# Patient Record
Sex: FEMALE | Race: ASIAN | NOT HISPANIC OR LATINO | Employment: UNEMPLOYED | URBAN - METROPOLITAN AREA
[De-identification: names, ages, dates, MRNs, and addresses within clinical notes are randomized per-mention and may not be internally consistent; named-entity substitution may affect disease eponyms.]

---

## 2017-02-09 ENCOUNTER — TRANSFERRED RECORDS (OUTPATIENT)
Dept: HEALTH INFORMATION MANAGEMENT | Facility: CLINIC | Age: 42
End: 2017-02-09

## 2017-02-10 ENCOUNTER — TRANSFERRED RECORDS (OUTPATIENT)
Dept: HEALTH INFORMATION MANAGEMENT | Facility: CLINIC | Age: 42
End: 2017-02-10

## 2017-11-17 ENCOUNTER — TRANSFERRED RECORDS (OUTPATIENT)
Dept: HEALTH INFORMATION MANAGEMENT | Facility: CLINIC | Age: 42
End: 2017-11-17

## 2019-07-22 ENCOUNTER — RECORDS - HEALTHEAST (OUTPATIENT)
Dept: ADMINISTRATIVE | Facility: OTHER | Age: 44
End: 2019-07-22

## 2019-07-22 ENCOUNTER — ANESTHESIA - HEALTHEAST (OUTPATIENT)
Dept: INTERVENTIONAL RADIOLOGY/VASCULAR | Facility: CLINIC | Age: 44
End: 2019-07-22

## 2019-07-22 ENCOUNTER — HOSPITAL ENCOUNTER (INPATIENT)
Dept: INTENSIVE CARE | Facility: CLINIC | Age: 44
Discharge: NURSING, REHAB OR LONG-TERM CARE | End: 2019-08-20
Attending: INTERNAL MEDICINE | Admitting: HOSPITALIST
Payer: COMMERCIAL

## 2019-07-22 DIAGNOSIS — G91.9 HYDROCEPHALUS (H): ICD-10-CM

## 2019-07-22 DIAGNOSIS — J96.00 ACUTE RESPIRATORY FAILURE, UNSPECIFIED WHETHER WITH HYPOXIA OR HYPERCAPNIA (H): ICD-10-CM

## 2019-07-22 LAB
ABO/RH(D): NORMAL
ACT BLD: 142 SECONDS (ref 105–167)
ACT BLD: 227 SECONDS (ref 105–167)
ACT BLD: 239 SECONDS (ref 105–167)
ACT BLD: 251 SECONDS (ref 105–167)
ANTIBODY SCREEN: NEGATIVE
LACTATE SERPL-SCNC: 1.9 MMOL/L (ref 0.5–2.2)
LACTATE SERPL-SCNC: 3.1 MMOL/L (ref 0.5–2.2)

## 2019-07-22 ASSESSMENT — MIFFLIN-ST. JEOR
SCORE: 1238.73

## 2019-07-23 ENCOUNTER — RECORDS - HEALTHEAST (OUTPATIENT)
Dept: ADMINISTRATIVE | Facility: OTHER | Age: 44
End: 2019-07-23

## 2019-07-23 LAB
ANION GAP SERPL CALCULATED.3IONS-SCNC: 10 MMOL/L (ref 5–18)
BUN SERPL-MCNC: 9 MG/DL (ref 8–22)
CALCIUM SERPL-MCNC: 8.4 MG/DL (ref 8.5–10.5)
CHLORIDE BLD-SCNC: 110 MMOL/L (ref 98–107)
CO2 SERPL-SCNC: 21 MMOL/L (ref 22–31)
CREAT SERPL-MCNC: 0.82 MG/DL (ref 0.6–1.1)
ERYTHROCYTE [DISTWIDTH] IN BLOOD BY AUTOMATED COUNT: 12.7 % (ref 11–14.5)
GFR SERPL CREATININE-BSD FRML MDRD: >60 ML/MIN/1.73M2
GLUCOSE BLD-MCNC: 165 MG/DL (ref 70–125)
GLUCOSE BLDC GLUCOMTR-MCNC: 130 MG/DL (ref 70–139)
HCT VFR BLD AUTO: 37.3 % (ref 35–47)
HGB BLD-MCNC: 12.4 G/DL (ref 12–16)
MCH RBC QN AUTO: 28.9 PG (ref 27–34)
MCHC RBC AUTO-ENTMCNC: 33.2 G/DL (ref 32–36)
MCV RBC AUTO: 87 FL (ref 80–100)
PLATELET # BLD AUTO: 246 THOU/UL (ref 140–440)
PMV BLD AUTO: 9.1 FL (ref 8.5–12.5)
POTASSIUM BLD-SCNC: 3.4 MMOL/L (ref 3.5–5)
RBC # BLD AUTO: 4.29 MILL/UL (ref 3.8–5.4)
SODIUM SERPL-SCNC: 141 MMOL/L (ref 136–145)
WBC: 11 THOU/UL (ref 4–11)

## 2019-07-23 ASSESSMENT — MIFFLIN-ST. JEOR
SCORE: 1235.56

## 2019-07-24 ENCOUNTER — RECORDS - HEALTHEAST (OUTPATIENT)
Dept: ADMINISTRATIVE | Facility: OTHER | Age: 44
End: 2019-07-24

## 2019-07-24 LAB
ANION GAP SERPL CALCULATED.3IONS-SCNC: 8 MMOL/L (ref 5–18)
BUN SERPL-MCNC: 9 MG/DL (ref 8–22)
CALCIUM SERPL-MCNC: 8.5 MG/DL (ref 8.5–10.5)
CHLORIDE BLD-SCNC: 111 MMOL/L (ref 98–107)
CO2 SERPL-SCNC: 20 MMOL/L (ref 22–31)
CREAT SERPL-MCNC: 0.72 MG/DL (ref 0.6–1.1)
ERYTHROCYTE [DISTWIDTH] IN BLOOD BY AUTOMATED COUNT: 12.7 % (ref 11–14.5)
GFR SERPL CREATININE-BSD FRML MDRD: >60 ML/MIN/1.73M2
GLUCOSE BLD-MCNC: 131 MG/DL (ref 70–125)
HCT VFR BLD AUTO: 34.4 % (ref 35–47)
HGB BLD-MCNC: 11.2 G/DL (ref 12–16)
MCH RBC QN AUTO: 28.9 PG (ref 27–34)
MCHC RBC AUTO-ENTMCNC: 32.6 G/DL (ref 32–36)
MCV RBC AUTO: 89 FL (ref 80–100)
PLATELET # BLD AUTO: 237 THOU/UL (ref 140–440)
PMV BLD AUTO: 9.6 FL (ref 8.5–12.5)
POTASSIUM BLD-SCNC: 3.9 MMOL/L (ref 3.5–5)
RBC # BLD AUTO: 3.87 MILL/UL (ref 3.8–5.4)
SODIUM SERPL-SCNC: 139 MMOL/L (ref 136–145)
WBC: 13.3 THOU/UL (ref 4–11)

## 2019-07-24 ASSESSMENT — MIFFLIN-ST. JEOR
SCORE: 1215.6

## 2019-07-25 ENCOUNTER — RECORDS - HEALTHEAST (OUTPATIENT)
Dept: ADMINISTRATIVE | Facility: OTHER | Age: 44
End: 2019-07-25

## 2019-07-25 ASSESSMENT — MIFFLIN-ST. JEOR
SCORE: 1234.65

## 2019-07-26 LAB
ANION GAP SERPL CALCULATED.3IONS-SCNC: 10 MMOL/L (ref 5–18)
AORTIC ROOT: 2.8 CM
AORTIC VALVE MEAN VELOCITY: 90.7 CM/S
ASCENDING AORTA: 3 CM
AV DIMENSIONLESS INDEX VTI: 0.8
AV MEAN GRADIENT: 4 MMHG
AV PEAK GRADIENT: 7.5 MMHG
AV VALVE AREA: 1.9 CM2
AV VELOCITY RATIO: 0.8
BSA FOR ECHO PROCEDURE: 1.7 M2
BUN SERPL-MCNC: 15 MG/DL (ref 8–22)
CALCIUM SERPL-MCNC: 9.1 MG/DL (ref 8.5–10.5)
CHLORIDE BLD-SCNC: 103 MMOL/L (ref 98–107)
CHOLEST SERPL-MCNC: 197 MG/DL
CO2 SERPL-SCNC: 23 MMOL/L (ref 22–31)
CREAT SERPL-MCNC: 0.73 MG/DL (ref 0.6–1.1)
CV BLOOD PRESSURE: ABNORMAL MMHG
CV ECHO HEIGHT: 58 IN
CV ECHO WEIGHT: 154 LBS
DOP CALC AO PEAK VEL: 137 CM/S
DOP CALC AO VTI: 29.6 CM
DOP CALC LVOT AREA: 2.27 CM2
DOP CALC LVOT DIAMETER: 1.7 CM
DOP CALC LVOT PEAK VEL: 106 CM/S
DOP CALC LVOT STROKE VOLUME: 55.1 CM3
DOP CALCLVOT PEAK VEL VTI: 24.3 CM
EJECTION FRACTION: 65 % (ref 55–75)
FASTING STATUS PATIENT QL REPORTED: ABNORMAL
FRACTIONAL SHORTENING: 50.3 % (ref 28–44)
GFR SERPL CREATININE-BSD FRML MDRD: >60 ML/MIN/1.73M2
GLUCOSE BLD-MCNC: 113 MG/DL (ref 70–125)
HDLC SERPL-MCNC: 43 MG/DL
INTERVENTRICULAR SEPTUM IN END DIASTOLE: 0.75 CM (ref 0.6–0.9)
IVS/PW RATIO: 1
LA AREA 1: 14.1 CM2
LA AREA 2: 15.1 CM2
LDLC SERPL CALC-MCNC: 105 MG/DL
LEFT ATRIUM LENGTH: 4.9 CM
LEFT ATRIUM SIZE: 3.3 CM
LEFT ATRIUM TO AORTIC ROOT RATIO: 1.18 NO UNITS
LEFT ATRIUM VOLUME INDEX: 21.7 ML/M2
LEFT ATRIUM VOLUME: 36.9 ML
LEFT VENTRICLE CARDIAC INDEX: 2 L/MIN/M2
LEFT VENTRICLE CARDIAC OUTPUT: 3.5 L/MIN
LEFT VENTRICLE DIASTOLIC VOLUME INDEX: 38.2 CM3/M2 (ref 29–61)
LEFT VENTRICLE DIASTOLIC VOLUME: 65 CM3 (ref 46–106)
LEFT VENTRICLE HEART RATE: 63 BPM
LEFT VENTRICLE MASS INDEX: 56.7 G/M2
LEFT VENTRICLE SYSTOLIC VOLUME INDEX: 13.5 CM3/M2 (ref 8–24)
LEFT VENTRICLE SYSTOLIC VOLUME: 23 CM3 (ref 14–42)
LEFT VENTRICULAR INTERNAL DIMENSION IN DIASTOLE: 4.29 CM (ref 3.8–5.2)
LEFT VENTRICULAR INTERNAL DIMENSION IN SYSTOLE: 2.13 CM (ref 2.2–3.5)
LEFT VENTRICULAR MASS: 96.3 G
LEFT VENTRICULAR OUTFLOW TRACT MEAN GRADIENT: 2 MMHG
LEFT VENTRICULAR OUTFLOW TRACT MEAN VELOCITY: 71.5 CM/S
LEFT VENTRICULAR OUTFLOW TRACT PEAK GRADIENT: 4 MMHG
LEFT VENTRICULAR POSTERIOR WALL IN END DIASTOLE: 0.75 CM (ref 0.6–0.9)
LV STROKE VOLUME INDEX: 32.4 ML/M2
MAGNESIUM SERPL-MCNC: 1.7 MG/DL (ref 1.8–2.6)
MITRAL VALVE DECELERATION SLOPE: 4230 MM/S2
MITRAL VALVE E/A RATIO: 1.7
MITRAL VALVE PRESSURE HALF-TIME: 74 MS
MV AVERAGE E/E' RATIO: 9.7 CM/S
MV DECELERATION TIME: 254 MS
MV E'TISSUE VEL-LAT: 12.1 CM/S
MV E'TISSUE VEL-MED: 10 CM/S
MV LATERAL E/E' RATIO: 8.8
MV MEDIAL E/E' RATIO: 10.7
MV PEAK A VELOCITY: 62.1 CM/S
MV PEAK E VELOCITY: 107 CM/S
MV VALVE AREA PRESSURE 1/2 METHOD: 3 CM2
NUC REST DIASTOLIC VOLUME INDEX: 2468.8 LBS
NUC REST SYSTOLIC VOLUME INDEX: 58 IN
PHOSPHATE SERPL-MCNC: 4.1 MG/DL (ref 2.5–4.5)
POTASSIUM BLD-SCNC: 3.9 MMOL/L (ref 3.5–5)
SODIUM SERPL-SCNC: 136 MMOL/L (ref 136–145)
TRICUSPID REGURGITATION PEAK PRESSURE GRADIENT: 23.4 MMHG
TRICUSPID VALVE ANULAR PLANE SYSTOLIC EXCURSION: 2.6 CM
TRICUSPID VALVE PEAK REGURGITANT VELOCITY: 242 CM/S
TRIGL SERPL-MCNC: 243 MG/DL

## 2019-07-27 ENCOUNTER — ANESTHESIA - HEALTHEAST (OUTPATIENT)
Dept: INTERVENTIONAL RADIOLOGY/VASCULAR | Facility: CLINIC | Age: 44
End: 2019-07-27

## 2019-07-27 ENCOUNTER — RECORDS - HEALTHEAST (OUTPATIENT)
Dept: ADMINISTRATIVE | Facility: OTHER | Age: 44
End: 2019-07-27

## 2019-07-27 LAB
ACT BLD: 130 SECONDS (ref 105–167)
ACT BLD: 243 SECONDS (ref 105–167)
ACT BLD: 247 SECONDS (ref 105–167)
ACT BLD: 267 SECONDS (ref 105–167)
ACT BLD: 276 SECONDS (ref 105–167)
BASE EXCESS BLDA CALC-SCNC: -1 MMOL/L
BASE EXCESS BLDA CALC-SCNC: -3.1 MMOL/L
CALCIUM, IONIZED MEASURED: 0.94 MMOL/L (ref 1.11–1.3)
CLOSURE TME COLL+ADP BLD: 53 SEC (ref 1–110)
CLOSURE TME COLL+EPINEP BLD: >300 SEC (ref 1–180)
COHGB MFR BLD: 100 % (ref 96–97)
COHGB MFR BLD: 100 % (ref 96–97)
GLUCOSE BLDC GLUCOMTR-MCNC: 144 MG/DL (ref 70–139)
HCO3, ARTERIAL CALC - HISTORICAL: 22.5 MMOL/L (ref 23–29)
HCO3, ARTERIAL CALC - HISTORICAL: 24.2 MMOL/L (ref 23–29)
INR PPP: 1.12 (ref 0.9–1.1)
ION CA PH 7.4: 0.92 MMOL/L (ref 1.11–1.3)
O2/TOTAL GAS SETTING VFR VENT: 0.6 %
O2/TOTAL GAS SETTING VFR VENT: 100 %
OXYHEMOGLOBIN - HISTORICAL: 97.6 % (ref 96–97)
OXYHEMOGLOBIN - HISTORICAL: 97.8 % (ref 96–97)
PCO2 BLD: 30 MM HG (ref 35–45)
PCO2 BLD: 34 MM HG (ref 35–45)
PEEP: 5 CM H2O
PEEP: 5 CM H2O
PH BLD: 7.4 [PH] (ref 7.37–7.44)
PH BLD: 7.47 [PH] (ref 7.37–7.44)
PH: 7.35 (ref 7.35–7.45)
PO2 BLD: 245 MM HG (ref 80–90)
PO2 BLD: 356 MM HG (ref 80–90)
RATE: 16 RR/MIN
RATE: 16 RR/MIN
SODIUM SERPL-SCNC: 142 MMOL/L (ref 136–145)
SODIUM SERPL-SCNC: 143 MMOL/L (ref 136–145)
TEMPERATURE: 37 DEGREES C
TEMPERATURE: 37 DEGREES C
VENTILATION MODE: ABNORMAL
VENTILATION MODE: ABNORMAL
VENTILATOR TIDAL VOLUME: 375 ML
VENTILATOR TIDAL VOLUME: 375 ML

## 2019-07-27 ASSESSMENT — MIFFLIN-ST. JEOR
SCORE: 1260.05

## 2019-07-28 ENCOUNTER — RECORDS - HEALTHEAST (OUTPATIENT)
Dept: ADMINISTRATIVE | Facility: OTHER | Age: 44
End: 2019-07-28

## 2019-07-28 LAB
ANION GAP SERPL CALCULATED.3IONS-SCNC: 9 MMOL/L (ref 5–18)
ANION GAP SERPL CALCULATED.3IONS-SCNC: NORMAL MMOL/L (ref 5–18)
BUN SERPL-MCNC: 6 MG/DL (ref 8–22)
BUN SERPL-MCNC: NORMAL MG/DL (ref 8–22)
CALCIUM SERPL-MCNC: 8.3 MG/DL (ref 8.5–10.5)
CALCIUM SERPL-MCNC: NORMAL MG/DL (ref 8.5–10.5)
CHLORIDE BLD-SCNC: 117 MMOL/L (ref 98–107)
CHLORIDE BLD-SCNC: NORMAL MMOL/L (ref 98–107)
CK SERPL-CCNC: 29 U/L (ref 30–190)
CO2 SERPL-SCNC: 17 MMOL/L (ref 22–31)
CO2 SERPL-SCNC: NORMAL MMOL/L (ref 22–31)
CREAT SERPL-MCNC: 0.63 MG/DL (ref 0.6–1.1)
CREAT SERPL-MCNC: NORMAL MG/DL (ref 0.6–1.1)
FASTING STATUS PATIENT QL REPORTED: YES
GFR SERPL CREATININE-BSD FRML MDRD: >60 ML/MIN/1.73M2
GFR SERPL CREATININE-BSD FRML MDRD: NORMAL ML/MIN/1.73M2
GLUCOSE BLD-MCNC: 136 MG/DL (ref 70–125)
GLUCOSE BLD-MCNC: NORMAL MG/DL (ref 70–125)
MAGNESIUM SERPL-MCNC: 2.1 MG/DL (ref 1.8–2.6)
POTASSIUM BLD-SCNC: 3.8 MMOL/L (ref 3.5–5)
POTASSIUM BLD-SCNC: 3.8 MMOL/L (ref 3.5–5)
POTASSIUM BLD-SCNC: NORMAL MMOL/L (ref 3.5–5)
SODIUM SERPL-SCNC: 142 MMOL/L (ref 136–145)
SODIUM SERPL-SCNC: 143 MMOL/L (ref 136–145)
SODIUM SERPL-SCNC: 143 MMOL/L (ref 136–145)
SODIUM SERPL-SCNC: 144 MMOL/L (ref 136–145)
SODIUM SERPL-SCNC: 144 MMOL/L (ref 136–145)
SODIUM SERPL-SCNC: NORMAL MMOL/L (ref 136–145)
SODIUM SERPL-SCNC: NORMAL MMOL/L (ref 136–145)
TRIGL SERPL-MCNC: 132 MG/DL

## 2019-07-28 ASSESSMENT — MIFFLIN-ST. JEOR
SCORE: 1238.28

## 2019-07-29 ENCOUNTER — RECORDS - HEALTHEAST (OUTPATIENT)
Dept: ADMINISTRATIVE | Facility: OTHER | Age: 44
End: 2019-07-29

## 2019-07-29 LAB
ERYTHROCYTE [DISTWIDTH] IN BLOOD BY AUTOMATED COUNT: 13 % (ref 11–14.5)
HCT VFR BLD AUTO: 32.4 % (ref 35–47)
HGB BLD-MCNC: 10.7 G/DL (ref 12–16)
MAGNESIUM SERPL-MCNC: 1.9 MG/DL (ref 1.8–2.6)
MCH RBC QN AUTO: 29.1 PG (ref 27–34)
MCHC RBC AUTO-ENTMCNC: 33 G/DL (ref 32–36)
MCV RBC AUTO: 88 FL (ref 80–100)
PLATELET # BLD AUTO: 217 THOU/UL (ref 140–440)
PMV BLD AUTO: 9.2 FL (ref 8.5–12.5)
POTASSIUM BLD-SCNC: 3.5 MMOL/L (ref 3.5–5)
RBC # BLD AUTO: 3.68 MILL/UL (ref 3.8–5.4)
SODIUM SERPL-SCNC: 143 MMOL/L (ref 136–145)
SODIUM SERPL-SCNC: 145 MMOL/L (ref 136–145)
SODIUM SERPL-SCNC: 146 MMOL/L (ref 136–145)
SODIUM SERPL-SCNC: 146 MMOL/L (ref 136–145)
WBC: 17.8 THOU/UL (ref 4–11)

## 2019-07-29 ASSESSMENT — MIFFLIN-ST. JEOR
SCORE: 1237.83

## 2019-07-30 ENCOUNTER — RECORDS - HEALTHEAST (OUTPATIENT)
Dept: ADMINISTRATIVE | Facility: OTHER | Age: 44
End: 2019-07-30

## 2019-07-30 LAB
ALBUMIN UR-MCNC: NEGATIVE MG/DL
ANION GAP SERPL CALCULATED.3IONS-SCNC: 7 MMOL/L (ref 5–18)
APPEARANCE UR: ABNORMAL
BACTERIA #/AREA URNS HPF: ABNORMAL HPF
BASOPHILS # BLD AUTO: 0 THOU/UL (ref 0–0.2)
BASOPHILS NFR BLD AUTO: 0 % (ref 0–2)
BILIRUB UR QL STRIP: NEGATIVE
BUN SERPL-MCNC: 3 MG/DL (ref 8–22)
CALCIUM SERPL-MCNC: 7.7 MG/DL (ref 8.5–10.5)
CHLORIDE BLD-SCNC: 122 MMOL/L (ref 98–107)
CO2 SERPL-SCNC: 19 MMOL/L (ref 22–31)
COLOR UR AUTO: ABNORMAL
CREAT SERPL-MCNC: 0.57 MG/DL (ref 0.6–1.1)
EOSINOPHIL # BLD AUTO: 0.2 THOU/UL (ref 0–0.4)
EOSINOPHIL NFR BLD AUTO: 1 % (ref 0–6)
ERYTHROCYTE [DISTWIDTH] IN BLOOD BY AUTOMATED COUNT: 13.2 % (ref 11–14.5)
GFR SERPL CREATININE-BSD FRML MDRD: >60 ML/MIN/1.73M2
GLUCOSE BLD-MCNC: 131 MG/DL (ref 70–125)
GLUCOSE UR STRIP-MCNC: ABNORMAL MG/DL
HCG UR QL: NEGATIVE
HCT VFR BLD AUTO: 30.3 % (ref 35–47)
HGB BLD-MCNC: 9.7 G/DL (ref 12–16)
HGB UR QL STRIP: NEGATIVE
KETONES UR STRIP-MCNC: NEGATIVE MG/DL
LEUKOCYTE ESTERASE UR QL STRIP: ABNORMAL
LEVETIRACETAM (KEPPRA): 8.8 UG/ML (ref 6–46)
LYMPHOCYTES # BLD AUTO: 1.2 THOU/UL (ref 0.8–4.4)
LYMPHOCYTES NFR BLD AUTO: 7 % (ref 20–40)
MAGNESIUM SERPL-MCNC: 1.8 MG/DL (ref 1.8–2.6)
MCH RBC QN AUTO: 28.8 PG (ref 27–34)
MCHC RBC AUTO-ENTMCNC: 32 G/DL (ref 32–36)
MCV RBC AUTO: 90 FL (ref 80–100)
MONOCYTES # BLD AUTO: 0.6 THOU/UL (ref 0–0.9)
MONOCYTES NFR BLD AUTO: 4 % (ref 2–10)
MUCOUS THREADS #/AREA URNS LPF: ABNORMAL LPF
NEUTROPHILS # BLD AUTO: 14.9 THOU/UL (ref 2–7.7)
NEUTROPHILS NFR BLD AUTO: 88 % (ref 50–70)
NITRATE UR QL: NEGATIVE
PH UR STRIP: 5 [PH] (ref 4.5–8)
PHENYTOIN (DILATIN) FREE: 1 UG/ML (ref 1–2)
PHENYTOIN SERPL-MCNC: 9.4 UG/ML (ref 10–20)
PLATELET # BLD AUTO: 220 THOU/UL (ref 140–440)
PMV BLD AUTO: 9.5 FL (ref 8.5–12.5)
POTASSIUM BLD-SCNC: 3.3 MMOL/L (ref 3.5–5)
POTASSIUM BLD-SCNC: 3.6 MMOL/L (ref 3.5–5)
POTASSIUM BLD-SCNC: 3.8 MMOL/L (ref 3.5–5)
RBC # BLD AUTO: 3.37 MILL/UL (ref 3.8–5.4)
RBC #/AREA URNS AUTO: ABNORMAL HPF
SODIUM SERPL-SCNC: 146 MMOL/L (ref 136–145)
SODIUM SERPL-SCNC: 148 MMOL/L (ref 136–145)
SODIUM SERPL-SCNC: 148 MMOL/L (ref 136–145)
SODIUM SERPL-SCNC: 149 MMOL/L (ref 136–145)
SP GR UR STRIP: 1.01 (ref 1–1.03)
SQUAMOUS #/AREA URNS AUTO: ABNORMAL LPF
UROBILINOGEN UR STRIP-ACNC: ABNORMAL
WBC #/AREA URNS AUTO: ABNORMAL HPF
WBC: 17.1 THOU/UL (ref 4–11)

## 2019-07-30 ASSESSMENT — MIFFLIN-ST. JEOR
SCORE: 1236.92

## 2019-07-31 ENCOUNTER — RECORDS - HEALTHEAST (OUTPATIENT)
Dept: ADMINISTRATIVE | Facility: OTHER | Age: 44
End: 2019-07-31

## 2019-07-31 LAB
MAGNESIUM SERPL-MCNC: 2 MG/DL (ref 1.8–2.6)
POTASSIUM BLD-SCNC: 3.3 MMOL/L (ref 3.5–5)
POTASSIUM BLD-SCNC: 3.7 MMOL/L (ref 3.5–5)
SODIUM SERPL-SCNC: 148 MMOL/L (ref 136–145)
SODIUM SERPL-SCNC: 148 MMOL/L (ref 136–145)
SODIUM SERPL-SCNC: 149 MMOL/L (ref 136–145)
SODIUM SERPL-SCNC: 150 MMOL/L (ref 136–145)

## 2019-07-31 ASSESSMENT — MIFFLIN-ST. JEOR
SCORE: 1241.91

## 2019-08-01 ENCOUNTER — RECORDS - HEALTHEAST (OUTPATIENT)
Dept: ADMINISTRATIVE | Facility: OTHER | Age: 44
End: 2019-08-01

## 2019-08-01 LAB
APPEARANCE CSF: ABNORMAL
BACTERIA SPEC CULT: ABNORMAL
BASOPHILS # BLD AUTO: 0 THOU/UL (ref 0–0.2)
BASOPHILS NFR BLD AUTO: 0 % (ref 0–2)
C DIFF TOX B STL QL: NEGATIVE
CHLORIDE BLD-SCNC: 122 MMOL/L (ref 98–107)
CO2 SERPL-SCNC: 20 MMOL/L (ref 22–31)
COLOR CSF: ABNORMAL
EOSINOPHIL # BLD AUTO: 0.3 THOU/UL (ref 0–0.4)
EOSINOPHIL NFR BLD AUTO: 2 % (ref 0–6)
ERYTHROCYTE [DISTWIDTH] IN BLOOD BY AUTOMATED COUNT: 13.7 % (ref 11–14.5)
FASTING STATUS PATIENT QL REPORTED: NO
GLUCOSE BLDC GLUCOMTR-MCNC: 128 MG/DL (ref 70–139)
GLUCOSE CSF-MCNC: 61 MG/DL (ref 40–75)
GRAM STAIN RESULT: NORMAL
GRAM STAIN RESULT: NORMAL
HCT VFR BLD AUTO: 25.1 % (ref 35–47)
HGB BLD-MCNC: 8 G/DL (ref 12–16)
LYMPH ABN NFR CSF MANUAL: 26 % (ref 0–98)
LYMPHOCYTES # BLD AUTO: 1.6 THOU/UL (ref 0.8–4.4)
LYMPHOCYTES NFR BLD AUTO: 12 % (ref 20–40)
MACROPHAGE % - HISTORICAL: 2 % (ref 0–98)
MAGNESIUM SERPL-MCNC: 2 MG/DL (ref 1.8–2.6)
MCH RBC QN AUTO: 28.7 PG (ref 27–34)
MCHC RBC AUTO-ENTMCNC: 31.9 G/DL (ref 32–36)
MCV RBC AUTO: 90 FL (ref 80–100)
MONOCYTES # BLD AUTO: 0.7 THOU/UL (ref 0–0.9)
MONOCYTES NFR BLD AUTO: 5 % (ref 2–10)
MONOS+MACROS NFR CSF MANUAL: 3 % (ref 0–98)
NEUTROPHILS # BLD AUTO: 10.7 THOU/UL (ref 2–7.7)
NEUTROPHILS NFR BLD AUTO: 80 % (ref 50–70)
NEUTROPHILS NFR CSF MANUAL: 70 % (ref 0–2)
PHENYTOIN (DILATIN) FREE: 1 UG/ML (ref 1–2)
PHENYTOIN SERPL-MCNC: 11.3 UG/ML (ref 10–20)
PLATELET # BLD AUTO: 187 THOU/UL (ref 140–440)
PMV BLD AUTO: 9.4 FL (ref 8.5–12.5)
POTASSIUM BLD-SCNC: 3.8 MMOL/L (ref 3.5–5)
PROT CSF-MCNC: 68 MG/DL (ref 15–45)
RBC # BLD AUTO: 2.79 MILL/UL (ref 3.8–5.4)
RBC # CSF MANUAL: ABNORMAL /UL
RIBOTYPE 027/NAP1/BI: NORMAL
SODIUM SERPL-SCNC: 145 MMOL/L (ref 136–145)
SODIUM SERPL-SCNC: 147 MMOL/L (ref 136–145)
SODIUM SERPL-SCNC: 147 MMOL/L (ref 136–145)
SODIUM SERPL-SCNC: 148 MMOL/L (ref 136–145)
SODIUM SERPL-SCNC: 149 MMOL/L (ref 136–145)
TRIGL SERPL-MCNC: 183 MG/DL
TUBE # CSF: ABNORMAL
VOLUME CSF - HISTORICAL: 3 ML
WBC # CSF MANUAL: 76 /UL
WBC: 13.4 THOU/UL (ref 4–11)

## 2019-08-01 ASSESSMENT — MIFFLIN-ST. JEOR
SCORE: 1239.64

## 2019-08-02 ENCOUNTER — RECORDS - HEALTHEAST (OUTPATIENT)
Dept: ADMINISTRATIVE | Facility: OTHER | Age: 44
End: 2019-08-02

## 2019-08-02 LAB
ANION GAP SERPL CALCULATED.3IONS-SCNC: 9 MMOL/L (ref 5–18)
BUN SERPL-MCNC: 8 MG/DL (ref 8–22)
CALCIUM SERPL-MCNC: 8.5 MG/DL (ref 8.5–10.5)
CHLORIDE BLD-SCNC: 113 MMOL/L (ref 98–107)
CO2 SERPL-SCNC: 23 MMOL/L (ref 22–31)
CREAT SERPL-MCNC: 0.63 MG/DL (ref 0.6–1.1)
CREAT SERPL-MCNC: 0.63 MG/DL (ref 0.6–1.1)
GFR SERPL CREATININE-BSD FRML MDRD: >60 ML/MIN/1.73M2
GFR SERPL CREATININE-BSD FRML MDRD: >60 ML/MIN/1.73M2
GLUCOSE BLD-MCNC: 137 MG/DL (ref 70–125)
GLUCOSE BLDC GLUCOMTR-MCNC: 130 MG/DL (ref 70–139)
GLUCOSE BLDC GLUCOMTR-MCNC: 141 MG/DL (ref 70–139)
MAGNESIUM SERPL-MCNC: 1.8 MG/DL (ref 1.8–2.6)
POTASSIUM BLD-SCNC: 3.6 MMOL/L (ref 3.5–5)
POTASSIUM BLD-SCNC: 3.6 MMOL/L (ref 3.5–5)
SODIUM SERPL-SCNC: 143 MMOL/L (ref 136–145)
SODIUM SERPL-SCNC: 145 MMOL/L (ref 136–145)
SODIUM SERPL-SCNC: 146 MMOL/L (ref 136–145)

## 2019-08-02 ASSESSMENT — MIFFLIN-ST. JEOR
SCORE: 1263.23

## 2019-08-03 ENCOUNTER — RECORDS - HEALTHEAST (OUTPATIENT)
Dept: ADMINISTRATIVE | Facility: OTHER | Age: 44
End: 2019-08-03

## 2019-08-03 LAB
ABO/RH(D): NORMAL
ANTIBODY SCREEN: NEGATIVE
APTT PPP: 32 SECONDS (ref 24–37)
BACTERIA SPEC CULT: ABNORMAL
BACTERIA SPEC CULT: ABNORMAL
BASE EXCESS BLDV CALC-SCNC: 5.4 MMOL/L
BASOPHILS # BLD AUTO: 0 THOU/UL (ref 0–0.2)
BASOPHILS # BLD AUTO: 0.1 THOU/UL (ref 0–0.2)
BASOPHILS NFR BLD AUTO: 0 % (ref 0–2)
BASOPHILS NFR BLD AUTO: 0 % (ref 0–2)
CHLORIDE BLD-SCNC: 108 MMOL/L (ref 98–107)
CK SERPL-CCNC: 437 U/L (ref 30–190)
CLOSURE TME COLL+EPINEP BLD: 112 SEC (ref 1–180)
CO2 SERPL-SCNC: 27 MMOL/L (ref 22–31)
EOSINOPHIL # BLD AUTO: 0.3 THOU/UL (ref 0–0.4)
EOSINOPHIL # BLD AUTO: 0.4 THOU/UL (ref 0–0.4)
EOSINOPHIL NFR BLD AUTO: 2 % (ref 0–6)
EOSINOPHIL NFR BLD AUTO: 3 % (ref 0–6)
ERYTHROCYTE [DISTWIDTH] IN BLOOD BY AUTOMATED COUNT: 12.9 % (ref 11–14.5)
ERYTHROCYTE [DISTWIDTH] IN BLOOD BY AUTOMATED COUNT: 13 % (ref 11–14.5)
GLUCOSE BLDC GLUCOMTR-MCNC: 125 MG/DL (ref 70–139)
GLUCOSE BLDC GLUCOMTR-MCNC: 147 MG/DL (ref 70–139)
GRAM STAIN RESULT: ABNORMAL
HCO3, VENOUS, CALC - HISTORICAL: 28.9 MMOL/L (ref 24–30)
HCT VFR BLD AUTO: 25 % (ref 35–47)
HCT VFR BLD AUTO: 30.8 % (ref 35–47)
HGB BLD-MCNC: 10.1 G/DL (ref 12–16)
HGB BLD-MCNC: 8 G/DL (ref 12–16)
INR PPP: 1.1 (ref 0.9–1.1)
LYMPHOCYTES # BLD AUTO: 1.2 THOU/UL (ref 0.8–4.4)
LYMPHOCYTES # BLD AUTO: 1.4 THOU/UL (ref 0.8–4.4)
LYMPHOCYTES NFR BLD AUTO: 11 % (ref 20–40)
LYMPHOCYTES NFR BLD AUTO: 7 % (ref 20–40)
MAGNESIUM SERPL-MCNC: 1.8 MG/DL (ref 1.8–2.6)
MAGNESIUM SERPL-MCNC: 1.8 MG/DL (ref 1.8–2.6)
MCH RBC QN AUTO: 28.7 PG (ref 27–34)
MCH RBC QN AUTO: 28.9 PG (ref 27–34)
MCHC RBC AUTO-ENTMCNC: 32 G/DL (ref 32–36)
MCHC RBC AUTO-ENTMCNC: 32.8 G/DL (ref 32–36)
MCV RBC AUTO: 88 FL (ref 80–100)
MCV RBC AUTO: 90 FL (ref 80–100)
MONOCYTES # BLD AUTO: 0.8 THOU/UL (ref 0–0.9)
MONOCYTES # BLD AUTO: 0.9 THOU/UL (ref 0–0.9)
MONOCYTES NFR BLD AUTO: 6 % (ref 2–10)
MONOCYTES NFR BLD AUTO: 7 % (ref 2–10)
NEUTROPHILS # BLD AUTO: 14.5 THOU/UL (ref 2–7.7)
NEUTROPHILS # BLD AUTO: 9.8 THOU/UL (ref 2–7.7)
NEUTROPHILS NFR BLD AUTO: 79 % (ref 50–70)
NEUTROPHILS NFR BLD AUTO: 86 % (ref 50–70)
OXYHEMOGLOBIN (VBG) - HISTORICAL: 80.8 % (ref 70–75)
OXYHGB MFR BLDV: 83.2 % (ref 70–75)
PCO2 BLDV: 43 MM HG (ref 35–50)
PH BLDV: 7.45 [PH] (ref 7.35–7.45)
PLATELET # BLD AUTO: 231 THOU/UL (ref 140–440)
PLATELET # BLD AUTO: 243 THOU/UL (ref 140–440)
PMV BLD AUTO: 10.3 FL (ref 8.5–12.5)
PMV BLD AUTO: 10.5 FL (ref 8.5–12.5)
PO2 BLDV: 42 MM HG (ref 25–47)
POTASSIUM BLD-SCNC: 3.4 MMOL/L (ref 3.5–5)
POTASSIUM BLD-SCNC: 4 MMOL/L (ref 3.5–5)
RBC # BLD AUTO: 2.79 MILL/UL (ref 3.8–5.4)
RBC # BLD AUTO: 3.5 MILL/UL (ref 3.8–5.4)
SODIUM SERPL-SCNC: 143 MMOL/L (ref 136–145)
SODIUM SERPL-SCNC: 144 MMOL/L (ref 136–145)
WBC: 12.5 THOU/UL (ref 4–11)
WBC: 17.1 THOU/UL (ref 4–11)

## 2019-08-03 ASSESSMENT — MIFFLIN-ST. JEOR
SCORE: 1263.23

## 2019-08-04 ENCOUNTER — RECORDS - HEALTHEAST (OUTPATIENT)
Dept: ADMINISTRATIVE | Facility: OTHER | Age: 44
End: 2019-08-04

## 2019-08-04 LAB
AEROBIC BLOOD CULTURE BOTTLE: NO GROWTH
AEROBIC BLOOD CULTURE BOTTLE: NO GROWTH
ANAEROBIC BLOOD CULTURE BOTTLE: ABNORMAL
ANAEROBIC BLOOD CULTURE BOTTLE: NO GROWTH
BACTERIA SPEC CULT: ABNORMAL
BACTERIA SPEC CULT: NO GROWTH
BACTERIA SPEC CULT: NO GROWTH
BLD PROD TYP BPU: NORMAL
BLOOD EXPIRATION DATE: NORMAL
BLOOD TYPE: 9500
CHLORIDE BLD-SCNC: 107 MMOL/L (ref 98–107)
CO2 SERPL-SCNC: 26 MMOL/L (ref 22–31)
CODING SYSTEM: NORMAL
COMPONENT (HISTORICAL CONVERSION): NORMAL
CROSSMATCH: NORMAL
GLUCOSE BLDC GLUCOMTR-MCNC: 119 MG/DL (ref 70–139)
GLUCOSE BLDC GLUCOMTR-MCNC: 163 MG/DL (ref 70–139)
GRAM STAIN RESULT: ABNORMAL
ISSUE DATE AND TIME: NORMAL
MAGNESIUM SERPL-MCNC: 1.7 MG/DL (ref 1.8–2.6)
POTASSIUM BLD-SCNC: 3.2 MMOL/L (ref 3.5–5)
POTASSIUM BLD-SCNC: 4 MMOL/L (ref 3.5–5)
SODIUM SERPL-SCNC: 141 MMOL/L (ref 136–145)
SODIUM SERPL-SCNC: 141 MMOL/L (ref 136–145)
SODIUM SERPL-SCNC: 142 MMOL/L (ref 136–145)
SODIUM SERPL-SCNC: 142 MMOL/L (ref 136–145)
STATUS (HISTORICAL CONVERSION): NORMAL
UNIT ABO/RH (HISTORICAL CONVERSION): NORMAL
UNIT NUMBER: NORMAL

## 2019-08-04 ASSESSMENT — MIFFLIN-ST. JEOR
SCORE: 1265.49

## 2019-08-05 ENCOUNTER — RECORDS - HEALTHEAST (OUTPATIENT)
Dept: ADMINISTRATIVE | Facility: OTHER | Age: 44
End: 2019-08-05

## 2019-08-05 LAB
AEROBIC BLOOD CULTURE BOTTLE: NO GROWTH
AEROBIC BLOOD CULTURE BOTTLE: NO GROWTH
ALBUMIN SERPL-MCNC: 2.7 G/DL (ref 3.5–5)
ALP SERPL-CCNC: 80 U/L (ref 45–120)
ALT SERPL W P-5'-P-CCNC: 65 U/L (ref 0–45)
ANAEROBIC BLOOD CULTURE BOTTLE: NO GROWTH
ANAEROBIC BLOOD CULTURE BOTTLE: NORMAL
ANION GAP SERPL CALCULATED.3IONS-SCNC: 6 MMOL/L (ref 5–18)
AST SERPL W P-5'-P-CCNC: 66 U/L (ref 0–40)
BASOPHILS # BLD AUTO: 0.1 THOU/UL (ref 0–0.2)
BASOPHILS NFR BLD AUTO: 0 % (ref 0–2)
BILIRUB SERPL-MCNC: 0.3 MG/DL (ref 0–1)
BUN SERPL-MCNC: 6 MG/DL (ref 8–22)
CALCIUM SERPL-MCNC: 8 MG/DL (ref 8.5–10.5)
CHLORIDE BLD-SCNC: 108 MMOL/L (ref 98–107)
CK SERPL-CCNC: 347 U/L (ref 30–190)
CO2 SERPL-SCNC: 25 MMOL/L (ref 22–31)
CREAT SERPL-MCNC: 0.52 MG/DL (ref 0.6–1.1)
EOSINOPHIL # BLD AUTO: 0.6 THOU/UL (ref 0–0.4)
EOSINOPHIL NFR BLD AUTO: 5 % (ref 0–6)
ERYTHROCYTE [DISTWIDTH] IN BLOOD BY AUTOMATED COUNT: 13.4 % (ref 11–14.5)
FASTING STATUS PATIENT QL REPORTED: NO
GFR SERPL CREATININE-BSD FRML MDRD: >60 ML/MIN/1.73M2
GLUCOSE BLD-MCNC: 126 MG/DL (ref 70–125)
GLUCOSE BLDC GLUCOMTR-MCNC: 122 MG/DL (ref 70–139)
GLUCOSE BLDC GLUCOMTR-MCNC: 122 MG/DL (ref 70–139)
GLUCOSE BLDC GLUCOMTR-MCNC: 125 MG/DL (ref 70–139)
HCT VFR BLD AUTO: 27 % (ref 35–47)
HGB BLD-MCNC: 9.1 G/DL (ref 12–16)
LYMPHOCYTES # BLD AUTO: 0.9 THOU/UL (ref 0.8–4.4)
LYMPHOCYTES NFR BLD AUTO: 8 % (ref 20–40)
MAGNESIUM SERPL-MCNC: 1.8 MG/DL (ref 1.8–2.6)
MCH RBC QN AUTO: 29.4 PG (ref 27–34)
MCHC RBC AUTO-ENTMCNC: 33.7 G/DL (ref 32–36)
MCV RBC AUTO: 87 FL (ref 80–100)
MONOCYTES # BLD AUTO: 0.6 THOU/UL (ref 0–0.9)
MONOCYTES NFR BLD AUTO: 5 % (ref 2–10)
NEUTROPHILS # BLD AUTO: 9 THOU/UL (ref 2–7.7)
NEUTROPHILS NFR BLD AUTO: 81 % (ref 50–70)
PLATELET # BLD AUTO: 215 THOU/UL (ref 140–440)
PMV BLD AUTO: 10.8 FL (ref 8.5–12.5)
POTASSIUM BLD-SCNC: 3.5 MMOL/L (ref 3.5–5)
POTASSIUM BLD-SCNC: 3.6 MMOL/L (ref 3.5–5)
PROT SERPL-MCNC: 5.9 G/DL (ref 6–8)
RBC # BLD AUTO: 3.1 MILL/UL (ref 3.8–5.4)
SODIUM SERPL-SCNC: 139 MMOL/L (ref 136–145)
SODIUM SERPL-SCNC: 140 MMOL/L (ref 136–145)
SODIUM SERPL-SCNC: 141 MMOL/L (ref 136–145)
SODIUM SERPL-SCNC: 142 MMOL/L (ref 136–145)
TRIGL SERPL-MCNC: 573 MG/DL
WBC: 11.2 THOU/UL (ref 4–11)

## 2019-08-05 ASSESSMENT — MIFFLIN-ST. JEOR
SCORE: 1277.29

## 2019-08-06 ENCOUNTER — RECORDS - HEALTHEAST (OUTPATIENT)
Dept: ADMINISTRATIVE | Facility: OTHER | Age: 44
End: 2019-08-06

## 2019-08-06 LAB
AEROBIC BLOOD CULTURE BOTTLE: NO GROWTH
AEROBIC BLOOD CULTURE BOTTLE: NO GROWTH
ALBUMIN SERPL-MCNC: 2.6 G/DL (ref 3.5–5)
ALP SERPL-CCNC: 76 U/L (ref 45–120)
ALT SERPL W P-5'-P-CCNC: 86 U/L (ref 0–45)
ANAEROBIC BLOOD CULTURE BOTTLE: NO GROWTH
ANAEROBIC BLOOD CULTURE BOTTLE: NO GROWTH
ANION GAP SERPL CALCULATED.3IONS-SCNC: 6 MMOL/L (ref 5–18)
AST SERPL W P-5'-P-CCNC: 87 U/L (ref 0–40)
BASOPHILS # BLD AUTO: 0.1 THOU/UL (ref 0–0.2)
BASOPHILS NFR BLD AUTO: 1 % (ref 0–2)
BILIRUB SERPL-MCNC: 0.3 MG/DL (ref 0–1)
BLD PROD TYP BPU: NORMAL
BLOOD EXPIRATION DATE: NORMAL
BLOOD TYPE: 9500
BUN SERPL-MCNC: 10 MG/DL (ref 8–22)
CALCIUM SERPL-MCNC: 8.4 MG/DL (ref 8.5–10.5)
CHLORIDE BLD-SCNC: 112 MMOL/L (ref 98–107)
CO2 SERPL-SCNC: 24 MMOL/L (ref 22–31)
CODING SYSTEM: NORMAL
COMPONENT (HISTORICAL CONVERSION): NORMAL
CREAT SERPL-MCNC: 0.58 MG/DL (ref 0.6–1.1)
CROSSMATCH: NORMAL
EOSINOPHIL # BLD AUTO: 0.4 THOU/UL (ref 0–0.4)
EOSINOPHIL NFR BLD AUTO: 5 % (ref 0–6)
ERYTHROCYTE [DISTWIDTH] IN BLOOD BY AUTOMATED COUNT: 13.6 % (ref 11–14.5)
FASTING STATUS PATIENT QL REPORTED: NO
GFR SERPL CREATININE-BSD FRML MDRD: >60 ML/MIN/1.73M2
GLUCOSE BLD-MCNC: 152 MG/DL (ref 70–125)
HCT VFR BLD AUTO: 28.9 % (ref 35–47)
HCT VFR BLD AUTO: 33.4 % (ref 35–47)
HGB BLD-MCNC: 9.4 G/DL (ref 12–16)
ISSUE DATE AND TIME: NORMAL
LYMPHOCYTES # BLD AUTO: 1.1 THOU/UL (ref 0.8–4.4)
LYMPHOCYTES NFR BLD AUTO: 13 % (ref 20–40)
MAGNESIUM SERPL-MCNC: 1.9 MG/DL (ref 1.8–2.6)
MCH RBC QN AUTO: 29.5 PG (ref 27–34)
MCHC RBC AUTO-ENTMCNC: 32.5 G/DL (ref 32–36)
MCV RBC AUTO: 91 FL (ref 80–100)
MONOCYTES # BLD AUTO: 0.5 THOU/UL (ref 0–0.9)
MONOCYTES NFR BLD AUTO: 6 % (ref 2–10)
NEUTROPHILS # BLD AUTO: 6.4 THOU/UL (ref 2–7.7)
NEUTROPHILS NFR BLD AUTO: 75 % (ref 50–70)
PLATELET # BLD AUTO: 217 THOU/UL (ref 140–440)
PMV BLD AUTO: 10.8 FL (ref 8.5–12.5)
POTASSIUM BLD-SCNC: 3.3 MMOL/L (ref 3.5–5)
POTASSIUM BLD-SCNC: 3.8 MMOL/L (ref 3.5–5)
PROT SERPL-MCNC: 5.6 G/DL (ref 6–8)
RBC # BLD AUTO: 3.19 MILL/UL (ref 3.8–5.4)
SODIUM SERPL-SCNC: 142 MMOL/L (ref 136–145)
SODIUM SERPL-SCNC: 142 MMOL/L (ref 136–145)
SODIUM SERPL-SCNC: 145 MMOL/L (ref 136–145)
STATUS (HISTORICAL CONVERSION): NORMAL
TRIGL SERPL-MCNC: 208 MG/DL
UNIT ABO/RH (HISTORICAL CONVERSION): NORMAL
UNIT NUMBER: NORMAL
WBC: 8.6 THOU/UL (ref 4–11)

## 2019-08-06 ASSESSMENT — MIFFLIN-ST. JEOR
SCORE: 1280.92

## 2019-08-07 ENCOUNTER — ANESTHESIA - HEALTHEAST (OUTPATIENT)
Dept: SURGERY | Facility: CLINIC | Age: 44
End: 2019-08-07

## 2019-08-07 ENCOUNTER — SURGERY - HEALTHEAST (OUTPATIENT)
Dept: SURGERY | Facility: CLINIC | Age: 44
End: 2019-08-07

## 2019-08-07 ENCOUNTER — RECORDS - HEALTHEAST (OUTPATIENT)
Dept: ADMINISTRATIVE | Facility: OTHER | Age: 44
End: 2019-08-07

## 2019-08-07 ENCOUNTER — ANESTHESIA - HEALTHEAST (OUTPATIENT)
Dept: MRI IMAGING | Facility: CLINIC | Age: 44
End: 2019-08-07

## 2019-08-07 LAB
ALBUMIN SERPL-MCNC: 2.3 G/DL (ref 3.5–5)
ALP SERPL-CCNC: 81 U/L (ref 45–120)
ALT SERPL W P-5'-P-CCNC: 142 U/L (ref 0–45)
ANION GAP SERPL CALCULATED.3IONS-SCNC: 5 MMOL/L (ref 5–18)
AST SERPL W P-5'-P-CCNC: 143 U/L (ref 0–40)
BACTERIA SPEC CULT: ABNORMAL
BACTERIA SPEC CULT: NO GROWTH
BASE EXCESS BLDA CALC-SCNC: 0.4 MMOL/L
BASOPHILS # BLD AUTO: 0.1 THOU/UL (ref 0–0.2)
BASOPHILS NFR BLD AUTO: 1 % (ref 0–2)
BILIRUB SERPL-MCNC: 0.3 MG/DL (ref 0–1)
BLD PROD TYP BPU: NORMAL
BLOOD EXPIRATION DATE: NORMAL
BLOOD TYPE: 5100
BUN SERPL-MCNC: 8 MG/DL (ref 8–22)
CALCIUM SERPL-MCNC: 7 MG/DL (ref 8.5–10.5)
CHLORIDE BLD-SCNC: 116 MMOL/L (ref 98–107)
CO2 SERPL-SCNC: 21 MMOL/L (ref 22–31)
CODING SYSTEM: NORMAL
COHGB MFR BLD: 95.8 % (ref 96–97)
COMPONENT (HISTORICAL CONVERSION): NORMAL
CREAT SERPL-MCNC: 0.43 MG/DL (ref 0.6–1.1)
CROSSMATCH: NORMAL
EOSINOPHIL # BLD AUTO: 0.5 THOU/UL (ref 0–0.4)
EOSINOPHIL NFR BLD AUTO: 5 % (ref 0–6)
ERYTHROCYTE [DISTWIDTH] IN BLOOD BY AUTOMATED COUNT: 14.2 % (ref 11–14.5)
GFR SERPL CREATININE-BSD FRML MDRD: >60 ML/MIN/1.73M2
GLUCOSE BLD-MCNC: 122 MG/DL (ref 70–125)
GLUCOSE BLDC GLUCOMTR-MCNC: 104 MG/DL (ref 70–139)
GLUCOSE BLDC GLUCOMTR-MCNC: 126 MG/DL (ref 70–139)
GLUCOSE BLDC GLUCOMTR-MCNC: 150 MG/DL (ref 70–139)
GRAM STAIN RESULT: NORMAL
GRAM STAIN RESULT: NORMAL
HCO3, ARTERIAL CALC - HISTORICAL: 25.1 MMOL/L (ref 23–29)
HCT VFR BLD AUTO: 30.1 % (ref 35–47)
HGB BLD-MCNC: 10.2 G/DL (ref 12–16)
ISSUE DATE AND TIME: NORMAL
LYMPHOCYTES # BLD AUTO: 1 THOU/UL (ref 0.8–4.4)
LYMPHOCYTES NFR BLD AUTO: 10 % (ref 20–40)
MAGNESIUM SERPL-MCNC: 1.6 MG/DL (ref 1.8–2.6)
MCH RBC QN AUTO: 29.8 PG (ref 27–34)
MCHC RBC AUTO-ENTMCNC: 33.9 G/DL (ref 32–36)
MCV RBC AUTO: 88 FL (ref 80–100)
MONOCYTES # BLD AUTO: 0.6 THOU/UL (ref 0–0.9)
MONOCYTES NFR BLD AUTO: 6 % (ref 2–10)
NEUTROPHILS # BLD AUTO: 8.1 THOU/UL (ref 2–7.7)
NEUTROPHILS NFR BLD AUTO: 79 % (ref 50–70)
O2/TOTAL GAS SETTING VFR VENT: 30 %
OVALOCYTES: ABNORMAL
OXYHEMOGLOBIN - HISTORICAL: 92.2 % (ref 96–97)
PCO2 BLD: 37 MM HG (ref 35–45)
PEEP: 5 CM H2O
PH BLD: 7.43 [PH] (ref 7.37–7.44)
PHOSPHATE SERPL-MCNC: 3.1 MG/DL (ref 2.5–4.5)
PLAT MORPH BLD: NORMAL
PLATELET # BLD AUTO: 191 THOU/UL (ref 140–440)
PMV BLD AUTO: 10.8 FL (ref 8.5–12.5)
PO2 BLD: 63 MM HG (ref 80–90)
POLYCHROMASIA BLD QL SMEAR: ABNORMAL
POTASSIUM BLD-SCNC: 3.1 MMOL/L (ref 3.5–5)
POTASSIUM BLD-SCNC: 3.8 MMOL/L (ref 3.5–5)
PROT SERPL-MCNC: 4.9 G/DL (ref 6–8)
RATE: 16 RR/MIN
RBC # BLD AUTO: 3.42 MILL/UL (ref 3.8–5.4)
SODIUM SERPL-SCNC: 141 MMOL/L (ref 136–145)
SODIUM SERPL-SCNC: 142 MMOL/L (ref 136–145)
SODIUM SERPL-SCNC: 142 MMOL/L (ref 136–145)
SODIUM SERPL-SCNC: 144 MMOL/L (ref 136–145)
STATUS (HISTORICAL CONVERSION): NORMAL
TEMPERATURE: 37 DEGREES C
UNIT ABO/RH (HISTORICAL CONVERSION): NORMAL
UNIT NUMBER: NORMAL
VENTILATION MODE: ABNORMAL
VENTILATOR TIDAL VOLUME: 375 ML
WBC: 10.3 THOU/UL (ref 4–11)

## 2019-08-07 ASSESSMENT — MIFFLIN-ST. JEOR
SCORE: 1299.06

## 2019-08-08 ENCOUNTER — RECORDS - HEALTHEAST (OUTPATIENT)
Dept: ADMINISTRATIVE | Facility: OTHER | Age: 44
End: 2019-08-08

## 2019-08-08 LAB
ALBUMIN SERPL-MCNC: 2.8 G/DL (ref 3.5–5)
ALP SERPL-CCNC: 95 U/L (ref 45–120)
ALT SERPL W P-5'-P-CCNC: 203 U/L (ref 0–45)
ANION GAP SERPL CALCULATED.3IONS-SCNC: 7 MMOL/L (ref 5–18)
AST SERPL W P-5'-P-CCNC: 146 U/L (ref 0–40)
BASE EXCESS BLDA CALC-SCNC: 0.7 MMOL/L
BASOPHILS # BLD AUTO: 0.1 THOU/UL (ref 0–0.2)
BASOPHILS NFR BLD AUTO: 0 % (ref 0–2)
BILIRUB SERPL-MCNC: 0.6 MG/DL (ref 0–1)
BUN SERPL-MCNC: 9 MG/DL (ref 8–22)
CALCIUM SERPL-MCNC: 8.3 MG/DL (ref 8.5–10.5)
CHLORIDE BLD-SCNC: 107 MMOL/L (ref 98–107)
CO2 SERPL-SCNC: 24 MMOL/L (ref 22–31)
COHGB MFR BLD: 98.5 % (ref 96–97)
CREAT SERPL-MCNC: 0.54 MG/DL (ref 0.6–1.1)
EOSINOPHIL # BLD AUTO: 0.3 THOU/UL (ref 0–0.4)
EOSINOPHIL NFR BLD AUTO: 2 % (ref 0–6)
ERYTHROCYTE [DISTWIDTH] IN BLOOD BY AUTOMATED COUNT: 13.8 % (ref 11–14.5)
GFR SERPL CREATININE-BSD FRML MDRD: >60 ML/MIN/1.73M2
GLUCOSE BLD-MCNC: 133 MG/DL (ref 70–125)
GLUCOSE BLDC GLUCOMTR-MCNC: 123 MG/DL (ref 70–139)
GLUCOSE BLDC GLUCOMTR-MCNC: 145 MG/DL (ref 70–139)
GLUCOSE BLDC GLUCOMTR-MCNC: 151 MG/DL (ref 70–139)
HCO3, ARTERIAL CALC - HISTORICAL: 25.4 MMOL/L (ref 23–29)
HCT VFR BLD AUTO: 34.1 % (ref 35–47)
HGB BLD-MCNC: 11.6 G/DL (ref 12–16)
LYMPHOCYTES # BLD AUTO: 1 THOU/UL (ref 0.8–4.4)
LYMPHOCYTES NFR BLD AUTO: 8 % (ref 20–40)
MAGNESIUM SERPL-MCNC: 2 MG/DL (ref 1.8–2.6)
MCH RBC QN AUTO: 29.4 PG (ref 27–34)
MCHC RBC AUTO-ENTMCNC: 34 G/DL (ref 32–36)
MCV RBC AUTO: 86 FL (ref 80–100)
MONOCYTES # BLD AUTO: 0.6 THOU/UL (ref 0–0.9)
MONOCYTES NFR BLD AUTO: 5 % (ref 2–10)
NEUTROPHILS # BLD AUTO: 11 THOU/UL (ref 2–7.7)
NEUTROPHILS NFR BLD AUTO: 84 % (ref 50–70)
O2/TOTAL GAS SETTING VFR VENT: 30 %
OXYHEMOGLOBIN - HISTORICAL: 94.9 % (ref 96–97)
PCO2 BLD: 33 MM HG (ref 35–45)
PEEP: 5 CM H2O
PH BLD: 7.47 [PH] (ref 7.37–7.44)
PLATELET # BLD AUTO: 253 THOU/UL (ref 140–440)
PMV BLD AUTO: 11 FL (ref 8.5–12.5)
PO2 BLD: 79 MM HG (ref 80–90)
POTASSIUM BLD-SCNC: 3.7 MMOL/L (ref 3.5–5)
PROT SERPL-MCNC: 6.1 G/DL (ref 6–8)
RATE: 14 RR/MIN
RBC # BLD AUTO: 3.95 MILL/UL (ref 3.8–5.4)
SODIUM SERPL-SCNC: 138 MMOL/L (ref 136–145)
SODIUM SERPL-SCNC: 141 MMOL/L (ref 136–145)
TEMPERATURE: 37 DEGREES C
VENTILATION MODE: ABNORMAL
VENTILATOR TIDAL VOLUME: 375 ML
WBC: 13 THOU/UL (ref 4–11)

## 2019-08-08 ASSESSMENT — MIFFLIN-ST. JEOR
SCORE: 1284.09

## 2019-08-09 ENCOUNTER — RECORDS - HEALTHEAST (OUTPATIENT)
Dept: ADMINISTRATIVE | Facility: OTHER | Age: 44
End: 2019-08-09

## 2019-08-09 LAB
ALBUMIN SERPL-MCNC: 2.9 G/DL (ref 3.5–5)
ALP SERPL-CCNC: 119 U/L (ref 45–120)
ALT SERPL W P-5'-P-CCNC: 159 U/L (ref 0–45)
ANION GAP SERPL CALCULATED.3IONS-SCNC: 7 MMOL/L (ref 5–18)
AST SERPL W P-5'-P-CCNC: 81 U/L (ref 0–40)
BASOPHILS # BLD AUTO: 0 THOU/UL (ref 0–0.2)
BASOPHILS NFR BLD AUTO: 0 % (ref 0–2)
BILIRUB SERPL-MCNC: 0.3 MG/DL (ref 0–1)
BUN SERPL-MCNC: 12 MG/DL (ref 8–22)
CALCIUM SERPL-MCNC: 8.3 MG/DL (ref 8.5–10.5)
CHLORIDE BLD-SCNC: 108 MMOL/L (ref 98–107)
CO2 SERPL-SCNC: 24 MMOL/L (ref 22–31)
CREAT SERPL-MCNC: 0.54 MG/DL (ref 0.6–1.1)
EOSINOPHIL # BLD AUTO: 0.5 THOU/UL (ref 0–0.4)
EOSINOPHIL NFR BLD AUTO: 4 % (ref 0–6)
ERYTHROCYTE [DISTWIDTH] IN BLOOD BY AUTOMATED COUNT: 14.1 % (ref 11–14.5)
GFR SERPL CREATININE-BSD FRML MDRD: >60 ML/MIN/1.73M2
GLUCOSE BLD-MCNC: 184 MG/DL (ref 70–125)
GLUCOSE BLDC GLUCOMTR-MCNC: 148 MG/DL (ref 70–139)
GLUCOSE BLDC GLUCOMTR-MCNC: 155 MG/DL (ref 70–139)
HCT VFR BLD AUTO: 32 % (ref 35–47)
HGB BLD-MCNC: 10.8 G/DL (ref 12–16)
LYMPHOCYTES # BLD AUTO: 0.8 THOU/UL (ref 0.8–4.4)
LYMPHOCYTES NFR BLD AUTO: 8 % (ref 20–40)
MAGNESIUM SERPL-MCNC: 1.8 MG/DL (ref 1.8–2.6)
MCH RBC QN AUTO: 29.4 PG (ref 27–34)
MCHC RBC AUTO-ENTMCNC: 33.8 G/DL (ref 32–36)
MCV RBC AUTO: 87 FL (ref 80–100)
MONOCYTES # BLD AUTO: 0.5 THOU/UL (ref 0–0.9)
MONOCYTES NFR BLD AUTO: 5 % (ref 2–10)
NEUTROPHILS # BLD AUTO: 8.7 THOU/UL (ref 2–7.7)
NEUTROPHILS NFR BLD AUTO: 82 % (ref 50–70)
PHOSPHATE SERPL-MCNC: 2.7 MG/DL (ref 2.5–4.5)
PLATELET # BLD AUTO: 292 THOU/UL (ref 140–440)
PMV BLD AUTO: 10.9 FL (ref 8.5–12.5)
POTASSIUM BLD-SCNC: 2.9 MMOL/L (ref 3.5–5)
POTASSIUM BLD-SCNC: 4 MMOL/L (ref 3.5–5)
PROT SERPL-MCNC: 6.2 G/DL (ref 6–8)
RBC # BLD AUTO: 3.67 MILL/UL (ref 3.8–5.4)
SODIUM SERPL-SCNC: 138 MMOL/L (ref 136–145)
SODIUM SERPL-SCNC: 139 MMOL/L (ref 136–145)
WBC: 10.6 THOU/UL (ref 4–11)

## 2019-08-09 ASSESSMENT — MIFFLIN-ST. JEOR
SCORE: 1276.38

## 2019-08-10 ENCOUNTER — RECORDS - HEALTHEAST (OUTPATIENT)
Dept: ADMINISTRATIVE | Facility: OTHER | Age: 44
End: 2019-08-10

## 2019-08-10 LAB
AEROBIC BLOOD CULTURE BOTTLE: NO GROWTH
ALBUMIN SERPL-MCNC: 3 G/DL (ref 3.5–5)
ALP SERPL-CCNC: 105 U/L (ref 45–120)
ALT SERPL W P-5'-P-CCNC: 132 U/L (ref 0–45)
ANAEROBIC BLOOD CULTURE BOTTLE: NORMAL
ANION GAP SERPL CALCULATED.3IONS-SCNC: 7 MMOL/L (ref 5–18)
AST SERPL W P-5'-P-CCNC: 57 U/L (ref 0–40)
BASOPHILS # BLD AUTO: 0.1 THOU/UL (ref 0–0.2)
BASOPHILS NFR BLD AUTO: 1 % (ref 0–2)
BILIRUB SERPL-MCNC: 0.3 MG/DL (ref 0–1)
BUN SERPL-MCNC: 9 MG/DL (ref 8–22)
CALCIUM SERPL-MCNC: 8.7 MG/DL (ref 8.5–10.5)
CHLORIDE BLD-SCNC: 109 MMOL/L (ref 98–107)
CO2 SERPL-SCNC: 23 MMOL/L (ref 22–31)
CREAT SERPL-MCNC: 0.51 MG/DL (ref 0.6–1.1)
EOSINOPHIL # BLD AUTO: 0.6 THOU/UL (ref 0–0.4)
EOSINOPHIL NFR BLD AUTO: 6 % (ref 0–6)
ERYTHROCYTE [DISTWIDTH] IN BLOOD BY AUTOMATED COUNT: 14.7 % (ref 11–14.5)
GFR SERPL CREATININE-BSD FRML MDRD: >60 ML/MIN/1.73M2
GLUCOSE BLD-MCNC: 138 MG/DL (ref 70–125)
GLUCOSE BLDC GLUCOMTR-MCNC: 141 MG/DL (ref 70–139)
HCT VFR BLD AUTO: 33.6 % (ref 35–47)
HGB BLD-MCNC: 11.2 G/DL (ref 12–16)
LYMPHOCYTES # BLD AUTO: 1.2 THOU/UL (ref 0.8–4.4)
LYMPHOCYTES NFR BLD AUTO: 12 % (ref 20–40)
MAGNESIUM SERPL-MCNC: 1.9 MG/DL (ref 1.8–2.6)
MCH RBC QN AUTO: 29.7 PG (ref 27–34)
MCHC RBC AUTO-ENTMCNC: 33.3 G/DL (ref 32–36)
MCV RBC AUTO: 89 FL (ref 80–100)
MONOCYTES # BLD AUTO: 0.6 THOU/UL (ref 0–0.9)
MONOCYTES NFR BLD AUTO: 6 % (ref 2–10)
NEUTROPHILS # BLD AUTO: 7.4 THOU/UL (ref 2–7.7)
NEUTROPHILS NFR BLD AUTO: 75 % (ref 50–70)
PLATELET # BLD AUTO: 349 THOU/UL (ref 140–440)
PMV BLD AUTO: 10.8 FL (ref 8.5–12.5)
POTASSIUM BLD-SCNC: 3.7 MMOL/L (ref 3.5–5)
PROCALCITONIN SERPL-MCNC: 0.05 NG/ML (ref 0–0.49)
PROT SERPL-MCNC: 6.2 G/DL (ref 6–8)
RBC # BLD AUTO: 3.77 MILL/UL (ref 3.8–5.4)
SODIUM SERPL-SCNC: 139 MMOL/L (ref 136–145)
WBC: 9.8 THOU/UL (ref 4–11)

## 2019-08-10 ASSESSMENT — MIFFLIN-ST. JEOR
SCORE: 1274.57

## 2019-08-11 ENCOUNTER — RECORDS - HEALTHEAST (OUTPATIENT)
Dept: ADMINISTRATIVE | Facility: OTHER | Age: 44
End: 2019-08-11

## 2019-08-11 LAB
ALBUMIN SERPL-MCNC: 3.1 G/DL (ref 3.5–5)
ALP SERPL-CCNC: 102 U/L (ref 45–120)
ALT SERPL W P-5'-P-CCNC: 132 U/L (ref 0–45)
ANION GAP SERPL CALCULATED.3IONS-SCNC: 7 MMOL/L (ref 5–18)
AST SERPL W P-5'-P-CCNC: 61 U/L (ref 0–40)
BASE EXCESS BLDV CALC-SCNC: 0 MMOL/L
BASOPHILS # BLD AUTO: 0.1 THOU/UL (ref 0–0.2)
BASOPHILS NFR BLD AUTO: 1 % (ref 0–2)
BILIRUB SERPL-MCNC: 0.3 MG/DL (ref 0–1)
BUN SERPL-MCNC: 9 MG/DL (ref 8–22)
CALCIUM SERPL-MCNC: 9 MG/DL (ref 8.5–10.5)
CHLORIDE BLD-SCNC: 108 MMOL/L (ref 98–107)
CO2 SERPL-SCNC: 23 MMOL/L (ref 22–31)
CREAT SERPL-MCNC: 0.53 MG/DL (ref 0.6–1.1)
EOSINOPHIL # BLD AUTO: 0.4 THOU/UL (ref 0–0.4)
EOSINOPHIL NFR BLD AUTO: 5 % (ref 0–6)
ERYTHROCYTE [DISTWIDTH] IN BLOOD BY AUTOMATED COUNT: 14.5 % (ref 11–14.5)
GFR SERPL CREATININE-BSD FRML MDRD: >60 ML/MIN/1.73M2
GLUCOSE BLD-MCNC: 112 MG/DL (ref 70–125)
HCO3, VENOUS, CALC - HISTORICAL: 24.4 MMOL/L (ref 24–30)
HCT VFR BLD AUTO: 31.8 % (ref 35–47)
HGB BLD-MCNC: 10.5 G/DL (ref 12–16)
LYMPHOCYTES # BLD AUTO: 0.9 THOU/UL (ref 0.8–4.4)
LYMPHOCYTES NFR BLD AUTO: 12 % (ref 20–40)
MAGNESIUM SERPL-MCNC: 1.9 MG/DL (ref 1.8–2.6)
MCH RBC QN AUTO: 29.2 PG (ref 27–34)
MCHC RBC AUTO-ENTMCNC: 33 G/DL (ref 32–36)
MCV RBC AUTO: 88 FL (ref 80–100)
MONOCYTES # BLD AUTO: 0.5 THOU/UL (ref 0–0.9)
MONOCYTES NFR BLD AUTO: 6 % (ref 2–10)
NEUTROPHILS # BLD AUTO: 5.7 THOU/UL (ref 2–7.7)
NEUTROPHILS NFR BLD AUTO: 76 % (ref 50–70)
OXYHEMOGLOBIN (VBG) - HISTORICAL: 75.3 % (ref 70–75)
OXYHGB MFR BLDV: 78.1 % (ref 70–75)
PCO2 BLDV: 34 MM HG (ref 35–50)
PH BLDV: 7.45 [PH] (ref 7.35–7.45)
PLATELET # BLD AUTO: 307 THOU/UL (ref 140–440)
PMV BLD AUTO: 10.3 FL (ref 8.5–12.5)
PO2 BLDV: 39 MM HG (ref 25–47)
POTASSIUM BLD-SCNC: 3.5 MMOL/L (ref 3.5–5)
POTASSIUM BLD-SCNC: 3.9 MMOL/L (ref 3.5–5)
PROT SERPL-MCNC: 6.1 G/DL (ref 6–8)
RBC # BLD AUTO: 3.6 MILL/UL (ref 3.8–5.4)
SODIUM SERPL-SCNC: 138 MMOL/L (ref 136–145)
WBC: 7.5 THOU/UL (ref 4–11)

## 2019-08-11 ASSESSMENT — MIFFLIN-ST. JEOR
SCORE: 1281.82

## 2019-08-12 ENCOUNTER — RECORDS - HEALTHEAST (OUTPATIENT)
Dept: ADMINISTRATIVE | Facility: OTHER | Age: 44
End: 2019-08-12

## 2019-08-12 LAB
ALBUMIN SERPL-MCNC: 3.4 G/DL (ref 3.5–5)
ALP SERPL-CCNC: 100 U/L (ref 45–120)
ALT SERPL W P-5'-P-CCNC: 121 U/L (ref 0–45)
ANION GAP SERPL CALCULATED.3IONS-SCNC: 7 MMOL/L (ref 5–18)
AST SERPL W P-5'-P-CCNC: 49 U/L (ref 0–40)
BASOPHILS # BLD AUTO: 0.1 THOU/UL (ref 0–0.2)
BASOPHILS NFR BLD AUTO: 1 % (ref 0–2)
BILIRUB SERPL-MCNC: 0.4 MG/DL (ref 0–1)
BUN SERPL-MCNC: 9 MG/DL (ref 8–22)
CALCIUM SERPL-MCNC: 9.8 MG/DL (ref 8.5–10.5)
CHLORIDE BLD-SCNC: 106 MMOL/L (ref 98–107)
CO2 SERPL-SCNC: 25 MMOL/L (ref 22–31)
CREAT SERPL-MCNC: 0.54 MG/DL (ref 0.6–1.1)
EOSINOPHIL # BLD AUTO: 0.3 THOU/UL (ref 0–0.4)
EOSINOPHIL NFR BLD AUTO: 4 % (ref 0–6)
ERYTHROCYTE [DISTWIDTH] IN BLOOD BY AUTOMATED COUNT: 14.3 % (ref 11–14.5)
GFR SERPL CREATININE-BSD FRML MDRD: >60 ML/MIN/1.73M2
GLUCOSE BLD-MCNC: 129 MG/DL (ref 70–125)
HCT VFR BLD AUTO: 34.6 % (ref 35–47)
HGB BLD-MCNC: 11.4 G/DL (ref 12–16)
LYMPHOCYTES # BLD AUTO: 1 THOU/UL (ref 0.8–4.4)
LYMPHOCYTES NFR BLD AUTO: 13 % (ref 20–40)
MAGNESIUM SERPL-MCNC: 1.9 MG/DL (ref 1.8–2.6)
MCH RBC QN AUTO: 29.7 PG (ref 27–34)
MCHC RBC AUTO-ENTMCNC: 32.9 G/DL (ref 32–36)
MCV RBC AUTO: 90 FL (ref 80–100)
MONOCYTES # BLD AUTO: 0.4 THOU/UL (ref 0–0.9)
MONOCYTES NFR BLD AUTO: 5 % (ref 2–10)
NEUTROPHILS # BLD AUTO: 5.7 THOU/UL (ref 2–7.7)
NEUTROPHILS NFR BLD AUTO: 77 % (ref 50–70)
OSMOLALITY SERPL: 288 MOSM/KG (ref 270–300)
PLATELET # BLD AUTO: 354 THOU/UL (ref 140–440)
PMV BLD AUTO: 10.5 FL (ref 8.5–12.5)
POTASSIUM BLD-SCNC: 3.6 MMOL/L (ref 3.5–5)
PROT SERPL-MCNC: 6.6 G/DL (ref 6–8)
RBC # BLD AUTO: 3.84 MILL/UL (ref 3.8–5.4)
SODIUM SERPL-SCNC: 138 MMOL/L (ref 136–145)
WBC: 7.5 THOU/UL (ref 4–11)

## 2019-08-12 ASSESSMENT — MIFFLIN-ST. JEOR
SCORE: 1271.39

## 2019-08-13 ENCOUNTER — RECORDS - HEALTHEAST (OUTPATIENT)
Dept: ADMINISTRATIVE | Facility: OTHER | Age: 44
End: 2019-08-13

## 2019-08-13 ENCOUNTER — AMBULATORY - HEALTHEAST (OUTPATIENT)
Dept: INTERVENTIONAL RADIOLOGY/VASCULAR | Facility: CLINIC | Age: 44
End: 2019-08-13

## 2019-08-13 DIAGNOSIS — I67.0: ICD-10-CM

## 2019-08-13 LAB
ALBUMIN SERPL-MCNC: 4 G/DL (ref 3.5–5)
ALP SERPL-CCNC: 108 U/L (ref 45–120)
ALT SERPL W P-5'-P-CCNC: 113 U/L (ref 0–45)
ANION GAP SERPL CALCULATED.3IONS-SCNC: 12 MMOL/L (ref 5–18)
AST SERPL W P-5'-P-CCNC: 56 U/L (ref 0–40)
BASOPHILS # BLD AUTO: 0.1 THOU/UL (ref 0–0.2)
BASOPHILS NFR BLD AUTO: 1 % (ref 0–2)
BILIRUB SERPL-MCNC: 0.6 MG/DL (ref 0–1)
BUN SERPL-MCNC: 10 MG/DL (ref 8–22)
CALCIUM SERPL-MCNC: 9.8 MG/DL (ref 8.5–10.5)
CHLORIDE BLD-SCNC: 105 MMOL/L (ref 98–107)
CO2 SERPL-SCNC: 22 MMOL/L (ref 22–31)
CREAT SERPL-MCNC: 0.59 MG/DL (ref 0.6–1.1)
EOSINOPHIL # BLD AUTO: 0.3 THOU/UL (ref 0–0.4)
EOSINOPHIL NFR BLD AUTO: 3 % (ref 0–6)
ERYTHROCYTE [DISTWIDTH] IN BLOOD BY AUTOMATED COUNT: 14.5 % (ref 11–14.5)
GFR SERPL CREATININE-BSD FRML MDRD: >60 ML/MIN/1.73M2
GLUCOSE BLD-MCNC: 111 MG/DL (ref 70–125)
GLUCOSE BLDC GLUCOMTR-MCNC: 103 MG/DL (ref 70–139)
HCT VFR BLD AUTO: 36 % (ref 35–47)
HGB BLD-MCNC: 12.2 G/DL (ref 12–16)
LYMPHOCYTES # BLD AUTO: 1.1 THOU/UL (ref 0.8–4.4)
LYMPHOCYTES NFR BLD AUTO: 14 % (ref 20–40)
MAGNESIUM SERPL-MCNC: 2 MG/DL (ref 1.8–2.6)
MCH RBC QN AUTO: 29.6 PG (ref 27–34)
MCHC RBC AUTO-ENTMCNC: 33.9 G/DL (ref 32–36)
MCV RBC AUTO: 87 FL (ref 80–100)
MONOCYTES # BLD AUTO: 0.6 THOU/UL (ref 0–0.9)
MONOCYTES NFR BLD AUTO: 7 % (ref 2–10)
NEUTROPHILS # BLD AUTO: 6.2 THOU/UL (ref 2–7.7)
NEUTROPHILS NFR BLD AUTO: 75 % (ref 50–70)
PLATELET # BLD AUTO: 408 THOU/UL (ref 140–440)
PMV BLD AUTO: 10.2 FL (ref 8.5–12.5)
POTASSIUM BLD-SCNC: 3.6 MMOL/L (ref 3.5–5)
PROT SERPL-MCNC: 7.6 G/DL (ref 6–8)
RBC # BLD AUTO: 4.12 MILL/UL (ref 3.8–5.4)
SODIUM SERPL-SCNC: 139 MMOL/L (ref 136–145)
WBC: 8.3 THOU/UL (ref 4–11)

## 2019-08-13 ASSESSMENT — MIFFLIN-ST. JEOR
SCORE: 1210.61

## 2019-08-14 ENCOUNTER — RECORDS - HEALTHEAST (OUTPATIENT)
Dept: ADMINISTRATIVE | Facility: OTHER | Age: 44
End: 2019-08-14

## 2019-08-14 ENCOUNTER — ANESTHESIA - HEALTHEAST (OUTPATIENT)
Dept: SURGERY | Facility: CLINIC | Age: 44
End: 2019-08-14

## 2019-08-14 LAB
ALBUMIN SERPL-MCNC: 4.1 G/DL (ref 3.5–5)
ALP SERPL-CCNC: 111 U/L (ref 45–120)
ALT SERPL W P-5'-P-CCNC: 86 U/L (ref 0–45)
ANION GAP SERPL CALCULATED.3IONS-SCNC: 10 MMOL/L (ref 5–18)
APTT PPP: 31 SECONDS (ref 24–37)
AST SERPL W P-5'-P-CCNC: 40 U/L (ref 0–40)
BASOPHILS # BLD AUTO: 0.1 THOU/UL (ref 0–0.2)
BASOPHILS NFR BLD AUTO: 1 % (ref 0–2)
BILIRUB SERPL-MCNC: 0.6 MG/DL (ref 0–1)
BUN SERPL-MCNC: 13 MG/DL (ref 8–22)
CALCIUM SERPL-MCNC: 9.8 MG/DL (ref 8.5–10.5)
CHLORIDE BLD-SCNC: 104 MMOL/L (ref 98–107)
CLOSURE TME COLL+EPINEP BLD: 131 SEC (ref 1–180)
CO2 SERPL-SCNC: 22 MMOL/L (ref 22–31)
CREAT SERPL-MCNC: 0.59 MG/DL (ref 0.6–1.1)
EOSINOPHIL # BLD AUTO: 0.3 THOU/UL (ref 0–0.4)
EOSINOPHIL NFR BLD AUTO: 3 % (ref 0–6)
ERYTHROCYTE [DISTWIDTH] IN BLOOD BY AUTOMATED COUNT: 14.3 % (ref 11–14.5)
GFR SERPL CREATININE-BSD FRML MDRD: >60 ML/MIN/1.73M2
GLUCOSE BLD-MCNC: 118 MG/DL (ref 70–125)
HCT VFR BLD AUTO: 36.4 % (ref 35–47)
HGB BLD-MCNC: 12.1 G/DL (ref 12–16)
INR PPP: 1.01 (ref 0.9–1.1)
LYMPHOCYTES # BLD AUTO: 0.8 THOU/UL (ref 0.8–4.4)
LYMPHOCYTES NFR BLD AUTO: 10 % (ref 20–40)
MAGNESIUM SERPL-MCNC: 2.1 MG/DL (ref 1.8–2.6)
MCH RBC QN AUTO: 29.2 PG (ref 27–34)
MCHC RBC AUTO-ENTMCNC: 33.2 G/DL (ref 32–36)
MCV RBC AUTO: 88 FL (ref 80–100)
MONOCYTES # BLD AUTO: 0.5 THOU/UL (ref 0–0.9)
MONOCYTES NFR BLD AUTO: 6 % (ref 2–10)
NEUTROPHILS # BLD AUTO: 6.4 THOU/UL (ref 2–7.7)
NEUTROPHILS NFR BLD AUTO: 79 % (ref 50–70)
PLATELET # BLD AUTO: 435 THOU/UL (ref 140–440)
PMV BLD AUTO: 9.8 FL (ref 8.5–12.5)
POTASSIUM BLD-SCNC: 3.9 MMOL/L (ref 3.5–5)
PROT SERPL-MCNC: 7.8 G/DL (ref 6–8)
RBC # BLD AUTO: 4.14 MILL/UL (ref 3.8–5.4)
SODIUM SERPL-SCNC: 136 MMOL/L (ref 136–145)
WBC: 8.1 THOU/UL (ref 4–11)

## 2019-08-14 ASSESSMENT — MIFFLIN-ST. JEOR
SCORE: 1193.83

## 2019-08-15 ENCOUNTER — RECORDS - HEALTHEAST (OUTPATIENT)
Dept: ADMINISTRATIVE | Facility: OTHER | Age: 44
End: 2019-08-15

## 2019-08-15 LAB
ALBUMIN SERPL-MCNC: 4.2 G/DL (ref 3.5–5)
ALP SERPL-CCNC: 110 U/L (ref 45–120)
ALT SERPL W P-5'-P-CCNC: 72 U/L (ref 0–45)
ANION GAP SERPL CALCULATED.3IONS-SCNC: 9 MMOL/L (ref 5–18)
AST SERPL W P-5'-P-CCNC: 44 U/L (ref 0–40)
BASOPHILS # BLD AUTO: 0.1 THOU/UL (ref 0–0.2)
BASOPHILS NFR BLD AUTO: 1 % (ref 0–2)
BILIRUB SERPL-MCNC: 0.5 MG/DL (ref 0–1)
BUN SERPL-MCNC: 16 MG/DL (ref 8–22)
CALCIUM SERPL-MCNC: 10.2 MG/DL (ref 8.5–10.5)
CHLORIDE BLD-SCNC: 103 MMOL/L (ref 98–107)
CO2 SERPL-SCNC: 24 MMOL/L (ref 22–31)
CREAT SERPL-MCNC: 0.6 MG/DL (ref 0.6–1.1)
EOSINOPHIL # BLD AUTO: 0.3 THOU/UL (ref 0–0.4)
EOSINOPHIL NFR BLD AUTO: 4 % (ref 0–6)
ERYTHROCYTE [DISTWIDTH] IN BLOOD BY AUTOMATED COUNT: 14.2 % (ref 11–14.5)
GFR SERPL CREATININE-BSD FRML MDRD: >60 ML/MIN/1.73M2
GLUCOSE BLD-MCNC: 105 MG/DL (ref 70–125)
HCT VFR BLD AUTO: 39.4 % (ref 35–47)
HGB BLD-MCNC: 12.8 G/DL (ref 12–16)
LYMPHOCYTES # BLD AUTO: 1.4 THOU/UL (ref 0.8–4.4)
LYMPHOCYTES NFR BLD AUTO: 19 % (ref 20–40)
MAGNESIUM SERPL-MCNC: 2.2 MG/DL (ref 1.8–2.6)
MCH RBC QN AUTO: 29.6 PG (ref 27–34)
MCHC RBC AUTO-ENTMCNC: 32.5 G/DL (ref 32–36)
MCV RBC AUTO: 91 FL (ref 80–100)
MONOCYTES # BLD AUTO: 0.8 THOU/UL (ref 0–0.9)
MONOCYTES NFR BLD AUTO: 10 % (ref 2–10)
NEUTROPHILS # BLD AUTO: 4.6 THOU/UL (ref 2–7.7)
NEUTROPHILS NFR BLD AUTO: 64 % (ref 50–70)
PLATELET # BLD AUTO: 486 THOU/UL (ref 140–440)
PMV BLD AUTO: 10.1 FL (ref 8.5–12.5)
POTASSIUM BLD-SCNC: 4.2 MMOL/L (ref 3.5–5)
PROT SERPL-MCNC: 8.3 G/DL (ref 6–8)
RBC # BLD AUTO: 4.33 MILL/UL (ref 3.8–5.4)
SODIUM SERPL-SCNC: 136 MMOL/L (ref 136–145)
WBC: 7.2 THOU/UL (ref 4–11)

## 2019-08-15 ASSESSMENT — MIFFLIN-ST. JEOR
SCORE: 1180.22

## 2019-08-16 ENCOUNTER — RECORDS - HEALTHEAST (OUTPATIENT)
Dept: ADMINISTRATIVE | Facility: OTHER | Age: 44
End: 2019-08-16

## 2019-08-16 LAB
ALBUMIN SERPL-MCNC: 4.4 G/DL (ref 3.5–5)
ALP SERPL-CCNC: 112 U/L (ref 45–120)
ALT SERPL W P-5'-P-CCNC: 60 U/L (ref 0–45)
ANION GAP SERPL CALCULATED.3IONS-SCNC: 9 MMOL/L (ref 5–18)
AST SERPL W P-5'-P-CCNC: 39 U/L (ref 0–40)
BASOPHILS # BLD AUTO: 0.1 THOU/UL (ref 0–0.2)
BASOPHILS NFR BLD AUTO: 1 % (ref 0–2)
BILIRUB SERPL-MCNC: 0.7 MG/DL (ref 0–1)
BUN SERPL-MCNC: 19 MG/DL (ref 8–22)
CALCIUM SERPL-MCNC: 10.3 MG/DL (ref 8.5–10.5)
CHLORIDE BLD-SCNC: 102 MMOL/L (ref 98–107)
CO2 SERPL-SCNC: 26 MMOL/L (ref 22–31)
CREAT SERPL-MCNC: 0.61 MG/DL (ref 0.6–1.1)
EOSINOPHIL # BLD AUTO: 0.3 THOU/UL (ref 0–0.4)
EOSINOPHIL NFR BLD AUTO: 3 % (ref 0–6)
ERYTHROCYTE [DISTWIDTH] IN BLOOD BY AUTOMATED COUNT: 13.8 % (ref 11–14.5)
GFR SERPL CREATININE-BSD FRML MDRD: >60 ML/MIN/1.73M2
GLUCOSE BLD-MCNC: 102 MG/DL (ref 70–125)
HCT VFR BLD AUTO: 39.6 % (ref 35–47)
HGB BLD-MCNC: 12.9 G/DL (ref 12–16)
LYMPHOCYTES # BLD AUTO: 1.6 THOU/UL (ref 0.8–4.4)
LYMPHOCYTES NFR BLD AUTO: 19 % (ref 20–40)
MAGNESIUM SERPL-MCNC: 2.1 MG/DL (ref 1.8–2.6)
MCH RBC QN AUTO: 29.5 PG (ref 27–34)
MCHC RBC AUTO-ENTMCNC: 32.6 G/DL (ref 32–36)
MCV RBC AUTO: 90 FL (ref 80–100)
MONOCYTES # BLD AUTO: 0.8 THOU/UL (ref 0–0.9)
MONOCYTES NFR BLD AUTO: 10 % (ref 2–10)
NEUTROPHILS # BLD AUTO: 5.6 THOU/UL (ref 2–7.7)
NEUTROPHILS NFR BLD AUTO: 67 % (ref 50–70)
PLATELET # BLD AUTO: 491 THOU/UL (ref 140–440)
PMV BLD AUTO: 9.9 FL (ref 8.5–12.5)
POTASSIUM BLD-SCNC: 4.1 MMOL/L (ref 3.5–5)
PROT SERPL-MCNC: 8.4 G/DL (ref 6–8)
RBC # BLD AUTO: 4.38 MILL/UL (ref 3.8–5.4)
SODIUM SERPL-SCNC: 137 MMOL/L (ref 136–145)
WBC: 8.4 THOU/UL (ref 4–11)

## 2019-08-16 ASSESSMENT — MIFFLIN-ST. JEOR
SCORE: 1225.58

## 2019-08-17 LAB
ALBUMIN SERPL-MCNC: 4.4 G/DL (ref 3.5–5)
ALP SERPL-CCNC: 115 U/L (ref 45–120)
ALT SERPL W P-5'-P-CCNC: 51 U/L (ref 0–45)
ANION GAP SERPL CALCULATED.3IONS-SCNC: 9 MMOL/L (ref 5–18)
AST SERPL W P-5'-P-CCNC: 37 U/L (ref 0–40)
BASE EXCESS BLDA CALC-SCNC: 1.3 MMOL/L
BASOPHILS # BLD AUTO: 0.1 THOU/UL (ref 0–0.2)
BASOPHILS NFR BLD AUTO: 1 % (ref 0–2)
BILIRUB SERPL-MCNC: 0.6 MG/DL (ref 0–1)
BUN SERPL-MCNC: 19 MG/DL (ref 8–22)
CALCIUM SERPL-MCNC: 10.4 MG/DL (ref 8.5–10.5)
CHLORIDE BLD-SCNC: 101 MMOL/L (ref 98–107)
CO2 SERPL-SCNC: 26 MMOL/L (ref 22–31)
COHGB MFR BLD: 98 % (ref 96–97)
CREAT SERPL-MCNC: 0.65 MG/DL (ref 0.6–1.1)
EOSINOPHIL # BLD AUTO: 0.3 THOU/UL (ref 0–0.4)
EOSINOPHIL NFR BLD AUTO: 3 % (ref 0–6)
ERYTHROCYTE [DISTWIDTH] IN BLOOD BY AUTOMATED COUNT: 13.7 % (ref 11–14.5)
GFR SERPL CREATININE-BSD FRML MDRD: >60 ML/MIN/1.73M2
GLUCOSE BLD-MCNC: 127 MG/DL (ref 70–125)
HCO3, ARTERIAL CALC - HISTORICAL: 25.9 MMOL/L (ref 23–29)
HCT VFR BLD AUTO: 38.7 % (ref 35–47)
HGB BLD-MCNC: 12.9 G/DL (ref 12–16)
LYMPHOCYTES # BLD AUTO: 1.3 THOU/UL (ref 0.8–4.4)
LYMPHOCYTES NFR BLD AUTO: 16 % (ref 20–40)
MAGNESIUM SERPL-MCNC: 2 MG/DL (ref 1.8–2.6)
MCH RBC QN AUTO: 29.4 PG (ref 27–34)
MCHC RBC AUTO-ENTMCNC: 33.3 G/DL (ref 32–36)
MCV RBC AUTO: 88 FL (ref 80–100)
MONOCYTES # BLD AUTO: 0.7 THOU/UL (ref 0–0.9)
MONOCYTES NFR BLD AUTO: 9 % (ref 2–10)
NEUTROPHILS # BLD AUTO: 5.8 THOU/UL (ref 2–7.7)
NEUTROPHILS NFR BLD AUTO: 70 % (ref 50–70)
O2/TOTAL GAS SETTING VFR VENT: 21 %
OXYHEMOGLOBIN - HISTORICAL: 94.5 % (ref 96–97)
PCO2 BLD: 36 MM HG (ref 35–45)
PH BLD: 7.45 [PH] (ref 7.37–7.44)
PLATELET # BLD AUTO: 453 THOU/UL (ref 140–440)
PMV BLD AUTO: 9.5 FL (ref 8.5–12.5)
PO2 BLD: 83 MM HG (ref 80–90)
POTASSIUM BLD-SCNC: 4.1 MMOL/L (ref 3.5–5)
PROT SERPL-MCNC: 8.4 G/DL (ref 6–8)
RBC # BLD AUTO: 4.39 MILL/UL (ref 3.8–5.4)
SODIUM SERPL-SCNC: 136 MMOL/L (ref 136–145)
TEMPERATURE: 37 DEGREES C
VENTILATION MODE: ABNORMAL
WBC: 8.3 THOU/UL (ref 4–11)

## 2019-08-18 ENCOUNTER — RECORDS - HEALTHEAST (OUTPATIENT)
Dept: ADMINISTRATIVE | Facility: OTHER | Age: 44
End: 2019-08-18

## 2019-08-18 LAB
ALBUMIN SERPL-MCNC: 4.2 G/DL (ref 3.5–5)
ALBUMIN UR-MCNC: ABNORMAL MG/DL
ALP SERPL-CCNC: 106 U/L (ref 45–120)
ALT SERPL W P-5'-P-CCNC: 44 U/L (ref 0–45)
ANION GAP SERPL CALCULATED.3IONS-SCNC: 8 MMOL/L (ref 5–18)
APPEARANCE UR: CLEAR
AST SERPL W P-5'-P-CCNC: 36 U/L (ref 0–40)
BASOPHILS # BLD AUTO: 0.1 THOU/UL (ref 0–0.2)
BASOPHILS NFR BLD AUTO: 1 % (ref 0–2)
BILIRUB SERPL-MCNC: 0.5 MG/DL (ref 0–1)
BILIRUB UR QL STRIP: NEGATIVE
BUN SERPL-MCNC: 15 MG/DL (ref 8–22)
CALCIUM SERPL-MCNC: 9.9 MG/DL (ref 8.5–10.5)
CHLORIDE BLD-SCNC: 102 MMOL/L (ref 98–107)
CO2 SERPL-SCNC: 25 MMOL/L (ref 22–31)
COLOR UR AUTO: YELLOW
CREAT SERPL-MCNC: 0.58 MG/DL (ref 0.6–1.1)
EOSINOPHIL # BLD AUTO: 0.3 THOU/UL (ref 0–0.4)
EOSINOPHIL NFR BLD AUTO: 3 % (ref 0–6)
ERYTHROCYTE [DISTWIDTH] IN BLOOD BY AUTOMATED COUNT: 13.7 % (ref 11–14.5)
GFR SERPL CREATININE-BSD FRML MDRD: >60 ML/MIN/1.73M2
GLUCOSE BLD-MCNC: 115 MG/DL (ref 70–125)
GLUCOSE UR STRIP-MCNC: NEGATIVE MG/DL
HCT VFR BLD AUTO: 37.6 % (ref 35–47)
HGB BLD-MCNC: 12.3 G/DL (ref 12–16)
HGB UR QL STRIP: ABNORMAL
KETONES UR STRIP-MCNC: NEGATIVE MG/DL
LEUKOCYTE ESTERASE UR QL STRIP: ABNORMAL
LYMPHOCYTES # BLD AUTO: 1.3 THOU/UL (ref 0.8–4.4)
LYMPHOCYTES NFR BLD AUTO: 17 % (ref 20–40)
MAGNESIUM SERPL-MCNC: 1.9 MG/DL (ref 1.8–2.6)
MCH RBC QN AUTO: 29.3 PG (ref 27–34)
MCHC RBC AUTO-ENTMCNC: 32.7 G/DL (ref 32–36)
MCV RBC AUTO: 90 FL (ref 80–100)
MONOCYTES # BLD AUTO: 0.8 THOU/UL (ref 0–0.9)
MONOCYTES NFR BLD AUTO: 10 % (ref 2–10)
NEUTROPHILS # BLD AUTO: 5.3 THOU/UL (ref 2–7.7)
NEUTROPHILS NFR BLD AUTO: 68 % (ref 50–70)
NITRATE UR QL: NEGATIVE
PH UR STRIP: 5.5 [PH] (ref 4.5–8)
PLATELET # BLD AUTO: 406 THOU/UL (ref 140–440)
PMV BLD AUTO: 9.5 FL (ref 8.5–12.5)
POTASSIUM BLD-SCNC: 3.8 MMOL/L (ref 3.5–5)
PROT SERPL-MCNC: 7.8 G/DL (ref 6–8)
RBC # BLD AUTO: 4.2 MILL/UL (ref 3.8–5.4)
SODIUM SERPL-SCNC: 135 MMOL/L (ref 136–145)
SP GR UR STRIP: 1.02 (ref 1–1.03)
UROBILINOGEN UR STRIP-ACNC: ABNORMAL
WBC: 7.8 THOU/UL (ref 4–11)

## 2019-08-18 ASSESSMENT — MIFFLIN-ST. JEOR
SCORE: 1167.52

## 2019-08-19 ENCOUNTER — SURGERY - HEALTHEAST (OUTPATIENT)
Dept: SURGERY | Facility: CLINIC | Age: 44
End: 2019-08-19

## 2019-08-19 ENCOUNTER — RECORDS - HEALTHEAST (OUTPATIENT)
Dept: ADMINISTRATIVE | Facility: OTHER | Age: 44
End: 2019-08-19

## 2019-08-19 LAB
ALBUMIN SERPL-MCNC: 4.3 G/DL (ref 3.5–5)
ALP SERPL-CCNC: 108 U/L (ref 45–120)
ALT SERPL W P-5'-P-CCNC: 39 U/L (ref 0–45)
ANION GAP SERPL CALCULATED.3IONS-SCNC: 9 MMOL/L (ref 5–18)
APPEARANCE CSF: CLEAR
AST SERPL W P-5'-P-CCNC: 34 U/L (ref 0–40)
BASOPHILS # BLD AUTO: 0.1 THOU/UL (ref 0–0.2)
BASOPHILS NFR BLD AUTO: 1 % (ref 0–2)
BILIRUB SERPL-MCNC: 0.5 MG/DL (ref 0–1)
BUN SERPL-MCNC: 13 MG/DL (ref 8–22)
CALCIUM SERPL-MCNC: 10.2 MG/DL (ref 8.5–10.5)
CHLORIDE BLD-SCNC: 102 MMOL/L (ref 98–107)
CO2 SERPL-SCNC: 27 MMOL/L (ref 22–31)
COLOR CSF: ABNORMAL
CREAT SERPL-MCNC: 0.6 MG/DL (ref 0.6–1.1)
EOSINOPHIL # BLD AUTO: 0.2 THOU/UL (ref 0–0.4)
EOSINOPHIL NFR BLD AUTO: 3 % (ref 0–6)
ERYTHROCYTE [DISTWIDTH] IN BLOOD BY AUTOMATED COUNT: 13.4 % (ref 11–14.5)
GFR SERPL CREATININE-BSD FRML MDRD: >60 ML/MIN/1.73M2
GLUCOSE BLD-MCNC: 103 MG/DL (ref 70–125)
GLUCOSE BLDC GLUCOMTR-MCNC: 138 MG/DL (ref 70–139)
GLUCOSE BLDC GLUCOMTR-MCNC: 152 MG/DL (ref 70–139)
GLUCOSE CSF-MCNC: 55 MG/DL (ref 40–75)
GRAM STAIN RESULT: NORMAL
GRAM STAIN RESULT: NORMAL
HCT VFR BLD AUTO: 37.8 % (ref 35–47)
HGB BLD-MCNC: 12.5 G/DL (ref 12–16)
LYMPHOCYTES # BLD AUTO: 1.6 THOU/UL (ref 0.8–4.4)
LYMPHOCYTES NFR BLD AUTO: 20 % (ref 20–40)
MAGNESIUM SERPL-MCNC: 1.9 MG/DL (ref 1.8–2.6)
MCH RBC QN AUTO: 29.4 PG (ref 27–34)
MCHC RBC AUTO-ENTMCNC: 33.1 G/DL (ref 32–36)
MCV RBC AUTO: 89 FL (ref 80–100)
MONOCYTES # BLD AUTO: 0.8 THOU/UL (ref 0–0.9)
MONOCYTES NFR BLD AUTO: 10 % (ref 2–10)
NEUTROPHILS # BLD AUTO: 5.2 THOU/UL (ref 2–7.7)
NEUTROPHILS NFR BLD AUTO: 66 % (ref 50–70)
PLATELET # BLD AUTO: 384 THOU/UL (ref 140–440)
PMV BLD AUTO: 9.2 FL (ref 8.5–12.5)
POTASSIUM BLD-SCNC: 3.8 MMOL/L (ref 3.5–5)
PROT CSF-MCNC: 35 MG/DL (ref 15–45)
PROT SERPL-MCNC: 8 G/DL (ref 6–8)
RBC # BLD AUTO: 4.25 MILL/UL (ref 3.8–5.4)
RBC # CSF MANUAL: 209 /UL
SODIUM SERPL-SCNC: 138 MMOL/L (ref 136–145)
TUBE # CSF: 4
VOLUME CSF - HISTORICAL: 2 ML
WBC # CSF MANUAL: 3 /UL
WBC: 7.8 THOU/UL (ref 4–11)

## 2019-08-19 ASSESSMENT — MIFFLIN-ST. JEOR
SCORE: 1174.78

## 2019-08-20 ENCOUNTER — RECORDS - HEALTHEAST (OUTPATIENT)
Dept: ADMINISTRATIVE | Facility: OTHER | Age: 44
End: 2019-08-20

## 2019-08-20 ENCOUNTER — COMMUNICATION - HEALTHEAST (OUTPATIENT)
Dept: NEUROSURGERY | Facility: CLINIC | Age: 44
End: 2019-08-20

## 2019-08-20 DIAGNOSIS — I60.9 SAH (SUBARACHNOID HEMORRHAGE) (H): ICD-10-CM

## 2019-08-20 LAB
ALBUMIN SERPL-MCNC: 3.8 G/DL (ref 3.5–5)
ALP SERPL-CCNC: 97 U/L (ref 45–120)
ALT SERPL W P-5'-P-CCNC: 33 U/L (ref 0–45)
ANION GAP SERPL CALCULATED.3IONS-SCNC: 7 MMOL/L (ref 5–18)
AST SERPL W P-5'-P-CCNC: 27 U/L (ref 0–40)
BASOPHILS # BLD AUTO: 0 THOU/UL (ref 0–0.2)
BASOPHILS NFR BLD AUTO: 0 % (ref 0–2)
BILIRUB SERPL-MCNC: 0.5 MG/DL (ref 0–1)
BUN SERPL-MCNC: 9 MG/DL (ref 8–22)
CALCIUM SERPL-MCNC: 9.4 MG/DL (ref 8.5–10.5)
CHLORIDE BLD-SCNC: 105 MMOL/L (ref 98–107)
CO2 SERPL-SCNC: 26 MMOL/L (ref 22–31)
CREAT SERPL-MCNC: 0.56 MG/DL (ref 0.6–1.1)
EOSINOPHIL # BLD AUTO: 0 THOU/UL (ref 0–0.4)
EOSINOPHIL NFR BLD AUTO: 0 % (ref 0–6)
ERYTHROCYTE [DISTWIDTH] IN BLOOD BY AUTOMATED COUNT: 13.2 % (ref 11–14.5)
GFR SERPL CREATININE-BSD FRML MDRD: >60 ML/MIN/1.73M2
GLUCOSE BLD-MCNC: 110 MG/DL (ref 70–125)
GLUCOSE BLDC GLUCOMTR-MCNC: 95 MG/DL (ref 70–139)
HBA1C MFR BLD: 5.2 % (ref 4.2–6.1)
HCT VFR BLD AUTO: 34.8 % (ref 35–47)
HGB BLD-MCNC: 11.4 G/DL (ref 12–16)
LYMPHOCYTES # BLD AUTO: 1.2 THOU/UL (ref 0.8–4.4)
LYMPHOCYTES NFR BLD AUTO: 11 % (ref 20–40)
MAGNESIUM SERPL-MCNC: 1.8 MG/DL (ref 1.8–2.6)
MCH RBC QN AUTO: 29.7 PG (ref 27–34)
MCHC RBC AUTO-ENTMCNC: 32.8 G/DL (ref 32–36)
MCV RBC AUTO: 91 FL (ref 80–100)
MONOCYTES # BLD AUTO: 0.8 THOU/UL (ref 0–0.9)
MONOCYTES NFR BLD AUTO: 7 % (ref 2–10)
NEUTROPHILS # BLD AUTO: 9.2 THOU/UL (ref 2–7.7)
NEUTROPHILS NFR BLD AUTO: 81 % (ref 50–70)
PLATELET # BLD AUTO: 369 THOU/UL (ref 140–440)
PMV BLD AUTO: 9.6 FL (ref 8.5–12.5)
POTASSIUM BLD-SCNC: 3.7 MMOL/L (ref 3.5–5)
PROT SERPL-MCNC: 7.1 G/DL (ref 6–8)
RBC # BLD AUTO: 3.84 MILL/UL (ref 3.8–5.4)
SODIUM SERPL-SCNC: 138 MMOL/L (ref 136–145)
WBC: 11.4 THOU/UL (ref 4–11)

## 2019-08-20 ASSESSMENT — MIFFLIN-ST. JEOR
SCORE: 1175.23

## 2019-08-21 LAB — BACTERIA SPEC CULT: ABNORMAL

## 2019-08-24 LAB — BACTERIA SPEC CULT: NO GROWTH

## 2019-09-06 ENCOUNTER — HOME CARE/HOSPICE - HEALTHEAST (OUTPATIENT)
Dept: HOME HEALTH SERVICES | Facility: HOME HEALTH | Age: 44
End: 2019-09-06

## 2019-09-10 ENCOUNTER — HOSPITAL ENCOUNTER (OUTPATIENT)
Dept: INTERVENTIONAL RADIOLOGY/VASCULAR | Facility: CLINIC | Age: 44
Discharge: HOME | End: 2019-09-10
Attending: INTERPRETER

## 2019-09-10 LAB — BACTERIA SPEC CULT: NORMAL

## 2019-09-11 ENCOUNTER — AMBULATORY - HEALTHEAST (OUTPATIENT)
Dept: LAB | Facility: CLINIC | Age: 44
End: 2019-09-11

## 2019-09-11 DIAGNOSIS — I67.1 CEREBRAL ANEURYSM, NONRUPTURED: ICD-10-CM

## 2019-09-11 LAB — PA ADP BLD-ACNC: 158 PRU

## 2019-09-12 ENCOUNTER — HOME CARE/HOSPICE - HEALTHEAST (OUTPATIENT)
Dept: HOME HEALTH SERVICES | Facility: HOME HEALTH | Age: 44
End: 2019-09-12

## 2019-09-12 ENCOUNTER — RECORDS - HEALTHEAST (OUTPATIENT)
Dept: ADMINISTRATIVE | Facility: OTHER | Age: 44
End: 2019-09-12

## 2019-09-14 ENCOUNTER — HOME CARE/HOSPICE - HEALTHEAST (OUTPATIENT)
Dept: HOME HEALTH SERVICES | Facility: HOME HEALTH | Age: 44
End: 2019-09-14

## 2019-09-14 ASSESSMENT — MIFFLIN-ST. JEOR: SCORE: 1094.95

## 2019-09-16 ENCOUNTER — COMMUNICATION - HEALTHEAST (OUTPATIENT)
Dept: NEUROSURGERY | Facility: CLINIC | Age: 44
End: 2019-09-16

## 2019-09-16 ENCOUNTER — ANESTHESIA - HEALTHEAST (OUTPATIENT)
Dept: INTERVENTIONAL RADIOLOGY/VASCULAR | Facility: CLINIC | Age: 44
End: 2019-09-16

## 2019-09-16 DIAGNOSIS — I60.9 SAH (SUBARACHNOID HEMORRHAGE) (H): ICD-10-CM

## 2019-09-18 ENCOUNTER — HOME CARE/HOSPICE - HEALTHEAST (OUTPATIENT)
Dept: HOME HEALTH SERVICES | Facility: HOME HEALTH | Age: 44
End: 2019-09-18

## 2019-09-20 ENCOUNTER — HOME CARE/HOSPICE - HEALTHEAST (OUTPATIENT)
Dept: HOME HEALTH SERVICES | Facility: HOME HEALTH | Age: 44
End: 2019-09-20

## 2019-09-24 ENCOUNTER — HOME CARE/HOSPICE - HEALTHEAST (OUTPATIENT)
Dept: HOME HEALTH SERVICES | Facility: HOME HEALTH | Age: 44
End: 2019-09-24

## 2019-09-25 ENCOUNTER — HOSPITAL ENCOUNTER (OUTPATIENT)
Dept: CT IMAGING | Facility: CLINIC | Age: 44
Discharge: HOME | End: 2019-09-25
Attending: NEUROLOGICAL SURGERY

## 2019-09-25 DIAGNOSIS — I60.9 SAH (SUBARACHNOID HEMORRHAGE) (H): ICD-10-CM

## 2019-09-26 ENCOUNTER — OFFICE VISIT - HEALTHEAST (OUTPATIENT)
Dept: NEUROSURGERY | Facility: CLINIC | Age: 44
End: 2019-09-26

## 2019-09-26 DIAGNOSIS — G91.1 OBSTRUCTIVE HYDROCEPHALUS (H): ICD-10-CM

## 2019-09-27 ENCOUNTER — HOME CARE/HOSPICE - HEALTHEAST (OUTPATIENT)
Dept: HOME HEALTH SERVICES | Facility: HOME HEALTH | Age: 44
End: 2019-09-27

## 2019-10-01 ENCOUNTER — HOME CARE/HOSPICE - HEALTHEAST (OUTPATIENT)
Dept: HOME HEALTH SERVICES | Facility: HOME HEALTH | Age: 44
End: 2019-10-01

## 2019-10-02 ENCOUNTER — COMMUNICATION - HEALTHEAST (OUTPATIENT)
Dept: NEUROSURGERY | Facility: CLINIC | Age: 44
End: 2019-10-02

## 2019-10-03 ENCOUNTER — HOME CARE/HOSPICE - HEALTHEAST (OUTPATIENT)
Dept: HOME HEALTH SERVICES | Facility: HOME HEALTH | Age: 44
End: 2019-10-03

## 2019-10-04 ENCOUNTER — AMBULATORY - HEALTHEAST (OUTPATIENT)
Dept: LAB | Facility: CLINIC | Age: 44
End: 2019-10-04

## 2019-10-04 ENCOUNTER — HOME CARE/HOSPICE - HEALTHEAST (OUTPATIENT)
Dept: HOME HEALTH SERVICES | Facility: HOME HEALTH | Age: 44
End: 2019-10-04

## 2019-10-04 DIAGNOSIS — I67.1 CEREBRAL ANEURYSM, NONRUPTURED: ICD-10-CM

## 2019-10-04 LAB — PA ADP BLD-ACNC: 3 PRU

## 2019-10-08 ENCOUNTER — HOME CARE/HOSPICE - HEALTHEAST (OUTPATIENT)
Dept: HOME HEALTH SERVICES | Facility: HOME HEALTH | Age: 44
End: 2019-10-08

## 2019-10-09 ENCOUNTER — HOME CARE/HOSPICE - HEALTHEAST (OUTPATIENT)
Dept: HOME HEALTH SERVICES | Facility: HOME HEALTH | Age: 44
End: 2019-10-09

## 2019-10-11 ENCOUNTER — HOME CARE/HOSPICE - HEALTHEAST (OUTPATIENT)
Dept: HOME HEALTH SERVICES | Facility: HOME HEALTH | Age: 44
End: 2019-10-11

## 2019-10-15 ENCOUNTER — HOME CARE/HOSPICE - HEALTHEAST (OUTPATIENT)
Dept: HOME HEALTH SERVICES | Facility: HOME HEALTH | Age: 44
End: 2019-10-15

## 2019-10-18 ENCOUNTER — AMBULATORY - HEALTHEAST (OUTPATIENT)
Dept: LAB | Facility: CLINIC | Age: 44
End: 2019-10-18

## 2019-10-18 ENCOUNTER — HOME CARE/HOSPICE - HEALTHEAST (OUTPATIENT)
Dept: HOME HEALTH SERVICES | Facility: HOME HEALTH | Age: 44
End: 2019-10-18

## 2019-10-18 DIAGNOSIS — I67.1 CEREBRAL ANEURYSM, NONRUPTURED: ICD-10-CM

## 2019-10-18 LAB — PA ADP BLD-ACNC: 23 PRU

## 2019-10-25 ENCOUNTER — HOME CARE/HOSPICE - HEALTHEAST (OUTPATIENT)
Dept: HOME HEALTH SERVICES | Facility: HOME HEALTH | Age: 44
End: 2019-10-25

## 2019-10-30 ENCOUNTER — ANESTHESIA - HEALTHEAST (OUTPATIENT)
Dept: SURGERY | Facility: CLINIC | Age: 44
End: 2019-10-30

## 2019-10-30 ENCOUNTER — SURGERY - HEALTHEAST (OUTPATIENT)
Dept: SURGERY | Facility: CLINIC | Age: 44
End: 2019-10-30

## 2019-11-04 ENCOUNTER — COMMUNICATION - HEALTHEAST (OUTPATIENT)
Dept: NEUROSURGERY | Facility: CLINIC | Age: 44
End: 2019-11-04

## 2019-11-04 DIAGNOSIS — G91.9 HYDROCEPHALUS (H): ICD-10-CM

## 2019-11-13 ENCOUNTER — AMBULATORY - HEALTHEAST (OUTPATIENT)
Dept: NEUROSURGERY | Facility: CLINIC | Age: 44
End: 2019-11-13

## 2019-11-13 DIAGNOSIS — Z48.89 ENCOUNTER FOR POSTOPERATIVE WOUND CHECK: ICD-10-CM

## 2019-11-13 ASSESSMENT — MIFFLIN-ST. JEOR: SCORE: 1158.45

## 2019-11-27 ENCOUNTER — OFFICE VISIT - HEALTHEAST (OUTPATIENT)
Dept: NEUROSURGERY | Facility: CLINIC | Age: 44
End: 2019-11-27

## 2019-11-27 ENCOUNTER — HOSPITAL ENCOUNTER (OUTPATIENT)
Dept: CT IMAGING | Facility: CLINIC | Age: 44
Discharge: HOME | End: 2019-11-27
Attending: NEUROLOGICAL SURGERY

## 2019-11-27 DIAGNOSIS — S06.5XAA SDH (SUBDURAL HEMATOMA) (H): ICD-10-CM

## 2019-11-27 DIAGNOSIS — G91.9 HYDROCEPHALUS (H): ICD-10-CM

## 2019-11-27 ASSESSMENT — MIFFLIN-ST. JEOR: SCORE: 1167.52

## 2019-12-04 ENCOUNTER — HOSPITAL ENCOUNTER (OUTPATIENT)
Dept: CT IMAGING | Facility: CLINIC | Age: 44
Discharge: HOME | End: 2019-12-04
Attending: INTERPRETER

## 2019-12-04 ENCOUNTER — HOSPITAL ENCOUNTER (OUTPATIENT)
Dept: RADIOLOGY | Facility: CLINIC | Age: 44
Discharge: HOME | End: 2019-12-04
Attending: INTERPRETER

## 2019-12-04 DIAGNOSIS — S06.5XAA SDH (SUBDURAL HEMATOMA) (H): ICD-10-CM

## 2019-12-05 ENCOUNTER — OFFICE VISIT - HEALTHEAST (OUTPATIENT)
Dept: NEUROSURGERY | Facility: CLINIC | Age: 44
End: 2019-12-05

## 2019-12-05 DIAGNOSIS — S06.5XAA SDH (SUBDURAL HEMATOMA) (H): ICD-10-CM

## 2019-12-05 ASSESSMENT — MIFFLIN-ST. JEOR: SCORE: 1167.52

## 2019-12-18 ENCOUNTER — HOSPITAL ENCOUNTER (OUTPATIENT)
Dept: CT IMAGING | Facility: CLINIC | Age: 44
Discharge: HOME | End: 2019-12-18

## 2019-12-18 DIAGNOSIS — S06.5XAA SDH (SUBDURAL HEMATOMA) (H): ICD-10-CM

## 2019-12-19 ENCOUNTER — OFFICE VISIT - HEALTHEAST (OUTPATIENT)
Dept: NEUROSURGERY | Facility: CLINIC | Age: 44
End: 2019-12-19

## 2019-12-19 DIAGNOSIS — G91.0 COMMUNICATING HYDROCEPHALUS (H): ICD-10-CM

## 2019-12-19 DIAGNOSIS — S06.5XAA SUBDURAL HEMATOMA (H): ICD-10-CM

## 2019-12-19 ASSESSMENT — MIFFLIN-ST. JEOR: SCORE: 1167.52

## 2019-12-20 ENCOUNTER — HOSPITAL ENCOUNTER (OUTPATIENT)
Dept: RADIOLOGY | Facility: HOSPITAL | Age: 44
Discharge: HOME | End: 2019-12-20
Attending: NEUROLOGICAL SURGERY

## 2019-12-20 ENCOUNTER — COMMUNICATION - HEALTHEAST (OUTPATIENT)
Dept: NEUROSURGERY | Facility: CLINIC | Age: 44
End: 2019-12-20

## 2019-12-20 DIAGNOSIS — G91.0 COMMUNICATING HYDROCEPHALUS (H): ICD-10-CM

## 2019-12-31 ENCOUNTER — HOSPITAL ENCOUNTER (OUTPATIENT)
Dept: CT IMAGING | Facility: CLINIC | Age: 44
Discharge: HOME | End: 2019-12-31
Attending: NEUROLOGICAL SURGERY

## 2019-12-31 DIAGNOSIS — G91.0 COMMUNICATING HYDROCEPHALUS (H): ICD-10-CM

## 2019-12-31 DIAGNOSIS — S06.5XAA SUBDURAL HEMATOMA (H): ICD-10-CM

## 2020-01-02 ENCOUNTER — OFFICE VISIT - HEALTHEAST (OUTPATIENT)
Dept: NEUROSURGERY | Facility: CLINIC | Age: 45
End: 2020-01-02

## 2020-01-02 DIAGNOSIS — S06.5XAA SUBDURAL HEMATOMA (H): ICD-10-CM

## 2020-01-02 DIAGNOSIS — G91.0 COMMUNICATING HYDROCEPHALUS (H): ICD-10-CM

## 2020-01-02 ASSESSMENT — MIFFLIN-ST. JEOR: SCORE: 1167.52

## 2020-01-30 ENCOUNTER — HOSPITAL ENCOUNTER (OUTPATIENT)
Dept: CT IMAGING | Facility: HOSPITAL | Age: 45
Discharge: HOME | End: 2020-01-30
Attending: NEUROLOGICAL SURGERY

## 2020-01-30 ENCOUNTER — OFFICE VISIT - HEALTHEAST (OUTPATIENT)
Dept: NEUROSURGERY | Facility: CLINIC | Age: 45
End: 2020-01-30

## 2020-01-30 DIAGNOSIS — S06.5XAA SUBDURAL HEMATOMA (H): ICD-10-CM

## 2020-01-30 DIAGNOSIS — G91.0 COMMUNICATING HYDROCEPHALUS (H): ICD-10-CM

## 2020-01-30 ASSESSMENT — MIFFLIN-ST. JEOR: SCORE: 1167.52

## 2020-01-31 ENCOUNTER — RECORDS - HEALTHEAST (OUTPATIENT)
Dept: ADMINISTRATIVE | Facility: OTHER | Age: 45
End: 2020-01-31

## 2020-03-06 ENCOUNTER — HOSPITAL ENCOUNTER (OUTPATIENT)
Dept: MRI IMAGING | Facility: CLINIC | Age: 45
Discharge: HOME | End: 2020-03-06

## 2020-03-06 DIAGNOSIS — I67.1 CEREBRAL ANEURYSM: ICD-10-CM

## 2020-03-09 ENCOUNTER — AMBULATORY - HEALTHEAST (OUTPATIENT)
Dept: SURGERY | Facility: CLINIC | Age: 45
End: 2020-03-09

## 2020-03-25 ENCOUNTER — RECORDS - HEALTHEAST (OUTPATIENT)
Dept: ADMINISTRATIVE | Facility: OTHER | Age: 45
End: 2020-03-25

## 2020-04-24 ENCOUNTER — HOSPITAL ENCOUNTER (OUTPATIENT)
Dept: CT IMAGING | Facility: CLINIC | Age: 45
Discharge: HOME | End: 2020-04-24
Attending: NEUROLOGICAL SURGERY

## 2020-04-24 DIAGNOSIS — S06.5XAA SUBDURAL HEMATOMA (H): ICD-10-CM

## 2020-04-24 DIAGNOSIS — G91.0 COMMUNICATING HYDROCEPHALUS (H): ICD-10-CM

## 2020-05-07 ENCOUNTER — OFFICE VISIT - HEALTHEAST (OUTPATIENT)
Dept: NEUROSURGERY | Facility: CLINIC | Age: 45
End: 2020-05-07

## 2020-05-07 DIAGNOSIS — S06.5XAA SUBDURAL HEMATOMA (H): ICD-10-CM

## 2020-07-21 ENCOUNTER — COMMUNICATION - HEALTHEAST (OUTPATIENT)
Dept: NEUROSURGERY | Facility: CLINIC | Age: 45
End: 2020-07-21

## 2020-10-16 ENCOUNTER — RECORDS - HEALTHEAST (OUTPATIENT)
Dept: ADMINISTRATIVE | Facility: OTHER | Age: 45
End: 2020-10-16

## 2020-10-16 ENCOUNTER — OFFICE VISIT (OUTPATIENT)
Dept: NEUROLOGY | Facility: CLINIC | Age: 45
End: 2020-10-16
Payer: COMMERCIAL

## 2020-10-16 VITALS
SYSTOLIC BLOOD PRESSURE: 118 MMHG | HEART RATE: 81 BPM | HEIGHT: 58 IN | WEIGHT: 158 LBS | BODY MASS INDEX: 33.17 KG/M2 | DIASTOLIC BLOOD PRESSURE: 74 MMHG

## 2020-10-16 DIAGNOSIS — F09 COGNITIVE AND NEUROBEHAVIORAL DYSFUNCTION STAUS POST BRAIN INJURY (H): ICD-10-CM

## 2020-10-16 DIAGNOSIS — R41.89 SUBJECTIVE MEMORY COMPLAINTS: Primary | ICD-10-CM
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
## 2020-10-16 DIAGNOSIS — S06.9XAS COGNITIVE AND NEUROBEHAVIORAL DYSFUNCTION STAUS POST BRAIN INJURY (H): ICD-10-CM

## 2020-10-16 DIAGNOSIS — G31.89 COGNITIVE AND NEUROBEHAVIORAL DYSFUNCTION STAUS POST BRAIN INJURY (H): ICD-10-CM

## 2020-10-16 DIAGNOSIS — R51.9 NONINTRACTABLE HEADACHE, UNSPECIFIED CHRONICITY PATTERN, UNSPECIFIED HEADACHE TYPE: ICD-10-CM

## 2020-10-16 PROCEDURE — 99215 OFFICE O/P EST HI 40 MIN: CPT | Performed by: PSYCHIATRY & NEUROLOGY

## 2020-10-16 RX ORDER — LEVETIRACETAM 1000 MG/1
1000 TABLET ORAL 2 TIMES DAILY
COMMUNITY
Start: 2019-09-06 | End: 2022-02-10

## 2020-10-16 RX ORDER — GABAPENTIN 300 MG/1
300 CAPSULE ORAL 3 TIMES DAILY
Qty: 90 CAPSULE | Refills: 1 | Status: SHIPPED | OUTPATIENT
Start: 2020-10-16 | End: 2022-02-10

## 2020-10-16 RX ORDER — CLOPIDOGREL BISULFATE 75 MG/1
75 TABLET ORAL
COMMUNITY
Start: 2019-09-17 | End: 2023-07-14

## 2020-10-16 RX ORDER — ACETAMINOPHEN 500 MG
1000 TABLET ORAL
COMMUNITY
End: 2022-03-18

## 2020-10-16 RX ORDER — ATORVASTATIN CALCIUM 20 MG/1
20 TABLET, FILM COATED ORAL
COMMUNITY
End: 2023-07-14

## 2020-10-16 RX ORDER — ASPIRIN 81 MG/1
81 TABLET, CHEWABLE ORAL
COMMUNITY
End: 2023-07-14

## 2020-10-16 SDOH — HEALTH STABILITY: MENTAL HEALTH: HOW OFTEN DO YOU HAVE A DRINK CONTAINING ALCOHOL?: NEVER

## 2020-10-16 ASSESSMENT — MIFFLIN-ST. JEOR: SCORE: 1256.43

## 2020-10-16 NOTE — LETTER
10/16/2020         RE: Darion Herrera  [address]  [place] [address]        Dear Colleague,    Thank you for referring your patient, Darion Herrera, to the [facility] [place]. Please see a copy of my visit note below.    NEUROLOGY OUTPATIENT CONSULT NOTE   Oct 16, 2020     CHIEF COMPLAINT/REASON FOR VISIT/REASON FOR CONSULT  Patient presents with:  Hospital F/U  Stroke  Intracranial Aneurysm    REASON FOR CONSULTATION-right headache      HISTORY OF PRESENT ILLNESS  Darion Herrera is a 44 year old female seen today for evaluation of head pain.  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  Patient also developed hydrocephalus and needed a shunt.  Patient continues to have the shunt.  She at that time developed seizures and has been on Keppra since then.  Patient reports no side effects to the Keppra.    Patient over the last few months has been having right occipital region pain.  Patient's shunt is more in the frontal and the temporal region.  Patient reports that this pain is worse at nighttime.  Is barely there during the daytime.  It is more of a pulling pain she reports.  Denies any photophobia phonophobia or auras with this.  Has not tried any medications for this.  This started 4 months ago and is present every day.    Patient also complains of hand weakness.  She reports that if she bends her head she is unable to stay steady.  Complains of some ongoing neck stiffness.  Patient does not feel she is ready to go back to work.She does also complain of cognitive difficulty since the initial aneurysm rupture.    Patient also reports that she recently had a fall.  This was more of a mechanical fall that she had gone hunting with her  and then fell down.  Headache was present even before the fall.    Previous history is reviewed and this is unchanged.    PAST MEDICAL/SURGICAL HISTORY  Past Medical History:  "  Diagnosis Date     Aneurysm (H)      Depressive disorder      History of stroke without residual deficits      Migraines      Seizures (H)      There is no problem list on file for this patient.  Significant for previous aneurysm/rupture/stroke/migraine/seizure    FAMILY HISTORY  History reviewed. No pertinent family history.   Negative for aneurysms.  Positive for stroke and migraines    SOCIAL HISTORY  Social History     Tobacco Use     Smoking status: Never Smoker     Smokeless tobacco: Never Used   Substance Use Topics     Alcohol use: Never     Frequency: Never     Drug use: Never       SYSTEMS REVIEW  Twelve-system ROS was done and other than the HPI this was negative except for neck pain, arm and leg pain, weakness paralysis, falling, balance coordination problems, dizziness, headaches    MEDICATIONS       acetaminophen (TYLENOL) 500 MG tablet, Take 1,000 mg by mouth       aspirin (ASA) 81 MG chewable tablet, Take 81 mg by mouth       atorvastatin (LIPITOR) 20 MG tablet, Take 20 mg by mouth       clopidogrel (PLAVIX) 75 MG tablet, Take 75 mg by mouth       levETIRAcetam (KEPPRA) 1000 MG tablet, Take 1,000 mg by mouth 2 times daily    No current facility-administered medications on file prior to visit.        PHYSICAL EXAMINATION  VITALS: /74   Pulse 81   Ht 1.473 m (4' 10\")   Wt 71.7 kg (158 lb)   BMI 33.02 kg/m    GENERAL: Healthy appearing, alert, no acute distress, normal habitus.  CARDIOVASCULAR: Extremities warm and well perfused. Pulses present.   NEUROLOGICAL:  Patient is awake and oriented to self, place and time.  Attention span is normal.  Memory is grossly intact.  [employer] is fluent and follows commands appropriately.  Appropriate fund of knowledge. Cranial nerves 2-12 are intact. There is no pronator drift.  Motor exam shows 5/5 strength in all extremities.  Tone is symmetric bilaterally in upper and lower extremities.  Reflexes are symmetric and 2+ in upper extremities and lower " extremities. Sensory exam is grossly intact to light touch, pin prick and vibration.  Finger to nose and heel to shin is without dysmetria.  Romberg is negative.  Gait is normal and the patient is able to do tandem walk with mild difficulty.      DIAGNOSTICS  CT head  IMPRESSION:   1.  Considerable streak artifact related to prior coil embolization. No hemorrhage, infarct or edema is visible.  2.  Unchanged position of a right frontal approach ventricular catheter. Unchanged ventricular caliber.        MRI  IMPRESSION:   1.  Redemonstrated stent assisted coiling of the previously described right supraclinoid internal carotid artery aneurysm with extensive susceptibility artifact relating to the coils. Tiny focus of flow related enhancement along the anterior margin of   the coil mass likely relates to the very small amount of opacification seen on prior conventional angiogram. No findings to suggest recurrence elsewhere.     2.  No new aneurysm and no high-grade intracranial stenosis.     3.   Right-sided subdural fluid collection measuring up to 2 mm in thickness is similar to what was demonstrated on prior head CT.     4.  Right frontal approach  shunt with stable ventricular size and configuration.    EEG (Dr. Meza)  Conclusion: This is an abnormal EEG due to moderate diffuse slowing consistent with sedation and also some asymmetry with mild attenuation on the right hemisphere. Clinical correlation is advised regarding any structural lesion on the right side.  There are no epileptiform findings on today's study.        OUTSIDE RECORDS  Outside referral notes were reviewed and pertinent information has been summarized:-Patient was recently seen in the emergency room.  Patient was actually diagnosed with acute headache and fall.  Patient was thought to have the headache related to her fall and not before that.  Patient received a head CT that was stable.  Patient was supposed to use Tylenol ibuprofen to see  if that would help.  No seizure-like activity was reported.  It was not thought that the  shunt was causing the problems.  Patient was asked to come to neurology.    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  At this point patient's headache is not related to the  shunt.  I will get a MRI of the brain to rule out any other structural lesions.  We will also get a MRI to rule out any new aneurysms/growth of the previous aneurysm.  Will prescribe gabapentin to see if that would help with the pain.  Headache is mainly nighttime.  He does not fit with any of the primary headache features.  We will further refer to physical therapy for balance.  I did give her a note for work though will defer to primary care doctor if further restrictions need to be put for the patient.  I can see her back in 6-8 weeks.    -     PHYSICAL THERAPY REFERRAL  -     MR Brain w/o Contrast  -     MRA Brain ([facility]) wo Contrast  -     gabapentin (NEURONTIN) 300 MG capsule; Take 1 capsule (300 mg) by mouth 3 times daily    Return in about 2 months (around 12/16/2020) for In-Clinic Visit.    Over 40 minutes were spent coordinating the care for the patient today.  More than 50% of the time was spent counseling, educating the patient.  Extremity use of  and reviewing all the outside records.    Justin Vasquez MD  Neurologist  [facility] Neurology [facility]  Tel:- [phone]    This note was dictated using voice recognition software.  Any grammatical or context distortions are unintentional and inherent to the software.        Again, thank you for allowing me to participate in the care of your patient.         Sincerely,        Justin Vasquez MD

## 2020-10-16 NOTE — LETTER
October 16, 2020      Darion Herrera  [address]  [place] [address]        To Whom It May Concern:    Darion Herrera was seen in our clinic. She is unable to return to work at this point due to ongoing hand weakness and neck pain from recent (2019) bleeding in brain.       Sincerely,        Justin Vasquez MD

## 2020-10-16 NOTE — PROGRESS NOTES
NEUROLOGY OUTPATIENT CONSULT NOTE   Oct 16, 2020     CHIEF COMPLAINT/REASON FOR VISIT/REASON FOR CONSULT  Patient presents with:  Hospital F/U  Stroke  Intracranial Aneurysm    REASON FOR CONSULTATION-right headache      HISTORY OF PRESENT ILLNESS  Darion Herrera is a 44 year old female seen today for evaluation of head pain.  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  Patient also developed hydrocephalus and needed a shunt.  Patient continues to have the shunt.  She at that time developed seizures and has been on Keppra since then.  Patient reports no side effects to the Keppra.    Patient over the last few months has been having right occipital region pain.  Patient's shunt is more in the frontal and the temporal region.  Patient reports that this pain is worse at nighttime.  Is barely there during the daytime.  It is more of a pulling pain she reports.  Denies any photophobia phonophobia or auras with this.  Has not tried any medications for this.  This started 4 months ago and is present every day.    Patient also complains of hand weakness.  She reports that if she bends her head she is unable to stay steady.  Complains of some ongoing neck stiffness.  Patient does not feel she is ready to go back to work.She does also complain of cognitive difficulty since the initial aneurysm rupture.    Patient also reports that she recently had a fall.  This was more of a mechanical fall that she had gone hunting with her  and then fell down.  Headache was present even before the fall.    Previous history is reviewed and this is unchanged.    PAST MEDICAL/SURGICAL HISTORY  Past Medical History:   Diagnosis Date     Aneurysm (H)      Depressive disorder      History of stroke without residual deficits      Migraines      Seizures (H)      There is no problem list on file for this patient.  Significant for previous  "aneurysm/rupture/stroke/migraine/seizure    FAMILY HISTORY  History reviewed. No pertinent family history.   Negative for aneurysms.  Positive for stroke and migraines    SOCIAL HISTORY  Social History     Tobacco Use     Smoking status: Never Smoker     Smokeless tobacco: Never Used   Substance Use Topics     Alcohol use: Never     Frequency: Never     Drug use: Never       SYSTEMS REVIEW  Twelve-system ROS was done and other than the HPI this was negative except for neck pain, arm and leg pain, weakness paralysis, falling, balance coordination problems, dizziness, headaches    MEDICATIONS       acetaminophen (TYLENOL) 500 MG tablet, Take 1,000 mg by mouth       aspirin (ASA) 81 MG chewable tablet, Take 81 mg by mouth       atorvastatin (LIPITOR) 20 MG tablet, Take 20 mg by mouth       clopidogrel (PLAVIX) 75 MG tablet, Take 75 mg by mouth       levETIRAcetam (KEPPRA) 1000 MG tablet, Take 1,000 mg by mouth 2 times daily    No current facility-administered medications on file prior to visit.        PHYSICAL EXAMINATION  VITALS: /74   Pulse 81   Ht 1.473 m (4' 10\")   Wt 71.7 kg (158 lb)   BMI 33.02 kg/m    GENERAL: Healthy appearing, alert, no acute distress, normal habitus.  CARDIOVASCULAR: Extremities warm and well perfused. Pulses present.   NEUROLOGICAL:  Patient is awake and oriented to self, place and time.  Attention span is normal.  Memory is grossly intact.  [employer] is fluent and follows commands appropriately.  Appropriate fund of knowledge. Cranial nerves 2-12 are intact. There is no pronator drift.  Motor exam shows 5/5 strength in all extremities.  Tone is symmetric bilaterally in upper and lower extremities.  Reflexes are symmetric and 2+ in upper extremities and lower extremities. Sensory exam is grossly intact to light touch, pin prick and vibration.  Finger to nose and heel to shin is without dysmetria.  Romberg is negative.  Gait is normal and the patient is able to do tandem walk with " mild difficulty.      DIAGNOSTICS  CT head  IMPRESSION:   1.  Considerable streak artifact related to prior coil embolization. No hemorrhage, infarct or edema is visible.  2.  Unchanged position of a right frontal approach ventricular catheter. Unchanged ventricular caliber.        MRI  IMPRESSION:   1.  Redemonstrated stent assisted coiling of the previously described right supraclinoid internal carotid artery aneurysm with extensive susceptibility artifact relating to the coils. Tiny focus of flow related enhancement along the anterior margin of   the coil mass likely relates to the very small amount of opacification seen on prior conventional angiogram. No findings to suggest recurrence elsewhere.     2.  No new aneurysm and no high-grade intracranial stenosis.     3.   Right-sided subdural fluid collection measuring up to 2 mm in thickness is similar to what was demonstrated on prior head CT.     4.  Right frontal approach  shunt with stable ventricular size and configuration.    EEG (Dr. Meza)  Conclusion: This is an abnormal EEG due to moderate diffuse slowing consistent with sedation and also some asymmetry with mild attenuation on the right hemisphere. Clinical correlation is advised regarding any structural lesion on the right side.  There are no epileptiform findings on today's study.        OUTSIDE RECORDS  Outside referral notes were reviewed and pertinent information has been summarized:-Patient was recently seen in the emergency room.  Patient was actually diagnosed with acute headache and fall.  Patient was thought to have the headache related to her fall and not before that.  Patient received a head CT that was stable.  Patient was supposed to use Tylenol ibuprofen to see if that would help.  No seizure-like activity was reported.  It was not thought that the  shunt was causing the problems.  Patient was asked to come to neurology.    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  At this point patient's headache is not related to the  shunt.  I will get a MRI of the brain to rule out any other structural lesions.  We will also get a MRI to rule out any new aneurysms/growth of the previous aneurysm.  Will prescribe gabapentin to see if that would help with the pain.  Headache is mainly nighttime.  He does not fit with any of the primary headache features.  We will further refer to physical therapy for balance.  I did give her a note for work though will defer to primary care doctor if further restrictions need to be put for the patient.  I can see her back in 6-8 weeks.    In terms of the seizure medication could consider stopping it sometime next year.  Patient would need a repeat EEG before we stop it.    -     PHYSICAL THERAPY REFERRAL  -     MR Brain w/o Contrast  -     MRA Brain ([facility]) wo Contrast  -     gabapentin (NEURONTIN) 300 MG capsule; Take 1 capsule (300 mg) by mouth 3 times daily    Return in about 2 months (around 12/16/2020) for In-Clinic Visit.    Over 40 minutes were spent coordinating the care for the patient today.  More than 50% of the time was spent counseling, educating the patient.  Extremity use of  and reviewing all the outside records.    Justin Vasquez MD  Neurologist  [facility] Neurology [facility]  Tel:- [phone]    This note was dictated using voice recognition software.  Any grammatical or context distortions are unintentional and inherent to the software.

## 2020-10-16 NOTE — NURSING NOTE
Chief Complaint   Patient presents with     Hospital F/U     Stroke     Intracranial Aneurysm         .Alicia Ritter CMA on 10/16/2020 at 11:32 AM

## 2020-10-23 ENCOUNTER — HOSPITAL ENCOUNTER (OUTPATIENT)
Dept: PHYSICAL THERAPY | Facility: CLINIC | Age: 45
Setting detail: THERAPIES SERIES
End: 2020-10-23
Attending: PSYCHIATRY & NEUROLOGY
Payer: COMMERCIAL

## 2020-10-23 PROCEDURE — 97110 THERAPEUTIC EXERCISES: CPT | Mod: GP | Performed by: PHYSICAL THERAPIST

## 2020-10-23 PROCEDURE — 97161 PT EVAL LOW COMPLEX 20 MIN: CPT | Mod: GP | Performed by: PHYSICAL THERAPIST

## 2020-10-27 ENCOUNTER — HOSPITAL ENCOUNTER (OUTPATIENT)
Dept: MRI IMAGING | Facility: CLINIC | Age: 45
Discharge: HOME | End: 2020-10-27
Attending: PSYCHIATRY & NEUROLOGY

## 2020-10-27 ENCOUNTER — HOSPITAL ENCOUNTER (OUTPATIENT)
Dept: RADIOLOGY | Facility: CLINIC | Age: 45
Discharge: HOME | End: 2020-10-27
Attending: PSYCHIATRY & NEUROLOGY

## 2020-10-27 ENCOUNTER — COMMUNICATION - HEALTHEAST (OUTPATIENT)
Dept: NEUROSURGERY | Facility: CLINIC | Age: 45
End: 2020-10-27

## 2020-10-27 DIAGNOSIS — R41.89 SUBJECTIVE MEMORY COMPLAINTS: ICD-10-CM

## 2020-10-27 DIAGNOSIS — Z96.89 PRESENCE OF PROGRAMMABLE VENTRICULAR SHUNT VALVE: ICD-10-CM

## 2020-10-27 NOTE — PROGRESS NOTES
"   10/23/20 1400   General Information   Start of Care Date 10/23/20   Referring Physician Justin Vasquez MD   Orders Evaluate and Treat as Indicated   Order Date 10/16/20   Medical Diagnosis memory c/o, neck stiff, balance and hand weakness.    Surgical/Medical history reviewed Yes   Pertinent history of current problem (include personal factors and/or comorbidities that impact the POC) 2019 SAH and aneurysm.  dtr said she was not awake couple months.  R handed.  can do cooking but can't lift pots.     Pertinent Visual History  no glasses. \"not good\".     Prior level of functional mobility Ambulation   Ambulation one block or less. has boots on today.  sometimes walks alone.  Lives w/ husb.  2 dtr live there.     Current Community Support Family/friend caregiver   Patient role/Employment history Unemployed   Home/Community Accessibility Comments has a cane, not using now 5 to 6 months   Current Assistive Devices Standard Cane   ADL Devices Shower/Tub Chair   Patient/Family Goals Statement doc appt / doc said to help me.   walk better and be stronger.     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 Yes   Have you fallen and had an injury in the past year? Yes   Timed Up and Go score (seconds) 24   Fall screen comments \"few times\"   Pain   Patient currently in pain Yes   Pain rating head where shunt is. R side of head.     Vitals Signs   Heart Rate 77   Blood Pressure 119/81   Cognitive Status Examination   Orientation orientation to person, place and time   Level of Consciousness alert   Follows Commands and Answers Questions 100% of the time   Personal Safety and Judgment intact   Integumentary   Integumentary Other   Integumentary Comments shunt R scalp   Posture   Posture Forward head position   Range of Motion (ROM)   ROM Comment wfls   Strength   Strength Comments L leg hip fl gr 4 df 4+ knee ext 5.  L arm gr " 3 or less   Bed Mobility   Bed Mobility Comments indep   Transfer Skills   Transfer Comments uses hands to get up from chair   Locomotion   Wheel Chair Mobility Comments n/a   Gait   Gait Comments 2.5 min walk --260ft w/ cane.    Gait Special Tests   Gait Special Tests 25 FOOT TIMED WALK;OTHER   Gait Special Tests 25 Foot Timed Walk   Seconds 15   Steps 21 Steps   Comments w/ cane   Balance   Balance Comments cannot SLS   Sensory Examination   Sensory Perception no deficits were identified   Coordination   Coordination other (see comments)   Coordination Comments not tested   Muscle Tone   Muscle Tone Comments wfls   Planned Therapy Interventions   Planned Therapy Interventions balance training;gait training;neuromuscular re-education;strengthening;transfer training   Clinical Impression   Criteria for Skilled Therapeutic Interventions Met yes, treatment indicated   PT Diagnosis L sided weakness w/ gait difficulty   Influenced by the following impairments weakness, fatigue   Functional limitations due to impairments gait difficulty   Clinical Presentation Stable/Uncomplicated   Clinical Decision Making (Complexity) Low complexity   Therapy Frequency other (see comments)   Predicted Duration of Therapy Intervention (days/wks) up to 6x in 90 days   Risk & Benefits of therapy have been explained Yes   Patient, Family & other staff in agreement with plan of care Yes   Clinical Impression Comments 45 yo w/ R shunt and L sided weakness   Education Assessment   Preferred Learning Style Demonstration   Barriers to Learning [employer];Physical   GOALS   PT Eval Goals 2;1   Goal 1   Goal Identifier gait speed   Goal Description pt to walk and do TUG in 13 sec or less for community safety   Target Date 01/20/21   Goal 2   Goal Identifier gait   Goal Description pt to walk 6 min walk and be able to walk 800ft for short community outings.    Target Date 01/20/21   Goal 3   Goal Identifier HEP   Goal Description  pt to be indep w/  a Home exer program for strengthening and improving her gait quality and endurance.   Target Date 01/20/21   Total Evaluation Time   PT [place], Low Complexity Minutes (65943) 24

## 2020-10-27 NOTE — PROGRESS NOTES
[facility]        OUTPATIENT PHYSICAL THERAPY FUNCTIONAL EVALUATION  PLAN OF TREATMENT FOR OUTPATIENT REHABILITATION  (COMPLETE FOR INITIAL CLAIMS ONLY)  Patient's Last Name, First Name, M.I.  YOB: 1975  Darion Herrera     Provider's Name   [facility]   Medical Record No.  [id number]     Start of Care Date:  10/23/20   Onset Date:   10/16/20   Type:     _X__PT   ____OT  ____SLP Medical Diagnosis:   L sided weakness     PT Diagnosis:  L sided weakness w/ gait difficulty Visits from SOC:  1                              __________________________________________________________________________________  Plan of Treatment/Functional Goals:  balance training, gait training, neuromuscular re-education, strengthening, transfer training           GOALS  gait speed  pt to walk and do TUG in 13 sec or less for community safety  01/20/21    gait  pt to walk 6 min walk and be able to walk 800ft for short community outings.   01/20/21    HEP   pt to be indep w/ a Home exer program for strengthening and improving her gait quality and endurance.  01/20/21         Therapy Frequency:  other (see comments)   Predicted Duration of Therapy Intervention:  up to 6x in 90 days    Donya Tucker PT                                    I CERTIFY THE NEED FOR THESE SERVICES FURNISHED UNDER        THIS PLAN OF TREATMENT AND WHILE UNDER MY CARE     (Physician co-signature of this document indicates review and certification of the therapy plan).                Certification Date From:    10/23/20  Certification Date To:       1/20/21    Referring Provider:  Justin Vasquez MD    Initial Assessment  See Epic Evaluation- Start of Care Date: 10/23/20

## 2020-11-06 ENCOUNTER — HOSPITAL ENCOUNTER (OUTPATIENT)
Dept: PHYSICAL THERAPY | Facility: CLINIC | Age: 45
Setting detail: THERAPIES SERIES
End: 2020-11-06
Attending: PSYCHIATRY & NEUROLOGY
Payer: COMMERCIAL

## 2020-11-06 PROCEDURE — 97110 THERAPEUTIC EXERCISES: CPT | Mod: GP | Performed by: PHYSICAL THERAPIST

## 2020-11-06 PROCEDURE — 97116 GAIT TRAINING THERAPY: CPT | Mod: GP | Performed by: PHYSICAL THERAPIST

## 2020-11-20 ENCOUNTER — HOSPITAL ENCOUNTER (OUTPATIENT)
Dept: PHYSICAL THERAPY | Facility: CLINIC | Age: 45
Setting detail: THERAPIES SERIES
End: 2020-11-20
Attending: PSYCHIATRY & NEUROLOGY
Payer: COMMERCIAL

## 2020-11-20 PROCEDURE — 97530 THERAPEUTIC ACTIVITIES: CPT | Mod: GP | Performed by: PHYSICAL THERAPIST

## 2020-11-20 NOTE — DISCHARGE INSTRUCTIONS
11/20/20    PT:  NEED to do  parts of chore jobs .  All of this will help you get stronger.      1.  Ask family to let SHAMA fold the laundry.      Set it up so she has a folding surface like a table or another chair.  Make sure REACHER is near you.    2.  Help in kitchen from sitting position.   Sit and help cut things or prepare parts of meal.   Again - family will have to set in up for you.    3. Try to sweep the floor / just one room at a time.     4. If going for a walk outside, have family member carry a camping chair along in case you need to sit.     5. STAND and wash a few dishes, plastic things.  1-3 minutes at a time.     Donya   PT     See you soon!!

## 2020-11-27 ENCOUNTER — AMBULATORY - HEALTHEAST (OUTPATIENT)
Dept: LAB | Facility: HOSPITAL | Age: 45
End: 2020-11-27

## 2020-11-27 DIAGNOSIS — E78.2 MIXED HYPERLIPIDEMIA: ICD-10-CM

## 2020-11-27 DIAGNOSIS — E13.29 OTHER SPECIFIED DIABETES MELLITUS WITH OTHER DIABETIC KIDNEY COMPLICATION (H): ICD-10-CM

## 2020-11-27 DIAGNOSIS — Z13.228 SCREENING FOR PHENYLKETONURIA (PKU): ICD-10-CM

## 2020-11-27 LAB
ALBUMIN SERPL-MCNC: 4.3 G/DL (ref 3.5–5)
ALP SERPL-CCNC: 80 U/L (ref 45–120)
ALT SERPL W P-5'-P-CCNC: 34 U/L (ref 0–45)
ANION GAP SERPL CALCULATED.3IONS-SCNC: 8 MMOL/L (ref 5–18)
AST SERPL W P-5'-P-CCNC: 23 U/L (ref 0–40)
BILIRUB SERPL-MCNC: 0.4 MG/DL (ref 0–1)
BUN SERPL-MCNC: 10 MG/DL (ref 8–22)
CALCIUM SERPL-MCNC: 9.1 MG/DL (ref 8.5–10.5)
CHLORIDE BLD-SCNC: 106 MMOL/L (ref 98–107)
CHOLEST SERPL-MCNC: 147 MG/DL
CO2 SERPL-SCNC: 27 MMOL/L (ref 22–31)
CREAT SERPL-MCNC: 0.75 MG/DL (ref 0.6–1.1)
ERYTHROCYTE [DISTWIDTH] IN BLOOD BY AUTOMATED COUNT: 11.9 % (ref 11–14.5)
FASTING STATUS PATIENT QL REPORTED: YES
GFR SERPL CREATININE-BSD FRML MDRD: >60 ML/MIN/1.73M2
GLUCOSE BLD-MCNC: 101 MG/DL (ref 70–125)
HBA1C MFR BLD: 5.4 %
HCT VFR BLD AUTO: 42.6 % (ref 35–47)
HDLC SERPL-MCNC: 45 MG/DL
HGB BLD-MCNC: 14.1 G/DL (ref 12–16)
LDLC SERPL CALC-MCNC: 75 MG/DL
MCH RBC QN AUTO: 29.2 PG (ref 27–34)
MCHC RBC AUTO-ENTMCNC: 33.1 G/DL (ref 32–36)
MCV RBC AUTO: 88 FL (ref 80–100)
PLATELET # BLD AUTO: 249 THOU/UL (ref 140–440)
PMV BLD AUTO: 8.9 FL (ref 8.5–12.5)
POTASSIUM BLD-SCNC: 3.8 MMOL/L (ref 3.5–5)
PROT SERPL-MCNC: 7.5 G/DL (ref 6–8)
RBC # BLD AUTO: 4.83 MILL/UL (ref 3.8–5.4)
SODIUM SERPL-SCNC: 141 MMOL/L (ref 136–145)
TRIGL SERPL-MCNC: 136 MG/DL
TSH SERPL DL<=0.005 MIU/L-ACNC: 1.97 UIU/ML (ref 0.3–5)
WBC: 6.7 THOU/UL (ref 4–11)

## 2020-12-11 ENCOUNTER — OFFICE VISIT (OUTPATIENT)
Dept: NEUROLOGY | Facility: CLINIC | Age: 45
End: 2020-12-11
Payer: COMMERCIAL

## 2020-12-11 ENCOUNTER — HOSPITAL ENCOUNTER (OUTPATIENT)
Dept: PHYSICAL THERAPY | Facility: CLINIC | Age: 45
Setting detail: THERAPIES SERIES
End: 2020-12-11
Attending: PSYCHIATRY & NEUROLOGY
Payer: COMMERCIAL

## 2020-12-11 VITALS
DIASTOLIC BLOOD PRESSURE: 81 MMHG | HEART RATE: 85 BPM | HEIGHT: 58 IN | SYSTOLIC BLOOD PRESSURE: 128 MMHG | WEIGHT: 160 LBS | BODY MASS INDEX: 33.58 KG/M2

## 2020-12-11 DIAGNOSIS — R41.89 SUBJECTIVE MEMORY COMPLAINTS: ICD-10-CM
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
## 2020-12-11 DIAGNOSIS — Z87.898 HISTORY OF SEIZURES: ICD-10-CM

## 2020-12-11 DIAGNOSIS — S06.9XAS COGNITIVE AND NEUROBEHAVIORAL DYSFUNCTION STAUS POST BRAIN INJURY (H): ICD-10-CM

## 2020-12-11 DIAGNOSIS — G31.89 COGNITIVE AND NEUROBEHAVIORAL DYSFUNCTION STAUS POST BRAIN INJURY (H): ICD-10-CM

## 2020-12-11 DIAGNOSIS — F09 COGNITIVE AND NEUROBEHAVIORAL DYSFUNCTION STAUS POST BRAIN INJURY (H): ICD-10-CM

## 2020-12-11 DIAGNOSIS — M79.10 MUSCLE PAIN: Primary | ICD-10-CM

## 2020-12-11 DIAGNOSIS — R51.9 NONINTRACTABLE HEADACHE, UNSPECIFIED CHRONICITY PATTERN, UNSPECIFIED HEADACHE TYPE: ICD-10-CM

## 2020-12-11 PROCEDURE — 99215 OFFICE O/P EST HI 40 MIN: CPT | Performed by: PSYCHIATRY & NEUROLOGY

## 2020-12-11 PROCEDURE — 97530 THERAPEUTIC ACTIVITIES: CPT | Mod: GP | Performed by: PHYSICAL THERAPIST

## 2020-12-11 RX ORDER — CYCLOBENZAPRINE HCL 5 MG
5 TABLET ORAL 3 TIMES DAILY PRN
Qty: 90 TABLET | Refills: 0 | Status: SHIPPED | OUTPATIENT
Start: 2020-12-11 | End: 2022-02-10

## 2020-12-11 ASSESSMENT — MIFFLIN-ST. JEOR: SCORE: 1260.38

## 2020-12-11 NOTE — NURSING NOTE
Chief Complaint   Patient presents with     Headache     Discuss MRI results     Extremity Weakness     PT has helped her      Beckie Vivas CMA on 12/11/2020 at 11:26 AM

## 2020-12-11 NOTE — PROGRESS NOTES
NEUROLOGY OUTPATIENT PROGRESS NOTE   Dec 11, 2020     CHIEF COMPLAINT/REASON FOR VISIT/REASON FOR CONSULT  Patient presents with:  Headache: Discuss MRI results  Extremity Weakness: PT has helped her     REASON FOR CONSULTATION-right headache      HISTORY OF PRESENT ILLNESS  Darion Herrera is a 45 year old female seen today for evaluation of head pain.  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  Patient also developed hydrocephalus and needed a shunt.  Patient continues to have the shunt.  She at that time developed seizures and has been on Keppra since then.  Patient reports no side effects to the Keppra.    Patient over the last few months has been having right occipital region pain.  Patient's shunt is more in the frontal and the temporal region.  Patient reports that this pain is worse at nighttime.  Is barely there during the daytime.  It is more of a pulling pain she reports.  Denies any photophobia phonophobia or auras with this.  Has not tried any medications for this.  This started 4 months ago and is present every day.    Patient also complains of hand weakness.  She reports that if she bends her head she is unable to stay steady.  Complains of some ongoing neck stiffness.  Patient does not feel she is ready to go back to work.She does also complain of cognitive difficulty since the initial aneurysm rupture.    Patient also reports that she recently had a fall.  This was more of a mechanical fall that she had gone hunting with her  and then fell down.  Headache was present even before the fall.    12/11/20  Patient returns today.  She reports that she did try the gabapentin which has not helped her pain at all.  Reports no side effects.  She tries to clarify the pain and reports that she went hunting with her .  She was not hunting but she tripped and fell.  She landed on her left side.  She has pain from her left elbow  that radiates all the way to her left neck and then to her right occipital region.  This is more of a muscle pain and a headache.  The pain is outside the head.  In terms of her balance she has gone to physical therapy and it is a bit better but nothing significant.  She is using a cane.  Denies any other new symptoms.  Does complain of muscle pain than head pain.    Patient is a lot of questions about lifestyle restrictions, and questions about the shunt and primary care questions.  Reports no other new symptoms.  Patient remains seizure-free.    Previous history is reviewed and this is unchanged.    PAST MEDICAL/SURGICAL HISTORY  Past Medical History:   Diagnosis Date     Aneurysm (H)      Depressive disorder      History of stroke without residual deficits      Migraines      Seizures (H)      There is no problem list on file for this patient.  Significant for previous aneurysm/rupture/stroke/migraine/seizure    FAMILY HISTORY  History reviewed. No pertinent family history.   Negative for aneurysms.  Positive for stroke and migraines    SOCIAL HISTORY  Social History     Tobacco Use     Smoking status: Never Smoker     Smokeless tobacco: Never Used   Substance Use Topics     Alcohol use: Never     Frequency: Never     Drug use: Never       SYSTEMS REVIEW  Twelve-system ROS was done and other than the HPI this was negative except for neck pain, arm and leg pain, weakness paralysis, falling, balance coordination problems, dizziness, headaches    MEDICATIONS       acetaminophen (TYLENOL) 500 MG tablet, Take 1,000 mg by mouth       aspirin (ASA) 81 MG chewable tablet, Take 81 mg by mouth       atorvastatin (LIPITOR) 20 MG tablet, Take 20 mg by mouth       clopidogrel (PLAVIX) 75 MG tablet, Take 75 mg by mouth       gabapentin (NEURONTIN) 300 MG capsule, Take 1 capsule (300 mg) by mouth 3 times daily       levETIRAcetam (KEPPRA) 1000 MG tablet, Take 1,000 mg by mouth 2 times daily    No current facility-administered  "medications on file prior to visit.        PHYSICAL EXAMINATION  VITALS: /81 (BP Location: Right arm, Patient Position: Sitting)   Pulse 85   Ht 1.473 m (4' 9.99\")   Wt 72.6 kg (160 lb)   BMI 33.45 kg/m    GENERAL: Healthy appearing, alert, no acute distress, normal habitus.  CARDIOVASCULAR: Extremities warm and well perfused. Pulses present.   NEUROLOGICAL:  Patient is awake and oriented to self, place and time.  Attention span is normal.  Memory is grossly intact.  [employer] is fluent and follows commands appropriately.  Appropriate fund of knowledge. Cranial nerves 2-12 are intact. There is no pronator drift.  Motor exam shows 5/5 strength in all extremities.  Tone is symmetric bilaterally in upper and lower extremities.  Reflexes are symmetric and 2+ in upper extremities and lower extremities. Sensory exam is grossly intact to light touch, pin prick and vibration.  Finger to nose and heel to shin is without dysmetria.  Romberg is negative.  Gait is normal and the patient is able to do tandem walk with mild difficulty.      DIAGNOSTICS  CT head  IMPRESSION:   1.  Considerable streak artifact related to prior coil embolization. No hemorrhage, infarct or edema is visible.  2.  Unchanged position of a right frontal approach ventricular catheter. Unchanged ventricular caliber.        MRI  IMPRESSION:   1.  Redemonstrated stent assisted coiling of the previously described right supraclinoid internal carotid artery aneurysm with extensive susceptibility artifact relating to the coils. Tiny focus of flow related enhancement along the anterior margin of   the coil mass likely relates to the very small amount of opacification seen on prior conventional angiogram. No findings to suggest recurrence elsewhere.     2.  No new aneurysm and no high-grade intracranial stenosis.     3.   Right-sided subdural fluid collection measuring up to 2 mm in thickness is similar to what was demonstrated on prior head CT.     4.  " Right frontal approach  shunt with stable ventricular size and configuration.    EEG (Dr. Meza)  Conclusion: This is an abnormal EEG due to moderate diffuse slowing consistent with sedation and also some asymmetry with mild attenuation on the right hemisphere. Clinical correlation is advised regarding any structural lesion on the right side.  There are no epileptiform findings on today's study.        OUTSIDE RECORDS  Outside referral notes were reviewed and pertinent information has been summarized:-Patient was recently seen in the emergency room.  Patient was actually diagnosed with acute headache and fall.  Patient was thought to have the headache related to her fall and not before that.  Patient received a head CT that was stable.  Patient was supposed to use Tylenol ibuprofen to see if that would help.  No seizure-like activity was reported.  It was not thought that the  shunt was causing the problems.  Patient was asked to come to neurology.    10/16/20  HEAD MRI:   1.  Redemonstration of right frontal approach ventriculostomy catheter with tip in the anterior horn of the right lateral ventricle, stable compared to head CT 10/05/2020. No significant change in the size of the lateral and third ventricles.  2.  Mild right sided dural thickening. Question thin 2 mm subdural collection in the posterior right frontal and parietal convexities. It is not significantly changed compared to the prior head CT 10/05/2020.  3.  No acute/subacute infarcts, mass lesions, or hydrocephalus.   4.  Several small to moderate-sized areas of encephalomalacia involving the right cerebral hemisphere.     HEAD MRA:   1.  Coil mass involving the right supraclinoid internal carotid aneurysm causes artifact which limits evaluation of the adjacent vessels. Interval decrease in the previously noted small areas of flow related enhancement within the anterior aspect of the   coiled aneurysm compared to MRI 03/06/2020. No new areas of  flow related enhancement.  2.  Apparent moderate to severe decrease in flow related enhancement involving the M1 segment of the right middle cerebral artery, new since prior brain MRA. Distal branches of the right middle cerebral artery are patent without hemodynamically   significant stenosis. The flow void of the right middle cerebral artery is present, however, on the T2-weighted images. This can be better evaluated with CT angiogram.    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  At this point patient's headache is not related to the  shunt.  Repeat MRI/MRA are stable in the head pain is not related to the aneurysm.  From her description her symptoms are more consistent with muscular pain than head pain.  I will stop the gabapentin and switch her to Flexeril to see if that helps relax some of her muscle pain.  She should continue to work with physical therapy.  In terms of her balance she should again continue to work with physical therapy.  If the Flexeril is not helpful I would recommend that she follow-up with her primary care doctor as her muscle pain is not neurological in nature.    Patient questions about the shunt which I tried to answer to the best of my ability.  Patient should follow-up with Dr. Sal for further questions.   Patient had questions about restrictions.  She can drink alcohol in moderation and she can fly.  She can exercise and there is no restrictions to activity.  She was encouraged to follow-up with the primary care doctor for her other questions.    In terms of the seizure medication could consider stopping it sometime next year.  Patient would need  a repeat EEG before we stop it.  I want to see her back in 1 year for this.    -     cyclobenzaprine (FLEXERIL) 5 MG tablet; Take 1 tablet (5 mg) by mouth 3 times daily as needed for muscle spasms (headache/muscle pain)  -     PHYSICAL THERAPY REFERRAL; Future    Return in about 1 year (around 12/11/2021) for Virtual or clinic.    Over 40 minutes were spent coordinating the care for the patient today.  More than 50% of the time was spent counseling, educating the patient.  Use of  required extra time.    Justin Vasquez MD  Neurologist  [facility]  Tel:- [phone]    This note was dictated using voice recognition software.  Any grammatical or context distortions are unintentional and inherent to the software.

## 2020-12-11 NOTE — NURSING NOTE
Study Result    EXAM: MR BRAIN COW WO CONTRAST  LOCATION: [facility]  DATE/TIME: 10/27/2020 5:34 PM     INDICATION: Other symptoms and signs involving cognitive functions and awareness.  COMPARISON: Head CT 10/05/2020, brain MRA 03/06/2020.  TECHNIQUE:   HEAD MRI: Routine multiplanar multisequence head MRI without intravenous contrast.  HEAD MRA: 3D time-of-flight head MRA without intravenous contrast.     FINDINGS:  HEAD MRI:  INTRACRANIAL CONTENTS: There is moderate susceptibility artifact obscuring parts of the right frontal and parietal lobes related to the shunt tubing. No areas of acute/subacute infarcts. Redemonstration of right frontal approach ventriculostomy catheter   with tip in the anterior horn of the right lateral ventricle. No significant change in the size of the lateral and third ventricles. Mild ex vacuo dilatation of the right lateral ventricle. Otherwise the ventricles are not out of proportion to the degree   of sulcal dilatation. There are several small to moderate-sized areas of encephalomalacia involving the right frontal lobe, the right parietal lobe and anterior right temporal lobe. Mild right sided dural thickening. Question thin 2 mm subdural   collection in the posterior right frontal and parietal convexities. Small area of gliosis along the right frontal approach ventriculostomy catheter tract. No cerebellar tonsillar ectopia.      SELLA: No abnormality accounting for technique.     OSSEOUS STRUCTURES/SOFT TISSUES: Normal marrow signal. Coil mass in the region of the right supraclinoid internal carotid artery. The flow-voids of the directly imaged major intracranial arteries are patent.      ORBITS: No abnormality accounting for technique.      SINUSES/MASTOIDS: Mild mucosal thickening of the ethmoid air cells. No middle ear or mastoid effusion.      HEAD MRA:     ANTERIOR CIRCULATION: Coil mass involving the right supraclinoid internal carotid aneurysm causes  artifact which limits evaluation of the adjacent vessels. Interval decrease in the previously noted small areas of flow related enhancement within the   anterior aspect of the coiled aneurysm. No new areas of flow related enhancement.     Apparent moderate to severe decrease in the flow related enhancement involving the M1 segment of the right middle cerebral artery, new since prior brain MRA. Distal branches of the right middle cerebral artery are patent without hemodynamically   significant stenosis. No intracranial aneurysms. The anterior cerebral arteries and the left middle cerebral artery are patent without hemodynamically significant stenosis. Standard [place] anatomy.     POSTERIOR CIRCULATION: No stenosis/occlusion, aneurysm, or high flow vascular malformation. Balanced vertebral arteries supply a normal basilar artery.      IMPRESSION:   HEAD MRI:   1.  Redemonstration of right frontal approach ventriculostomy catheter with tip in the anterior horn of the right lateral ventricle, stable compared to head CT 10/05/2020. No significant change in the size of the lateral and third ventricles.  2.  Mild right sided dural thickening. Question thin 2 mm subdural collection in the posterior right frontal and parietal convexities. It is not significantly changed compared to the prior head CT 10/05/2020.  3.  No acute/subacute infarcts, mass lesions, or hydrocephalus.   4.  Several small to moderate-sized areas of encephalomalacia involving the right cerebral hemisphere.     HEAD MRA:   1.  Coil mass involving the right supraclinoid internal carotid aneurysm causes artifact which limits evaluation of the adjacent vessels. Interval decrease in the previously noted small areas of flow related enhancement within the anterior aspect of the   coiled aneurysm compared to MRI 03/06/2020. No new areas of flow related enhancement.  2.  Apparent moderate to severe decrease in flow related enhancement involving the M1  segment of the right middle cerebral artery, new since prior brain MRA. Distal branches of the right middle cerebral artery are patent without hemodynamically   significant stenosis. The flow void of the right middle cerebral artery is present, however, on the T2-weighted images. This can be better evaluated with CT angiogram.

## 2020-12-11 NOTE — LETTER
12/11/2020         RE: Darion Herrera  [address]  [place] [address]        Dear Colleague,    Thank you for referring your patient, Darion Herrera, to the [facility]. Please see a copy of my visit note below.    NEUROLOGY OUTPATIENT PROGRESS NOTE   Dec 11, 2020     CHIEF COMPLAINT/REASON FOR VISIT/REASON FOR CONSULT  Patient presents with:  Headache: Discuss MRI results  Extremity Weakness: PT has helped her     REASON FOR CONSULTATION-right headache      HISTORY OF PRESENT ILLNESS  Darion Herrera is a 45 year old female seen today for evaluation of head pain.  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  Patient also developed hydrocephalus and needed a shunt.  Patient continues to have the shunt.  She at that time developed seizures and has been on Keppra since then.  Patient reports no side effects to the Keppra.    Patient over the last few months has been having right occipital region pain.  Patient's shunt is more in the frontal and the temporal region.  Patient reports that this pain is worse at nighttime.  Is barely there during the daytime.  It is more of a pulling pain she reports.  Denies any photophobia phonophobia or auras with this.  Has not tried any medications for this.  This started 4 months ago and is present every day.    Patient also complains of hand weakness.  She reports that if she bends her head she is unable to stay steady.  Complains of some ongoing neck stiffness.  Patient does not feel she is ready to go back to work.She does also complain of cognitive difficulty since the initial aneurysm rupture.    Patient also reports that she recently had a fall.  This was more of a mechanical fall that she had gone hunting with her  and then fell down.  Headache was present even before the fall.    12/11/20  Patient returns today.  She reports that she did try the gabapentin which  has not helped her pain at all.  Reports no side effects.  She tries to clarify the pain and reports that she went hunting with her .  She was not hunting but she tripped and fell.  She landed on her left side.  She has pain from her left elbow that radiates all the way to her left neck and then to her right occipital region.  This is more of a muscle pain and a headache.  The pain is outside the head.  In terms of her balance she has gone to physical therapy and it is a bit better but nothing significant.  She is using a cane.  Denies any other new symptoms.  Does complain of muscle pain than head pain.    Patient is a lot of questions about lifestyle restrictions, and questions about the shunt and primary care questions.  Reports no other new symptoms.  Patient remains seizure-free.    Previous history is reviewed and this is unchanged.    PAST MEDICAL/SURGICAL HISTORY  Past Medical History:   Diagnosis Date     Aneurysm (H)      Depressive disorder      History of stroke without residual deficits      Migraines      Seizures (H)      There is no problem list on file for this patient.  Significant for previous aneurysm/rupture/stroke/migraine/seizure    FAMILY HISTORY  History reviewed. No pertinent family history.   Negative for aneurysms.  Positive for stroke and migraines    SOCIAL HISTORY  Social History     Tobacco Use     Smoking status: Never Smoker     Smokeless tobacco: Never Used   Substance Use Topics     Alcohol use: Never     Frequency: Never     Drug use: Never       SYSTEMS REVIEW  Twelve-system ROS was done and other than the HPI this was negative except for neck pain, arm and leg pain, weakness paralysis, falling, balance coordination problems, dizziness, headaches    MEDICATIONS       acetaminophen (TYLENOL) 500 MG tablet, Take 1,000 mg by mouth       aspirin (ASA) 81 MG chewable tablet, Take 81 mg by mouth       atorvastatin (LIPITOR) 20 MG tablet, Take 20 mg by mouth       clopidogrel  "(PLAVIX) 75 MG tablet, Take 75 mg by mouth       gabapentin (NEURONTIN) 300 MG capsule, Take 1 capsule (300 mg) by mouth 3 times daily       levETIRAcetam (KEPPRA) 1000 MG tablet, Take 1,000 mg by mouth 2 times daily    No current facility-administered medications on file prior to visit.        PHYSICAL EXAMINATION  VITALS: /81 (BP Location: Right arm, Patient Position: Sitting)   Pulse 85   Ht 1.473 m (4' 9.99\")   Wt 72.6 kg (160 lb)   BMI 33.45 kg/m    GENERAL: Healthy appearing, alert, no acute distress, normal habitus.  CARDIOVASCULAR: Extremities warm and well perfused. Pulses present.   NEUROLOGICAL:  Patient is awake and oriented to self, place and time.  Attention span is normal.  Memory is grossly intact.  [employer] is fluent and follows commands appropriately.  Appropriate fund of knowledge. Cranial nerves 2-12 are intact. There is no pronator drift.  Motor exam shows 5/5 strength in all extremities.  Tone is symmetric bilaterally in upper and lower extremities.  Reflexes are symmetric and 2+ in upper extremities and lower extremities. Sensory exam is grossly intact to light touch, pin prick and vibration.  Finger to nose and heel to shin is without dysmetria.  Romberg is negative.  Gait is normal and the patient is able to do tandem walk with mild difficulty.      DIAGNOSTICS  CT head  IMPRESSION:   1.  Considerable streak artifact related to prior coil embolization. No hemorrhage, infarct or edema is visible.  2.  Unchanged position of a right frontal approach ventricular catheter. Unchanged ventricular caliber.        MRI  IMPRESSION:   1.  Redemonstrated stent assisted coiling of the previously described right supraclinoid internal carotid artery aneurysm with extensive susceptibility artifact relating to the coils. Tiny focus of flow related enhancement along the anterior margin of   the coil mass likely relates to the very small amount of opacification seen on prior conventional angiogram. " No findings to suggest recurrence elsewhere.     2.  No new aneurysm and no high-grade intracranial stenosis.     3.   Right-sided subdural fluid collection measuring up to 2 mm in thickness is similar to what was demonstrated on prior head CT.     4.  Right frontal approach  shunt with stable ventricular size and configuration.    EEG (Dr. Meza)  Conclusion: This is an abnormal EEG due to moderate diffuse slowing consistent with sedation and also some asymmetry with mild attenuation on the right hemisphere. Clinical correlation is advised regarding any structural lesion on the right side.  There are no epileptiform findings on today's study.        OUTSIDE RECORDS  Outside referral notes were reviewed and pertinent information has been summarized:-Patient was recently seen in the emergency room.  Patient was actually diagnosed with acute headache and fall.  Patient was thought to have the headache related to her fall and not before that.  Patient received a head CT that was stable.  Patient was supposed to use Tylenol ibuprofen to see if that would help.  No seizure-like activity was reported.  It was not thought that the  shunt was causing the problems.  Patient was asked to come to neurology.    10/16/20  HEAD MRI:   1.  Redemonstration of right frontal approach ventriculostomy catheter with tip in the anterior horn of the right lateral ventricle, stable compared to head CT 10/05/2020. No significant change in the size of the lateral and third ventricles.  2.  Mild right sided dural thickening. Question thin 2 mm subdural collection in the posterior right frontal and parietal convexities. It is not significantly changed compared to the prior head CT 10/05/2020.  3.  No acute/subacute infarcts, mass lesions, or hydrocephalus.   4.  Several small to moderate-sized areas of encephalomalacia involving the right cerebral hemisphere.     HEAD MRA:   1.  Coil mass involving the right supraclinoid internal  carotid aneurysm causes artifact which limits evaluation of the adjacent vessels. Interval decrease in the previously noted small areas of flow related enhancement within the anterior aspect of the   coiled aneurysm compared to MRI 03/06/2020. No new areas of flow related enhancement.  2.  Apparent moderate to severe decrease in flow related enhancement involving the M1 segment of the right middle cerebral artery, new since prior brain MRA. Distal branches of the right middle cerebral artery are patent without hemodynamically   significant stenosis. The flow void of the right middle cerebral artery is present, however, on the T2-weighted images. This can be better evaluated with CT angiogram.    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  At this point patient's headache is not related to the  shunt.  Repeat MRI/MRA are stable in the head pain is not related to the aneurysm.  From her description her symptoms are more consistent with muscular pain than head pain.  I will stop the gabapentin and switch her to Flexeril to see if that helps relax some of her muscle pain.  She should continue to work with physical therapy.  In terms of her balance she should again continue to work with physical therapy.  If the Flexeril is not helpful I would recommend that she follow-up with her primary care doctor.    Patient questions about the shunt which I tried to answer to the best of my ability.  Patient should follow-up with Dr. Sal for further questions.   Patient had questions about restrictions.  She can drink alcohol in moderation and she can fly.  She can exercise and there is no  restrictions to activity.  She was encouraged to follow-up with the primary care doctor for her other questions.    In terms of the seizure medication could consider stopping it sometime next year.  Patient would need a repeat EEG before we stop it.  I want to see her back in 1 year for this.    -     cyclobenzaprine (FLEXERIL) 5 MG tablet; Take 1 tablet (5 mg) by mouth 3 times daily as needed for muscle spasms (headache/muscle pain)  -     PHYSICAL THERAPY REFERRAL; Future    Return in about 1 year (around 12/11/2021) for Virtual or clinic.    Over 40 minutes were spent coordinating the care for the patient today.  More than 50% of the time was spent counseling, educating the patient.  Use of  required extra time.    Justin Vasquez MD  Neurologist  [facility]  Tel:- [phone]    This note was dictated using voice recognition software.  Any grammatical or context distortions are unintentional and inherent to the software.        Again, thank you for allowing me to participate in the care of your patient.        Sincerely,        Justin Vasquez MD

## 2020-12-14 ENCOUNTER — AMBULATORY - HEALTHEAST (OUTPATIENT)
Dept: ADMINISTRATIVE | Facility: REHABILITATION | Age: 45
End: 2020-12-14

## 2020-12-14 DIAGNOSIS — M79.10 MUSCLE PAIN: ICD-10-CM

## 2020-12-31 ENCOUNTER — HOSPITAL ENCOUNTER (OUTPATIENT)
Dept: PHYSICAL THERAPY | Facility: CLINIC | Age: 45
Setting detail: THERAPIES SERIES
End: 2020-12-31
Attending: PSYCHIATRY & NEUROLOGY
Payer: COMMERCIAL

## 2020-12-31 PROCEDURE — 97530 THERAPEUTIC ACTIVITIES: CPT | Mod: GP | Performed by: PHYSICAL THERAPIST

## 2020-12-31 PROCEDURE — 97116 GAIT TRAINING THERAPY: CPT | Mod: GP | Performed by: PHYSICAL THERAPIST

## 2021-01-15 ENCOUNTER — HOSPITAL ENCOUNTER (OUTPATIENT)
Dept: PHYSICAL THERAPY | Facility: CLINIC | Age: 46
Setting detail: THERAPIES SERIES
End: 2021-01-15
Attending: PSYCHIATRY & NEUROLOGY
Payer: COMMERCIAL

## 2021-01-15 PROCEDURE — 97110 THERAPEUTIC EXERCISES: CPT | Mod: GP | Performed by: PHYSICAL THERAPIST

## 2021-02-05 ENCOUNTER — HOSPITAL ENCOUNTER (OUTPATIENT)
Dept: PHYSICAL THERAPY | Facility: CLINIC | Age: 46
Setting detail: THERAPIES SERIES
End: 2021-02-05
Attending: PSYCHIATRY & NEUROLOGY
Payer: COMMERCIAL

## 2021-02-05 PROCEDURE — 97530 THERAPEUTIC ACTIVITIES: CPT | Mod: GP | Performed by: PHYSICAL THERAPIST

## 2021-02-05 PROCEDURE — 97110 THERAPEUTIC EXERCISES: CPT | Mod: GP | Performed by: PHYSICAL THERAPIST

## 2021-02-05 PROCEDURE — 97116 GAIT TRAINING THERAPY: CPT | Mod: GP | Performed by: PHYSICAL THERAPIST

## 2021-02-19 ENCOUNTER — HOSPITAL ENCOUNTER (OUTPATIENT)
Dept: PHYSICAL THERAPY | Facility: CLINIC | Age: 46
Setting detail: THERAPIES SERIES
End: 2021-02-19
Attending: PSYCHIATRY & NEUROLOGY
Payer: COMMERCIAL

## 2021-02-19 PROCEDURE — 97110 THERAPEUTIC EXERCISES: CPT | Mod: GP | Performed by: PHYSICAL THERAPIST

## 2021-04-09 ENCOUNTER — HOSPITAL ENCOUNTER (OUTPATIENT)
Dept: PHYSICAL THERAPY | Facility: CLINIC | Age: 46
Setting detail: THERAPIES SERIES
End: 2021-04-09
Attending: PSYCHIATRY & NEUROLOGY
Payer: COMMERCIAL

## 2021-04-09 PROCEDURE — 97116 GAIT TRAINING THERAPY: CPT | Mod: GP | Performed by: PHYSICAL THERAPIST

## 2021-04-09 PROCEDURE — 97110 THERAPEUTIC EXERCISES: CPT | Mod: GP | Performed by: PHYSICAL THERAPIST

## 2021-04-09 NOTE — DISCHARGE INSTRUCTIONS
4/9/21    Walk faster at home  Moving faster if able  You are safe- need to work on confidence.      Walk outside or at family homes or in your yard for practice. Can use cane if you want.    Practice carrying things inside the house.  LIKE - laundry - food on a tray or in a baking pan (as a carrier).     Keep pushing your self to do more.   Can try soft music to sleep; try some relaxing tea a couple hours before you go to bed.      Donya   PT  [phone]

## 2021-05-07 ENCOUNTER — HOSPITAL ENCOUNTER (OUTPATIENT)
Dept: PHYSICAL THERAPY | Facility: CLINIC | Age: 46
Setting detail: THERAPIES SERIES
End: 2021-05-07
Attending: PSYCHIATRY & NEUROLOGY
Payer: COMMERCIAL

## 2021-05-07 PROCEDURE — 97116 GAIT TRAINING THERAPY: CPT | Mod: GP | Performed by: PHYSICAL THERAPIST

## 2021-05-07 PROCEDURE — 97110 THERAPEUTIC EXERCISES: CPT | Mod: GP | Performed by: PHYSICAL THERAPIST

## 2021-05-07 NOTE — PROGRESS NOTES
"   05/07/21 1000   Signing Clinician's Name / Credentials   Signing clinician's name / credentials Donya Causeyherman PT   Session Number   Session Number 10  [facility]   Goal 1   Goal Identifier gait speed - nearly met   Goal Description pt to walk and do TUG in 13 sec or less for community safety   Target Date 05/28/21   Goal 2   Goal Identifier gait- see 5 min walk above   Goal Description pt to walk 6 min walk and be able to walk 800ft for short community outings.    Target Date 05/28/21   Goal 3   Goal Identifier HEP   Goal Description  pt to be indep w/ a Home exer program for strengthening and improving her gait quality and endurance.   Target Date 04/17/21   Date Met 02/19/21   Subjective Report   Subjective Report feeling better.  staying home; went shopping yest to get Corina presents. June 19.   am doing household jobs.   dtr Katey w/ pt today \"I try to push her to do more; my dad can also use PT. \"   Objective Measure 1   Objective Measure 25ft walk      Details 14.2 sec w/out AD.   Objective Measure 2   Objective Measure timed walk   Details 5 min 530 steps no cane   Objective Measure 3   Objective Measure TUG   Details 14.7 sec   Objective Measure 4   Objective Measure DGI   Details 13   Objective Measure 5   Objective Measure nustep   Details seat 4  arms 8  wl 1.  time:  7 min  0.20 miles   Therapeutic Procedure/exercise   Therapeutic Procedures: strength, endurance, ROM, flexibillity minutes (71502) 11   Skilled Intervention ther exer   Patient Response sarah well; tired end of session   Treatment Detail nustep as above w/ set up- re configured her vhi HEP - removed ankle pumps and put HEP on one page for pt.  she states she is doing all of them.    Gait Training   Gait Training Minutes, includes stair climbing (95856) 31   Skilled Intervention gait train   Patient Response sarah well; times improved and NO cane today   Treatment Detail 25ft and timed walk.  no cane today - sba.  cues to look ahead when walking.  " see tug score and DGI improved by 4 points.    Education   Learner Patient;Family   Readiness Acceptance   Method Explanation   Response Verbalizes Understanding   Education Comments cont HEP and doing activ around house, clean, cook and walk outside w/ family or at stores as able.    Plan   Homework above   Updates to plan of care see goals above   Plan for next session d/c w/ HEP   Total Session Time   Timed Code Treatment Minutes 42   Total Treatment Time (sum of timed and untimed services) 42

## 2021-05-26 VITALS
RESPIRATION RATE: 16 BRPM | HEART RATE: 82 BPM | OXYGEN SATURATION: 97 % | SYSTOLIC BLOOD PRESSURE: 106 MMHG | DIASTOLIC BLOOD PRESSURE: 58 MMHG | TEMPERATURE: 96.8 F

## 2021-05-26 VITALS
OXYGEN SATURATION: 97 % | DIASTOLIC BLOOD PRESSURE: 70 MMHG | SYSTOLIC BLOOD PRESSURE: 110 MMHG | RESPIRATION RATE: 16 BRPM | TEMPERATURE: 98.1 F | HEART RATE: 80 BPM

## 2021-05-26 VITALS
DIASTOLIC BLOOD PRESSURE: 62 MMHG | RESPIRATION RATE: 16 BRPM | RESPIRATION RATE: 16 BRPM | HEART RATE: 72 BPM | HEART RATE: 84 BPM | OXYGEN SATURATION: 98 % | TEMPERATURE: 98 F | OXYGEN SATURATION: 98 % | TEMPERATURE: 96.9 F | SYSTOLIC BLOOD PRESSURE: 110 MMHG | DIASTOLIC BLOOD PRESSURE: 70 MMHG | SYSTOLIC BLOOD PRESSURE: 112 MMHG

## 2021-05-26 VITALS
SYSTOLIC BLOOD PRESSURE: 102 MMHG | TEMPERATURE: 98.4 F | DIASTOLIC BLOOD PRESSURE: 70 MMHG | SYSTOLIC BLOOD PRESSURE: 112 MMHG | OXYGEN SATURATION: 98 % | DIASTOLIC BLOOD PRESSURE: 68 MMHG | HEART RATE: 89 BPM | OXYGEN SATURATION: 98 % | HEART RATE: 89 BPM

## 2021-05-26 VITALS
OXYGEN SATURATION: 84 % | RESPIRATION RATE: 18 BRPM | DIASTOLIC BLOOD PRESSURE: 74 MMHG | HEART RATE: 84 BPM | SYSTOLIC BLOOD PRESSURE: 108 MMHG | TEMPERATURE: 98.2 F

## 2021-05-26 VITALS — HEART RATE: 72 BPM | SYSTOLIC BLOOD PRESSURE: 112 MMHG | RESPIRATION RATE: 18 BRPM | DIASTOLIC BLOOD PRESSURE: 70 MMHG

## 2021-05-26 VITALS
HEART RATE: 97 BPM | RESPIRATION RATE: 16 BRPM | TEMPERATURE: 97.2 F | OXYGEN SATURATION: 98 % | SYSTOLIC BLOOD PRESSURE: 108 MMHG | DIASTOLIC BLOOD PRESSURE: 62 MMHG

## 2021-05-26 VITALS
RESPIRATION RATE: 16 BRPM | OXYGEN SATURATION: 97 % | DIASTOLIC BLOOD PRESSURE: 70 MMHG | HEART RATE: 68 BPM | SYSTOLIC BLOOD PRESSURE: 110 MMHG | TEMPERATURE: 97.9 F

## 2021-05-26 VITALS
HEART RATE: 90 BPM | OXYGEN SATURATION: 98 % | SYSTOLIC BLOOD PRESSURE: 104 MMHG | DIASTOLIC BLOOD PRESSURE: 62 MMHG | TEMPERATURE: 97.4 F

## 2021-05-26 VITALS
RESPIRATION RATE: 16 BRPM | TEMPERATURE: 96.8 F | DIASTOLIC BLOOD PRESSURE: 82 MMHG | SYSTOLIC BLOOD PRESSURE: 122 MMHG | HEART RATE: 82 BPM | OXYGEN SATURATION: 98 %

## 2021-05-26 VITALS
DIASTOLIC BLOOD PRESSURE: 78 MMHG | SYSTOLIC BLOOD PRESSURE: 110 MMHG | TEMPERATURE: 97.9 F | RESPIRATION RATE: 16 BRPM | OXYGEN SATURATION: 97 % | HEART RATE: 78 BPM

## 2021-05-26 VITALS — DIASTOLIC BLOOD PRESSURE: 58 MMHG | SYSTOLIC BLOOD PRESSURE: 106 MMHG

## 2021-05-26 VITALS
HEART RATE: 77 BPM | OXYGEN SATURATION: 98 % | DIASTOLIC BLOOD PRESSURE: 68 MMHG | TEMPERATURE: 96.7 F | RESPIRATION RATE: 16 BRPM | SYSTOLIC BLOOD PRESSURE: 118 MMHG

## 2021-05-26 VITALS
DIASTOLIC BLOOD PRESSURE: 80 MMHG | HEART RATE: 84 BPM | SYSTOLIC BLOOD PRESSURE: 112 MMHG | TEMPERATURE: 97.3 F | OXYGEN SATURATION: 98 %

## 2021-05-26 VITALS
OXYGEN SATURATION: 98 % | HEART RATE: 102 BPM | DIASTOLIC BLOOD PRESSURE: 76 MMHG | SYSTOLIC BLOOD PRESSURE: 112 MMHG | TEMPERATURE: 98.5 F

## 2021-05-26 VITALS
TEMPERATURE: 97.8 F | DIASTOLIC BLOOD PRESSURE: 64 MMHG | OXYGEN SATURATION: 98 % | SYSTOLIC BLOOD PRESSURE: 102 MMHG | HEART RATE: 95 BPM

## 2021-05-26 VITALS
HEART RATE: 97 BPM | OXYGEN SATURATION: 98 % | RESPIRATION RATE: 16 BRPM | SYSTOLIC BLOOD PRESSURE: 122 MMHG | DIASTOLIC BLOOD PRESSURE: 82 MMHG | TEMPERATURE: 97 F

## 2021-05-30 NOTE — PLAN OF CARE
Pt sometimes opens eyes to sound, otherwise pain. PERRLA, pupils size 3. BUE withdraw to pain, BLE move spontaneously. LS diminished. Minimal ETT secretions. Small amount of PO secretions. TF restarted at 15ml/hr. Lumbar drain patent and was draining serosanguinous drainage at beginning of shift, now looking more serous. Sheath in R groin with no complications. PRN albumin X 2 given and effective to keep -160. On levophed as well. Continues on 3% protocol. EEG discontinued. Still having fevers. Scheduled tylenol given. Urine output >1 liter. NPO at midnight for IR procedure tomorrow morning. Will continue to monitor.

## 2021-05-30 NOTE — PLAN OF CARE
Care Management Goals of the Day: Follow progression of care      Care Progression Reviewed With: Charge RN, MD, HUC, RNCM     Barriers to Discharge: emergently intubated/sedated 7/27/19, possible new seizures, increased ICP, repeat angio pending today and tomorrow, re-eval rec's from therapy    Discharge Disposition: TBD     Expected Discharge Date: TBD     Transportation: family if able    Care Coordination Narrative: Pt had an unresponsive episode early AM 7/27, was intubated for airway protection and remains intubated/sedated. Repeat angio pending for today, as well as tomorrow. DC and transport pending progression. CM to follow.

## 2021-05-30 NOTE — PROGRESS NOTES
NEUROSURGERY PROGRESS NOTE:    NEUROSURGERY ATTENDING: The patient's attending neurosurgeon is Dr. Malou Sal.    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  Coiled 07/22/19, but not completely occluded, some filling persisted.  MRI concerning for vasospasm and new scattered small infarcts.  Held off on angio at that time as patient was asymptomatic without new focal deficits and velocity 160s on TCD.     Overnight 7/26-7/27 found to have decreased LOC.  Repeat CT concerning for re-bleed and indicative of hydrocephalus and increased ICP.  Patient was intubated and taken to IR where the aneurysm was recoiled and a lumbar drain placed due to concern for risk of further bleeding with ventric placement with aspirin on board.  A follow up cerebral angio was performed on 7/29 and intra-arterial verapamil was infused in the right ICA.     Today she remains intubated and sedated. TCD's have been re-ordered.  Sedation can be weaned if TCD's show improvement in vasospasm, but if not, would lean toward keeping her sedated to a point where she is not bucking the ventilator.  A repeat cerebral angio has been scheduled for 8/2 with IR, but would opt to have that completed sooner if clinical exam were to decline or TCD's showed increased vasospasm.        PLAN:   Patient Active Problem List   Diagnosis     Carotid artery aneurysm (H)     Aneurysmal subarachnoid hemorrhage (H)     Intraventricular hemorrhage, nontraumatic (H)     Compression of brain (H)     S/P coil embolization of cerebral aneurysm     Aneurysm of right internal carotid artery     Cerebrovascular accident (CVA) due to occlusion of right middle cerebral artery (H)     Vasospasm (H)  --Keep sedated, RASS -1, goal to be comfortably tolerating the ventilator  --IV fluid goal 100/hr, goal to be euvolemic or slightly  hypervolemic  --Sharpe catheter for strict I/O measurement  --Full 3% protocol for goal Na 150-155  ---150, currently on levophed, however albumin ordered and should be exhausted prior to increasing levophed  --TCD's today, if improving would consider backing off on sedation  --Nimodipine x21 days. DO NOT HOLD due to significant risk for further vasospasm  --HOB 30 degrees  --Keppra x30 days without seizure, indefinite with seizure  --Follow up angio scheduled for 8/2 by IR to confirm aneurysm stability, would consider scheduling earlier if clinical status changes or TCD's show increased vasospasm     Acute -respiratory failure, unspecified whether with hypoxia or hypercapnia (H)  --Ventilator managed by ICU     Fever in other diseases  --Workup per ICU team, appreciate recs          Discharge Recommendations/Plan:  Barriers to Discharge: yes, acute medical care.      Follow Up Plan: pending         HPI: Darion Herrera is a 44 yo female who was admitted on 07/22/19 with SAH and IVH secondary to ruptured aneurysm of the right ICA.  Aneurysm coiling on 7/22 that was complicated by vasospasm. On 07/27, found to be unresponsive and possibly seizing, was intubated and taken to IR where it was revealed that her right ICU aneurysm had dissected and re-bled.  In IR on 07/27, the vessel was recoiled and a lumbar drain was placed for ICP management.  On 07/29 underwent intraarterial verapamil infusion with angiogram for acute vasospasm of right ICA.      SUBJECTIVE:  Patient is intubated and sedated.       OBJECTIVE:  Vital signs in last 24 hours  Temp:  [98.6  F (37  C)-102.9  F (39.4  C)] 102.9  F (39.4  C)  Heart Rate:  [] 87  Resp:  [8-30] 18  BP: (108-156)/(62-87) 127/71  Arterial Line BP: (138-142)/(77-85) 142/77  FiO2 (%):  [30 %-100 %] 30 %  Body mass index is 32.35 kg/m .    Mental Status: sedated.  Opens her eyes to her name.  Follows commands with RUE and RLE extremity. Able to show her right thumb and  wiggle her right and left toes.  Spontaneous movement of LUE noted, unable to follow commands with LUE or LLE, however exam may be limited due to propofol infusion.     Cranial Nerves: Pupils equal and reactive to light.  Face appears midline, +corneals, +cough, +gag reflex.     Motor Strength: Unable to complete exam due to sedation.     Lumbar Drain: Open to drainage, patent. 10-15 mL/hr of blood output.     Pertinent Labs   Lab Results:   Lab Results   Component Value Date     (H) 07/30/2019    K 3.6 07/30/2019     (H) 07/30/2019    CO2 19 (L) 07/30/2019    BUN 3 (L) 07/30/2019    CREATININE 0.57 (L) 07/30/2019    CALCIUM 7.7 (L) 07/30/2019     Lab Results   Component Value Date    WBC 17.1 (H) 07/30/2019    HGB 9.7 (L) 07/30/2019    HCT 30.3 (L) 07/30/2019    MCV 90 07/30/2019     07/30/2019     Coagulation:   Lab Results   Component Value Date    INR 1.12 (H) 07/27/2019         Pertinent Radiology   Radiology Results: Previous images reviewed with Katherin Youssef CNP, [facility].     MANDY Sandoval-ACNP Student  [facility]

## 2021-05-30 NOTE — PROGRESS NOTES
Resp Care: Patient remained on full vent support, tolerated well. BS are clear throughout, No change made with the vent setting in this shift. No PS trials yet. ABG in acceptable level. CXR two days ago clear.  Will continue to monitor SpO2/ABG, CXR, suctioned PRN and initiate PS trials when appropriate.      Ezio Johnson, KAET

## 2021-05-30 NOTE — PROGRESS NOTES
Progress Note    Assessment/Plan:  1. Comfortable; repeat angios today; see IR/neurosurg notes  Reviewed with multple family members (dtr Anneal speaks English)     Plan per neuro ICU, hospitalist service will follow.     Active Hospital Problems    S/P coil embolization of cerebral aneurysm      Carotid artery aneurysm (H)      *Aneurysmal subarachnoid hemorrhage (H)      Intraventricular hemorrhage, nontraumatic (H)      Aneurysm of right internal carotid artery      Compression of brain (H)        Subjective:  No new issues    Objective:    Vital signs in last 24 hours  Temp:  [98.1  F (36.7  C)-98.5  F (36.9  C)] 98.1  F (36.7  C)  Heart Rate:  [53-97] 80  Resp:  [9-28] 17  BP: ()/(50-73) 101/61  Weight:   150 lb 1.6 oz (68.1 kg)    Intake/Output last 3 shifts  I/O last 3 completed shifts:  In: 2381 [P.O.:1350; I.V.:931; IV Piggyback:100]  Out: 2975 [Urine:2975]  Intake/Output this shift:  I/O this shift:  In: 100 [P.O.:100]  Out: 800 [Urine:800]    ROS:   compete 10 system review otherwise negative except as noted (see H&P)    1. Constitutional: Denies fever, weight loss and headaches.  Energy level is stable  2. Eyes: Denies double vision, pain and acutely decreased vision.  3. Ears, Nose, Mouth, Throat: Denies acute hearing changes, nosebleeds, sore throat and mouth ulcers.  4. Cardiovascular: no chest pain, no palpitations  5. Respiratory: denies cough, shortness of breath  6. Gastrointestinal: denies diarrhea or bloody stools and nausea or vomiting  7. Genitourinary: Denies hematuria and dysuria.  8. Musculoskeletal : See HPI.  9. Integumentary: Denies skin ulcerations, nodules or rashes .  10. Neurological: Denies new limb weakness, limb numbness and changes in speech.  Physical Exam  Resting in bed/ smiles    Pertinent Labs   Lab Results   Component Value Date    WBC 13.3 (H) 07/24/2019    HGB 11.2 (L) 07/24/2019    HCT 34.4 (L) 07/24/2019    MCV 89 07/24/2019     07/24/2019     Lab Results    Component Value Date    CREATININE 0.72 07/24/2019    BUN 9 07/24/2019     07/24/2019    K 3.9 07/24/2019     (H) 07/24/2019    CO2 20 (L) 07/24/2019         Fer Machuca MD.   25 min, review chart, review with RN, discuss with family

## 2021-05-30 NOTE — PROGRESS NOTES
"[facility]:    S:  Feeling better. Mild neck pain.    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 Stable appearance.    ** TCD's pending today **    Labs: Electrolytes ordered for tomorrow morning  Nothing new today    Exam:  Sitting up in chair. Showered this morning and is feeling much better. Alert and oriented x 3. Speech is clear. No focal weakness. Right femoral groin site is soft and dry. Mild right neck pain rated a \"3\".    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 Delayed presentation, headache at home for 3 days. Likely post bleed day # 7 today. The dissecting aneurysm was treated with successful endovascular coil embolization on 7/22/19 and has remained stable on following angiogram imaging yesterday. She is neurologically intact. Tolerating activity and diet. Mild neck pain.    P: Continue close observation for any neurological changes.  Labs ordered for tomorrow morning. Need to follow electrolytes.  Nimodipine for full 21 days.  TCD's to monitor for cerebral vasospasm.  We are planning for a follow up MRA tomorrow to monitor aneurysm.  We will follow along.  Please call us with any questions or concerns.  [phone].    .Yoli Reyes CNP    For billing purposes:  Rounding note # [id number]      "

## 2021-05-30 NOTE — PROCEDURES
[facility] Post Procedure    Procedure: 1. 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  2. Cerebral angiogram    Radiologist: Ric Snider    Contrast: 110 ml omnipaque 350 and 20 ml Visipaque 320  Fluoro Time: 27.3 minutes  Air Kerma: 5879 mGy    Medications: Heparin IV and GA. 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 >400 cm of coil placed. There is coil throughout the aneurysm sac with moderate diffuse but very stagnant interstitial filling within the coil mass.     No other aneurysms. No cerebral vasospasm.    Assess/Plan:   Extubate. ICU care.  Report to neurosurgery.  Will keep sheath in place overnight. Most likely remove sheath in a.m.  Dissecting aneurysms are notoriously unstable, even status post coil embo. Will plan follow-up angiogram on Wednesday to document stability.     Ric Snider

## 2021-05-30 NOTE — PROGRESS NOTES
ICU UPDATE:    Patient was noted to be seizing and initiated on Phosphenytoin in addition to Keppra.    Her triglycerides were also noted to be elevated on 7/27--will recheck in the AM and if they continue to rise, would have to discontinue Propofol.    Shellie Butler  Pulmonary and Critical Care  0334

## 2021-05-30 NOTE — PROGRESS NOTES
Hospitalist Progress Note     Assessment/Plan:     Darion Herrera is a 43-year-old female who presented with ruptured aneurysm of the right ICA resulting in subarachnoid hemorrhage with extension intraventricular hemorrhage.  She is status post aneurysm coiling on 7/22 which was complicated by vasospasm.  There is also a complication of the right ICA aneurysm with dissection and rebleeding leading to seizure and unresponsiveness on 7/27.  This required intubation and recoiling and placement of lumbar drain.  She is currently intubated in ICU.  Intensivist and neurosurgery managing.    Active Problem:     Principal Problem:    Aneurysmal subarachnoid hemorrhage (H)  Active Problems:    Carotid artery aneurysm (H)    Intraventricular hemorrhage, nontraumatic (H)    Compression of brain (H)    S/P coil embolization of cerebral aneurysm    Aneurysm of right internal carotid artery    Cerebrovascular accident (CVA) due to occlusion of right middle cerebral artery (H)    Vasospasm (H)    Acute respiratory failure, unspecified whether with hypoxia or hypercapnia (H)  -Remains intubated in ICU.    Plan:  -Intensivist and neurosurgery involved.  Neurology involved.  Continuous EEG monitoring.  On sedation, and antiepileptics.    Subjective:     Intubated.  Discussed with patient's family.    Review of systems:     Please see Subjective.    Current Medications:     Scheduled Meds:    acetaminophen  650 mg Oral Q6H     atorvastatin  20 mg Oral DAILY     fosphenytoin (CEREBYX) IV maintenance  100 mg PE Intravenous Q8H     levETIRAcetam (KEPPRA) IVPB  1,000 mg Intravenous Q12H     LORazepam  2 mg Intravenous Once     niMODipine  60 mg Oral Q4H     pantoprazole  40 mg Intravenous Q24H     potassium chloride ER  20 mEq Oral BID     senna-docusate  1 tablet Oral BID     sodium chloride  10-30 mL Intravenous Q8H FIXED TIMES     sodium chloride  2 g Oral BID    Or     sodium chloride  2 g Enteral Tube BID     Continuous  Infusions:    lactated Ringers       niCARdipine       norepinephrine 0.18 mcg/kg/min (07/28/19 1610)     propofol 65 mcg/kg/min (07/28/19 1610)     sodium chloride 0.9% 100 mL/hr (07/28/19 0109)     sodium chloride 0.9% Stopped (07/27/19 1836)     sodium chloride 3 % 75 mL/hr (07/28/19 1331)     PRN Meds:.albumin human, bisacodyl, HYDROmorphone, lactated Ringers, naloxone **OR** naloxone, ondansetron, polyethylene glycol, sodium chloride bacteriostatic, sodium chloride, sodium chloride, sodium chloride, traMADol    Objective:       Vital signs in last 24 hours:     Temp:  [98.7  F (37.1  C)-100.1  F (37.8  C)] 98.8  F (37.1  C)  Heart Rate:  [] 87  Resp:  [16-28] 17  BP: ()/(56-73) 102/62  Arterial Line BP: (106-152)/(53-78) 141/67  FiO2 (%):  [30 %-100 %] 30 %  Weight: 155 lb 1.6 oz (70.4 kg)    Physical Exam:     General appearance:  Intubated           Resp:  Intubated on ventilator   CVS:  Regular rate and rhythm   GI:  Nondistended   MSK: No swelling, or tenderness   Neuro:  Alert and oriented ×3     Intake/Output this shift:     I/O last 3 completed shifts:  In: 8066.3 [I.V.:6886.3; NG/GT:775; IV Piggyback:405]  Out: 5702 [Urine:5400; Drains:302]    Pertinent Labs:     Lab Results: personally reviewed.     Pertinent Radiology:       Advanced Care Planning:     Barrier to discharge: Intubated  Anticipated LOS: To be determined  Disposition: To be determined    Gardenia Luu M.D.  [facility]ist  Internal Medicine

## 2021-05-30 NOTE — CONSULTS
NEUROLOGY CONSULTATION NOTE     Darion Herrera,  1975, MRN [id number] Date: 2019     [facility]   Code status:  Full Code   PCP: Monroe Cassidy MD, [phone]      ASSESSMENT & PLAN   Hospital Day#: 4  Diagnosis code: SAH    Aneurysmal SAH  MARIA L supraclinoid aneurysm s/p endovascular coil embolization  Vasospasm      Initial head CT showed MARIA L giant aneurysm (sentinel headache)    S/p endovascular coil embolization    MRI showed small right sided infarcts - these could be related to the angiogram procedure.     TCD shows elevated velocities in RMCA    On HHH therapy; hydration and -180    Nimodipine X 21 days    Keppra X 30 days.  Can consider EEG next week.     Keppra level    Continue TCD evaluation.     Monitor sodium.    Dr. Styles to follow tomorrow.    Thank you again for this referral, please feel free to contact me if you have any questions.     CHIEF COMPLAINT Aneurysmal subarachnoid hemorrhage (H)     HISTORY OF PRESENT ILLNESS     We have been requested by Dr. Kwan to evaluate Darion Herrera who is a 43 y.o.  female for SAH.    This a 43-year-old female on whom neurology is been consulted to evaluate for subarachnoid hemorrhage.  Patient initially presented with 6 days ago with a headache.  She was trying to lift some fruits and felt something blowing up in her head.  She continued to have the headache.  She left the staff she was lifting on the table.  She was then brought to the emergency room where imaging showed a large frontal mass.  This was later on MRI identified to be a giant aneurysm.  Patient underwent angiogram and endovascular coil embolization of the aneurysm.  The patient had a repeat angiogram.  MRI done today showed multiple foci of acute/subacute infarction in the right parietal-occipital region.  There is also small area of infarction in the right frontal lobe.  TCD's show vasospasm in the right MCA.  There was some narrowing of the M1 and M2 segments.   Neurology was consulted to further help with further cares.  Patient denies any ongoing symptoms he does complain of some headache.      PAST MEDICAL & SURGICAL HISTORY     Medical History  Patient Active Problem List    Diagnosis Date Noted     Cerebrovascular accident (CVA) due to occlusion of right middle cerebral artery (H)      Vasospasm (H)      S/P coil embolization of cerebral aneurysm 07/23/2019     Aneurysm of right internal carotid artery 07/23/2019     Carotid artery aneurysm (H) 07/22/2019     Nausea vomiting and diarrhea 07/22/2019     Aneurysmal subarachnoid hemorrhage (H) 07/22/2019     Intraventricular hemorrhage, nontraumatic (H) 07/22/2019     Compression of brain (H)      Leiomyoma of uterus 07/18/2016     Past Medical History: No previous history.    Surgical History  She  has a past surgical history that includes IR Cerebral Angiogram (7/22/2019); IR Cerebral Angiogram (7/24/2019); and PICC Team Line Insertion (7/26/2019).     SOCIAL HISTORY     Reviewed, and she  reports that she has never smoked. She has never used smokeless tobacco. She reports that she has current or past drug history. She reports that she does not drink alcohol.     FAMILY HISTORY     Reviewed, and non-contributory.      ALLERGIES     Allergies   Allergen Reactions     Oxycodone      Hydrocodone Swelling and Rash        REVIEW OF SYSTEMS     12 system ROS was done and was negative other than HPI - except patient feeling tired.     HOME & HOSPITAL MEDICATIONS     Prior to Admission Medications  Medications Prior to Admission   Medication Sig Dispense Refill Last Dose     ascorbic acid, vitamin C, 500 mg Chew chew tab Chew 500 mg daily.   Unknown at Unknown time       Hospital Medications    acetaminophen  650 mg Oral Q6H     aspirin  81 mg Oral DAILY     atorvastatin  20 mg Oral DAILY     levETIRAcetam  500 mg Oral BID     magnesium oxide  400 mg Oral TID     niMODipine  60 mg Oral Q4H     potassium chloride ER  20 mEq Oral  BID     senna-docusate  1 tablet Oral BID     sodium chloride  10-30 mL Intravenous Q8H FIXED TIMES        PHYSICAL EXAM     Vital signs  Temp:  [97.7  F (36.5  C)-98.6  F (37  C)] (P) 98.6  F (37  C)  Heart Rate:  [48-76] 60  Resp:  [14-26] 17  BP: (103-139)/(60-80) 136/80    Weight:   154 lb 4.8 oz (70 kg)    GENERAL: Healthy appearing, alert, no acute distress, normal habitus.  CARDIOVASCULAR: Ext warm and well perfused. Pulses present.   NEUROLOGICAL:  Patient is awake and oriented to self, place and time.  Attention span is normal.  Memory grossly intact.  [employer] is fluent and follows commands appropriately.  Appropriate fund of knowledge. Cranial nerves 2-12 are intact. There is no pronator drift.  Motor exam shows 5/5 strength in all extremities.  Tone is symmetric bilaterally in upper and lower extremities.  Reflexes are symmetric and 2+ in upper extremities and lower extremities. Sensory exam is grossly intact to light touch, pin prick . Finger to nose and heel to shin is without dysmetria.  Patient cannot safely ambulate.      DIAGNOSTIC STUDIES     Pertinent Radiology   MRI brain - images reviewed stroke seen  IMPRESSION:   CONCLUSION:  1.  Multiple foci of developing acute/subacute infarction throughout the right parietal occipital lobes, with additional single small foci of acute/subacute infarction in the right frontal lobe and left parietal lobe.  2.  Status post endovascular coiling of large dissecting aneurysm of the ventromedial right supraclinoid ICA with redemonstration of mild persistent flow-related enhancement along the neck of the aneurysm. There is also faint flow-related enhancement   centrally within the coil mass.  3.  Mildly decreased caliber of the M1 and proximal M2 segments of the right middle cerebral artery compared to the left, which may represent mild vasospasm. There is more normal caliber of the distal M2 and M3 branches.    ECHO    1.Left ventricle ejection fraction is  normal. The calculated left ventricular ejection fraction is 65%.    2.TAPSE is normal, which is consistent with normal right ventricular systolic function.    3.No hemodynamically significant valvular heart abnormalities.    4.No obvious embolic source seen, if highly suspect consider MICHAEL.    5.No previous study for comparison.     TCD - images reviewed.   IMPRESSION:   CONCLUSION:  New moderate right middle cerebral artery vasospasm.      Recent Results (from the past 24 hour(s))   Basic Metabolic Panel    Collection Time: 07/26/19  4:27 AM   Result Value Ref Range    Sodium 136 136 - 145 mmol/L    Potassium 3.9 3.5 - 5.0 mmol/L    Chloride 103 98 - 107 mmol/L    CO2 23 22 - 31 mmol/L    Anion Gap, Calculation 10 5 - 18 mmol/L    Glucose 113 70 - 125 mg/dL    Calcium 9.1 8.5 - 10.5 mg/dL    BUN 15 8 - 22 mg/dL    Creatinine 0.73 0.60 - 1.10 mg/dL    GFR MDRD Af Amer >60 >60 mL/min/1.73m2    GFR MDRD Non Af Amer >60 >60 mL/min/1.73m2   Magnesium    Collection Time: 07/26/19  4:27 AM   Result Value Ref Range    Magnesium 1.7 (L) 1.8 - 2.6 mg/dL   Phosphorus    Collection Time: 07/26/19  4:27 AM   Result Value Ref Range    Phosphorus 4.1 2.5 - 4.5 mg/dL   Lipid Profile    Collection Time: 07/26/19  1:40 PM   Result Value Ref Range    Triglycerides 243 (H) <=149 mg/dL    Cholesterol 197 <=199 mg/dL    LDL Calculated 105 <=129 mg/dL    HDL Cholesterol 43 (L) >=50 mg/dL    Patient Fasting > 8hrs? Unknown    Echo Complete    Collection Time: 07/26/19  3:20 PM   Result Value Ref Range    LV volume diastolic 65 46 - 106 cm3    LV volume systolic 23 14 - 42 cm3    IVSd 0.753 0.6 - 0.9 cm    LVIDd 4.29 3.8 - 5.2 cm    LVIDs 2.13 (!) 2.2 - 3.5 cm    LVOT diam 1.7 cm    LVOT mean gradient 2 mmHg    LVOT peak VTI 24.3 cm    LVOT mean guadalupe 71.5 cm/s    LVOT peak guadalupe 106 cm/s    LVOT peak gradient 4 mmHg    LV PWd 0.746 0.6 - 0.9 cm    MV E' lat guadalupe 12.1 cm/s    MV E' med guadalupe 10 cm/s    AV mean guadalupe 90.7 cm/s    AV mean gradient 4  mmHg    AV VTI 29.6 cm    AV peak guadalupe 137 cm/s    AO root 2.8 cm    AO ascending 3 cm    LA size 3.3 cm    LA/AO root ratio 1.18 no units    MV decel slope 4,230 mm/s2    MV decel time 254 ms    MV P 1/2 time 74 ms    MV peak A guadalupe 62.1 cm/s    MV peak E guadalupe 107 cm/s    TR peak guadalupe 242 cm/s    BSA 1.7 m2    Hieght 58 in    Weight 2,468.8 lbs    /67 mmHg    HR 63 bpm    IVS/PW ratio 1.0     TR peak gradent 23.4 mmHg    LV FS 50.3 28 - 44 %    Echo LVEF calculated 65 55 - 75 %    LA volume 36.9 mL    LV mass 96.3 g    AV area 1.9 cm2    AV DIM IND guadalupe 0.8     MV area p 1/2 time 3.0 cm2    MV E/A Ratio 1.7     LVOT area 2.27 cm2    LVOT SV 55.1 cm3    AV peak gradient 7.5 mmHg    LV systolic volume index 13.5 8 - 24 cm3/m2    LV diastolic volume index 38.2 29 - 61 cm3/m2    LA volume index 21.7 mL/m2    LV mass index 56.7 g/m2    LV SVi 32.4 ml/m2    TAPSE 2.6 cm    MV med E/e' ratio 10.7     MV lat E/e' ratio 8.8     LV CO 3.5 l/min    LV Ci 2.0 l/min/m2    LA area 2 15.1 cm2    LA area 1 14.1 cm2    Height 58.0 in    Weight 154 lbs    MV Avg E/e' Ratio 9.7 cm/s    LA length 4.9 cm    AV DIM IND VTI 0.8        Total time spent for face to face visit, reviewing labs/imaging studies, counseling and coordination of care was: Over 70 min More than 50% of this time was spent on counseling and coordination of care.      Justin Vasquez MD  Direct Secure Messaging: [email or web address]  Tel: [phone]  This note was dictated using voice recognition software.  Any grammatical or context distortions are unintentional and inherent to the software.

## 2021-05-30 NOTE — PROGRESS NOTES
Critical Care Medicine Progress Note  7/28/2019    ASSESSMENT/PLAN:  43 y.o. woman who was admitted on 7/22/2019 with SAH & IVH secondary to ruptured aneurysm of the right ICA, aneurysm coiling 7/22 that was complicated by vasospasm.  Hospitalization course was complicated by right ICA aneurysm dissection with rebleeding that presented with acute onset seizures and unresponsiveness on 7/27.  Patient was intubated on 7/27, underwent a recoiling of the culprit vessel, and placement of lumbar drain for ICP management.    New events:    Started on fosphenytoin by neurology for recurrent seizure activity    Plan for EEG    Stable head imaging    Repeat angiogram with possible verapamil infusion    Neuro/Psych:   #. Analgesia/Sedation: Currently managed with propofol drip, PRN IV Dilaudid.  #.  Right ICA aneurysm complicated by subarachnoid hemorrhage with IVH, status post dissection and coiling x2 with hydrocephalus requiring lumbar drain placement 7/27/2019.  Concern for vasospasm complicated the clinical presentation.  Will be undergoing a cerebral angiogram 7/28 with possible verapamil injection, as well as likely angiogram 7/29.    Neurosurgery and IR closely following    Cerebral angiography per IR    Continue with every 4 hours nimodipine    Goal -150    Goal sodium 150-155, hypertonic saline infusion per NSG    Strict I/O, strict lumbar drain output monitoring  #.  Recurrent seizures, likely secondary to acute intracranial process as detailed above.  Currently on propofol, IV levetiracetam, started on IV fosphenytoin.  Neurology closely involved.    Plan for continuous EEG    Pulmonary: Mechanical ventilation instituted for airway protection in setting of seizure and encephalopathy secondary to SAH.  The patient is on minimal ventilator settings.  Not clinically appropriate for ventilator weaning.  Monitor.  Cardiovascular: goal -150. Avoid medications that would cause bradycardia.  Renal: No acute  issues.  Good urine output.  GI: NPO for now.  Heme: No acute issues, monitor.  Endo: No acute issues.  Continue close monitoring.    Access/Lines/Tubes: required and necessary for continued patient cares  ETT, 7.5  PICC, RUE  PIV  Arterial sheath, right femoral artery  Sharpe catheter  Lumbar drain  OGT    ICU prophylaxis:   #. VAP ppx: HOB 30 degrees, chlorhexidine rinses  #. Stress ulcer: PPI  #. Diet: NPO  #. VTE: SCD  #. Restraints: required and necessary for continued patient cares    CODE: FULL    Family was updated at bedside    Dispo: ICU for mechanical ventilation, hemodynamic support, neurological monitoring    Patient is critically ill with total CCT spent 50 minutes thus far today.     This report was prepared using speech recognition software.  Any typographical errors are unintentional.  Please, contact me directly for any clarifications of my report.    Ludmila Pink MD  Pulmonary and Critical Care     =================================================================    Interval history: Seizure activity in the evening, loaded with fosphenytoin. Nursing notes reviewed.     Vitals: Temp:  [97.7  F (36.5  C)-100.5  F (38.1  C)] 98.7  F (37.1  C)  Heart Rate:  [] 74  Resp:  [16-32] 17  BP: (116-150)/(56-91) 116/56  Arterial Line BP: (106-152)/(53-76) 131/66  FiO2 (%):  [40 %-100 %] 40 %  Vent:   Vent Mode: VCV  FiO2 (%):  [40 %-100 %] 40 %  S RR:  [16] 16  S VT:  [375 mL] 375 mL  PEEP/CPAP (cm H2O):  [5 cm H2O] 5 cm H2O  Minute Ventilation (L/min):  [6.7 L/min-10.6 L/min] 10.6 L/min  PIP:  [17 cm H2O-36 cm H2O] 17 cm H2O  MAP (cm H2O):  [7-13] 7    Physical Exam:   General: obese woman, lying in bed, NAD  HEENT: orally intubated, MMM  CV: RRR, no M/R/G; extremities well perfused  Pulm: equal bilateral mechanical breath sounds, no wheezing, no rhonchi, no crackles, no cough  Abd: soft, ND, NT, hypoactive bowel sounds  Msk: warm to touch, trace BLE edema  Derm: no acute lesions or rashes on limited  exam  Neuro: sedated  Psych: sedated    Labs: personally reviewed in EMR. Most pertinent for HCO3 17,     Imaging: personally reviewed in EMR. Formal Radiology interpretation listed below.  #.  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 There has been some compaction within the region of the aneurysm base. There is new broad-based 5 mm maximum dimension aneurysm growth along the anterior superior of the aneurysm base. This is the aneurysm inflow zone. The parent right internal carotid artery is of small caliber. As on the previous study, there is a single coil loop protruding into the parent right ICA. No evidence of thromboembolism. However, there is some flow limitation due to the small caliber the right internal carotid artery. Narrowing of the right internal carotid artery at the level of the aneurysm is most likely related to dissection and mass effect from the aneurysm, however a component of vasospasm cannot be excluded. There is mild vasospasm of the M1 trunk right middle cerebral artery. There is trace filling of the nondominant small caliber A1 segment right anterior cerebral artery. The mid to distal right anterior cerebral artery is of normal caliber, filling via flow across anterior communicating artery. Trace vasospasm of the basilar trunk. The intracranial left vertebral artery, posterior cerebral arteries, intracranial left internal carotid artery, left anterior cerebral artery, and left middle cerebral artery widely patent. Circulation times are normal. Venous structures are grossly patent. Stenosis measurements are estimated using NASCET type criteria.    #. HCT, 7/27:   INTRACRANIAL CONTENTS: Beam hardening and streak artifacts related to a large right paraclinoid coil mass as seen previously. This locally degrades evaluation. Small volume  subarachnoid hemorrhage within sulci of both cerebral hemispheres. No definite increasing intraventricular hemorrhage layering within the occipital horns of the lateral ventricles favored to reflect redistribution of subarachnoid blood products. No large territorial infarct is identified. Small frontoparietal infarcts seen on the 7/26/2019 MRI are poorly appreciated on CT. Minor periventricular low-attenuation. Mild to moderate lateral and third ventriculomegaly similar to findings on the prior exam.   VISUALIZED ORBITS/SINUSES/MASTOIDS: No significant orbital abnormality. No significant paranasal sinus mucosal disease. No significant middle ear or mastoid effusion.  BONES/SOFT TISSUES: No significant abnormality.    #. HCT: 7/28:   Again demonstrated is endovascular coiling of a giant right supraclinoid aneurysm. This results in extensive streak artifact and compromises a portion of the intracranial compartment. Within this limitation, the amount of intraventricular hemorrhage and   subarachnoid hemorrhage within the basal cisterns and scattered over the convexities appears grossly unchanged. There is stable mild to moderate ventriculomegaly of the lateral and third ventricles. No evidence of interval acute intracranial hemorrhage,   infarct or hydrocephalus.

## 2021-05-30 NOTE — PLAN OF CARE
Care Management Goals of the Day: Follow progression of care      Care Progression Reviewed With: Charge RN, MD, HUC, RNCM     Barriers to Discharge: emergently intubated/sedated 7/27/19, new seizures with continuous EEG, increased ICP with lumbar drain, febrile, angios pending Friday, will need therapy re-evals when able,     Discharge Disposition: TBD     Expected Discharge Date: TBD     Transportation: family if able    Care Coordination Narrative: Pt continues to be critically ill with intubation, sedation, continuous EEG monitoring, febrile and has lumbar drain in place. Repeat angios pending for end of week. Will need therapy evals again when able to participate. CM to follow and assist as needed.

## 2021-05-30 NOTE — PROCEDURES
Procedure Note - Endotracheal Intubation    : Willie Dave MD   Procedure: Endotracheal Intubation, Emergent   Indications: See above.   Complications: None   Instruments:   1. Glidescope with #3 blade (used electively due to facilitate higher first pass success).     2. 7.5 cuffed endotracheal tube.   3. No adjuncts required.  Anesthesia:   Propofol 100 mg IV x1   No neuromuscular blockade was used.  Grade view achieved: I   Ease of Intubation: Easy  Findings:   Overall normal appearing upper airway.    Narrative:   This patient required emergent intubation for the above reasons. I did not hold a PARQ due to the emergent nature of the situation, and instead proceeded with implied emergent consent with the intent to preserve life and organ function, deeming the benefits to far outweigh the risks. All equipment was readied at bedside. Continuous oxygen saturation, telemetry monitoring were in effect. Blood pressure checks every 2 minutes. Preoxygenated with bag-valve mask. The patient was induced with the above medications and excellent hypnosis and relaxation was achieved. The patient was easily bag valve mask ventilated without resistance. The layngoscope was gently inserted into the oropharynx taking care to avoid the teeth. The tube was inserted gently through the cords with a single attempt. The cuff was inflated, the stylet was removed. There was positive humidity return within the tube, equal bilateral breath sounds, and positive end tidal CO2 color change. There was maintenance of O2 saturation in the 100% range. The patient tolerated the procedure exceedingly well without any complications in the immediate phase.    A post-procedure chest-xray demonstrated the tube appropriately positioned in the trachea.    Willie Dave MD

## 2021-05-30 NOTE — PLAN OF CARE
NEUROSURGERY:    Was paged by patient's primary RN this morning reporting patient had a seizure, lost her airway, and was intubated.    Ordered STAT CTA of the head- concern for rebleed or worsening vasospasm.    CTA with worsening sulcal effacement, increasing hydrocephalus, and possible rebleed. Discussed immediately with Dr. Juhi Kimbrough. Plan for STAT cerebral angiogram, angioplasty, and placement of lumbar drain. At this time Dr. Juhi Kimbrough would prefer to proceed with lumbar drain as opposed to ventric as the patient has been on aspirin for her recent coil.    Also ordered STAT Na level, 3% sodium, and discussed with primary RN and intensivist.      Katherin Youssef [facility] Neurosurgery  [address], [address]  [place] [address]    O: [phone]  P: [phone]

## 2021-05-30 NOTE — PROGRESS NOTES
Progress Note    Assessment/Plan:  1. S/p coil - large ICA aneurysm with leakage- see report below  2. GI symptoms resolving- taking po ok  3. See neuro/neuro surg notes    Plan per neuro ICU consultants looks like repeat angios tomorrow     Active Hospital Problems    S/P coil embolization of cerebral aneurysm      Carotid artery aneurysm (H)      *Aneurysmal subarachnoid hemorrhage (H)      Intraventricular hemorrhage, nontraumatic (H)      Aneurysm of right internal carotid artery      Compression of brain (H)        Subjective:  No complaints per family  Objective:    Vital signs in last 24 hours  Temp:  [98.2  F (36.8  C)-99  F (37.2  C)] 98.5  F (36.9  C)  Heart Rate:  [] 84  Resp:  [0-29] 23  BP: ()/(51-81) 95/51  Arterial Line BP: ()/(49-70) 108/61  Weight:   154 lb 8 oz (70.1 kg)    Intake/Output last 3 shifts  I/O last 3 completed shifts:  In: 3292.1 [I.V.:3192.1; IV Piggyback:100]  Out: 3340 [Urine:3310; Blood:30]  Intake/Output this shift:  I/O this shift:  In: 375 [I.V.:275; IV Piggyback:100]  Out: 850 [Urine:850]    ROS:   compete 10 system review otherwise negative except as noted (see H&P)    1. Constitutional: Denies fever, weight loss and headaches.  Energy level is stable  2. Eyes: Denies double vision, pain and acutely decreased vision.  3. Ears, Nose, Mouth, Throat: Denies acute hearing changes, nosebleeds, sore throat and mouth ulcers.  4. Cardiovascular: no chest pain, no palpitations  5. Respiratory: denies cough, shortness of breath  6. Gastrointestinal: denies diarrhea or bloody stools and nausea or vomiting  7. Genitourinary: Denies hematuria and dysuria.  8. Musculoskeletal : See HPI.  9. Integumentary: Denies skin ulcerations, nodules or rashes .  10. Neurological: Denies new limb weakness, limb numbness and changes in speech.  Physical Exam  Resting in bed    Pertinent Labs   Lab Results   Component Value Date    WBC 11.0 07/23/2019    HGB 12.4 07/23/2019    HCT 37.3  07/23/2019    MCV 87 07/23/2019     07/23/2019     Lab Results   Component Value Date    CREATININE 0.82 07/23/2019    BUN 9 07/23/2019     07/23/2019    K 3.4 (L) 07/23/2019     (H) 07/23/2019    CO2 21 (L) 07/23/2019         Fer Machuca MD.        Date: 7/22/2019 Department: [facility] 5100 Cardiac/Neuro ICU Released By/Authorizing: Jailene Mcknight CNP (auto-released)   Study Result /cerebral angiograms       1. ENDOVASCULAR COIL EMBOLIZATION OF A RUPTURED DISSECTING VENTROMEDIAL SUPRACLINOID RIGHT INTERNAL CAROTID ARTERY ANEURYSM  2. CEREBRAL ANGIOGRAM: SELECTIVE CATHETERIZATION OF THE LEFT COMMON CAROTID ARTERY, RIGHT COMMON CAROTID ARTERY, RIGHT INTERNAL CAROTID ARTERY, LEFT VERTEBRAL ARTERY, AND RIGHT VERTEBRAL ARTERY WITH ANGIOGRAPHIC IMAGING CENTERED OVER THE HEAD  3. CERVICAL ANGIOGRAM: SELECTIVE CATHETERIZATION OF THE LEFT COMMON CAROTID ARTERY AND THE RIGHT COMMON CAROTID ARTERY WITH ANGIOGRAPHIC IMAGING CENTERED OVER THE NECK  4. THREE-DIMENSIONAL ROTATIONAL ANGIOGRAM OF THE HEAD WITH IMAGE PROCESSING ON A SEPARATE COMPUTER WORKSTATION: INJECTION OF THE RIGHT COMMON CAROTID ARTERY  5. 5 CEREBRAL ANGIOGRAMS THROUGH EXISTING GUIDE CATHETER TO EVALUATE ENDOVASCULAR TREATMENT: INJECTIONS OF THE RIGHT INTERNAL CAROTID ARTERY

## 2021-05-30 NOTE — ANESTHESIA CARE TRANSFER NOTE
Last vitals:   Vitals:    07/27/19 1323   BP: 119/78   Pulse: 100   Resp:    Temp: 36.5  C (97.7  F)   SpO2: 100%     Patient's level of consciousness is unresponsive  Spontaneous respirations: no: ventilator   Maintains airway independently: no: intubated   Dentition unchanged: yes  Oropharynx: endotracheal tube in place    QCDR Measures:  ASA# 20 - Surgical Safety Checklist: WHO surgical safety checklist completed prior to induction    PQRS# 430 - Adult PONV Prevention: 4558F - Pt received => 2 anti-emetic agents (different classes) preop & intraop  ASA# 8 - Peds PONV Prevention: NA - Not pediatric patient, not GA or 2 or more risk factors NOT present  PQRS# 424 - Nina-op Temp Management: 4559F - At least one body temp DOCUMENTED => 35.5C or 95.9F within required timeframe  PQRS# 426 - PACU Transfer Protocol: - Transfer of care checklist used  ASA# 14 - Acute Post-op Pain: ASA14B - Patient did NOT experience pain >= 7 out of 10

## 2021-05-30 NOTE — PLAN OF CARE
Problem: Pain  Goal: Patient's pain/discomfort is manageable  Outcome: Progressing   Headache resolves with noise reduction and prn medications. States tolerable at this time.      Problem: Potential for Compromised Skin Integrity  Goal: Nutritional status is improving  Outcome: Progressing   Good appetite noted.      Problem: Neurological Deficit  Goal: Neurological status is stable or improving  Outcome: Progressing   Blurry vision on the right improving from admission. Otherwise completely intact neuro exam. NIH 0.    In ICU to monitor blood pressure parameter to keep >120. Will continue to monitor.    Kerry Collazo RN

## 2021-05-30 NOTE — PROGRESS NOTES
NEUROSURGERY PROGRESS NOTE:    ASSESSMENT:     Darion Herrera is 43 y.o. female is hospital day 2 after  presenting with three day history of worsening headache. 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 Coil embolization 7/22/19 in IR.     Right posterior neck pain improved. . No really headache. No dizziness. Continued right blurred vision.       PLAN:   Patient Active Problem List   Diagnosis     Aneurysmal subarachnoid hemorrhage (H)     Intraventricular hemorrhage, nontraumatic (H)     Compression of brain (H)     S/P coil embolization of cerebral aneurysm     Aneurysm of right internal carotid artery      1.  Daily TCD.  2.  Nimodipine x 21 days.  3.  Progress activity  4.  Follow up angio today per IR. Dissecting aneurysm notoriously unstable require close monitoring.   5.  Blood pressure < 120.       Discharge Recommendations/Plan:  Barriers to Discharge: yes  Acute medical management     Follow Up Plan:     pending         HPI: Darion Herrera  43 y.o. female has had several years of mild headaches and neck pain on the right. She presented to ED when her headache became significantly worse over three days. Right head focused.  She has had blurred vision in the right eye for a couple of months    SUBJECTIVE:   Minimal headache.      OBJECTIVE:  Vital signs in last 24 hours  Temp:  [98.1  F (36.7  C)-98.5  F (36.9  C)] 98.3  F (36.8  C)  Heart Rate:  [59-97] 65  Resp:  [10-31] 20  BP: ()/(46-69) 103/61  Body mass index is 31.37 kg/m .    Mental Status: alert and oriented, person, place, time and situationspeech normal via .   Cranial Nerves: CN1 grossly intact per patient recall. CN2; no funduscopic exam performed. CN3,4&6; pupillary light response, lateral and vertical gaze normal.  No nystagmus.  Visual fields are full to confrontation. CN5; double simultaneous stimulation intact. CN7: smile, facial symmetry intact.  CN8: hearing intact to finger rub. CN9&10; gag reflex, uvula midline, palate rises with phonation. CN11; shoulder shrug 5/5 intact bilaterally. CN12; tongue midline and moves freely from side to side.  Motor Strength:  Biceps 5/5 right, 5/5 left   Triceps 5/5 right, 5/5 left   Deltoids 5/5 right, 5/5 left  Hand Grasp 5/5 right, 5/5 left  Hip flexors 5/5 right, 5/5 left   Quadriceps: 5/5 right, 5/5 left   Ankle dorsiflexion: 5/5 right, 5/5 left   Extensor hallicus longus: 5/5 right, 5/5 left   Ankle plantar flexion : 5/5 right, 5/5 left       Pertinent Labs   Lab Results:   Lab Results   Component Value Date     07/24/2019    K 3.9 07/24/2019     (H) 07/24/2019    CO2 20 (L) 07/24/2019    BUN 9 07/24/2019    CREATININE 0.72 07/24/2019    CALCIUM 8.5 07/24/2019     Lab Results   Component Value Date    WBC 13.3 (H) 07/24/2019    HGB 11.2 (L) 07/24/2019    HCT 34.4 (L) 07/24/2019    MCV 89 07/24/2019     07/24/2019       Pertinent Radiology   Radiology Results:    TCD pending        Jailene Mcknight NP

## 2021-05-30 NOTE — PLAN OF CARE
C/O head/neck pain rated up to 4/10. PRN tramadol given and effective. Neuros intact. LS CTA. +BS. Tolerating regular diet. Voiding without difficulty. VSS. Will continue to monitor.

## 2021-05-30 NOTE — PROGRESS NOTES
Spiritual Care Note    Spiritual Assessment:   made a visit with patient this morning; patient's  and interpretor are present. Patient seems to be struggling today and talked about the pain she has been experiencing and how difficult things have been. Patient hoping that things will improve for her and welcomed prayer from the . No concerns noted.     Care Provided: Listening, support, and prayer provided.     Plan of Care: Spiritual care will continue to follow as part of patient's care team.     FABIANO Tinoco, BCC

## 2021-05-30 NOTE — PROGRESS NOTES
NEUROSURGERY PROGRESS NOTE:    NEUROSURGERY ATTENDING: The patient's clinical examination, laboratory data,and the plan was reviewed and made collaboratively with Dr Sal.     ASSESSMENT:   Darion Herrera is on hospital day 9 after admission for headache, found to have ruptured dissecting ventral medial supraclinoid right internal carotid artery aneurysm resulting in SAH/IVH. Coiled 7/22/2019 but not completely occluded, some filling persisted. MRI  concerning for vasospasm and new scattered small infarcts. Held off on angio as patient was asymptomatic without new focal deficits and velocity 160s on TCD.     Seizure and decreased LOC overnight Friday night. CT concerning for rebleed and indicative of hydrocephalus and increased ICP. Patient in IR most of the day, more coils inserted.   Lumbar drain placed due to concern of ventric placement with aspirin on board.     Given dissection of the aneurysm she is not a candidate for clipping per Dr. Juhi Kimbrough.    Today angio performed. Verapamil infused in right ICA    PLAN:     Aneurysmal subarachnoid hemorrhage (H)     Intraventricular hemorrhage, nontraumatic (H)     Compression of brain (H)     S/P coil embolization of cerebral aneurysm     Aneurysm of right internal carotid artery  IV fluids 100/hr - goal to have Euvolemic or slightly hypervolemic  Strict intake and output  full 3% with goal Na 150-155 per Dr. Juhi Kimbrough   -150  Albumin ordered prn to support BP - please exhaust prior to starting Levophed  No oral antihypertensives for now  SCD  TCD through day 7  Nimodipine 21 days DO NOT HOLD. Now has OG  HOB 30 degrees  Keppra X 30 days without seizure. Indefinite with seizure  Would not advocate for deep sedation but would avoid agitation and bucking the vent, Goal RASS -1          Discharge Recommendations/Plan:  Barriers to Discharge: yes, Neuro critical care management     Follow Up Plan: pending hospital course       HPI: Darion Herrera is a 43 year  old female with IVH/SAH secondary to 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 Headache started 3 days prior to presentation. Aneurysm was coiled 7/22/2019. MRA done 7/26/2019 with multiple foci of acute/subacute infarction throughout the right parietal occipital lobes and small area in the right frontal lobe and left parietal. There is mild persistent flow along the neck of the aneurysm and faint flow centrally along the coil mass. Mild M1 and M2 RMCA narrowing suggestive of vasospasm.    SUBJECTIVE:  Sedatation off for 20 minutes.     OBJECTIVE:  Vital signs in last 24 hours  Temp:  [98.7  F (37.1  C)-100.8  F (38.2  C)] 100.3  F (37.9  C)  Heart Rate:  [] 90  Resp:  [14-37] 23  BP: ()/(58-73) 102/62  Arterial Line BP: (112-150)/(52-85) 141/85  FiO2 (%):  [30 %-35 %] 30 %  Body mass index is 32.4 kg/m .    Mental Status: Sedated on propofol  Cranial Nerves: Pupils equal, react. Face appears midline. + corneals +cough +gag  Motor Strength: Opens eyes spontaneously. Follows commands bilateral upper extremities. Nothing bilateral lower extremities.     Lumbar drain: patent, 10-15 per hour of output, output is bloody.    Pertinent Labs   Lab Results:   Lab Results   Component Value Date     07/29/2019    K 3.5 07/29/2019     (H) 07/28/2019    CO2 17 (L) 07/28/2019    BUN 6 (L) 07/28/2019    CREATININE 0.63 07/28/2019    CALCIUM 8.3 (L) 07/28/2019     Lab Results   Component Value Date    WBC 17.8 (H) 07/29/2019    HGB 10.7 (L) 07/29/2019    HCT 32.4 (L) 07/29/2019    MCV 88 07/29/2019     07/29/2019       Pertinent Radiology   Radiology Results:     TCD reviewed.     Jailene Mcknight NP  [facility] Neurosurgery  [address], [address]  [place] [address]  O: [phone]

## 2021-05-30 NOTE — PROGRESS NOTES
To ir with pippa salgado rn, sbp < 120 reported blurred vision rt eye old , had on admit.   No other deficits.

## 2021-05-30 NOTE — PLAN OF CARE
Neuros intact. Neuro checks changed to Q1H as of 1300. Strict I&O importance discussed with family and patient. Attempting to raise SBP to goal of 120-180. Received NS bolus of 500ml and IVF restarted at 100ml/hr. PRN albumin given X 1 . SBP still 110-118. C/O head and neck ache up to 3/10. PRN tramadol and scheduled tylenol helpful along with environmental changes. Will continue to monitor.

## 2021-05-30 NOTE — PROGRESS NOTES
Hospitalist Progress Note     Assessment/Plan:     Darion Herrera is a 43-year-old female who presented with ruptured aneurysm of the right ICA resulting in subarachnoid hemorrhage with extension intraventricular hemorrhage.  She is status post aneurysm coiling on 7/22 which was complicated by vasospasm.  There is also a complication of the right ICA aneurysm with dissection and rebleeding leading to seizure and unresponsiveness on 7/27.  This required intubation and recoiling and placement of lumbar drain.  She is currently intubated in ICU.  Intensivist and neurosurgery managing.    Active Problem:     Principal Problem:    Aneurysmal subarachnoid hemorrhage (H)  Active Problems:    Carotid artery aneurysm (H)    Intraventricular hemorrhage, nontraumatic (H)    Compression of brain (H)    S/P coil embolization of cerebral aneurysm    Aneurysm of right internal carotid artery    Cerebrovascular accident (CVA) due to occlusion of right middle cerebral artery (H)    Vasospasm (H)    Acute respiratory failure, unspecified whether with hypoxia or hypercapnia (H)    Fever in other diseases  -Remains intubated in ICU.    Plan:  -Intensivist and neurosurgery involved.  Neurology involved.  On verapamil to MARIA L.  On sedation, and antiepileptics.    Subjective:     Intubated.     Review of systems:     Please see Subjective.    Current Medications:     Scheduled Meds:    acetaminophen  650 mg Enteral Tube QID    Or     acetaminophen  650 mg Oral QID     albumin human  12.5 g Intravenous Once     atorvastatin  20 mg Enteral Tube DAILY     chlorhexidine  15 mL Topical Q12H     fosphenytoin (CEREBYX) IV maintenance  130 mg PE Intravenous Q8H     levETIRAcetam (KEPPRA) IVPB  1,000 mg Intravenous Q12H     niMODipine  60 mg Enteral Tube Q4H     omeprazole  20 mg Enteral Tube QAM AC    Or     pantoprazole  40 mg Intravenous QAM AC     potassium chloride liquid/packet  20 mEq Enteral Tube BID     senna (SENOKOT) syrup  8.8 mg  Enteral Tube BID    Or     senna  8.6 mg Oral BID     sodium chloride  10-30 mL Intravenous Q8H FIXED TIMES     sodium chloride  2 g Oral BID    Or     sodium chloride  2 g Enteral Tube BID     Continuous Infusions:    lactated Ringers       niCARdipine       norepinephrine 0.15 mcg/kg/min (07/30/19 1453)     propofol 45 mcg/kg/min (07/30/19 1424)     sodium chloride 0.9% 100 mL/hr (07/30/19 0800)     sodium chloride 3 % 50 mL/hr (07/30/19 1414)     PRN Meds:.acetaminophen, albumin human, bisacodyl, HYDROmorphone, lactated Ringers, lipase-protease-amylase **AND** sodium bicarbonate, naloxone **OR** naloxone, ondansetron, polyethylene glycol, sodium chloride bacteriostatic, sodium chloride, sodium chloride, sodium chloride    Objective:       Vital signs in last 24 hours:     Temp:  [98.6  F (37  C)-102.9  F (39.4  C)] 99.1  F (37.3  C)  Heart Rate:  [] 79  Resp:  [8-27] 18  BP: (108-156)/(56-87) 133/82  FiO2 (%):  [30 %] 30 %  Weight: 154 lb 12.8 oz (70.2 kg)    Physical Exam:   General: Alert, intubated, no acute distress  HEENT: PER, no scleral icterus,  Chest: intubated  Cardiac: RRR  Abdomen: ND  Neuro: non-verbal, intubated, moving spontaneously    Intake/Output this shift:     I/O last 3 completed shifts:  In: 6677.1 [I.V.:5887.1; NG/GT:440; IV Piggyback:350]  Out: 6474 [Urine:6170; Drains:304]    Pertinent Labs:     Lab Results: personally reviewed.     Pertinent Radiology:       Advanced Care Planning:     Barrier to discharge: Intubated  Anticipated LOS: To be determined  Disposition: To be determined    Gardenia Luu M.D.  [facility]  [facility] Medicine

## 2021-05-30 NOTE — ANESTHESIA POSTPROCEDURE EVALUATION
Patient: Darion Herrera  * No procedures listed *  Anesthesia type: No value filed.    Patient location: ICU  Last vitals:   Vitals Value Taken Time   BP  7/27/2019  3:50 PM   Temp  7/27/2019  3:50 PM   Pulse 73 7/27/2019  3:48 PM   Resp 16 7/27/2019  3:48 PM   SpO2 100 % 7/27/2019  3:48 PM   Vitals shown include unvalidated device data.  Post vital signs: stable  Level of consciousness: sedated  Post-anesthesia pain: pain controlled  Post-anesthesia nausea and vomiting: no  Pulmonary: ETT  Cardiovascular: stable and blood pressure at baseline  Hydration: adequate  Anesthetic events: no    QCDR Measures:  ASA# 11 - Nina-op Cardiac Arrest: ASA11B - Patient did NOT experience unanticipated cardiac arrest  ASA# 12 - Nina-op Mortality Rate: ASA12B - Patient did NOT die  ASA# 13 - PACU Re-Intubation Rate: ASA13B - Patient did NOT require a new airway mgmt  ASA# 10 - Composite Anes Safety: ASA10A - No serious adverse event    Additional Notes:

## 2021-05-30 NOTE — PROGRESS NOTES
ETT # 7.5 and is secured 21 cm at the teeth. Patient remains on the vent/fully supported with the following settings: VCV 16 375 30% 5. sats are high 90s. BS are coarse, and moderate/thin secretions are suctioned. RT will monitor.    JERRY Sanchez

## 2021-05-30 NOTE — CONSULTS
Consultation -   Darion MINH Herrera,  1975, MRN [id number]    Admitting Dx: Headache, dizziness, neck stiffness    PCP: Monroe Cassidy MD, [phone]   Code status:  Full Code       Extended Emergency Contact Information  Primary Emergency Contact: Anyi Herrera  Address: [address] [place] [address] [facility]  Home Phone: [phone]  Mobile Phone: [phone]  Relation: Child  Secondary Emergency Contact: Kusum Herrera  Address: [address] [place] [address] [facility]  Home Phone: [phone]  Relation: Spouse       Assessment and Plan   Unresponsive  Acute respiratory failure  Principal Problem:    Aneurysmal subarachnoid hemorrhage (H)  Active Problems:    Carotid artery aneurysm (H)    Intraventricular hemorrhage, nontraumatic (H)    Compression of brain (H)    S/P coil embolization of cerebral aneurysm    Aneurysm of right internal carotid artery    Cerebrovascular accident (CVA) due to occlusion of right middle cerebral artery (H)    Vasospasm (H)      Neuro:  Ruptured MARIA L supraclinoid aneurysm status post endovascular coil embolization.  Started to develop vasospasm that was treated with triple H therapy.  Patient is already on amlodipine and Keppra.  Had to be intubated for airway protection and will undergo stat CTA to evaluate for further vasospasm versus infarction.  Already on Keppra and does not appear to be having any seizure activity at least on physical exam.  Of course she will need to undergo EEG.  Neurosurgery was notified by nursing staff.  Neurology is already following the patient.    Cardiovascular:  We will try to keep her blood pressure systolic between 1 20-1 80 as part of triple H therapy.  Unfortunately now that she is intubated she will need to be sedated which might cause hypotension so she is going to be treated with fluids and colloids.  Vasopressors only once we exhaust the above  therapy.    Respiratory:  Intubated for airway protection.  Will avoid hyperventilation which could cause vasoconstriction.    SCDs for a DVT prophylaxis  Place OG tube so she can take her nimodipine  Heparin versus Lovenox when okay with neurosurgery.  Protonix for GI prophylaxis.    45 minutes critical care time     Chief Complaint Aneurysmal subarachnoid hemorrhage (H)       HPI    43-year-old female that was admitted here on 7/22 with a diagnosis of subarachnoid hemorrhage and intraventricular hemorrhage secondary to 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  She underwent coil embolization on 7/22.  She has been on amlodipine and getting daily TCD's.  MRA done on 7/26 showed narrowing of M1 and M2 of the right MCA suggestive for vasospasm.  It was decided to try triple H therapy.  In around 4 in the morning she suddenly became unresponsive.  Pupils equal at 4 mm bilaterally.  Not responding to painful stimuli.  She was intubated for airway protection.     Medical History  Active Ambulatory (Non-Hospital) Problems    Diagnosis     Nausea vomiting and diarrhea     Leiomyoma of uterus     History reviewed. No pertinent past medical history. Surgical History  She  has a past surgical history that includes IR Cerebral Angiogram (7/22/2019); IR Cerebral Angiogram (7/24/2019); and PICC Team Line Insertion (7/26/2019).   Social History  Reviewed, and she  reports that she has never smoked. She has never used smokeless tobacco. She reports that she has current or past drug history. She reports that she does not drink alcohol.   Allergies  Allergies   Allergen Reactions     Oxycodone      Hydrocodone Swelling and Rash    Family History  Reviewed, and family history is not on file.   Psychosocial Needs  Social History     Social History Narrative     Not on file     Additional psychosocial needs reviewed per nursing assessment.     Prior to Admission Medications   Medications  Prior to Admission   Medication Sig Dispense Refill Last Dose     ascorbic acid, vitamin C, 500 mg Chew chew tab Chew 500 mg daily.   Unknown at Unknown time          Review of Systems:  Review of systems not obtained due to inability to communicate with the patient.  Physical Exam:  Temp:  [98.3  F (36.8  C)-98.6  F (37  C)] 98.6  F (37  C)  Heart Rate:  [51-73] 55  Resp:  [14-32] 16  BP: (103-156)/(61-87) 145/83    General appearance: Intubated and sedated prior to that she was basically unresponsive  Head: Normocephalic, without obvious abnormality, atraumatic  Lungs: clear to auscultation bilaterally  Heart: regular rate and rhythm, S1, S2 normal, no murmur, click, rub or gallop  Abdomen: soft, non-tender; bowel sounds normal; no masses,  no organomegaly  Extremities: extremities normal, atraumatic, no cyanosis or edema  Neurologic: Pupils equal at 4 mm and sluggish.  She was not responding to painful stimuli prior to intubation.  Agonal breathing prior to intubation.       Pertinent Labs  Lab Results: personally reviewed.   Lab Results   Component Value Date     07/26/2019    K 3.9 07/26/2019     07/26/2019    CO2 23 07/26/2019    BUN 15 07/26/2019    CREATININE 0.73 07/26/2019    CALCIUM 9.1 07/26/2019     Lab Results   Component Value Date    WBC 13.3 (H) 07/24/2019    HGB 11.2 (L) 07/24/2019    HCT 34.4 (L) 07/24/2019    MCV 89 07/24/2019     07/24/2019        Pertinent Radiology  Radiology Results: Personally reviewed image/s and Personally reviewed impression/s

## 2021-05-30 NOTE — PLAN OF CARE
Care Management Goals of the Day: Follow progression of care      Care Progression Reviewed With: Charge RN, MD, HUC, RNCM     Barriers to Discharge: emergently intubated/sedated 7/27/19, possible new seizures, increased ICP, repeat angio pending    Discharge Disposition: TBD     Expected Discharge Date: TBD     Transportation: family if able    Care Coordination Narrative: Pt had an unresponsive episode early this AM, was intubated for airway protection and remains intubated/sedated. Repeat angio pending, DC and transport pending progression. CM to follow.

## 2021-05-30 NOTE — PROGRESS NOTES
Critical Care Medicine Progress Note  7/30/2019    ASSESSMENT/PLAN:  43 y.o. woman who was admitted on 7/22/2019 with SAH & IVH secondary to ruptured aneurysm of the right ICA, aneurysm coiling 7/22 that was complicated by vasospasm.  Hospitalization course was complicated by right ICA aneurysm dissection with rebleeding that presented with acute onset seizures and unresponsiveness on 7/27.  Patient was intubated on 7/27, underwent a recoiling of the culprit vessel, and placement of lumbar drain for ICP management.  7/28, 7/29 underwent verapamil infusion with angiogram for acute vasospasm of right ICA.    New events:   - fever this am to 102 F   - responds to external stimulus when on sedation holiday   - remains on NE    Neuro/Psych:   #. Analgesia/Sedation: Currently managed with propofol drip, PRN IV Dilaudid.  #.  Right ICA aneurysm complicated by subarachnoid hemorrhage with IVH, status post dissection and coiling x2 with hydrocephalus requiring lumbar drain placement 7/27/2019.  Concern for vasospasm complicated the clinical presentation and has had verapamil infusion to right ICA x2.    Neurosurgery and IR closely following    Cerebral angiography per IR, repeat on Friday    Continue with every 4 hours nimodipine x21 days    Goal -150, currently on NE to maintain    Goal sodium 150-155, hypertonic saline infusion per NSG    Strict I/O, strict lumbar drain output monitoring    Repeat TCD today  #.  Recurrent seizures, likely secondary to acute intracranial process as detailed above.  Currently on propofol, IV levetiracetam, sand IV fosphenytoin.  Neurology closely involved.    continuous EEG  #.  Fever: suspect cerebral in origin, but did have leukocytosis yesterday, see ID section.  Tylenol and cooling blanket prn if fever persists    Pulmonary: Mechanical ventilation instituted for airway protection in setting of seizure and encephalopathy secondary to SAH.  The patient is on minimal ventilator  settings.  Not clinically appropriate for ventilator weaning.  Monitor.  - goal SpO2 > 92%  - no weaning today  - secretions minimal  - will check CXR due to fever     Cardiovascular: goal -150. Avoid medications that would cause bradycardia.  - continue NE as needed to maintain SBP goal    Renal: No acute issues.  Good urine output.  - check UA for possible UTI given fevers    GI: NPO for now.  - start trophic feeding    Heme: No acute issues, monitor.  - recheck CBC    Endo: No acute issues.  Continue close monitoring.  - ICU q 4 hour SSI    ID: More overt fever this am.  Suspect cerebral in origin  - will check UA/UC if indicated  - CXR   - blood cultures x2 sets  - consider CSF fluid analysis if fever persists, will defer to NSG  - hold on starting antibiotics for now  - will consider UE US if no evidence of infection     Access/Lines/Tubes: required and necessary for continued patient cares  ETT, 7.5  PICC, RUE  PIV  Arterial sheath, right femoral artery  Sharpe catheter  Lumbar drain  OGT    ICU prophylaxis:   #. VAP ppx: HOB 30 degrees, chlorhexidine rinses  #. Stress ulcer: PPI  #. Diet: NPO, will start trophic feeding today  #. VTE: SCD, chemical prophylaxis contraindicated due to SAH/bleeding  #. Restraints: required and necessary for continued patient cares    CODE: FULL    Family was updated at bedside    Dispo: ICU for mechanical ventilation, hemodynamic support, neurological monitoring, remains critically ill with constant threat to life.    Patient is critically ill with total CCT spent 45 minutes thus far today.     Fer Reyes, DO  Pulmonary and Critical Care     =================================================================    Interval history: remains sedated and intubated.  No further seizure activity reported.  More overt fever this am.  Sputum suctioned remains minimal.  CSF fluid still bloody.      Vitals: Temp:  [98.6  F (37  C)-102.9  F (39.4  C)] 102.9  F (39.4  C)  Heart Rate:   [] 100  Resp:  [8-30] 21  BP: (108-156)/(62-87) 127/65  Arterial Line BP: (137-142)/(77-85) 142/77  FiO2 (%):  [30 %-100 %] 30 %  Vent:   Vent Mode: VCV  FiO2 (%):  [30 %-100 %] 30 %  S RR:  [16] 16  S VT:  [375 mL] 375 mL  PEEP/CPAP (cm H2O):  [5 cm H2O] 5 cm H2O  Minute Ventilation (L/min):  [0.7 L/min-9.9 L/min] 8.3 L/min  PIP:  [13 cm H2O-34 cm H2O] 34 cm H2O  MAP (cm H2O):  [6-12] 11    Physical Exam:   General: obese woman, lying in bed, NAD  HEENT: orally intubated, MMM, PER  CV: RRR, no M/R/G; extremities well perfused, no significant edema  Pulm: equal bilateral mechanical breath sounds that are course, no wheezing, no rhonchi, no crackles  Abd: soft, ND, NT, hypoactive bowel sounds  Msk: warm to touch  Derm: no acute lesions or rashes on limited exam  Neuro: sedated, no evidence of clonus  Psych: sedated    Labs: personally reviewed in EMR.     Imaging: personally reviewed in EMR. Formal Radiology interpretation listed below.  #. HCT, 7/27:   INTRACRANIAL CONTENTS: Beam hardening and streak artifacts related to a large right paraclinoid coil mass as seen previously. This locally degrades evaluation. Small volume subarachnoid hemorrhage within sulci of both cerebral hemispheres. No definite increasing intraventricular hemorrhage layering within the occipital horns of the lateral ventricles favored to reflect redistribution of subarachnoid blood products. No large territorial infarct is identified. Small frontoparietal infarcts seen on the 7/26/2019 MRI are poorly appreciated on CT. Minor periventricular low-attenuation. Mild to moderate lateral and third ventriculomegaly similar to findings on the prior exam.   VISUALIZED ORBITS/SINUSES/MASTOIDS: No significant orbital abnormality. No significant paranasal sinus mucosal disease. No significant middle ear or mastoid effusion.  BONES/SOFT TISSUES: No significant abnormality.    #. HCT: 7/28:   Again demonstrated is endovascular coiling of a giant right  supraclinoid aneurysm. This results in extensive streak artifact and compromises a portion of the intracranial compartment. Within this limitation, the amount of intraventricular hemorrhage and   subarachnoid hemorrhage within the basal cisterns and scattered over the convexities appears grossly unchanged. There is stable mild to moderate ventriculomegaly of the lateral and third ventricles. No evidence of interval acute intracranial hemorrhage,   infarct or hydrocephalus.

## 2021-05-30 NOTE — PLAN OF CARE
Problem: Discharge Barriers  Goal: Patient's discharge needs are met  Outcome: Progressing  Care Plan  Care Management        Care Management Goals of the Day:  Progression of Care and Discharge Planning     Care Progression Reviewed With: Charge RN, MD, HUC, RNCM     Barriers to Discharge:  Aneurysmal Subarachnoid Hemorrhage:  Status-Post Coil Embolization of Cerebral Aneurysm 07/22/2019, Repeat Angios. 07/24/2019:  Acute Medical Management    Discharge Disposition:  Home     Expected Discharge Date:  07/26/2019     Transportation:  Family        Care Coordination Narrative:     Per Care Management Care Plan 07/22/2019:  Writer met with pt. Family was present-including daughter, Corina. Pt requested a HCD and the phone number for billing as she is concerned about her high deductible on her health plan. This information was brought back to pt and left with Corina as pt was sleeping. CM will follow for DC planning needs.

## 2021-05-30 NOTE — PLAN OF CARE
Pt arrived to floor from PACU at about 2015. Vital signs were elevated with SBP 140s; so cardene gtt was started. Cardene stopped at 2249. Pt is intact neurologically , CMS intact on RLE. Pt's sheath is intact, did have serosanguinous drainage x1 but is now WDL. Swallow study done at bedside and pt has tolerated fluids--switched to clear liquid diet this morning. Pt's  is at bedside with her, is supportive and cooperative. Will continue to monitor.

## 2021-05-30 NOTE — PROGRESS NOTES
NEUROLOGY   INPATIENT PROGRESS NOTE       1. Assessment/Plan for Hospital Day: 7     Status post ruptured dissecting ventromedial supraclinoid right internal carotid artery aneurysm    Recurrent subarachnoid hemorrhage and intraventricular hemorrhage    Status post coiling 7/22/2019    Status post coiling 7/27/2019    Yesterday while intubated and sedated clinical signs of seizures    Right MCA vasospasm with subsequent right MCA ischemic strokes    Recommendations  1. Start Video EEG monitoring  2. Continue fosphenytoin 100 mg 3 times a day patient was loaded last night  3. Increase Keppra to 1000 mg mg twice a day  4. Treat SAH/IVH and aneurysm per neurosurgery and IR  5. Continue triple H therapy  6. Continue nimodipine for vasospasms  7. No weaning       2. SUBJECTIVE     CC: none    Darion Herrera is a 43 y.o. female seen for a follow up subarachnoid hemorrhage.  Please see prior neurology consultation progress notes in chart for detail clinical course and history.  Yesterday morning the patient had additional subarachnoid hemorrhage and needed to go to IR for additional coiling of the aneurysm.  Overnight she had seizure-like physical movements while intubated and sedated.  We added fosphenytoin to the existing Keppra.  The movements have stopped.  The clinical course otherwise seems stable.  Her repeated CT scan head today is stable.  Kathie updated at the bedside.  Discussed with ICU staff     Patient Active Problem List    Diagnosis Date Noted     Acute respiratory failure, unspecified whether with hypoxia or hypercapnia (H)      Cerebrovascular accident (CVA) due to occlusion of right middle cerebral artery (H)      Vasospasm (H)      S/P coil embolization of cerebral aneurysm 07/23/2019     Aneurysm of right internal carotid artery 07/23/2019     Carotid artery aneurysm (H) 07/22/2019     Nausea vomiting and diarrhea 07/22/2019     Aneurysmal subarachnoid hemorrhage (H) 07/22/2019     Intraventricular  hemorrhage, nontraumatic (H) 07/22/2019     Compression of brain (H)      Leiomyoma of uterus 07/18/2016       Past Surgical History:   Procedure Laterality Date     IR CEREBRAL ANGIOGRAM  7/22/2019     IR CEREBRAL ANGIOGRAM  7/24/2019     IR CEREBRAL ANGIOGRAM  7/27/2019     IR LUMBAR DRAIN INSERTION  7/27/2019     PICC AND MIDLINE TEAM LINE INSERTION  7/26/2019            History reviewed. No pertinent family history.    Social History     Socioeconomic History     Marital status:      Spouse name: Not on file     Number of children: Not on file     Years of education: Not on file     Highest education level: Not on file   Occupational History     Not on file   Social Needs     Financial resource strain: Not on file     Food insecurity:     Worry: Not on file     Inability: Not on file     Transportation needs:     Medical: Not on file     Non-medical: Not on file   Tobacco Use     Smoking status: Never Smoker     Smokeless tobacco: Never Used   Substance and Sexual Activity     Alcohol use: No     Drug use: Not Currently     Sexual activity: Yes   Lifestyle     Physical activity:     Days per week: Not on file     Minutes per session: Not on file     Stress: Not on file   Relationships     Social connections:     Talks on phone: Not on file     Gets together: Not on file     Attends Oriental orthodox service: Not on file     Active member of club or organization: Not on file     Attends meetings of clubs or organizations: Not on file     Relationship status: Not on file     Intimate partner violence:     Fear of current or ex partner: Not on file     Emotionally abused: Not on file     Physically abused: Not on file     Forced sexual activity: Not on file   Other Topics Concern     Not on file   Social History Narrative     Not on file       Allergies   Allergen Reactions     Oxycodone      Hydrocodone Swelling and Rash         3. SCHEDULED MEDICATIONS       acetaminophen  650 mg Oral Q6H     atorvastatin  20 mg  Oral DAILY     fosphenytoin (CEREBYX) IV maintenance  100 mg PE Intravenous Q8H     levETIRAcetam (KEPPRA) IVPB  500 mg Intravenous Q12H     LORazepam  2 mg Intravenous Once     niMODipine  60 mg Oral Q4H     pantoprazole  40 mg Intravenous Q24H     potassium chloride ER  20 mEq Oral BID     senna-docusate  1 tablet Oral BID     sodium chloride  10-30 mL Intravenous Q8H FIXED TIMES     sodium chloride  2 g Oral BID    Or     sodium chloride  2 g Enteral Tube BID       4. INFUSIONS       lactated Ringers       niCARdipine       norepinephrine 0.14 mcg/kg/min (07/28/19 0903)     propofol 70 mcg/kg/min (07/28/19 0909)     sodium chloride 0.9% 100 mL/hr (07/28/19 0109)     sodium chloride 0.9% Stopped (07/27/19 1836)     sodium chloride 3 % 75 mL/hr (07/28/19 0632)       5. PRN MEDICATIONS   albumin human, bisacodyl, HYDROmorphone, lactated Ringers, naloxone **OR** naloxone, ondansetron, polyethylene glycol, sodium chloride bacteriostatic, sodium chloride, sodium chloride, sodium chloride, traMADol    6. ROS   Review of systems not obtained due to inability to communicate with the patient.     7. PHYSICAL EXAMINATION   Vital signs in last 24 hours:  Vitals:    07/28/19 0815 07/28/19 0830 07/28/19 0845 07/28/19 0900   BP:       Pulse: 75 77 83 82   Resp: 16 17 16 20   Temp:       TempSrc:       SpO2: 100% 100% 97% 97%   Weight:       Height:         I/O last 3 completed shifts:  In: 8316.1 [I.V.:7401.1; NG/GT:560; IV Piggyback:355]  Out: 6904 [Urine:6675; Drains:229]  I/O this shift:  In: 100 [NG/GT:100]  Out: 460 [Urine:450; Drains:10]  Vent Mode: VCV  FiO2 (%):  [35 %-100 %] 35 %  S RR:  [16] 16  S VT:  [375 mL] 375 mL  PEEP/CPAP (cm H2O):  [5 cm H2O] 5 cm H2O  Minute Ventilation (L/min):  [6.7 L/min-10.6 L/min] 7 L/min  PIP:  [17 cm H2O-30 cm H2O] 29 cm H2O  MAP (cm H2O):  [7-13] 8    Neurological Exam:     Intubated and sedated.  Neurological examination is very limited.  Pupils are round reactive to light 3 mm.  No  OCR.  Positive corneal reflex.  Quadriplegia.  No hyperreflexia.  Toes downgoing.         8. RESULTS REVIEW       8.1 Imaging studies   All studies were personally reviewed        MR BRAIN W WO CONTRAST  7/22/2019 11:07 AM  1.  The right suprasellar mass measuring up to 2.4 cm almost certainly represents a right supraclinoid internal carotid artery aneurysm. Recommend consultation with neurosurgery/neuro interventional radiology and conventional angiogram or CTA.  2.  Intraventricular hemorrhage and probable small amount of right parietal subarachnoid hemorrhage in the left sylvian fissure. No acute intracranial body. No finding to suggest subarachnoid hemorrhage. No hydrocephalus.    CT HEAD WITHOUT CONTRAST   07/28/2019, 4:52 AM  1.  Overall, no significant change compared with 07/27/2019 at 1711.  2.  Redemonstration of changes of endovascular coiling of giant right supraclinoid aneurysm.  3.  Stable mild to moderate ventriculomegaly of the lateral and third ventricles. Continued close follow-up is advised.  4.  Overall, stable amount of recent intraventricular and subarachnoid hemorrhage. No definite evidence of interval acute intracranial hemorrhage.       8.2 Laboratory testing         Lab Results   Component Value Date    ALT 31 07/21/2019    AST 23 07/21/2019    ALKPHOS 49 07/21/2019    BILITOT 0.2 07/21/2019     Results from last 7 days   Lab Units 07/28/19  0417 07/27/19  2351 07/27/19  1736  07/26/19  0427 07/24/19  0441  07/21/19  2223   LN-SODIUM mmol/L 143  143 142 142   < > 136 139   < > 138   LN-POTASSIUM mmol/L 3.8  3.8  --   --   --  3.9 3.9   < > 3.6   LN-CHLORIDE mmol/L 117*  --   --   --  103 111*   < > 103   LN-CO2 mmol/L 17*  --   --   --  23 20*   < > 23   LN-BLOOD UREA NITROGEN mg/dL 6*  --   --   --  15 9   < > 11   LN-CREATININE mg/dL 0.63  --   --   --  0.73 0.72   < > 0.77   LN-CALCIUM mg/dL 8.3*  --   --   --  9.1 8.5   < > 9.2   LN-PROTEIN TOTAL g/dL  --   --   --   --   --   --   --   7.2   LN-BILIRUBIN TOTAL mg/dL  --   --   --   --   --   --   --  0.2   LN-ALKALINE PHOSPHATASE U/L  --   --   --   --   --   --   --  49   LN-ALT (SGPT) U/L  --   --   --   --   --   --   --  31   LN-AST (SGOT) U/L  --   --   --   --   --   --   --  23    < > = values in this interval not displayed.            8.3 EEG testing       pening    Time spent on counseling/coordination of care: 45 Minutes. More than 50% of this time during the visit for Darion MINH Sharon was devoted to counseling and coordination of care, including face to face time with the patient, time spent reviewing data, obtaining patient information, and discussing the case with other health care providers. I discussed stroke, seizures, management, treatment options today during rounds, last night as well as throughout the day with ICU team and family.         Farhad Styles MD, PhD  Neurology & Sleep Medicine

## 2021-05-30 NOTE — PROGRESS NOTES
I agree with the assessment and plan as outlined by Yaquelin Birch [facility] student in the note below. The student NP was directly supervised by myself, the patient was seen and examined personally, and plan made collaboratively.     TCD today with velocity of 266 in the R MCA, most of the other velocities increasing. Patient sent for urgent angiogram. Discussed with attending neurosurgeon and IR neuro interventional radiologist.    Target -160, would hold off on sedation vacation while concern for active vasospasm    Katherin Youssef [facility] [place] [facility]  [address] [place] [address], [address]  [place] [address]    O: [phone]  P: [phone]        NEUROSURGERY PROGRESS NOTE:     NEUROSURGERY ATTENDING: The patient's attending neurosurgeon is Dr. Malou Sal.      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  Coiled 07/22/19, but not completely occluded, some filling persisted.  MRI concerning for vasospasm and new scattered small infarcts.  Held off on angio at that time as patient was asymptomatic without new focal deficits and velocity 160s on TCD.      Overnight 7/26-7/27 found to have decreased LOC and possible seizure.  Repeat CT concerning for re-bleed and indicative of hydrocephalus and increased ICP.  Patient was intubated and taken to IR where the aneurysm was recoiled and a lumbar drain placed due to concern for risk of further bleeding with ventric placement with aspirin on board.  A follow up cerebral angio was performed on 7/29 and intra-arterial verapamil was infused in the right ICA.      Today she remains intubated and sedated. TCD's have been re-ordered.  Sedation can be weaned if TCD's show improvement in vasospasm, but if not, would lean toward keeping her confortably sedated until velocities improve.  A repeat cerebral angio has been scheduled for  8/2 with IR, but would opt to have that completed sooner if clinical exam were to decline or TCD's showed increased vasospasm.          PLAN:       Patient Active Problem List   Diagnosis     Carotid artery aneurysm (H)     Aneurysmal subarachnoid hemorrhage (H)     Intraventricular hemorrhage, nontraumatic (H)     Compression of brain (H)     S/P coil embolization of cerebral aneurysm     Aneurysm of right internal carotid artery     Cerebrovascular accident (CVA) due to occlusion of right middle cerebral artery (H)     Vasospasm (H)  --Keep sedated, RASS -1, goal to be comfortably tolerating the ventilator  --IV fluid goal 100/hr, goal to be euvolemic or slightly hypervolemic  --Sharpe catheter for strict I/O measurement  --Full 3% protocol for goal Na 145-150  ---150, currently on levophed, however albumin ordered and should be exhausted prior to increasing levophed  --TCD's today, if improving would consider backing off on sedation  --Nimodipine x21 days. DO NOT HOLD due to significant risk for further vasospasm  --HOB 30 degrees  --Keppra x30 days without seizure, indefinite with seizure  --Follow up angio scheduled for 8/2 by IR to confirm aneurysm stability, would consider scheduling earlier if clinical status changes or TCD's show increased vasospasm     Acute -respiratory failure, unspecified whether with hypoxia or hypercapnia (H)  --Ventilator managed by ICU     Fever in other diseases  --Workup per ICU team, appreciate recs            Discharge Recommendations/Plan:  Barriers to Discharge: yes, acute medical care.      Follow Up Plan: pending            HPI: Darion Herrera is a 44 yo female who was admitted on 07/22/19 with SAH and IVH secondary to ruptured aneurysm of the right ICA.  Aneurysm coiling on 7/22 that was complicated by vasospasm. On 07/27, found to be unresponsive and possibly seizing, was intubated and taken to IR where it was revealed that her right ICU aneurysm had dissected and  re-bled.  In IR on 07/27, the vessel was recoiled and a lumbar drain was placed for ICP management.  On 07/29 underwent intraarterial verapamil infusion with angiogram for acute vasospasm of right ICA.       SUBJECTIVE:  Patient is intubated and sedated.         OBJECTIVE:  Vital signs in last 24 hours  Temp:  [98.6  F (37  C)-102.9  F (39.4  C)] 102.9  F (39.4  C)  Heart Rate:  [] 87  Resp:  [8-30] 18  BP: (108-156)/(62-87) 127/71  Arterial Line BP: (138-142)/(77-85) 142/77  FiO2 (%):  [30 %-100 %] 30 %  Body mass index is 32.35 kg/m .     Mental Status: sedated.  Opens her eyes to her name.  Follows commands with RUE and RLE extremity. Able to show her right thumb and wiggle her right and left toes.  Spontaneous movement of LUE noted, unable to follow commands with LUE or LLE, however exam may be limited due to propofol infusion.      Cranial Nerves: Pupils equal and reactive to light.  Face appears midline, +corneals, +cough, +gag reflex.      Motor Strength: Unable to complete exam due to sedation.      Lumbar Drain: Open to drainage, patent. 10-15 mL/hr of blood output.      Pertinent Labs   Lab Results:         Lab Results   Component Value Date      (H) 07/30/2019     K 3.6 07/30/2019      (H) 07/30/2019     CO2 19 (L) 07/30/2019     BUN 3 (L) 07/30/2019     CREATININE 0.57 (L) 07/30/2019     CALCIUM 7.7 (L) 07/30/2019            Lab Results   Component Value Date     WBC 17.1 (H) 07/30/2019     HGB 9.7 (L) 07/30/2019     HCT 30.3 (L) 07/30/2019     MCV 90 07/30/2019      07/30/2019      Coagulation:         Lab Results   Component Value Date     INR 1.12 (H) 07/27/2019            Pertinent Radiology   Radiology Results: Previous images reviewed with Katherin Youssef CNP, [facility].      MANDY Sandoval-ACNP Student  [facility]

## 2021-05-30 NOTE — PLAN OF CARE
Pt educated on fentanyl pre sheath pull and had good relief with fentanyl for all pain. Heat applied to neck with good relief also.pt ate well chicken and rice and foods from home.

## 2021-05-30 NOTE — PROGRESS NOTES
PROVIDER RESTRAINT FOR NON-VIOLENT BEHAVIOR FACE TO FACE EVALUATION    Patient's Immediate Situation:  Patient demonstrated the following behaviors: Pulling/tugging at invasive lines or tubes and does not respond to verbal/non-verbal redirection    Patient's Reaction to the intervention:  Does patient understand the reason for restraint/seclusion? Unable to Express    Medical Condition:  Is there any evidence of compromise of Skin integrity, Respiratory, Cardiovascular, Musculoskeletal, Hydration? No    Behavioral Condition:  In consultation with the RN, is there a need to continue this restraint or seclusion? Yes    See Restraint Flowsheet for complete restraint documentation and assessment.    Ludmila Pink MD

## 2021-05-30 NOTE — PROGRESS NOTES
Patient was intubated for Acute Respiratory failure at 0415. Pt ETT size 7.5 @22 teeth and Patient placed on ventilator on the following settings. Pt had equal Bilateral Breaths and was later transferred to CT Scan and brought back with no complications. ABG is pending. RT will follow and monitor.    Vent Mode: VCV  FiO2 (%):  [100 %] 100 %  S RR:  [16] 16  S VT:  [375 mL] 375 mL  PEEP/CPAP (cm H2O):  [5 cm H2O] 5 cm H2O  Minute Ventilation (L/min):  [6.8 L/min] 6.8 L/min  PIP:  [28 cm H2O] 28 cm H2O  MAP (cm H2O):  [8] 8    KAE McclainT

## 2021-05-30 NOTE — PLAN OF CARE
Problem: Safety  Goal: Patient will be injury free during hospitalization  Outcome: Progressing     Problem: Daily Care  Goal: Daily care needs are met  Outcome: Progressing     Problem: Knowledge Deficit  Goal: Patient/family/caregiver demonstrates understanding of disease process, treatment plan, medications, and discharge instructions  Outcome: Progressing

## 2021-05-30 NOTE — SIGNIFICANT EVENT
Pt placed call light on and when RN walked into the room, she was unresponsive and shaking. She was breathing, but gasping for breaths. RN pressed staff assist button, pt bagged to assist with breaths. Intensivist intubated pt @0410 after 10mg Propofol pushed. Propofol drip started @20mg/hr. Stat head CT and chest x-ray ordered. RN paged neurosurgery and updated Dr. Youssef, who ordered a stat CTA in addition.

## 2021-05-30 NOTE — PLAN OF CARE
Problem: Pain  Goal: Patient's pain/discomfort is manageable  Outcome: Progressing     Problem: Knowledge Deficit  Goal: Patient/family/caregiver demonstrates understanding of disease process, treatment plan, medications, and discharge instructions  Outcome: Progressing       Pt's  speaks very little english, he speaks Hmong. He expressed this AM that he has not been kept well informed by providers and staff during the pt's stay. A Hmong  is scheduled for today @0930, Mr. Herrera is aware. More family at the bedside now.

## 2021-05-30 NOTE — PROGRESS NOTES
NEUROLOGY PROGRESS NOTE     Darion Herrera,  1975, MRN [id number] Date: 2019     [facility]   Code status:  Full Code   PCP: Monroe Cassidy MD, [phone]      ASSESSMENT & PLAN   Hospital Day#: 7  Diagnosis code: SAH    Aneurysmal SAH  MARIA L supraclinoid aneurysm s/p endovascular coil embolization  Vasospasm  Seizures      Initial head CT showed MARIA L giant aneurysm (sentinel headache)    S/p endovascular coil embolization    MRI showed small right sided infarcts - these could be related to the angiogram procedure.     TCD shows elevated velocities in RMCA    Angiogram today, 10 mg verapamil given     On H therapy; hydration and -180    Nimodipine X 21 days    Keppra 1000 mg two times a day - check level    Phenytoin 100 mg three times a day - check level    Can stop prolonged EEG tomorrow if no seizures clinically.    Consider switching from tramadol to other pain meds as this can lower the seizure threshold.    Continue TCD evaluation.     Monitor sodium. On 3% protocol.     Patient intermittently not moving right side - suspect related to sedation. Consider scan tomorrow if does not improve.     Thank you again for this referral, please feel free to contact me if you have any questions.     CHIEF COMPLAINT Aneurysmal subarachnoid hemorrhage (H)     HISTORY OF PRESENT ILLNESS     We have been requested by Dr. Kwan to evaluate Dairon Herrera who is a 43 y.o.  female for SAH.    This a 43-year-old female on whom neurology is been consulted to evaluate for subarachnoid hemorrhage.  Patient initially presented with 6 days ago with a headache.  She was trying to lift some fruits and felt something blowing up in her head.  She continued to have the headache.  She left the staff she was lifting on the table.  She was then brought to the emergency room where imaging showed a large frontal mass.  This was later on MRI identified to be a giant aneurysm.  Patient underwent angiogram and  endovascular coil embolization of the aneurysm.  The patient had a repeat angiogram.  MRI done today showed multiple foci of acute/subacute infarction in the right parietal-occipital region.  There is also small area of infarction in the right frontal lobe.  TCD's show vasospasm in the right MCA.  There was some narrowing of the M1 and M2 segments.  Neurology was consulted to further help with further cares.  Patient denies any ongoing symptoms he does complain of some headache.     7/29  Patient had 2 seizures over the weekend. Was started on fosphenytoin in addition to Keppra.   No seizures today.  Intubated, but waking up as the sedation has been withdrawn.      PAST MEDICAL & SURGICAL HISTORY     Medical History  Patient Active Problem List    Diagnosis Date Noted     Acute respiratory failure, unspecified whether with hypoxia or hypercapnia (H)      Cerebrovascular accident (CVA) due to occlusion of right middle cerebral artery (H)      Vasospasm (H)      S/P coil embolization of cerebral aneurysm 07/23/2019     Aneurysm of right internal carotid artery 07/23/2019     Carotid artery aneurysm (H) 07/22/2019     Nausea vomiting and diarrhea 07/22/2019     Aneurysmal subarachnoid hemorrhage (H) 07/22/2019     Intraventricular hemorrhage, nontraumatic (H) 07/22/2019     Compression of brain (H)      Leiomyoma of uterus 07/18/2016     Past Medical History: No previous history.    Surgical History  She  has a past surgical history that includes IR Cerebral Angiogram (7/22/2019); IR Cerebral Angiogram (7/24/2019); PICC Team Line Insertion (7/26/2019); IR Cerebral Angiogram (7/27/2019); and IR Lumbar Drain Insertion (7/27/2019).     SOCIAL HISTORY     Reviewed, and she  reports that she has never smoked. She has never used smokeless tobacco. She reports that she has current or past drug history. She reports that she does not drink alcohol.     FAMILY HISTORY     Reviewed, and non-contributory.      ALLERGIES      Allergies   Allergen Reactions     Oxycodone      Hydrocodone Swelling and Rash        REVIEW OF SYSTEMS     12 system ROS was done and was negative other than HPI - except patient feeling tired.     HOME & HOSPITAL MEDICATIONS     Prior to Admission Medications  Medications Prior to Admission   Medication Sig Dispense Refill Last Dose     ascorbic acid, vitamin C, 500 mg Chew chew tab Chew 500 mg daily.   Unknown at Unknown time       Hospital Medications    acetaminophen  650 mg Enteral Tube QID    Or     acetaminophen  650 mg Oral QID     [START ON 7/30/2019] atorvastatin  20 mg Enteral Tube DAILY     chlorhexidine  15 mL Topical Q12H     fosphenytoin (CEREBYX) IV maintenance  100 mg PE Intravenous Q8H     levETIRAcetam (KEPPRA) IVPB  1,000 mg Intravenous Q12H     niMODipine  60 mg Enteral Tube Q4H     omeprazole  20 mg Enteral Tube QAM AC    Or     pantoprazole  40 mg Intravenous QAM AC     potassium chloride liquid/packet  20 mEq Enteral Tube BID     senna (SENOKOT) syrup  8.8 mg Enteral Tube BID    Or     senna  8.6 mg Oral BID     sodium chloride  10-30 mL Intravenous Q8H FIXED TIMES     sodium chloride  2 g Oral BID    Or     sodium chloride  2 g Enteral Tube BID        PHYSICAL EXAM     Vital signs  Temp:  [98.7  F (37.1  C)-101  F (38.3  C)] 99.8  F (37.7  C)  Heart Rate:  [] 90  Resp:  [14-37] 16  BP: (108-147)/(62-85) 142/75  Arterial Line BP: (112-150)/(59-85) 142/77  FiO2 (%):  [30 %-100 %] 30 %    Weight:   155 lb (70.3 kg)    GENERAL: Healthy appearing, alert, no acute distress, normal habitus.  CARDIOVASCULAR: Ext warm and well perfused. Pulses present.   NEUROLOGICAL:  Patient is intubated. Opens eyes to voice. Follows commands intermittently. Pupils symmetric 4 mm and reactive. EOM partially intact. NLF symmetric. VFIT. Moves UE and 5/5 strength to formal testing. Does not move LE - but able to get some movement in LLE with painful stimulation. Tone symmetric.      DIAGNOSTIC STUDIES      Pertinent Radiology   MRI brain - images reviewed stroke seen  IMPRESSION:   CONCLUSION:  1.  Multiple foci of developing acute/subacute infarction throughout the right parietal occipital lobes, with additional single small foci of acute/subacute infarction in the right frontal lobe and left parietal lobe.  2.  Status post endovascular coiling of large dissecting aneurysm of the ventromedial right supraclinoid ICA with redemonstration of mild persistent flow-related enhancement along the neck of the aneurysm. There is also faint flow-related enhancement   centrally within the coil mass.  3.  Mildly decreased caliber of the M1 and proximal M2 segments of the right middle cerebral artery compared to the left, which may represent mild vasospasm. There is more normal caliber of the distal M2 and M3 branches.    ECHO    1.Left ventricle ejection fraction is normal. The calculated left ventricular ejection fraction is 65%.    2.TAPSE is normal, which is consistent with normal right ventricular systolic function.    3.No hemodynamically significant valvular heart abnormalities.    4.No obvious embolic source seen, if highly suspect consider MICHAEL.    5.No previous study for comparison.     TCD - images reviewed.   IMPRESSION:   CONCLUSION:  New moderate right middle cerebral artery vasospasm.    EEG negative for seizures.    Angiogram  Preliminary findings: (see dictation for full detail)  - Stable right ICA aneurysm coil mass with residual interstitial neck filling.  - Possible mild vasospasm superimposed on right ICA dissection, verapamil 10 mg infused in right ICA.    Recent Results (from the past 24 hour(s))   Sodium lab - every 6 hours    Collection Time: 07/28/19 11:57 PM   Result Value Ref Range    Sodium 146 (H) 136 - 145 mmol/L   Sodium lab - every 6 hours    Collection Time: 07/29/19  5:14 AM   Result Value Ref Range    Sodium 143 136 - 145 mmol/L   Potassium - Next AM    Collection Time: 07/29/19  5:14 AM    Result Value Ref Range    Potassium 3.5 3.5 - 5.0 mmol/L   Magnesium    Collection Time: 07/29/19  5:14 AM   Result Value Ref Range    Magnesium 1.9 1.8 - 2.6 mg/dL   HM2(CBC W/O DIFF)    Collection Time: 07/29/19  8:52 AM   Result Value Ref Range    WBC 17.8 (H) 4.0 - 11.0 thou/uL    RBC 3.68 (L) 3.80 - 5.40 mill/uL    Hemoglobin 10.7 (L) 12.0 - 16.0 g/dL    Hematocrit 32.4 (L) 35.0 - 47.0 %    MCV 88 80 - 100 fL    MCH 29.1 27.0 - 34.0 pg    MCHC 33.0 32.0 - 36.0 g/dL    RDW 13.0 11.0 - 14.5 %    Platelets 217 140 - 440 thou/uL    MPV 9.2 8.5 - 12.5 fL   Sodium lab - every 6 hours    Collection Time: 07/29/19 12:36 PM   Result Value Ref Range    Sodium 145 136 - 145 mmol/L   Sodium lab - every 6 hours    Collection Time: 07/29/19  6:16 PM   Result Value Ref Range    Sodium 146 (H) 136 - 145 mmol/L       Total time spent for face to face visit, reviewing labs/imaging studies, counseling and coordination of care was: Over 35 min More than 50% of this time was spent on counseling and coordination of care.  Answering families questions. Discussion of care with nursing staff.   Reviewing events over the weekend with family.     Justin Vasquez MD  Direct Secure Messaging: [email or web address]  Tel: [phone]  This note was dictated using voice recognition software.  Any grammatical or context distortions are unintentional and inherent to the software.

## 2021-05-30 NOTE — PROGRESS NOTES
"[facility]:  Pre Procedure Note ~     Imaging:  MR brain today ~  1.  The right suprasellar mass measuring up to 2.4 cm almost certainly represents a right supraclinoid internal carotid artery aneurysm. Recommend consultation with neurosurgery/neuro interventional radiology and conventional angiogram or CTA.  2.  Intraventricular hemorrhage and probable small amount of right parietal subarachnoid hemorrhage in the left sylvian fissure. No acute intracranial body. No finding to suggest subarachnoid hemorrhage. No hydrocephalus.    Exam:  /77   Pulse 71   Temp 98.4  F (36.9  C) (Oral)   Resp 21   Ht 4' 10\" (1.473 m)   Wt 155 lb 3.2 oz (70.4 kg)   LMP 07/01/2016   SpO2 98%   BMI 32.44 kg/m    Awake, oriented x 3. Speech is clear, soft voice. Moving all extremities. Nonfocal. Complains of headache, rated a \"4\".    Allergies:    Allergies   Allergen Reactions     Oxycodone      Hydrocodone Swelling and Rash     Labs: Hemoglobin 12.9, Platelet count 263, Creatinine 0.77    Impression/plan: This is a 43 year old female who presented with a 3 day history of headache, dizziness, abdominal pain with nausea and vomiting. MR imaging demonstrates IVH/SAH with possible right ICA aneurysm.  Dr. MARIBEL Snider has reviewed imaging.  We are planning for a diagnostic cerebral angiogram with possible endovascular coil embolization under general anesthesia today.  Darion is medically stable and appropriately NPO for procedure as planned. Pt is scheduled in IR with anesthesia at 1600.    The procedure benefits and risks were discussed with the patient and her daughters.  They express an understanding of the procedure and provide permission for us to proceed.  Informed consent obtained and signed by the patient and one of her daughter.     ~Pt understands and speaks English.  Daughters did interpret some of my discussion with the patient in [facility].  They wanted to make sure she understood the important " details.  Pt denies needing a professional ..    Yoli Reyes, CNP

## 2021-05-30 NOTE — PROGRESS NOTES
Hospitalist Progress Note    Overnight Events/Subjective:    -R ICA vasospasm on prelim TCD  -MRI with R parietal&frontal and L parietal CVA.  No weakness, dysarthria, numbness  No major complaints.  Reports unchanged mild R sided  Headache  No nausea, vomiting    Assessment/Plan    43F with SAH/IVH due to ruptured dissecting 2.3cm ventral supraclinoid R ICA aneurysm.  Underwent coil embolization 07/22.  Now with vasospasm and associated acute/subacute infarcts    #IVH/SAH due to ruptured 2.3cm R supraclinoid ICA aneurysm -  s/p coil embolization.    #R MCA vasospasm with associated infarcts   -TCDs daily  -Keppra and nimodipine  -IVF and I see levophed added if needed as well.  Consented for PICC in case needed.   -Asa 81, statin, ECHO  -avoid dehydration, monitor Na    #Hypokalemia - replacement protocol    Family ok interpreting today but requesting [facility] [place]or for patient for future interactions.  Notified [facility].      Objective    CONCLUSION:  1.  Multiple foci of developing acute/subacute infarction throughout the right parietal occipital lobes, with additional single small foci of acute/subacute infarction in the right frontal lobe and left parietal lobe.  2.  Status post endovascular coiling of large dissecting aneurysm of the ventromedial right supraclinoid ICA with redemonstration of mild persistent flow-related enhancement along the neck of the aneurysm. There is also faint flow-related enhancement   centrally within the coil mass.  3.  Mildly decreased caliber of the M1 and proximal M2 segments of the right middle cerebral artery compared to the left, which may represent mild vasospasm. There is more normal caliber of the distal M2 and M3 branches.    Vital signs in last 24 hours  Temp:  [97.7  F (36.5  C)-98.3  F (36.8  C)] 98.3  F (36.8  C)  Heart Rate:  [48-82] 65  Resp:  [13-37] 18  BP: (103-131)/(60-79) 118/77 97% O2 Device: None (Room air) O2 Flow Rate (L/min): 2 L/min  Weight:   154 lb 4.8 oz  (70 kg) Weight change:     Intake/Output last 3 shifts  I/O last 3 completed shifts:  In: 695 [P.O.:695]  Out: 2525 [Urine:2525]  Body mass index is 32.25 kg/m .    Physical Exam  General:  Alert, cooperative, no distress,  Appears stated age  Neurologic:  Oriented, fluent speech, facial symmetry preserved, spontaneous moving extremities  HEENT:  Anicteric, MMM  CV: RRR no MRG, normal S1 and S2, no edema  Lungs: CTAB.  Easy respirations  Abd: soft, NT, normoactive BS,  Skin: no rashes noted on exposed skin  Central Lines and Tubes: None (no blackmon, CVC, or PICC)       Labs: Labs reviewed and pertinent findings discussed in A/P  Medications: medication list reviewed.  Pertinent changes discussed in A/P    D/w family, Alpesh Stacy MD  Internal Medicine Hospitalist  7/26/2019

## 2021-05-30 NOTE — PROGRESS NOTES
"  Clinical Nutrition Therapy Nutrition Support Note        Subjective:  Pt intubated 7/27. Continuous EEG in process.  Pt with temp today of 102.9 degrees.  Family visiting, discussed plan to start slow rate of TF.   Trophic TF to start per MD.  Chart reviewed.    Nutrition Prescription:   Diet: NPO day 3   IV dextrose or Fluids:    lactated Ringers    niCARdipine    norepinephrine Last Rate: 0.3 mcg/kg/min (07/30/19 1016)   propofol Last Rate: 25 mcg/kg/min (07/30/19 0926)   sodium chloride 0.9% Last Rate: 100 mL/hr (07/30/19 0800)   sodium chloride 3 % Last Rate: 50 mL/hr (07/30/19 0800)       Nutrition Intake:  FT is an OG placed 7/27.  X-ray of 7/27 confirms gastric.  NPO day 3.  284 ml propofol in past 24 hrs.  This provides 312 calories/day.    Anthropometrics:  Height: 4' 10\" (147.3 cm)  Admission weight: 155#  Weight: 154 lb 12.8 oz (70.2 kg)    Physical Findings:  No wasting found     GI Status/Output:   GI symptoms per nursing:   Last BM recorded: n/a    Skin/Wound:   Clifford Scale Score: 11   Pt with lumbar drain    Medications:  IV-cerebyx, 3% saline, propofol, NaCl tabs  K replacement protocol  Medications reviewed.    Labs:  Na 148  Labs reviewed    Estimated Nutrition Needs:  Using ideal weight of 43 kg.    Energy Needs: 3664-2753 kcals daily per 25-30 kcal/kg   Protein Needs: 52+ g daily, 1.2+ g/kg.  Fluid Needs: 1100+ mls daily, 25+ mls/kg    Malnutrition: Not noted    Nutrition Risk Level: moderate risk    Nutrition DX:  Swallow difficulty r/t intubation evidenced by NPO    Goals: Start nutrition in 24-48 hrs-progressing.    Plan:  Start trophic TF:  Isosource 1.5 at 15 ml/hr, water flush 30 ml q 4 hrs.  To provide:  540 calories, 24 g protein, 63 g carb, 455 ml free water.  Propofol provides additional calories.   Nutrition needs not met with trophic feeding.      Monitor:  TF tolerance, wt, labs.    See Care Plan for Problems, Goals, and Interventions.        "

## 2021-05-30 NOTE — PROGRESS NOTES
Pharmacy Note - Admission Medication History    Pertinent Provider Information:    ______________________________________________________________________    Prior To Admission (PTA) med list completed and updated in EMR.       PTA Med List   Medication Sig Last Dose     ascorbic acid, vitamin C, 500 mg Chew chew tab Chew 500 mg daily. Unknown at Unknown time       Information source(s): Patient and Family member    Summary of Changes to PTA Med List  New: ascorbic acid  Discontinued: zyrtec, iron, senna S  Changed: none    Patient was asked about OTC/herbal products specifically.  PTA med list reflects this.    Based on the pharmacist s assessment, the PTA med list information appears reliable    Patient appears adherent: unknown    Allergies were reviewed, assessed, and updated with the patient.      Patient does not use any multi-dose medications prior to admission.    Thank you for the opportunity to participate in the care of this patient.    Mickie Lima, PharmD, BCPS, BCCCP  7/22/2019 11:50 AM

## 2021-05-30 NOTE — PROGRESS NOTES
At about 1945 pt was noted to be shaking and having periodic spasms that looked like posturing. Pupils and pain response were the same as previous assessments. VSS throughout, went up on Levophed for BP after nimodipine slightly dropped BP. Neuro surgery paged and notified. HEATHER Youssef requested neurology to be notified. Neuro notified and new seizure med ordered. Pt now not having this activity and appears the same as before. Will continue to monitor.

## 2021-05-30 NOTE — PLAN OF CARE
Care Plan  Care Management      Care Management Goals of the Day: Assessment and Offer DC Planning    Care Progression Reviewed With: Charge RN, MD, CAMDEN, CMSW    Barriers to Discharge: Plan for medical care will be determined after MRI    Discharge Disposition: Home, likely    Expected Discharge DateTBD      Transportation: TBD      Care Coordination Narrative: Writer met with pt. Family was present-including daughter, Corina. Pt requested a HCD and the phone nubmer for billing as she is concerned about her high deductible on her health plan. This information was brought back to pt and left with Corina as pt was sleeping. CM will follow for DC planning needs.

## 2021-05-30 NOTE — PLAN OF CARE
Problem: Mechanical Ventilation  Goal: Patient will maintain patent airway  Outcome: Progressing  Goal: Oral health is maintained or improved  Outcome: Progressing  Goal: ET tube will be managed safely  Outcome: Progressing     Pt intubated 7/27 for airway protection.  Vent Day 2.  VCV 16 375 +5 30%.  PIP 28 Plat 15 iP 0.  7.5 ETT, 21 at the teeth.  BS clear.  Suctioning a small amount tan ETT secretions.  No SBT this shift secondary to neuro status.  Transport to/from IR.  No complications.  Plan is full ventilator support.   Winnie Colbert, LRT, 7/28/2019

## 2021-05-30 NOTE — PROGRESS NOTES
RT note:  ETT pulled back 1 cm per MD Pink.  Now 21 @ the teeth.  Winnie Colbert, LRT, 7/27/2019

## 2021-05-30 NOTE — PLAN OF CARE
Problem: Pain  Goal: Patient's pain/discomfort is manageable  Outcome: Progressing     Problem: Safety  Goal: Patient will be injury free during hospitalization  Outcome: Progressing     Problem: Daily Care  Goal: Daily care needs are met  Outcome: Progressing     Problem: Psychosocial Needs  Goal: Demonstrates ability to cope with hospitalization/illness  Outcome: Progressing

## 2021-05-30 NOTE — PLAN OF CARE
Pt sent to IR for angio, poss verapamil/poss coil.   here to explain to .  Pt sedated minimal movement in legs, moves head to L arm pain and slight opens eyes to R arm pain, no movement in arms noted.  Sbp 120-150 with Levo, Dilaudid .2 for pain.  Draining 10-15 bloody fluid per lumbar drain.  Pupils equal reactive.

## 2021-05-30 NOTE — PLAN OF CARE
Care Plan  Care Management      Care Management Goals of the Day: chart review    Care Progression Reviewed With: Charge RN, MD, HUC, RNCM, CMSW    Barriers to Discharge:neuro ICU, aneurysm, coiling 7/22    Discharge Disposition:home    Expected Discharge Date:7/28    Transportation:family      Care Coordination Narrative:Home with family. [facility] for PTrecommended.  CM to follow up regarding HC when  available 7/27.

## 2021-05-30 NOTE — PROGRESS NOTES
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     TCD shows elevated velocities in RMCA    Angiogram today, for IA verapamil.     On [facility] therapy; hydration and -180    Nimodipine X 21 days    Keppra 1000 mg two times a day - Level therapeutic.     Phenytoin 130 mg three times a day - (increased due to low level, repeat level in 1-2 days)    Stop EEG today.     Consider switching from tramadol to other pain meds as this can lower the seizure threshold.    Continue TCD evaluation.     Monitor sodium. On 3% protocol.     Exam changes intermittently - due to sedation. Continue Neurochecks.    Thank you again for this referral, please feel free to contact me if you have any questions.     CHIEF COMPLAINT Aneurysmal subarachnoid hemorrhage (H)     HISTORY OF PRESENT ILLNESS     We have been requested by Dr. Kwan to evaluate Darion Herrera who is a 43 y.o.  female for SAH.    This a 43-year-old female on whom neurology is been consulted to evaluate for subarachnoid hemorrhage.  Patient initially presented with 6 days ago with a headache.  She was trying to lift some fruits and felt something blowing up in her head.  She continued to have the headache.  She left the staff she was lifting on the table.  She was then brought to the emergency room where imaging showed a large frontal mass.  This was later on MRI identified to be a giant aneurysm.  Patient underwent angiogram and endovascular coil embolization of the aneurysm.  The  patient had a repeat angiogram.  MRI done today showed multiple foci of acute/subacute infarction in the right parietal-occipital region.  There is also small area of infarction in the right frontal lobe.  TCD's show vasospasm in the right MCA.  There was some narrowing of the M1 and M2 segments.  Neurology was consulted to further help with further cares.  Patient denies any ongoing symptoms he does complain of some headache.     7/29  Patient had 2 seizures over the weekend. Was started on fosphenytoin in addition to Keppra.   No seizures today.  Intubated, but waking up as the sedation has been withdrawn.     7/30  No new seizures. Patient remains on sedation and intubated. RMCA velocities remain elevated on the TCD.      PAST MEDICAL & SURGICAL HISTORY     Medical History  Patient Active Problem List    Diagnosis Date Noted     Fever in other diseases      Acute respiratory failure, unspecified whether with hypoxia or hypercapnia (H)      Cerebrovascular accident (CVA) due to occlusion of right middle cerebral artery (H)      Vasospasm (H)      S/P coil embolization of cerebral aneurysm 07/23/2019     Aneurysm of right internal carotid artery 07/23/2019     Carotid artery aneurysm (H) 07/22/2019     Nausea vomiting and diarrhea 07/22/2019     Aneurysmal subarachnoid hemorrhage (H) 07/22/2019     Intraventricular hemorrhage, nontraumatic (H) 07/22/2019     Compression of brain (H)      Leiomyoma of uterus 07/18/2016     Past Medical History: No previous history.    Surgical History  She  has a past surgical history that includes IR Cerebral Angiogram (7/22/2019); IR Cerebral Angiogram (7/24/2019); PICC Team Line Insertion (7/26/2019); IR Cerebral Angiogram (7/27/2019); IR Lumbar Drain Insertion (7/27/2019); and IR Cerebral Angiogram (7/29/2019).     SOCIAL HISTORY     Reviewed, and she  reports that she has never smoked. She has never used smokeless tobacco. She reports that she has current or past drug history.  She reports that she does not drink alcohol.     FAMILY HISTORY     Reviewed, and non-contributory.      ALLERGIES     Allergies   Allergen Reactions     Oxycodone      Hydrocodone Swelling and Rash        REVIEW OF SYSTEMS     12 system ROS was done and was negative other than HPI - except patient feeling tired.     HOME & HOSPITAL MEDICATIONS     Prior to Admission Medications  Medications Prior to Admission   Medication Sig Dispense Refill Last Dose     ascorbic acid, vitamin C, 500 mg Chew chew tab Chew 500 mg daily.   Unknown at Unknown time       Hospital Medications    acetaminophen  650 mg Enteral Tube QID    Or     acetaminophen  650 mg Oral QID     albumin human  12.5 g Intravenous Once     atorvastatin  20 mg Enteral Tube DAILY     chlorhexidine  15 mL Topical Q12H     fosphenytoin (CEREBYX) IV maintenance  100 mg PE Intravenous Q8H     levETIRAcetam (KEPPRA) IVPB  1,000 mg Intravenous Q12H     niMODipine  60 mg Enteral Tube Q4H     omeprazole  20 mg Enteral Tube QAM AC    Or     pantoprazole  40 mg Intravenous QAM AC     potassium chloride liquid/packet  20 mEq Enteral Tube BID     senna (SENOKOT) syrup  8.8 mg Enteral Tube BID    Or     senna  8.6 mg Oral BID     sodium chloride  10-30 mL Intravenous Q8H FIXED TIMES     sodium chloride  2 g Oral BID    Or     sodium chloride  2 g Enteral Tube BID        PHYSICAL EXAM     Vital signs  Temp:  [98.6  F (37  C)-102.9  F (39.4  C)] 99.1  F (37.3  C)  Heart Rate:  [] 79  Resp:  [8-27] 18  BP: (108-156)/(56-87) 133/82  FiO2 (%):  [30 %] 30 %    Weight:   154 lb 12.8 oz (70.2 kg)    GENERAL: Healthy appearing, alert, no acute distress, normal habitus.  CARDIOVASCULAR: Ext warm and well perfused. Pulses present.   NEUROLOGICAL:  Patient is intubated. Opens eyes to voice. Follows commands intermittently. Pupils symmetric 4 mm and reactive. EOM partially intact. NLF symmetric. VFIT. Moves all 4 extremities at different times of the days. Exams are  inconsistent due to use of sedation. Tone symmetric.      DIAGNOSTIC STUDIES     Pertinent Radiology   MRI brain - images reviewed stroke seen  IMPRESSION:   CONCLUSION:  1.  Multiple foci of developing acute/subacute infarction throughout the right parietal occipital lobes, with additional single small foci of acute/subacute infarction in the right frontal lobe and left parietal lobe.  2.  Status post endovascular coiling of large dissecting aneurysm of the ventromedial right supraclinoid ICA with redemonstration of mild persistent flow-related enhancement along the neck of the aneurysm. There is also faint flow-related enhancement   centrally within the coil mass.  3.  Mildly decreased caliber of the M1 and proximal M2 segments of the right middle cerebral artery compared to the left, which may represent mild vasospasm. There is more normal caliber of the distal M2 and M3 branches.    ECHO    1.Left ventricle ejection fraction is normal. The calculated left ventricular ejection fraction is 65%.    2.TAPSE is normal, which is consistent with normal right ventricular systolic function.    3.No hemodynamically significant valvular heart abnormalities.    4.No obvious embolic source seen, if highly suspect consider MICHAEL.    5.No previous study for comparison.     TCD - images reviewed.   IMPRESSION:   CONCLUSION:  New moderate right middle cerebral artery vasospasm.    EEG negative for seizures.    Angiogram  Preliminary findings: (see dictation for full detail)  - Stable right ICA aneurysm coil mass with residual interstitial neck filling.  - Possible mild vasospasm superimposed on right ICA dissection, verapamil 10 mg infused in right ICA.    TCD reviewed and velocities are elevated in the RMCA.    Recent Results (from the past 24 hour(s))   Sodium lab - every 6 hours    Collection Time: 07/29/19  6:16 PM   Result Value Ref Range    Sodium 146 (H) 136 - 145 mmol/L   Sodium lab - every 6 hours    Collection Time:  07/30/19 12:35 AM   Result Value Ref Range    Sodium 146 (H) 136 - 145 mmol/L   Potassium    Collection Time: 07/30/19 12:35 AM   Result Value Ref Range    Potassium 3.3 (L) 3.5 - 5.0 mmol/L   Magnesium    Collection Time: 07/30/19  5:04 AM   Result Value Ref Range    Magnesium 1.8 1.8 - 2.6 mg/dL   Basic Metabolic Panel    Collection Time: 07/30/19  5:04 AM   Result Value Ref Range    Sodium 148 (H) 136 - 145 mmol/L    Potassium 3.6 3.5 - 5.0 mmol/L    Chloride 122 (H) 98 - 107 mmol/L    CO2 19 (L) 22 - 31 mmol/L    Anion Gap, Calculation 7 5 - 18 mmol/L    Glucose 131 (H) 70 - 125 mg/dL    Calcium 7.7 (L) 8.5 - 10.5 mg/dL    BUN 3 (L) 8 - 22 mg/dL    Creatinine 0.57 (L) 0.60 - 1.10 mg/dL    GFR MDRD Af Amer >60 >60 mL/min/1.73m2    GFR MDRD Non Af Amer >60 >60 mL/min/1.73m2   Levetiracetam [Keppra ]    Collection Time: 07/30/19  5:04 AM   Result Value Ref Range    Levetiracetam 8.8 6.0 - 46.0 ug/mL   Phenytoin (Dilantin )    Collection Time: 07/30/19  5:04 AM   Result Value Ref Range    Phenytoin 9.4 (L) 10.0 - 20.0 ug/mL   Phenytoin, Free (Dilantin , Free)    Collection Time: 07/30/19  5:04 AM   Result Value Ref Range    Phenytoin, Free 1.0 1.0 - 2.0 ug/mL   HM1 (CBC with Diff)    Collection Time: 07/30/19  9:54 AM   Result Value Ref Range    WBC 17.1 (H) 4.0 - 11.0 thou/uL    RBC 3.37 (L) 3.80 - 5.40 mill/uL    Hemoglobin 9.7 (L) 12.0 - 16.0 g/dL    Hematocrit 30.3 (L) 35.0 - 47.0 %    MCV 90 80 - 100 fL    MCH 28.8 27.0 - 34.0 pg    MCHC 32.0 32.0 - 36.0 g/dL    RDW 13.2 11.0 - 14.5 %    Platelets 220 140 - 440 thou/uL    MPV 9.5 8.5 - 12.5 fL    Neutrophils % 88 (H) 50 - 70 %    Lymphocytes % 7 (L) 20 - 40 %    Monocytes % 4 2 - 10 %    Eosinophils % 1 0 - 6 %    Basophils % 0 0 - 2 %    Neutrophils Absolute 14.9 (H) 2.0 - 7.7 thou/uL    Lymphocytes Absolute 1.2 0.8 - 4.4 thou/uL    Monocytes Absolute 0.6 0.0 - 0.9 thou/uL    Eosinophils Absolute 0.2 0.0 - 0.4 thou/uL    Basophils Absolute 0.0 0.0 - 0.2  thou/uL   Pregnancy, urine    Collection Time: 07/30/19 10:05 AM   Result Value Ref Range    Pregnancy Test, Urine Negative Negative   Urinalysis-UC if Indicated    Collection Time: 07/30/19 10:05 AM   Result Value Ref Range    Color, UA Straw Colorless, Yellow, Straw, Light Yellow    Clarity, UA Slightly Cloudy (!) Clear    Glucose, UA 50 mg/dL (!) Negative    Bilirubin, UA Negative Negative    Ketones, UA Negative Negative    Specific Gravity, UA 1.015 1.001 - 1.030    Blood, UA Negative Negative    pH, UA 5.0 4.5 - 8.0    Protein, UA Negative Negative mg/dL    Urobilinogen, UA <2.0 E.U./dL <2.0 E.U./dL, 2.0 E.U./dL    Nitrite, UA Negative Negative    Leukocytes, UA Moderate (!) Negative    Bacteria, UA Many (!) None Seen hpf    RBC, UA 0-2 None Seen, 0-2 hpf    WBC, UA 5-10 (!) None Seen, 0-5 hpf    Squam Epithel, UA 0-5 None Seen, 0-5 lpf    Mucus, UA Few (!) None Seen lpf   Sodium lab - every 6 hours    Collection Time: 07/30/19 12:21 PM   Result Value Ref Range    Sodium 148 (H) 136 - 145 mmol/L   Potassium    Collection Time: 07/30/19 12:21 PM   Result Value Ref Range    Potassium 3.8 3.5 - 5.0 mmol/L       Total time spent for face to face visit, reviewing labs/imaging studies, counseling and coordination of care was: Over 25 min More than 50% of this time was spent on counseling and coordination of care.  Discussing cares with nursing staff. Getting EEG off.     Justin Vasquez MD  [employer] Messaging: [email or web address]  Tel: [phone]  This note was dictated using voice recognition software.  Any grammatical or context distortions are unintentional and inherent to the software.

## 2021-05-30 NOTE — PROGRESS NOTES
NEUROSURGERY PROGRESS NOTE:    NEUROSURGERY ATTENDING: The patient's clinical examination, laboratory data,and the plan was reviewed and made collaboratively with Dr Sal.     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 Coiled 7/22/2019 but not completely occluded, some filling persisted. MRI yesterday concerning for vasospasm and new scattered small infarcts. Held off on angio as patient was asymptomatic without new focal deficits and velocity 160s on TCD. Overnight with seizure and decreased LOC. CT concerning for rebleed and indicative of hydrocephalus and increased ICP. Patient in IR most of the day, more coils inserted. Sheath left in place with plans to repeat angio tomorrow. Lumbar drain placed due to concern of ventric placement with aspirin on board.     PLAN:     Aneurysmal subarachnoid hemorrhage (H)     Intraventricular hemorrhage, nontraumatic (H)     Compression of brain (H)     S/P coil embolization of cerebral aneurysm     Aneurysm of right internal carotid artery  IV fluids 100/hr - goal to have Euvolemic or slightly hypervolemic  Strict intake and output  23 % given this morning, now on full 3% with goal Na 150-155 per Dr. Juhi Kimbrough   -150  Albumin ordered prn to support BP - please exhaust prior to starting Levophed  No oral antihypertensives for now  SCD  TCD through day 7  Nimodipine 21 days DO NOT HOLD. Now has OG  HOB 30 degrees  Keppra X 30 days without seizure. Indefinite with seizure  Would not advocate for deep sedation but would avoid agitation and bucking the vent          Discharge Recommendations/Plan:  Barriers to Discharge: yes, Neuro critical care management     Follow Up Plan: pending hospital course       HPI: Darion Herrera is a 43 year old female with IVH/SAH secondary to ruptured dissecting ventral medial supraclinoid  right internal carotid artery aneurysm. Headache started 3 days prior to presentation. Aneurysm was coiled 7/22/2019. MRA done 7/26/2019 with multiple foci of acute/subacute infarction throughout the right parietal occipital lobes and small area in the right frontal lobe and left parietal. There is mild persistent flow along the neck of the aneurysm and faint flow centrally along the coil mass. Mild M1 and M2 RMCA narrowing suggestive of vasospasm.    SUBJECTIVE:  Unable to participate in ROS    OBJECTIVE:  Vital signs in last 24 hours  Temp:  [97.7  F (36.5  C)-100.5  F (38.1  C)] 100.5  F (38.1  C)  Heart Rate:  [] 70  Resp:  [14-32] 16  BP: ()/(56-91) 116/56  Arterial Line BP: (119-152)/(61-76) 130/66  FiO2 (%):  [40 %-100 %] 40 %  Body mass index is 33.42 kg/m .    Mental Status: opens eyes to stim. Attempts to wiggle toes. Chews on ETT  Cranial Nerves: Pupils equal, react. Face appears midline. + corneals +cough +gag  Motor Strength:  Moving all extrems, withdraws from noxious stim    Last 3 TCD Values       7/25/2019 7/26/2019 7/27/2019   LMCA    78 93 74   LACA    70 98 78   RMCA    71 167 230   RACA    81 88 60   BA    42 42 41         Pertinent Labs   Lab Results:   Lab Results   Component Value Date     07/27/2019    K 3.2 (L) 07/27/2019     07/27/2019    CO2 19 (L) 07/27/2019    BUN 9 07/27/2019    CREATININE 0.61 07/27/2019    CALCIUM 7.1 (L) 07/27/2019     Lab Results   Component Value Date    WBC 13.3 (H) 07/24/2019    HGB 11.2 (L) 07/24/2019    HCT 34.4 (L) 07/24/2019    MCV 89 07/24/2019     07/24/2019       Pertinent Radiology   Radiology Results: Personally reviewed image/s  Katherin Youssef -[facility]  [address], [address]  [place] [address]    O: [phone]  P: [phone]

## 2021-05-30 NOTE — PLAN OF CARE
No changes to neuro assessment over night. VSS. No seizure like activity noted. Patient remains sedated, on levo for BP management, and on 3% per protocol. Patient down to head CT this am with no issues.       Problem: Pain  Goal: Patient's pain/discomfort is manageable  Outcome: Progressing     Problem: Safety  Goal: Patient will be injury free during hospitalization  Outcome: Progressing     Problem: Daily Care  Goal: Daily care needs are met  Outcome: Progressing     Problem: Knowledge Deficit  Goal: Patient/family/caregiver demonstrates understanding of disease process, treatment plan, medications, and discharge instructions  Outcome: Progressing     Problem: Potential for Falls  Goal: Patient will remain free of falls  Outcome: Progressing     Problem: Potential for Compromised Skin Integrity  Goal: Skin integrity is maintained or improved  Outcome: Progressing     Problem: Neurological Deficit  Goal: Neurological status is stable or improving  Outcome: Progressing     Problem: Risk of Injury Due to Unsafe Behavior  Goal: Patient will remain safe while in restraint; physical/psychological needs will be met  Outcome: Progressing  Goal: Alternatives to restraint will be continually assessed with use of least restrictive device and discontinuation as soon as possible  Outcome: Progressing  Goal: Patient/Family will be able to communicate reason for restraint and steps for restraint application and removal  Outcome: Progressing     Problem: Mechanical Ventilation  Goal: Patient will maintain patent airway  Outcome: Progressing  Goal: Oral health is maintained or improved  Outcome: Progressing  Goal: ET tube will be managed safely  Outcome: Progressing

## 2021-05-30 NOTE — PLAN OF CARE
NEUROSURGERY ON-CALL NP NOTE:     Called by  [facility] emergency room      Reported HPI: Darion Herrera   is a  43 y.o. 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       Patient is anticoagulated No    VITAL SIGNS: Temp:  [97.7  F (36.5  C)-99  F (37.2  C)] 98.7  F (37.1  C)  Heart Rate:  [] 87  Resp:  [6-25] 20  BP: ()/(53-76) 110/64    REPORTED EXAM: Intact    Neurosurgery Recommendations:    1. Admit to: Neurotelemetry   Vital Sign q 2 Neuro Checks q 2  2. BP Goal: <140  HOB 30  3. Patient is candidate for 3 hour Pathway :N/A   4. Labs:PTT, platelet function, CBC and BMP  5. Correct Coagulopathy:  6. Steroids: No  7. 3% protocol: No  8. Antiepileptic: No  9. Repeat Imaging:MRIw/wo in am   10. Consult: NeurologyNo    Discussion: Incidental finding of right sphenoid meningioma. Plan for MRI plan to follow         Attending Surgeon: Dr Sal

## 2021-05-30 NOTE — SIGNIFICANT EVENT
See H&P from early this AM  Looks like internal carotid aneurysm with some SAH  Neurosurgery/IR is involved and plan per consultants   Met with family briefly this AM post MRI and before angiograms  Hospitalist service will follow

## 2021-05-30 NOTE — PROGRESS NOTES
NEUROSURGERY PROGRESS NOTE:    NEUROSURGERY ATTENDING: The patient's clinical examination, laboratory data,and the plan was reviewed and made collaboratively with Dr Sal.     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 Coiled in IR 7/22/2019. Stable repeat angio 7/24/2019. MRA done today with multiple foci of acute/subacute infarction throughout the right parietal occipital lobes and small area in the right frontal lobe and left parietal. There is mild persistent flow along the neck of the aneurysm and faint flow centrally along the coil mass. Mild M1 and M2 RMCA narrowing suggestive of vasospasm. TCD today with RMCA velocity measuring 167, 71 yesterday. Discussed with IR. Will hold off on angio with verapamil for now and focus on triple H therapy. Fluid bolus ordered. I/O are not accurate. Discussed importance with nursing of this being accurate. Sodium 136. SBP goals 120-180. IR agreeable with SBP goals. Patient's exam remains the same. No deficit. Denies visual disturbance. Has mild constant right side head, face, neck pain. Photophobia.     PLAN:     Aneurysmal subarachnoid hemorrhage (H)     Intraventricular hemorrhage, nontraumatic (H)     Compression of brain (H)     S/P coil embolization of cerebral aneurysm     Aneurysm of right internal carotid artery  Fluid bolus for vasospasm  IV fluids 100/hr - goal to have Euvolemic or slightly hypervolemic  Strict intake and output  Monitor sodium - goal to have a little higher  -180  Albumin ordered prn to support BP - please exhaust prior to starting Levophed  PICC line  No oral antihypertensives for now  SCD  TCD through day 7  Nimodipine 21 days DO NOT HOLD  HOB 30 degrees  Keppra X 30 days without seizure. 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 Headache started 3 days prior to presentation. Aneurysm was coiled 7/22/2019. MRA done 7/26/2019 with multiple foci of acute/subacute infarction throughout the right parietal occipital lobes and small area in the right frontal lobe and left parietal. There is mild persistent flow along the neck of the aneurysm and faint flow centrally along the coil mass. Mild M1 and M2 RMCA narrowing suggestive of vasospasm.    SUBJECTIVE:  Complains of right side head, face, neck pain. Feels like it gets better for a short period of time with tramadol/tylenol but the pain relief is short lived. Will consider adding Dilaudid, is allergic to oxycodone and hydrocodone. Denies visual disturbance. Previously reported right eye blurry.     OBJECTIVE:  Vital signs in last 24 hours  Temp:  [97.7  F (36.5  C)-98.3  F (36.8  C)] 98.3  F (36.8  C)  Heart Rate:  [48-82] 62  Resp:  [13-37] 20  BP: (102-127)/(60-80) 113/74  Body mass index is 32.25 kg/m .    Mental Status: alert, oriented. GONSALEZ. Speech is clear.   Cranial Nerves:  CN2; no funduscopic exam performed. CN3,4&6; pupillary light response, lateral and vertical gaze normal.  No nystagmus.  Visual fields are full to confrontation. CN5; double simultaneous stimulation intact. CN7: smile, facial symmetry intact. CN8: hearing intact to voice. CN9&10; gag reflex, uvula midline, palate rises with phonation. CN11; shoulder shrug 5/5 intact bilaterally. CN12; tongue midline and moves freely from side to side.  Motor Strength:  Biceps 5/5 right, 5/5 left   Triceps 5/5 right, 5/5 left   Deltoids 5/5 right, 5/5 left  Hand Grasp 5/5 right, 5/5 left  Hip flexors 5/5 right,  5/5 left   Quadriceps: 5/5 right, 5/5 left   Ankle dorsiflexion: 5/5 right, 5/5 left   Extensor hallicus longus: 5/5 right, 5/5 left   Ankle plantar flexion : 5/5 right, 5/5 left     Sensory: intact to soft touch    Pertinent Labs   Lab Results:   Lab Results   Component Value Date     07/26/2019    K 3.9 07/26/2019     07/26/2019    CO2 23 07/26/2019    BUN 15 07/26/2019    CREATININE 0.73 07/26/2019    CALCIUM 9.1 07/26/2019     Lab Results   Component Value Date    WBC 13.3 (H) 07/24/2019    HGB 11.2 (L) 07/24/2019    HCT 34.4 (L) 07/24/2019    MCV 89 07/24/2019     07/24/2019       Pertinent Radiology   Radiology Results: Personally reviewed image/s    FINDINGS:  ANTERIOR CIRCULATION: No significant stenosis or occlusion. There is persistent flow-related signal within the right middle cerebral artery and its branches, though mildly decreased caliber of the M1 and proximal M2 segments compared to the left. More   normal caliber of the distal right M2 and M3 branches which appear symmetric to those on the left. Small A1 segment of the right anterior cerebral artery again noted.     POSTERIOR CIRCULATION: No significant stenosis or occlusion. Balanced vertebral arteries supply a normal basilar artery.      ANEURYSM/VASCULAR MALFORMATION: Redemonstrated are changes of endovascular coiling of large dissecting aneurysm of the ventromedial right supraclinoid ICA. As noted on recent cerebral angiogram, there is mild persistent flow-related enhancement along the   neck of the aneurysm (series 7, images #[id number]; key saved image). There is also faint flow-related enhancement centrally within the coil mass.     MISCELLANEOUS: There are multiple small foci of restricted diffusion throughout the right parietal and occipital lobes, with the largest focus measuring 3 x 4 mm in the right parietal lobe. There is also a single punctate focus of restricted diffusion in   the right frontal lobe and a single 2 mm  focus of restricted diffusion in the left parietal lobe.     CONCLUSION:  1.  Multiple foci of developing acute/subacute infarction throughout the right parietal occipital lobes, with additional single small foci of acute/subacute infarction in the right frontal lobe and left parietal lobe.  2.  Status post endovascular coiling of large dissecting aneurysm of the ventromedial right supraclinoid ICA with redemonstration of mild persistent flow-related enhancement along the neck of the aneurysm. There is also faint flow-related enhancement   centrally within the coil mass.  3.  Mildly decreased caliber of the M1 and proximal M2 segments of the right middle cerebral artery compared to the left, which may represent mild vasospasm. There is more normal caliber of the distal M2 and M3 branches.    MANDY [facility], [facility]1983 Neurosurgery  O: [phone]

## 2021-05-30 NOTE — PROCEDURES
[facility] Radiology Post Procedure    Procedure: Intra-arterial pharmacotherapy for cerebral vasospasm    Radiologist: Souleymane Monterroso    Contrast: 45 mL Omnipaque 300    Fluoro time: 4.9 minutes    Fluoro dose: 1003 mGy    Medications:   Verapamil 40 mg IA    EBL: Minimal    Complications: None evident    Preliminary findings: (see dictation for full detail)  - Progressed right anterior circulation vasospasm.  - Verapamil administered IA in following locations/amounts: R-ICA (20 mg), R-VA (10 mg), L-ICA (10 mg).    Assess/Plan:   Sheath sutured in, follow-up am TCDs.  Report to ordering provider.    Souleymane Monterroso

## 2021-05-30 NOTE — PLAN OF CARE
Problem: Physical Therapy  Goal: PT Goals  Description  Patient will demonstrate the following by 7/31/19, in order to maximize independence with functional mobility to facilitate safe discharge:   -Supine<>sit with head of bed flat, no rail, mod I  -Sit<>stand with LR assistive device, mod I  -Ambulate 400 feet with LR assistive device, mod I   -Negotiate 13 stairs with unilatearl rails, mod I, in order to access home.    Goals entered on 7/26/2019 by Nadya Tse, PT      Outcome: Change in Patient Condition     Physical Therapy Discharge Summary    Date of PT Discharge: 7/28/2019   Recommended Equipment: N/A  Discharge Destination: Remains in hospital  Discharge Comments: Pt with declining medical status not currently appropriate for therapies - will DC and await re-order once pt medically stable for therapy.     Please note that if goals are not fully met the patient is making progress towards established goals, which is documented in flowsheet notes. If further therapy is recommended it is related to documented deficits, and is necessary to maximize functional independence in order for patient to return to previous level of function.      7/28/2019 by Brittany L Dressler, PT

## 2021-05-30 NOTE — PROGRESS NOTES
Brief intensivist update  2019    Briefly, 43-year-old woman with recent SAH and IVH secondary to R-ICA ruptured aneurysm status post coiling on .  Patient had an MRI on  to evaluate for coiling complications, and was noted to have a vasospasm.  She was started on empiric hypertension/hypervolemia/hemodilution management regimen.  At approximately 0400 on  patient became suddenly unresponsive with concern for seizure activity and was intubated for airway protection.  STAT CTA demonstrated increased SAH, IVH, with hydrocephalus with concern for increased ICP.  At approximately 0820 patient had another likely seizure activity, for which she received 2 mg lorazepam.    Patient received hypertonic saline and underwent a cerebral angiogram with lumbar drain placement.    AB.40/30 4/245/--on the CV at 16/375/5/0.60 demonstrating adequate ventilation and oxygenation.    She is on norepinephrine to help maintain adequate intracranial blood flow.    Discussed with neurosurgery.  I updated patient's  and family to the best of my ability with an assistance from a professional [employer] .    Ludmila Pink MD  Pulmonary and Critical Care

## 2021-05-30 NOTE — PLAN OF CARE
Care Management Goals of the Day: Follow progression of care      Care Progression Reviewed With: Charge RN, MD, HUC, RNCM     Barriers to Discharge: Intubated    Discharge Disposition: TBD     Expected Discharge Date: TBD     Transportation: family if able    Care Coordination Narrative: Discharge planning pending progression of care.    CM to follow.

## 2021-05-30 NOTE — PLAN OF CARE
Problem: Pain  Goal: Patient's pain/discomfort is manageable  Outcome: Progressing     Problem: Knowledge Deficit  Goal: Patient/family/caregiver demonstrates understanding of disease process, treatment plan, medications, and discharge instructions  Outcome: Progressing     Problem: Neurological Deficit  Goal: Neurological status is stable or improving  Outcome: Progressing       Pt is alert and oriented, moves with a standby assist. NIH=0. VSS. Tylenol and tramadol overnight for neck pain rating 3/10. Heart rhythm is normal sinus and sinus rebeca down to 48 during the night. Continue to monitor.

## 2021-05-30 NOTE — PROGRESS NOTES
Physical Therapy     07/26/19 1102   Visit Specifics   Eval Type Initial eval   Inital PT Consult 07/26/19   Bed/Tabs/Pad Alarm Applied Other (Comment)  (call light in hand)   Subjective Patient Comments pt fatigued, wanting to rest   General   Onset date 07/22/19   Additional Pertinent History admitted due to subarachnoid hemorrage (brain mass). hx. hysterectomy secondary to leiomyoma   Chart Reviewed Yes   PT/OT Patient/Caregiver Stated Goals none stated   Family/Caregiver Present Yes   Treatment Time   Gait Training 10   Precautions   Weight Bearing Status wbat   General Precautions Other (Comment)  (HOB >30 degrees)   Home Living   Additional Comments see OT eval   Prior Status   Comments see OT eval   Cognition   Overall Cognitive Status WFL   RLE Assessment   RLE Assessment WFL;Strength   Strength RLE   R Knee Extension 4+/5   R Ankle Dorsiflexion 4+/5   LLE Assessment   LLE Assessment WFL;Strength   Strength LLE   L Knee Extension 4/5   L Ankle Dorsiflexion 4/5   Bed Mobility   Supine to Sit Stand by assistance   Bed Mobility Comments pt slow to move, reporting headache in occipital region   Transfer    Sit To Stand CGA   Stand To Sit CGA    Additional Transfer Toilet   Toilet CGA   Transfer Comments slow to rise, use of fww, cues for safe hand placement. assistance with multiple line management   Ambulation    Weight Bearing Status wbat   Distance (ft)  25'   Assistance Close supervision;CGA   Assistive Device Rolling walker   Verbal Cues Safety;Device advancement   Quality of Gait/Comment cues for safe walker placement during turns and prior to sitting, cues for safe hand placement. pt reports she is very fatigued and would typically be walking at a faster pace   Pattern Decreased step length;Step through;Decreased pace;Flexed posture;Other (Comment)  (shuffling pattern)   Endurance Deficit   Endurance Deficit Yes   Endurance Deficit Description cardiovascular and neuromuscular   Balance   Sitting Balance  Independent   Standing Balance Standby   Fall Risk   Fall Risk Medium   Plan   Treatment/Interventions Functional transfer training;Gait/stair training;Home exercise program;Neuromuscular re-ed;Strengthening/ROM   PT Frequency Daily   Assessment   Prognosis Good   Problem List Decreased strength;Decreased endurance;Impaired balance;Decreased mobility;Decreased coordination   Barriers to Discharge Inaccessible home environment   Barriers to Discharge Comments pt in split level home with 5 LUKAS- has low activity tolerance and has not yet performed steps   Recommendation   PT Discharge Recommendation Home with assist;Home PT   PT Equipment Recommendation Rolling walker / FWW   Treatment Suggestions for Next Session progress activity tolerance, stairs   PT Summary pt initially resistant to participation, however with encouragement from staff willing to complete in room mobility   PT Care Plan REVIEWED DAILY Yes, goals remain appropriate   Nadya Tse, PT  07/26/19

## 2021-05-30 NOTE — PLAN OF CARE
Problem: Safety  Goal: Patient will be injury free during hospitalization  Outcome: Progressing     Problem: Daily Care  Goal: Daily care needs are met  Outcome: Progressing     Problem: Potential for Falls  Goal: Patient will remain free of falls  Outcome: Progressing     Problem: Neurological Deficit  Goal: Neurological status is stable or improving  Outcome: Progressing     Patient remained safe and free from falls. Neuro exam stable through shift. Propofol restarted to meet RASS goal.

## 2021-05-30 NOTE — ANESTHESIA PREPROCEDURE EVALUATION
Anesthesia Evaluation      Patient summary reviewed     Airway    Pulmonary - normal exam                          Cardiovascular - normal exam  ECG reviewed        Neuro/Psych    (+) CVA ,     Endo/Other       GI/Hepatic/Renal            Dental                         Anesthesia Plan  Planned anesthetic: general endotracheal    ASA 4 - emergent     Anesthetic plan and risks discussed with: patient  Anesthesia plan special considerations: antiemetics,   Post-op plan: extended intubation/vent support

## 2021-05-30 NOTE — PROCEDURES
[place] [facility] Post Procedure    Procedure: cerebral angiogram    Radiologist: Ric Snider    Contrast: 10 ml omnipaque  Fluoro Time: 1.1 minutes  Air Kerma: 330 mGy    Medications: Versed .5 mg IV                        Fentanyl 25 mcg IV  Sedation Time: 15 minutes    EBL: minimal  Complications: none    Preliminary findings: (see dictation for full detail)  Status post coil embolization of large dissecting aneurysm of the ventral medial supraclinoid right internal carotid artery. Significant interval interstitial thrombosis. Stable appearance.    Assess/Plan:   ICU care for [facility]  Bedrest for 4 hours.  These aneurysms are notoriously unstable. Will plan for follow-up MRA in 2 days to again document stability.    Ric Snider M.D.

## 2021-05-30 NOTE — PROGRESS NOTES
Hospitalist Progress Note    Overnight Events/Subjective:    No major complaints.  Mild headache rated 2-3/10 which improves with tramadol.  No nausea, vomiting    Assessment/Plan    43F with SAH/IVH due to ruptured dissecting 2.3cm ventral supraclinoid R ICA aneurysm.  Underwent coil embolization 07/22.      #IVH/SAH due to ruptured 2.3cm R supraclinoid ICA aneurysm -  s/p coil embolization  -MRA tomorrow  -TCDs daily  -Keppra and nimodipine  -Asa 81  -SBP < 120, has nicardipine PRN if needed  -avoid dehydration, monitor Na    #Hypokalemia - replacement protocol      Objective    Vital signs in last 24 hours  Temp:  [98.1  F (36.7  C)-99.6  F (37.6  C)] 98.4  F (36.9  C)  Heart Rate:  [55-89] 74  Resp:  [9-36] 26  BP: ()/(55-80) 120/80 99% O2 Device: Nasal cannula O2 Flow Rate (L/min): 2 L/min  Weight:   154 lb 4.8 oz (70 kg) Weight change: 4 lb 3.2 oz (1.905 kg)    Intake/Output last 3 shifts  I/O last 3 completed shifts:  In: 100 [P.O.:100]  Out: 1500 [Urine:1500]  Body mass index is 32.25 kg/m .    Physical Exam  General:  Alert, cooperative, no distress,  Appears stated age  Neurologic:  Oriented, fluent speech, facial symmetry preserved, spontaneous moving extremities  HEENT:  Anicteric, MMM  CV: RRR no MRG, normal S1 and S2, no edema  Lungs: CTAB.  Easy respirations  Abd: soft, NT, normoactive BS,  Skin: no rashes noted on exposed skin  Central Lines and Tubes: None (no blackmon, CVC, or PICC)       Labs: Labs reviewed and pertinent findings discussed in A/P  Medications: medication list reviewed.  Pertinent changes discussed in A/P      Dell Stacy MD  Internal Medicine Hospitalist  7/25/2019

## 2021-05-30 NOTE — ANESTHESIA PREPROCEDURE EVALUATION
Anesthesia Evaluation      Patient summary reviewed   No history of anesthetic complications     Airway   Mallampati: III  Neck ROM: full   Pulmonary - negative ROS and normal exam                          Cardiovascular - normal exam  Exercise tolerance: > or = 4 METS  ECG reviewed        Neuro/Psych      Comments: Aneurysmal subarachnoid hemorrhage (H)  Intraventricular hemorrhage, nontraumatic (H        Endo/Other    (+) obesity (BMI 32),      GI/Hepatic/Renal - negative ROS           Dental    (+) caps    Comment: Molar crowns                       Anesthesia Plan  Planned anesthetic: general endotracheal  Glidescope    Decadron  Zofran    Arterial line  ASA 3 - emergent   Induction: intravenous   Anesthetic plan and risks discussed with: patient and  services used ([facility] [place])  Anesthesia plan special considerations: video-assisted, antiemetics, IV therapy two IVs,   Post-op plan: routine recovery

## 2021-05-30 NOTE — PROGRESS NOTES
Vent Mode: VCV  FiO2 (%):  [40 %-100 %] 40 %  S RR:  [16] 16  S VT:  [375 mL] 375 mL  PEEP/CPAP (cm H2O):  [5 cm H2O] 5 cm H2O  Minute Ventilation (L/min):  [6.7 L/min-10.6 L/min] 10.6 L/min  PIP:  [17 cm H2O-36 cm H2O] 17 cm H2O  MAP (cm H2O):  [7-13] 7     Pt remained on the above vent setting for the night. BS were clear/diminished bilaterally, pt has a 7.5 ETT at 21 @teeth and was not weaned for the night. Pt was Suctioned small thick white secretion and tolerated well. Pt was transported to and from CT scan without complication. RT will continue to monitor.    JERRY Mcclain

## 2021-05-30 NOTE — PROCEDURES
[place] [facility] Post Procedure    Procedure: fluoroscopically guided lumbar drain placement    Radiologist: Ric Snider    Fluoro Time: .7 minutes  Number of images: two     EBL: minimal  Complications: none    Preliminary findings: (see dictation for full detail)  Lumbar drain inserted at the L3-4 level with tip at the T10-11 level.  Bloody CSF return.    Assess/Plan:   Drainage parameters as per neurosurgery.  Bedrest while lumbar drain is actively ion use.    Ric Snider M.D.

## 2021-05-30 NOTE — ANESTHESIA CARE TRANSFER NOTE
Last vitals:   Vitals:    07/22/19 1851   BP: 112/63   Pulse: (!) 106   Resp: 18   Temp: 36.8  C (98.2  F)   SpO2: 94%     Patient's level of consciousness is drowsy  Spontaneous respirations: yes  Maintains airway independently: yes  Dentition unchanged: yes  Oropharynx: oropharynx clear of all foreign objects    QCDR Measures:  ASA# 20 - Surgical Safety Checklist: WHO surgical safety checklist completed prior to induction    PQRS# 430 - Adult PONV Prevention: 4558F - Pt received => 2 anti-emetic agents (different classes) preop & intraop  ASA# 8 - Peds PONV Prevention: NA - Not pediatric patient, not GA or 2 or more risk factors NOT present  PQRS# 424 - Nina-op Temp Management: 4559F - At least one body temp DOCUMENTED => 35.5C or 95.9F within required timeframe  PQRS# 426 - PACU Transfer Protocol: - Transfer of care checklist used  ASA# 14 - Acute Post-op Pain: ASA14B - Patient did NOT experience pain >= 7 out of 10

## 2021-05-30 NOTE — PROGRESS NOTES
"[facility]:    S:  Complains of right posterior neck discomfort, rated a \"2\".    O: Visit Vital Signs:   /63   Pulse 74   Temp 98.2  F (36.8  C) (Oral)   Resp 20   Ht 4' 10\" (1.473 m)   Wt 154 lb 8 oz (70.1 kg)   LMP 07/01/2016   SpO2 91%   BMI 32.29 kg/m        Intake/Output Summary (Last 24 hours) at 7/23/2019 0901  Last data filed at 7/23/2019 0800  Gross per 24 hour   Intake 3292.1 ml   Output 3590 ml   Net -297.9 ml     Imaging:  Endovascular procedure last evening ~  The patient underwent endovascular coil embolization of a ruptured dissecting 2.3 cm maximum dimension aneurysm of the ventral medial supraclinoid right internal carotid artery. 414 cm of coil material placed into the aneurysm sac. At the conclusion of the procedure, there is coil material present throughout the aneurysm. There is mild/moderate interstitial filling within the coil mass, but this is very stagnant. Some degree of progressive thrombosis within the aneurysm sac is expected once the patient is no longer anticoagulated.     Labs:  Ordered CBC and BMP for this morning    Exam:  Pt is awake, oriented x 3. Speech is clear and fluent. She offers complaints of mild neck discomfort. Denies headache. No focal weakness. Right femoral arterial sheath site is dry and intact, distal pulses present.    A:  This is a 43 year old female with IVH/SAH secondary to a ruptured dissecting  ventral medial supraclinoid right internal carotid artery aneurysm. Delayed presentation, headache at home for 3 days. Likely post bleed day # 5 today.  The dissecting aneurysm was treated with successful endovascular coil embolization last evening. She is neurologically intact/stable today with mild right posterior neck discomfort.      P:  Continue ICU care with SAH protocols.  IR staff will remove arterial sheath this morning. Bedrest x 4 hours post removal.  Labs ordered this morning.  Need to follow electrolytes " closely.  Nimodipine for full 21 days.  TCD's to monitor for cerebral vasospasm.  We are planning for a follow up cerebral angiogram tomorrow afternoon to monitor stability of aneurysm.  We will follow along.  Please call us with any questions or concerns.  [phone].    Yoli Reyes, CNP

## 2021-05-30 NOTE — PLAN OF CARE
Problem: Pain  Goal: Patient's pain/discomfort is manageable  Outcome: Progressing     Problem: Safety  Goal: Patient will be injury free during hospitalization  Outcome: Progressing     Problem: Daily Care  Goal: Daily care needs are met  Outcome: Progressing     Problem: Psychosocial Needs  Goal: Demonstrates ability to cope with hospitalization/illness  Outcome: Progressing  Goal: Collaborate with patient/family/caregiver to identify patient specific goals for this hospitalization  Outcome: Progressing     Problem: Knowledge Deficit  Goal: Patient/family/caregiver demonstrates understanding of disease process, treatment plan, medications, and discharge instructions  Outcome: Progressing     Problem: Potential for Falls  Goal: Patient will remain free of falls  Outcome: Progressing     Problem: Neurological Deficit  Goal: Neurological status is stable or improving  Outcome: Progressing     Problem: Risk of Injury Due to Unsafe Behavior  Goal: Patient will remain safe while in restraint; physical/psychological needs will be met  Outcome: Progressing  Goal: Alternatives to restraint will be continually assessed with use of least restrictive device and discontinuation as soon as possible  Outcome: Progressing  Goal: Patient/Family will be able to communicate reason for restraint and steps for restraint application and removal  Outcome: Progressing     Problem: Mechanical Ventilation  Goal: Patient will maintain patent airway  Outcome: Progressing  Goal: Oral health is maintained or improved  Outcome: Progressing

## 2021-05-30 NOTE — PROGRESS NOTES
Pt transported back from IR without incident.  Sx x1 for mod thick white/pale yellow.  Sats and VVS

## 2021-05-30 NOTE — CONSULTS
NEUROSURGERY CONSULT NOTE     Darion Herrera   [address]  [place] [address]  43 y.o.  female  Admission Date/Time: 7/22/2019  1:38 AM  Primary Care Provider:  Monroe Cassidy MD    Date of service: 7/22/2019    Informant: Patient    Neurosurgery was asked to see this patient by Dr. Comer for evaluation of headache and supracellar mass seen on CT.     CC:  headache and supracellar mass seen on CT.       IMPRESSION/PLAN:    43 y.o. female presented to ED with a worsening headache. CT shows right supracellar mass. MRI confirms 2.4 cm most likely representing right supraclinoid internal carotid artery aneurysm with intraventricular hemorrhage and small amount of  subarachnoid hemorrhage.     PLAN:  1.   IR for coil embolization.  2.   TCD's, Nimodipine. Continue Keppra for now.    3.   PT/OT  4.   Keep head of bed elevated.       Patient Active Problem List   Diagnosis     Leiomyoma of uterus     Carotid artery aneurysm (H)     Nausea vomiting and diarrhea     Aneurysmal subarachnoid hemorrhage (H)     Intraventricular hemorrhage, nontraumatic (H)       NEUROSURGERY ATTENDING: The patient's clinical examination, laboratory data,and the plan was reviewed and made collaboratively with Dr. Sal.     HPI: 43 y.o. female has had several years of mild headaches and neck pain on the right. She presented to ED when her headache became significantly worse. Right head focused.  She has had blurred vision in the right eye for a couple of months.     REVIEW OF SYSTEMS:  Review of Systems - Negative except otherwise mentioned above.       SOCIAL HISTORY:   Social History     Socioeconomic History     Marital status:      Spouse name: None     Number of children: None     Years of education: None     Highest education level: None   Occupational History     None   Social Needs     Financial resource strain: None     Food insecurity:     Worry: None     Inability: None     Transportation needs:     Medical: None      "Non-medical: None   Tobacco Use     Smoking status: Never Smoker     Smokeless tobacco: Never Used   Substance and Sexual Activity     Alcohol use: No     Drug use: None     Sexual activity: None   Lifestyle     Physical activity:     Days per week: None     Minutes per session: None     Stress: None   Relationships     Social connections:     Talks on phone: None     Gets together: None     Attends Oriental orthodox service: None     Active member of club or organization: None     Attends meetings of clubs or organizations: None     Relationship status: None     Intimate partner violence:     Fear of current or ex partner: None     Emotionally abused: None     Physically abused: None     Forced sexual activity: None   Other Topics Concern     None   Social History Narrative     None       FAMILY HISTORY:  No family history on file.    SURGERY HISTORY:  No past surgical history on file.    ALLERGIES/SENSITIVITIES:     Allergies   Allergen Reactions     Oxycodone      Hydrocodone Swelling and Rash       MEDICATIONS:  Medications Prior to Admission   Medication Sig Dispense Refill Last Dose     ascorbic acid, vitamin C, 500 mg Chew chew tab Chew 500 mg daily.   Unknown at Unknown time       PHYSICAL EXAM:     General Appearance:  in no apparent distress and well developed and well nourished      VITAL SIGNS:  /69   Pulse 80   Temp 98.4  F (36.9  C) (Oral)   Resp 22   Ht 4' 10\" (1.473 m)   Wt 155 lb 3.2 oz (70.4 kg)   LMP 2016   SpO2 99%   BMI 32.44 kg/m    Body mass index is 32.44 kg/m .      Nazareth Coma Score  Eye openin - Opens eyes on own   Verbal:  5 - Alert and oriented   Motor:  6 - Follows simple motor commands   GCS Total: 15       alert and oriented to time, date, person, place, city, president, Speech normal via  and family. Speaks some English.   Recent and remote memory: no decrease  Attention span and concentration: Able to recite the days of the week in reverse order  Fund of " knowledge: aware of current events.      Cranial Nerves  II: Visual fields  intact to confrontation    II: Pupils PERRL    III, IV, IV: Extraocular movements Full ROM   VI: Ptosis none    V:Sensation intact to light touch bilaterally    VII: Motor left facial droop    VIII: Hearing intact to finger rub bilaterally    IX, X: Soft palate elevation, gag palate elevates symmetrically    XI: Shoulder shrug strong and equal    XII: Tongue midline      Motor Strength Arms:  Pronator drift  none left dysmetria   Grasp  R 5/5  L 4/5   Bicep  R 5/5  L 4/5   Triceps  R 5/5  L 4/5   Deltoid  R 5/5  L 4/5       Motor Strength Legs:  Dorsiflexion  R 5/5  L 4+/5   Plantar flexion  R 5/5  L 4+/5   Hip flexion  R 4/5  L 4+/5       Coordination: Finger to nose smooth and accurate RIGHT > LEFT (dysmetria left)  Sensation: NO DECREASE  Bulk and tone: NORMAL  Reflexes: biceps, triceps, brachioradialis, patellar and achilles +1  No clonus, no Dalal's, toes down  Extremities: less then 2 second capillary refill, no joint deformities, effusion, or inflammation and no edema  Skin: no rashes, no ecchymoses, no petechiae  Psych:age appropriate      IMAGING:   Reviewed and discussed with Radiologist, Personally reviewed image/s and Personally reviewed impression/s  1.  The right suprasellar mass measuring up to 2.4 cm almost certainly represents a right supraclinoid internal carotid artery aneurysm. Recommend consultation with neurosurgery/neuro interventional radiology and conventional angiogram or CTA.  2.  Intraventricular hemorrhage and probable small amount of right parietal subarachnoid hemorrhage in the left sylvian fissure. No acute intracranial body. No finding to suggest subarachnoid hemorrhage. No hydrocephalus.    LABS:  No results found for: INR  No results found for: PTT  Platelets   Date Value Ref Range Status   07/21/2019 263 140 - 440 thou/uL Final     Sodium   Date Value Ref Range Status   07/21/2019 138 136 - 145 mmol/L  Final          Thank you for allowing us to participate in the care of this patient.        Jailene Mcknight, NP  [facility]  [phone]

## 2021-05-30 NOTE — H&P
Admission History and Physical   Darion Herrera,  1975, MRN [id number]    [facility]  Principal Problem:    Brain mass      PCP: Monroe Cassidy MD, [phone]   Code status:  Full Code       Extended Emergency Contact Information  Primary Emergency Contact: Anyi Herrera  Address: [address] [place] [address] [facility]  Home Phone: [phone]  Mobile Phone: [phone]  Relation: Child  Secondary Emergency Contact: Kusum Herrera  Address: [address] [place] [address] [facility]  Home Phone: [phone]  Relation: Spouse       Day of Service: 2019.    Assessment and Plan   1.  Extra-axial 2.2 x 2.3 cm brain mass over the right planum sphenoidale region.  Symptomatic.  Differential diagnosis for meningioma versus other lesions.  Head of bed more than 30 degrees, further assessment with brain MRI.  Keppra for seizure prophylaxis.  Neurosurgery consult for further recommendations.  2.  Elevated lactic acid.  No signs or symptoms of sepsis and no meningismus.  IV fluid bolus followed by rehydration.  Trend lactic acid.  3.  Abdominal pain, nausea, vomiting, diarrhea.  WBC 14.8 but CT scan abdomen pelvis negative for acute pathology.    Disposition: ICU, inpatient for at least 2 midnights.  Barriers to discharge: Brain MRI, neurosurgery evaluation.  DVT prophylaxis: SCDs.  CODE STATUS: Full code.     Chief Complaint:  Headache, dizziness, abdominal pain, nausea, vomiting, diarrhea.     HPI:    Darion Herrera is a 43 y.o. old female with history of hysterectomy secondary to leiomyoma who presented to [facility] for evaluation of above complaint.  Her symptoms have been intermittently going on for 3 days.  They are worse during daytime and the patient has gone home from work.  She denies recent traveling or sick contacts but felt warm and unwell prior to ED evaluation.  She denied chest pain, shortness of breath,  palpitations, sore throat or nasal congestion.  She denies recent infections or use of antibiotics.  She described the pain that started at the base of the neck with radiation to the forehead like they are connected.  Denies vision changes.  Denies recent falls or head trauma.  In the ED she was afebrile and hemodynamically stable.  CT scan abdomen and pelvis was unremarkable.  CT head showed an extra-axial brain mass.  Neurosurgery recommended Kera for seizure prophylaxis and transferred to [facility] neuro ICU.  Patient denies tobacco, alcohol or illicit drugs.       Medical History  Active Ambulatory (Non-Hospital) Problems    Diagnosis     Leiomyoma of uterus        Surgical History  Robotic hysterectomy       Social History  Reviewed, and   she  reports that she has never smoked. She has never used smokeless tobacco. She reports that she does not drink alcohol.     Allergies  Allergies   Allergen Reactions     Hydrocodone Swelling and Rash    Family History  FH reviewed and not pertinent to chief complaint or history of present illness.           Prior to Admission Medications   Medications Prior to Admission   Medication Sig Dispense Refill Last Dose     cetirizine (ZYRTEC) 10 MG tablet Take 10 mg by mouth daily as needed.    Past Week     ferrous gluconate 324 mg (37.5 mg iron) Tab Take 1 tablet (324 mg total) by mouth 2 (two) times a day. 60 tablet 0      senna-docusate (PERICOLACE) 8.6-50 mg tablet Take 1 tablet by mouth 2 (two) times a day. 60 tablet 0           Review of Systems:  A 12 point comprehensive review of systems was negative except as noted.      Physical Exam:  Vitals:    07/22/19 0400   BP: 110/71   Pulse: 92   Resp: 25   Temp:    SpO2: 97%     155 lb 3.2 oz (70.4 kg)  Body mass index is 32.44 kg/m .    General: In no acute distress.  HEENT: Normocephalic, atraumatic, EOMI.  Neck: Nontender, no JVD, no meningismus.  Heart: Regular rate and rhythm, S1, S2.  Lungs: Clear to  auscultation.  Abdomen: Bowel sounds present, soft, nontender.  Extremities: Nontender, no edema.  Skin: Warm, dry.  Neuro: Awake, alert, oriented, no gross focal motor deficits.  Psychiatric: Normal affect.      Pertinent Labs  Lab Results: Personally reviewed.  Results for orders placed or performed during the hospital encounter of 07/22/19   Lactic Acid   Result Value Ref Range    Lactic Acid 3.1 (H) 0.5 - 2.2 mmol/L           Pertinent Radiology   Radiology Results: Personally reviewed.  EXAM: XR CHEST 2 VIEWS  LOCATION: [facility]  DATE/TIME: 7/21/2019 10:56 PM     INDICATION: epigastric pain, dizziness  COMPARISON: None.     FINDINGS: Heart size within normal limits. The lungs are clear. No visible pneumothorax or pleural effusion.      EXAM: CT HEAD WO CONTRAST  LOCATION: [facility]  DATE/TIME: 7/21/2019 11:00 PM     INDICATION: headache, vertigo headache, vertigo  COMPARISON: None.  TECHNIQUE: Routine without IV contrast. Multiplanar reformats. Dose reduction techniques were used.     FINDINGS:  INTRACRANIAL CONTENTS: No intracranial hemorrhage. Cerebral parenchymal volume is within normal limits for patient's age. There is slightly hyperdense extra-axial mass over the right planum sphenoidale region measuring 2.2 x 2.3 cm. It does seem to have   associated dural thickening. No CT evidence for an acute infarct. No midline shift. The basilar cisterns are patent.     VISUALIZED ORBITS/SINUSES/MASTOIDS: No significant orbital abnormality. No significant paranasal sinus mucosal disease. No significant middle ear or mastoid effusion.     BONES/SOFT TISSUES: No significant abnormality.     IMPRESSION:   CONCLUSION:  1.  There is slightly hyperdense extra-axial mass over the right planum sphenoidale region measuring 2.2 x 2.3 cm. It does seem to have associated dural thickening. This is likely meningioma versus other extra-axial mass lesions. It can be confirmed withcontrast enhanced brain  MRI.      EXAM: CT ABDOMEN PELVIS WO ORAL W IV CONTRAST  LOCATION: [facility]  DATE/TIME: 7/21/2019 11:23 PM     INDICATION: LLQ and LUQ tenderness, diarrhea and nausea   COMPARISON: None.  TECHNIQUE: Helical enhanced thin-section CT scan of the abdomen and pelvis was performed following injection of IV contrast. Multiplanar reformats were obtained. Dose reduction techniques were used.  CONTRAST: Iohexol (Omni) 100 mL     FINDINGS:   LUNG BASES: Negative.     ABDOMEN: The liver, spleen, pancreas, adrenal glands, gallbladder, and kidneys are normal. No adenopathy.     PELVIS: No adenopathy or ascites. No evidence for bowel obstruction. No bowel wall thickening. No significant fluid in the colon. Normal appendix.     MUSCULOSKELETAL: Negative.     IMPRESSION:   CONCLUSION:   1.  Negative CT abdomen and pelvis. No findings to account for the patient's symptoms.         EKG Results: Sinus rhythm at 72 bpm, no acute ST waves abnormalities, no previous EKG.      Advanced Care Planning  Disposition: Inpatient.  Time spent was 50 minutes were 30 minutes of time was reviewing records, counseling and or coordination of cares.

## 2021-05-30 NOTE — PLAN OF CARE
Problem: Mechanical Ventilation  Goal: Patient will maintain patent airway  Outcome: Progressing  Goal: Oral health is maintained or improved  Outcome: Progressing  Goal: ET tube will be managed safely  Outcome: Progressing     Pt intubated 7/27 for airway protection.  Vent Day 1.  VCV 16 375 +5 40%.  ABG this am 7.40/34/245/22.5/97% (60%).  PIP 24 Plat 14 iP 0.  7.5 ETT, 22 at the teeth.  Suctioning a small amount of clear ETT secretions.  CXR pending for ETT placement.  No SBT per protocol.  Transport to/from IR this shift.  No complications.  Plan is full ventilator support.  Winnie Colbert, LRT, 7/27/2019

## 2021-05-30 NOTE — PROGRESS NOTES
PROVIDER RESTRAINT FOR NON-VIOLENT BEHAVIOR FACE TO FACE EVALUATION  7/29/2019   8:40 AM     Patient seen on morning interdisciplinary rounds.     Patient's Immediate Situation:  Patient demonstrated the following behaviors: Pulling/tugging at invasive lines or tubes and does not respond to verbal/non-verbal redirection    Patient's Reaction to the intervention:  Does patient understand the reason for restraint/seclusion? Unable to Express    Medical Condition:  Is there any evidence of compromise of Skin integrity, Respiratory, Cardiovascular, Musculoskeletal, Hydration? No    Behavioral Condition:  In consultation with the RN, is there a need to continue this restraint or seclusion? Yes    See Restraint Flowsheet for complete restraint documentation and assessment.    Kenia Adams, CNP

## 2021-05-30 NOTE — PROGRESS NOTES
Hospitalist Progress Note    Overnight Events/Subjective:    Significant events overnight and into today noted  Intubated and sedated  Discussed with patient's son, neurosurgery and ICU      Assessment/Plan    43F with SAH/IVH due to ruptured dissecting 2.3cm ventral supraclinoid R ICA aneurysm.  Underwent coil embolization 07/22.   Subsequent MRA demonstrated multifocal mostly R ICA territory infarcts without neurologic deficit.   TCD's demonstrated moderate R ICA vasospasm 07/26 for which she was started on HHH therapy.  Overnight on 07/26-07/27 Ms. Herrera was unresponsive with concern for seizure activity and was emergently intubated for airway protection.  Repeat CTA  demonstrated worsening SAH and IVH with hydrocephalus and evidence for raised ICP.  Underwent urgent lumbar drain placement, treatment with hypertonic saline.  Repeat IR angiogram demonstrated growth of a new 3-5mm aneurysm with moderate to severe narrowing of the R ICA thought most likely due to dissection.  The aneurysm was retreated with coiling.  Also treated with intra-arterial verapamil in case there is a component of vasospasm.      IVH/SAH due to ruptured 2.3cm R supraclinoid ICA aneurysm  Hydrocephalus  Elevated ICP  Suspected ICA dissection  R MCA vasospasm   R MCA territory infarcts  Seizure   -repeat angio tomorrow, nimodipine, hypertonic saline, keppra, -150 with levophed as needed to BP target, lumbar drain    Respiratory failure - intubated  Hypokalemia    Care per Nsx, IR, ICU, neurology.    No charge for visit.    Objective    CONCLUSION:  1.  Multiple foci of developing acute/subacute infarction throughout the right parietal occipital lobes, with additional single small foci of acute/subacute infarction in the right frontal lobe and left parietal lobe.  2.  Status post endovascular coiling of large dissecting aneurysm of the ventromedial right supraclinoid ICA with redemonstration of mild persistent flow-related enhancement  along the neck of the aneurysm. There is also faint flow-related enhancement   centrally within the coil mass.  3.  Mildly decreased caliber of the M1 and proximal M2 segments of the right middle cerebral artery compared to the left, which may represent mild vasospasm. There is more normal caliber of the distal M2 and M3 branches.    Vital signs in last 24 hours  Temp:  [97.7  F (36.5  C)-98.8  F (37.1  C)] 97.7  F (36.5  C)  Heart Rate:  [] 100  Resp:  [14-32] 24  BP: ()/(61-91) 119/78  FiO2 (%):  [45 %-100 %] 45 % 100% O2 Device: Ventilator O2 Flow Rate (L/min): 2 L/min  Weight:   159 lb 14.4 oz (72.5 kg) Weight change:     Intake/Output last 3 shifts  I/O last 3 completed shifts:  In: 2769.6 [P.O.:170; I.V.:2555.1; IV Piggyback:44.5]  Out: 5725 [Urine:5725]  Body mass index is 33.42 kg/m .    Labs: Labs reviewed and pertinent findings discussed in A/P  Medications: medication list reviewed.  Pertinent changes discussed in A/P    D/w family, Nsx.    Dell Stacy MD  Internal Medicine Hospitalist  7/27/2019

## 2021-05-30 NOTE — PROGRESS NOTES
Hospitalist Progress Note     Assessment/Plan:     Darion Herrera is a 43-year-old female who presented with ruptured aneurysm of the right ICA resulting in subarachnoid hemorrhage with extension intraventricular hemorrhage.  She is status post aneurysm coiling on 7/22 which was complicated by vasospasm.  There is also a complication of the right ICA aneurysm with dissection and rebleeding leading to seizure and unresponsiveness on 7/27.  This required intubation and recoiling and placement of lumbar drain.  She is currently intubated in ICU.  Intensivist and neurosurgery managing.    Active Problem:     Principal Problem:    Aneurysmal subarachnoid hemorrhage (H)  Active Problems:    Carotid artery aneurysm (H)    Intraventricular hemorrhage, nontraumatic (H)    Compression of brain (H)    S/P coil embolization of cerebral aneurysm    Aneurysm of right internal carotid artery    Cerebrovascular accident (CVA) due to occlusion of right middle cerebral artery (H)    Vasospasm (H)    Acute respiratory failure, unspecified whether with hypoxia or hypercapnia (H)  -Remains intubated in ICU.    Plan:  -Intensivist and neurosurgery involved.  Neurology involved.  Continuous EEG monitoring.  On sedation, and antiepileptics. IR angiogram today    Subjective:     Intubated.     Review of systems:     Please see Subjective.    Current Medications:     Scheduled Meds:    acetaminophen  650 mg Enteral Tube QID    Or     acetaminophen  650 mg Oral QID     [START ON 7/30/2019] atorvastatin  20 mg Enteral Tube DAILY     chlorhexidine  15 mL Topical Q12H     fosphenytoin (CEREBYX) IV maintenance  100 mg PE Intravenous Q8H     levETIRAcetam (KEPPRA) IVPB  1,000 mg Intravenous Q12H     niMODipine  60 mg Enteral Tube Q4H     omeprazole  20 mg Enteral Tube QAM AC    Or     pantoprazole  40 mg Intravenous QAM AC     potassium chloride liquid/packet  20 mEq Enteral Tube BID     senna (SENOKOT) syrup  8.8 mg Enteral Tube BID    Or      senna  8.6 mg Oral BID     sodium chloride  10-30 mL Intravenous Q8H FIXED TIMES     sodium chloride  2 g Oral BID    Or     sodium chloride  2 g Enteral Tube BID     Continuous Infusions:    lactated Ringers       niCARdipine       norepinephrine 0.35 mcg/kg/min (07/29/19 1639)     propofol Stopped (07/29/19 1523)     sodium chloride 0.9% 100 mL/hr (07/29/19 1523)     sodium chloride 3 % 75 mL/hr (07/29/19 1523)     PRN Meds:.acetaminophen, albumin human, bisacodyl, HYDROmorphone, lactated Ringers, naloxone **OR** naloxone, ondansetron, polyethylene glycol, sodium chloride bacteriostatic, sodium chloride, sodium chloride, sodium chloride, traMADol    Objective:       Vital signs in last 24 hours:     Temp:  [98.7  F (37.1  C)-101  F (38.3  C)] 99.8  F (37.7  C)  Heart Rate:  [] 98  Resp:  [14-37] 18  BP: (108-147)/(62-85) 128/73  Arterial Line BP: (112-150)/(59-85) 142/77  FiO2 (%):  [30 %-100 %] 30 %  Weight: 155 lb (70.3 kg)    Physical Exam:     Intake/Output this shift:     I/O last 3 completed shifts:  In: 6405 [I.V.:5335; NG/GT:770; IV Piggyback:300]  Out: 5788 [Urine:5475; Drains:313]    Pertinent Labs:     Lab Results: personally reviewed.     Pertinent Radiology:       Advanced Care Planning:     Barrier to discharge: Intubated  Anticipated LOS: To be determined  Disposition: To be determined    Gardenia Luu M.D.  [facility]  Internal Medicine

## 2021-05-30 NOTE — ANESTHESIA POSTPROCEDURE EVALUATION
Patient: Darion Herrera  * No procedures listed *  Anesthesia type: general    Patient location: PACU  Last vitals:   Vitals Value Taken Time   BP  7/22/2019  7:52 PM   Temp  7/22/2019  7:52 PM   Pulse 96 7/22/2019  7:50 PM   Resp 21 7/22/2019  7:50 PM   SpO2 90 % 7/22/2019  7:50 PM   Vitals shown include unvalidated device data.  Post vital signs: stable  Level of consciousness: awake and responds to simple questions  Post-anesthesia pain: pain controlled  Post-anesthesia nausea and vomiting: no  Pulmonary: unassisted, face mask  Cardiovascular: stable and blood pressure at baseline  Hydration: adequate  Anesthetic events: no    QCDR Measures:  ASA# 11 - Nina-op Cardiac Arrest: ASA11B - Patient did NOT experience unanticipated cardiac arrest  ASA# 12 - Nina-op Mortality Rate: ASA12B - Patient did NOT die  ASA# 13 - PACU Re-Intubation Rate: ASA13B - Patient did NOT require a new airway mgmt  ASA# 10 - Composite Anes Safety: ASA10A - No serious adverse event    Additional Notes:

## 2021-05-30 NOTE — PROGRESS NOTES
"  Clinical Nutrition Therapy Assessment Note      Reason for Assessment:   Darion Herrera is a 43 y.o. female assessed by the RD for length of stay screen.    Subjective:  Pt NPO today for procedures.  Pt reports eating ok.  Chart reviewed.    Nutrition History:  Information from patient and chart.  Diet prior to admission:  general.  Recent food/fluid intake: good.   Cultural/Holiness food needs or preferences: Hmong  Food allergies or intolerances: nkfa    Nutrition Prescription:   Diet: general  IV dextrose or Fluids:  niCARdipine Last Rate: Stopped (07/22/19 2249)   sodium chloride 0.9% Last Rate: Stopped (07/23/19 2104)       Nutrition Intake:  Eats % at meals.  Pt ordering full meals    Anthropometrics:  Height: 4' 10\" (147.3 cm)  Admission weight: 155#  Weight: 154 lb 4.8 oz (70 kg)  BMI (Calculated): 32.4  BMI indication: 30-34.9 obesity (class 1)  Ideal body weight 95#+/-10%  Weight History:  140# 8/1/17.    Physical Findings:  No wasting found     GI Status/Output:   GI symptoms per nursing:   Last BM recorded: n/a    Skin/Wound:   Clifford Scale Score: 19    Medications:  Magnesium and K replacement protocol  Senna-doc bid  Medications reviewed.    Labs:  Labs reviewed    Estimated Nutrition Needs:  Using ideal weight of 43 kg.    Energy Needs: 6976-9540 kcals daily per 25-30 kcal/kg   Protein Needs: 52+ g daily, 1.2+ g/kg.  Fluid Needs: 1100+ mls daily, 25+ mls/kg    Malnutrition: Not noted    Nutrition Risk Level: low risk    Nutrition DX: none    Goals:  Meet nutrition needs with intake    Plan:  Continue diet as ordered    Monitor:   Per goals    See Care Plan for Problems, Goals, and Interventions.        "

## 2021-05-30 NOTE — PLAN OF CARE
Problem: Mechanical Ventilation  Goal: Patient will maintain patent airway  Outcome: Progressing     Problem: Mechanical Ventilation  Goal: ET tube will be managed safely  Outcome: Progressing    JERRY Sanchez

## 2021-05-30 NOTE — PROCEDURES
[facility] Radiology Post Procedure    Procedure: Verapamil Infusion Right ICA    Radiologist: Kameron Aguero    Contrast: 20 mL Omnipaque  Fluoro Time: 1.3 minutes  Air Kerma: 528 mGy     Medications: Verapamil 20 mg     EBL: minimal  Complications: None    Preliminary findings: (see dictation for full detail)  Stable Near Occlusion Right ICA Aneurysm    Assess/Plan:   Report to ICU, Neurosurgery.  Standard post angio orders post sheath removal.  Repeat angiography on 7-29-19      Kameron Aguero

## 2021-05-30 NOTE — PROGRESS NOTES
Physical Therapy       07/28/19 8829   Visit Specifics   Treatment Deferred Medical status;Hold per PT;RN deferred  (RN, PT & OT agree to DC therapies until pt more stable.)   Subjective Patient Comments Medical change: sz c decreased LOC c more coils c hydrocephalus, put on vent, etc.

## 2021-05-30 NOTE — PROGRESS NOTES
Vent Mode: VCV  FiO2 (%):  [30 %-100 %] 30 %  S RR:  [16] 16  S VT:  [375 mL] 375 mL  PEEP/CPAP (cm H2O):  [5 cm H2O] 5 cm H2O  Minute Ventilation (L/min):  [6.9 L/min-10.6 L/min] 8.1 L/min  PIP:  [17 cm H2O-30 cm H2O] 25 cm H2O  MAP (cm H2O):  [7-13] 13   Patient remains on full vent support on above settings. No weaning or vent changes made this shift. sats 99%  RR 17. Breath sounds clear. Suctioned small amount of thin clear white secretions form ETT, and moderate amount of clear thin secretions orally. Rt continue to monitor.

## 2021-05-30 NOTE — PLAN OF CARE
Pt explained to call rn if needing to go to br or getting up to chair. She agreed and so di family. Pt denies pain now.

## 2021-05-30 NOTE — PROGRESS NOTES
PROVIDER RESTRAINT FOR NON-VIOLENT BEHAVIOR FACE TO FACE EVALUATION  7/30/2019   8:41 AM     Patient seen on morning interdisciplinary rounds.     Patient's Immediate Situation:  Patient demonstrated the following behaviors: Pulling/tugging at invasive lines or tubes and does not respond to verbal/non-verbal redirection    Patient's Reaction to the intervention:  Does patient understand the reason for restraint/seclusion? Unable to Express    Medical Condition:  Is there any evidence of compromise of Skin integrity, Respiratory, Cardiovascular, Musculoskeletal, Hydration? No    Behavioral Condition:  In consultation with the RN, is there a need to continue this restraint or seclusion? Yes    See Restraint Flowsheet for complete restraint documentation and assessment.    Kenia Adams, CNP

## 2021-05-30 NOTE — PROGRESS NOTES
RESPIRATORY CARE NOTE   Darion Herrera  Pt continue to be on full ventlator support all this evening. BS clear bilaterally. CXR : ETT 7.5 21 at teeth and it is in good position. SXN small amount pink secretions. No weaning trilas this evening. Vent settings VCV: 16, 375, 5, 25%. RT will overview mechanical ventilation goals, care of the artificial airway, need of suctioning, plan for progressing towards liberation from ventilator, readiness for extubation, and the post ventilator supportive interventions to for adequate  oxygenation, ventilation and airway clearance  Said I Duale

## 2021-05-30 NOTE — PLAN OF CARE
Problem: Pain  Goal: Patient's pain/discomfort is manageable  Outcome: Progressing  Pt denies C/O pain this shift     Problem: Potential for Compromised Skin Integrity  Goal: Skin integrity is maintained or improved  Outcome: Progressing  Pt is moving independently.       Problem: Neurological Deficit  Goal: Neurological status is stable or improving  Outcome: Progressing  Pt states that blurred vision is improving

## 2021-05-30 NOTE — PLAN OF CARE
Problem: Mechanical Ventilation  Goal: Patient will maintain patent airway  Outcome: Progressing  Goal: ET tube will be managed safely  Outcome: Progressing     Vent Mode: VCV  FiO2 (%):  [30 %-100 %] 30 %  S RR:  [16] 16  S VT:  [375 mL] 375 mL  PEEP/CPAP (cm H2O):  [5 cm H2O] 5 cm H2O  Minute Ventilation (L/min):  [0.7 L/min-8.5 L/min] 7.4 L/min  PIP:  [23 cm H2O-31 cm H2O] 23 cm H2O  MAP (cm H2O):  [8-13] 12     Pt continues to be on full mechanical ventilator support, day 3, with the above settings.  ETT size 7.5 secured 21 cm at the teeth.  Breath sounds clear bilaterally.  Minimal amount of white, clear secretions via inline suction. SBT on hold due to neuro status  Pt was transported to IR this morning and back without complications vent transport vent. Pt will remain on full vent support at this time.    KAE PerkinsT

## 2021-05-30 NOTE — SIGNIFICANT EVENT
Pt with shaking, fighting vent, intensivist called to evaluate.  @mg ativan given and propofol to 75.  Shaking ceased propofol back to 55 due to HR.  MD explained status through  to .

## 2021-05-30 NOTE — PROGRESS NOTES
OCCUPATIONAL THERAPY:Further OT services deferred. Due to change in medical status, will d/c OT at this time. Please re-order OT services when appropriate.  Thank you.  Mary Reyes, OTR/L  7/28/2019

## 2021-05-30 NOTE — PLAN OF CARE
Problem: Occupational Therapy  Goal: OT Goals  Description  The following goals to be met by 8/4/19  Pt will participate in grooming/hygiene tasks with SBA/I to increase independence in ADLs.  Pt will participate in 8-10 minutes of light bilateral UE exercise to increase strength/endurance for ADLs.  Pt will transfer to/from bed/chair/toilet with SBA/I with adaptive device prn to maximize independence in ADLs.  Mary Reyes, OTR/MINH  7/26/2019       Outcome: Completed   Occupational Therapy Discharge Summary    Date of OT Discharge: 7/28/2019    Refer to daily doc flowsheet for equipment issued and current functional status.  Discharge Destination: Remains in hospital  Discharge Comments: D/C from OT at this time d/t change in medical status.       7/28/2019 by Mary T Reyes, OT

## 2021-05-30 NOTE — PROGRESS NOTES
NEUROSURGERY PROGRESS NOTE:    ASSESSMENT:     Darion Herrera is 43 y.o. female is hospital day 3 after  presenting with three day history of worsening headache. 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 Coil embolization 7/22/19 in IR.     Repeat cerebral angiogram yesterday was stable.  She reports mild headache.       PLAN:   Patient Active Problem List   Diagnosis     Aneurysmal subarachnoid hemorrhage (H)     Intraventricular hemorrhage, nontraumatic (H)     Compression of brain (H)     S/P coil embolization of cerebral aneurysm     Aneurysm of right internal carotid artery      1.  Daily TCD.  2.  Nimodipine x 21 days.  3.  Progress activity  4.  MRA tomorrow.   5.  Blood pressure < 120. May transfer out of ICU when she is completely stable off Cardene.      Discharge Recommendations/Plan:  Barriers to Discharge: yes  Acute medical management     Follow Up Plan:     pending         HPI: Darion Herrera  43 y.o. female has had several years of mild headaches and neck pain on the right. She presented to ED when her headache became significantly worse over three days. Right head focused.  She has had blurred vision in the right eye for a couple of months    SUBJECTIVE:   Minimal headache.      OBJECTIVE:  Vital signs in last 24 hours  Temp:  [98.1  F (36.7  C)-99.6  F (37.6  C)] 98.1  F (36.7  C)  Heart Rate:  [53-89] 74  Resp:  [9-36] 29  BP: ()/(50-73) 99/64  Body mass index is 32.25 kg/m .    Mental Status: alert and oriented, person, place, time and situationspeech normal via .   Cranial Nerves: CN1 grossly intact per patient recall. CN2; no funduscopic exam performed. CN3,4&6; pupillary light response, lateral and vertical gaze normal.  No nystagmus.  Visual fields are full to confrontation. CN5; double simultaneous stimulation intact. CN7: smile, facial symmetry intact. CN8: hearing intact to finger rub. CN9&10; gag  reflex, uvula midline, palate rises with phonation. CN11; shoulder shrug 5/5 intact bilaterally. CN12; tongue midline and moves freely from side to side.  Motor Strength:  Biceps 5/5 right, 5/5 left   Triceps 5/5 right, 5/5 left   Deltoids 5/5 right, 5/5 left  Hand Grasp 5/5 right, 5/5 left  Hip flexors 5/5 right, 5/5 left   Quadriceps: 5/5 right, 5/5 left   Ankle dorsiflexion: 5/5 right, 5/5 left   Extensor hallicus longus: 5/5 right, 5/5 left   Ankle plantar flexion : 5/5 right, 5/5 left       Pertinent Labs   Lab Results:   Lab Results   Component Value Date     07/24/2019    K 3.9 07/24/2019     (H) 07/24/2019    CO2 20 (L) 07/24/2019    BUN 9 07/24/2019    CREATININE 0.72 07/24/2019    CALCIUM 8.5 07/24/2019     Lab Results   Component Value Date    WBC 13.3 (H) 07/24/2019    HGB 11.2 (L) 07/24/2019    HCT 34.4 (L) 07/24/2019    MCV 89 07/24/2019     07/24/2019       Pertinent Radiology   Radiology Results:     No vasospasm seen although velocities increased.           Jailene Mcknight NP

## 2021-05-30 NOTE — PROGRESS NOTES
"  Clinical Nutrition Therapy Reassessment Note        Subjective:  Pt intubated 7/27. Continuous EEG in process.  Chart reviewed.    Nutrition Prescription:   Diet: NPO day 2    IV dextrose or Fluids:    lactated Ringers    niCARdipine    norepinephrine Last Rate: 0.13 mcg/kg/min (07/29/19 0932)   propofol Last Rate: 45 mcg/kg/min (07/29/19 0938)   sodium chloride 0.9% Last Rate: 100 mL/hr (07/29/19 0936)   sodium chloride 3 % Last Rate: 75 mL/hr (07/29/19 0835)       Nutrition Intake:  Was eating well before intubation.  NPO day 2.  583.6 ml propofol in past 24 hrs.  This provides 642 calories/day.    Anthropometrics:  Height: 4' 10\" (147.3 cm)  Admission weight: 155#  Weight: 155 lb (70.3 kg)    Physical Findings:  No wasting found     GI Status/Output:   GI symptoms per nursing:   Last BM recorded: n/a    Skin/Wound:   Clifford Scale Score: 11    Medications:  IV-cerebyx, 3% saline, propofol, NaCl tabs  K replacement protocol  Medications reviewed.    Labs:  Labs reviewed    Estimated Nutrition Needs:  Using ideal weight of 43 kg.    Energy Needs: 8961-2319 kcals daily per 25-30 kcal/kg   Protein Needs: 52+ g daily, 1.2+ g/kg.  Fluid Needs: 1100+ mls daily, 25+ mls/kg    Malnutrition: Not noted    Nutrition Risk Level: moderate risk    Nutrition DX:  Swallow difficulty r/t intubation evidenced by NPO    Goals: Start nutrition in 24-48 hrs.    Plan:  Monitor feeding plan    Monitor:  Feeding plan, wt, labs.    See Care Plan for Problems, Goals, and Interventions.        "

## 2021-05-30 NOTE — PLAN OF CARE
Problem: Discharge Barriers  Goal: Patient's discharge needs are met  Outcome: Not Progressing   Pt's blood pressure maintained within ordered limits with titration of ordered IV vasopressor medication. Will continue to monitor. Mark Mercado RN

## 2021-05-30 NOTE — PROGRESS NOTES
Agree with Barbara Youssef's note. Repeat angiogram today. Continue LD to treat hydrocephalus for now(LD placed yesterday due to higher risks of IVH with ventriculostomy drain since patient is on ASA).  We'll follow closely.    Juhi Kimbrough

## 2021-05-30 NOTE — PROGRESS NOTES
NEUROSURGERY PROGRESS NOTE:    NEUROSURGERY ATTENDING: The patient's clinical examination, laboratory data,and the plan was reviewed and made collaboratively with Dr Sal.     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 Coiled 7/22/2019 but not completely occluded, some filling persisted. MRI yesterday concerning for vasospasm and new scattered small infarcts. Held off on angio as patient was asymptomatic without new focal deficits and velocity 160s on TCD.     seizure and decreased LOC overnight Friday night. CT concerning for rebleed and indicative of hydrocephalus and increased ICP. Patient in IR most of the day, more coils inserted. Plan is to repeat the angiogram today and tomorrow again.    Lumbar drain placed due to concern of ventric placement with aspirin on board.     Given dissection of the aneurysm she is not a candidate for clipping per Dr. Juhi Kimbrough     PLAN:     Aneurysmal subarachnoid hemorrhage (H)     Intraventricular hemorrhage, nontraumatic (H)     Compression of brain (H)     S/P coil embolization of cerebral aneurysm     Aneurysm of right internal carotid artery  IV fluids 100/hr - goal to have Euvolemic or slightly hypervolemic  Strict intake and output  full 3% with goal Na 150-155 per Dr. Juhi Kimbrough   -150  Albumin ordered prn to support BP - please exhaust prior to starting Levophed  No oral antihypertensives for now  SCD  TCD through day 7  Nimodipine 21 days DO NOT HOLD. Now has OG  HOB 30 degrees  Keppra X 30 days without seizure. 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 Headache started 3 days prior to presentation. Aneurysm was coiled 7/22/2019. MRA done 7/26/2019 with multiple foci of acute/subacute infarction throughout the right parietal occipital lobes and small area in the right frontal lobe and left parietal. There is mild persistent flow along the neck of the aneurysm and faint flow centrally along the coil mass. Mild M1 and M2 RMCA narrowing suggestive of vasospasm.    SUBJECTIVE:  Unable to participate in ROS. Nurses report agitation with sedation vacation    OBJECTIVE:  Vital signs in last 24 hours  Temp:  [98.7  F (37.1  C)-100.1  F (37.8  C)] 98.8  F (37.1  C)  Heart Rate:  [] 90  Resp:  [16-28] 18  BP: ()/(58-73) 102/62  Arterial Line BP: (106-146)/(52-80) 130/80  FiO2 (%):  [30 %-100 %] 30 %  Body mass index is 32.42 kg/m .    Mental Status: opens eyes to stim. Attempts to wiggle toes. Chews on ETT  Cranial Nerves: Pupils equal, react. Face appears midline. + corneals +cough +gag  Motor Strength:  withdraws from noxious stim in lower extrems, more robust in the left leg. No movement with noxious stim to upper extrems, but does grimace somewhat.    Last 3 TCD Values- today's values pending       7/25/2019 7/26/2019 7/27/2019   LMCA    78 93 74   LACA    70 98 78   RMCA    71 167 230   RACA    81 88 60   BA    42 42 41     Lumbar drain: patent, 10-15 per hour of output, output is bloody.    Pertinent Labs   Lab Results:   Lab Results   Component Value Date     07/28/2019    K 3.8 07/28/2019    K 3.8 07/28/2019     (H) 07/28/2019    CO2 17 (L) 07/28/2019    BUN 6 (L) 07/28/2019    CREATININE 0.63 07/28/2019    CALCIUM 8.3 (L) 07/28/2019     Lab Results   Component Value Date    WBC 13.3 (H) 07/24/2019    HGB 11.2 (L) 07/24/2019    HCT 34.4 (L) 07/24/2019    MCV 89 07/24/2019     07/24/2019       Pertinent  Radiology   Radiology Results: Personally reviewed image/s  Katherin Youssef [facility] Neurosurgery  [address], [address]  [place] [address]    O: [phone]  P: [phone]

## 2021-05-30 NOTE — PROGRESS NOTES
Critical Care Medicine Progress Note  7/29/2019    ASSESSMENT/PLAN:  43 y.o. woman who was admitted on 7/22/2019 with SAH & IVH secondary to ruptured aneurysm of the right ICA, aneurysm coiling 7/22 that was complicated by vasospasm.  Hospitalization course was complicated by right ICA aneurysm dissection with rebleeding that presented with acute onset seizures and unresponsiveness on 7/27.  Patient was intubated on 7/27, underwent a recoiling of the culprit vessel, and placement of lumbar drain for ICP management.  7/28 underwent verapamil infusion with angiogram for acute vasospasm of right ICA.    New events:    Increased keppra dosing, still on AED otherwise    Continuous EEG monitoring    Repeat angiogram with verapamil infusion right ICA     Neuro/Psych:   #. Analgesia/Sedation: Currently managed with propofol drip, PRN IV Dilaudid.  #.  Right ICA aneurysm complicated by subarachnoid hemorrhage with IVH, status post dissection and coiling x2 with hydrocephalus requiring lumbar drain placement 7/27/2019.  Concern for vasospasm complicated the clinical presentation and has had verapamil infusion to right ICA.  Repeat cerebral angiogram 7/29 with possible verapamil injection    Neurosurgery and IR closely following    Cerebral angiography per IR    Continue with every 4 hours nimodipine    Goal -150, currently on NE to maintain    Goal sodium 150-155, hypertonic saline infusion per NSG    Strict I/O, strict lumbar drain output monitoring  #.  Recurrent seizures, likely secondary to acute intracranial process as detailed above.  Currently on propofol, IV levetiracetam, sand IV fosphenytoin.  Neurology closely involved.    continuous EEG    Pulmonary: Mechanical ventilation instituted for airway protection in setting of seizure and encephalopathy secondary to SAH.  The patient is on minimal ventilator settings.  Not clinically appropriate for ventilator weaning.  Monitor.  - goal SpO2 > 92%  - no weaning  today  - secretions minimal     Cardiovascular: goal -150. Avoid medications that would cause bradycardia.  - continue NE as needed to maintain SBP goal    Renal: No acute issues.  Good urine output.    GI: NPO for now.    Heme: No acute issues, monitor.  - recheck CBC    Endo: No acute issues.  Continue close monitoring.  - ICU q 4 hour SSI    Access/Lines/Tubes: required and necessary for continued patient cares  ETT, 7.5  PICC, RUE  PIV  Arterial sheath, right femoral artery  Sharpe catheter  Lumbar drain  OGT    ICU prophylaxis:   #. VAP ppx: HOB 30 degrees, chlorhexidine rinses  #. Stress ulcer: PPI  #. Diet: NPO  #. VTE: SCD, chemical prophylaxis contraindicated due to SAH/bleeding  #. Restraints: required and necessary for continued patient cares    CODE: FULL    Family was updated at bedside, answered questions about eating/feedings    Dispo: ICU for mechanical ventilation, hemodynamic support, neurological monitoring    Patient is critically ill with total CCT spent 45 minutes thus far today.     Fer Reyes, DO  Pulmonary and Critical Care     =================================================================    Interval history: remains sedated and intubated.  No further seizure activity reported.  Low grade temps, but no further overt fevers.    Vitals: Temp:  [98.7  F (37.1  C)-100.8  F (38.2  C)] 98.9  F (37.2  C)  Heart Rate:  [] 96  Resp:  [16-37] 16  BP: ()/(58-73) 102/62  Arterial Line BP: (113-150)/(52-80) 126/76  FiO2 (%):  [30 %-35 %] 30 %  Vent:   Vent Mode: VCV  FiO2 (%):  [30 %-35 %] 30 %  S RR:  [16] 16  S VT:  [375 mL] 375 mL  PEEP/CPAP (cm H2O):  [5 cm H2O] 5 cm H2O  Minute Ventilation (L/min):  [6.9 L/min-9 L/min] 8.5 L/min  PIP:  [25 cm H2O-31 cm H2O] 25 cm H2O  MAP (cm H2O):  [8-13] 8    Physical Exam:   General: obese woman, lying in bed, NAD  HEENT: orally intubated, MMM, PER  CV: RRR, no M/R/G; extremities well perfused, no edema  Pulm: equal bilateral mechanical  breath sounds, no wheezing, no rhonchi, no crackles  Abd: soft, ND, NT, hypoactive bowel sounds  Msk: warm to touch  Derm: no acute lesions or rashes on limited exam  Neuro: sedated, no evidence of clonus  Psych: sedated    Labs: personally reviewed in EMR.     Imaging: personally reviewed in EMR. Formal Radiology interpretation listed below.  #.  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 There has been some compaction within the region of the aneurysm base. There is new broad-based 5 mm maximum dimension aneurysm growth along the anterior superior of the aneurysm base. This is the aneurysm inflow zone. The parent right internal carotid artery is of small caliber. As on the previous study, there is a single coil loop protruding into the parent right ICA. No evidence of thromboembolism. However, there is some flow limitation due to the small caliber the right internal carotid artery. Narrowing of the right internal carotid artery at the level of the aneurysm is most likely related to dissection and mass effect from the aneurysm, however a component of vasospasm cannot be excluded. There is mild vasospasm of the M1 trunk right middle cerebral artery. There is trace filling of the nondominant small caliber A1 segment right anterior cerebral artery. The mid to distal right anterior cerebral artery is of normal caliber, filling via flow across anterior communicating artery. Trace vasospasm of the basilar trunk. The intracranial left vertebral artery, posterior cerebral arteries, intracranial left internal carotid artery, left anterior cerebral artery, and left middle cerebral artery widely patent. Circulation times are normal. Venous structures are grossly patent. Stenosis measurements are estimated using NASCET type criteria.    #. HCT, 7/27:   INTRACRANIAL CONTENTS: Beam  hardening and streak artifacts related to a large right paraclinoid coil mass as seen previously. This locally degrades evaluation. Small volume subarachnoid hemorrhage within sulci of both cerebral hemispheres. No definite increasing intraventricular hemorrhage layering within the occipital horns of the lateral ventricles favored to reflect redistribution of subarachnoid blood products. No large territorial infarct is identified. Small frontoparietal infarcts seen on the 7/26/2019 MRI are poorly appreciated on CT. Minor periventricular low-attenuation. Mild to moderate lateral and third ventriculomegaly similar to findings on the prior exam.   VISUALIZED ORBITS/SINUSES/MASTOIDS: No significant orbital abnormality. No significant paranasal sinus mucosal disease. No significant middle ear or mastoid effusion.  BONES/SOFT TISSUES: No significant abnormality.    #. HCT: 7/28:   Again demonstrated is endovascular coiling of a giant right supraclinoid aneurysm. This results in extensive streak artifact and compromises a portion of the intracranial compartment. Within this limitation, the amount of intraventricular hemorrhage and   subarachnoid hemorrhage within the basal cisterns and scattered over the convexities appears grossly unchanged. There is stable mild to moderate ventriculomegaly of the lateral and third ventricles. No evidence of interval acute intracranial hemorrhage,   infarct or hydrocephalus.

## 2021-05-30 NOTE — SEDATION DOCUMENTATION
used for consent and explaining what will happen in IR and after.  Pt was crying reassurance given by writer.

## 2021-05-30 NOTE — PLAN OF CARE
Problem: Mechanical Ventilation  Goal: Patient will maintain patent airway  Outcome: Progressing  Goal: ET tube will be managed safely  Outcome: Progressing       KAE HilliardT

## 2021-05-30 NOTE — PROCEDURES
[facility] Radiology Post Procedure    Procedure:  1. Cerebral angiogram  2. Intra-arterial pharmacotherapy for treatment of cerebral vasospasm    Radiologist: Souleymane Monterroso    Contrast: 70 mL Omnipaque 300  Fluoro time: 6.6 minutes  Fluoro dose: 2433 mGy     EBL: Minimal    Complications: None evident    Preliminary findings: (see dictation for full detail)  - Stable right ICA aneurysm coil mass with residual interstitial neck filling.  - Possible mild vasospasm superimposed on right ICA dissection, verapamil 10 mg infused in right ICA.    Assess/Plan:   Report to ordering provider.  Sheath removed.  Bed rest for 4 hours.    Souleymane Monterroso

## 2021-05-30 NOTE — PROGRESS NOTES
NEUROSURGERY PROGRESS NOTE:    ASSESSMENT:     Darion Herrera is 43 y.o. female is hospital day 2 after  presenting with three day history of worsening headache. 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 Coil embolization 7/22/19 in IR.     She has right posterior neck pain. No really headache. No dizziness. Continued right blurred vision.       PLAN:   Patient Active Problem List   Diagnosis     Aneurysmal subarachnoid hemorrhage (H)     Intraventricular hemorrhage, nontraumatic (H)     Compression of brain (H)     S/P coil embolization of cerebral aneurysm     Aneurysm of right internal carotid artery      1.  Daily TCD.  2.  Nimodipine x 21 days.  3.  Progress activity  4.  Follow up angio tomorrow per IR. Dissecting aneurysm notoriously unstable require close monitoring.   5.  Blood pressure < 120.       Discharge Recommendations/Plan:  Barriers to Discharge: yes  Acute medical management     Follow Up Plan:     pending         HPI: Darion Herrera  43 y.o. female has had several years of mild headaches and neck pain on the right. She presented to ED when her headache became significantly worse over three days. Right head focused.  She has had blurred vision in the right eye for a couple of months    SUBJECTIVE:  Neck pain. Minimal headache.      OBJECTIVE:  Vital signs in last 24 hours  Temp:  [98.2  F (36.8  C)-99  F (37.2  C)] 98.2  F (36.8  C)  Heart Rate:  [] 82  Resp:  [0-28] 26  BP: ()/(59-81) 99/59  Arterial Line BP: ()/(49-70) 108/61  Body mass index is 32.29 kg/m .    Mental Status: alert and oriented, person, place, time and situationspeech normal via .   Cranial Nerves: CN1 grossly intact per patient recall. CN2; no funduscopic exam performed. CN3,4&6; pupillary light response, lateral and vertical gaze normal.  No nystagmus.  Visual fields are full to confrontation. CN5; double simultaneous  stimulation intact. CN7: smile, facial symmetry intact. CN8: hearing intact to finger rub. CN9&10; gag reflex, uvula midline, palate rises with phonation. CN11; shoulder shrug 5/5 intact bilaterally. CN12; tongue midline and moves freely from side to side.  Motor Strength:  Biceps 5/5 right, 5/5 left   Triceps 5/5 right, 5/5 left   Deltoids 5/5 right, 5/5 left  Hand Grasp 5/5 right, 5/5 left  Hip flexors 5/5 right, 5/5 left   Quadriceps: 5/5 right, 5/5 left   Ankle dorsiflexion: 5/5 right, 5/5 left   Extensor hallicus longus: 5/5 right, 5/5 left   Ankle plantar flexion : 5/5 right, 5/5 left       Pertinent Labs   Lab Results:   Lab Results   Component Value Date     07/21/2019    K 3.6 07/21/2019     07/21/2019    CO2 23 07/21/2019    BUN 11 07/21/2019    CREATININE 0.77 07/21/2019    CALCIUM 9.2 07/21/2019     Lab Results   Component Value Date    WBC 11.0 07/23/2019    HGB 12.4 07/23/2019    HCT 37.3 07/23/2019    MCV 87 07/23/2019     07/23/2019       Pertinent Radiology   Radiology Results:    TCD pending      ADDITIONAL COMMENTS:The patient's clinical examination, laboratory data, and plan was discussed with Dr. Sal.     Jailene Mcknight NP

## 2021-05-30 NOTE — PROGRESS NOTES
07/26/19 1101   Visit Specifics   Eval Type Inital eval   Inital OT Consult  07/26/19    Present   Bed/Tabs/Pad Alarm Applied Other (Comment)  (call light in reach)   Treatment Time   ADL Training 8   General   Onset date 07/22/19   Chart Reviewed Yes   PT/OT Patient/Caregiver Stated Goals none stated   Family/Caregiver Present Yes   Home Living   Type of Home House   Home Layout Two level;Bed/bath upstairs;Stairs to enter with rails  (split level, 5 steps from garage)   Bathroom Shower/Tub Tub/shower unit   Bathroom Toilet Standard   Bathroom Equipment Vanity near toilet   Mobility Equipment Crutches   Prior Status   Independent With All ADL's/IADL's   Lives With Spouse  (6 children )   Vocational Full time employment   Device Used No   ADL   Grooming Wash/Dry hands;Wash/dry face   Wash/Dry Hands SBA;Standing;Increased time to complete;Cues for safety   Wash/Dry Face SBA;Standing;Increased time to complete   Lower Body Dressing Socks   Socks SBA;Fatigue;Decreased flexability  (L sock, able to reach R sock but don/doff deferred)   Toileting All toileting   All Toileting SBA;Fatigue   Activity Tolerance   Endurance Fair   Balance   Sitting Balance Standby   Standing Balance Standby;Static   Bed Mobility   Supine to Sit CGA   Functional Transfers   Functional Transfers Toilet Transfers;Chair Transfers   Chair Transfers SBA   Toilet Transfers SBA   Transfer Cues Correct hand placement   Transfer Deficits Fatigue;Increased time   Ambulation   Location To bathroom;To chair   Assistance CGA;SBA   Device Rolling Walker   Verbal Cues Safety;Attention to direction   Comments slow moving   Cognition   Arousal/Alertness Lethargic   Following Commands Follows all commands and directions   Behavior    Behavior During Session Cooperative   Vision-Basic Assessment   Current Vision Does not wear glasses   Vision/Perception   Vision/Perception Intact   RUE Assessment   RUE Assessment WFL   LUE Assessment   LUE  Assessment WFL   Hand Function   Gross Grasp Functional   Sensation   Light Touch No apparent deficits   Assessment   Assessment Decreased endurance;Decreased ADL status;Decreased funtional mobility   Prognosis Good   Treatment/Interventions ADL training;Functional transfer training   OT Frequency Daily   Goal Formulation Patient   Recommendations   OT Discharge Recommendation Home with family support/assistance   Treatment Suggestions for Next Session g/h at sink, trsfs, endurance, light AROM   OT Care Plan REVIEWED DAILY Yes, goals remain appropriate

## 2021-05-31 NOTE — PROGRESS NOTES
NEUROSURGERY PROGRESS NOTE:    NEUROSURGERY ATTENDING: The patient's attending neurosurgeon is Dr. Daniel Kimbrough.     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 Complicated hospital course with re-bleed, concern for hydrocephalus, daily cerebral angiograms for severe vasospasm. CT with concern for R MCA infarct, however would expect progression of the low attenuation seen and development of worsening edema if MCA territory infarct. MRI not ordered due to high likelihood artifact from coil would obscure view and in the absence of severe vasogenic edema on CT an MRI would not change the clinical course.     Overall clinical status complicated by the fact that patient is being kept sedated due to severe vasospasm, but does appear to have L facial weakness.    Complex patient with few surgical options. Could consider placement of ventriculostomy should she clinically worsen. IR plans to place lumbar drain. Dr. Malou Sal in agreement with this plan.    PLAN:   Patient Active Problem List   Diagnosis     Carotid artery aneurysm (H)     Aneurysmal subarachnoid hemorrhage (H)     Intraventricular hemorrhage, nontraumatic (H)     Compression of brain (H)     S/P coil embolization of cerebral aneurysm     Aneurysm of right internal carotid artery     Cerebrovascular accident (CVA) due to occlusion of right middle cerebral artery (H)     Vasospasm (H)  --Repeat cerebral angio today to evaluate and treat vasospasm  --Another angio tomorrow per IR  --Keep sedated, RASS -3 until velocities improving  --IV fluid goal 100 mL/hr, goal to be euvolemic or slightly hypervolemic to help mitigate vasospasm, appreciate intensivist assistance  --Target CVP > 10  --Full 3% protocol  ---160, Albumin PRN x5 doses first, then titrate levophed once Albumin exhausted  --TCD's daily  --Keppra 500 mg two times a day  x30 days, indefinite with seizure  --Nimodipine x21 days. Complete 7/12/2019  --Sharpe in place, strict I/O  --Mechanical DVT prophylaxis  --Must have accurate Is and Os  --Care conference tomorrow for status update and discussion of trach, G/J tube placement  --Continue Lumbar drain at 10-15 ml/hour. Would not wean until velocities stabilize  -- Repeat CT in am       Fever  --elevated again today  --Will send CSF            Discharge Recommendations/Plan:  Barriers to Discharge: yes, requiring acute medical care.     Follow Up Plan: Pending       HPI: Darion Herrera is a 42 yo female who was admitted on 07/22/19 with SAH and IVH secondary to ruptured aneurysm of the right ICA.  Aneurysm coiling on 7/22 that was complicated by vasospasm. On 07/27, found to be unresponsive and possibly seizing, was intubated and taken to IR where it was revealed that her right ICU aneurysm had dissected and re-bled.  In IR on 07/27, the vessel was recoiled and a lumbar drain was placed for ICP management.  On 07/29 underwent intraarterial verapamil infusion with angiogram for acute vasospasm of right ICA. Due to increased TCD values, back to IR for another cerebral angiogram 7/30 for acute vasospasm and intraarterial verapamil infusion.     BRIEF HOSPITAL COURSE:  7/21/2019  -Admit with nausea, vomiting, headache. CT with question of extra axial mass over the planum sphenoidale region measuring 2.2 x 2.3 cm    7/22/2019  -MRI indicates mass on CT actually a giant right supraclinoid internal carotid artery aneurysm. + intraventricular hemorrhage, small right subarachnoid hemorrhage.    -IR for angiogram, coil embolization of ruptured, dissecting aneurysm. Mild to moderate interstitial filling within the coil mass    7/24/2019  -Return to IR due to high likelihood of instability of aneurysm. Aneurysm was stable in appearance    7/27/2019  -Possible seizure, became unresponsive, intubated. CT + for increased amount of hemorrhage, cerebral  edema, increasing size of ventricles.     -Return to IR for angio and placement of lumbar drain    -Aneurysm re-ruptured, additional coils placed with 95% occlusion    -Moderate to severe narrowing of R ICA likely secondary tot he dissecting aneurysm and not necessarily true spasm. Verapamil given.    -Mild spasm R MCA    7/28/2019  -Stable appearance of aneurysm, verapamil given    7/29/2019  -Stable to mildly increased filling of aneurysm.     -mildly increased spasm of R MCA, verapamil given. Plan for repeat angio 8/2/2019 7/30/2019  -TCD worsening. Patient sent for angio    -worsening spasm in multiple territories. Verapamil given to R ICA, L ICA, R vert    - Stable filling of aneurysm    -Head CT with some hypodensity concerning for infarct of R MCA territory    -Temp 102.9. Standard fever workup    7/31/2019  -Return to IR for treatment of vasospasm, more verapamil    -CT head unchanged    -UA with e coli. Gram neg rods blood culture, may be contaminant    -Febrile, temp max 102.9    -Antibiotics started per ICU team    8/1/2019  -Return to IR for treatment of vasospasm, more verapamil    -CT head unchanged    -CSF sent from lumbar drain    -Fever persisted     SUBJECTIVE:  RN reports patient appears to track at times, reports patient squeezed her right hand tightly to command.  tearful at bedside today.      OBJECTIVE:  Vital signs in last 24 hours  Temp:  [100.4  F (38  C)-102.8  F (39.3  C)] 101.9  F (38.8  C)  Heart Rate:  [] 102  Resp:  [15-38] 18  BP: ()/(55-77) 113/69  Arterial Line BP: (120-158)/(14-82) 131/64  FiO2 (%):  [30 %-100 %] 50 %  Body mass index is 32.48 kg/m .    Mental Status: sedated, speech  intubated. Opens eyes spontaneously.     Cranial Nerves: Exam limited to sedation.  PERRL. + cough/gag. Resists exam of R pupil, does not resist on the L.     Motor Strength: Does not withdraw to painful stim. Sedated.    Lumbar Drain: Lumbar drain patent, open and titrated to  10-15 mL output/hour.  Output remains bloody.     Last 3 TCD Values       7/30 7/31 8/1   LMCA    172 170 172   LACA    189 131 44   RMCA    266 216 198   RACA    39 63 116   BA    52 66 69         Pertinent Labs:  CBC:   Lab Results   Component Value Date    WBC 13.4 (H) 08/01/2019    RBC 2.79 (L) 08/01/2019     BMP:   Lab Results   Component Value Date    CO2 20 (L) 08/01/2019    BUN 3 (L) 07/30/2019    CREATININE 0.57 (L) 07/30/2019    CALCIUM 7.7 (L) 07/30/2019     Coagulation:   Lab Results   Component Value Date    INR 1.12 (H) 07/27/2019         Pertinent Radiology   Radiology Results: personally reviewed.    Katherin Youssef [facility] Neurosurgery  [address] [place] [address], [address] [place] [id number]    O: [phone]  P: [phone]

## 2021-05-31 NOTE — PROGRESS NOTES
This note also relates to the following rows which could not be included:  SpO2 - Cannot attach notes to unvalidated device data  To mri, bp 100- 160 jorden jimenez rn repoorted to.

## 2021-05-31 NOTE — PROGRESS NOTES
Patient is transported ventilated via transport vent. Transported to/from CT without incident. RT will monitor.    JERRY Sanchez

## 2021-05-31 NOTE — PROGRESS NOTES
Critical Care Medicine Progress Note  8/5/2019    ASSESSMENT/PLAN:  43 y.o. woman who was admitted on 7/22/2019 with SAH & IVH secondary to ruptured aneurysm of the right ICA, aneurysm coiling 7/22 that was complicated by vasospasm.  Hospitalization course was complicated by right ICA aneurysm dissection with rebleeding that presented with acute onset seizures and unresponsiveness on 7/27.  Patient was intubated on 7/27, underwent a recoiling of the culprit vessel, and placement of lumbar drain for ICP management.  Ongoing issues with ICA/MCA vasospasm.          Neuro/Psych:   #. Analgesia/Sedation: On propofol and fentanyl, prn versed available mostly for agitation with cares.  #.  Right ICA aneurysm complicated by subarachnoid hemorrhage with IVH, status post dissection and coiling x2 with hydrocephalus requiring lumbar drain placement 7/27/2019.  Ongoing vasospasm complicated the clinical presentation and has had verapamil infusion on multiple days.    Neurosurgery and IR closely following    Cerebral angiography per IR, follow TCD to determine further angiogram    Continue with every 4 hours nimodipine x21 days    Goal -150 by arterial line, currently on NE to maintain    If BP dips, and CVP less than 10, will use albumin but otherwise should be titrating NE    Trying to avoid complications of hypervolemia     Goal sodium 145-150, hypertonic saline infusion per NSG protocol    Goal HCT level 30-32. Transfusing 1 unit of PRBC today.    Goal CVP 10-12    Strict I/O, strict lumbar drain output monitoring (if increased output may be sign of further edema/swelling and consider stat HCT) has been stable    Repeat TCDs to determine need for angiogram    Monitor for volume overload    NSGY d/w family re edilberto hole, but family would like to wait for now and follow radiographically.  #.  Recurrent seizures, likely secondary to acute intracranial process as detailed above.  Currently on propofol, IV levetiracetam,  and IV fosphenytoin.  #.  Moderate Sedation with lower RASS -3 as goal   Due to ongoing issues will pursue deeper RASS goal of -3   fentanyl infusion - titrate to sedation   Prn versed especially with turning/routine cares  #.  Fever - possible central fever contributing.  Elevated protein, PMNs, and WBC, but likely due to blood.  Changed Cefepime to ceftraixone on 8/4.    Pulmonary: Mechanical ventilation instituted for airway protection in setting of seizure and encephalopathy secondary to SAH.  The patient is on minimal ventilator settings.  Not clinically appropriate for ventilator weaning, and due to ongoing issues no further sedation holidays.  Monitor.  - goal SpO2 > 92%, on 30% FiO2  - no weaning for now  - secretions mostly small  - sputum culture 4+ pseudomonas  - Abx per ID section below  - Discussed case with Dr. Ledezma and so family likely to proceed with tracheostomy early this week.     Cardiovascular: goal -150 but will try to aim for higher end of this (note when goal SBP was higher 150-180 then patient had further bleeding). Avoid medications that would cause bradycardia.  - continue NE as needed to maintain SBP goal   - intermittent albumin for low CVP   - CVP to be maintained 10-12    Renal: No acute issues.  Good urine output.  - E.coli UTI (CAUTI)  - potassium/magnesium replacement per protocol  - add KCL to saline infusion    GI: NPO for now.  - om trophic feeding  - PPI    Heme: No acute issues, monitor.  - IVF/Tx to maintain HCT 30-32  - SCDs    Endo: No acute issues.  Continue close monitoring.  - ICU q 4 hour SSI    ID: E. Coli UTI and GPC in clusters in blood stream (1/4 positive, but less than 24 hours) with ongoing fevers, leukocytosis improved  - cefepime (will cover E.coli and has better CNS penetration). Follow up repeat blood cultures  - vancomycin - discontinued after 1 day as CoNS grew from culture, follow up cultures negative     Access/Lines/Tubes: required and necessary  for continued patient cares  ETT, 7.5  PICC, RUE  PIV  Arterial sheath, right femoral artery - removed on 8/3  Sharpe catheter  Lumbar drain  OGT    ICU prophylaxis:   #. VAP ppx: HOB 30 degrees, chlorhexidine rinses  #. Stress ulcer: PPI  #. Diet: NPO, trophic feedings now  #. VTE: SCD, chemical prophylaxis contraindicated due to SAH/bleeding  #. Restraints: required and necessary for continued patient cares    CODE: FULL    Family was updated during multidisciplinary family conference.  Family is awaiting more family however they are agreeing with trach/peg    Dispo: ICU for mechanical ventilation, hemodynamic support, neurological monitoring, remains critically ill with constant threat to life.    Patient is critically ill with total CCT spent 45 minutes thus far today.     Brittni Ruano MD  Pulmonary and Critical Care     =================================================================    Interval history: \  No new changes.  Fevers noted.  Family at bedside. On NE. Has vasospasms.    Vitals: Temp:  [99.5  F (37.5  C)-103.1  F (39.5  C)] 100.9  F (38.3  C)  Heart Rate:  [] 75  Resp:  [10-26] 17  BP: (108-160)/(55-96) 150/87  FiO2 (%):  [30 %-100 %] 30 %  Vent:   Vent Mode: VCV  FiO2 (%):  [30 %-100 %] 30 %  S RR:  [16] 16  S VT:  [375 mL] 375 mL  PEEP/CPAP (cm H2O):  [5 cm H2O] 5 cm H2O  Minute Ventilation (L/min):  [6.1 L/min-7.9 L/min] 6.1 L/min  PIP:  [18 cm H2O-28 cm H2O] 28 cm H2O  MAP (cm H2O):  [7-8] 8    Physical Exam:   General: obese woman, lying in bed, NAD  HEENT: orally intubated, MMM, PER  CV: RRR, no M/R/G; extremities well perfused, 1+ edema  Pulm: mechanical breath sounds that are course, more diminished on the left, no wheezing, no crackles  Abd: soft, ND, NT, hypoactive bowel sounds  Msk: warm to touch  Derm: no acute lesions or rashes on limited exam  Neuro: sedated, no evidence of clonus  Psych: sedated    Labs: personally reviewed in EMR.     Imaging: personally reviewed in EMR.  Formal Radiology interpretation listed below.  #. HCT, 7/27:   INTRACRANIAL CONTENTS: Beam hardening and streak artifacts related to a large right paraclinoid coil mass as seen previously. This locally degrades evaluation. Small volume subarachnoid hemorrhage within sulci of both cerebral hemispheres. No definite increasing intraventricular hemorrhage layering within the occipital horns of the lateral ventricles favored to reflect redistribution of subarachnoid blood products. No large territorial infarct is identified. Small frontoparietal infarcts seen on the 7/26/2019 MRI are poorly appreciated on CT. Minor periventricular low-attenuation. Mild to moderate lateral and third ventriculomegaly similar to findings on the prior exam.   VISUALIZED ORBITS/SINUSES/MASTOIDS: No significant orbital abnormality. No significant paranasal sinus mucosal disease. No significant middle ear or mastoid effusion.  BONES/SOFT TISSUES: No significant abnormality.    #. HCT: 7/28:   Again demonstrated is endovascular coiling of a giant right supraclinoid aneurysm. This results in extensive streak artifact and compromises a portion of the intracranial compartment. Within this limitation, the amount of intraventricular hemorrhage and   subarachnoid hemorrhage within the basal cisterns and scattered over the convexities appears grossly unchanged. There is stable mild to moderate ventriculomegaly of the lateral and third ventricles. No evidence of interval acute intracranial hemorrhage,   infarct or hydrocephalus.     HCT on 8/3:  IMPRESSION:   CONCLUSION:  1.  No new acute intracranial abnormality.  2.  Continued evolution of right MCA bilateral CATHI territory infarction, more conspicuous on the recent MRI. No significant mass effect or hemorrhagic transformation.  3.  Continued decrease conspicuity of intraventricular subarachnoid blood products.

## 2021-05-31 NOTE — PROGRESS NOTES
Patient is transported to/from CT with event. Pt is ventilated via transport vent. RT will monitor.    JERRY Sanchez

## 2021-05-31 NOTE — PLAN OF CARE
Problem: Daily Care  Goal: Daily care needs are met  Outcome: Progressing     Problem: Knowledge Deficit  Goal: Patient/family/caregiver demonstrates understanding of disease process, treatment plan, medications, and discharge instructions  Outcome: Progressing     Problem: Potential for Compromised Skin Integrity  Goal: Nutritional status is improving  Outcome: Progressing     Problem: Neurological Deficit  Goal: Neurological status is stable or improving  Outcome: Progressing     Problem: Mechanical Ventilation  Goal: Patient will maintain patent airway  Outcome: Progressing  Goal: Oral health is maintained or improved  Outcome: Progressing  Goal: ET tube will be managed safely  Outcome: Progressing

## 2021-05-31 NOTE — PROGRESS NOTES
NEUROSURGERY PROGRESS NOTE:    NEUROSURGERY ATTENDING: The patient's attending neurosurgeon is Dr. Daniel Kimbrough.     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 Complicated hospital course with re-bleed, concern for hydrocephalus, daily cerebral angiograms for severe vasospasm. CT with concern for R MCA infarct, however would expect progression of the low attenuation seen and development of worsening edema if MCA territory infarct. MRI not ordered due to high likelihood artifact from coil would obscure view and in the absence of severe vasogenic edema on CT an MRI would not change the clinical course.     Overall clinical status complicated by the fact that patient is being kept sedated due to severe vasospasm, but does appear to have L facial weakness.    Complex patient with few surgical options. Could consider placement of ventriculostomy should she clinically worsen. IR to replace lumbar drain today    90 minute family conference today- explained that it is impossible to attempt to predict long term outcome or deficits when we are unable to complete a neurological exam. They will likely proceed with G/J and trach, would like to discuss with some other family members first and will let us know.     PLAN:   Patient Active Problem List   Diagnosis     Carotid artery aneurysm (H)     Aneurysmal subarachnoid hemorrhage (H)     Intraventricular hemorrhage, nontraumatic (H)     Compression of brain (H)     S/P coil embolization of cerebral aneurysm     Aneurysm of right internal carotid artery     Cerebrovascular accident (CVA) due to occlusion of right middle cerebral artery (H)     Vasospasm (H)  --Repeat cerebral angio today to evaluate and treat vasospasm  --Another angio tomorrow per IR  --Keep sedated, RASS -3 until velocities improving  --IV fluid goal 100 mL/hr, goal to be euvolemic or slightly  hypervolemic to help mitigate vasospasm, appreciate intensivist assistance  --Target CVP > 10  --Full 3% protocol  ---160, Albumin PRN x5 doses first, then titrate levophed once Albumin exhausted  --TCD's daily  --Keppra 500 mg two times a day x30 days, indefinite with seizure  --Nimodipine x21 days. Complete 8/13/2019  --Sharpe in place, strict I/O  --Mechanical DVT prophylaxis  --Must have accurate Is and Os  --Care conference tomorrow for status update and discussion of trach, G/J tube placement  --Continue Lumbar drain at 10-15 ml/hour. Would not wean until velocities stabilize  --Multiple CTs without change. No plans to repeat unless change in exam        Fever  --On Cefepime for UTI  --CSF with 34,000 RBC and 72 WBC. Glucose 61  --No plans to send additional CSF unless develops symptoms concerning for meningitis            Discharge Recommendations/Plan:  Barriers to Discharge: yes, requiring acute medical care.     Follow Up Plan: Pending       HPI: Darion Herrera is a 42 yo female who was admitted on 07/22/19 with SAH and IVH secondary to ruptured aneurysm of the right ICA.  Aneurysm coiling on 7/22 that was complicated by vasospasm. On 07/27, found to be unresponsive and possibly seizing, was intubated and taken to IR where it was revealed that her right ICU aneurysm had dissected and re-bled.  In IR on 07/27, the vessel was recoiled and a lumbar drain was placed for ICP management.  On 07/29 underwent intraarterial verapamil infusion with angiogram for acute vasospasm of right ICA. Due to increased TCD values, back to IR for another cerebral angiogram 7/30 for acute vasospasm and intraarterial verapamil infusion.     BRIEF HOSPITAL COURSE:  7/21/2019  -Admit with nausea, vomiting, headache. CT with question of extra axial mass over the planum sphenoidale region measuring 2.2 x 2.3 cm    7/22/2019  -MRI indicates mass on CT actually a giant right supraclinoid internal carotid artery aneurysm. +  intraventricular hemorrhage, small right subarachnoid hemorrhage.    -IR for angiogram, coil embolization of ruptured, dissecting aneurysm. Mild to moderate interstitial filling within the coil mass    7/24/2019  -Return to IR due to high likelihood of instability of aneurysm. Aneurysm was stable in appearance    7/27/2019  -Possible seizure, became unresponsive, intubated. CT + for increased amount of hemorrhage, cerebral edema, increasing size of ventricles.     -Return to IR for angio and placement of lumbar drain    -Aneurysm re-ruptured, additional coils placed with 95% occlusion    -Moderate to severe narrowing of R ICA likely secondary tot he dissecting aneurysm and not necessarily true spasm. Verapamil given.    -Mild spasm R MCA    7/28/2019  -Stable appearance of aneurysm, verapamil given    7/29/2019  -Stable to mildly increased filling of aneurysm.     -mildly increased spasm of R MCA, verapamil given. Plan for repeat angio 8/2/2019 7/30/2019  -TCD worsening. Patient sent for angio    -worsening spasm in multiple territories. Verapamil given to R ICA, L ICA, R vert    - Stable filling of aneurysm    -Head CT with some hypodensity concerning for infarct of R MCA territory    -Temp 102.9. Standard fever workup    7/31/2019  -Return to IR for treatment of vasospasm, more verapamil    -CT head unchanged    -UA with e coli. Gram neg rods blood culture, may be contaminant    -Febrile, temp max 102.9    -Antibiotics started per ICU team    8/1/2019  -Return to IR for treatment of vasospasm, more verapamil    -CT head unchanged    -CSF sent from lumbar drain    -Fever persisted    8/2/2019          -Return to IR for treatment of vasospasm, higher dose verapamil given                          -TCDs worsening as compared to yesterday                          - Family conference to discuss G/J and Trach     SUBJECTIVE:  No change, patient unable to participate.  reports she nodded her head for him last  weekend when on ventilator but not since increase in sedation.    OBJECTIVE:  Vital signs in last 24 hours  Temp:  [102  F (38.9  C)-103.1  F (39.5  C)] 102  F (38.9  C)  Heart Rate:  [] 81  Resp:  [7-34] 16  BP: ()/(46-88) 130/79  Arterial Line BP: (108-165)/(54-87) 144/65  FiO2 (%):  [40 %] 40 %  Body mass index is 33.57 kg/m .    Mental Status: sedated, speech  intubated. Opens eyes spontaneously.     Cranial Nerves: Exam limited to sedation.  PERRL. + cough/gag. Resists exam of R pupil, does not resist on the L. Brisk corneal on R, none on L may be due to neglect     Motor Strength: Does not withdraw to painful stim. Sedated.    Lumbar Drain: Lumbar drain patent, open and titrated to 10-15 mL output/hour.  Output remains bloody.     Last 3 TCD Values       7/31 8/1 8/2   LMCA    170 172 215   LACA    131 44 68   RMCA    216 198 246   RACA    63 116 96   BA    66 69 77         Pertinent Labs:  CBC:   Lab Results   Component Value Date    WBC 13.4 (H) 08/01/2019    RBC 2.79 (L) 08/01/2019     BMP:   Lab Results   Component Value Date    CO2 23 08/02/2019    BUN 8 08/02/2019    CREATININE 0.63 08/02/2019    CREATININE 0.63 08/02/2019    CALCIUM 8.5 08/02/2019     Coagulation:   Lab Results   Component Value Date    INR 1.12 (H) 07/27/2019         Pertinent Radiology   Radiology Results: personally reviewed.    Katherin Youssef -[facility]  [address] [place] [address], [address]  [place] [address]    O: [phone]  P: [phone]

## 2021-05-31 NOTE — PLAN OF CARE
Care Management Goals of the Day: Follow progression of care, assist with DC planning     Care Progression Reviewed With: Charge RN, MD, HUC, RNCM     Barriers to Discharge: ICU medical cares, intubated w/weaning trials, angio pending,     Discharge Disposition: [place] when stable     Expected Discharge Date: 8/15/19     Transportation:  stretcher    Care Coordination Narrative: Pt continues with weaning trials, RT reports 12 hours off vent yesterday. Angio pending per AM report and lumbar drain is still in place. Plan is to [place] when stable, Anupam aware and following. CM to follow.

## 2021-05-31 NOTE — PROGRESS NOTES
Pt returned to full vent support @ 1600, c/o shortness of breath.  Pt sx for small amt white. Pt moved to chair @ approx 2000 and put on trach dome, positioned in chair and pt stated she felt nauseated.  Pt returned to bed and back onto full vent support.   Will continue to wean two times a day as sarah.

## 2021-05-31 NOTE — PROGRESS NOTES
NEUROSURGERY PROGRESS NOTE:    ASSESSMENT:     Dr. Sal neurosurgeon     Darion Herrera is 43 y.o. female admitted 7/21/2019 with ruptured dissecting ventral medial supraclinoid right internal carotid artery aneurysm resulting in SAH and IVH. Complicated hospital course with rehemorrhage, vasospasm treated with verapamil and right MCA infarct. Her aneurysm larger on last angio with a plan to repeat 8/13/19.     CT stable today. Patient up in chair and looking good. She denies headache or pain. Drain clamped.  Patient on trach collar. No passy mir as of yet and back on vent at night. No new deficit.       PLAN:   Patient Active Problem List   Diagnosis     Nausea vomiting and diarrhea     Aneurysmal subarachnoid hemorrhage (H)     Intraventricular hemorrhage, nontraumatic (H)     Compression of brain (H)     S/P coil embolization of cerebral aneurysm     Aneurysm of right internal carotid artery     Cerebrovascular accident (CVA) due to occlusion of right middle cerebral artery (H)     Vasospasm (H)     Acute respiratory failure, unspecified whether with hypoxia or hypercapnia (H)     Fever in other diseases     E-coli UTI     Gram-positive bacteremia     CVA (cerebral vascular accident) (H)     Pneumonia due to group B Streptococcus, unspecified laterality, unspecified part of lung (H)     Vasospasm of cerebral artery      PLAN:    1.  CT head stable. Clamp lumbar drain. Repeat head CT in AM. Possible removal of drain Wednesday.   2.  Precedex off at this time.   3.  Blood pressure 100-160.  4.  Nimodipine for full 21 days. Ends tomorrow.  5.  Continue antiseizure medications. Keppra. Vimpat dc'kelsey  6.  Trach collar with plan to go back on vent at night. Plan to downsize trach and passy mir valve after she transfers to [place]. .   7.  Up to chair and physical therapy.  8.  Repeat angio tomorrow. If increase in aneurysm will stent, per IR.   9.  Check serum osmolality. High urine output in past 24 hours.        Discharge Recommendations/Plan:  Barriers to Discharge: yes  Critical care medicine.      Follow Up Plan:     pending         HPI: Darion Herrera is a 42 yo female who was admitted on 07/22/19 with SAH and IVH secondary to ruptured aneurysm of the right ICA. Aneurysm coiling on 7/22 that was complicated by vasospasm. On 07/27, found to be unresponsive and possibly seizing, was intubated and taken to IR where it was revealed that her right ICU aneurysm had dissected and re-bled.  In IR on 07/27, the vessel was recoiled and a lumbar drain was placed for ICP management.  On 07/29 underwent intraarterial verapamil infusion with angiogram for acute vasospasm of right ICA. Due to increased TCD values, back to IR for another cerebral angiogram 7/30 for acute vasospasm and intraarterial verapamil infusion.    SUBJECTIVE:  Trach collar.       OBJECTIVE:  Vital signs in last 24 hours  Temp:  [98.8  F (37.1  C)-99.7  F (37.6  C)] 99.2  F (37.3  C)  Heart Rate:  [57-88] 79  Resp:  [12-27] 21  BP: ()/(64-99) 107/74  FiO2 (%):  [21 %-30 %] 30 %  Body mass index is 33.94 kg/m .    Mental Status: alert, speech trach collar, no speech  Cranial Nerves: PERRL, EOMI, Nods head approprietly, left facial droop  Motor Strength:  Right upper extremity 5/5  Has feeling left but no movement.     Drain: lumbar  Is clamped now.     Pertinent Labs   Lab Results:   Lab Results   Component Value Date     08/12/2019    K 3.6 08/12/2019     08/12/2019    CO2 25 08/12/2019    BUN 9 08/12/2019    CREATININE 0.54 (L) 08/12/2019    CALCIUM 9.8 08/12/2019     Lab Results   Component Value Date    WBC 7.5 08/12/2019    HGB 11.4 (L) 08/12/2019    HCT 34.6 (L) 08/12/2019    MCV 90 08/12/2019     08/12/2019       Pertinent Radiology   Radiology Results:     Stable large right MCA distribution hypodensity; consistent with evolving acute/subacute ischemia. 6 stable small amount of subtle hyperdensity at the right temporal operculum  likely representing mild amount of petechial hemorrhage. Tiny amount of hyperdensity within parasagittal right parietal lobe, stable; could be due to small amount of subarachnoid hemorrhage. No significant global mass effect. Redemonstration of coiling at the anterior right aspect of [place] with prominent streaking artifact. The ventricles and sulci are normal for age. No significant change.       Jailene Mcknight, NP

## 2021-05-31 NOTE — PROGRESS NOTES
"RESPIRATORY CARE NOTE      Arterial Blood Gas result:  pH 7.47; pCO2 33; pO2 79; HCO3 25.4, %O2 Sat 98.5.    Vent Mode: VCV  FiO2 (%):  [30 %] 30 %  S RR:  [14-16] 14  S VT:  [375 mL] 375 mL  PEEP/CPAP (cm H2O):  [5 cm H2O] 5 cm H2O  Minute Ventilation (L/min):  [6.3 L/min-10.7 L/min] 10.7 L/min  PIP:  [13 cm H2O-27 cm H2O] 27 cm H2O  MAP (cm H2O):  [6-9] 9    /88   Pulse 62   Temp 100  F (37.8  C) (Oral)   Resp 18   Ht 4' 10\" (1.473 m)   Wt 168 lb 8 oz (76.4 kg)   LMP 07/01/2016   SpO2 97%   BMI 35.22 kg/m    New 8.0 Noryley Trach working well. Placed 8/7/2019. No weaning done this shift.  Trach and Sutures looking well. Trach cares complete. Will change inner cannula QDay at noon.  BS coarse Sx scant to small white clear pink tinged secretions. Will continue cares and assess for wean.  RT following.     KAE LewisT      "

## 2021-05-31 NOTE — PROGRESS NOTES
[facility] Daily Progress Note    Assessment/Plan:  Darion Herrera is a 43-year-old female who presented 7/21/2019 with headache that started in the Neck with radiation to the forehead ruptured aneurysm of the right ICA resulting in subarachnoid hemorrhage with extension intraventricular hemorrhage.  She is status post aneurysm coiling on 7/22 which was complicated by vasospasm.  There is also a complication of the right ICA aneurysm with dissection and rebleeding leading to seizure and unresponsiveness on 7/27.  This required intubation and recoiling and placement of lumbar drain. She is currently requiring daily intraarterial verapamil infusions w/angiogram for acute vasospasms of the R ICA, held since 8/3. She is currently intubated in ICU. Intensivist, neurology and neurosurgery managing.      Assessment:  Right ICA Aneurysm complicated with SAH, IVH  S/p Dissection and Coling x 2 with hydrocephalus s/p Lumbar drainage   Daily intraarterial verapamil infusions w/angiogram, currently on hold since 8/3  On nimodipine Q4H for total of 21 days  Maintain Na 145-150  MRI and CT done 8/3, showing new stroke. Planned for repeat CT on 8/5.     Recurrent Seizures  On  vimpat and keppra.  EEG done 8/3, no new seizure activity.    Metabolic Encephalopathy  Management as per above    E. Coli CAUTI  On Ceftriaxone    Respiratory failure  Intubated, sedated. On pressors.  Family aggreeable for Trach/Peg today.    Anemia  Hb dropped from 11.2-->10.7-->9.7-->8-->10.1   NSG wants target HCT to be >30. S/p 1 U PRBC on 8/3.       DVT ppx: SCD  GI ppx: Protonix    Diet: Tube feeds    Code: Full code      Subjective:  Patient is intubated, sedated. D/w RN, no acute events overnight. Family I room, agreeable for Trach/Peg placements.       Current Medications Reviewed via EHR List    Objective:  Vital signs in last 24 hours:  Temp:  [99.7  F (37.6  C)-101.2  F (38.4  C)] 100.4  F (38  C)  Heart Rate:  [] 88  Resp:  [9-38] 18  BP:  (102-159)/(57-95) 127/77  SpO2:  [78 %-100 %] 96 %  ETCO2 (mmHg):  [0 mmHg-45 mmHg] 38 mmHg  FiO2 (%):  [30 %-100 %] 30 %    Intake/Output last 3 shifts:  I/O last 3 completed shifts:  In: 6300.3 [I.V.:5159.3; Blood:300; NG/GT:429; IV Piggyback:412]  Out: 5628 [Urine:4670; Drains:308; Stool:650]      Physical Exam:  General: Intubated, sedated.  HEENT: Intubated, PERRL  HEART : S1 S2, normal rate/rhythm  LUNGS: Clear to auscultation, equal air entry  ABDOMEN:   Soft, non distended  CNS Sedated               Lab Results:  Personally Reviewed.   Fingerstick Blood Glucose:   Recent Labs     08/02/19  1807 08/03/19  0113 08/03/19  0542 08/04/19  0033 08/04/19  0545   POCGLUFGR 130 147* 125 119 163*       Recent Results (from the past 24 hour(s))   Sodium lab - every 6 hours    Collection Time: 08/03/19  5:02 PM   Result Value Ref Range    Sodium 143 136 - 145 mmol/L   HM1 (CBC with Diff)    Collection Time: 08/03/19  5:02 PM   Result Value Ref Range    WBC 17.1 (H) 4.0 - 11.0 thou/uL    RBC 3.50 (L) 3.80 - 5.40 mill/uL    Hemoglobin 10.1 (L) 12.0 - 16.0 g/dL    Hematocrit 30.8 (L) 35.0 - 47.0 %    MCV 88 80 - 100 fL    MCH 28.9 27.0 - 34.0 pg    MCHC 32.8 32.0 - 36.0 g/dL    RDW 13.0 11.0 - 14.5 %    Platelets 243 140 - 440 thou/uL    MPV 10.5 8.5 - 12.5 fL    Neutrophils % 86 (H) 50 - 70 %    Lymphocytes % 7 (L) 20 - 40 %    Monocytes % 6 2 - 10 %    Eosinophils % 2 0 - 6 %    Basophils % 0 0 - 2 %    Neutrophils Absolute 14.5 (H) 2.0 - 7.7 thou/uL    Lymphocytes Absolute 1.2 0.8 - 4.4 thou/uL    Monocytes Absolute 0.9 0.0 - 0.9 thou/uL    Eosinophils Absolute 0.3 0.0 - 0.4 thou/uL    Basophils Absolute 0.1 0.0 - 0.2 thou/uL   Potassium    Collection Time: 08/03/19  5:02 PM   Result Value Ref Range    Potassium 4.0 3.5 - 5.0 mmol/L   Crossmatch    Collection Time: 08/04/19 12:08 AM   Result Value Ref Range    Crossmatch Compatible     Blood Expiration Date [id number]     Unit Type O Neg     Unit Number  [id number]     Status Transfused     Component Red Blood Cells     PRODUCT CODE O6070W46     Issue Date and Time [id number]     Blood Type 9500     CODING SYSTEM [id number]    Sodium lab - every 6 hours    Collection Time: 08/04/19 12:24 AM   Result Value Ref Range    Sodium 141 136 - 145 mmol/L   POCT Glucose    Collection Time: 08/04/19 12:33 AM   Result Value Ref Range    Glucose 119 70 - 139 mg/dL   POCT Glucose    Collection Time: 08/04/19  5:45 AM   Result Value Ref Range    Glucose 163 (H) 70 - 139 mg/dL   Sodium lab - every 6 hours    Collection Time: 08/04/19  6:04 AM   Result Value Ref Range    Sodium 142 136 - 145 mmol/L   Magnesium    Collection Time: 08/04/19  6:04 AM   Result Value Ref Range    Magnesium 1.7 (L) 1.8 - 2.6 mg/dL   Potassium    Collection Time: 08/04/19  6:04 AM   Result Value Ref Range    Potassium 3.2 (L) 3.5 - 5.0 mmol/L   Chloride    Collection Time: 08/04/19  6:04 AM   Result Value Ref Range    Chloride 107 98 - 107 mmol/L   Carbon Dioxide (CO2)    Collection Time: 08/04/19  6:04 AM   Result Value Ref Range    CO2 26 22 - 31 mmol/L           Advanced Care Planning  Discharge plan: Unknown      Total time spent was >35 mins, discussing with RN, JAMES,  and reviewing the chart    Smithland Lawrence  Date: 8/4/2019    Hospitalist Service      Part of this chart was created using a dictation software. Typographic errors, word substitutions, and Grammatical errors may unintentionally occur.

## 2021-05-31 NOTE — PROGRESS NOTES
"Speech [employer]/Pathology  Speech [employer] Evaluation    History:  Diagnosis:   Patient Active Problem List   Diagnosis     Leiomyoma of uterus     Carotid artery aneurysm (H)     Nausea vomiting and diarrhea     Aneurysmal subarachnoid hemorrhage (H)     Intraventricular hemorrhage, nontraumatic (H)     Compression of brain (H)     S/P coil embolization of cerebral aneurysm     Aneurysm of right internal carotid artery     Cerebrovascular accident (CVA) due to occlusion of right middle cerebral artery (H)     Vasospasm (H)     Acute respiratory failure, unspecified whether with hypoxia or hypercapnia (H)     Fever in other diseases     E-coli UTI     Gram-positive bacteremia     CVA (cerebral vascular accident) (H)     Pneumonia due to group B Streptococcus, unspecified laterality, unspecified part of lung (H)     Vasospasm of cerebral artery     Hydrocephalus     Adjustment disorder with mixed anxiety and depressed mood     Mood disorder (H)     Sleep difficulties     Onset Date: 7/22/2019  Reason for Evaluation: Assess for dysarthria, apraxia, aphasia, and cognitive-linguistic deficits  Pertinent History: Per Rosy Clemente's (CNP) note from earlier this PM, \"43 year old female who was admitted on 7/22/2019 with ruptured dissecting ventral medial supraclinoid right internal carotid artery aneurysm resuling in SAH/IVH, aneurysm coiling 7/22 that was complicated by right MCA vasospasm, seizure 7/27 s/p intubation and lumbar drain, repeat coiling 8/8 and replacement of lumbar drain, s/p trach/PEG 8/7/19.\"  Current Diet: NPO; TF via PEG tube  Baseline Diet: Regular textures and thin liquids    Subjective:  Patient presents as alert and labile during this session. She was intermittently tearful and perseverated on wanting to be home, as well as her depression over the fact that she cannot care for herself at present. SLP spent the majority of this session providing empathetic listening and offering patient words of " "encouragement and reassurance.  [facility]  (Jerrell Falcon) was present   Patient's , nephew, and the nephew's child were present for part of session.     Objective:  Oral motor function was not impaired.  Motor speech was not impaired.   Speech intelligibility was approximately 100% at the conversational level.  Voice was not impaired, though patient speaks with a low vocal volume.  Trach/Vent: #6 Bivona cuffed trach. Patient was wearing a Shiley speaking valve upon SLP's arrival. Per RN (Jennifer Barillas) report, patient has been wearing speaking valve for most of shift.    Auditory comprehension was not impaired. Patient was able to answer moderately complex yes/no questions with 100% accuracy independently. Patient was able to follow 2-step directions with 100% accuracy independently.    Verbal expression was not impaired. Confrontational naming was 100% accurate independently. Patient was able to participate in conversation independently. Per  report, no overt word-finding difficulty/errors noted.    Reading comprehension and written expression were not assessed due to language barrier.    Cognition was mildly impaired. Patient was independently oriented type of place, name of hospital, city, state, season, and situation. She was not oriented to month (\"July\") or year (\"2020\").   Memory appears grossly intact. Patient independently recalled 3/3 words immediately after hearing them, as well as following a 3-minute delay with distractions.    Assessment:  Patient presents with mild deficits in cognition. Her expressive and receptive language skills appear intact. She is tolerating a speaking valve and is able to communicate her wants, needs, and thoughts.     Plan:  Speech Therapy is not recommended at this time. While patient appears to have mild deficits in cognition, anticipate that her participation in (and motivation for) Speech Therapy would be limited by her current " depression and anxiety. As plan is for transfer to [place] at discharge, recommend repeat Speech/[employer]/Cognitive evaluation at that time. Patient would also benefit from a Bedside Swallow Study once Treatment Team deems her ready for PO trials.    Referrals: ST post discharge    20 speech/language minutes     Saba Anderson MA, CCC-SLP

## 2021-05-31 NOTE — PROGRESS NOTES
Critical Care Medicine Progress Note  8/3/2019    ASSESSMENT/PLAN:  43 y.o. woman who was admitted on 7/22/2019 with SAH & IVH secondary to ruptured aneurysm of the right ICA, aneurysm coiling 7/22 that was complicated by vasospasm.  Hospitalization course was complicated by right ICA aneurysm dissection with rebleeding that presented with acute onset seizures and unresponsiveness on 7/27.  Patient was intubated on 7/27, underwent a recoiling of the culprit vessel, and placement of lumbar drain for ICP management.  Ongoing issues with ICA/MCA vasospasm.    New events:   - fever persists, on abx starting 7/31   - remains on NE   - continued vasospasm   - hct is low at 25    Neuro/Psych:   #. Analgesia/Sedation: On propofol and fentanyl, prn versed available mostly for agitation with cares.  #.  Right ICA aneurysm complicated by subarachnoid hemorrhage with IVH, status post dissection and coiling x2 with hydrocephalus requiring lumbar drain placement 7/27/2019.  Ongoing vasospasm complicated the clinical presentation and has had verapamil infusion on multiple days.    Neurosurgery and IR closely following    Cerebral angiography per IR, follow TCD to determine further angiogram    Continue with every 4 hours nimodipine x21 days    Goal -150 by arterial line, currently on NE to maintain    If BP dips, and CVP less than 10, will use albumin but otherwise should be titrating NE    Trying to avoid complications of hypervolemia     Goal sodium 145-150, hypertonic saline infusion per NSG protocol    Goal HCT level 30-32    Goal CVP 10-12    Strict I/O, strict lumbar drain output monitoring (if increased output may be sign of further edema/swelling and consider stat HCT) has been stable    Repeat TCDs to determine need for angiogram    Monitor for volume overload    NSGY d/w family re edilberto hole, but family would like to wait for now and follow radiographically.  #.  Recurrent seizures, likely secondary to acute  intracranial process as detailed above.  Currently on propofol, IV levetiracetam, and IV fosphenytoin.  #.  Moderate Sedation with lower RASS -3 as goal   Due to ongoing issues will pursue deeper RASS goal of -3   fentanyl infusion - titrate to sedation   Prn versed especially with turning/routine cares  #.  Fever - possible central fever contributing.  Elevated protein, PMNs, and WBC, but likely due to blood.   Cefepime would have good coverage into the CNS as well    Pulmonary: Mechanical ventilation instituted for airway protection in setting of seizure and encephalopathy secondary to SAH.  The patient is on minimal ventilator settings.  Not clinically appropriate for ventilator weaning, and due to ongoing issues no further sedation holidays.  Monitor.  - goal SpO2 > 92%, on 30% FiO2  - no weaning today  - secretions mostly small  - sputum culture 4+ pseudomonas  - Abx per ID section below  - Discussed case with Dr. Ledezma and so family likely to proceed with tracheostomy early next week.     Cardiovascular: goal -150 but will try to aim for higher end of this (note when goal SBP was higher 150-180 then patient had further bleeding). Avoid medications that would cause bradycardia.  - continue NE as needed to maintain SBP goal   - intermittent albumin for low CVP   - CVP to be maintained 10-12    Renal: No acute issues.  Good urine output.  - E.coli UTI (CAUTI)  - potassium/magnesium replacement per protocol  - add KCL to saline infusion    GI: NPO for now.  - om trophic feeding  - PPI    Heme: No acute issues, monitor.  - IVF to maintain HCT 30-32  - SCDs    Endo: No acute issues.  Continue close monitoring.  - ICU q 4 hour SSI    ID: E. Coli UTI and GPC in clusters in blood stream (1/4 positive, but less than 24 hours) with ongoing fevers, leukocytosis improved  - cefepime (will cover E.coli and has better CNS penetration). Follow up repeat blood cultures  - vancomycin - discontinued after 1 day as CoNS  grew from culture, follow up cultures negative     Access/Lines/Tubes: required and necessary for continued patient cares  ETT, 7.5  PICC, RUE  PIV  Arterial sheath, right femoral artery - removed on 8/3  Sharpe catheter  Lumbar drain  OGT    ICU prophylaxis:   #. VAP ppx: HOB 30 degrees, chlorhexidine rinses  #. Stress ulcer: PPI  #. Diet: NPO, trophic feedings now  #. VTE: SCD, chemical prophylaxis contraindicated due to SAH/bleeding  #. Restraints: required and necessary for continued patient cares    CODE: FULL    Family was updated during multidisciplinary family conference.  Family is awaiting more family however they are agreeing with trach/peg    Dispo: ICU for mechanical ventilation, hemodynamic support, neurological monitoring, remains critically ill with constant threat to life.    Patient is critically ill with total CCT spent 50 minutes thus far today.     Brittni Ruano MD  Pulmonary and Critical Care     =================================================================    Interval history: remains sedated and intubated.  No further seizure activity reported.  Fever again today.  No significant changes overnight.       Vitals: Temp:  [99.1  F (37.3  C)-103  F (39.4  C)] 99.1  F (37.3  C)  Heart Rate:  [] 97  Resp:  [7-33] 16  BP: (103-153)/(63-88) 146/63  Arterial Line BP: (121-175)/(56-87) 137/71  FiO2 (%):  [30 %-40 %] 30 %  Vent:   Vent Mode: VCV  FiO2 (%):  [30 %-40 %] 30 %  S RR:  [16] 16  S VT:  [375 mL] 375 mL  PEEP/CPAP (cm H2O):  [5 cm H2O] 5 cm H2O  Minute Ventilation (L/min):  [5.8 L/min-8.2 L/min] 5.9 L/min  PIP:  [22 cm H2O-30 cm H2O] 28 cm H2O  MAP (cm H2O):  [8] 8    Physical Exam:   General: obese woman, lying in bed, NAD  HEENT: orally intubated, MMM, PER  CV: RRR, no M/R/G; extremities well perfused, 1+ edema  Pulm: mechanical breath sounds that are course, more diminished on the left, no wheezing, no crackles  Abd: soft, ND, NT, hypoactive bowel sounds  Msk: warm to  touch  Derm: no acute lesions or rashes on limited exam  Neuro: sedated, no evidence of clonus  Psych: sedated    Labs: personally reviewed in EMR.     Imaging: personally reviewed in EMR. Formal Radiology interpretation listed below.  #. HCT, 7/27:   INTRACRANIAL CONTENTS: Beam hardening and streak artifacts related to a large right paraclinoid coil mass as seen previously. This locally degrades evaluation. Small volume subarachnoid hemorrhage within sulci of both cerebral hemispheres. No definite increasing intraventricular hemorrhage layering within the occipital horns of the lateral ventricles favored to reflect redistribution of subarachnoid blood products. No large territorial infarct is identified. Small frontoparietal infarcts seen on the 7/26/2019 MRI are poorly appreciated on CT. Minor periventricular low-attenuation. Mild to moderate lateral and third ventriculomegaly similar to findings on the prior exam.   VISUALIZED ORBITS/SINUSES/MASTOIDS: No significant orbital abnormality. No significant paranasal sinus mucosal disease. No significant middle ear or mastoid effusion.  BONES/SOFT TISSUES: No significant abnormality.    #. HCT: 7/28:   Again demonstrated is endovascular coiling of a giant right supraclinoid aneurysm. This results in extensive streak artifact and compromises a portion of the intracranial compartment. Within this limitation, the amount of intraventricular hemorrhage and   subarachnoid hemorrhage within the basal cisterns and scattered over the convexities appears grossly unchanged. There is stable mild to moderate ventriculomegaly of the lateral and third ventricles. No evidence of interval acute intracranial hemorrhage,   infarct or hydrocephalus.

## 2021-05-31 NOTE — PROGRESS NOTES
Pulmonary Medicine :       HPI: 43 y.o. woman who was admitted on 7/22/2019 with SAH & IVH secondary to ruptured aneurysm of the right ICA, aneurysm coiling 7/22 that was complicated by vasospasm.  Hospitalization course was complicated by right ICA aneurysm dissection with rebleeding that presented with acute onset seizures and unresponsiveness on 7/27.  Patient was intubated on 7/27, underwent a recoiling of the culprit vessel, and placement of lumbar drain for ICP management.  Ongoing issues with ICA/MCA vasospasm. Now status post trach (8/7) and  shunt (8/19).    Chief complaint: Ongoing respiratory failure  Significant change in clinical status during past 24 hours? - Self decannulated overnight; #6 Bivona placed without difficulty.     Vent Settings:   FiO2 (%):  [21 %] 21 %     Tracheal secretions: strong cough; moderate secretions.     Current phase of ventilator weaning pathway:  Phase 2    Ventilator weaning results from yesterday: TM continuous.     Physical exam: (patient personally examined)  General: awake and alert.   HEENT: Trach midline; crani site clean.   Lungs: unlabored on TM; coarse anterolaterally. No wheeze.   CV: RRR without tachycardia or bradycardia; No MÓNICA  GI: round, soft,BS+  Extremities: no significant edema.   Skin:visible skin intact  Neuro: nuñez    Lab:  Today's lab results reviewed.     Imaging: (all imaging personally reviewed)  8/12 CXR -  Tracheostomy tube and right PICC line are in good position. Heart size and pulmonary vessels are normal. Improved lung aeration with only minimal residual atelectasis or infiltrate in the left hilar, lungs otherwise clear.    Diagnosis:   1. Acute hypoxic respiratory failure.   - s/p trach on 8/7   - downsized to #6 Bivona - replaced on 8/20.   2. Aneurysmal SAH  3. Hydrocephalus   - S/P  shunt 8/19  4. Vasospasm  5. IVH    Recommendations/Plans:  1. Weaning orders: Continue Phase 2 as tolerates.   2. Trach change? Not indicated.   3.  Diagnostic testing? None from our service  4. Other? Suggest re-eval by SLP at [facility] to move forward with PMV and oral intake.     Kenia Adams, CNP  Pulmonary Medicine

## 2021-05-31 NOTE — PROGRESS NOTES
[place] Daily Progress Note      Date of Service: 8/17/2019     Brief History: Darion Herrera is 43 y.o. 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 CT head showed an extra-axial brain mass, and was admitted to neuro ICU for further management. MRI showed right suprasellar mass and intraventricular hemorrhage and small subarachnoid hemorrhage in left sylvian fissure. 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 She was monitored with daily transcranial doppler. She had follow up angiogram on 07/24, which showed stable findings. MRA on 07/26 was suggestive of vasospasms and was started on triple H therapy (hypertension, hemodilution, and hypervolemia). The patient became unresponsive on 07/27 and was endotracheally intubated. A lumbar drain was placed, and she also had cerebral angiogram with endovascular coil embolization of re-ruptured previously coil embolized dissecting aneurysm of ventral medial supraclinoid right internal carotid artery on 07/27. The patient had cerebral angiogram with intraarterial verapamil infusion on 07/28, 07/29, 07/30, 07/31, 08/01, and 08/02. The lumbar drain was replaced on 08/02. The patient also underwent percutaneous tracheostomy and G tube placement by Dr. Ledezma on 08/02. The lumbar drain was replaced on 08/08, and she had another cerebral angiogram on 08/08. Cerebral angiogram on 08/13 seemed stable. Lumbar drain was clamped on 08/12, but was noted to have increasing ventricular dilatation, so the drain clamp was released. Planned for  shunt placement on 08/15, but patient refused. Now she is agreeing. Lumber drain was replaced on 08/16. Plan to have  shunt placement next week.    Assessment:     Acute respiratory  failure with hypoxia  -Secondary to poor airway protection, in the setting of intracranial hemorrhage  -S/p percutaneous tracheostomy on 08/02. Trach changed to bivona #6 on 08/14 and is tolerating trach mask with PMV. Vent as needed.  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 Re clamped on 08/14, and again the follow up CT showed increased ventricular dilatation.   -Lumbar drain was change on 08/16. Plan to have a  shunt placed. Patient refused on 08/15. Now agreeing to have it placed.   -Neurosurgery following  -Keppra for seizure prophylaxis.  -Discontinued Nimodipine on 07/13    Oropharyngeal dysphagia  At risk of malnutrition  -S/p G tube placement on 08/02  -Currently NPO. Otherwise on tube feeding    Electrolyte imbalance  -Hypokalemia, replaced  -Hypomagnesemia, replaced  -Monitor, and replace as needed    Urinary tract infection, unspecified  -Culture grew E. Coli on 07/30, and was treated with antibiotic (cephalosporin)  -Urine culture from 08/05 grew candida. She was treated with fluconazole.    Transaminitis   -Unclear etiology, likely medication  -Stable liver enzymes  -Monitor intermitently      Subjective:     Chart reviewed, events noted. Pt seen and examined. A professional [employer] language interpretor  "was available during my evaluation. Now tolerating trach mask. Answered multiple questions from multiple family members at bedside. She was crying at times, stating that she only has motivation, because of her 6 kids.    Objective     Vital signs in last 24 hours:  Temp:  [98.6  F (37  C)-99.9  F (37.7  C)] 99.2  F (37.3  C)  Heart Rate:  [] 106  Resp:  [17-60] 26  BP: ()/(63-88) 121/81  FiO2 (%):  [21 %] 21 %  Weight:   152 lb 4.8 oz (69.1 kg)  Weight change:   Body mass index is 31.83 kg/m .    Intake/Output last 3 shifts:  I/O last 3 completed shifts:  In: 360 [NG/GT:360]  Out: 1235 [Urine:1150; Drains:85]  Intake/Output this shift:  No intake/output data recorded.      Physical Exam:  /81   Pulse (!) 106   Temp 99.2  F (37.3  C)   Resp 26   Ht 4' 10\" (1.473 m)   Wt 152 lb 4.8 oz (69.1 kg)   LMP 07/01/2016   SpO2 96%   BMI 31.83 kg/m      FiO2 (%): 21 % O2 Device: Trach mask O2 Flow Rate (L/min): 10 L/min    General Appearance: Alert, not in distress.  HEENT: Normocephalic. No scleral icterus. Trach in place with no sign of bleeding.  Heart: S1 S2 heard. Regular rate and rhythm.  Lungs: Clear to auscultation bilaterally.  Abdomen: Soft, non tender, bowel sounds present. G tube in place.  Extremity: No deformity. No joint swelling. No edema.  Neurologic: Left leg and left arm weakness. Patient is alert and responding to questions  Skin: No skin rashes      Imaging:  personally reviewed.    Ct Head Without Contrast    Result Date: 8/15/2019  EXAM: CT HEAD WO CONTRAST LOCATION: [facility] DATE/TIME: 8/15/2019 5:06 AM INDICATION: Hydrocephalus Preop planning.  AxiEM CT; NO fiducials, include nose and face (as guide), STEALTH PROTOCOL. COMPARISON: 8/13/2019. TECHNIQUE: Stealth without IV contrast. Multiplanar reformats. Dose reduction techniques were used. FINDINGS: INTRACRANIAL CONTENTS: Again demonstrated is a large endovascular coil mass in the right paraclinoid region from " aneurysm coiling. This results in regional artifact obscuring portions of the intracranial compartment. There is has been a mild interval enlargement of the lateral and third ventricles which are mild to moderately dilated. No acute intracranial hemorrhage. Ventricles and sulci are within normal limits. No intracranial mass. VISUALIZED ORBITS/SINUSES/MASTOIDS: No significant orbital abnormality. No significant paranasal sinus mucosal disease. No significant middle ear or mastoid effusion. BONES/SOFT TISSUES: No significant abnormality.     1.  Stealth noncontrast head CT for preoperative planning. 2.  Mild interval enlargement of the lateral and third ventricles which are mild to moderately dilated. This suggests a worsening hydrocephalus. No definite transependymal CSF noted. 3.  Status post endovascular coiling of giant right paraclinoid aneurysm.          Lab Results:  personally reviewed.   Lab Results   Component Value Date     08/17/2019    K 4.1 08/17/2019     08/17/2019    CO2 26 08/17/2019    BUN 19 08/17/2019    CREATININE 0.65 08/17/2019    CALCIUM 10.4 08/17/2019     Lab Results   Component Value Date    WBC 8.3 08/17/2019    HGB 12.9 08/17/2019    HCT 38.7 08/17/2019    MCV 88 08/17/2019     (H) 08/17/2019     Results from last 7 days   Lab Units 08/17/19  0423 08/16/19  0453 08/15/19  0424   LN-MAGNESIUM mg/dL 2.0 2.1 2.2        Results from last 7 days   Lab Units 08/13/19  2101   LN-POC GLUCOSE FINGERSTICK- HE mg/dL 103           Advance Care Planning:   Barriers to discharge:  shunt placement  Anticipated discharge day: to be determined  Disposition: [facility]ACH    Total unit/floor time 36 minutes: Time consisted of the following:  Examination of patient, reviewing of the record and lab results, and completing documentation.  Coordination of care time with nursing staff, care manager,  and other healthcare providers- 16 minutes.  Counseling time 20 minutes. Time spent in  counseling with the patient consisted of discussing about lumbar drain,  shunt, trach weaning, feeding tube.       Pedro Campoverde MD  [facility] Hospitalist  Paging: [employer]

## 2021-05-31 NOTE — PROGRESS NOTES
NEUROSURGERY PROGRESS NOTE:    NEUROSURGERY ATTENDING: The patient's attending neurosurgeon is Dr. Sal. Plan of care discussed with Dr Sal.     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 Complicated hospital course with re-bleed, concern for hydrocephalus, vasospasms, right MCA infarct. MRI last week questionable for recurrent aneurysm. No change on repeat angio earlier this week. CT head showed slightly larger ventricles after having lumbar drain clamped for 24 hours.     She's moving right leg much better. She's more interactive.      Son interpreting for this visit.      PLAN:   Patient Active Problem List   Diagnosis     Carotid artery aneurysm (H)     Aneurysmal subarachnoid hemorrhage (H)     Intraventricular hemorrhage, nontraumatic (H)     Compression of brain (H)     S/P coil embolization of cerebral aneurysm     Aneurysm of right internal carotid artery     Cerebrovascular accident (CVA) due to occlusion of right middle cerebral artery (H)     Vasospasm (H)     1.   If BP drops, would allow natural BP, do not use pressors.            Notify Neurosurgery if BP less than 100     2.  Keppra 500 mg two times a day, indefinite with seizure      3.  Nimodipine dosing complete - no benefit of extending per Dr Sal     4.  Lumbar drain to 5 ml/hr.       5.   shunt Monday with Dr. Hoffmann.        Discharge Recommendations/Plan:  Barriers to Discharge: yes, requiring acute medical care.     Follow Up Plan: Pending       HPI: Darion Herrera is a 44 yo female who was admitted on 07/22/19 with SAH and IVH secondary to ruptured aneurysm of the right ICA. Aneurysm coiling on 7/22 that was complicated by vasospasm. On 07/27, found to be unresponsive and possibly seizing, was intubated and taken to IR where it was revealed that her right ICU aneurysm had dissected and re-bled.  In IR on 07/27, the vessel was  recoiled and a lumbar drain was placed for ICP management.  On 07/29 underwent intraarterial verapamil infusion with angiogram for acute vasospasm of right ICA. Due to increased TCD values, back to IR for another cerebral angiogram 7/30 for acute vasospasm and intraarterial verapamil infusion.     BRIEF HOSPITAL COURSE:  7/21/2019  -Admit with nausea, vomiting, headache. CT with question of extra axial mass over the planum sphenoidale region measuring 2.2 x 2.3 cm    7/22/2019  -MRI indicates mass on CT actually a giant right supraclinoid internal carotid artery aneurysm. + intraventricular hemorrhage, small right subarachnoid hemorrhage.    -IR for angiogram, coil embolization of ruptured, dissecting aneurysm. Mild to moderate interstitial filling within the coil mass    7/24/2019  -Return to IR due to high likelihood of instability of aneurysm. Aneurysm was stable in appearance    7/27/2019  -Possible seizure, became unresponsive, intubated. CT + for increased amount of hemorrhage, cerebral edema, increasing size of ventricles.     -Return to IR for angio and placement of lumbar drain    -Aneurysm re-ruptured, additional coils placed with 95% occlusion    -Moderate to severe narrowing of R ICA likely secondary tot he dissecting aneurysm and not necessarily true spasm. Verapamil given.    -Mild spasm R MCA    7/28/2019  -Stable appearance of aneurysm, verapamil given    7/29/2019  -Stable to mildly increased filling of aneurysm.     -mildly increased spasm of R MCA, verapamil given. Plan for repeat angio 8/2/2019 7/30/2019  -TCD worsening. Patient sent for angio    -worsening spasm in multiple territories. Verapamil given to R ICA, L ICA, R vert    - Stable filling of aneurysm    -Head CT with some hypodensity concerning for infarct of R MCA territory    -Temp 102.9. Standard fever workup    7/31/2019  -Return to IR for treatment of vasospasm, more verapamil    -CT head unchanged    -UA with e coli. Gram neg  rods blood culture, may be contaminant    -Febrile, temp max 102.9    -Antibiotics started per ICU team    8/1/2019  -Return to IR for treatment of vasospasm, more verapamil    -CT head unchanged    -CSF sent from lumbar drain    -Fever persisted    8/2/2019          -Return to IR for treatment of vasospasm, higher dose verapamil given                          -TCDs worsening as compared to yesterday                          - Family conference to discuss G/J and Trach    8/7/2019           - Trach and Peg completed                          - MRI/MRA    8/8/2019           - IR (no additional coiling)                          - New lumbar drain    8/11/2019         - weaning lumbar drain                           - weaning from the vent                           - off pressors                           - velocities improved  8/12:                 - clamped lumbar drain  8/13:                 - ventricles slightly larger on CT - lumbar drain re-opened                           - angio without intervention - aneurysm unchanged  8/14:                 - Passey Fermin valve                           - planning  shunt 8/15/2019                              Which she refused.          SUBJECTIVE:      OBJECTIVE:  Vital signs in last 24 hour  Temp:  [98.6  F (37  C)-99.9  F (37.7  C)] 98.6  F (37  C)  Heart Rate:  [] 102  Resp:  [14-60] 29  BP: ()/(63-89) 125/81  SpO2:  [92 %-100 %] 95 %  ETCO2 (mmHg):  [0 mmHg] 0 mmHg  FiO2 (%):  [21 %] 21 %  Body mass index is 31.83 kg/m .    Mental Status: Trach, Vent, Follows commands and appears appropriate.  Cranial Nerves: Exam limited.  PERRL. + cough/gag. EOM intact.   She is speaking with me today via Passey fermin    Motor Strength: Minimal movement left arm and leg. Minimal hand grasp on the left.  Some horizontal movement of left leg and dorsiflexion/plantar flexion. Able to lift and hold right arm in the air. Can lift right leg.   Lumbar Drain: Lumbar drain to drain 5  ml/hr. Output clear/yellow.     Pertinent Labs:    Reviewed  Pertinent Radiology   Radiology Results: personally reviewed.    Head CT 8/15/2019  1.  Redemonstration of large endovascular coil mass  in the right paraclinoid region resulting in significant streak artifact and locally limiting evaluation.  2.  Within this limitation, no acute intracranial hemorrhage or evidence of transcortical infarct.  3.  Stable mild to moderate enlargement of the lateral and third ventricles.    Jailene Mcknight NP  [facility] Neurosurgery  O: [phone]

## 2021-05-31 NOTE — PROGRESS NOTES
[place] Daily Progress Note      Date of Service: 8/15/2019     Brief History: Darion Herrera is 43 y.o. 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 CT head showed an extra-axial brain mass, and was admitted to neuro ICU for further management. MRI showed right suprasellar mass and intraventricular hemorrhage and small subarachnoid hemorrhage in left sylvian fissure. 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 She was monitored with daily transcranial doppler. She had follow up angiogram on 07/24, which showed stable findings. MRA on 07/26 was suggestive of vasospasms and was started on triple H therapy (hypertension, hemodilution, and hypervolemia). The patient became unresponsive on 07/27 and was endotracheally intubated. A lumbar drain was placed, and she also had cerebral angiogram with endovascular coil embolization of re-ruptured previously coil embolized dissecting aneurysm of ventral medial supraclinoid right internal carotid artery on 07/27. The patient had cerebral angiogram with intraarterial verapamil infusion on 07/28, 07/29, 07/30, 07/31, 08/01, and 08/02. The lumbar drain was replaced on 08/02. The patient also underwent percutaneous tracheostomy and G tube placement by Dr. Ledezma on 08/02. The lumbar drain was replaced on 08/08, and she had another cerebral angiogram on 08/08. Cerebral angiogram on 08/13 seemed stable. Lumbar drain was clamped on 08/12, but was noted to have increasing ventricular dilatation, so the drain clamp was released. Planned for  shunt placement today.     Assessment:     Acute respiratory failure with hypoxia  -Secondary to poor airway protection, in the setting of intracranial hemorrhage  -S/p percutaneous  tracheostomy on 08/02. Trach changed to bivona #6 on 08/14 and is tolerating trach mask with PMV. Vent as needed.  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 Re clamped yesterday, and again the follow up CT shows increased ventricular dilatation. Plan to have a  shunt placed on 08/15  -Neurosurgery following  [facility]  -Discontinued Nimodipine on 07/13    Oropharyngeal dysphagia  At risk of malnutrition  -S/p G tube placement on 08/02  -Currently NPO. Otherwise on tube feeding    Electrolyte imbalance  -Hypokalemia, replaced  -Hypomagnesemia, replaced  -Monitor, and replace as needed    Urinary tract infection, unspecified  -Culture grew E. Coli on 07/30, and was treated with antibiotic (cephalosporin)  -Urine culture from 08/05 grew candida. She was treated with fluconazole.    Transaminitis   -Unclear etiology, likely medication  -Stable liver enzymes  -Monitor intermitently      Subjective:     Chart reviewed, events noted. Pt seen and examined. A professional [place] language interpretor was available during my evaluation. She is tolerating TM with PMV this morning. LD was clamped yesterday, and the CT again showed increased ventricular dilatation. She will undergo  shunt placement today.  "Discussed with NS team.    Objective     Vital signs in last 24 hours:  Temp:  [98.1  F (36.7  C)-99.2  F (37.3  C)] 99.1  F (37.3  C)  Heart Rate:  [] 88  Resp:  [14-37] 26  BP: (108-130)/(68-85) 108/76  FiO2 (%):  [21 %] 21 %  Weight:   142 lb 4.8 oz (64.5 kg)  Weight change: -3 lb (-1.361 kg)  Body mass index is 29.74 kg/m .    Intake/Output last 3 shifts:  I/O last 3 completed shifts:  In: 160 [I.V.:10; NG/GT:150]  Out: 1687 [Urine:1550; Drains:87; Stool:50]  Intake/Output this shift:  No intake/output data recorded.      Physical Exam:  /76   Pulse 88   Temp 99.1  F (37.3  C)   Resp 26   Ht 4' 10\" (1.473 m)   Wt 142 lb 4.8 oz (64.5 kg)   LMP 07/01/2016   SpO2 99%   BMI 29.74 kg/m      FiO2 (%): 21 % O2 Device: Trach mask O2 Flow Rate (L/min): 20 L/min    General Appearance: Alert, not in distress.  HEENT: Normocephalic. No scleral icterus. Trach in place with no sign of bleeding.  Heart: S1 S2 heard. Regular rate and rhythm.  Lungs: Clear to auscultation bilaterally.  Abdomen: Soft, non tender, bowel sounds present. G tube in place.  Extremity: No deformity. No joint swelling. No edema.  Neurologic: Left leg and left arm weakness. Patient is alert and responding to questions  Skin: No skin rashes      Imaging:  personally reviewed.    Ct Head Without Contrast    Result Date: 8/15/2019  EXAM: CT HEAD WO CONTRAST LOCATION: [facility] DATE/TIME: 8/15/2019 5:06 AM INDICATION: Hydrocephalus Preop planning.  AxiEM CT; NO fiducials, include nose and face (as guide), STEALTH PROTOCOL. COMPARISON: 8/13/2019. TECHNIQUE: Stealth without IV contrast. Multiplanar reformats. Dose reduction techniques were used. FINDINGS: INTRACRANIAL CONTENTS: Again demonstrated is a large endovascular coil mass in the right paraclinoid region from aneurysm coiling. This results in regional artifact obscuring portions of the intracranial compartment. There is has been a mild interval enlargement of the lateral " and third ventricles which are mild to moderately dilated. No acute intracranial hemorrhage. Ventricles and sulci are within normal limits. No intracranial mass. VISUALIZED ORBITS/SINUSES/MASTOIDS: No significant orbital abnormality. No significant paranasal sinus mucosal disease. No significant middle ear or mastoid effusion. BONES/SOFT TISSUES: No significant abnormality.     1.  Stealth noncontrast head CT for preoperative planning. 2.  Mild interval enlargement of the lateral and third ventricles which are mild to moderately dilated. This suggests a worsening hydrocephalus. No definite transependymal CSF noted. 3.  Status post endovascular coiling of giant right paraclinoid aneurysm.          Lab Results:  personally reviewed.   Lab Results   Component Value Date     08/15/2019    K 4.2 08/15/2019     08/15/2019    CO2 24 08/15/2019    BUN 16 08/15/2019    CREATININE 0.60 08/15/2019    CALCIUM 10.2 08/15/2019     Lab Results   Component Value Date    WBC 7.2 08/15/2019    HGB 12.8 08/15/2019    HCT 39.4 08/15/2019    MCV 91 08/15/2019     (H) 08/15/2019     Results from last 7 days   Lab Units 08/15/19  0424 08/14/19  0448 08/13/19  0500   LN-MAGNESIUM mg/dL 2.2 2.1 2.0        Results from last 7 days   Lab Units 08/13/19  2101 08/10/19  0032 08/09/19  1800 08/09/19  0551 08/08/19  2346 08/08/19 2022   LN-POC GLUCOSE FINGERSTICK- HE mg/dL 103 141* 148* 155* 151* 145*           Advance Care Planning:   Barriers to discharge:  shunt placement  Anticipated discharge day: to be determined  Disposition: JEFF Campoverde MD  [facility]  Hospitalist  Paging: [employer]

## 2021-05-31 NOTE — PLAN OF CARE
Care Management Goals of the Day: Follow progression of care, assist with DC planning     Care Progression Reviewed With: Charge RN, MD, HUC, RNCM     Barriers to Discharge: ICU medical cares, intubated w/weaning trials, angio pending,     Discharge Disposition: [place] when stable     Expected Discharge Date: 8/16/19     Transportation:  stretcher    Care Coordination Narrative: Pt continues with weaning trials, RT reports 12 hours off vent yesterday. Pt lumbar drain is still in place and being utilized. Plan is [place] when stable, Anupam aware and following. CM to follow.

## 2021-05-31 NOTE — PROGRESS NOTES
Respiratory Care Note    Pt currently on trach mask 20LPM 21% and tolerating well. Vent on standby. Will communicate to evening/nightshift RT that patient is to be placed back on full vent support at night. No respiratory distress noted. Suctioning scant secretions. RT following.    KAE RosalesT

## 2021-05-31 NOTE — ANESTHESIA CARE TRANSFER NOTE
SBAR report to RN per institutional policy and procedure.  Transfer of care.    Last vitals:   Vitals:    08/07/19 1750   BP: 145/82   Pulse: 68   Resp: 14   Temp: 37.3  C (99.1  F)   SpO2: 100%     Patient's level of consciousness is unresponsive  Spontaneous respirations: no: vent  Maintains airway independently: no: trach  Dentition unchanged: yes  Oropharynx: oropharynx clear of all foreign objects    QCDR Measures:  ASA# 20 - Surgical Safety Checklist: WHO surgical safety checklist completed prior to induction    PQRS# 430 - Adult PONV Prevention: NA - Not adult patient, not GA or 3 or more risk factors NOT present  ASA# 8 - Peds PONV Prevention: NA - Not pediatric patient, not GA or 2 or more risk factors NOT present  PQRS# 424 - Nina-op Temp Management: 4559F - At least one body temp DOCUMENTED => 35.5C or 95.9F within required timeframe  PQRS# 426 - PACU Transfer Protocol: - Transfer of care checklist used  ASA# 14 - Acute Post-op Pain: ASA14B - Patient did NOT experience pain >= 7 out of 10

## 2021-05-31 NOTE — PROGRESS NOTES
"  Clinical Nutrition Therapy Nutrition Support Note      Subjective:  Pt continues to have liquid stools, dignicare in place.  Chart reviewed.    Nutrition Prescription:   Diet: NPO for CT and angio today. (TF Isosource 1.5 at 35 ml/hr continuous)  Water flush 30 ml q 4 hrs  Modular:  Nutrifiber 1 pkt bid    IV dextrose or Fluids:    dexmedetomidine 400 mcg/100 mL in NS (PRECEDEX) (4mcg/mL) Last Rate: Stopped (08/12/19 0945)       Nutrition Intake:  FT is an PEG placed 8/7/19.    TF at goal provides:  1260 calories, 57 g protein, 148 g carb, 19 g fiber, 822 ml free water.    Additional fluid per IV.     Anthropometrics:  Height: 4' 10\" (147.3 cm)  Admission weight: 155#  Weight: 149 lb (67.6 kg)     GI Status/Output:   GI symptoms per nursing:   Last BM recorded: 750 ml per rectal tube 8/12.   Note diuresing with 3825 ml urine out in past 24 hrs.    Skin/Wound:   Clifford Scale Score: 14       Plan:  Liquid stooling.  Stooling has varied from 0-750 ml over the past few days.  Will change TF to semi-elemental formula to help with absorption/tolerance.    Peptamen 1.5 at 35 ml/hr.  Water flush 60 ml q 4 hrs.  Continue Nutrifiber bid.  To provide:  1260 calories, 57 g protein, 158 g carb, 6 g fiber, 1005 ml free water.  Anticipate pt will need additional hydration as IV fluids wean.    Monitor:  TF tolerance, wt, labs, hydration needs.    See Care Plan for Problems, Goals, and Interventions.        "

## 2021-05-31 NOTE — PLAN OF CARE
Pt remains sedated at rass goal -3 with no sedation interruptions given. Pt did squeeze with right hand one time during the day, unable to duplicate. Cultures send for continued fevers. Had episode of hypoxia upon return from IR, requiring increased oxygen. Dr. Reyes aware and saw pt.   Problem: Pain  Goal: Patient's pain/discomfort is manageable  Outcome: Progressing     Problem: Safety  Goal: Patient will be injury free during hospitalization  Outcome: Progressing     Problem: Daily Care  Goal: Daily care needs are met  Outcome: Progressing     Problem: Psychosocial Needs  Goal: Demonstrates ability to cope with hospitalization/illness  Outcome: Progressing     Problem: Risk of Injury Due to Unsafe Behavior  Goal: Patient will remain safe while in restraint; physical/psychological needs will be met  Outcome: Progressing  Goal: Alternatives to restraint will be continually assessed with use of least restrictive device and discontinuation as soon as possible  Outcome: Progressing  Goal: Patient/Family will be able to communicate reason for restraint and steps for restraint application and removal  Outcome: Progressing

## 2021-05-31 NOTE — ANESTHESIA CARE TRANSFER NOTE
Patient spontaneously breathing.  Tidal volumes > 300ml.  Following commands, trach cuff deflated and pt placed on trach mask.  O2 at 6L 50%fio2.  To PACU, CT on monitored cart with RN for a scan prior to transport to PACU. VSS, SBAR report to RN per institutional handoff policy and procedure.  Transfer of care.    Last vitals:   Vitals:    08/19/19 1635   BP: (!) 137/91   Pulse: 88   Resp: 16   Temp: 36.4  C (97.6  F)   SpO2: 100%     Patient's level of consciousness is drowsy  Spontaneous respirations: yes  Maintains airway independently: yes  Dentition unchanged: yes  Oropharynx: oropharynx clear of all foreign objects    QCDR Measures:  ASA# 20 - Surgical Safety Checklist: WHO surgical safety checklist completed prior to induction    PQRS# 430 - Adult PONV Prevention: 4558F - Pt received => 2 anti-emetic agents (different classes) preop & intraop  ASA# 8 - Peds PONV Prevention: NA - Not pediatric patient, not GA or 2 or more risk factors NOT present  PQRS# 424 - Nina-op Temp Management: 4559F - At least one body temp DOCUMENTED => 35.5C or 95.9F within required timeframe  PQRS# 426 - PACU Transfer Protocol: - Transfer of care checklist used  ASA# 14 - Acute Post-op Pain: ASA14B - Patient did NOT experience pain >= 7 out of 10

## 2021-05-31 NOTE — PROGRESS NOTES
RESPIRATORY CARE NOTE   Darion Herrera  Pt continue to be on room air off ventlator all this morning. ABG done on RA was within normal range. Vent removed. BS coarse crackles bilaterally. RT will overview  post ventilator supportive interventions to for adequate  oxygenation, ventilation and airway clearance  Said ISAAK Marquez

## 2021-05-31 NOTE — PROGRESS NOTES
Vent Mode: VCV  FiO2 (%):  [30 %] 30 %  S RR:  [16] 16  S VT:  [375 mL] 375 mL  PEEP/CPAP (cm H2O):  [5 cm H2O] 5 cm H2O  Minute Ventilation (L/min):  [6.2 L/min-7.4 L/min] 6.9 L/min  PIP:  [19 cm H2O-30 cm H2O] 22 cm H2O  MAP (cm H2O):  [7-9] 7     Pt remained on the above vent setting for the night. BS were diminished bilaterally, pt has a 7.5 ETT at 21 @teeth and was not weaned for the night. Pt was Suctioned scant  white thick secretion and tolerated well. Rt will continue to monitor.    KAE McclainT

## 2021-05-31 NOTE — PROGRESS NOTES
Physical Therapy     08/12/19 1111   Visit Specifics   Eval Type Initial eval   Inital PT Consult 08/12/19    Present   Bed/Tabs/Pad Alarm Applied Other (Comment)  (pt with call light)   Subjective Patient Comments pt currently trached   General   Onset date 07/22/19   Additional Pertinent History S/p endovascular coil embolization, cerebral infarction, SAH   Chart Reviewed Yes   PT/OT Patient/Caregiver Stated Goals wants to go home   Family/Caregiver Present Yes   Treatment Time   Theraputic Exercise 15   Precautions   General Precautions Seizure  (trach, rectal tube)   Home Living   Type of Home House   Home Layout Two level;Bed/bath upstairs;Stairs to enter with rails  (split level)   Mobility Equipment   (none)   Prior Status   Independent With All ADL's/IADL's   Prior Device Use None of the above   Indoor Mobility Independent   Lives With Family   Vocational Full time employment   Device Used No   RLE Assessment   RLE Assessment WFL   Strength LLE   L Hip Flexion 3/5   L Knee Flexion 2-/5   L Knee Extension 3/5   L Ankle Dorsiflexion 0/5   L Ankle Plantar Flexion 0/5   Bed Mobility   Bed Mobility Comments pt hoyered to chair w/ nsg staff   Endurance Deficit   Endurance Deficit Yes   Endurance Deficit Description weakness   Fall Risk   Fall Risk High   Plan   Treatment/Interventions Functional transfer training;Gait/stair training;Strengthening/ROM;Neuromuscular re-ed   PT Frequency Daily   Assessment   Prognosis Good   Problem List Decreased mobility;Decreased strength;Decreased endurance;Impaired balance;Decreased coordination   Barriers to Discharge Decreased caregiver support;Inaccessible home environment   Recommendation   PT Discharge Recommendation LTACH (pt currently on vent and trach)   PT Equipment Recommendation Other (Comment)  (pending progress)   Treatment Suggestions for Next Session progress with bed mobility, sitting sarah, attempt standing/transfers w/ AIDE, ther ex   PT Care Plan  REVIEWED DAILY Yes, goals remain appropriate

## 2021-05-31 NOTE — PROGRESS NOTES
Neurosurgery      MRA raises concern for recurrent aneurysm.  Discussed with Dr. Monterroso. He has patient on schedule in AM for angio and possible more coil if needed or able.    Jailene Mcknight NP

## 2021-05-31 NOTE — CONSULTS
"    INITIAL PSYCHIATRIC CONSULTATION  This note was created with the help of Dragon dictation system. Grammatical and typing errors are not intentional.                REASON FOR REQUEST: situational depression        ASSESSMENT/RECOMMENDATIONS/PLAN :     Adjustment disorder with depression and anxiety likely due to medical condition, hospitalization.  Sleep difficulties due to above.    Recommendations:  Melatonin 3 mg at bedtime as needed.  Patient declines any antidepressant/anxilytics at present.  SS to send referral to psychology for ongoing therapy for coping skills and anxiety management.      MENTAL STATUS EXAMINATION:   Appearance: Stated age, cooperative, not in acute distress, mild anxiety noted.  Behavior: Good eye contact. No bizarre ideations, no ideas of reference.  Speech: Coherent.  Thought Process: Organized, Clear.  Thought content: Does not show any hallucinations, delusions, paranoia, or thoughts of dying. Patient does not show any psychosis nor responding to internally generated stimuli.   Thought Formation: No loosening of associations. No flight of ideas.   Judgment: Intact  Insight :Intact  Attention : Sufficent  Memory: Adequate for both long and short term recall.   Fund Of Knowledge: Sufficent  Affect: Blunted  Mood: Congruent  Alert: Awake.  Orientation: X 4   Comprehension: Adequate  Generative thought content:Sufficient  [employer]: Intact  Gait and ambulation: No rigidity, no tonal abnormalities    Vital Signs: /77   Pulse (!) 112   Temp 99  F (37.2  C)   Resp 23   Ht 4' 10\" (1.473 m)   Wt 152 lb 4.8 oz (69.1 kg)   LMP 07/01/2016   SpO2 100%   BMI 31.83 kg/m      HISTORY OF PRESENT ILLNESS:   Presenting history to include: [place] [facility]       Genny Comer PA-C  Patient with new intracranial mass requiring further evaluation with MRI.  Patient will be transferred to [facility] neuro ICU for further diagnostics and management of brain mass.  Differential for mass " meningioma versus other extra-axial mass.  Infection also on differential, given patient with lactate of 3 and WBC 14.8.  Patient had a CT scan of her abdomen to evaluate LLQ pain, no abnormalities were identified.  Patient's chest x-ray negative for infection.  The patient's urinalysis is still pending.  No cause for patient's leukocytosis and lactic acidosis identified at this time, patient given IV fluids x 2 and repeat lactate ordered, not yet drawn.  Again, this could be from a CNS infection given no other source identified at this time.  Patient has not yet received any antibiotics.  Lumbar puncture not performed given CNS mass identified on CT, although patient with intermittent meningeal signs.  Patient given IV Keppra 500 mg here in the emergency department as recommended by neurosurgery.  Spoke with hospitalist, Dr. Pierre at [facility], who is accepting patient to neuro ICU.     Darion Herrera is a 43 y.o. 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 for evaluation of above complaint.  Her symptoms have been intermittently going on for 3 days.  They are worse during daytime and the patient has gone home from work.  She denies recent traveling or sick contacts but felt warm and unwell prior to ED evaluation.  She denied chest pain, shortness of breath, palpitations, sore throat or nasal congestion.  She denies recent infections or use of antibiotics.  She described the pain that started at the base of the neck with radiation to the forehead like they are connected.  Denies vision changes.  Denies recent falls or head trauma.  In the ED she was afebrile and hemodynamically stable.  CT scan abdomen and pelvis was unremarkable.  CT head showed an extra-axial brain mass.  Neurosurgery recommended Keppra for seizure prophylaxis and transferred to [facility] neuro ICU.  Patient denies tobacco, alcohol or illicit drugs.  Upon assessment this afternoon  "Mrs. Herrera was noted to be comfortable and pleasant.  She was seen with her family in attendance to include her , sister-in-law.  Sister-in-law was available for interpreting per patient's permission. She also allowed her family members to be present in the room during this assessment.  She acknowledged feeling sad, tearful and nervous as well as overwhelmed due to hospitalization and medical condition.  She initially had presented to 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  Symptomatic.  Differential diagnosis for meningioma versus other lesions.  Head of bed more than 30 degrees, further assessment with brain MRI.  Keppra for seizure prophylaxis and under care of Neurosurgery/ICU. She did not anticipate this when she had gone to .  She is tearful, anxious and nervous but she wants to get well. She does not want to take any antidepressants, nor any anxiolytics on a scheduled basis.  She is open to trying sleep medications on as needed basis. Mood fluctuations at times when anxious. She is overwhelmed and nervous, wants to get well and wants to resume her routine and functionality.  She does not report of any apathy, anhedonia, lack of motivation or lack of energy.  She denies any thoughts of dying or suicidality. She experienced difficulties with sleep regulation at home which she had attributed to \"my  argued at that time\".  She said that she \" did not have that issue\" referring to  not present in hospital and no potential of arguing. She does not offer any concerns.   She is comforted by family presence.     Review of Systems:As per HPI. Remainders of 12 point review of systems negative.  Psychiatric ROS:  Change of Interest/Anhedonia:  No  Appetite/Weight Changes: No  Concentration " "Changes:No  Impaired Energy:No  Impaired Sleep:No  Anxiety/Panic:Yes  Tearfulness:No  Depression:No  Psychosis: No  Irritability:No  SI/VI/HI: No,No,No  Tiffany: No               PFSH reviewed  and not pertinent to chief complaint/reason for visit  /77   Pulse 98   Temp 98.8  F (37.1  C)   Resp 26   Ht 4' 10\" (1.473 m)   Wt 152 lb 4.8 oz (69.1 kg)   LMP 07/01/2016   SpO2 96%   BMI 31.83 kg/m    No results found for: AMPHET, PCP, BARBIT, COCAINEMETAB, OXYCODONE, CREAUR, THC, ETOH  Recent Results (from the past 24 hour(s))   Magnesium   Result Value Ref Range    Magnesium 2.1 1.8 - 2.6 mg/dL   Comprehensive Metabolic Panel   Result Value Ref Range    Sodium 137 136 - 145 mmol/L    Potassium 4.1 3.5 - 5.0 mmol/L    Chloride 102 98 - 107 mmol/L    CO2 26 22 - 31 mmol/L    Anion Gap, Calculation 9 5 - 18 mmol/L    Glucose 102 70 - 125 mg/dL    BUN 19 8 - 22 mg/dL    Creatinine 0.61 0.60 - 1.10 mg/dL    GFR MDRD Af Amer >60 >60 mL/min/1.73m2    GFR MDRD Non Af Amer >60 >60 mL/min/1.73m2    Bilirubin, Total 0.7 0.0 - 1.0 mg/dL    Calcium 10.3 8.5 - 10.5 mg/dL    Protein, Total 8.4 (H) 6.0 - 8.0 g/dL    Albumin 4.4 3.5 - 5.0 g/dL    Alkaline Phosphatase 112 45 - 120 U/L    AST 39 0 - 40 U/L    ALT 60 (H) 0 - 45 U/L   HM1 (CBC with Diff)   Result Value Ref Range    WBC 8.4 4.0 - 11.0 thou/uL    RBC 4.38 3.80 - 5.40 mill/uL    Hemoglobin 12.9 12.0 - 16.0 g/dL    Hematocrit 39.6 35.0 - 47.0 %    MCV 90 80 - 100 fL    MCH 29.5 27.0 - 34.0 pg    MCHC 32.6 32.0 - 36.0 g/dL    RDW 13.8 11.0 - 14.5 %    Platelets 491 (H) 140 - 440 thou/uL    MPV 9.9 8.5 - 12.5 fL    Neutrophils % 67 50 - 70 %    Lymphocytes % 19 (L) 20 - 40 %    Monocytes % 10 2 - 10 %    Eosinophils % 3 0 - 6 %    Basophils % 1 0 - 2 %    Neutrophils Absolute 5.6 2.0 - 7.7 thou/uL    Lymphocytes Absolute 1.6 0.8 - 4.4 thou/uL    Monocytes Absolute 0.8 0.0 - 0.9 thou/uL    Eosinophils Absolute 0.3 0.0 - 0.4 thou/uL    Basophils Absolute 0.1 0.0 - 0.2 " thou/uL     Recent Results (from the past 72 hour(s))   POCT Glucose    Collection Time: 08/13/19  9:01 PM   Result Value Ref Range    Glucose 103 70 - 139 mg/dL   Magnesium    Collection Time: 08/14/19  4:48 AM   Result Value Ref Range    Magnesium 2.1 1.8 - 2.6 mg/dL   Comprehensive Metabolic Panel    Collection Time: 08/14/19  4:48 AM   Result Value Ref Range    Sodium 136 136 - 145 mmol/L    Potassium 3.9 3.5 - 5.0 mmol/L    Chloride 104 98 - 107 mmol/L    CO2 22 22 - 31 mmol/L    Anion Gap, Calculation 10 5 - 18 mmol/L    Glucose 118 70 - 125 mg/dL    BUN 13 8 - 22 mg/dL    Creatinine 0.59 (L) 0.60 - 1.10 mg/dL    GFR MDRD Af Amer >60 >60 mL/min/1.73m2    GFR MDRD Non Af Amer >60 >60 mL/min/1.73m2    Bilirubin, Total 0.6 0.0 - 1.0 mg/dL    Calcium 9.8 8.5 - 10.5 mg/dL    Protein, Total 7.8 6.0 - 8.0 g/dL    Albumin 4.1 3.5 - 5.0 g/dL    Alkaline Phosphatase 111 45 - 120 U/L    AST 40 0 - 40 U/L    ALT 86 (H) 0 - 45 U/L   HM1 (CBC with Diff)    Collection Time: 08/14/19  4:48 AM   Result Value Ref Range    WBC 8.1 4.0 - 11.0 thou/uL    RBC 4.14 3.80 - 5.40 mill/uL    Hemoglobin 12.1 12.0 - 16.0 g/dL    Hematocrit 36.4 35.0 - 47.0 %    MCV 88 80 - 100 fL    MCH 29.2 27.0 - 34.0 pg    MCHC 33.2 32.0 - 36.0 g/dL    RDW 14.3 11.0 - 14.5 %    Platelets 435 140 - 440 thou/uL    MPV 9.8 8.5 - 12.5 fL    Neutrophils % 79 (H) 50 - 70 %    Lymphocytes % 10 (L) 20 - 40 %    Monocytes % 6 2 - 10 %    Eosinophils % 3 0 - 6 %    Basophils % 1 0 - 2 %    Neutrophils Absolute 6.4 2.0 - 7.7 thou/uL    Lymphocytes Absolute 0.8 0.8 - 4.4 thou/uL    Monocytes Absolute 0.5 0.0 - 0.9 thou/uL    Eosinophils Absolute 0.3 0.0 - 0.4 thou/uL    Basophils Absolute 0.1 0.0 - 0.2 thou/uL   Platelet Function Test    Collection Time: 08/14/19  3:00 PM   Result Value Ref Range    PFA-COL/ 1 - 180 sec   APTT(PTT)    Collection Time: 08/14/19  6:47 PM   Result Value Ref Range    PTT 31 24 - 37 seconds   INR    Collection Time: 08/14/19   6:47 PM   Result Value Ref Range    INR 1.01 0.90 - 1.10   HM1 (CBC with Diff)    Collection Time: 08/15/19  4:24 AM   Result Value Ref Range    WBC 7.2 4.0 - 11.0 thou/uL    RBC 4.33 3.80 - 5.40 mill/uL    Hemoglobin 12.8 12.0 - 16.0 g/dL    Hematocrit 39.4 35.0 - 47.0 %    MCV 91 80 - 100 fL    MCH 29.6 27.0 - 34.0 pg    MCHC 32.5 32.0 - 36.0 g/dL    RDW 14.2 11.0 - 14.5 %    Platelets 486 (H) 140 - 440 thou/uL    MPV 10.1 8.5 - 12.5 fL    Neutrophils % 64 50 - 70 %    Lymphocytes % 19 (L) 20 - 40 %    Monocytes % 10 2 - 10 %    Eosinophils % 4 0 - 6 %    Basophils % 1 0 - 2 %    Neutrophils Absolute 4.6 2.0 - 7.7 thou/uL    Lymphocytes Absolute 1.4 0.8 - 4.4 thou/uL    Monocytes Absolute 0.8 0.0 - 0.9 thou/uL    Eosinophils Absolute 0.3 0.0 - 0.4 thou/uL    Basophils Absolute 0.1 0.0 - 0.2 thou/uL   Magnesium    Collection Time: 08/15/19  4:24 AM   Result Value Ref Range    Magnesium 2.2 1.8 - 2.6 mg/dL   Comprehensive Metabolic Panel    Collection Time: 08/15/19  4:24 AM   Result Value Ref Range    Sodium 136 136 - 145 mmol/L    Potassium 4.2 3.5 - 5.0 mmol/L    Chloride 103 98 - 107 mmol/L    CO2 24 22 - 31 mmol/L    Anion Gap, Calculation 9 5 - 18 mmol/L    Glucose 105 70 - 125 mg/dL    BUN 16 8 - 22 mg/dL    Creatinine 0.60 0.60 - 1.10 mg/dL    GFR MDRD Af Amer >60 >60 mL/min/1.73m2    GFR MDRD Non Af Amer >60 >60 mL/min/1.73m2    Bilirubin, Total 0.5 0.0 - 1.0 mg/dL    Calcium 10.2 8.5 - 10.5 mg/dL    Protein, Total 8.3 (H) 6.0 - 8.0 g/dL    Albumin 4.2 3.5 - 5.0 g/dL    Alkaline Phosphatase 110 45 - 120 U/L    AST 44 (H) 0 - 40 U/L    ALT 72 (H) 0 - 45 U/L   Magnesium    Collection Time: 08/16/19  4:53 AM   Result Value Ref Range    Magnesium 2.1 1.8 - 2.6 mg/dL   Comprehensive Metabolic Panel    Collection Time: 08/16/19  4:53 AM   Result Value Ref Range    Sodium 137 136 - 145 mmol/L    Potassium 4.1 3.5 - 5.0 mmol/L    Chloride 102 98 - 107 mmol/L    CO2 26 22 - 31 mmol/L    Anion Gap, Calculation 9 5 -  18 mmol/L    Glucose 102 70 - 125 mg/dL    BUN 19 8 - 22 mg/dL    Creatinine 0.61 0.60 - 1.10 mg/dL    GFR MDRD Af Amer >60 >60 mL/min/1.73m2    GFR MDRD Non Af Amer >60 >60 mL/min/1.73m2    Bilirubin, Total 0.7 0.0 - 1.0 mg/dL    Calcium 10.3 8.5 - 10.5 mg/dL    Protein, Total 8.4 (H) 6.0 - 8.0 g/dL    Albumin 4.4 3.5 - 5.0 g/dL    Alkaline Phosphatase 112 45 - 120 U/L    AST 39 0 - 40 U/L    ALT 60 (H) 0 - 45 U/L   HM1 (CBC with Diff)    Collection Time: 08/16/19  4:53 AM   Result Value Ref Range    WBC 8.4 4.0 - 11.0 thou/uL    RBC 4.38 3.80 - 5.40 mill/uL    Hemoglobin 12.9 12.0 - 16.0 g/dL    Hematocrit 39.6 35.0 - 47.0 %    MCV 90 80 - 100 fL    MCH 29.5 27.0 - 34.0 pg    MCHC 32.6 32.0 - 36.0 g/dL    RDW 13.8 11.0 - 14.5 %    Platelets 491 (H) 140 - 440 thou/uL    MPV 9.9 8.5 - 12.5 fL    Neutrophils % 67 50 - 70 %    Lymphocytes % 19 (L) 20 - 40 %    Monocytes % 10 2 - 10 %    Eosinophils % 3 0 - 6 %    Basophils % 1 0 - 2 %    Neutrophils Absolute 5.6 2.0 - 7.7 thou/uL    Lymphocytes Absolute 1.6 0.8 - 4.4 thou/uL    Monocytes Absolute 0.8 0.0 - 0.9 thou/uL    Eosinophils Absolute 0.3 0.0 - 0.4 thou/uL    Basophils Absolute 0.1 0.0 - 0.2 thou/uL       PMH: History reviewed. No pertinent past medical history.        Current Medications:Scheduled Meds:    atorvastatin  20 mg Enteral Tube DAILY     chlorhexidine  15 mL Topical Q12H     guar gum  1 packet Enteral Tube BID     levETIRAcetam  (KEPPRA) solution 100 mg/mL  1,000 mg Enteral Tube BID     nystatin  5 mL Swish & Swallow QID     omeprazole  20 mg Enteral Tube QAM AC    Or     pantoprazole  40 mg Intravenous QAM AC     potassium chloride liquid/packet  20 mEq Enteral Tube BID     sodium chloride  10-30 mL Intravenous Q8H FIXED TIMES     Continuous Infusions:  PRN Meds:.acetaminophen, bisacodyl, HYDROmorphone, lipase-protease-amylase **AND** sodium bicarbonate, menthol-zinc oxide, naloxone **OR** naloxone, ondansetron, polyethylene glycol, senna **OR**  senna (SENOKOT) syrup, simethicone, sodium chloride bacteriostatic, sodium chloride, sodium chloride, sodium chloride, white babrra-mineral oil (LUBRIFRESH PM) ophthalmic ointment                Family History: PERSONALLY REVIEWED.History reviewed. No pertinent family history.  Pertinent Family hx not pertinent to Chief Complaint or reason for visit.     Social History:  PERSONALLY REVIEWED.  Social History     Socioeconomic History     Marital status:      Spouse name: Not on file     Number of children: Not on file     Years of education: Not on file     Highest education level: Not on file   Occupational History     Not on file   Social Needs     Financial resource strain: Not on file     Food insecurity:     Worry: Not on file     Inability: Not on file     Transportation needs:     Medical: Not on file     Non-medical: Not on file   Tobacco Use     Smoking status: Never Smoker     Smokeless tobacco: Never Used   Substance and Sexual Activity     Alcohol use: No     Drug use: Not Currently     Sexual activity: Yes   Lifestyle     Physical activity:     Days per week: Not on file     Minutes per session: Not on file     Stress: Not on file   Relationships     Social connections:     Talks on phone: Not on file     Gets together: Not on file     Attends Bahai service: Not on file     Active member of club or organization: Not on file     Attends meetings of clubs or organizations: Not on file     Relationship status: Not on file     Intimate partner violence:     Fear of current or ex partner: Not on file     Emotionally abused: Not on file     Physically abused: Not on file     Forced sexual activity: Not on file   Other Topics Concern     Not on file   Social History Narrative     Not on file    not pertinent to Chief Complaint or reason for visit.             Allergies as of 06/01/2014 Reviewed     Review of Systems:As per HPI. Remainders of 12 point review of systems negative.    Review of Pertinent  Laboratory:      PERSONALLY REVIEWED.    Physical Exam: Temp:  [98.5  F (36.9  C)-99.5  F (37.5  C)] 98.8  F (37.1  C)  Heart Rate:  [] 98  Resp:  [14-29] 26  BP: ()/(65-94) 109/77  FiO2 (%):  [21 %] 21 %   Vitals: reviewed in chart     Physical exam as per medical team: reviewed in chart      diagnoses, risk and benefits of medications discussed with staff. Care coordination with care management team.   Thank you for this consultation.       Una Gregory; NP  [facility]        This note was created with the help of Dragon dictation system. Grammatical and typing errors are not intentional.

## 2021-05-31 NOTE — PROGRESS NOTES
[facility] Daily Progress Note    Assessment/Plan:  aDrion Herrera is a 43-year-old female who presented 7/21/2019 with headache that started in the Neck with radiation to the forehead ruptured aneurysm of the right ICA resulting in subarachnoid hemorrhage with extension intraventricular hemorrhage.  She is status post aneurysm coiling on 7/22 which was complicated by vasospasm.  There is also a complication of the right ICA aneurysm with dissection and rebleeding leading to seizure and unresponsiveness on 7/27.  This required intubation and recoiling and placement of lumbar drain. She is currently requiring daily intraarterial verapamil infusions w/angiogram for acute vasospasms of the R ICA.  She is currently intubated in ICU. Intensivist, neurology and neurosurgery managing.      Assessment:  Right ICA Aneurysm complicated with SAH, IVH  S/p Dissection and Coling x 2 with hydrocephalus s/p Lumbar drainage   Daily intraarterial verapamil infusions w/angiogram  On nimodipine Q4H for total of 21 days  Maintain Na 145-150    Recurrent Seizures  On  vimpat  Had some twitching yest, planned for repeat EEG today.    Metabolic Encephalopathy  Management as per above    E. Coli CAUTI  On Cefepime     Respiratory failure  Intubated, sedated. On pressors.  D/w trach and PEG, family hasnt decided yet.     Anemia  Hb dropped from 11.2-->10.7-->9.7-->8.   NSG wants target HCT to be >30. Will transfuse 1 U PRBC today and recehck CBC in PM.       DVT ppx: SCD  GI ppx: Protonix    Diet: NPO, tube feeds    Code: Full code      Subjective:  Patient is intubated, sedated. No much response. D/w RN, had some shoulder twitching yest, planned for EEG today. Also, planned for more imagings of brain today. No family at bedside currently.      Current Medications Reviewed via EHR List    Objective:  Vital signs in last 24 hours:  Temp:  [99.1  F (37.3  C)-103  F (39.4  C)] 99.1  F (37.3  C)  Heart Rate:  [] 96  Resp:  [5-33] 16  BP:  (103-146)/(63-88) 146/63  SpO2:  [84 %-100 %] 96 %  ETCO2 (mmHg):  [0 mmHg-45 mmHg] 43 mmHg  Arterial Line BP: (121-175)/(56-87) 158/73  FiO2 (%):  [30 %-40 %] 30 %    Intake/Output last 3 shifts:  I/O last 3 completed shifts:  In: 6329.9 [I.V.:5703.9; NG/GT:216; IV Piggyback:410]  Out: 4672 [Urine:3500; Drains:272; Stool:900]      Physical Exam:  General: Intubated, sedated.  HEENT: Intubated, PERRL  HEART : S1 S2, normal rate/rhythm  LUNGS: Clear to auscultation, equal air entry  ABDOMEN:   Soft, non distended  CNS Sedated               Lab Results:  Personally Reviewed.   Fingerstick Blood Glucose:   Recent Labs     08/01/19  1836 08/02/19  1308 08/02/19  1807 08/03/19  0113 08/03/19  0542   POCGLUFGR 128 141* 130 147* 125       Recent Results (from the past 24 hour(s))   Culture, Catherer Tip    Collection Time: 08/02/19 10:45 AM   Result Value Ref Range    Culture No growth to date    Sodium lab - every 6 hours    Collection Time: 08/02/19  1:07 PM   Result Value Ref Range    Sodium 143 136 - 145 mmol/L   POCT Glucose    Collection Time: 08/02/19  1:08 PM   Result Value Ref Range    Glucose 141 (H) 70 - 139 mg/dL   POCT Glucose    Collection Time: 08/02/19  6:07 PM   Result Value Ref Range    Glucose 130 70 - 139 mg/dL   Sodium lab - every 6 hours    Collection Time: 08/02/19  6:21 PM   Result Value Ref Range    Sodium 145 136 - 145 mmol/L   Sodium lab - every 6 hours    Collection Time: 08/03/19 12:11 AM   Result Value Ref Range    Sodium 144 136 - 145 mmol/L   POCT Glucose    Collection Time: 08/03/19  1:13 AM   Result Value Ref Range    Glucose 147 (H) 70 - 139 mg/dL   POCT Glucose    Collection Time: 08/03/19  5:42 AM   Result Value Ref Range    Glucose 125 70 - 139 mg/dL   Sodium lab - every 6 hours    Collection Time: 08/03/19  5:59 AM   Result Value Ref Range    Sodium 143 136 - 145 mmol/L   Magnesium    Collection Time: 08/03/19  5:59 AM   Result Value Ref Range    Magnesium 1.8 1.8 - 2.6 mg/dL    Potassium - Next AM    Collection Time: 08/03/19  5:59 AM   Result Value Ref Range    Potassium 3.4 (L) 3.5 - 5.0 mmol/L   Blood Gases, Venous    Collection Time: 08/03/19  5:59 AM   Result Value Ref Range    pH, Venous 7.45 7.35 - 7.45    pCO2, Venous 43 35 - 50 mm Hg    pO2, Eliud 42 25 - 47 mm Hg    Base Excess, Venous 5.4 mmol/L    HCO3, Venous 28.9 24.0 - 30.0 mmol/L    Oxyhemoglobin 80.8 (H) 70.0 - 75.0 %    O2 Sat, Venous 83.2 (H) 70.0 - 75.0 %   Chloride    Collection Time: 08/03/19  5:59 AM   Result Value Ref Range    Chloride 108 (H) 98 - 107 mmol/L   Carbon Dioxide (CO2)    Collection Time: 08/03/19  5:59 AM   Result Value Ref Range    CO2 27 22 - 31 mmol/L   CK Total    Collection Time: 08/03/19  5:59 AM   Result Value Ref Range    CK, Total 437 (H) 30 - 190 U/L   Platelet Function Test    Collection Time: 08/03/19  7:30 AM   Result Value Ref Range    PFA-COL/ 1 - 180 sec   APTT    Collection Time: 08/03/19  8:10 AM   Result Value Ref Range    PTT 32 24 - 37 seconds   Protime-INR    Collection Time: 08/03/19  8:10 AM   Result Value Ref Range    INR 1.10 0.90 - 1.10   Magnesium    Collection Time: 08/03/19  9:04 AM   Result Value Ref Range    Magnesium 1.8 1.8 - 2.6 mg/dL           Advanced Care Planning  Discharge plan: Unknown      Total time spent was >35 mins, discussing with RN,  and reviewing the chart    Francy Lawrence  Date: 8/3/2019    Hospitalist Service      Part of this chart was created using a dictation software. Typographic errors, word substitutions, and Grammatical errors may unintentionally occur.

## 2021-05-31 NOTE — PROGRESS NOTES
Spiritual Care Note    Spiritual Assessment:   made a follow up visit with patient this morning. Patient dealing with a long hospital stay. Family is present. Patient remains intubated. Family present seem disappointed as they share feeling like patient isn't making much progress but state they are still hoping for a good outcome. Patient continues to have strong support from a loving family; No concerns noted.     Care Provided: Listening and support provided.     Plan of Care: Spiritual care will continue to follow as part of patient's care team.    FABIANO Umanzor, BCC

## 2021-05-31 NOTE — PROGRESS NOTES
[facility] Daily Progress Note      Date of Service: 8/14/2019     Brief History: Darion Herrera is 43 y.o. 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 CT head showed an extra-axial brain mass, and was admitted to neuro ICU for further management. MRI showed right suprasellar mass and intraventricular hemorrhage and small subarachnoid hemorrhage in left sylvian fissure. 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 She was monitored with daily transcranial doppler. She had follow up angiogram on 07/24, which showed stable findings. MRA on 07/26 was suggestive of vasospasms and was started on triple H therapy (hypertension, hemodilution, and hypervolemia). The patient became unresponsive on 07/27 and was endotracheally intubated. A lumbar drain was placed, and she also had cerebral angiogram with endovascular coil embolization of re-ruptured previously coil embolized dissecting aneurysm of ventral medial supraclinoid right internal carotid artery on 07/27. The patient had cerebral angiogram with intraarterial verapamil infusion on 07/28, 07/29, 07/30, 07/31, 08/01, and 08/02. The lumbar drain was replaced on 08/02. The patient also underwent percutaneous tracheostomy and G tube placement by Dr. Ledezma on 08/02. The lumbar drain was replaced on 08/08, and she had another cerebral angiogram on 08/08. Cerebral angiogram on 08/13 seemed stable. Lumbar drain was clamped on 08/12, but was noted to have increasing ventricular dilatation, so the drain clamp was released. The patient was gradually started on vent weaning.     Assessment:     Acute respiratory failure with hypoxia  -Secondary to poor airway protection, in the setting of intracranial hemorrhage  -S/p  percutaneous tracheostomy on 08/02. Trach changed to bivona #6 and is tolerating trach mask intermittently  -Intensive care/pulmonary team following    Intracranial hemorrhage: non traumatic intraventricular hemorrhage and subarachnoid hemorrhage  Secondary to ruptured dissecting ventromedial supraclinoid right ICA aneurysm  -S/p cerebral angiogram with endovascular coil embolization of ruptured dissecting aneurysm  -Complicated by vasospasms s/p lumbar drain placement (initially on 07/27) and angiogram with intraarterial verapamil infusion  -Complicated by re-bleed, s/p cerebral angiogram with endovascular coil embolization of re-ruptured previously coil embolized dissecting aneurysm of ventral medial supraclinoid right internal carotid artery on 07/27  -Lumbar drain was clamped on 08/12, but was released as CT head showed ventricular dilatation. Plan to have a  shunt placed on 08/15  -Neurosurgery following  -Keppra for seizure prophylaxis.  -Nimodipine for 21 days. Triple H (hydration and blood pressure goal of 100-160    Oropharyngeal dysphagia  At risk of malnutrition  -S/p G tube placement on 08/02  -Currently NPO. Otherwise on tube feeding    Electrolyte imbalance  -Hypokalemia, replaced  -Hypomagnesemia, replaced    Urinary tract infection, unspecified  -Culture grew E. Coli on 07/30, and was treated with antibiotic (cephalosporin)  -Urine culture from 08/05 grew candida. She was treated with fluconazole.    Transaminitis   -Unclear etiology, likely medication  -Stable liver enzymes      Subjective:     Chart reviewed, events noted. Pt seen and examined. A professional [employer] language interpretor was used during my evaluation. Lumbar drain was released. Patient denies any headache. Discussed with family member. Discussed with NS.    Objective     Vital signs in last 24 hours:  Temp:  [99  F (37.2  C)-99.7  F (37.6  C)] 99  F (37.2  C)  Heart Rate:  [] 99  Resp:  [14-39] 32  BP: ()/(63-84)  "114/73  FiO2 (%):  [21 %-30 %] 21 %  Weight:   145 lb 4.8 oz (65.9 kg)  Weight change: -3 lb 11.2 oz (-1.678 kg)  Body mass index is 30.37 kg/m .    Intake/Output last 3 shifts:  I/O last 3 completed shifts:  In: 675 [NG/GT:565; IV Piggyback:110]  Out: 2629 [Urine:2000; Drains:229; Stool:400]  Intake/Output this shift:  I/O this shift:  In: 50 [NG/GT:50]  Out: 275 [Urine:250; Drains:25]      Physical Exam:  /73   Pulse 99   Temp 99  F (37.2  C) (Oral)   Resp (!) 32   Ht 4' 10\" (1.473 m)   Wt 145 lb 4.8 oz (65.9 kg)   LMP 07/01/2016   SpO2 100%   BMI 30.37 kg/m      FiO2 (%): 21 % O2 Device: Trach mask O2 Flow Rate (L/min): 25 L/min    General Appearance: Alert, not in distress.  HEENT: Normocephalic. No scleral icterus. Trach in place with no sign of bleeding.  Heart: S1 S2 heard. Regular rate and rhythm.  Lungs: Clear to auscultation bilaterally.  Abdomen: Soft, non tender, bowel sounds present. G tube in place.  Extremity: No deformity. No joint swelling. No edema.  Neurologic: Left leg and left arm weakness. Patient is alert and responding to questions  Skin: No skin rashes      Imaging:  personally reviewed.    Ir Cerebral Angiogram    Result Date: 8/13/2019  [facility] INTERVENTIONAL NEURORADIOLOGY 8/13/2019 12:16 PM 1. CEREBRAL ANGIOGRAM: SELECTIVE CATHETERIZATION OF THE RIGHT INTERNAL CAROTID ARTERY AND LEFT COMMON CAROTID ARTERY WITH ANGIOGRAPHIC IMAGING CENTERED OVER THE HEAD 2. CEREBRAL ANGIOGRAM: SELECTIVE CATHETERIZATION OF THE RIGHT EXTERNAL CAROTID ARTERY WITH ANGIOGRAPHIC IMAGING CENTERED OVER THE HEAD 3. CEREBRAL ANGIOGRAM: SELECTIVE CATHETERIZATION OF THE RIGHT VERTEBRAL ARTERY WITH ANGIOGRAPHIC IMAGING CENTERED OVER THE HEAD 4. THREE-DIMENSIONAL ROTATIONAL ANGIOGRAM OF THE HEAD WITH IMAGE PROCESSING ON A SEPARATE COMPUTER WORKSTATION: INJECTION OF THE RIGHT COMMON CAROTID ARTERY INDICATION: 43-year-old female with history of a ruptured dissecting giant supraclinoid right " internal carotid artery aneurysm status post primary coil embolization on 07/24/2019 with subsequent re-rupture and repeat coil embolization on 07/27/2019. Cerebral angiogram has been requested for continued surveillance. CONSENT: The procedure and its indications, major risks, benefits, and alternatives were discussed. Risks including, but not limited to, arterial perforation or dissection, stroke, death, groin hematoma, pain, allergic reaction, renal damage, radiation-related complications, and cardiac or pulmonary complications of sedation, were discussed. Understanding was acknowledged, and a signed informed consent was obtained. MODERATE SEDATION: Versed 1 mg. Fentanyl 50 mcg. The procedure was performed with the administration of intravenous conscious sedation with appropriate preoperative, intraoperative, and postoperative evaluation. 30 minutes of supervised face to face conscious sedation time was provided by a radiology nurse under my direct supervision. During the time-out, immediately prior to administration of medications, the patient was re-assessed for adequacy to receive conscious sedation. MEDICATIONS: Versed 1 mg IV Fentanyl 50 mcg IV CONTRAST: 15 cc Omnipaque 300. 70 cc Visipaque 320. FLUOROSCOPIC TIME: 7.2 minutes DOSE: Air Kerma: 2022 mGy ESTIMATED BLOOD LOSS: Minimal PERFORMING PHYSICIAN(S): Hosea Duarte M.D. PROCEDURE: The patient was placed supine upon the neuroangiography table. The skin of both groins was prepped and draped in sterile fashion. Under local anesthesia with lidocaine and sterile technique, the right common femoral artery was percutaneously accessed with a 4 Palauan micropuncture set. A short 5 Palauan sheath was then placed. A 5 Palauan diagnostic catheter ([employer] was then advanced over an angled Glidewire into the aortic arch and used to selectively and subselectively catheterize the following arteries: right common carotid artery, right internal carotid artery, right  external carotid artery, right vertebral artery, and left common carotid artery. Digital subtraction angiograms of the head/neck were performed in multiple projections with these catheterizations. The right common carotid artery was injected in order to obtain three-dimensional rotational angiographic images of the head. The three-dimensional angiographic source images were transferred to a separate computer workstation where they were post-processed with three-dimensionally enhancing software in order to better characterize the size, morphology, and relationship to adjacent vessels of a right internal carotid artery aneurysm. Three-dimensional post-processing was performed under my concurrent supervision. The catheter and sheath were then removed. Hemostasis was achieved with direct compression of the groin. The procedure appeared well-tolerated. There was no apparent complication. COMPARISON: Multiple prior cerebral angiograms, most recent dated 08/08/2019. FINDINGS: CRANIAL/CERVICAL CIRCULATION: Images of the right internal carotid artery centered over the head demonstrate posttreatment changes related to prior coil embolization of a giant dissecting ventral supraclinoid right internal carotid artery aneurysm. Residual filling at the base of the  aneurysm measuring approximately 8 x 5 mm appears grossly stable from the most recent prior cerebral angiograms. Moderate residual stenosis in the distal supraclinoid segment of the right internal carotid artery (approximately 50%) appears slightly improved in the interval. There is mild residual irregularity of the distal M1 and proximal M2 segments of the right middle cerebral artery without significant residual stenosis. No residual vasospasm is identified in the right anterior circulation. There is mild to moderately delayed antegrade transit in the right middle cerebral artery territory, which may be related to a combination of flow-limiting stenosis in the  supraclinoid right internal carotid artery as well as the sequela of a recent large right middle cerebral artery territorial infarction. The previously seen parenchymal hyperemia in the right middle cerebral artery territory has resolved in the interval. The A1 segment of the right anterior cerebral artery is again noted to be hypoplastic. There is no evidence of new cerebral aneurysm or high flow vascular malformation in the right anterior circulation. Images of the right external carotid artery centered over the head demonstrate normal appearance of the right external carotid artery branches without evidence of high flow vascular malformation. There is a moderate caliber right superficial temporal artery. Images of the left common carotid artery centered over the head demonstrate no evidence of aneurysm or high flow vascular malformation in the left anterior circulation. There is no significant residual arterial stenosis in the left anterior circulation to suggest vasospasm. The left ophthalmic artery arises from the distal cavernous segment of the left internal carotid artery consistent with a dorsal ophthalmic artery, a normal congenital variant. Images of the right vertebral artery centered over the head demonstrate no evidence of aneurysm or high flow vascular malformation in the posterior circulation. There is no residual arterial stenosis in the posterior circulation to suggest vasospasm. There is excellent reflux of contrast into the entire intradural left vertebral artery and left posterior inferior cerebellar artery, both of which appear normal.     CONCLUSION: 1. Posttreatment changes related to prior coil embolization of a previously ruptured, giant ventral supraclinoid right internal carotid artery aneurysm with grossly stable residual filling at the base of the aneurysm. Follow-up head MRA in 1-2 weeks is recommended to ensure stability with a plan for stent-assisted coil embolization of the residual  aneurysm filling in approximately one month. Dual antiplatelet therapy with aspirin and Plavix will be started approximately 10 days prior to the planned stent assisted coil embolization procedure, and no sooner given the risk for possible hemorrhagic transformation of the large right middle cerebral artery infarction. 2. No evidence of residual vasospasm. 3. Moderate residual stenosis in the distal supraclinoid right internal carotid artery has slightly improved in the interval. This region of stenosis is likely related to underlying dissection of the supraclinoid right internal carotid artery. 4. Mild to moderately delayed antegrade transit in the right middle cerebral artery territory, which may be related to a combination of moderate residual flow-limiting stenosis in the supraclinoid right internal carotid artery as well as the sequela of recent large right middle cerebral artery territorial infarction. Results were discussed with the family and care team immediately following the procedure. Evaluation and stenosis determination based on NASCET criteria.       Lab Results:  personally reviewed.   Lab Results   Component Value Date     08/14/2019    K 3.9 08/14/2019     08/14/2019    CO2 22 08/14/2019    BUN 13 08/14/2019    CREATININE 0.59 (L) 08/14/2019    CALCIUM 9.8 08/14/2019     Lab Results   Component Value Date    WBC 8.1 08/14/2019    HGB 12.1 08/14/2019    HCT 36.4 08/14/2019    MCV 88 08/14/2019     08/14/2019     Results from last 7 days   Lab Units 08/14/19  0448 08/13/19  0500 08/12/19  0513   LN-MAGNESIUM mg/dL 2.1 2.0 1.9        Results from last 7 days   Lab Units 08/13/19  2101 08/10/19  0032 08/09/19  1800 08/09/19  0551 08/08/19  2346 08/08/19  2022 08/08/19  0758 08/07/19  1159   LN-POC GLUCOSE FINGERSTICK- HE mg/dL 103 141* 148* 155* 151* 145* 123 104           Advance Care Planning:   Barriers to discharge: Active issues  Anticipated discharge day: few days  Disposition:  Likely LTACH      Pedro Campoverde MD  [facility]  Hospitalist  Paging: [employer] Paging

## 2021-05-31 NOTE — PROGRESS NOTES
Progress Note    Assessment/Plan  Decision not yet made regarding ongoing care and need for tracheostomy notes reviewed    Principal Problem:    Aneurysmal subarachnoid hemorrhage (H)  Active Problems:    Carotid artery aneurysm (H)    Intraventricular hemorrhage, nontraumatic (H)    Compression of brain (H)    S/P coil embolization of cerebral aneurysm    Aneurysm of right internal carotid artery    Cerebrovascular accident (CVA) due to occlusion of right middle cerebral artery (H)    Vasospasm (H)    Acute respiratory failure, unspecified whether with hypoxia or hypercapnia (H)    Fever in other diseases    E-coli UTI    Gram-positive bacteremia      Subjective  No change  Objective    Vital signs in last 24 hours  Temp:  [99.5  F (37.5  C)-103.1  F (39.5  C)] 101.8  F (38.8  C)  Heart Rate:  [] 86  Resp:  [15-26] 17  BP: (108-159)/(55-95) 136/80  FiO2 (%):  [30 %-100 %] 30 %  Weight:   163 lb 11.2 oz (74.3 kg)    Intake/Output last 3 shifts  I/O last 3 completed shifts:  In: 5510.4 [I.V.:4446.4; NG/GT:604; IV Piggyback:460]  Out: 5608 [Urine:4325; Drains:283; Stool:1000]  Intake/Output this shift:  I/O this shift:  In: 785 [I.V.:705; NG/GT:30; IV Piggyback:50]  Out: 1359 [Urine:1300; Drains:59]      Physical Exam  No change by report    Pertinent Labs   Lab Results   Component Value Date    WBC 11.2 (H) 08/05/2019    HGB 9.1 (L) 08/05/2019    HCT 27.0 (L) 08/05/2019    MCV 87 08/05/2019     08/05/2019             Pertinent Radiology     Xr Chest 1 View Portable    Result Date: 8/5/2019  EXAM: XR CHEST 1 VIEW PORTABLE LOCATION: [facility] DATE/TIME: 8/5/2019 9:09 AM INDICATION: fever, on vent COMPARISON: 8/1/2019 FINDINGS: Appliances remain in stable and satisfactory position. Some new atelectasis in the right lower lobe. New Infiltrate or atelectasis left lower lobe behind the heart.          Fer Ledezma

## 2021-05-31 NOTE — PLAN OF CARE
Care Management Goals of the Day: Follow progression of care, assist with DC planning     Care Progression Reviewed With: Charge RN, MD, HUC, RNCM     Barriers to Discharge: ICU medical cares, weaning trials, lumbar drain, pending shunt     Discharge Disposition: [facility] when stable     Expected Discharge Date: 8/20/19     Transportation: HE stretcher    Care Coordination Narrative: Pt continues with weaning trials, currently has lumbar drain, and is pending shunt placement per neuro surgery. Plan continues to be Anupam Machado to follow. CM to follow and assist as needed.

## 2021-05-31 NOTE — PROGRESS NOTES
RESPIRATORY CARE NOTE      Vent Mode: VCV  FiO2 (%):  [21 %] 21 %  S RR:  [14] 14  S VT:  [375 mL] 375 mL  PEEP/CPAP (cm H2O):  [5 cm H2O] 5 cm H2O  Minute Ventilation (L/min):  [1.7 L/min-8.5 L/min] 5.6 L/min  PIP:  [22 cm H2O-46 cm H2O] 30 cm H2O  MAP (cm H2O):  [7-18] 8  #6 bivona trach    Pt spent day on heated trach mask 21%.  Placed on vent at 2222.  Pt was removed from vent to go to CT.  Placed back on vent at ooo6.  Pt tolerates speaking valve well.  Plan to remove from vent in am and continue with trach mask with speaking valve.    KAE CabralesT

## 2021-05-31 NOTE — PROGRESS NOTES
[place] Daily Progress Note      Date of Service: 8/13/2019     Brief History: Darion Herrera is 43 y.o. 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 CT head showed an extra-axial brain mass, and was admitted to neuro ICU for further management. MRI showed right suprasellar mass and intraventricular hemorrhage and small subarachnoid hemorrhage in left sylvian fissure. 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 She was monitored with daily transcranial doppler. She had follow up angiogram on 07/24, which showed stable findings. MRA on 07/26 was suggestive of vasospasms and was started on triple H therapy (hypertension, hemodilution, and hypervolemia). The patient became unresponsive on 07/27 and was endotracheally intubated. A lumbar drain was placed, and she also had cerebral angiogram with endovascular coil embolization of re-ruptured previously coil embolized dissecting aneurysm of ventral medial supraclinoid right internal carotid artery on 07/27. The patient had cerebral angiogram with intraarterial verapamil infusion on 07/28, 07/29, 07/30, 07/31, 08/01, and 08/02. The lumbar drain was replaced on 08/02. The patient also underwent percutaneous tracheostomy and G tube placement by Dr. Ledezma on 08/02. The lumbar drain was replaced on 08/08, and she had another cerebral angiogram on 08/08. The patient was gradually started on vent weaning.     Assessment:     Acute respiratory failure with hypoxia  -Secondary to poor airway protection, in the setting of intracranial hemorrhage  -S/p percutaneous tracheostomy on 08/02  -Now on full vent support. 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  -S/p cerebral angiogram with endovascular coil embolization of ruptured dissecting aneurysm  -Complicated by vasospasms s/p lumbar drain placement (initially on 07/27) and angiogram with intraarterial verapamil infusion  -Complicated by re-bleed, s/p cerebral angiogram with endovascular coil embolization of re-ruptured previously coil embolized dissecting aneurysm of ventral medial supraclinoid right internal carotid artery on 07/27  -Lumbar drain was clamped on 08/12, but was released as CT head showed ventricular dilatation  -May need a  shunt  -Neurosurgery following  -Keppra for seizure prophylaxis.  -Nimodipine for 21 days. Triple H (hydration and blood pressure goal of 100-160    Oropharyngeal dysphagia  At risk of malnutrition  -S/p G tube placement on 08/02  -Currently NPO. Otherwise on tube feeding    Electrolyte imbalance  -Hypokalemia, replaced  -Hypomagnesemia, replaced    Urinary tract infection, unspecified  -Culture grew E. Coli on 07/30, and was treated with antibiotic (cephalosporin)  -Urine culture from 08/05 is growing candida. She is on fluconazole.    Transaminitis   -Unclear etiology, likely medication  -Stable liver enzymes      Subjective:     Chart reviewed, events noted. Pt seen and examined. A professional [email or web address] language interpretor was used during my evaluation. She wants to speak. Denies any difficulty breathing. No chest pain. No headache.    Objective     Vital signs in last 24 hours:  Temp:  [98  F (36.7  C)-99.2  F (37.3  C)] 99.2  F (37.3  C)  Heart Rate:  [] 86  Resp:  [18-31] 23  BP: ()/(64-84) 111/77  FiO2 (%):  [21 %-100 %] 21 %  Weight:   149 lb (67.6 kg)  Weight change: -13 lb 6.4 oz (-6.078 kg)  Body mass index is 31.14 kg/m .    Intake/Output last 3 shifts:  I/O last 3 completed shifts:  In: 1845 [NG/GT:1735; IV  "Piggyback:110]  Out: 4575 [Urine:3825; Stool:750]  Intake/Output this shift:  I/O this shift:  In: -   Out: 20 [Drains:20]      Physical Exam:  /77   Pulse 86   Temp 99.2  F (37.3  C)   Resp 23   Ht 4' 10\" (1.473 m)   Wt 149 lb (67.6 kg)   LMP 07/01/2016   SpO2 98%   BMI 31.14 kg/m      FiO2 (%): 21 % O2 Device: Ventilator O2 Flow Rate (L/min): 25 L/min    General Appearance: Alert, not in distress.  HEENT: Normocephalic. No scleral icterus. Trach in place with no sign of bleeding.  Heart: S1 S2 heard. Regular rate and rhythm.  Lungs: Clear to auscultation bilaterally.  Abdomen: Soft, non tender, bowel sounds present. G tube in place.  Extremity: No deformity. No joint swelling. No edema.  Neurologic: Left leg and left arm weakness. Patient is alert and responding to questions  Skin: No skin rashes      Imaging:  personally reviewed.    Ct Head Without Contrast    Result Date: 8/13/2019  EXAM: CT HEAD WO CONTRAST LOCATION: [facility] DATE/TIME: 8/13/2019 6:03 AM INDICATION: Headache, intracranial hemorrhage suspected SAH. COMPARISON: 08/12/2019. TECHNIQUE: Routine without IV contrast. Multiplanar reformats. Dose reduction techniques were used. FINDINGS: INTRACRANIAL CONTENTS: There is significant streak artifact from a large coil mass at the anterior right aspect of the [place] as on the prior. This largely obscures evaluation of hypodense changes in the right middle cerebral artery territory noted on the prior. No definite new infarct. Unchanged sulcal hyperdensities in the right parietal and parietal regions could represent trace amounts of subarachnoid blood. There is a small amount of blood dependent within the right occipital horn. This location is obscured on the prior by streak artifact. Slight prominence of the lateral and third ventricles versus prior. VISUALIZED ORBITS/SINUSES/MASTOIDS: No significant orbital abnormality. No significant paranasal sinus mucosal disease. No " significant middle ear or mastoid effusion. BONES/SOFT TISSUES: No significant abnormality.     CONCLUSION: 1.  Considerable streak artifact related to the coil mass positioned in the anterior right circulation. No definite new or worsening intracranial hemorrhage. 2.  Subtle prominence of the lateral and third ventricles compared to prior could reflect very early ventricular dilatation/hydrocephalus. Follow-up recommended. 3.  No definite interval change in evolving ischemic changes in the right middle cerebral artery territory. No definite new infarct.          Lab Results:  personally reviewed.   Lab Results   Component Value Date     08/13/2019    K 3.6 08/13/2019     08/13/2019    CO2 22 08/13/2019    BUN 10 08/13/2019    CREATININE 0.59 (L) 08/13/2019    CALCIUM 9.8 08/13/2019     Lab Results   Component Value Date    WBC 8.3 08/13/2019    HGB 12.2 08/13/2019    HCT 36.0 08/13/2019    MCV 87 08/13/2019     08/13/2019     Results from last 7 days   Lab Units 08/13/19  0500 08/12/19  0513 08/11/19  0426   LN-MAGNESIUM mg/dL 2.0 1.9 1.9        Results from last 7 days   Lab Units 08/10/19  0032 08/09/19  1800 08/09/19  0551 08/08/19  2346 08/08/19  2022 08/08/19  0758 08/07/19  1159 08/07/19  0551 08/07/19  0038   LN-POC GLUCOSE FINGERSTICK- HE mg/dL 141* 148* 155* 151* 145* 123 104 126 150*           Advance Care Planning:   Barriers to discharge: Active issues  Anticipated discharge day: few days  Disposition: Likely LTACH      Pedro Campoverde MD  [facility]  Hospitalist  Paging: [employer] Paging

## 2021-05-31 NOTE — OP NOTE
OPERATIVE REPORT    Darion Herrera   [facility]  Medical Record #:  [id number]  YOB: 1975  Age:  43 y.o.    PROCEDURE DATE:  7/22/2019 - 8/19/2019    PREOPERATIVE DIAGNOSIS: Hydrocephalus need for intraperitoneal placement of  shunt    POSTOPERATIVE DIAGNOSIS: Same    PROCEDURE: Diagnostic laparoscopy placement of peritoneal side of  shunt    OPERATING SURGEON:  Fer Jhaveri    ASSISTANT: Dr. Hoffmann co-surgeon    ANESTHESIA: General    DESCRIPTION OF PROCEDURE: With the patient in supine position under general anesthesia having reviewed the risk benefits complications of surgical intervention with her with the family and  present the abdomen is prepped and draped usual sterile fashion and pneumoperitoneum instilled in the umbilicus after Dr. Hoffmann to proceeded with placement of the central portion of the shunt.  Using Seldinger technique the peritoneum was entered and a guidewire and peel-away sheath introduced without difficulty.  The catheter was cut to length and threaded into the peritoneal cavity without difficulty under direct visualization and placed over the liver.  Time was taken to ensure that adequate hemostasis is obtained there is good position of the shunt tubing.  Procedure terminated with closure of skin wounds with subcuticular suture Dermabond.  The estimated blood loss is minimal there were no complications and the patient tolerated the procedure well.  Sponge and counts are correct x2.    EBL:  15 mL from 8/19/2019  1:17 PM to 8/19/2019  3:31 PM    SPECIMENS:  [unfilled]    Fer jhaveri md  [facility]

## 2021-05-31 NOTE — PROGRESS NOTES
NEUROLOGY PROGRESS NOTE     Darion Herrera,  1975, MRN [id number] Date: 2019     [facility]   Code status:  Full Code   PCP: Monroe Cassidy MD, [phone]      ASSESSMENT & PLAN   Hospital Day#: 29  Diagnosis code: SAH    Aneurysmal SAH  MARIA L supraclinoid aneurysm s/p endovascular coil embolization  Vasospasm  Seizures  RMCA stroke related to vasospasm.       Initial head CT showed MARIA L giant aneurysm (sentinel headache)    S/p endovascular coil embolization X 2    Aneurysm stable on repeat angiogram.     MRI/CT shows large hypodensity/evolving ischemic stroke (from vasospasm)    Head CT stable shows slightly enlarged ventricles and  shunt placed today.     -160    Keppra 1000 mg two times a day - Level therapeutic. (long term)    Start PT.    EEG to get baseline.     Thank you again for this referral, please feel free to contact me if you have any questions.     CHIEF COMPLAINT Aneurysmal subarachnoid hemorrhage (H)     HISTORY OF PRESENT ILLNESS     We have been requested by Dr. Kwan to evaluate Darion Herrera who is a 43 y.o.  female for SAH.    This a 43-year-old female on whom neurology is been consulted to evaluate for subarachnoid hemorrhage.  Patient initially presented with 6 days ago with a headache.  She was trying to lift some fruits and felt something blowing up in her head.  She continued to have the headache.  She left the staff she was lifting on the table.  She was then brought to the emergency room where imaging showed a large frontal mass.  This was later on MRI identified to be a giant aneurysm.  Patient underwent angiogram and endovascular coil embolization of the aneurysm.  The patient had a repeat angiogram.  MRI done today showed multiple foci of acute/subacute infarction in the right parietal-occipital region.  There is also small area of infarction in the right frontal lobe.  TCD's show vasospasm in the right MCA.  There was some narrowing of the M1 and M2  segments.  Neurology was consulted to further help with further cares.  Patient denies any ongoing symptoms he does complain of some headache.     7/29  Patient had 2 seizures over the weekend. Was started on fosphenytoin in addition to Keppra.   No seizures today.  Intubated, but waking up as the sedation has been withdrawn.     7/30  No new seizures. Patient remains on sedation and intubated. RMCA velocities remain elevated on the TCD.     7/31  No new seizures. Patient being kept sedation. Angiogram still shows vasospasm. Patient getting IA verapamil.     8/1  No seizures. Patient continues to remain on sedation. Angiogram continues to show vasospasm. Getting IA verapamil. Family conference tomorrow.     8/2  Patient remains on sedation. Angiogram shows stable vasospasm. Some twitching of right shoulder per nursing staff.     8/5  Weekend events - patient was found to have a RMCA evolving infarct over the weekend.   Family wanting to proceed with Trach and PEG.     8/6  TCD still shows vasospasm. Patient is being kept sedated till vasospasm resolves. Family interested in Trach and PEG.     8/7  MRI/MRA brain today to assess for vasospasm. Trach and PEG today.     8/8  Patient went for angiogram today. Exam improved off sedation. Angiogram showed aneurysm but no coiling done. Coiling planned for next week.     8/9  TCD look improved. Sedation has been reduced.     8/10  More awake and able to move right arm and leg more. Following more commands.     8/11  Vimpat was decreased with no seizures. More interactive today. Still not able to lift left arm.     8/12  Exam has improved. Starting to move left side today. Head CT stable.  No seizures.    8/13  Angiogram today. Patients exam on left side has improved. She is able to lift it against gravity.     8/14  Patient very emotional today and crying all day long. Wants to go home. NSG planning  shunt so lumbar drain can be removed.     8/15  Patient did not get   shunt surgery. She is not sure she wants to proceed. Left arm and leg keeps to get stronger. Patient remains very emotional. Lumbar drain still open and draining.    8/16  Patient in better mood today. Wanting to go home. Has agreed to  shunt but surgery would be next week. Lumbar drain repaired today. Mild headache but no new symptoms.    8/17  Patient underwent  shunt today. Eager to go home. Denies any other new symptoms. Reports some weakness on right side related to the anaesthesia.      8/18  No overnight events. Tolerating  shunt. Complains of mild headache and belly pain. Denies any other new symptoms. Eager to go home. Discussed about EEG and willing to do it.      PAST MEDICAL & SURGICAL HISTORY     Medical History  Patient Active Problem List    Diagnosis Date Noted     Adjustment disorder with mixed anxiety and depressed mood      Mood disorder (H)      Sleep difficulties      Pneumonia due to group B Streptococcus, unspecified laterality, unspecified part of lung (H)      Vasospasm of cerebral artery      E-coli UTI      Gram-positive bacteremia      Fever in other diseases      Acute respiratory failure, unspecified whether with hypoxia or hypercapnia (H)      Cerebrovascular accident (CVA) due to occlusion of right middle cerebral artery (H)      Vasospasm (H)      S/P coil embolization of cerebral aneurysm 07/23/2019     Aneurysm of right internal carotid artery 07/23/2019     Carotid artery aneurysm (H) 07/22/2019     Nausea vomiting and diarrhea 07/22/2019     Aneurysmal subarachnoid hemorrhage (H) 07/22/2019     Intraventricular hemorrhage, nontraumatic (H) 07/22/2019     CVA (cerebral vascular accident) (H) 07/22/2019     Hydrocephalus 07/22/2019     Compression of brain (H)      Leiomyoma of uterus 07/18/2016     Past Medical History: No previous history.    Surgical History  She  has a past surgical history that includes IR Cerebral Angiogram (7/22/2019); IR Cerebral Angiogram  (7/24/2019); PICC Team Line Insertion (7/26/2019); IR Cerebral Angiogram (7/27/2019); IR Lumbar Drain Insertion (7/27/2019); IR Cerebral Angiogram (7/29/2019); IR Cerebral Angiogram (7/30/2019); IR Cerebral Angiogram (7/31/2019); IR Cerebral Angiogram (8/1/2019); IR Lumbar Drain Insertion (8/2/2019); IR Cerebral Angiogram (8/2/2019); IR Cerebral Angiogram (7/28/2019); pr egd percutaneous placement gastrostomy tube (N/A, 8/7/2019); Tracheostomy (N/A, 8/7/2019); IR Lumbar Drain Insertion (8/8/2019); IR Cerebral Angiogram (8/13/2019); IR Cerebral Angiogram (8/8/2019); and IR Lumbar Drain Insertion (8/16/2019).     SOCIAL HISTORY     Reviewed, and she  reports that she has never smoked. She has never used smokeless tobacco. She reports that she has current or past drug history. She reports that she does not drink alcohol.     FAMILY HISTORY     Reviewed, and non-contributory.      ALLERGIES     Allergies   Allergen Reactions     Oxycodone      Hydrocodone Swelling and Rash        REVIEW OF SYSTEMS     12 system ROS was done and was negative other than HPI - except patient feeling tired.     HOME & HOSPITAL MEDICATIONS     Prior to Admission Medications  Medications Prior to Admission   Medication Sig Dispense Refill Last Dose     ascorbic acid, vitamin C, 500 mg Chew chew tab Chew 500 mg daily.   Unknown at Unknown time       Hospital Medications    atorvastatin  20 mg Enteral Tube DAILY     chlorhexidine  15 mL Topical Q12H     guar gum  1 packet Enteral Tube BID     levETIRAcetam  (KEPPRA) solution 100 mg/mL  1,000 mg Enteral Tube BID     nystatin  5 mL Swish & Swallow QID     potassium chloride liquid/packet  20 mEq Enteral Tube BID     sodium chloride  10-30 mL Intravenous Q8H FIXED TIMES        PHYSICAL EXAM     Vital signs  Temp:  [97.6  F (36.4  C)-99.3  F (37.4  C)] 99.3  F (37.4  C)  Heart Rate:  [70-99] 79  Resp:  [11-30] 30  BP: ()/(55-91) 97/64  FiO2 (%):  [21 %] 21 %    Weight:   141 lb 3.2 oz (64  kg)    GENERAL: Healthy appearing, alert, no acute distress, normal habitus.  CARDIOVASCULAR: Ext warm and well perfused. Pulses present.   NEUROLOGICAL:  Patient is trached. Does not speak but does follow commands. EOMI. Pupils symmetric. VFI on present on both sides. Left sided facial droop.  Right side antigravity. Left leg 4-/5. Left arm 3/5     DIAGNOSTIC STUDIES     Pertinent Radiology   MRI brain - images reviewed stroke seen  IMPRESSION:   CONCLUSION:  1.  Multiple foci of developing acute/subacute infarction throughout the right parietal occipital lobes, with additional single small foci of acute/subacute infarction in the right frontal lobe and left parietal lobe.  2.  Status post endovascular coiling of large dissecting aneurysm of the ventromedial right supraclinoid ICA with redemonstration of mild persistent flow-related enhancement along the neck of the aneurysm. There is also faint flow-related enhancement   centrally within the coil mass.  3.  Mildly decreased caliber of the M1 and proximal M2 segments of the right middle cerebral artery compared to the left, which may represent mild vasospasm. There is more normal caliber of the distal M2 and M3 branches.    ECHO    1.Left ventricle ejection fraction is normal. The calculated left ventricular ejection fraction is 65%.    2.TAPSE is normal, which is consistent with normal right ventricular systolic function.    3.No hemodynamically significant valvular heart abnormalities.    4.No obvious embolic source seen, if highly suspect consider MICHAEL.    5.No previous study for comparison.     TCD - images reviewed.   IMPRESSION:   CONCLUSION:  New moderate right middle cerebral artery vasospasm.    EEG negative for seizures.    Angiogram  Preliminary findings: (see dictation for full detail)  - Stable right ICA aneurysm coil mass with residual interstitial neck filling.  - Possible mild vasospasm superimposed on right ICA dissection, verapamil 10 mg infused in  right ICA.    TCD reviewed and velocities are elevated in the RMCA.    Angiogram shows MARIA L, RMCA, RACA vasospasm. Dissection in MARIA L.    Angiogram on 8/1 continues to show vasospasm.     MRI and CT reviewed - RMCA evolving stroke seen.     8/6 TCD - RMCA velocity 192 - still elevated.     MRI brain  IMPRESSION:   CONCLUSION:  HEAD MRI:   1.  New T2 FLAIR sulcal nonsuppression in the dependent temporal and occipital sulci compatible with subarachnoid hemorrhage although age indeterminant and a small amount of new subarachnoid hemorrhage is difficult to exclude.  2.  Hyperattenuation on the prior CT corresponds to faint susceptibility, favored to represent mild petechial hemorrhage.  3.  A few small scattered foci of new restricted diffusion in the right posterior limb internal capsule and right parietal cortex.  4.  No midline shift or herniation. No hydrocephalus.     HEAD MRA:   1.  New flow related signal near circumferential around the periphery of the coiled right internal carotid artery aneurysm, raising concern for recurrence.  2.  Mild to moderate vasospasm of the right M1, slightly improved compared to 7/26/2019. Mild vasospasm of the proximal right M2 middle cerebral artery branches, not significant change.  3.  No new significant stenosis or occlusion.    Angiogram  6-7 mm recurrence at the neck of previously coiled right ICA aneurysm.     TCD shows improving RMCA velocities (images of today's test reviewed).     Angiogram shows unchanged aneurysm.     Head CT  1.  Stealth noncontrast head CT for preoperative planning.  2.  Mild interval enlargement of the lateral and third ventricles which are mild to moderately dilated. This suggests a worsening hydrocephalus. No definite transependymal CSF noted.  3.  Status post endovascular coiling of giant right paraclinoid aneurysm.    Head CT  IMPRESSION:   1.  Interval placement of right frontal approach ventricular shunt catheter. No significant change in  ventricular caliber.  2.  No unexpected acute intracranial abnormality.    Recent Results (from the past 24 hour(s))   POCT Glucose    Collection Time: 08/19/19  7:50 PM   Result Value Ref Range    Glucose 138 70 - 139 mg/dL   POCT Glucose    Collection Time: 08/19/19 11:53 PM   Result Value Ref Range    Glucose 152 (H) 70 - 139 mg/dL   Magnesium    Collection Time: 08/20/19  4:20 AM   Result Value Ref Range    Magnesium 1.8 1.8 - 2.6 mg/dL   Comprehensive Metabolic Panel    Collection Time: 08/20/19  4:20 AM   Result Value Ref Range    Sodium 138 136 - 145 mmol/L    Potassium 3.7 3.5 - 5.0 mmol/L    Chloride 105 98 - 107 mmol/L    CO2 26 22 - 31 mmol/L    Anion Gap, Calculation 7 5 - 18 mmol/L    Glucose 110 70 - 125 mg/dL    BUN 9 8 - 22 mg/dL    Creatinine 0.56 (L) 0.60 - 1.10 mg/dL    GFR MDRD Af Amer >60 >60 mL/min/1.73m2    GFR MDRD Non Af Amer >60 >60 mL/min/1.73m2    Bilirubin, Total 0.5 0.0 - 1.0 mg/dL    Calcium 9.4 8.5 - 10.5 mg/dL    Protein, Total 7.1 6.0 - 8.0 g/dL    Albumin 3.8 3.5 - 5.0 g/dL    Alkaline Phosphatase 97 45 - 120 U/L    AST 27 0 - 40 U/L    ALT 33 0 - 45 U/L   Glycosylated Hemoglobin A1C    Collection Time: 08/20/19  4:20 AM   Result Value Ref Range    Hemoglobin A1c 5.2 4.2 - 6.1 %   HM1 (CBC with Diff)    Collection Time: 08/20/19  4:20 AM   Result Value Ref Range    WBC 11.4 (H) 4.0 - 11.0 thou/uL    RBC 3.84 3.80 - 5.40 mill/uL    Hemoglobin 11.4 (L) 12.0 - 16.0 g/dL    Hematocrit 34.8 (L) 35.0 - 47.0 %    MCV 91 80 - 100 fL    MCH 29.7 27.0 - 34.0 pg    MCHC 32.8 32.0 - 36.0 g/dL    RDW 13.2 11.0 - 14.5 %    Platelets 369 140 - 440 thou/uL    MPV 9.6 8.5 - 12.5 fL    Neutrophils % 81 (H) 50 - 70 %    Lymphocytes % 11 (L) 20 - 40 %    Monocytes % 7 2 - 10 %    Eosinophils % 0 0 - 6 %    Basophils % 0 0 - 2 %    Neutrophils Absolute 9.2 (H) 2.0 - 7.7 thou/uL    Lymphocytes Absolute 1.2 0.8 - 4.4 thou/uL    Monocytes Absolute 0.8 0.0 - 0.9 thou/uL    Eosinophils Absolute 0.0 0.0 -  0.4 thou/uL    Basophils Absolute 0.0 0.0 - 0.2 thou/uL       Total time spent for face to face visit, reviewing labs/imaging studies, counseling and coordination of care was: Over 25 min More than 50% of this time was spent on counseling and coordination of care.  Counseling family on plan. Answering patients questions about leaving.   Rationale for EEG.     Justin Vasquez MD  [employer] Messaging: [email or web address]  Tel: [phone]  This note was dictated using voice recognition software.  Any grammatical or context distortions are unintentional and inherent to the software.

## 2021-05-31 NOTE — PLAN OF CARE
Problem: Pain  Goal: Patient's pain/discomfort is manageable  8/20/2019 0134 by Mark Mercado, RN  Outcome: Progressing  8/19/2019 2308 by Mark Mercado, RN  Outcome: Not Progressing   Pt's post surgical pain effectively managed with enteral analgesic, and repositioning. Will continue to monitor. Mark Mercado RN

## 2021-05-31 NOTE — ANESTHESIA PREPROCEDURE EVALUATION
Anesthesia Evaluation      Patient summary reviewed   No history of anesthetic complications     Airway    Pulmonary - normal exam   (+) pneumonia,                          Cardiovascular - normal exam   Neuro/Psych    (+) neuromuscular disease,  CVA ,     Endo/Other    (+) obesity,      GI/Hepatic/Renal       Other findings: Intubated, mechanically ventilated.      Dental                         Anesthesia Plan  Planned anesthetic: general endotracheal    ASA 4 - emergent   Induction: intravenous       Post-op plan: extended intubation/vent support

## 2021-05-31 NOTE — PROGRESS NOTES
Trach # 8.0 Gladys. Patient remains fully supported with the following settings: VCV 14 375 21% 5. Ventilation/oxygenations are adequate. BS are coarse; moderate/tan secretions are suctioned. Vent change is not made. RT will monitor.    JERRY Sanchez

## 2021-05-31 NOTE — PROCEDURES
[facility] Post Procedure    Procedure: lumbar drain replacement    Radiologist: Kameron Aguero    Contrast: 0 mL Omnipaque  Fluoro Time: 0.9 minutes  Air Kerma: 73.66 mGy     Medications: Versed 1 mg IV                       Fentanyl 50 mcg IV  Sedation Time: 15 minutes    EBL: minimal  Complications: none    Preliminary findings: (see dictation for full detail)  Successful lumbar drain placement at L5-S1 with tip at T12    Assess/Plan:   Bedrest for 2 hours  Continued drain management per neurosurgery      Kameron Aguero

## 2021-05-31 NOTE — PLAN OF CARE
Problem: Mechanical Ventilation  Goal: Patient will maintain patent airway  Outcome: Progressing  Goal: ET tube will be managed safely  Outcome: Progressing     Vent Mode: VCV  FiO2 (%):  [25 %-100 %] 30 %  S RR:  [16] 16  S VT:  [375 mL] 375 mL  PEEP/CPAP (cm H2O):  [5 cm H2O] 5 cm H2O  Minute Ventilation (L/min):  [8.1 L/min-10.8 L/min] 8.6 L/min  PIP:  [18 cm H2O-27 cm H2O] 18 cm H2O  MAP (cm H2O):  [7-10] 7     Pt continues to be on full mechanical ventilator support, day 5, with the above settings.  ETT size 7.5 secured 21 cm at the teeth.  Breath sounds coarse bilaterally.  Small to moderate amount of thick tan secretions via inline suction. Sputum sent.  SBT on hold due to neuro status. Peak 17, plateau.     KAE PerkinsT

## 2021-05-31 NOTE — PROGRESS NOTES
[facility] Daily Progress Note      Date of Service: 8/19/2019     Brief History: Darion Herrera is 43 y.o. 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 CT head showed an extra-axial brain mass, and was admitted to neuro ICU for further management. MRI showed right suprasellar mass and intraventricular hemorrhage and small subarachnoid hemorrhage in left sylvian fissure. 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 She was monitored with daily transcranial doppler. She had follow up angiogram on 07/24, which showed stable findings. MRA on 07/26 was suggestive of vasospasms and was started on triple H therapy (hypertension, hemodilution, and hypervolemia). The patient became unresponsive on 07/27 and was endotracheally intubated. A lumbar drain was placed, and she also had cerebral angiogram with endovascular coil embolization of re-ruptured previously coil embolized dissecting aneurysm of ventral medial supraclinoid right internal carotid artery on 07/27. The patient had cerebral angiogram with intraarterial verapamil infusion on 07/28, 07/29, 07/30, 07/31, 08/01, and 08/02. The lumbar drain was replaced on 08/02. The patient also underwent percutaneous tracheostomy and G tube placement by Dr. Ledezma on 08/02. The lumbar drain was replaced on 08/08, and she had another cerebral angiogram on 08/08. Cerebral angiogram on 08/13 seemed stable. Lumbar drain was clamped on 08/12, but was noted to have increasing ventricular dilatation, so the drain clamp was released. Planned for  shunt placement on 08/15, but patient refused. Now she is agreeing. Lumber drain was replaced on 08/16. Plan to have  shunt placement today.    Assessment:     Acute respiratory failure  with hypoxia  -Secondary to poor airway protection, in the setting of intracranial hemorrhage  -S/p percutaneous tracheostomy on 08/02. Trach changed to bivona #6 on 08/14 and is tolerating trach mask with PMV. She has not required ventilator support. Tolerating trach mask. Has good cough.  -Continues to have large secretions, and is not on continuous PMV.  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 Re clamped on 08/14, and again the follow up CT showed increased ventricular dilatation.   -Lumbar drain was change on 08/16. Plan to have a  shunt placed. Patient refused on 08/15. Now agreeing to have it placed, which will be done today.  -Neurosurgery following  -Keppra for seizure prophylaxis.  -Discontinued Nimodipine on 08/13    Oropharyngeal dysphagia  At risk of malnutrition  -S/p G tube placement on 08/02  -Currently on tube feeding. NPO by mouth.  -Tube feeding on hold for upcoming procedure.    Electrolyte imbalance  -Hypokalemia, replaced  -Hypomagnesemia, replaced  -Monitor, and replace as needed    Urinary tract infection, unspecified  -Culture grew E. Coli on 07/30, and was treated with antibiotic (cephalosporin)  -Urine culture from 08/05 grew candida. She was treated with fluconazole.  -Not on any antimicrobial at  "this time.  -She does have indwelling urinary catheter. Consider removing this as soon as possible to avoid CAUTI    Transaminitis   -Unclear etiology, likely medication  -Stable liver enzymes  -Monitor intermitently      Subjective:     Chart reviewed, events noted. Pt seen and examined. A professional [place] language interpretor was used during my evaluation today. Discussed with neurosurgical team. She denies any shortness of breath. She was not tolerating PMV all the time, secondary to increased secretions, but has not required to go back on vent. She was complaining of some headache this morning. No fever or chills.    Objective     Vital signs in last 24 hours:  Temp:  [98.1  F (36.7  C)-99.6  F (37.6  C)] 99.1  F (37.3  C)  Heart Rate:  [] 99  Resp:  [15-36] 30  BP: ()/(58-95) 103/67  FiO2 (%):  [21 %] 21 %  Weight:   141 lb 1.6 oz (64 kg)  Weight change: 1 lb 9.6 oz (0.726 kg)  Body mass index is 29.49 kg/m .    Intake/Output last 3 shifts:  I/O last 3 completed shifts:  In: 890 [NG/GT:890]  Out: 2172 [Urine:2100; Drains:72]  Intake/Output this shift:  I/O this shift:  In: 150 [NG/GT:100; IV Piggyback:50]  Out: 150 [Urine:150]      Physical Exam:  /67   Pulse 99   Temp 99.1  F (37.3  C) (Oral)   Resp (!) 30   Ht 4' 10\" (1.473 m)   Wt 141 lb 1.6 oz (64 kg)   LMP 07/01/2016   SpO2 98%   BMI 29.49 kg/m      FiO2 (%): 21 % O2 Device: Trach mask O2 Flow Rate (L/min): 10 L/min    General Appearance: Alert, not in distress.  HEENT: Normocephalic. No scleral icterus. Trach in place with no sign of bleeding.  Heart: S1 S2 heard. Regular rate and rhythm.  Lungs: Clear to auscultation bilaterally.  Abdomen: Soft, non tender, bowel sounds present. G tube in place.  Extremity: No deformity. No joint swelling. No edema.  Neurologic: Left leg and left arm weakness. Patient is alert and responding to questions  Skin: No skin rashes      Imaging:  personally reviewed.    Ct Head Without " Contrast    Result Date: 8/19/2019  EXAM: CT HEAD WO CONTRAST LOCATION: [facility] DATE/TIME: 8/19/2019 8:44 AM INDICATION: Stroke, follow up hydrocephalus; stealth protocol COMPARISON: 8/15/2019 and 8/12/2019. TECHNIQUE: Head CT without IV contrast, stealth protocol. Multiplanar reformats. Dose reduction techniques were used. FINDINGS: INTRACRANIAL CONTENTS: Redemonstration of significant metallic streak artifact related to endovascular coil mass in the right paraclinoid region. Within these limits, there is no definite evidence for acute intracranial hemorrhage or CT evidence for acute infarction. Normal parenchymal density for age. Despite slight differences in angulation, there has been mild interval increase in ventriculomegaly affecting the lateral and third ventricles. Bifrontal ventricular diameter now measures 4.1 cm (previously 3.8 cm), and third ventricular diameter now measures 9.3 mm (previously 8.6 mm). No periventricular hypoattenuation to suggest transependymal edema at this time. VISUALIZED ORBITS/SINUSES/MASTOIDS: No significant orbital abnormality. No significant paranasal sinus mucosal disease. No significant middle ear or mastoid effusion. BONES/SOFT TISSUES: No acute abnormality. Partially visualized tracheostomy is unremarkable.     1.  Mild interval increase in ventriculomegaly affecting the lateral and third ventricles as described above. No periventricular hypoattenuation to suggest transependymal edema at this time. 2.  Redemonstration of right paraclinoid endovascular coil mass with associated metallic streak artifact. Within these limits, there is no definite evidence for acute intracranial hemorrhage or CT evidence for acute infarction. Results discussed with DIAMOND DEE on 8/19/2019 9:18 AM.         Lab Results:  personally reviewed.   Lab Results   Component Value Date     08/19/2019    K 3.8 08/19/2019     08/19/2019    CO2 27 08/19/2019    BUN 13  08/19/2019    CREATININE 0.60 08/19/2019    CALCIUM 10.2 08/19/2019     Lab Results   Component Value Date    WBC 7.8 08/19/2019    HGB 12.5 08/19/2019    HCT 37.8 08/19/2019    MCV 89 08/19/2019     08/19/2019     Results from last 7 days   Lab Units 08/19/19  0525 08/18/19  0547 08/17/19  0423   LN-MAGNESIUM mg/dL 1.9 1.9 2.0        Results from last 7 days   Lab Units 08/13/19  2101   LN-POC GLUCOSE FINGERSTICK- HE mg/dL 103           Advance Care Planning:   Barriers to discharge:  shunt placement  Anticipated discharge day: to be determined  Disposition: JEFF Campoverde MD  [facility]  Hospitalist  Paging: [employer] Paging

## 2021-05-31 NOTE — PROGRESS NOTES
"  Clinical Nutrition Therapy Nutrition Support Note      Subjective:  Met with pt assisted by .  Pt still c/o stomach discomfort.  Rectal pouch is out. Pt was NPO yesterday with plan for surgery.  Pt  NPO this am as lumbar drain changed.  Chart reviewed.    Nutrition Prescription:   Diet: TF:  Peptamen 1.5 at 35 m/hr started 8/14.   Water flush 50 ml q 4 hrs  Modular:  Nutrifiber 1 pkt bid    Nutrition Intake:  FT is an PEG placed 8/7/19.    TF at goal provides:  1260 calories, 57 g protein, 158 g carb, 6 g fiber, 945 ml free water.    TF meets estimated needs.    Anthropometrics:  Height: 4' 10\" (147.3 cm)  Admission weight: 155#  Weight: 152 lb 4.8 oz (69.1 kg)     GI Status/Output:   GI symptoms per nursing:   Last BM recorded: 50 ml per rectal tube 8/14.     Skin/Wound:   Clifford Scale Score: 16     Meds reviewed  Labs reviewed    Estimated Nutrition Needs:  Using ideal weight of 43 kg.     Energy Needs: 4901-9304 kcals daily per 25-30 kcal/kg   Protein Needs: 52+ g daily, 1.2+ g/kg.  Fluid Needs: 1100+ mls daily, 25+ mls/kg    Plan:  Peptamen 1.5 at 35 ml/hr.  Increase water flush to 150 ml 4x/day for hydration needs.  To provide 1245 ml free water.   Note frequent NPO for procedures decreases nutrition provided.    Monitor:  TF tolerance, wt, labs, hydration needs.    See Care Plan for Problems, Goals, and Interventions.        "

## 2021-05-31 NOTE — PROGRESS NOTES
Critical Care Medicine Progress Note  8/2/2019    ASSESSMENT/PLAN:  43 y.o. woman who was admitted on 7/22/2019 with SAH & IVH secondary to ruptured aneurysm of the right ICA, aneurysm coiling 7/22 that was complicated by vasospasm.  Hospitalization course was complicated by right ICA aneurysm dissection with rebleeding that presented with acute onset seizures and unresponsiveness on 7/27.  Patient was intubated on 7/27, underwent a recoiling of the culprit vessel, and placement of lumbar drain for ICP management.  Ongoing issues with ICA/MCA vasospasm.    New events:   - fever persists, on abx starting 7/31   - remains on NE   - continued vasospasm, going to angiogram today   - CVP 9-13    Neuro/Psych:   #. Analgesia/Sedation: Currently managed with propofol and fentanyl, prn versed available mostly for agitation with cares.  #.  Right ICA aneurysm complicated by subarachnoid hemorrhage with IVH, status post dissection and coiling x2 with hydrocephalus requiring lumbar drain placement 7/27/2019.  Ongoing vasospasm complicated the clinical presentation and has had verapamil infusion on multiple days.    Neurosurgery and IR closely following    Cerebral angiography per IR, follow TCD to determine further angiogram    Continue with every 4 hours nimodipine x21 days    Goal -150 by arterial line, currently on NE to maintain    If BP dips, and CVP less than 10, will use albumin but otherwise should be titrating NE    Trying to avoid complications of hypervolemia     Goal sodium 145-150, hypertonic saline infusion per NSG protocol    Goal HCT level 30-32    Goal CVP 10-12    Strict I/O, strict lumbar drain output monitoring (if increased output may be sign of further edema/swelling and consider stat HCT) has been stable    Repeat TCDs to determine need for angiogram    Monitor for volume overload  #.  Recurrent seizures, likely secondary to acute intracranial process as detailed above.  Currently on propofol, IV  levetiracetam, and IV fosphenytoin.  #.  Moderate Sedation with lower RASS -3 as goal   Due to ongoing issues will pursue deeper RASS goal of -3   Start fentanyl infusion - titrate to sedation   Prn versed especially with turning/routine cares  #.  Fever - possible central fever contributing.  Elevated protein, PMNs, and WBC, but likely due to blood.  Cultures still pending   Cefepime would have good coverage into the CNS as well    Pulmonary: Mechanical ventilation instituted for airway protection in setting of seizure and encephalopathy secondary to SAH.  The patient is on minimal ventilator settings.  Not clinically appropriate for ventilator weaning, and due to ongoing issues no further sedation holidays.  Monitor.  - goal SpO2 > 92%, on 30% FiO2  - no weaning today  - secretions mostly small  - sputum culture sent, culture pending  - Abx per ID section below  - recheck CXR  - Discussed case with Dr. Ledezma and so family likely to proceed with tracheostomy early next week.     Cardiovascular: goal -150 but will try to aim for higher end of this (note when goal SBP was higher 150-180 then patient had further bleeding). Avoid medications that would cause bradycardia.  - continue NE as needed to maintain SBP goal   - intermittent albumin for low CVP   - CVP to be maintained 10-12    Renal: No acute issues.  Good urine output.  - E.coli UTI (CAUTI)  - potassium/magnesium replacement per protocol  - add KCL to saline infusion    GI: NPO for now.  - om trophic feeding  - PPI    Heme: No acute issues, monitor.  - IVF to maintain HCT 30-32  - SCDs    Endo: No acute issues.  Continue close monitoring.  - ICU q 4 hour SSI    ID: E. Coli UTI and GPC in clusters in blood stream (1/4 positive, but less than 24 hours) with ongoing fevers, leukocytosis improved  - cefepime (will cover E.coli and has better CNS penetration)  - vancomycin - discontinued after 1 day as CoNS grew from culture, follow up cultures negative      Access/Lines/Tubes: required and necessary for continued patient cares  ETT, 7.5  PICC, RUE  PIV  Arterial sheath, right femoral artery  Sharpe catheter  Lumbar drain  OGT    ICU prophylaxis:   #. VAP ppx: HOB 30 degrees, chlorhexidine rinses  #. Stress ulcer: PPI  #. Diet: NPO, trophic feedings now  #. VTE: SCD, chemical prophylaxis contraindicated due to SAH/bleeding  #. Restraints: required and necessary for continued patient cares    CODE: FULL    Family was updated during multidisciplinary family conference.  Discussed overall condition.  Plan for formal family conference tomorrow am at 1030.    Dispo: ICU for mechanical ventilation, hemodynamic support, neurological monitoring, remains critically ill with constant threat to life.    Patient is critically ill with total CCT spent 50 minutes thus far today.     Fer Reyes, DO  Pulmonary and Critical Care     =================================================================    Interval history: remains sedated and intubated.  No further seizure activity reported.  Fever again today.  No significant changes overnight.       Vitals: Temp:  [99.3  F (37.4  C)-103.1  F (39.5  C)] 102.9  F (39.4  C)  Heart Rate:  [] 80  Resp:  [15-34] 16  BP: ()/(46-78) 137/67  Arterial Line BP: (108-156)/(54-78) 137/67  FiO2 (%):  [30 %-100 %] 40 %  Vent:   Vent Mode: VCV  FiO2 (%):  [30 %-100 %] 40 %  S RR:  [16] 16  S VT:  [375 mL] 375 mL  PEEP/CPAP (cm H2O):  [5 cm H2O] 5 cm H2O  Minute Ventilation (L/min):  [6.5 L/min-12.7 L/min] 8.2 L/min  PIP:  [7 cm H2O-29 cm H2O] 29 cm H2O  MAP (cm H2O):  [5-8] 8    Physical Exam:   General: obese woman, lying in bed, NAD  HEENT: orally intubated, MMM, PER  CV: RRR, no M/R/G; extremities well perfused, 1+ edema  Pulm: mechanical breath sounds that are course, more diminished on the left, no wheezing, no crackles  Abd: soft, ND, NT, hypoactive bowel sounds  Msk: warm to touch  Derm: no acute lesions or rashes on limited  exam  Neuro: sedated, no evidence of clonus  Psych: sedated    Labs: personally reviewed in EMR.     Imaging: personally reviewed in EMR. Formal Radiology interpretation listed below.  #. HCT, 7/27:   INTRACRANIAL CONTENTS: Beam hardening and streak artifacts related to a large right paraclinoid coil mass as seen previously. This locally degrades evaluation. Small volume subarachnoid hemorrhage within sulci of both cerebral hemispheres. No definite increasing intraventricular hemorrhage layering within the occipital horns of the lateral ventricles favored to reflect redistribution of subarachnoid blood products. No large territorial infarct is identified. Small frontoparietal infarcts seen on the 7/26/2019 MRI are poorly appreciated on CT. Minor periventricular low-attenuation. Mild to moderate lateral and third ventriculomegaly similar to findings on the prior exam.   VISUALIZED ORBITS/SINUSES/MASTOIDS: No significant orbital abnormality. No significant paranasal sinus mucosal disease. No significant middle ear or mastoid effusion.  BONES/SOFT TISSUES: No significant abnormality.    #. HCT: 7/28:   Again demonstrated is endovascular coiling of a giant right supraclinoid aneurysm. This results in extensive streak artifact and compromises a portion of the intracranial compartment. Within this limitation, the amount of intraventricular hemorrhage and   subarachnoid hemorrhage within the basal cisterns and scattered over the convexities appears grossly unchanged. There is stable mild to moderate ventriculomegaly of the lateral and third ventricles. No evidence of interval acute intracranial hemorrhage,   infarct or hydrocephalus.

## 2021-05-31 NOTE — PROGRESS NOTES
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   RMCA stroke related to vasospasm.       Initial head CT showed MARIA L giant aneurysm (sentinel headache)    S/p endovascular coil embolization    Aneurysm stable on repeat angiogram.     MRI/CT shows large hypodensity/evolving ischemic stroke (from vasospasm)    Head CT stable shows slightly enlarged ventricles and lumbar drain opened.    Exam improving clinically.    -160    Keppra 1000 mg two times a day - Level therapeutic.     Start PT.    Lumbar drain replaced today    NSG recommending  shunt - planned for next week    Dr. Styles to follow over the weekend.     Thank you again for this referral, please feel free to contact me if you have any questions.     CHIEF COMPLAINT Aneurysmal subarachnoid hemorrhage (H)     HISTORY OF PRESENT ILLNESS     We have been requested by Dr. Kwan to evaluate Darion Herrera who is a 43 y.o.  female for SAH.    This a 43-year-old female on whom neurology is been consulted to evaluate for subarachnoid hemorrhage.  Patient initially presented with 6 days ago with a headache.  She was trying to lift some fruits and felt something blowing up in her head.  She continued to have the headache.  She left the staff she was lifting on the table.  She was then brought to the emergency room where imaging showed a large frontal mass.  This was later on MRI identified to be a giant aneurysm.  Patient underwent angiogram and endovascular coil embolization of the aneurysm.  The patient had a repeat angiogram.  MRI done today showed multiple foci of acute/subacute infarction in the right parietal-occipital region.  There is  also small area of infarction in the right frontal lobe.  TCD's show vasospasm in the right MCA.  There was some narrowing of the M1 and M2 segments.  Neurology was consulted to further help with further cares.  Patient denies any ongoing symptoms he does complain of some headache.     7/29  Patient had 2 seizures over the weekend. Was started on fosphenytoin in addition to Keppra.   No seizures today.  Intubated, but waking up as the sedation has been withdrawn.     7/30  No new seizures. Patient remains on sedation and intubated. RMCA velocities remain elevated on the TCD.     7/31  No new seizures. Patient being kept sedation. Angiogram still shows vasospasm. Patient getting IA verapamil.     8/1  No seizures. Patient continues to remain on sedation. Angiogram continues to show vasospasm. Getting IA verapamil. Family conference tomorrow.     8/2  Patient remains on sedation. Angiogram shows stable vasospasm. Some twitching of right shoulder per nursing staff.     8/5  Weekend events - patient was found to have a RMCA evolving infarct over the weekend.   Family wanting to proceed with Trach and PEG.     8/6  TCD still shows vasospasm. Patient is being kept sedated till vasospasm resolves. Family interested in Trach and PEG.     8/7  MRI/MRA brain today to assess for vasospasm. Trach and PEG today.     8/8  Patient went for angiogram today. Exam improved off sedation. Angiogram showed aneurysm but no coiling done. Coiling planned for next week.     8/9  TCD look improved. Sedation has been reduced.     8/10  More awake and able to move right arm and leg more. Following more commands.     8/11  Vimpat was decreased with no seizures. More interactive today. Still not able to lift left arm.     8/12  Exam has improved. Starting to move left side today. Head CT stable.  No seizures.    8/13  Angiogram today. Patients exam on left side has improved. She is able to lift it against gravity.     8/14  Patient very  emotional today and crying all day long. Wants to go home. NSG planning  shunt so lumbar drain can be removed.     8/15  Patient did not get  shunt surgery. She is not sure she wants to proceed. Left arm and leg keeps to get stronger. Patient remains very emotional. Lumbar drain still open and draining.    8/16  Patient in better mood today. Wanting to go home. Has agreed to  shunt but surgery would be next week. Lumbar drain repaired today. Mild headache but no new symptoms.      PAST MEDICAL & SURGICAL HISTORY     Medical History  Patient Active Problem List    Diagnosis Date Noted     Adjustment disorder with mixed anxiety and depressed mood      Mood disorder (H)      Sleep difficulties      Pneumonia due to group B Streptococcus, unspecified laterality, unspecified part of lung (H)      Vasospasm of cerebral artery      E-coli UTI      Gram-positive bacteremia      Fever in other diseases      Acute respiratory failure, unspecified whether with hypoxia or hypercapnia (H)      Cerebrovascular accident (CVA) due to occlusion of right middle cerebral artery (H)      Vasospasm (H)      S/P coil embolization of cerebral aneurysm 07/23/2019     Aneurysm of right internal carotid artery 07/23/2019     Carotid artery aneurysm (H) 07/22/2019     Nausea vomiting and diarrhea 07/22/2019     Aneurysmal subarachnoid hemorrhage (H) 07/22/2019     Intraventricular hemorrhage, nontraumatic (H) 07/22/2019     CVA (cerebral vascular accident) (H) 07/22/2019     Hydrocephalus 07/22/2019     Compression of brain (H)      Leiomyoma of uterus 07/18/2016     Past Medical History: No previous history.    Surgical History  She  has a past surgical history that includes IR Cerebral Angiogram (7/22/2019); IR Cerebral Angiogram (7/24/2019); PICC Team Line Insertion (7/26/2019); IR Cerebral Angiogram (7/27/2019); IR Lumbar Drain Insertion (7/27/2019); IR Cerebral Angiogram (7/29/2019); IR Cerebral Angiogram (7/30/2019); IR Cerebral  Angiogram (7/31/2019); IR Cerebral Angiogram (8/1/2019); IR Lumbar Drain Insertion (8/2/2019); IR Cerebral Angiogram (8/2/2019); IR Cerebral Angiogram (7/28/2019); pr egd percutaneous placement gastrostomy tube (N/A, 8/7/2019); Tracheostomy (N/A, 8/7/2019); IR Lumbar Drain Insertion (8/8/2019); IR Cerebral Angiogram (8/13/2019); and IR Cerebral Angiogram (8/8/2019).     SOCIAL HISTORY     Reviewed, and she  reports that she has never smoked. She has never used smokeless tobacco. She reports that she has current or past drug history. She reports that she does not drink alcohol.     FAMILY HISTORY     Reviewed, and non-contributory.      ALLERGIES     Allergies   Allergen Reactions     Oxycodone      Hydrocodone Swelling and Rash        REVIEW OF SYSTEMS     12 system ROS was done and was negative other than HPI - except patient feeling tired.     HOME & HOSPITAL MEDICATIONS     Prior to Admission Medications  Medications Prior to Admission   Medication Sig Dispense Refill Last Dose     ascorbic acid, vitamin C, 500 mg Chew chew tab Chew 500 mg daily.   Unknown at Unknown time       Hospital Medications    atorvastatin  20 mg Enteral Tube DAILY     chlorhexidine  15 mL Topical Q12H     guar gum  1 packet Enteral Tube BID     levETIRAcetam  (KEPPRA) solution 100 mg/mL  1,000 mg Enteral Tube BID     nystatin  5 mL Swish & Swallow QID     omeprazole  20 mg Enteral Tube QAM AC    Or     pantoprazole  40 mg Intravenous QAM AC     potassium chloride liquid/packet  20 mEq Enteral Tube BID     sodium chloride  10-30 mL Intravenous Q8H FIXED TIMES        PHYSICAL EXAM     Vital signs  Temp:  [98.5  F (36.9  C)-99.5  F (37.5  C)] 99  F (37.2  C)  Heart Rate:  [] 104  Resp:  [14-38] 38  BP: ()/(65-94) 111/72  FiO2 (%):  [21 %] 21 %    Weight:   152 lb 4.8 oz (69.1 kg)    GENERAL: Healthy appearing, alert, no acute distress, normal habitus.  CARDIOVASCULAR: Ext warm and well perfused. Pulses present.   NEUROLOGICAL:   Patient is trached. Does not speak but does follow commands. EOMI. Pupils symmetric. VFI on present on both sides. Left sided facial droop.  Right side antigravity. Left leg 4-/5. Left arm 3/5     DIAGNOSTIC STUDIES     Pertinent Radiology   MRI brain - images reviewed stroke seen  IMPRESSION:   CONCLUSION:  1.  Multiple foci of developing acute/subacute infarction throughout the right parietal occipital lobes, with additional single small foci of acute/subacute infarction in the right frontal lobe and left parietal lobe.  2.  Status post endovascular coiling of large dissecting aneurysm of the ventromedial right supraclinoid ICA with redemonstration of mild persistent flow-related enhancement along the neck of the aneurysm. There is also faint flow-related enhancement   centrally within the coil mass.  3.  Mildly decreased caliber of the M1 and proximal M2 segments of the right middle cerebral artery compared to the left, which may represent mild vasospasm. There is more normal caliber of the distal M2 and M3 branches.    ECHO    1.Left ventricle ejection fraction is normal. The calculated left ventricular ejection fraction is 65%.    2.TAPSE is normal, which is consistent with normal right ventricular systolic function.    3.No hemodynamically significant valvular heart abnormalities.    4.No obvious embolic source seen, if highly suspect consider MICHAEL.    5.No previous study for comparison.     TCD - images reviewed.   IMPRESSION:   CONCLUSION:  New moderate right middle cerebral artery vasospasm.    EEG negative for seizures.    Angiogram  Preliminary findings: (see dictation for full detail)  - Stable right ICA aneurysm coil mass with residual interstitial neck filling.  - Possible mild vasospasm superimposed on right ICA dissection, verapamil 10 mg infused in right ICA.    TCD reviewed and velocities are elevated in the RMCA.    Angiogram shows MARIA L, RMCA, RACA vasospasm. Dissection in MARIA L.    Angiogram on 8/1  continues to show vasospasm.     MRI and CT reviewed - RMCA evolving stroke seen.     8/6 TCD - RMCA velocity 192 - still elevated.     MRI brain  IMPRESSION:   CONCLUSION:  HEAD MRI:   1.  New T2 FLAIR sulcal nonsuppression in the dependent temporal and occipital sulci compatible with subarachnoid hemorrhage although age indeterminant and a small amount of new subarachnoid hemorrhage is difficult to exclude.  2.  Hyperattenuation on the prior CT corresponds to faint susceptibility, favored to represent mild petechial hemorrhage.  3.  A few small scattered foci of new restricted diffusion in the right posterior limb internal capsule and right parietal cortex.  4.  No midline shift or herniation. No hydrocephalus.     HEAD MRA:   1.  New flow related signal near circumferential around the periphery of the coiled right internal carotid artery aneurysm, raising concern for recurrence.  2.  Mild to moderate vasospasm of the right M1, slightly improved compared to 7/26/2019. Mild vasospasm of the proximal right M2 middle cerebral artery branches, not significant change.  3.  No new significant stenosis or occlusion.    Angiogram  6-7 mm recurrence at the neck of previously coiled right ICA aneurysm.     TCD shows improving RMCA velocities (images of today's test reviewed).     Angiogram shows unchanged aneurysm.     Head CT  1.  Stealth noncontrast head CT for preoperative planning.  2.  Mild interval enlargement of the lateral and third ventricles which are mild to moderately dilated. This suggests a worsening hydrocephalus. No definite transependymal CSF noted.  3.  Status post endovascular coiling of giant right paraclinoid aneurysm.    Recent Results (from the past 24 hour(s))   Magnesium    Collection Time: 08/16/19  4:53 AM   Result Value Ref Range    Magnesium 2.1 1.8 - 2.6 mg/dL   Comprehensive Metabolic Panel    Collection Time: 08/16/19  4:53 AM   Result Value Ref Range    Sodium 137 136 - 145 mmol/L     Potassium 4.1 3.5 - 5.0 mmol/L    Chloride 102 98 - 107 mmol/L    CO2 26 22 - 31 mmol/L    Anion Gap, Calculation 9 5 - 18 mmol/L    Glucose 102 70 - 125 mg/dL    BUN 19 8 - 22 mg/dL    Creatinine 0.61 0.60 - 1.10 mg/dL    GFR MDRD Af Amer >60 >60 mL/min/1.73m2    GFR MDRD Non Af Amer >60 >60 mL/min/1.73m2    Bilirubin, Total 0.7 0.0 - 1.0 mg/dL    Calcium 10.3 8.5 - 10.5 mg/dL    Protein, Total 8.4 (H) 6.0 - 8.0 g/dL    Albumin 4.4 3.5 - 5.0 g/dL    Alkaline Phosphatase 112 45 - 120 U/L    AST 39 0 - 40 U/L    ALT 60 (H) 0 - 45 U/L   HM1 (CBC with Diff)    Collection Time: 08/16/19  4:53 AM   Result Value Ref Range    WBC 8.4 4.0 - 11.0 thou/uL    RBC 4.38 3.80 - 5.40 mill/uL    Hemoglobin 12.9 12.0 - 16.0 g/dL    Hematocrit 39.6 35.0 - 47.0 %    MCV 90 80 - 100 fL    MCH 29.5 27.0 - 34.0 pg    MCHC 32.6 32.0 - 36.0 g/dL    RDW 13.8 11.0 - 14.5 %    Platelets 491 (H) 140 - 440 thou/uL    MPV 9.9 8.5 - 12.5 fL    Neutrophils % 67 50 - 70 %    Lymphocytes % 19 (L) 20 - 40 %    Monocytes % 10 2 - 10 %    Eosinophils % 3 0 - 6 %    Basophils % 1 0 - 2 %    Neutrophils Absolute 5.6 2.0 - 7.7 thou/uL    Lymphocytes Absolute 1.6 0.8 - 4.4 thou/uL    Monocytes Absolute 0.8 0.0 - 0.9 thou/uL    Eosinophils Absolute 0.3 0.0 - 0.4 thou/uL    Basophils Absolute 0.1 0.0 - 0.2 thou/uL       Total time spent for face to face visit, reviewing labs/imaging studies, counseling and coordination of care was: Over 25 min More than 50% of this time was spent on counseling and coordination of care.  Counseling family on plan. Discussing plan and events with nursing staff.     Justin Vasquez MD  Direct Secure Messaging: [email or web address]  Tel: [phone]  This note was dictated using voice recognition software.  Any grammatical or context distortions are unintentional and inherent to the software.

## 2021-05-31 NOTE — PROGRESS NOTES
[place] Note: Patient does meet criteria for LTACH level of care. I did send for BC authorization and is being reviewed by Medical director. I will update CM with determination.     Anupam Zuniga RT  Referral Specialist  [phone]

## 2021-05-31 NOTE — PLAN OF CARE
Preformed sedation vacation on patient. Patient was able to tolerate the interruption but did have a slight increase in RR(30s) and coughing.   Neuro checks remained unchanged over night.  LD is still putting out goal of 10-15/hr and remains straw colored.  Pupils are 2-3 and go back and fourth between brisk and sluggish.   Patient also noted to have a hard spot that seems to look like a hemorrhoid. Will ask for WOC consult.

## 2021-05-31 NOTE — PLAN OF CARE
Problem: Mechanical Ventilation  Goal: Tracheostomy will be managed safely  Outcome: Progressing  Goal: Respiratory status - ventilation  Description  Movement of air in and out of the lungs.    Liberate from ventilator  Outcome: Progressing     Problem: Inadequate Gas Exchange  Goal: Patient will achieve/maintain normal respiratory rate/effort  Outcome: Progressing     Problem: Breathing  Goal: STG - Respiratory rate and effort will be within normal limits for the patient  Outcome: Progressing     KAE RosalesT

## 2021-05-31 NOTE — CONSULTS
GENERAL SURGERY CONSULTATION    Darion Herrera  [place]  Medical Record #:  [id number]  YOB: 1975  Age:  43 y.o.     Date of Consultation: 8/2/2019    Reason for Consultation: Patient's record reviewed.  Reviewed with intensive care physician.  Will be available to place trachea of family decides on continuation of care.  Can place this 84 5.    Darion Herrera is a 43 y.o. female who presents with a history of cerebrovascular event is noted.  Progress notes reviewed.  Full consult will follow.    H:  History reviewed. No pertinent past medical history.     Past Surgical History:   Procedure Laterality Date     IR CEREBRAL ANGIOGRAM  7/22/2019     IR CEREBRAL ANGIOGRAM  7/24/2019     IR CEREBRAL ANGIOGRAM  7/27/2019     IR CEREBRAL ANGIOGRAM  7/29/2019     IR CEREBRAL ANGIOGRAM  7/30/2019     IR CEREBRAL ANGIOGRAM  7/31/2019     IR CEREBRAL ANGIOGRAM  8/1/2019     IR LUMBAR DRAIN INSERTION  7/27/2019     PICC AND MIDLINE TEAM LINE INSERTION  7/26/2019            ALLERGIES:  Oxycodone and Hydrocodone    MEDS:    Current Facility-Administered Medications:      acetaminophen solution 650 mg (TYLENOL), 650 mg, Enteral Tube, QID, 650 mg at 08/01/19 2014 **OR** acetaminophen tablet 650 mg (TYLENOL), 650 mg, Oral, QID, Ludmila Pink MD, 650 mg at 07/28/19 2052     acetaminophen solution 650 mg (TYLENOL), 650 mg, Oral, Q6H PRN, Brittni Ruano MD, 650 mg at 08/02/19 0556     albumin human 25 % bottle 12.5 g, 12.5 g, Intravenous, Once, Barbara Youssef CNP     albumin human 25 % bottle 12.5 g, 12.5 g, Intravenous, Q4H PRN, Barbara Youssef CNP, 12.5 g at 08/01/19 0717     amino acids-protein hydrolys 15-60 gram-kcal/30 mL packet 1 packet (ProSource No Carb), 1 packet, Enteral Tube, BID, Fer Reyes DO, 1 packet at 08/01/19 2014     atorvastatin tablet 20 mg (LIPITOR), 20 mg, Enteral Tube, DAILY, Dell Stacy MD, 20 mg at 08/01/19 0817     bisacodyl suppository 10 mg  (DULCOLAX), 10 mg, Rectal, Daily PRN, Dell Stacy MD     cefepime (MAXIPIME) 2 grams, 2 g, Intravenous, Q12H, Fer Reyes DO, 2 g at 08/02/19 0300     chlorhexidine 0.12 % solution 15 mL (PERIDEX), 15 mL, Topical, Q12H, Ludmila Pink MD, 15 mL at 08/02/19 0904     fentaNYL 2500 mcg/250 mL in NS (10 mcg/mL),  mcg/hr, Intravenous, Continuous, Fer Reyes DO, Last Rate: 7.5 mL/hr at 08/02/19 0204, 75 mcg/hr at 08/02/19 0204     HYDROmorphone injection 0.2 mg (DILAUDID), 0.2 mg, Intravenous, Q2H PRN, Barbara Youssef CNP, 0.2 mg at 07/31/19 0810     lacosamide 100 mg in sodium chloride 0.9% 100 mL (VIMPAT), 100 mg, Intravenous, Q12H 09-21, Justin Vasquez Rai, MD, Last Rate: 200 mL/hr at 08/02/19 0905, 100 mg at 08/02/19 0905     lactated Ringers, 100 mL/hr, Intravenous, Continuous PRN, Pat Thurston MD     lactated Ringers, 100 mL/hr, Intravenous, Continuous, Fer Reyes DO, Last Rate: 100 mL/hr at 08/02/19 0100, 100 mL/hr at 08/02/19 0100     levETIRAcetam 1,000 mg in sodium chloride 0.9% 100 mL (KEPPRA), 1,000 mg, Intravenous, Q12H, Farhad Styles MD, Last Rate: 200 mL/hr at 08/01/19 2225, 1,000 mg at 08/01/19 2225     lipase-protease-amylase 5,000-17,000- 24,000 unit capsule 2 capsule (ZENPEP), 2 capsule, Enteral Tube, PRN **AND** sodium bicarbonate tablet 325 mg, 325 mg, Enteral Tube, PRN, Fer Reyes DO     midazolam (PF) injection 1-2 mg (VERSED), 1-2 mg, Intravenous, Q2H PRN, Fer Reyes DO, 2 mg at 08/01/19 0500     naloxone injection 0.2-0.4 mg (NARCAN), 0.2-0.4 mg, Intravenous, PRN **OR** naloxone injection 0.2-0.4 mg (NARCAN), 0.2-0.4 mg, Intramuscular, PRN, Jerrell Rodas MD     niCARdipine 25 mg in sodium chloride 0.9% 250 mL (CARDENE), 0.5-15 mg/hr, Intravenous, Continuous, Barbara Youssef, CNP     niMODipine oral solution 60 mg (NYMALIZE), 60 mg, Enteral Tube, Q4H, Ludmila Pink MD, 60 mg at 08/02/19  0800     norepinephrine 4 mg/250 mL in NS (16 mcg/mL), 0.01-0.4 mcg/kg/min, Intravenous, Continuous, Barbara Youssef CNP, Last Rate: 47.3 mL/hr at 08/02/19 1209, 0.18 mcg/kg/min at 08/02/19 1209     omeprazole suspension 20 mg (PriLOSEC), 20 mg, Enteral Tube, QAM AC, 20 mg at 08/02/19 0553 **OR** pantoprazole 40 mg injection, 40 mg, Intravenous, QAM AC, Ludmila Pink MD, 40 mg at 08/01/19 0816     ondansetron injection 4 mg (ZOFRAN), 4 mg, Intravenous, Q4H PRN, Jerrell Rodas MD, 4 mg at 07/26/19 2304     polyethylene glycol packet 17 g (MIRALAX), 17 g, Enteral Tube, Daily PRN, Ludmila Pink MD     potassium chloride packet 20 mEq (KLOR-CON), 20 mEq, Enteral Tube, BID, Ludmila Pink MD, 20 mEq at 08/01/19 2014     propofol injection (DIPRIVAN), 5-100 mcg/kg/min, Intravenous, Continuous, Barbara Youssef CNP, Last Rate: 30.5 mL/hr at 08/02/19 1030, 70 mcg/kg/min at 08/02/19 1030     senna tablet 8.6 mg (SENOKOT), 8.6 mg, Oral, BID, Stopped at 07/31/19 2100 **OR** sennosides syrup 8.8 mg (for SENOKOT), 8.8 mg, Enteral Tube, BID, Barbara Youssef CNP, 8.8 mg at 07/30/19 1948     sodium chloride bacteriostatic 0.9 % injection 1-5 mL, 1-5 mL, Intradermal, Once PRN, Barbara Youssef CNP     sodium chloride flush 10-20 mL (NS), 10-20 mL, Intravenous, PRN, Barbara Youssef CNP     sodium chloride flush 10-30 mL (NS), 10-30 mL, Intravenous, PRN, Barbara Youssef CNP     sodium chloride flush 10-30 mL (NS), 10-30 mL, Intravenous, Q8H FIXED TIMES, Barbara Youssef CNP, 10 mL at 08/02/19 0553     sodium chloride flush 20 mL (NS), 20 mL, Intravenous, PRN, Barbara Youssef CNP     sodium chloride tablet 2 g, 2 g, Oral, BID **OR** sodium chloride tablet 2 g, 2 g, Enteral Tube, BID, Barbara Youssef CNP, 2 g at 08/01/19 2014     sodium chloride/acetate 3% IV solution (HYPERTONIC),  mL/hr,  Intravenous, Continuous, Fer Reyes DO, Last Rate: 50 mL/hr at 08/02/19 0859, 50 mL/hr at 08/02/19 0859    SOCIAL HABITS:    Social History     Tobacco Use   Smoking Status Never Smoker   Smokeless Tobacco Never Used       Social History     Substance and Sexual Activity   Alcohol Use No       Social History     Substance and Sexual Activity   Drug Use Not Currently       FAMILY HISTORY:  History reviewed. No pertinent family history.    REVIEW OF SYSTEMS:      PE:    Vitals:    08/02/19 1240   BP: 130/79   Pulse: 91   Resp:    Temp:    SpO2: 100%       HEENT:    Chest:    Lungs:    Heart:    Abd:    Ext:   Vascular:     LABS:    Results from last 7 days   Lab Units 08/02/19  0550   LN-SODIUM mmol/L 145  145   LN-POTASSIUM mmol/L 3.6   LN-CHLORIDE mmol/L 113*   LN-CO2 mmol/L 23   LN-BLOOD UREA NITROGEN mg/dL 8   LN-CREATININE mg/dL 0.63  0.63   LN-CALCIUM mg/dL 8.5      Results from last 7 days   Lab Units 08/01/19  0446   LN-WHITE BLOOD CELL COUNT thou/uL 13.4*   LN-HEMOGLOBIN g/dL 8.0*   LN-HEMATOCRIT % 25.1*   LN-PLATELET COUNT thou/uL 187   LN-NEUTROPHILS RELATIVE PERCENT % 80*   LN-LYMPHOCYTES RELATIVE PERCENT % 12*   LN-MONOCYTES RELATIVE PERCENT % 5   LN-EOSINOPHILS RELATIVE PERCENT % 2          Invalid input(s): LABALBU        Results from last 7 days   Lab Units 07/27/19  0548   LN-INR  1.12*       XRAYS: Noted    ASSESSMENT: Patient may need tracheostomy if family desires to proceed with such.    PLAN: Above    Fer jhaveri md  [facility]

## 2021-05-31 NOTE — ANESTHESIA POSTPROCEDURE EVALUATION
Patient: Darion L Sharon  CREATION, TRACHEOSTOMY - and peg, CREATION, GASTROSTOMY, PERCUTANEOUS, ENDOSCOPIC  Anesthesia type: No value filed.    Patient location: ICU  Last vitals:   Vitals Value Taken Time   /83 8/7/2019  4:31 PM   Temp 37.6  C (99.7  F) 8/7/2019  3:45 PM   Pulse 70 8/7/2019  4:30 PM   Resp 15 8/7/2019  4:29 PM   SpO2 96 % 8/7/2019  4:30 PM   Vitals shown include unvalidated device data.  Post vital signs: stable  Level of consciousness: awake and responds to simple questions  Post-anesthesia pain: pain controlled  Post-anesthesia nausea and vomiting: no  Pulmonary: unassisted, return to baseline  Cardiovascular: stable and blood pressure at baseline  Hydration: adequate  Anesthetic events: no    QCDR Measures:  ASA# 11 - Nina-op Cardiac Arrest: ASA11B - Patient did NOT experience unanticipated cardiac arrest  ASA# 12 - Nina-op Mortality Rate: ASA12B - Patient did NOT die  ASA# 13 - PACU Re-Intubation Rate: ASA13B - Patient did NOT require a new airway mgmt  ASA# 10 - Composite Anes Safety: ASA10A - No serious adverse event    Additional Notes:

## 2021-05-31 NOTE — PLAN OF CARE
Discussed MRA results with Dr. Duarte from IR - MRA appears stable. Plan 7-10 days repeat MRA to confirm stability. If aneurysm remains stable, they would continue with plan for f/u 3 weeks with Stent/Coil of the aneurysm. MRA of head with and without contrast at [place] and results to IR/Dr. Duarte again. If MRA has changed, plan for stent may change. XR confirmed setting and XR reviewed with Dr. Hoffmann - Miguel shunt set to 4. F/u Head CT early next week. Okay to discharge from neurosurgery perspective to . We will follow up head CT next week.     Ramonita Alfaro, [facility]  O: [phone]

## 2021-05-31 NOTE — PLAN OF CARE
Problem: Pain  Goal: Patient's pain/discomfort is manageable  Outcome: Progressing     Problem: Safety  Goal: Patient will be injury free during hospitalization  Outcome: Progressing     Problem: Daily Care  Goal: Daily care needs are met  Outcome: Progressing     Problem: Potential for Compromised Skin Integrity  Goal: Skin integrity is maintained or improved  Outcome: Progressing     Problem: Neurological Deficit  Goal: Neurological status is stable or improving  Outcome: Progressing     Problem: Mechanical Ventilation  Goal: Patient will maintain patent airway  Outcome: Progressing

## 2021-05-31 NOTE — PLAN OF CARE
Problem: Mechanical Ventilation  Goal: Patient will maintain patent airway  Outcome: Progressing  Goal: ET tube will be managed safely  Outcome: Progressing     JERRY Dunaway

## 2021-05-31 NOTE — PROCEDURES
[facility] Post-Procedure    Procedure: Cerebral angiogram    Radiologist: Hosea Duarte    Contrast: 15 mL Omnipaque 300. 70 mL Visipaque 320.    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 Management of the residual aneurysm is pending further discussion.  No evidence of residual cerebral vasospasm  No evidence of additional cerebral aneurysms.    Assess/Plan:   Routine post procedure monitoing  Bedrest for 4 hours  Report to ordering provider    Hosea Duarte  [phone]

## 2021-05-31 NOTE — PROGRESS NOTES
[facility] Daily Progress Note    Assessment/Plan:  Darion Herrera is a 43-year-old female who presented 7/21/2019 with headache that started in the Neck with radiation to the forehead ruptured aneurysm of the right ICA resulting in subarachnoid hemorrhage with extension intraventricular hemorrhage.  She is status post aneurysm coiling on 7/22 which was complicated by vasospasm.  There is also a complication of the right ICA aneurysm with dissection and rebleeding leading to seizure and unresponsiveness on 7/27.  This required intubation and recoiling and placement of lumbar drain. She is currently requiring daily intraarterial verapamil infusions w/angiogram for acute vasospasms of the R ICA.  She is currently intubated in ICU.  Intensivist and neurosurgery managing.      Assessment:  Right ICA Aneurysm complicated with SAH, IVH  S/p Dissection and Coling x 2 with hydrocephalus s/p Lumbar drainage   Daily intraarterial verapamil infusions w/angiogram  On nimodipine Q4H for total of 21 days  Maintain Na 145-150    Recurrent Seizures  On Propofol, Keppra and Fosphenytoin     Metabolic Encephalopathy  Management as per above    E. Coli CAUTI  On Cefepime     Respiratory failure  Intubated, sedated. On pressors.  D/w trach and PEG, family hasnt decided yet.     DVT ppx: SCD  GI ppx: Protonix    Diet: NPO    Code: Full code      Subjective:  Patient is intubated, sedated      Current Medications Reviewed via EHR List    Objective:  Vital signs in last 24 hours:  Temp:  [99.3  F (37.4  C)-103.1  F (39.5  C)] 102  F (38.9  C)  Heart Rate:  [] 81  Resp:  [7-34] 16  BP: ()/(46-88) 130/79  SpO2:  [89 %-100 %] 97 %  ETCO2 (mmHg):  [0 mmHg-42 mmHg] 40 mmHg  Arterial Line BP: (108-165)/(54-87) 144/65  FiO2 (%):  [40 %] 40 %    Intake/Output last 3 shifts:  I/O last 3 completed shifts:  In: 4940.3 [I.V.:4224.6; NG/GT:305.7; IV Piggyback:410]  Out: 4028 [Urine:3800; Drains:228]      Physical Exam:  General: Intubated,  sedated.  HEENT: Intubated  HEART : S1 S2, normal rate/rhythm  LUNGS: Clear to auscultation equal air entry  ABDOMEN:   Soft, non distended  CNS Sedated on Mechanical Ventilator               Lab Results:  Personally Reviewed.   Fingerstick Blood Glucose:   Recent Labs     08/01/19  1836 08/02/19  1308   POCGLUFGR 128 141*       Recent Results (from the past 24 hour(s))   Sodium lab - every 6 hours    Collection Time: 08/01/19  6:30 PM   Result Value Ref Range    Sodium 148 (H) 136 - 145 mmol/L   POCT Glucose    Collection Time: 08/01/19  6:36 PM   Result Value Ref Range    Glucose 128 70 - 139 mg/dL   Sodium lab - every 6 hours    Collection Time: 08/02/19 12:15 AM   Result Value Ref Range    Sodium 146 (H) 136 - 145 mmol/L   Potassium - Next AM    Collection Time: 08/02/19  4:06 AM   Result Value Ref Range    Potassium 3.6 3.5 - 5.0 mmol/L   Magnesium    Collection Time: 08/02/19  4:06 AM   Result Value Ref Range    Magnesium 1.8 1.8 - 2.6 mg/dL   Sodium lab - every 6 hours    Collection Time: 08/02/19  5:50 AM   Result Value Ref Range    Sodium 145 136 - 145 mmol/L   Creatinine    Collection Time: 08/02/19  5:50 AM   Result Value Ref Range    Creatinine 0.63 0.60 - 1.10 mg/dL    GFR MDRD Af Amer >60 >60 mL/min/1.73m2    GFR MDRD Non Af Amer >60 >60 mL/min/1.73m2   Basic Metabolic Panel    Collection Time: 08/02/19  5:50 AM   Result Value Ref Range    Sodium 145 136 - 145 mmol/L    Potassium 3.6 3.5 - 5.0 mmol/L    Chloride 113 (H) 98 - 107 mmol/L    CO2 23 22 - 31 mmol/L    Anion Gap, Calculation 9 5 - 18 mmol/L    Glucose 137 (H) 70 - 125 mg/dL    Calcium 8.5 8.5 - 10.5 mg/dL    BUN 8 8 - 22 mg/dL    Creatinine 0.63 0.60 - 1.10 mg/dL    GFR MDRD Af Amer >60 >60 mL/min/1.73m2    GFR MDRD Non Af Amer >60 >60 mL/min/1.73m2   Sodium lab - every 6 hours    Collection Time: 08/02/19  1:07 PM   Result Value Ref Range    Sodium 143 136 - 145 mmol/L   POCT Glucose    Collection Time: 08/02/19  1:08 PM   Result Value Ref  Range    Glucose 141 (H) 70 - 139 mg/dL           Advanced Care Planning  Discharge plan: Unknown      Total time spent was >35 mins, discussing with RN, , family and reviewing the chart    Francy Bravo  Date: 8/2/2019    Hospitalist Service      Part of this chart was created using a dictation software. Typographic errors, word substitutions, and Grammatical errors may unintentionally occur.

## 2021-05-31 NOTE — DISCHARGE SUMMARY
[facility] MEDICINE  DISCHARGE SUMMARY     Primary Care Physician: Monroe Cassidy MD  Admission Date: 7/22/2019   Discharge Provider: Lamonte To Discharge Date: 8/20/2019   Diet: tube feeding, NPO until cleared by speech   Code Status: Full Code   Activity: follow physical therapy recommendations        Condition at Discharge: Stable      REASON FOR PRESENTATION(See Admission Note for Details)   Headache    PRINCIPAL & ACTIVE DISCHARGE DIAGNOSES     Principal Problem:    Aneurysmal subarachnoid hemorrhage (H)  Active Problems:    Carotid artery aneurysm (H)    Intraventricular hemorrhage, nontraumatic (H)    Compression of brain (H)    S/P coil embolization of cerebral aneurysm    Aneurysm of right internal carotid artery    Cerebrovascular accident (CVA) due to occlusion of right middle cerebral artery (H)    Vasospasm (H)    Acute respiratory failure, unspecified whether with hypoxia or hypercapnia (H)    Fever in other diseases    E-coli UTI    Gram-positive bacteremia    CVA (cerebral vascular accident) (H)    Pneumonia due to group B Streptococcus, unspecified laterality, unspecified part of lung (H)    Vasospasm of cerebral artery    Hydrocephalus    Adjustment disorder with mixed anxiety and depressed mood    Mood disorder (H)    Sleep difficulties      SIGNIFICANT FINDINGS (Imaging, labs):     7/22/2019     PERFORMING PHYSICIANS: Ric Snider M.D.     HISTORY: Headache for 3 days. Acute intraventricular hemorrhage due to a large ruptured aneurysm appearing to arise from the right internal carotid artery on MRI of the brain.     CONSENT: Cerebral angiography and endovascular coil embolization were discussed in detail.      PRETREATMENT ANGIOGRAPHIC FINDINGS: 1.6 cm x 1.7 cm x 2.3 cm egg shaped aneurysm arises from the ventral medial surface of the supraclinoid right internal carotid artery, just proximal to the anterior choroidal artery origin. The parent right internal   carotid artery is  completely incorporated into this dissecting lesion. Is competing flow across the anterior communicating artery to supply the right anterior cerebral artery. Intracranial right internal carotid artery provides dominant supply to the   patent right middle cerebral artery.      The following embolization coils were then advanced into the aneurysm sac and detached in this order: Barricade complex frame 18 system 15 mm x 50 cm, 14 mm x 47 cm, 13 mm x 43 cm, 13 mm x 43 cm, 12 mm x 40 cm, 11 mm x 37 cm, 10 mm x 34 cm, 9 mm x 30 cm,   8 mm x 27 cm, 7 mm x 19 cm, helical fill 10 system 6 mm x 20 cm, and complex frame 10 system 7 mm x 24 cm.     After detachment of the final coil, the microcatheter was removed. A final right internal carotid artery digital subtraction angiograms of the head were performed. This reveals that coil material is present throughout the aneurysm. There is moderate   interstitial filling within the aneurysm, but this is very stagnant. There is no evidence of thromboembolic complication.     The guide catheter was removed. A 5 Macanese Flowify Limited diagnostic catheter was advanced over the angled Glidewire and used to selectively catheterize and inject contrast into the following arteries: left common carotid artery, left vertebral artery, and   right vertebral artery. Digital subtraction angiograms of the head and neck were performed in multiple projections with these catheterizations. The diagnostic catheter was then removed. The sheath was secured to the skin using suture. A sterile dressing   was applied. There was no apparent angiographic complication.     ADDITIONAL ANGIOGRAPHIC FINDINGS:  No additional aneurysms. Visualization of the basilar apex is difficult due to the superimposed large coil mass. However, multiple views reveal no definitive evidence of basilar apex aneurysm. Large anterior communicating artery present. The A1 segment   left anterior cerebral artery is slightly larger than its  right-sided counterpart. Both anterior cerebral arteries, both middle cerebral arteries, intracranial vertebral arteries, basilar branches, vertebral artery branches, and intracranial left   internal carotid artery widely patent. Circulation times normal. Venous structures grossly normal. External carotid artery branches grossly normal.     Cervical images of the left common carotid artery injection revealed the left carotid artery bifurcation is widely patent without evidence of stenosis using NASCET criteria.     CONCLUSION:  The patient underwent endovascular coil embolization of a ruptured dissecting 2.3 cm maximum dimension aneurysm of the ventral medial supraclinoid right internal carotid artery. 414 cm of coil material placed into the aneurysm sac. At the conclusion of   the procedure, there is coil material present throughout the aneurysm. There is mild/moderate interstitial filling within the coil mass, but this is very stagnant. Some degree of progressive thrombosis within the aneurysm sac is expected once the patient   is no longer anticoagulated.    EXAM: MR BRAIN COW W WO CONTRAST  LOCATION: [facility]  DATE/TIME: 8/7/2019 5:39 PM     INDICATION: Stroke, follow up follow up vasospasm, R MCA infarct  COMPARISON: 8/7/2000 19/8 3 2019 head CTs. 8/3/2019 MRI. 727. 7/26/2019 MRA.  CONTRAST: Gadavist 7.5  TECHNIQUE:   HEAD MRI: Routine multiplanar multisequence head MRI without and with intravenous contrast.  HEAD MRA: 3D time-of-flight head MRA without intravenous contrast.     FINDINGS:  HEAD MRI:  INTRACRANIAL CONTENTS:   New linear restricted diffusion in the right posterior limb internal capsule. A few scattered small foci of new restricted diffusion in the superior right parietal cortex slight interval increase in size of the focus of restricted diffusion in the right   frontoparietal centrum semiovale. Otherwise continued evolution of background of large area of restricted diffusion and T2  FLAIR hyperintensity in the right MCA territory as well as mild restricted diffusion in the right greater than left frontoparietal   anterior cerebral artery territories. No significant mass effect. No midline shift or herniation.     Faint susceptibility in the area of hyperattenuation seen on the prior CT may represent subtle petechial hemorrhage.     New T2 FLAIR sulcal nonsuppression in the dependent occipital and temporal lobes, right greater than left may represent evolution of prior hemorrhage although a small amount of new hemorrhage is difficult to exclude.     Unchanged small amount of residual blood products in the right greater than left dependent occipital horns. Unchanged ventricular caliber.     T1 hyperintensity and enhancement surrounding the coiled right ICA aneurysm.     SELLA: No significant abnormality accounting for technique.  OSSEOUS STRUCTURES/SOFT TISSUES: No aggressive osseous lesion involving the calvarium, skull base, or visualized upper cervical spine. The major intracranial vascular flow voids are maintained.   ORBITS: No significant abnormality accounting for technique.   SINUSES/MASTOIDS: Mucosal thickening primarily involving the ethmoid air cells. Scattered fluid/membrane thickening in the mastoid air cells bilaterally.      HEAD MRA:   ANTERIOR CIRCULATION: Coil artifact obscures evaluation of the distal right internal carotid artery and A1 right anterior cerebral artery. Mild proximal and moderate distal narrowing of the right M1 middle cerebral artery, slightly improved from   7/26/2019, compatible with vasospasm. Mild attenuation of the proximal right M2 middle cerebral arteries. The prior MRA, compatible with vasospasm. No additional significant stenosis or occlusion of the intracranial arteries. As on the prior MRI, the   right petrous and cavernous internal carotid artery is slightly smaller compared to the left internal carotid artery.      POSTERIOR CIRCULATION: Coronary  artifact partially obscures evaluation of the right P1 posterior cerebral artery. No significant stenosis or occlusion. Balanced vertebral arteries supply a normal basilar artery.      ANEURYSM/VASCULAR MALFORMATION: New 10 peripheral flow related signal is nearly circumferential around the periphery of the coil mass. No significant change in small amount of flow related signal at the neck of the aneurysm as described on the prior MRI.     IMPRESSION:   CONCLUSION:  HEAD MRI:   1.  New T2 FLAIR sulcal nonsuppression in the dependent temporal and occipital sulci compatible with subarachnoid hemorrhage although age indeterminant and a small amount of new subarachnoid hemorrhage is difficult to exclude.  2.  Hyperattenuation on the prior CT corresponds to faint susceptibility, favored to represent mild petechial hemorrhage.  3.  A few small scattered foci of new restricted diffusion in the right posterior limb internal capsule and right parietal cortex.  4.  No midline shift or herniation. No hydrocephalus.     HEAD MRA:   1.  New flow related signal near circumferential around the periphery of the coiled right internal carotid artery aneurysm, raising concern for recurrence.  2.  Mild to moderate vasospasm of the right M1, slightly improved compared to 7/26/2019. Mild vasospasm of the proximal right M2 middle cerebral artery branches, not significant change.  3.  No new significant stenosis or occlusion.    PENDING LABS      Order Current Status    Culture, Fungus (CSF) In process    Culture, CSF Preliminary result    Culture, Urine Preliminary result          PROCEDURES ( this hospitalization only)      INSERTION, SHUNT, VENTRICULOPERITONEAL, USING FRAMELESS STEREOTAXY , INSERTION, CATHETER, PERITONEAL, LAPAROSCOPIC    DISPOSITION     [facility]    SUMMARY OF HOSPITAL COURSE:      Darion Herrera is 43 y.o. 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 CT head showed an extra-axial brain mass, and was admitted to neuro ICU for further management. MRI showed right suprasellar mass and intraventricular hemorrhage and small subarachnoid hemorrhage in left sylvian fissure. She was found to have a ruptured dissecting ventromedial supraclinoid right internal carotid artery aneurysm and underwent cerebral angiogram on 07/22 with endovascular coil embolization of ruptured dissecting aneurysm.   She was monitored with daily transcranial doppler. She had follow up angiogram on 07/24, which showed stable findings. MRA on 07/26 was suggestive of vasospasms and was started on triple H therapy (hypertension, hemodilution, and hypervolemia). The patient became unresponsive on 07/27 and was endotracheally intubated. A lumbar drain was placed  For ventricular pressure reliefe.  She required repeat cerebral angiogram with endovascular coil embolization of re-ruptured previously coil embolized dissecting aneurysm of ventral medial supraclinoid right internal carotid artery on 07/27.   The patient had cerebral angiogram with intraarterial verapamil infusion on 07/28, 07/29, 07/30, 07/31, 08/01, and 08/02. The lumbar drain was replaced on 08/02. The patient also underwent percutaneous tracheostomy and G tube placement by Dr. Ledezma on 08/02. The lumbar drain was replaced on 08/08, and she had another cerebral angiogram on 08/08. Cerebral angiogram on 08/13 was stable. Lumbar drain was clamped on 08/12, but was noted to have increasing ventricular dilatation, so the drain clamp was released. Planned for  shunt placement on 08/15, initially patient refused. Then agreed for shunt and  shunt placed 8/19.   Patient on trach mask, awake, alert, talking, moving right side and left leg.    Acute respiratory failure with hypoxia  -Secondary to poor airway protection, in the setting of intracranial hemorrhage  -S/p percutaneous tracheostomy on  08/02. Trach changed to bivona #6 on 08/14 and is tolerating trach mask with PMV. She has not required ventilator support. Tolerating trach mask. Has good cough.  -Continues to have large secretions, and is not on continuous PMV.  -Aggressive pulmonary toileting  -using talking valve  -Patient accidentally removed trach last pm, but was breathing well, trach replaced and right hand in soft restraint.    Intracranial hemorrhage: non traumatic intraventricular hemorrhage and subarachnoid hemorrhage  Secondary to ruptured dissecting ventromedial supraclinoid right ICA aneurysm  -S/p cerebral angiogram with endovascular coil embolization of ruptured dissecting aneurysm  -Complicated by vasospasms s/p lumbar drain placement (initially on 07/27) and angiogram with intraarterial verapamil infusion  -Complicated by re-bleed, s/p cerebral angiogram with endovascular coil embolization of re-ruptured previously coil embolized dissecting aneurysm of ventral medial supraclinoid right internal carotid artery on 07/27  -Lumbar drain was clamped on 08/12, but was released as CT head showed ventricular dilatation. Re clamped on 08/14, and again the follow up CT showed increased ventricular dilatation.   -Lumbar drain was change on 08/16. Plan to have a  shunt placed. Patient refused on 08/15, then agreed to have it placed, done 8/19.  -Keppra for seizure prophylaxis.  -Discontinued Nimodipine on 08/13     Oropharyngeal dysphagia  At risk of malnutrition  -S/p G tube placement on 08/02  -Currently on tube feeding. NPO by mouth.     Electrolyte imbalance  -Hypokalemia, replaced  -Hypomagnesemia, replaced  -Monitor, and replace as needed     Urinary tract infection, unspecified  -Culture grew E. Coli on 07/30, and was treated with antibiotic (cephalosporin)  -Urine culture from 08/05 grew candida. She was treated with fluconazole.  -Not on any antimicrobial at this time.  -She does have indwelling urinary catheter. Consider removing  this as soon as possible to avoid CAUTI     Transaminitis   -Unclear etiology, likely medication  -Stable liver enzymes  -Monitor intermitently    Discharge Medications with Med changes: See Med Rec       Consults   pulmonary/intensive care and neurosurgery, neurology    Discharge Orders     No discharge procedures on file.  Examination     Vital Signs in last 24 hours:   Temp:  [97.6  F (36.4  C)-99.3  F (37.4  C)] 98.6  F (37  C)  Heart Rate:  [] 76  Resp:  [11-30] 21  BP: ()/(55-91) 104/70  SpO2:  [93 %-100 %] 99 %  FiO2 (%):  [21 %] 21 %  General appearance: alert, appears stated age and cooperative  Lungs: clear to auscultation bilaterally  Heart: regular rate and rhythm, S1, S2 normal, no murmur, click, rub or gallop  Abdomen: soft, non-tender; bowel sounds normal; no masses,  no organomegaly  Extremities: extremities normal, atraumatic, no cyanosis or edema   Neuro: awake, alert, talking fluently. Left facial droop, left hand/arm 0/5, left leg 4/5.    Please see EMR for more detailed significant labs, imaging, consultant notes etc.  Total time spent on discharge: >30 minutes    Lamonte To MD   [facility] Hospitalist Service: Ph:[phone]    CC:Monroe Cassidy MD

## 2021-05-31 NOTE — PROGRESS NOTES
PROVIDER RESTRAINT FOR NON-VIOLENT BEHAVIOR FACE TO FACE EVALUATION  7/31/2019   8:47 AM     Patient seen on morning interdisciplinary rounds.     Patient's Immediate Situation:  Patient demonstrated the following behaviors: Impulsive behavior, poor safety judgment, with other less restrictive interventions/devices ineffective    Patient's Reaction to the intervention:  Does patient understand the reason for restraint/seclusion? Unable to Express    Medical Condition:  Is there any evidence of compromise of Skin integrity, Respiratory, Cardiovascular, Musculoskeletal, Hydration? No    Behavioral Condition:  In consultation with the RN, is there a need to continue this restraint or seclusion? Yes    See Restraint Flowsheet for complete restraint documentation and assessment.    Kenia Adams, CNP

## 2021-05-31 NOTE — PROGRESS NOTES
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   RMCA stroke related to vasospasm.       Initial head CT showed MARIA L giant aneurysm (sentinel headache)    S/p endovascular coil embolization    Aneurysm stable on repeat angiogram.     MRI/CT shows large hypodensity/evolving ischemic stroke (from vasospasm)    Head CT stable shows slightly enlarged ventricles and lumbar drain opened.    Exam improving clinically.    -160    Nimodipine X 21 days (stop today)    Keppra 1000 mg two times a day - Level therapeutic.     Start PT.    Thank you again for this referral, please feel free to contact me if you have any questions.     CHIEF COMPLAINT Aneurysmal subarachnoid hemorrhage (H)     HISTORY OF PRESENT ILLNESS     We have been requested by Dr. Kwan to evaluate Darion Herrera who is a 43 y.o.  female for SAH.    This a 43-year-old female on whom neurology is been consulted to evaluate for subarachnoid hemorrhage.  Patient initially presented with 6 days ago with a headache.  She was trying to lift some fruits and felt something blowing up in her head.  She continued to have the headache.  She left the staff she was lifting on the table.  She was then brought to the emergency room where imaging showed a large frontal mass.  This was later on MRI identified to be a giant aneurysm.  Patient underwent angiogram and endovascular coil embolization of the aneurysm.  The patient had a repeat angiogram.  MRI done today showed multiple foci of acute/subacute infarction in the right parietal-occipital region.  There is also small area of infarction in the right frontal lobe.  TCD's show vasospasm in the  right MCA.  There was some narrowing of the M1 and M2 segments.  Neurology was consulted to further help with further cares.  Patient denies any ongoing symptoms he does complain of some headache.     7/29  Patient had 2 seizures over the weekend. Was started on fosphenytoin in addition to Keppra.   No seizures today.  Intubated, but waking up as the sedation has been withdrawn.     7/30  No new seizures. Patient remains on sedation and intubated. RMCA velocities remain elevated on the TCD.     7/31  No new seizures. Patient being kept sedation. Angiogram still shows vasospasm. Patient getting IA verapamil.     8/1  No seizures. Patient continues to remain on sedation. Angiogram continues to show vasospasm. Getting IA verapamil. Family conference tomorrow.     8/2  Patient remains on sedation. Angiogram shows stable vasospasm. Some twitching of right shoulder per nursing staff.     8/5  Weekend events - patient was found to have a RMCA evolving infarct over the weekend.   Family wanting to proceed with Trach and PEG.     8/6  TCD still shows vasospasm. Patient is being kept sedated till vasospasm resolves. Family interested in Trach and PEG.     8/7  MRI/MRA brain today to assess for vasospasm. Trach and PEG today.     8/8  Patient went for angiogram today. Exam improved off sedation. Angiogram showed aneurysm but no coiling done. Coiling planned for next week.     8/9  TCD look improved. Sedation has been reduced.     8/10  More awake and able to move right arm and leg more. Following more commands.     8/11  Vimpat was decreased with no seizures. More interactive today. Still not able to lift left arm.     8/12  Exam has improved. Starting to move left side today. Head CT stable.  No seizures.    8/13  Angiogram today. Patients exam on left side has improved. She is able to lift it against gravity.      PAST MEDICAL & SURGICAL HISTORY     Medical History  Patient Active Problem List    Diagnosis Date Noted      Pneumonia due to group B Streptococcus, unspecified laterality, unspecified part of lung (H)      Vasospasm of cerebral artery      E-coli UTI      Gram-positive bacteremia      Fever in other diseases      Acute respiratory failure, unspecified whether with hypoxia or hypercapnia (H)      Cerebrovascular accident (CVA) due to occlusion of right middle cerebral artery (H)      Vasospasm (H)      S/P coil embolization of cerebral aneurysm 07/23/2019     Aneurysm of right internal carotid artery 07/23/2019     Carotid artery aneurysm (H) 07/22/2019     Nausea vomiting and diarrhea 07/22/2019     Aneurysmal subarachnoid hemorrhage (H) 07/22/2019     Intraventricular hemorrhage, nontraumatic (H) 07/22/2019     CVA (cerebral vascular accident) (H) 07/22/2019     Compression of brain (H)      Leiomyoma of uterus 07/18/2016     Past Medical History: No previous history.    Surgical History  She  has a past surgical history that includes IR Cerebral Angiogram (7/22/2019); IR Cerebral Angiogram (7/24/2019); PICC Team Line Insertion (7/26/2019); IR Cerebral Angiogram (7/27/2019); IR Lumbar Drain Insertion (7/27/2019); IR Cerebral Angiogram (7/29/2019); IR Cerebral Angiogram (7/30/2019); IR Cerebral Angiogram (7/31/2019); IR Cerebral Angiogram (8/1/2019); IR Lumbar Drain Insertion (8/2/2019); IR Cerebral Angiogram (8/2/2019); IR Cerebral Angiogram (7/28/2019); pr egd percutaneous placement gastrostomy tube (N/A, 8/7/2019); Tracheostomy (N/A, 8/7/2019); IR Lumbar Drain Insertion (8/8/2019); and IR Cerebral Angiogram (8/13/2019).     SOCIAL HISTORY     Reviewed, and she  reports that she has never smoked. She has never used smokeless tobacco. She reports that she has current or past drug history. She reports that she does not drink alcohol.     FAMILY HISTORY     Reviewed, and non-contributory.      ALLERGIES     Allergies   Allergen Reactions     Oxycodone      Hydrocodone Swelling and Rash        REVIEW OF SYSTEMS     12  system ROS was done and was negative other than HPI - except patient feeling tired.     HOME & HOSPITAL MEDICATIONS     Prior to Admission Medications  Medications Prior to Admission   Medication Sig Dispense Refill Last Dose     ascorbic acid, vitamin C, 500 mg Chew chew tab Chew 500 mg daily.   Unknown at Unknown time       Hospital Medications    atorvastatin  20 mg Enteral Tube DAILY     chlorhexidine  15 mL Topical Q12H     guar gum  1 packet Enteral Tube BID     levETIRAcetam (KEPPRA) IVPB  1,000 mg Intravenous Q12H     magnesium sulfate IVPB  4 g Intravenous Once     niMODipine  60 mg Enteral Tube Q4H     nystatin  5 mL Swish & Swallow QID     omeprazole  20 mg Enteral Tube QAM AC    Or     pantoprazole  40 mg Intravenous QAM AC     potassium chloride liquid/packet  20 mEq Enteral Tube BID     sodium chloride  10-30 mL Intravenous Q8H FIXED TIMES        PHYSICAL EXAM     Vital signs  Temp:  [98.1  F (36.7  C)-99.7  F (37.6  C)] 99.7  F (37.6  C)  Heart Rate:  [] 101  Resp:  [14-39] 25  BP: ()/(63-84) 113/63  FiO2 (%):  [21 %-100 %] 21 %    Weight:   149 lb (67.6 kg)    GENERAL: Healthy appearing, alert, no acute distress, normal habitus.  CARDIOVASCULAR: Ext warm and well perfused. Pulses present.   NEUROLOGICAL:  Patient is trached. Does not speak but does follow commands. EOMI. Pupils symmetric. VFI on present on both sides. Left sided facial droop.  Right side antigravity. Left leg 3+/5. Left arm 3+/5     DIAGNOSTIC STUDIES     Pertinent Radiology   MRI brain - images reviewed stroke seen  IMPRESSION:   CONCLUSION:  1.  Multiple foci of developing acute/subacute infarction throughout the right parietal occipital lobes, with additional single small foci of acute/subacute infarction in the right frontal lobe and left parietal lobe.  2.  Status post endovascular coiling of large dissecting aneurysm of the ventromedial right supraclinoid ICA with redemonstration of mild persistent flow-related  enhancement along the neck of the aneurysm. There is also faint flow-related enhancement   centrally within the coil mass.  3.  Mildly decreased caliber of the M1 and proximal M2 segments of the right middle cerebral artery compared to the left, which may represent mild vasospasm. There is more normal caliber of the distal M2 and M3 branches.    ECHO    1.Left ventricle ejection fraction is normal. The calculated left ventricular ejection fraction is 65%.    2.TAPSE is normal, which is consistent with normal right ventricular systolic function.    3.No hemodynamically significant valvular heart abnormalities.    4.No obvious embolic source seen, if highly suspect consider MICHAEL.    5.No previous study for comparison.     TCD - images reviewed.   IMPRESSION:   CONCLUSION:  New moderate right middle cerebral artery vasospasm.    EEG negative for seizures.    Angiogram  Preliminary findings: (see dictation for full detail)  - Stable right ICA aneurysm coil mass with residual interstitial neck filling.  - Possible mild vasospasm superimposed on right ICA dissection, verapamil 10 mg infused in right ICA.    TCD reviewed and velocities are elevated in the RMCA.    Angiogram shows MARIA L, RMCA, RACA vasospasm. Dissection in MARIA L.    Angiogram on 8/1 continues to show vasospasm.     MRI and CT reviewed - RMCA evolving stroke seen.     8/6 TCD - RMCA velocity 192 - still elevated.     MRI brain  IMPRESSION:   CONCLUSION:  HEAD MRI:   1.  New T2 FLAIR sulcal nonsuppression in the dependent temporal and occipital sulci compatible with subarachnoid hemorrhage although age indeterminant and a small amount of new subarachnoid hemorrhage is difficult to exclude.  2.  Hyperattenuation on the prior CT corresponds to faint susceptibility, favored to represent mild petechial hemorrhage.  3.  A few small scattered foci of new restricted diffusion in the right posterior limb internal capsule and right parietal cortex.  4.  No midline shift  or herniation. No hydrocephalus.     HEAD MRA:   1.  New flow related signal near circumferential around the periphery of the coiled right internal carotid artery aneurysm, raising concern for recurrence.  2.  Mild to moderate vasospasm of the right M1, slightly improved compared to 7/26/2019. Mild vasospasm of the proximal right M2 middle cerebral artery branches, not significant change.  3.  No new significant stenosis or occlusion.    Angiogram  6-7 mm recurrence at the neck of previously coiled right ICA aneurysm.     TCD shows improving RMCA velocities (images of today's test reviewed).     Angiogram shows unchanged aneurysm.     Recent Results (from the past 24 hour(s))   Magnesium    Collection Time: 08/13/19  5:00 AM   Result Value Ref Range    Magnesium 2.0 1.8 - 2.6 mg/dL   Comprehensive Metabolic Panel    Collection Time: 08/13/19  5:00 AM   Result Value Ref Range    Sodium 139 136 - 145 mmol/L    Potassium 3.6 3.5 - 5.0 mmol/L    Chloride 105 98 - 107 mmol/L    CO2 22 22 - 31 mmol/L    Anion Gap, Calculation 12 5 - 18 mmol/L    Glucose 111 70 - 125 mg/dL    BUN 10 8 - 22 mg/dL    Creatinine 0.59 (L) 0.60 - 1.10 mg/dL    GFR MDRD Af Amer >60 >60 mL/min/1.73m2    GFR MDRD Non Af Amer >60 >60 mL/min/1.73m2    Bilirubin, Total 0.6 0.0 - 1.0 mg/dL    Calcium 9.8 8.5 - 10.5 mg/dL    Protein, Total 7.6 6.0 - 8.0 g/dL    Albumin 4.0 3.5 - 5.0 g/dL    Alkaline Phosphatase 108 45 - 120 U/L    AST 56 (H) 0 - 40 U/L     (H) 0 - 45 U/L   HM1 (CBC with Diff)    Collection Time: 08/13/19  5:00 AM   Result Value Ref Range    WBC 8.3 4.0 - 11.0 thou/uL    RBC 4.12 3.80 - 5.40 mill/uL    Hemoglobin 12.2 12.0 - 16.0 g/dL    Hematocrit 36.0 35.0 - 47.0 %    MCV 87 80 - 100 fL    MCH 29.6 27.0 - 34.0 pg    MCHC 33.9 32.0 - 36.0 g/dL    RDW 14.5 11.0 - 14.5 %    Platelets 408 140 - 440 thou/uL    MPV 10.2 8.5 - 12.5 fL    Neutrophils % 75 (H) 50 - 70 %    Lymphocytes % 14 (L) 20 - 40 %    Monocytes % 7 2 - 10 %     Eosinophils % 3 0 - 6 %    Basophils % 1 0 - 2 %    Neutrophils Absolute 6.2 2.0 - 7.7 thou/uL    Lymphocytes Absolute 1.1 0.8 - 4.4 thou/uL    Monocytes Absolute 0.6 0.0 - 0.9 thou/uL    Eosinophils Absolute 0.3 0.0 - 0.4 thou/uL    Basophils Absolute 0.1 0.0 - 0.2 thou/uL       Total time spent for face to face visit, reviewing labs/imaging studies, counseling and coordination of care was: Over 25 min More than 50% of this time was spent on counseling and coordination of care.  Stroke prognosis discussion with family and nursing staff.     Justin Vasquez MD  [employer] Messaging: [email or web address]  Tel: [phone]  This note was dictated using voice recognition software.  Any grammatical or context distortions are unintentional and inherent to the software.

## 2021-05-31 NOTE — PROGRESS NOTES
Critical Care Medicine Progress Note  8/6/2019    ASSESSMENT/PLAN:  43 y.o. woman who was admitted on 7/22/2019 with SAH & IVH secondary to ruptured aneurysm of the right ICA, aneurysm coiling 7/22 that was complicated by vasospasm.  Hospitalization course was complicated by right ICA aneurysm dissection with rebleeding that presented with acute onset seizures and unresponsiveness on 7/27.  Patient was intubated on 7/27, underwent a recoiling of the culprit vessel, and placement of lumbar drain for ICP management.  Ongoing issues with ICA/MCA vasospasm.    Neuro/Psych:   #. Analgesia/Sedation: On propofol and fentanyl, prn versed available mostly for agitation with cares.  #.  Right ICA aneurysm complicated by subarachnoid hemorrhage with IVH, status post dissection and coiling x2 with hydrocephalus requiring lumbar drain placement 7/27/2019.  Ongoing vasospasm complicated the clinical presentation and has had verapamil infusion on multiple days.    Neurosurgery and IR closely following    Cerebral angiography per IR, follow TCD to determine further angiogram    Continue with every 4 hours nimodipine x21 days    Goal -150 by arterial line, currently on NE to maintain    If BP dips, and CVP less than 10, will use albumin but otherwise should be titrating NE    Trying to avoid complications of hypervolemia     Goal sodium 145-150, hypertonic saline infusion per NSG protocol    Goal HCT level 30-32. Transfusing another 1 unit of PRBC today give low hct    Goal CVP 10-12    Strict I/O, strict lumbar drain output monitoring (if increased output may be sign of further edema/swelling and consider stat HCT) has been stable    Repeat TCDs to determine need for angiogram    Monitor for volume overload    NSGY d/w family re edilberto hole, but family would like to wait for now and follow radiographically. Imaging have been overall stable as most recently.  #.  Recurrent seizures, likely secondary to acute intracranial  process as detailed above.  Currently on propofol, IV levetiracetam, and IV fosphenytoin.  #.  Moderate Sedation with lower RASS -3 as goal   Due to ongoing issues will pursue deeper RASS goal of -3   fentanyl infusion - titrate to sedation   Prn versed especially with turning/routine cares  #.  Fever - possible central fever contributing.  Elevated protein, PMNs, and WBC, but likely due to blood.  Changed Cefepime to ceftraixone on 8/4. F/u repeat cultures.    Pulmonary: Mechanical ventilation instituted for airway protection in setting of seizure and encephalopathy secondary to SAH.  The patient is on minimal ventilator settings.  Not clinically appropriate for ventilator weaning, and due to ongoing issues no further sedation holidays.  Monitor.  - goal SpO2 > 92%, on 30% FiO2  - no weaning for now  - secretions mostly small  - sputum culture 4+ pseudomonas  - Abx per ID section below  - Discussed case with Dr. Ledezma and so family likely to proceed with tracheostomy this week.  Placed a call to Dr Ledezma's service to arrange dates for trach/peg as family would like to purse this option.     Cardiovascular: goal -150 but will try to aim for higher end of this (note when goal SBP was higher 150-180 then patient had further bleeding). Avoid medications that would cause bradycardia.  - continue NE as needed to maintain SBP goal   - intermittent albumin for low CVP   - CVP to be maintained 10-12    Renal: No acute issues.  Good urine output.  - E.coli UTI (CAUTI)  - potassium/magnesium replacement per protocol  - add KCL to saline infusion    GI: NPO for now.  - om trophic feeding  - PPI    Heme: No acute issues, monitor.  - IVF/Tx to maintain HCT 30-32  - SCDs    Endo: No acute issues.  Continue close monitoring.  - ICU q 4 hour SSI    ID: E. Coli UTI and GPC in clusters in blood stream (1/4 positive, but less than 24 hours) with ongoing fevers, leukocytosis improved  - cefepime (will cover E.coli and has  better CNS penetration). Follow up repeat blood cultures  - vancomycin - discontinued after 1 day as CoNS grew from culture, follow up cultures negative     Access/Lines/Tubes: required and necessary for continued patient cares  ETT, 7.5  PICC, RUE  PIV  Arterial sheath, right femoral artery - removed on 8/3  Sharpe catheter  Lumbar drain  OGT    ICU prophylaxis:   #. VAP ppx: HOB 30 degrees, chlorhexidine rinses  #. Stress ulcer: PPI  #. Diet: NPO, trophic feedings now  #. VTE: SCD, chemical prophylaxis contraindicated due to SAH/bleeding  #. Restraints: required and necessary for continued patient cares    CODE: FULL    Family was updated during multidisciplinary family conference.  Family would like to go with trach/peg.    Dispo: ICU for mechanical ventilation, hemodynamic support, neurological monitoring, remains critically ill with constant threat to life.    Patient is critically ill with total CCT spent 40 minutes thus far today.     Brittni Ruano MD  Pulmonary and Critical Care     =================================================================    Interval history: \  No new changes.  Fevers noted.  Family at bedside. On NE.     Vitals: Temp:  [99.9  F (37.7  C)-102  F (38.9  C)] 100.2  F (37.9  C)  Heart Rate:  [] 81  Resp:  [0-24] 16  BP: (116-152)/(59-92) 142/83  FiO2 (%):  [30 %-100 %] 30 %  Vent:   Vent Mode: VCV  FiO2 (%):  [30 %-100 %] 30 %  S RR:  [16] 16  S VT:  [375 mL] 375 mL  PEEP/CPAP (cm H2O):  [5 cm H2O] 5 cm H2O  Minute Ventilation (L/min):  [7.4 L/min-8.4 L/min] 7.4 L/min  PIP:  [20 cm H2O-24 cm H2O] 20 cm H2O  MAP (cm H2O):  [7] 7    Physical Exam:   General: obese woman, lying in bed, NAD  HEENT: orally intubated, MMM, PER  CV: RRR, no M/R/G; extremities well perfused, 1+ edema  Pulm: mechanical breath sounds that are course, more diminished on the left, no wheezing, no crackles  Abd: soft, ND, NT, hypoactive bowel sounds  Msk: warm to touch  Derm: no acute lesions or rashes on  limited exam  Neuro: sedated, no evidence of clonus  Psych: sedated    Labs: personally reviewed in EMR.     Imaging: personally reviewed in EMR. Formal Radiology interpretation listed below.  #. HCT, 7/27:   INTRACRANIAL CONTENTS: Beam hardening and streak artifacts related to a large right paraclinoid coil mass as seen previously. This locally degrades evaluation. Small volume subarachnoid hemorrhage within sulci of both cerebral hemispheres. No definite increasing intraventricular hemorrhage layering within the occipital horns of the lateral ventricles favored to reflect redistribution of subarachnoid blood products. No large territorial infarct is identified. Small frontoparietal infarcts seen on the 7/26/2019 MRI are poorly appreciated on CT. Minor periventricular low-attenuation. Mild to moderate lateral and third ventriculomegaly similar to findings on the prior exam.   VISUALIZED ORBITS/SINUSES/MASTOIDS: No significant orbital abnormality. No significant paranasal sinus mucosal disease. No significant middle ear or mastoid effusion.  BONES/SOFT TISSUES: No significant abnormality.    #. HCT: 7/28:   Again demonstrated is endovascular coiling of a giant right supraclinoid aneurysm. This results in extensive streak artifact and compromises a portion of the intracranial compartment. Within this limitation, the amount of intraventricular hemorrhage and   subarachnoid hemorrhage within the basal cisterns and scattered over the convexities appears grossly unchanged. There is stable mild to moderate ventriculomegaly of the lateral and third ventricles. No evidence of interval acute intracranial hemorrhage,   infarct or hydrocephalus.     HCT on 8/3:  IMPRESSION:   CONCLUSION:  1.  No new acute intracranial abnormality.  2.  Continued evolution of right MCA bilateral CATHI territory infarction, more conspicuous on the recent MRI. No significant mass effect or hemorrhagic transformation.  3.  Continued decrease  conspicuity of intraventricular subarachnoid blood products.

## 2021-05-31 NOTE — PLAN OF CARE
Problem: Pain  Goal: Patient's pain/discomfort is manageable  Outcome: Progressing     Problem: Safety  Goal: Patient will be injury free during hospitalization  Outcome: Progressing     Problem: Daily Care  Goal: Daily care needs are met  Outcome: Progressing     Problem: Psychosocial Needs  Goal: Collaborate with patient/family/caregiver to identify patient specific goals for this hospitalization  Outcome: Progressing      Chronic atrial fibrillation

## 2021-05-31 NOTE — PLAN OF CARE
Problem: Mechanical Ventilation  Goal: Patient will maintain patent airway  Outcome: Progressing  Goal: Oral health is maintained or improved  Outcome: Progressing  Goal: Tracheostomy will be managed safely  Outcome: Progressing  Goal: Respiratory status - ventilation  Description  Movement of air in and out of the lungs.    Liberate from ventilator  Outcome: Progressing   RT PROGRESS NOTE    DATA:  CURRENT SETTINGS:  TRACH TYPE / SIZE:   # 6 [place]  MODE:   TM and PMV with supervision  FIO2:   21%    ACTION:  THERAPIES:   none  SUCTION:    FREQUENCY:   x2  AMOUNT:   small  CONSISTENCY:   thick  COLOR:   white/yellow with lavage 10cc    WEANING PHASE:   2  WEAN MODE:    TM/PMV  WEAN TIME:  Since  0826  END WEAN REASON:   On going/pt will go off vent throughout the night and ABG in AM.    RESPONSE:  BS:    Clear/diminished  VITAL SIGNS:   02 Sats 98%, HR 91, RR 20  EMOTIONAL NEEDS / CONCERNS:   no  RISK FOR SELF DECANNULATION  minimal  RISK DUE TO  Try to pull out trach while on vent this morning due to discomfort.  INTERVENTION/S IN PLACE IS/ARE  no    NOTE / PLAN:   Pt remain off vent since this morning doing well. RT close monitoring respiratory status overnight.      JERRY Pate

## 2021-05-31 NOTE — PROGRESS NOTES
Patient was just transported down to CT on the transport vent with myself and RN. No issues. Saturations remained % during entire transport and procedure. Patient was placed back on our full vent when back in the ICU. RT following.    KAE LoyaT

## 2021-05-31 NOTE — PROGRESS NOTES
NEUROSURGERY PROGRESS NOTE:    NEUROSURGERY ATTENDING: The patient's attending neurosurgeon is Dr. Sal. Plan of care discussed with Dr Hoffmann.    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 Complicated hospital course with re-bleed and additional coils placed, concern for hydrocephalus, vasospasms with subsequent right MCA infarct. Most recent angio on 8/13/2019 stable. TCDs stopped 8/10/2019 with near resolution of the global vasospasm (slight residual in R MCA).     Now s/p  shunt placement, POD#1, CERTAS set to 4. Repeat CT stable, shunt series reviewed and intact. Plan CT 8/26, call neurosurgery when complete for remote review.      MRA reviewed with Dr. Duarte, imaging is stable. He recommends another MRA head with and without congrast in 7-10 days, if stable continue plan for stenting in 3 weeks. Shunt setting confirmed manually and via x-ray after MRI.         PLAN:   Patient Active Problem List   Diagnosis     Carotid artery aneurysm (H)     Aneurysmal subarachnoid hemorrhage (H)     Intraventricular hemorrhage, nontraumatic (H)     Compression of brain (H)     S/P coil embolization of cerebral aneurysm     Aneurysm of right internal carotid artery     Cerebrovascular accident (CVA) due to occlusion of right middle cerebral artery (H)     Vasospasm (H)     1.  Allow BP to normalize      2.  Keppra 1000 mg two times a day, indefinite or as directed by neurology     3.  Nimodipine dosing complete - no benefit of extending per Dr Sal     4.  Repeat head CT without contrast 8/23 or 8/26 to evaluate for slowly enlarging vents     5.  Follow CSF cultures      6.  MRA today - this was reviewed with Dr. Duarte     7.  Okay to dc to  after MRI reviewed and if medically ready per hospitalist     8.  Please call neurosurgery with questions [phone]        Discharge Recommendations/Plan:   Barriers to Discharge: MRA today, then could go to [place].      Follow Up Plan: Repeat CT on 8/23/2019 or 8/26/2019, staples out two weeks post-op.       HPI: Darion Herrera is a 44 yo female who was admitted on 07/22/19 with SAH and IVH secondary to ruptured aneurysm of the right ICA. Aneurysm coiling on 7/22 that was complicated by vasospasm. On 07/27, found to be unresponsive and possibly seizing, was intubated and taken to IR where it was revealed that her right ICU aneurysm had dissected and re-bled.  In IR on 07/27, the vessel was recoiled and a lumbar drain was placed for ICP management.  On 07/29 underwent intraarterial verapamil infusion with angiogram for acute vasospasm of right ICA. Due to increased TCD values, back to IR for another cerebral angiogram 7/30 for acute vasospasm and intraarterial verapamil infusion. Complicated hospital course outlined below.     BRIEF HOSPITAL COURSE:    7/21/2019  -Admit with nausea, vomiting, headache. CT with question of extra axial mass over the planum sphenoidale region measuring 2.2 x 2.3 cm    7/22/2019  -MRI indicates mass on CT actually a giant right supraclinoid internal carotid artery aneurysm. + intraventricular hemorrhage, small right subarachnoid hemorrhage.    -IR for angiogram, coil embolization of ruptured, dissecting aneurysm. Mild to moderate interstitial filling within the coil mass    7/24/2019  -Return to IR due to high likelihood of instability of aneurysm. Aneurysm was stable in appearance    7/27/2019  -Possible seizure, became unresponsive, intubated. CT + for increased amount of hemorrhage, cerebral edema, increasing size of ventricles.     -Return to IR for angio and placement of lumbar drain    -Aneurysm re-ruptured, additional coils placed with 95% occlusion    -Moderate to severe narrowing of R ICA likely secondary tot he dissecting aneurysm and not necessarily true spasm. Verapamil given.    -Mild spasm R MCA    7/28/2019  -Stable appearance  of aneurysm, verapamil given    7/29/2019  -Stable to mildly increased filling of aneurysm.     -mildly increased spasm of R MCA, verapamil given. Plan for repeat angio 8/2/2019 7/30/2019  -TCD worsening. Patient sent for angio    -worsening spasm in multiple territories. Verapamil given to R ICA, L ICA, R vert    - Stable filling of aneurysm    -Head CT with some hypodensity concerning for infarct of R MCA territory    -Temp 102.9. Standard fever workup    7/31/2019  -Return to IR for treatment of vasospasm, more verapamil    -CT head unchanged    -UA with e coli. Gram neg rods blood culture, may be contaminant    -Febrile, temp max 102.9    -Antibiotics started per ICU team    8/1/2019  -Return to IR for treatment of vasospasm, more verapamil    -CT head unchanged    -CSF sent from lumbar drain    -Fever persisted    8/2/2019          -Return to IR for treatment of vasospasm, higher dose verapamil given                          -TCDs worsening as compared to yesterday                          - Family conference to discuss G/J and Trach    8/7/2019           - Trach and Peg completed                          - MRI/MRA    8/8/2019           - IR (no additional coiling)                          - New lumbar drain    8/10/2019         - TCDs stopped                           - Resolution of vasospasm in other vessels, mild residual in R MCA    8/11/2019         - weaning lumbar drain                           - weaning from the vent                           - off pressors                           - velocities improved    8/12/2019:        - clamped lumbar drain    8/13/2019:        - ventricles slightly larger on CT - lumbar drain re-opened                           - angio without intervention - aneurysm unchanged  8/14/2019:        - Passey [place] valve                           - planning  shunt 8/15/2019                            -Which she refused.     8/16/2019         - Replacement of lumbar  drain    8/19/2019         -  shunt placement  8/20/2019         - MRA today, discharge to [place] today.            SUBJECTIVE:  No headache today, says she did not remove her trach but are reports that trach was removed. Mitts in place.     OBJECTIVE:  Vital signs in last 24 hour  Temp:  [97.6  F (36.4  C)-99.3  F (37.4  C)] 99.3  F (37.4  C)  Heart Rate:  [70-99] 79  Resp:  [11-30] 30  BP: ()/(55-91) 97/64  SpO2:  [93 %-100 %] 98 %  FiO2 (%):  [21 %] 21 %  Body mass index is 29.51 kg/m .    Mental Status: Trach, Follows commands and appears appropriate. Responds appropriately per report of [facility] . Oriented to self, place, recent events.  Cranial Nerves:  PERRL, EOMI, L droop, tongue midline  Motor Strength: some movement in left upper extremity  Biceps 4+/5 right, 2/5 left   Hand grasp 4/5 right, 3/5 left  Triceps 4+/5 right, 02/5 left   Deltoids 4+/5 right, 1/5 left   Hip flexors 4+/5 right, 3/5 left   Quadriceps: 4+/5 right, 3/5 left   Ankle dorsiflexion: 4+/5 right, 3/5 left   Extensor hallicus longus: 4+/5 right, 3/5 left   Ankle plantar flexion : 4+/5 right, 3/5 left     Lumbar Drain: Removed.     Head CT personally reviewed:   FINDINGS:  INTRACRANIAL CONTENTS:   Interval placement of right frontal approach ventricular shunt catheter, with tip terminating in the right lateral ventricular body. Expected post procedural right sided anti-dependent pneumocephalus. No significant change in caliber of the enlarged   lateral and third ventricles.     Again streak artifact from the coil right internal carotid artery aneurysm significant compromises evaluation of the parenchyma parallel to the coil mass.     No new intracranial hemorrhage. No new acute large artery territory infarction. Known right MCA territory infarction is not well seen, likely due to fogging effect. No midline shift or herniation.     VISUALIZED ORBITS/SINUSES/MASTOIDS: No significant orbital abnormality. No significant  paranasal sinus mucosal disease. No significant middle ear or mastoid effusion.     BONES/SOFT TISSUES: No significant abnormality.     IMPRESSION:   1.  Interval placement of right frontal approach ventricular shunt catheter. No significant change in ventricular caliber.  2.  No unexpected acute intracranial abnormality.      Ramonita Alfaro, [facility]  O: [phone]  P: [phone]

## 2021-05-31 NOTE — PROGRESS NOTES
NEUROLOGY PROGRESS NOTE     Darion Herrera,  1975, MRN [id number] Date: 2019     [facility]   Code status:  Full Code   PCP: Monroe Cassidy MD, [phone]      ASSESSMENT & PLAN   Hospital Day#: 10  Diagnosis code: SAH    Aneurysmal SAH  MARIA L supraclinoid aneurysm s/p endovascular coil embolization  Vasospasm  Seizures      Initial head CT showed MARIA L giant aneurysm (sentinel headache)    S/p endovascular coil embolization    MRI showed small right sided infarcts - these could be related to the angiogram procedure vs from vasospasm.     TCD shows elevated velocities in RMCA and angiogram shows vasospasm.     Angiogram with IA verapamil.     On H therapy; hydration and -180    Nimodipine X 21 days    Keppra 1000 mg two times a day - Level therapeutic.     Phenytoin can reduce effectiveness of nimodipine. Will switch to Vimpat 100 mg two times a day. Stop Phenytoin. Monitor for seizures.    Consider switching from tramadol to other pain meds as this can lower the seizure threshold.    Continue TCD/angiogram evaluation.     Monitor sodium. On 3% protocol.     Thank you again for this referral, please feel free to contact me if you have any questions.     CHIEF COMPLAINT Aneurysmal subarachnoid hemorrhage (H)     HISTORY OF PRESENT ILLNESS     We have been requested by Dr. Kwan to evaluate Darion Herrera who is a 43 y.o.  female for SAH.    This a 43-year-old female on whom neurology is been consulted to evaluate for subarachnoid hemorrhage.  Patient initially presented with 6 days ago with a headache.  She was trying to lift some fruits and felt something blowing up in her head.  She continued to have the headache.  She left the staff she was lifting on the table.  She was then brought to the emergency room where imaging showed a large frontal mass.  This was later on MRI identified to be a giant aneurysm.  Patient underwent angiogram and endovascular coil embolization of the aneurysm.   The patient had a repeat angiogram.  MRI done today showed multiple foci of acute/subacute infarction in the right parietal-occipital region.  There is also small area of infarction in the right frontal lobe.  TCD's show vasospasm in the right MCA.  There was some narrowing of the M1 and M2 segments.  Neurology was consulted to further help with further cares.  Patient denies any ongoing symptoms he does complain of some headache.     7/29  Patient had 2 seizures over the weekend. Was started on fosphenytoin in addition to Keppra.   No seizures today.  Intubated, but waking up as the sedation has been withdrawn.     7/30  No new seizures. Patient remains on sedation and intubated. RMCA velocities remain elevated on the TCD.     7/31  No new seizures. Patient being kept sedation. Angiogram still shows vasospasm. Patient getting IA verapamil.     8/1  No seizures. Patient continues to remain on sedation. Angiogram continues to show vasospasm. Getting IA verapamil. Family conference tomorrow.      PAST MEDICAL & SURGICAL HISTORY     Medical History  Patient Active Problem List    Diagnosis Date Noted     E-coli UTI      Gram-positive bacteremia      Fever in other diseases      Acute respiratory failure, unspecified whether with hypoxia or hypercapnia (H)      Cerebrovascular accident (CVA) due to occlusion of right middle cerebral artery (H)      Vasospasm (H)      S/P coil embolization of cerebral aneurysm 07/23/2019     Aneurysm of right internal carotid artery 07/23/2019     Carotid artery aneurysm (H) 07/22/2019     Nausea vomiting and diarrhea 07/22/2019     Aneurysmal subarachnoid hemorrhage (H) 07/22/2019     Intraventricular hemorrhage, nontraumatic (H) 07/22/2019     Compression of brain (H)      Leiomyoma of uterus 07/18/2016     Past Medical History: No previous history.    Surgical History  She  has a past surgical history that includes IR Cerebral Angiogram (7/22/2019); IR Cerebral Angiogram (7/24/2019);  PICC Team Line Insertion (7/26/2019); IR Cerebral Angiogram (7/27/2019); IR Lumbar Drain Insertion (7/27/2019); IR Cerebral Angiogram (7/29/2019); IR Cerebral Angiogram (7/30/2019); IR Cerebral Angiogram (7/31/2019); and IR Cerebral Angiogram (8/1/2019).     SOCIAL HISTORY     Reviewed, and she  reports that she has never smoked. She has never used smokeless tobacco. She reports that she has current or past drug history. She reports that she does not drink alcohol.     FAMILY HISTORY     Reviewed, and non-contributory.      ALLERGIES     Allergies   Allergen Reactions     Oxycodone      Hydrocodone Swelling and Rash        REVIEW OF SYSTEMS     12 system ROS was done and was negative other than HPI - except patient feeling tired.     HOME & HOSPITAL MEDICATIONS     Prior to Admission Medications  Medications Prior to Admission   Medication Sig Dispense Refill Last Dose     ascorbic acid, vitamin C, 500 mg Chew chew tab Chew 500 mg daily.   Unknown at Unknown time       Hospital Medications    acetaminophen  650 mg Enteral Tube QID    Or     acetaminophen  650 mg Oral QID     albumin human  12.5 g Intravenous Once     amino acids-protein hydrolys  1 packet Enteral Tube BID     atorvastatin  20 mg Enteral Tube DAILY     cefepime (MAXIPIME) IV  2 g Intravenous Q12H     chlorhexidine  15 mL Topical Q12H     fosphenytoin (CEREBYX) IV maintenance  130 mg PE Intravenous Q8H     levETIRAcetam (KEPPRA) IVPB  1,000 mg Intravenous Q12H     niMODipine  60 mg Enteral Tube Q4H     omeprazole  20 mg Enteral Tube QAM AC    Or     pantoprazole  40 mg Intravenous QAM AC     potassium chloride liquid/packet  20 mEq Enteral Tube BID     senna (SENOKOT) syrup  8.8 mg Enteral Tube BID    Or     senna  8.6 mg Oral BID     sodium chloride  10-30 mL Intravenous Q8H FIXED TIMES     sodium chloride  2 g Oral BID    Or     sodium chloride  2 g Enteral Tube BID        PHYSICAL EXAM     Vital signs  Temp:  [99.3  F (37.4  C)-102.8  F (39.3  C)]  99.3  F (37.4  C)  Heart Rate:  [] 86  Resp:  [15-38] 15  BP: ()/(58-77) 99/60  Arterial Line BP: (108-155)/(14-82) 131/65  FiO2 (%):  [30 %-100 %] 40 %    Weight:   155 lb 6.4 oz (70.5 kg)    GENERAL: Healthy appearing, alert, no acute distress, normal habitus.  CARDIOVASCULAR: Ext warm and well perfused. Pulses present.   NEUROLOGICAL:  Patient is intubated and on sedation. Pupils reactive. EOM absent. VF absent. NO movement in arms or legs to painful sedation. Tone is symmetric.      DIAGNOSTIC STUDIES     Pertinent Radiology   MRI brain - images reviewed stroke seen  IMPRESSION:   CONCLUSION:  1.  Multiple foci of developing acute/subacute infarction throughout the right parietal occipital lobes, with additional single small foci of acute/subacute infarction in the right frontal lobe and left parietal lobe.  2.  Status post endovascular coiling of large dissecting aneurysm of the ventromedial right supraclinoid ICA with redemonstration of mild persistent flow-related enhancement along the neck of the aneurysm. There is also faint flow-related enhancement   centrally within the coil mass.  3.  Mildly decreased caliber of the M1 and proximal M2 segments of the right middle cerebral artery compared to the left, which may represent mild vasospasm. There is more normal caliber of the distal M2 and M3 branches.    ECHO    1.Left ventricle ejection fraction is normal. The calculated left ventricular ejection fraction is 65%.    2.TAPSE is normal, which is consistent with normal right ventricular systolic function.    3.No hemodynamically significant valvular heart abnormalities.    4.No obvious embolic source seen, if highly suspect consider MICHAEL.    5.No previous study for comparison.     TCD - images reviewed.   IMPRESSION:   CONCLUSION:  New moderate right middle cerebral artery vasospasm.    EEG negative for seizures.    Angiogram  Preliminary findings: (see dictation for full detail)  - Stable right ICA  aneurysm coil mass with residual interstitial neck filling.  - Possible mild vasospasm superimposed on right ICA dissection, verapamil 10 mg infused in right ICA.    TCD reviewed and velocities are elevated in the RMCA.    Angiogram shows MARIA L, RMCA, RACA vasospasm. Dissection in MARIA L.    Angiogram on 8/1 continues to show vasospasm.   Recent Results (from the past 24 hour(s))   Sodium lab - every 6 hours    Collection Time: 08/01/19 12:07 AM   Result Value Ref Range    Sodium 149 (H) 136 - 145 mmol/L   C. Diff Toxin By PCR    Collection Time: 08/01/19  3:58 AM   Result Value Ref Range    C.Difficile Toxigenic by PCR Negative Negative    Ribotype 027/NAP1/B1 Presumptive Negative Presumptive Negative   Sodium lab - every 6 hours    Collection Time: 08/01/19  4:46 AM   Result Value Ref Range    Sodium 147 (H) 136 - 145 mmol/L   Triglycerides    Collection Time: 08/01/19  4:46 AM   Result Value Ref Range    Triglycerides 183 (H) <=149 mg/dL    Patient Fasting > 8hrs? No    Potassium - Next AM    Collection Time: 08/01/19  4:46 AM   Result Value Ref Range    Potassium 3.8 3.5 - 5.0 mmol/L   Phenytoin (Dilantin )    Collection Time: 08/01/19  4:46 AM   Result Value Ref Range    Phenytoin 11.3 10.0 - 20.0 ug/mL   Phenytoin, Free (Dilantin , Free)    Collection Time: 08/01/19  4:46 AM   Result Value Ref Range    Phenytoin, Free 1.0 1.0 - 2.0 ug/mL   HM1 (CBC with Diff)    Collection Time: 08/01/19  4:46 AM   Result Value Ref Range    WBC 13.4 (H) 4.0 - 11.0 thou/uL    RBC 2.79 (L) 3.80 - 5.40 mill/uL    Hemoglobin 8.0 (L) 12.0 - 16.0 g/dL    Hematocrit 25.1 (L) 35.0 - 47.0 %    MCV 90 80 - 100 fL    MCH 28.7 27.0 - 34.0 pg    MCHC 31.9 (L) 32.0 - 36.0 g/dL    RDW 13.7 11.0 - 14.5 %    Platelets 187 140 - 440 thou/uL    MPV 9.4 8.5 - 12.5 fL    Neutrophils % 80 (H) 50 - 70 %    Lymphocytes % 12 (L) 20 - 40 %    Monocytes % 5 2 - 10 %    Eosinophils % 2 0 - 6 %    Basophils % 0 0 - 2 %    Neutrophils Absolute 10.7 (H) 2.0 -  7.7 thou/uL    Lymphocytes Absolute 1.6 0.8 - 4.4 thou/uL    Monocytes Absolute 0.7 0.0 - 0.9 thou/uL    Eosinophils Absolute 0.3 0.0 - 0.4 thou/uL    Basophils Absolute 0.0 0.0 - 0.2 thou/uL   Magnesium    Collection Time: 08/01/19  4:46 AM   Result Value Ref Range    Magnesium 2.0 1.8 - 2.6 mg/dL   Sodium    Collection Time: 08/01/19  6:03 AM   Result Value Ref Range    Sodium 147 (H) 136 - 145 mmol/L   Chloride    Collection Time: 08/01/19  6:03 AM   Result Value Ref Range    Chloride 122 (H) 98 - 107 mmol/L   Carbon Dioxide (CO2)    Collection Time: 08/01/19  6:03 AM   Result Value Ref Range    CO2 20 (L) 22 - 31 mmol/L   Sodium lab - every 6 hours    Collection Time: 08/01/19 12:41 PM   Result Value Ref Range    Sodium 145 136 - 145 mmol/L   Protein, CSF    Collection Time: 08/01/19  1:35 PM   Result Value Ref Range    Protein, CSF 68 (H) 15 - 45 mg/dL   Glucose, CSF    Collection Time: 08/01/19  1:35 PM   Result Value Ref Range    Glucose, CSF 61 40 - 75 mg/dL   Stat Gram Stain    Collection Time: 08/01/19  1:35 PM   Result Value Ref Range    Gram Stain Result 2+ Polymorphonuclear leukocytes     Gram Stain Result No organisms seen    CSF Exam    Collection Time: 08/01/19  1:35 PM   Result Value Ref Range    WBC, CSF 76 (H) <6 /ul    RBC, CSF 34,000 (H) <2 /ul    Tube Number, CSF      Appearance, CSF Cloudy (!) Clear    Color, CSF Red (!) Colorless    Volume, CSF 3.0 ml   CSF Differential    Collection Time: 08/01/19  1:35 PM   Result Value Ref Range    Lymphocyte % 26 0 - 98 %    Neutrophil % 70 (H) 0 - 2 %    Monocyte % 3 0 - 98 %    Macrophage % 2 0 - 98 %       Total time spent for face to face visit, reviewing labs/imaging studies, counseling and coordination of care was: Over 25 min More than 50% of this time was spent on counseling and coordination of care.  Discussing plan with family.     Justin Vasquez MD  [employer] Secure Messaging: [email or web address]  Tel: [phone]  This note  was dictated using voice recognition software.  Any grammatical or context distortions are unintentional and inherent to the software.

## 2021-05-31 NOTE — PLAN OF CARE
Problem: Pain  Goal: Patient's pain/discomfort is manageable  Outcome: Progressing   Pt denies pain and CCPOT is 0    Problem: Daily Care  Goal: Daily care needs are met  Outcome: Progressing     Problem: Neurological Deficit  Goal: Neurological status is stable or improving  Outcome: Progressing  Pt able to follow commands, unable to move left side.

## 2021-05-31 NOTE — PROGRESS NOTES
Clinical Nutrition Therapy Follow Up Note    Was consulted to address patient's loose stools.    Current Nutrition Prescription:   Diet: TF: IsoSource 1.5 at 35 mL/hour with 30 mL water q 4 hours.   IV dextrose or Fluids:  dexmedetomidine 400 mcg/100 mL in NS (PRECEDEX) (4mcg/mL) Last Rate: 0.4 mcg/kg/hr (08/11/19 1541)   niCARdipine    norepinephrine Last Rate: Stopped (08/10/19 1600)   vasopressin Last Rate: Stopped (08/09/19 1300)     Current Nutrition Intake:  The TF regimen is providing 1260 calories, 57 g protein, 148 g carb, 13 g fiber, 822 ml free water.    Anthropometrics:  Admission weight: 155 lb  Weight: 164 lb 11.2 oz (74.7 kg)    GI Status/Output:   Last BM today per chart; per consult, patient is having lots of loose stools.    Skin/Wound:  Surgical incision on neck was noted.    Medications:  K replacement protocol    Labs:  Labs reviewed  K WNL    Malnutrition: Not noted    Nutrition Risk Level: moderate risk    Nutrition dx:  Swallow difficulty r/t intubation evidenced by NPO    Goal Status:  Tolerate TF-met    Intervention:  Continue current TF regimen  Will order NutriSource Fiber two times a day to help reduce loose stools  TF regimen + NS Fiber will provide 1292 calories, 57 g protein, 148 g carb, 19 g fiber, 822 ml free water.      Monitoring/Evaluation:  TF tolerance, wt, labs, hydration needs.   See Care Plan for Problems, Goals, and Interventions.    Jada Stone RD, LD

## 2021-05-31 NOTE — PROGRESS NOTES
I agree with the assessment and plan as outlined by student [facility] Yaquelin Birch in the note below. The student NP was directly supervised by myself, the patient was seen and examined personally, and plan made collaboratively.     Katherin Youssef [facility] Neurosurgery  [address] [place] [address], [address]  [place] [address]    O: [phone]  P: [phone]        NEUROSURGERY PROGRESS NOTE:    NEUROSURGERY ATTENDING: The patient's attending neurosurgeon is Dr. Daniel Kimbrough.   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  Coiled 07/22/19, but not completely occluded, some filling persisted.  MRI concerning for vasospasm and new scattered small infarcts.      Hospital course complicated by re-bleed and severe vasospasm. Returned to IR for angio and verapamil 7/27, 7/28, 7/29, and 7/30. Most recent imaging concerning for R MCA infarct, does have some left sided facial weakness on exam but exam limited due to sedation.     Complex patient with few surgical options. Could consider placement of ventriculostomy should she clinically worsen. Could consider MRI but would likely have significant artifact obscuring evaluation of the pertinent territories.       PLAN:   Patient Active Problem List   Diagnosis     Carotid artery aneurysm (H)     Aneurysmal subarachnoid hemorrhage (H)     Intraventricular hemorrhage, nontraumatic (H)     Compression of brain (H)     S/P coil embolization of cerebral aneurysm     Aneurysm of right internal carotid artery     Cerebrovascular accident (CVA) due to occlusion of right middle cerebral artery (H)     Vasospasm (H)  --Repeat cerebral angio today to evaluate and treat vasospasm  --Another angio tomorrow (8/1) per IR  --Keep sedated, RASS -3 until velocities improving  --IV fluid goal 100 mL/hr, goal to be euvolemic or slightly hypervolemic to  help mitigate vasospasm  --Full 3% protocol, target Na 140-145  ---160, Albumin PRN x5 doses first, then titrate levophed once Albumin exhausted  --TCD's daily  --Keppra 500 mg two times a day x30 days, indefinite with seizure  --Nimodipine x21 days. Please do not hold this medication.  --Sharpe in place, strict I/O  --Mechanical DVT prophylaxis  --Must have accurate Is and Os       Fever  --102.9 again this morning  --UA appears suspicious will discuss with ICU team as they may be awaiting culture results  --At this time would not draw CSF unless no other source identified                    Discharge Recommendations/Plan:  Barriers to Discharge: yes, requiring acute medical care.     Follow Up Plan: Pending         HPI: Darion Herrera is a 42 yo female who was admitted on 19 with SAH and IVH secondary to ruptured aneurysm of the right ICA.  Aneurysm coiling on  that was complicated by vasospasm. On , found to be unresponsive and possibly seizing, was intubated and taken to IR where it was revealed that her right ICU aneurysm had dissected and re-bled.  In IR on , the vessel was recoiled and a lumbar drain was placed for ICP management.  On  underwent intraarterial verapamil infusion with angiogram for acute vasospasm of right ICA. Due to increased TCD values, back to IR for another cerebral angiogram  for acute vasospasm and intraarterial verapamil infusion.     SUBJECTIVE:  Unable to assess due to sedation. RN reports agitation with cares.       OBJECTIVE:  Vital signs in last 24 hours  Temp:  [98.8  F (37.1  C)-102.9  F (39.4  C)] 98.8  F (37.1  C)  Heart Rate:  [] 105  Resp:  [15-34] 34  BP: ()/(52-84) 100/64  Arterial Line BP: (123-153)/(60-75) 135/69  FiO2 (%):  [25 %-100 %] 30 %  Body mass index is 32.58 kg/m .    Mental Status: sedated, speech  intubated.     Solitario Coma Score  Eye openin - Does not open eyes   Verbal:  1 - Makes no noise   Motor:  1 - No  motor response to pain   GCS Total: 3i         Cranial Nerves: Exam limited to deep sedation.  PERRL. + cough/gag. Face appears midline. Resists exam of R pupil, does not resist on the L.     Motor Strength: Unable to examine due to deep sedation. Propofol infusing at 70 mcg/kg/min, does not withdraw to painful stimulus.     Lumbar Drain: Lumbar drain patent, open and titrated to 10-15 mL output/hour.  Output remains bloody.     Last 3 TCD Values       7/29 7/30 7/31   LMCA    107 172 170   LACA    127 189 131   RMCA    142 266 216   RACA    99 39 63   BA    65 52 66         Pertinent Labs:  CBC:   Lab Results   Component Value Date    WBC 17.1 (H) 07/30/2019    RBC 3.37 (L) 07/30/2019     BMP:   Lab Results   Component Value Date    CO2 19 (L) 07/30/2019    BUN 3 (L) 07/30/2019    CREATININE 0.57 (L) 07/30/2019    CALCIUM 7.7 (L) 07/30/2019     Coagulation:   Lab Results   Component Value Date    INR 1.12 (H) 07/27/2019         Pertinent Radiology   Radiology Results: Reviewed with Katherin Youssef CNP, [facility] Neurosurgery    MANDY Sandoval-ACNP Student  [facility] Neurosurgery

## 2021-05-31 NOTE — PLAN OF CARE
Problem: Psychosocial Needs  Goal: Demonstrates ability to cope with hospitalization/illness  Outcome: Progressing  Goal: Collaborate with patient/family/caregiver to identify patient specific goals for this hospitalization  Outcome: Progressing   Very weepy not sure if she wants to go through with surgery.  Problem: Risk of Injury Due to Unsafe Behavior  Goal: Patient will remain safe while in restraint; physical/psychological needs will be met  Outcome: Progressing  Goal: Alternatives to restraint will be continually assessed with use of least restrictive device and discontinuation as soon as possible  Outcome: Progressing  Goal: Patient/Family will be able to communicate reason for restraint and steps for restraint application and removal  Outcome: Progressing  She wa  Problem: Neurological Deficit  Goal: Neurological status is stable or improving  Outcome: Progressing   s grabbing a lot at her tubing=g-tube and blackmon. Repositioned often and reassured.

## 2021-05-31 NOTE — PROGRESS NOTES
Critical Care Medicine Progress Note  7/31/2019    ASSESSMENT/PLAN:  43 y.o. woman who was admitted on 7/22/2019 with SAH & IVH secondary to ruptured aneurysm of the right ICA, aneurysm coiling 7/22 that was complicated by vasospasm.  Hospitalization course was complicated by right ICA aneurysm dissection with rebleeding that presented with acute onset seizures and unresponsiveness on 7/27.  Patient was intubated on 7/27, underwent a recoiling of the culprit vessel, and placement of lumbar drain for ICP management.  7/28, 7/29 underwent verapamil infusion with angiogram for acute vasospasm of right ICA.    New events:   - fever this am to 102 F - blood cultures coming back positive now, UC with E.coli   - responds to external stimulus when on sedation holiday, but plan to stop sedation holidays   - remains on NE   - continued vasospasm    Neuro/Psych:   #. Analgesia/Sedation: Currently managed with propofol drip, PRN IV Dilaudid.  #.  Right ICA aneurysm complicated by subarachnoid hemorrhage with IVH, status post dissection and coiling x2 with hydrocephalus requiring lumbar drain placement 7/27/2019.  Concern for vasospasm complicated the clinical presentation and has had verapamil infusion on multiple days.    Neurosurgery and IR closely following    Cerebral angiography per IR, follow TCD to determine further angiogram    Continue with every 4 hours nimodipine x21 days    Goal -150 by arterial line, currently on NE to maintain     Goal sodium 145-150, hypertonic saline infusion per NSG protocol    Goal HCT level 30-32    Goal CVP 10-12    Strict I/O, strict lumbar drain output monitoring (if increased output may be sign of further edema/swelling and consider stat HCT)    Repeat TCD tomorrow to determine need for angiogram    Monitor for volume overload  #.  Recurrent seizures, likely secondary to acute intracranial process as detailed above.  Currently on propofol, IV levetiracetam, and IV fosphenytoin.  #.   Moderate Sedation with lower RASS -3 as goal   Due to ongoing issues will pursue deeper RASS goal of -3   Start fentanyl infusion - titrate to sedation   Prn versed especially with turning/routine cares    Pulmonary: Mechanical ventilation instituted for airway protection in setting of seizure and encephalopathy secondary to SAH.  The patient is on minimal ventilator settings.  Not clinically appropriate for ventilator weaning, and due to ongoing issues on further sedation holidays.  Monitor.  - goal SpO2 > 92%  - no weaning today  - secretions increased  - sputum culture sent  - Abx per ID section below     Cardiovascular: goal -150 but will try to aim for higher end of this (note when goal SBP was higher 150-180 then patient had further bleeding). Avoid medications that would cause bradycardia.  - continue NE as needed to maintain SBP goal  - CVP to be maintained 10-12    Renal: No acute issues.  Good urine output.  - check UA for possible UTI given fevers  - potassium/magnesium replacement per protocol  - add KCL to saline infusion    GI: NPO for now.  - start trophic feeding    Heme: No acute issues, monitor.  - recheck CBC  - IVF to maintain HCT 30-32    Endo: No acute issues.  Continue close monitoring.  - ICU q 4 hour SSI    ID: More overt fever this am.  Now with GPC in clusters in blood < 24 hours and > [id number] Ecoli  - start cefepime (will cover E.coli and has better CNS penetration)  - start vancomycin   - blood cultures x2 sets    Access/Lines/Tubes: required and necessary for continued patient cares  ETT, 7.5  PICC, RUE  PIV  Arterial sheath, right femoral artery  Sharpe catheter  Lumbar drain  OGT    ICU prophylaxis:   #. VAP ppx: HOB 30 degrees, chlorhexidine rinses  #. Stress ulcer: PPI  #. Diet: NPO, trophic feedings now  #. VTE: SCD, chemical prophylaxis contraindicated due to SAH/bleeding  #. Restraints: required and necessary for continued patient cares    CODE: FULL    Family was updated  at bedside    Dispo: ICU for mechanical ventilation, hemodynamic support, neurological monitoring, remains critically ill with constant threat to life.    Patient is critically ill with total CCT spent 55 minutes thus far today.     Fer Reyes, DO  Pulmonary and Critical Care     =================================================================    Interval history: remains sedated and intubated.  No further seizure activity reported.  More overt fever this am.  Sputum suctioned slightly more now, but remains on 30% FiO2.  CSF fluid still bloody.      Vitals: Temp:  [98.8  F (37.1  C)-102.9  F (39.4  C)] 98.8  F (37.1  C)  Heart Rate:  [] 105  Resp:  [15-34] 34  BP: ()/(52-84) 100/64  Arterial Line BP: (123-153)/(60-75) 135/69  FiO2 (%):  [25 %-100 %] 30 %  Vent:   Vent Mode: VCV  FiO2 (%):  [25 %-100 %] 30 %  S RR:  [16] 16  S VT:  [375 mL] 375 mL  PEEP/CPAP (cm H2O):  [5 cm H2O] 5 cm H2O  Minute Ventilation (L/min):  [8.1 L/min-10.8 L/min] 8.6 L/min  PIP:  [18 cm H2O-27 cm H2O] 18 cm H2O  MAP (cm H2O):  [7-10] 7    Physical Exam:   General: obese woman, lying in bed, NAD  HEENT: orally intubated, MMM, PER  CV: RRR, no M/R/G; extremities well perfused, no significant edema  Pulm: equal bilateral mechanical breath sounds that are course, no wheezing, no rhonchi, no crackles  Abd: soft, ND, NT, hypoactive bowel sounds  Msk: warm to touch  Derm: no acute lesions or rashes on limited exam  Neuro: sedated, no evidence of clonus  Psych: sedated    Labs: personally reviewed in EMR.     Imaging: personally reviewed in EMR. Formal Radiology interpretation listed below.  #. HCT, 7/27:   INTRACRANIAL CONTENTS: Beam hardening and streak artifacts related to a large right paraclinoid coil mass as seen previously. This locally degrades evaluation. Small volume subarachnoid hemorrhage within sulci of both cerebral hemispheres. No definite increasing intraventricular hemorrhage layering within the occipital horns of  the lateral ventricles favored to reflect redistribution of subarachnoid blood products. No large territorial infarct is identified. Small frontoparietal infarcts seen on the 7/26/2019 MRI are poorly appreciated on CT. Minor periventricular low-attenuation. Mild to moderate lateral and third ventriculomegaly similar to findings on the prior exam.   VISUALIZED ORBITS/SINUSES/MASTOIDS: No significant orbital abnormality. No significant paranasal sinus mucosal disease. No significant middle ear or mastoid effusion.  BONES/SOFT TISSUES: No significant abnormality.    #. HCT: 7/28:   Again demonstrated is endovascular coiling of a giant right supraclinoid aneurysm. This results in extensive streak artifact and compromises a portion of the intracranial compartment. Within this limitation, the amount of intraventricular hemorrhage and   subarachnoid hemorrhage within the basal cisterns and scattered over the convexities appears grossly unchanged. There is stable mild to moderate ventriculomegaly of the lateral and third ventricles. No evidence of interval acute intracranial hemorrhage,   infarct or hydrocephalus.

## 2021-05-31 NOTE — PLAN OF CARE
Problem: Pain  Goal: Patient's pain/discomfort is manageable  Outcome: Not Progressing   Pt's post surgical abdominal pain effectively managed with utilization of PRN IV opioid analgesic, and repositioning. Will continue to monitor. Mark Mercado RN

## 2021-05-31 NOTE — PROGRESS NOTES
Speech [employer]/Pathology  Speech [employer] Evaluation    History:  Diagnosis:   Patient Active Problem List   Diagnosis     Leiomyoma of uterus     Carotid artery aneurysm (H)     Nausea vomiting and diarrhea     Aneurysmal subarachnoid hemorrhage (H)     Intraventricular hemorrhage, nontraumatic (H)     Compression of brain (H)     S/P coil embolization of cerebral aneurysm     Aneurysm of right internal carotid artery     Cerebrovascular accident (CVA) due to occlusion of right middle cerebral artery (H)     Vasospasm (H)     Acute respiratory failure, unspecified whether with hypoxia or hypercapnia (H)     Fever in other diseases     E-coli UTI     Gram-positive bacteremia     CVA (cerebral vascular accident) (H)     Pneumonia due to group B Streptococcus, unspecified laterality, unspecified part of lung (H)     Vasospasm of cerebral artery     Onset date: 7/22/2019  Reason for evaluation: assess tolerance of PMV given recent trach downsize.  Pertinent History: see EMR; extensive hospitalization with x5 day intubation requiring tracheotomy on 8/2  Subjective:  Patient presents as alert and cooperative during this session.   An  was present. Patient's  present upon arrival.   Objective:  Voicing  Trach: #6 Bivona    Baseline physiologic parameters:  SATS: 100  HR: 102  RR: 23    Upon arrival, SLP educated patient re: current plan and procedure. Patient was in agreement. Patient's cuff was deflated upon arrival. Evidence of airflow into the upper airway was exhibited as evidenced by weak, leak whisper.     Speaking valve was trialed for 27 minutes. Minimal coughing noted; x2 bouts of coughing which may be related to recent trach downsize, size/positioning of trach, and/or poor secretion management. Vocal quality was weak and generally aphonic. Patient was observed to attempt to vocalize and/or breath through trach despite max education re: breathing through upper airway for purposes of phonation.  Approx x5 opportunities of true phonation was observed at which point speech intelligibility was 100% per . Respiratory status stable during trial. x1 suction provided for moderate thick pale yellow secretions.      Speaking valve was removed and the cuff was left deflated per pulm recommendation. No back pressure observed upon removal. Prior to dismissal, respiratory rate was 21, and SATS were 97. Handoff with Respiratory Therapist Pb completed via vocera/phone    Assessment:  Patient demonstrates ability to continue trials of PMV for purposes of restoration of supraglottic pressure, clearance of oropharyngeal secretions, and for purposes of communication. Based on performance, suspect patient not producing consistent sound d/t mentation. Further trials and investigation warranted.     Plan:  Speech therapy 6 times per week.  Referrals: Sp/jaymie llanos in conjunction with PMV trials    35 voice minutes     Yessi Peralta MS, CCC-SLP

## 2021-05-31 NOTE — ANESTHESIA PREPROCEDURE EVALUATION
Anesthesia Evaluation      Patient summary reviewed   No history of anesthetic complications     Airway   Comment: Trach in place   Pulmonary - normal exam   (+) pneumonia,     ROS comment: Trach                         Cardiovascular - negative ROS  Rhythm: regular  Rate: normal,         Neuro/Psych    (+) CVA (Aneurysmal SAH s/p coiling, rebleed & vasospasm, s/p multiple angios ) ,     Comments: Hydrocephalus,  shunt planned  Lumbar drain currently in place    Endo/Other    (+) obesity,      GI/Hepatic/Renal      Comments: UTI          Dental - normal exam                        Anesthesia Plan  Planned anesthetic: general tracheostomy    ASA 4   Induction: intravenous   Anesthetic plan and risks discussed with: patient, spouse, child/children and  services used    Post-op plan: routine recovery

## 2021-05-31 NOTE — PROGRESS NOTES
"RESPIRATORY CARE NOTE      /70   Pulse 97   Temp (!) 101  F (38.3  C) (Axillary)   Resp 21   Ht 4' 10\" (1.473 m)   Wt 155 lb 14.4 oz (70.7 kg)   LMP 07/01/2016   SpO2 100%   BMI 32.58 kg/m        Vent Mode: VCV  FiO2 (%):  [30 %-100 %] 30 %  S RR:  [16] 16  S VT:  [375 mL] 375 mL  PEEP/CPAP (cm H2O):  [5 cm H2O] 5 cm H2O  Minute Ventilation (L/min):  [2.8 L/min-9.2 L/min] 2.8 L/min  PIP:  [17 cm H2O-27 cm H2O] 27 cm H2O  MAP (cm H2O):  [7-11] 8  7.5 ET 21 @ teeth    Pt remains on vent.  Breath sounds coarse.  Sx small amount thick tan secretions.  Transported to CT without incident.  No changes to setttings. Ppeak 25, P:plat 16     Sanjay Sagastume, LRT      "

## 2021-05-31 NOTE — PLAN OF CARE
Problem: Pain  Goal: Patient's pain/discomfort is manageable  Outcome: Progressing     Problem: Safety  Goal: Patient will be injury free during hospitalization  Outcome: Progressing     Problem: Daily Care  Goal: Daily care needs are met  Outcome: Progressing     Problem: Psychosocial Needs  Goal: Demonstrates ability to cope with hospitalization/illness  Outcome: Progressing  Goal: Collaborate with patient/family/caregiver to identify patient specific goals for this hospitalization  Outcome: Progressing     Problem: Discharge Barriers  Goal: Patient's discharge needs are met  Outcome: Progressing     Problem: Knowledge Deficit  Goal: Patient/family/caregiver demonstrates understanding of disease process, treatment plan, medications, and discharge instructions  Outcome: Progressing     Problem: Potential for Falls  Goal: Patient will remain free of falls  Outcome: Progressing     Problem: Potential for Compromised Skin Integrity  Goal: Skin integrity is maintained or improved  Outcome: Progressing  Goal: Nutritional status is improving  Outcome: Progressing     Problem: Urinary Incontinence  Goal: Perineal skin integrity is maintained or improved  Outcome: Progressing     Problem: Neurological Deficit  Goal: Neurological status is stable or improving  Outcome: Progressing     Problem: Risk of Injury Due to Unsafe Behavior  Goal: Patient will remain safe while in restraint; physical/psychological needs will be met  Outcome: Progressing  Goal: Alternatives to restraint will be continually assessed with use of least restrictive device and discontinuation as soon as possible  Outcome: Progressing  Goal: Patient/Family will be able to communicate reason for restraint and steps for restraint application and removal  Outcome: Progressing     Problem: Mechanical Ventilation  Goal: Patient will maintain patent airway  Outcome: Progressing  Goal: Oral health is maintained or improved  Outcome: Progressing  Goal:  Tracheostomy will be managed safely  Outcome: Progressing  Goal: Respiratory status - ventilation  Description  Movement of air in and out of the lungs.    Liberate from ventilator  Outcome: Progressing     Problem: Potential for transmission of organism by Contact, Enteric, Droplet and/or Airborne routes  Goal: Prevent transmission of organisms  Outcome: Progressing     Problem: Inadequate Gas Exchange  Goal: Patient will achieve/maintain normal respiratory rate/effort  Outcome: Progressing     Pt A&O x3, disoriented to situation, LS clear/diminished, trached on trach mask throughout shift, no complaints of shortness of breath or pain. Sharpe intact with adequate output throughout shift, no BM this shift. Pt slept on and off during shift, pt tossed and turned all night, writer offered melatonin to pt and pt refused. Pt turned self in bed even when staff assisted with turns. Lumbar drain intact. Will continue to monitor.    Monse Hall RN

## 2021-05-31 NOTE — PROGRESS NOTES
Respiratory Care Note    Pt currently on trach mask 20LPM 21%. BBS coarse, suctioning moderate amount of tan, thick via catheter. Pt trach was successfully down-sized from a size 8 shiley to a #6 bivona and tolerating well. No respiratory distress noted. Vent currently on standby. Will return back to vent when ready to sleep per phase 2. Will only use PMV when SLP is working with patient per CNP. No respiratory distress noted at this time. RT to follow.     KAE RosalesT

## 2021-05-31 NOTE — PLAN OF CARE
Problem: Mechanical Ventilation  Goal: Patient will maintain patent airway  Outcome: Progressing  Goal: Oral health is maintained or improved  Outcome: Progressing     Problem: Mechanical Ventilation  Goal: ET tube will be managed safely  Outcome: Completed

## 2021-05-31 NOTE — PLAN OF CARE
Pt explained suctioning from pneumonia and will have cough and secretions. Suctioned trach for whit small amt of secretions. Humidity trach mask on.  Pt deies pain , explained tylenol available if needed.

## 2021-05-31 NOTE — PROGRESS NOTES
Nutrition Therapy Brief Note    Noted per rounds pt was NPO and TF off.  Propofol was consistent over yesterday and this AM, now running a little lower for past several hours.  RN resumed TF but TF orders not re-entered.  I resumed orders for Isosource 1.5 to continue @ 15 ml/hr with flushes of 30 ml q 4 hr.  Will also continue 2 packets No Carb Prosource.  With higher propofol levels this will meet her needs.  Will follow propofol and adjust TF as needed.

## 2021-05-31 NOTE — PROGRESS NOTES
Vent Mode: VCV  FiO2 (%):  [30 %-100 %] 30 %  S RR:  [16] 16  S VT:  [375 mL] 375 mL  PEEP/CPAP (cm H2O):  [5 cm H2O] 5 cm H2O  Minute Ventilation (L/min):  [6.3 L/min-7.9 L/min] 7.9 L/min  PIP:  [18 cm H2O-22 cm H2O] 20 cm H2O  MAP (cm H2O):  [7] 7    No weaning done this shift. BS coarse to clear. Sxn for small amount of thin white secretions x1. No changes this shift. Will continue to follow.     KAE DunawayT

## 2021-05-31 NOTE — PROGRESS NOTES
Patient was transported to IR on transport vent.  Patient tolerated well.  Transport was done with no complications.  When returned to room was placed back on ventilator on the following settings:    Vent Mode: VCV  FiO2 (%):  [21 %-100 %] 21 %  S RR:  [14] 14  S VT:  [375 mL] 375 mL  PEEP/CPAP (cm H2O):  [5 cm H2O] 5 cm H2O  Minute Ventilation (L/min):  [6.1 L/min-9.7 L/min] 8.2 L/min  PIP:  [18 cm H2O-37 cm H2O] 19 cm H2O  MS SUP:  [5 cm H20] 5 cm H20  MAP (cm H2O):  [6-12] 12    Luis F Urias, KAET

## 2021-05-31 NOTE — PROGRESS NOTES
ETT # 7.5 and is secured 21 cm at the teeth. Patient remains on the vent fully supported. Vent changes are not made during this shift. Current settings are: VCV 16 375 40% 5. sats are high 90s. BS are coarse; moderate/tan secretions are suctioned. RT will monitor.    KAE SanchezT

## 2021-05-31 NOTE — PROGRESS NOTES
"[facility]    Assessment and Plan:    Right ICA aneurysmal subarachnoid hemorrhage  Course complicated by rebleed from the giant right ICA aneurysm.    TCD's just completed, results pending  Intra-arterial verapamil given twice so far for vasospasm    Patient remains intubated and sedated on fentanyl and propofol.  Norepinephrine also going to maintain blood pressure (HHH therapy)  Last EEG showed no further seizure.  Last CT yesterday morning, stable, images personally reviewed, unfortunately, the coiling obscures much of the skull base area.    Subjective:     43-year-old woman admitted to [facility] with subarachnoid hemorrhage due to giant right ICA aneurysm rupture.  She has had coiling procedure done twice.  She remains intubated and sedated in the ICU.  No significant new findings since yesterday.    Objective:  /63   Pulse 92   Temp 99.1  F (37.3  C) (Oral)   Resp 16   Ht 4' 10\" (1.473 m)   Wt 160 lb 9.6 oz (72.8 kg)   LMP 07/01/2016   SpO2 96%   BMI 33.57 kg/m       Intubated, sedated in the ICU  No spontaneous movement  Pupils okay, gaze conjugate, some scleral edema  Tone decreased throughout  No response to painful stimulus  Toes are silent on both sides    Scheduled Meds:    albumin human  12.5 g Intravenous Once     amino acids-protein hydrolys  1 packet Enteral Tube BID     atorvastatin  20 mg Enteral Tube DAILY     cefepime (MAXIPIME) IV  2 g Intravenous Q12H     chlorhexidine  15 mL Topical Q12H     lacosamide (VIMPAT) IVPB  100 mg Intravenous Q12H 09-21     levETIRAcetam (KEPPRA) IVPB  1,000 mg Intravenous Q12H     niMODipine  60 mg Enteral Tube Q4H     omeprazole  20 mg Enteral Tube QAM AC    Or     pantoprazole  40 mg Intravenous QAM AC     potassium chloride liquid/packet  30 mEq Oral Q4H     potassium chloride liquid/packet  20 mEq Enteral Tube BID     senna (SENOKOT) syrup  8.8 mg Enteral Tube BID    Or     senna  8.6 mg Oral BID     sodium " chloride  10-30 mL Intravenous Q8H FIXED TIMES     sodium chloride  2 g Oral BID    Or     sodium chloride  2 g Enteral Tube BID     Continuous Infusions:    fentaNYL 2500 mcg/250 mL in NS (10 mcg/mL) 75 mcg/hr (08/02/19 0204)     lactated Ringers       lactated ringers 100 mL/hr (08/03/19 0316)     niCARdipine       norepinephrine 0.14 mcg/kg/min (08/03/19 0620)     propofol 70 mcg/kg/min (08/03/19 0752)     sodium chloride/acetate 3% 50 mL/hr (08/03/19 0115)     PRN Meds:.acetaminophen, albumin human, bisacodyl, HYDROmorphone, lactated Ringers, lipase-protease-amylase **AND** sodium bicarbonate, midazolam, naloxone **OR** naloxone, ondansetron, polyethylene glycol, sodium chloride bacteriostatic, sodium chloride, sodium chloride, sodium chloride, white barbra-mineral oil (LUBRIFRESH PM) ophthalmic ointment     Luis Meza MD

## 2021-05-31 NOTE — PROGRESS NOTES
"  Clinical Nutrition Therapy Nutrition Support Note      Subjective:  Pt sitting up in chair.  She mouths words.  Chart reviewed.    Nutrition Prescription:   Diet: TF Isosource 1.5 at 35 ml/hr continuous  Water flush 30 ml q 4 hrs  Modular:  Nutrifiber 1 pkt bid    IV dextrose or Fluids:    dexmedetomidine 400 mcg/100 mL in NS (PRECEDEX) (4mcg/mL) Last Rate: 0.4 mcg/kg/hr (08/12/19 0826)   niCARdipine        Nutrition Intake:  FT is an PEG placed 8/7/19.    TF provides:  1260 calories, 57 g protein, 148 g carb, 19 g fiber, 822 ml free water.    Additional fluid per IV.     Anthropometrics:  Height: 4' 10\" (147.3 cm)  Admission weight: 155#  Weight: 162 lb 6.4 oz (73.7 kg) fluid wt up.    Physical Findings:  Edema noted    GI Status/Output:   GI symptoms per nursing:   Last BM recorded: 140 ml per rectal tube 8/11.   Lumbar drain 144 ml out.  Note diuresing with 4775 ml urine out in past 24 hrs.    Skin/Wound:   Clifford Scale Score: 13     Medications:  3% saline-stopped   Mag and K replacement  Medications reviewed.    Labs:  Na 138  K 3.6  Albumin 3.4  Glucose 129  Labs reviewed    Estimated Nutrition Needs:  Using ideal weight of 43 kg.    Energy Needs: 3608-9458 kcals daily per 25-30 kcal/kg   Protein Needs: 52+ g daily, 1.2+ g/kg.  Fluid Needs: 1100+ mls daily, 25+ mls/kg    Malnutrition: Not noted    Nutrition Risk Level: moderate risk    Nutrition DX:  Swallow difficulty r/t intubation evidenced by nutrition support    Goals: Tolerate TF-met  Adequate hydration    Plan:  Continue TF as ordered.  IV fluids decreasing, anticipate pt will need increased water flushes.  Neurosurgery following hydration/Na at this time.    Monitor:  TF tolerance, wt, labs, hydration needs.    See Care Plan for Problems, Goals, and Interventions.        "

## 2021-05-31 NOTE — PROGRESS NOTES
Palliative Care Progress Note  NAME: Darion Herrera, : 1975,   MRN: [id number] Date: 2019    Problem List:  Active Problems   Principal Problem:    Aneurysmal subarachnoid hemorrhage (H)  Active Problems:    Carotid artery aneurysm (H)    Intraventricular hemorrhage, nontraumatic (H)    Compression of brain (H)    S/P coil embolization of cerebral aneurysm    Aneurysm of right internal carotid artery    Cerebrovascular accident (CVA) due to occlusion of right middle cerebral artery (H)    Vasospasm (H)    Acute respiratory failure, unspecified whether with hypoxia or hypercapnia (H)    Fever in other diseases    E-coli UTI    Gram-positive bacteremia    Assessment: Darion Herrera, 43 y.o., female with no significant medical history admitted on 2019 with SAH and IVH 2/2 to ruptured aneurysm of the R ICA. Coiling done on  that was c/b vasospasm. On  found to be unresponsive and possible seizing, subsequently intubated and taken to IR, which found her R aneurysm had dissected and re-bled. In IR vessel was re-coiled and a lumbar drain was placed for ICP management. Currently requiring daily intraarterial verapamil infusions w/angiogram for acute vasospasm of the R ICA.      Recommendations:   Weakness: due to SAH/IVH 2/2 to a ruptured dissecting ventral medial supraclinoid R ICA aneurysm, treated w/endovascualr coil embolization on , subsequent regrowth of aneurysm resulting in re-rupture on  and coil embolization re-treatment on . Lumbar drain placed  for secondary hydrocephalus. Continued cerebral vasospasm requiring IA verapamil, almost daily, last done on .   - Neurosurgery and Neuro IR following  - Reposition for comfort as tolerated      Shortness of breath: required intubation for airway protection and due to continued sedation needs, unable to PST due to level of sedation to prevent/limit further cerebral vasospasms. Will likely need tracheostomy, family appears to be in  "agreement during my conversation with them.   - ICU/Pulm managing ventilator     Decision making capacity: Not decisional, defer to family                 - Advance directive on file: No      Estimated length of life: defer to primary team for definitive prognosis     Goals of Care: Restorative  - Family during my visit appear to be in agreement with proceeding with Trach/PEG as long as there has been no big change in her medical status given that there is concern for a possible stroke.      Code Status: Full Code  =========================================================  Current Medical Status:  Per imaging studies, concern for stroke overnight, plan for repeat imaging today to assess further.    Family at bedside, updated on medical status, continue to express concern over ability for their mother to recover. They are hopeful and aware recovery in her case may take a very long time (months to years) and want to do all they can to provide her the best chance at that, but also would want to know if something occurs that would limit recovery or lead to an expectation of no meaningful recovery, as this may change their goals.     Symptom-Focused ROS:  Unable to complete as patient is sedated     Palliative Care Focused Medications:  See MAR      PERTINENT PHYSICAL EXAMINATION:  Vital Signs: Blood pressure 144/88, pulse 80, temperature (!) 100.8  F (38.2  C), temperature source Oral, resp. rate 10, height 4' 10\" (1.473 m), weight 163 lb 11.2 oz (74.3 kg), last menstrual period 07/01/2016, SpO2 99 %.   GENERAL: intubated/sedated   SKIN: Hot to touch, dry  HEENT: Normocephalic, anicteric sclera, intubated    LUNGS: ventilated BS bilaterally   CARDIAC: RRR, normal s1/s2, w/o m/r/g   ABDOMINAL: BS (+), soft, non distended, non tender  : blackmon in place   EXTREMITIES: generalized edema   NEUROLOGIC: sedated   PSYCH: sedated     I have reviewed labs and imaging in EPIC "     ----------------------------------------------------  TT: I have personally spent a total of 25 minutes  today on the unit in review of medical record, consultation with the medical providers and assessment of patient today, with more than 50% of this time spent in counseling, coordination of care, and discussion re:diagnostic results, prognosis, symptom management, risks and benefits of management options, and development of plan of care.    Rikki Hall MD  Palliative Medicine   Pager [phone]

## 2021-05-31 NOTE — PROGRESS NOTES
Critical Care Medicine Progress Note  8/4/2019    ASSESSMENT/PLAN:  43 y.o. woman who was admitted on 7/22/2019 with SAH & IVH secondary to ruptured aneurysm of the right ICA, aneurysm coiling 7/22 that was complicated by vasospasm.  Hospitalization course was complicated by right ICA aneurysm dissection with rebleeding that presented with acute onset seizures and unresponsiveness on 7/27.  Patient was intubated on 7/27, underwent a recoiling of the culprit vessel, and placement of lumbar drain for ICP management.  Ongoing issues with ICA/MCA vasospasm.    New events:   - fever persists,  Low grade fever   - remains on NE   - continued vasospasm      Neuro/Psych:   #. Analgesia/Sedation: On propofol and fentanyl, prn versed available mostly for agitation with cares.  #.  Right ICA aneurysm complicated by subarachnoid hemorrhage with IVH, status post dissection and coiling x2 with hydrocephalus requiring lumbar drain placement 7/27/2019.  Ongoing vasospasm complicated the clinical presentation and has had verapamil infusion on multiple days.    Neurosurgery and IR closely following    Cerebral angiography per IR, follow TCD to determine further angiogram    Continue with every 4 hours nimodipine x21 days    Goal -150 by arterial line, currently on NE to maintain    If BP dips, and CVP less than 10, will use albumin but otherwise should be titrating NE    Trying to avoid complications of hypervolemia     Goal sodium 145-150, hypertonic saline infusion per NSG protocol    Goal HCT level 30-32    Goal CVP 10-12    Strict I/O, strict lumbar drain output monitoring (if increased output may be sign of further edema/swelling and consider stat HCT) has been stable    Repeat TCDs to determine need for angiogram    Monitor for volume overload    NSGY d/w family re edilberto hole, but family would like to wait for now and follow radiographically.  #.  Recurrent seizures, likely secondary to acute intracranial process as  detailed above.  Currently on propofol, IV levetiracetam, and IV fosphenytoin.  #.  Moderate Sedation with lower RASS -3 as goal   Due to ongoing issues will pursue deeper RASS goal of -3   fentanyl infusion - titrate to sedation   Prn versed especially with turning/routine cares  #.  Fever - possible central fever contributing.  Elevated protein, PMNs, and WBC, but likely due to blood.  Will change cefepime to ceftriaxone.    Pulmonary: Mechanical ventilation instituted for airway protection in setting of seizure and encephalopathy secondary to SAH.  The patient is on minimal ventilator settings.  Not clinically appropriate for ventilator weaning, and due to ongoing issues no further sedation holidays.  Monitor.  - goal SpO2 > 92%, on 30% FiO2  - no weaning today  - secretions mostly small  - sputum culture 4+ pseudomonas  - Abx per ID section below  - Discussed case with Dr. Ledezma and so family likely to proceed with tracheostomy early this week.     Cardiovascular: goal -150 but will try to aim for higher end of this (note when goal SBP was higher 150-180 then patient had further bleeding). Avoid medications that would cause bradycardia.  - continue NE as needed to maintain SBP goal   - intermittent albumin for low CVP   - CVP to be maintained 10-12    Renal: No acute issues.  Good urine output.  - E.coli UTI (CAUTI)  - potassium/magnesium replacement per protocol  - add KCL to saline infusion    GI: NPO for now.  - om trophic feeding  - PPI    Heme: No acute issues, monitor.  - IVF/Tx to maintain HCT 30-32  - SCDs    Endo: No acute issues.  Continue close monitoring.  - ICU q 4 hour SSI    ID: E. Coli UTI and GPC in clusters in blood stream (1/4 positive, but less than 24 hours) with ongoing fevers, leukocytosis improved  - cefepime (will cover E.coli and has better CNS penetration). Follow up repeat blood cultures  - vancomycin - discontinued after 1 day as CoNS grew from culture, follow up cultures  negative     Access/Lines/Tubes: required and necessary for continued patient cares  ETT, 7.5  PICC, RUE  PIV  Arterial sheath, right femoral artery - removed on 8/3  Sharpe catheter  Lumbar drain  OGT    ICU prophylaxis:   #. VAP ppx: HOB 30 degrees, chlorhexidine rinses  #. Stress ulcer: PPI  #. Diet: NPO, trophic feedings now  #. VTE: SCD, chemical prophylaxis contraindicated due to SAH/bleeding  #. Restraints: required and necessary for continued patient cares    CODE: FULL    Family was updated during multidisciplinary family conference.  Family is awaiting more family however they are agreeing with trach/peg    Dispo: ICU for mechanical ventilation, hemodynamic support, neurological monitoring, remains critically ill with constant threat to life.    Patient is critically ill with total CCT spent 50 minutes thus far today.     Brittni Ruano MD  Pulmonary and Critical Care     =================================================================    Interval history: remains sedated and intubated.  No further seizure activity reported.  Fever again today.  No significant changes overnight.       Vitals: Temp:  [99.2  F (37.3  C)-101.2  F (38.4  C)] 99.2  F (37.3  C)  Heart Rate:  [] 98  Resp:  [9-24] 16  BP: (102-159)/(57-93) 141/79  FiO2 (%):  [30 %-100 %] 30 %  Vent:   Vent Mode: VCV  FiO2 (%):  [30 %-100 %] 30 %  S RR:  [16] 16  S VT:  [375 mL] 375 mL  PEEP/CPAP (cm H2O):  [5 cm H2O] 5 cm H2O  Minute Ventilation (L/min):  [6.3 L/min-7.4 L/min] 6.5 L/min  PIP:  [18 cm H2O-22 cm H2O] 21 cm H2O  MAP (cm H2O):  [7] 7    Physical Exam:   General: obese woman, lying in bed, NAD  HEENT: orally intubated, MMM, PER  CV: RRR, no M/R/G; extremities well perfused, 1+ edema  Pulm: mechanical breath sounds that are course, more diminished on the left, no wheezing, no crackles  Abd: soft, ND, NT, hypoactive bowel sounds  Msk: warm to touch  Derm: no acute lesions or rashes on limited exam  Neuro: sedated, no evidence of  clonus  Psych: sedated    Labs: personally reviewed in EMR.     Imaging: personally reviewed in EMR. Formal Radiology interpretation listed below.  #. HCT, 7/27:   INTRACRANIAL CONTENTS: Beam hardening and streak artifacts related to a large right paraclinoid coil mass as seen previously. This locally degrades evaluation. Small volume subarachnoid hemorrhage within sulci of both cerebral hemispheres. No definite increasing intraventricular hemorrhage layering within the occipital horns of the lateral ventricles favored to reflect redistribution of subarachnoid blood products. No large territorial infarct is identified. Small frontoparietal infarcts seen on the 7/26/2019 MRI are poorly appreciated on CT. Minor periventricular low-attenuation. Mild to moderate lateral and third ventriculomegaly similar to findings on the prior exam.   VISUALIZED ORBITS/SINUSES/MASTOIDS: No significant orbital abnormality. No significant paranasal sinus mucosal disease. No significant middle ear or mastoid effusion.  BONES/SOFT TISSUES: No significant abnormality.    #. HCT: 7/28:   Again demonstrated is endovascular coiling of a giant right supraclinoid aneurysm. This results in extensive streak artifact and compromises a portion of the intracranial compartment. Within this limitation, the amount of intraventricular hemorrhage and   subarachnoid hemorrhage within the basal cisterns and scattered over the convexities appears grossly unchanged. There is stable mild to moderate ventriculomegaly of the lateral and third ventricles. No evidence of interval acute intracranial hemorrhage,   infarct or hydrocephalus.     HCT on 8/3:  IMPRESSION:   CONCLUSION:  1.  No new acute intracranial abnormality.  2.  Continued evolution of right MCA bilateral CATHI territory infarction, more conspicuous on the recent MRI. No significant mass effect or hemorrhagic transformation.  3.  Continued decrease conspicuity of intraventricular subarachnoid blood  products.

## 2021-05-31 NOTE — PLAN OF CARE
Pt remains unchanged, with no neuro resposes except pupils remain equal and reactive.  No movement in extremities.  Lumbar drain with straw to blood tinged fluid.  Family visiting and updated.  Fevers remain despite tylenol and ice.  Blood Cultures, UA,Sputum and CXR complete.

## 2021-05-31 NOTE — PROGRESS NOTES
[facility] Daily Progress Note    Assessment/Plan:  Darion Herrera is a 43-year-old female who presented 7/21/2019 with headache that started in the neck with radiation to the forehead ruptured aneurysm of the right ICA resulting in subarachnoid hemorrhage with extension intraventricular hemorrhage.  She is status post aneurysm coiling on 7/22 which was complicated by vasospasm.  There is also a complication of the right ICA aneurysm with dissection and rebleeding leading to seizure and unresponsiveness on 7/27.  This required intubation and recoiling and placement of lumbar drain on 8/2 for ICP management. She is currently requiring daily intraarterial verapamil infusions w/angiogram for acute vasospasms of the R ICA,  since 8/3. She is currently intubated in ICU. Intensivist, neurology and neurosurgery managing.      Assessment:  Right ICA Aneurysm complicated with SAH, IVH  S/p Dissection and Coiling x 2 with hydrocephalus s/p Lumbar drainage   Daily intraarterial verapamil infusions w/angiogram, currently on hold since 8/3  On nimodipine Q4H for total of 21 days  Maintain Na 145-150, hypertonic saline per NSG protocol  MRI and CT done 8/3, showing new stroke.   NSG and IR managing.     Recurrent Seizures  On  vimpat and keppra.  EEG done 8/3, no new seizure activity.    Metabolic Encephalopathy  Management as per above    E. Coli UTI  On Ceftriaxone    Straph epidermidis bacteremia  Blood cx from 7/30+, subsequent blood cx shows no growth.  On ceftriaxone.     Respiratory failure  Intubated, sedated. On pressors to maintain -160.  Family aggreeable for Trach/Peg, likely to be planned soon.  Sputum cx + for strep pneumoniae    Anemia  Hb dropped from 11.2-->10.7-->9.7-->8-->10.1-->9.1-->9.4  NS wants target HCT to be >30. S/p 1 U PRBC on 8/3,8/5 and will transfuse a unit today.    Fevers likely central.  Monitor.  On vasopressors, ceftriaxone.       DVT ppx: SCD  GI ppx: Protonix    Diet: Tube feeds    Code: Full  code      Subjective:  Patient is intubated, sedated. D/w RN, no acute events overnight. Family in room and updated. Expresses wishes with continuing with Trach/PEG.       Current Medications Reviewed via EHR List    Objective:  Vital signs in last 24 hours:  Temp:  [99.9  F (37.7  C)-102  F (38.9  C)] 102  F (38.9  C)  Heart Rate:  [] 81  Resp:  [0-24] 10  BP: (116-153)/(59-94) 140/80  SpO2:  [91 %-100 %] 97 %  ETCO2 (mmHg):  [30 mmHg-37 mmHg] 33 mmHg  FiO2 (%):  [30 %-100 %] 30 %    Intake/Output last 3 shifts:  I/O last 3 completed shifts:  In: 2965.6 [I.V.:2285.6; NG/GT:220; IV Piggyback:460]  Out: 2856 [Urine:2550; Drains:256; Stool:50]      Physical Exam:  General: Intubated, sedated.  HEENT: Intubated, PERRL+  HEART : S1 S2, normal rate/rhythm  LUNGS: Clear to auscultation, equal air entry  ABDOMEN:   Soft, non distended  CNS Sedated               Lab Results:  Personally Reviewed.   Fingerstick Blood Glucose:   Recent Labs     08/05/19  0026 08/05/19  0628 08/05/19  1131   POCGLUFGR 125 122 122       Recent Results (from the past 24 hour(s))   Sodium lab - every 6 hours    Collection Time: 08/05/19  6:05 PM   Result Value Ref Range    Sodium 142 136 - 145 mmol/L   Potassium    Collection Time: 08/05/19  6:05 PM   Result Value Ref Range    Potassium 3.6 3.5 - 5.0 mmol/L   Crossmatch    Collection Time: 08/06/19 12:04 AM   Result Value Ref Range    Crossmatch Compatible     Blood Expiration Date [id number]     Unit Type O Neg     Unit Number [id number]     Status Transfused     Component Red Blood Cells     PRODUCT CODE U2348T41     Issue Date and Time [id number]     Blood Type 9500     CODING SYSTEM [id number]    Sodium lab - every 6 hours    Collection Time: 08/06/19 12:04 AM   Result Value Ref Range    Sodium 142 136 - 145 mmol/L   Magnesium    Collection Time: 08/06/19  5:35 AM   Result Value Ref Range    Magnesium 1.9 1.8 - 2.6 mg/dL   Comprehensive Metabolic Panel    Collection Time:  08/06/19  5:35 AM   Result Value Ref Range    Sodium 142 136 - 145 mmol/L    Potassium 3.3 (L) 3.5 - 5.0 mmol/L    Chloride 112 (H) 98 - 107 mmol/L    CO2 24 22 - 31 mmol/L    Anion Gap, Calculation 6 5 - 18 mmol/L    Glucose 152 (H) 70 - 125 mg/dL    BUN 10 8 - 22 mg/dL    Creatinine 0.58 (L) 0.60 - 1.10 mg/dL    GFR MDRD Af Amer >60 >60 mL/min/1.73m2    GFR MDRD Non Af Amer >60 >60 mL/min/1.73m2    Bilirubin, Total 0.3 0.0 - 1.0 mg/dL    Calcium 8.4 (L) 8.5 - 10.5 mg/dL    Protein, Total 5.6 (L) 6.0 - 8.0 g/dL    Albumin 2.6 (L) 3.5 - 5.0 g/dL    Alkaline Phosphatase 76 45 - 120 U/L    AST 87 (H) 0 - 40 U/L    ALT 86 (H) 0 - 45 U/L   Triglycerides    Collection Time: 08/06/19  5:35 AM   Result Value Ref Range    Triglycerides 208 (H) <=149 mg/dL    Patient Fasting > 8hrs? No    HM1 (CBC with Diff)    Collection Time: 08/06/19  5:35 AM   Result Value Ref Range    WBC 8.6 4.0 - 11.0 thou/uL    RBC 3.19 (L) 3.80 - 5.40 mill/uL    Hemoglobin 9.4 (L) 12.0 - 16.0 g/dL    Hematocrit 28.9 (L) 35.0 - 47.0 %    MCV 91 80 - 100 fL    MCH 29.5 27.0 - 34.0 pg    MCHC 32.5 32.0 - 36.0 g/dL    RDW 13.6 11.0 - 14.5 %    Platelets 217 140 - 440 thou/uL    MPV 10.8 8.5 - 12.5 fL    Neutrophils % 75 (H) 50 - 70 %    Lymphocytes % 13 (L) 20 - 40 %    Monocytes % 6 2 - 10 %    Eosinophils % 5 0 - 6 %    Basophils % 1 0 - 2 %    Neutrophils Absolute 6.4 2.0 - 7.7 thou/uL    Lymphocytes Absolute 1.1 0.8 - 4.4 thou/uL    Monocytes Absolute 0.5 0.0 - 0.9 thou/uL    Eosinophils Absolute 0.4 0.0 - 0.4 thou/uL    Basophils Absolute 0.1 0.0 - 0.2 thou/uL   Crossmatch    Collection Time: 08/06/19 12:05 PM   Result Value Ref Range    Crossmatch Compatible     Blood Expiration Date [id number]     Unit Type O Pos     Unit Number [id number]     Status Issued     Component Red Blood Cells     PRODUCT CODE N0154K85     Issue Date and Time [id number]     Blood Type 5100     CODING SYSTEM [id number]            Advanced Care Planning  Discharge  plan: Unknown      Total time spent was >35 mins, discussing with RN,  and reviewing the chart    [place]  Date: 8/6/2019    Hospitalist Service      Part of this chart was created using a dictation software. Typographic errors, word substitutions, and Grammatical errors may unintentionally occur.

## 2021-05-31 NOTE — PLAN OF CARE
Problem: Pain  Goal: Patient's pain/discomfort is manageable  8/4/2019 0812 by Tessie Aguero RN  Outcome: Progressing  8/4/2019 0148 by Tessie Aguero RN  Outcome: Progressing      Problem: Daily Care  Goal: Daily care needs are met  8/4/2019 0812 by Tessie Aguero RN  Outcome: Progressing  8/4/2019 0148 by Tessie Aguero RN  Outcome: Progressing     Problem: Potential for Falls  Goal: Patient will remain free of falls  Outcome: Progressing     Problem: Potential for Compromised Skin Integrity  Goal: Skin integrity is maintained or improved  Outcome: Progressing     Problem: Neurological Deficit  Goal: Neurological status is stable or improving  8/4/2019 0812 by Tessie Aguero RN  Outcome: Progressing  8/4/2019 0148 by Tessie Aguero RN  Outcome: Progressing     Problem: Mechanical Ventilation  Goal: Patient will maintain patent airway  8/4/2019 0812 by Tessie Aguero RN  Outcome: Progressing  8/4/2019 0148 by Tessie Aguero RN  Outcome: Progressing  Goal: Oral health is maintained or improved  8/4/2019 0812 by Tessie Aguero RN  Outcome: Progressing  8/4/2019 0148 by Tessie Aguero RN  Outcome: Progressing  Goal: ET tube will be managed safely  8/4/2019 0812 by Tessie Aguero RN  Outcome: Progressing  8/4/2019 0148 by Tessie Aguero RN  Outcome: Progressing

## 2021-05-31 NOTE — PLAN OF CARE
Charting under swapnil dee rn in error. name said william astudillo rn  But reviewing documentation wrong  Name. All charting since 1500 under william astudillo rn

## 2021-05-31 NOTE — PROCEDURES
[facility]:  Post Procedure Note ~    [facility] Post Procedure    Procedure: 1. Cerebral angiogram  2. Intra-arterial verapamil infusion to treat cerebral vasospasm    Radiologist: Ric Snider    Contrast: 30 ml omnipaque  Fluoro Time: 4.9 minutes  Air Kerma: 992 mGy    EBL: minimal  Complications: none    Preliminary findings: (see dictation for full detail)  1. Slight worsening moderate to severe vasospasm right ica  2. Slight worsening moderate right mca vasospasm  3. Overall no major change moderate to severe left viraj vasospasm    Assess/Plan:   Report to Dr. Sal  Keep sheath in place. Follow-up angiogram tomorrow.    Ric Snider

## 2021-05-31 NOTE — PROGRESS NOTES
Patient remains of mechanical ventilation.  Our team will sign off at this point.  Please call if questions.

## 2021-05-31 NOTE — PLAN OF CARE
Problem: Mechanical Ventilation  Goal: Patient will maintain patent airway  Outcome: Progressing     Problem: Mechanical Ventilation  Goal: Tracheostomy will be managed safely  Outcome: Progressing     Problem: Mechanical Ventilation  Goal: Respiratory status - ventilation  Description  Movement of air in and out of the lungs.    Liberate from ventilator  Outcome: Progressing    KAE SanchezT

## 2021-05-31 NOTE — PLAN OF CARE
Care Management Goals of the Day: Follow progression of care, assist with DC planning     Care Progression Reviewed With: Charge RN, MD, HUC, RNCM     Barriers to Discharge: ICU medical cares, weaning trials, lumbar drain, pending shunt 8/15    Discharge Disposition: [place] when stable     Expected Discharge Date: 8/16/19     Transportation: [facility] stretcher    Care Coordination Narrative: Pt continues with weaning trials, currently has lumbar drain, and is pending shunt placement 8/15/19 per neuro surgery. Plan continues to be Anupam Machado to follow. AM rounds discussed DC to [facility] this Friday as possible, but more likely early next week. CM to follow and assist as needed.

## 2021-05-31 NOTE — ANESTHESIA POSTPROCEDURE EVALUATION
Patient: Darion WILKINSON Sharon  INSERTION, SHUNT, VENTRICULOPERITONEAL, USING FRAMELESS STEREOTAXY , INSERTION, CATHETER, PERITONEAL, LAPAROSCOPIC  Anesthesia type: general    Patient location: PACU  Last vitals:   Vitals Value Taken Time   /74 8/19/2019  5:30 PM   Temp 36.4  C (97.6  F) 8/19/2019  4:35 PM   Pulse 74 8/19/2019  5:31 PM   Resp 16 8/19/2019  5:31 PM   SpO2 100 % 8/19/2019  5:31 PM   Vitals shown include unvalidated device data.  Post vital signs: stable  Level of consciousness: awake and responds to simple questions  Post-anesthesia pain: pain controlled  Post-anesthesia nausea and vomiting: no  Pulmonary: unassisted, return to baseline  Cardiovascular: stable and blood pressure at baseline  Hydration: adequate  Anesthetic events: no    QCDR Measures:  ASA# 11 - Nina-op Cardiac Arrest: ASA11B - Patient did NOT experience unanticipated cardiac arrest  ASA# 12 - Nina-op Mortality Rate: ASA12B - Patient did NOT die  ASA# 13 - PACU Re-Intubation Rate: ASA13B - Patient did NOT require a new airway mgmt  ASA# 10 - Composite Anes Safety: ASA10A - No serious adverse event    Additional Notes:

## 2021-05-31 NOTE — PROGRESS NOTES
Vent Mode: VCV  FiO2 (%):  [30 %] 30 %  S RR:  [14-16] 14  S VT:  [375 mL] 375 mL  PEEP/CPAP (cm H2O):  [5 cm H2O] 5 cm H2O  Minute Ventilation (L/min):  [6.3 L/min-9.3 L/min] 7.2 L/min  PIP:  [13 cm H2O-36 cm H2O] 20 cm H2O  MAP (cm H2O):  [6-9] 7     Pt got a tracheostomy tube today in the OR. #8 shiley cuffed. Pt is on the ventilator settings above. Pt was transported to MRI today without complications. ABG done x1. RT following.    Brittney Hensley, LRT

## 2021-05-31 NOTE — PLAN OF CARE
Problem: Pain  Goal: Patient's pain/discomfort is manageable  Outcome: Progressing     Problem: Safety  Goal: Patient will be injury free during hospitalization  Outcome: Progressing     Problem: Daily Care  Goal: Daily care needs are met  Outcome: Progressing     Problem: Psychosocial Needs  Goal: Demonstrates ability to cope with hospitalization/illness  Outcome: Progressing  Goal: Collaborate with patient/family/caregiver to identify patient specific goals for this hospitalization  Outcome: Progressing     Problem: Discharge Barriers  Goal: Patient's discharge needs are met  Outcome: Progressing     Problem: Knowledge Deficit  Goal: Patient/family/caregiver demonstrates understanding of disease process, treatment plan, medications, and discharge instructions  Outcome: Progressing     Problem: Potential for Falls  Goal: Patient will remain free of falls  Outcome: Progressing     Problem: Potential for Compromised Skin Integrity  Goal: Skin integrity is maintained or improved  Outcome: Progressing  Goal: Nutritional status is improving  Outcome: Progressing     Problem: Urinary Incontinence  Goal: Perineal skin integrity is maintained or improved  Outcome: Progressing     Problem: Neurological Deficit  Goal: Neurological status is stable or improving  Outcome: Progressing     Problem: Risk of Injury Due to Unsafe Behavior  Goal: Patient will remain safe while in restraint; physical/psychological needs will be met  Outcome: Progressing  Goal: Alternatives to restraint will be continually assessed with use of least restrictive device and discontinuation as soon as possible  Outcome: Progressing  Goal: Patient/Family will be able to communicate reason for restraint and steps for restraint application and removal  Outcome: Progressing     Problem: Mechanical Ventilation  Goal: Patient will maintain patent airway  Outcome: Progressing  Goal: Oral health is maintained or improved  Outcome: Progressing     Problem:  Potential for transmission of organism by Contact, Enteric, Droplet and/or Airborne routes  Goal: Prevent transmission of organisms  Outcome: Progressing     Pt trached and sedated at this time. At beginning of shift pt did not respond to painful stimuli, Fentanyl and Precedex turned off at this time. Pt started to breathe in the 30's around 0000, Precedex restarted @ this time @ 0.5. Pt tolerating dose. At 0400 pt responded to painful stimuli in all 4 extremities and squeezed right hand and moved right foot. Pt not moving left arm or leg unless in a coughing fit. Pt had a temp of 101.0 during shift, ice packs and Tylenol given with temp decreasing to 98.5. Peg tube in place and open to gravity. Sharpe intact with adequate output, pt's urine turned pink @ 0400, provider aware. Dignishield  intact with liquid output. Lumbar drain in place and intact. Pt on 3% @ 25, Levo @ 0.06, Precedex @ 0.5 and TKO @ 10. Pt turned q2h's during shift with no new skin issues noted. Will continue to monitor.    Monse Hall RN

## 2021-05-31 NOTE — PLAN OF CARE
Care Management Goals of the Day: Follow progression of care      Care Progression Reviewed With: Charge RN, MD, HUC, RNCM     Barriers to Discharge: ICU medical cares, fam care conference to discuss ongoing plan    Discharge Disposition: TBD     Expected Discharge Date: TBD     Transportation: likely HE stretcher    Care Coordination Narrative: Pt remains intubated, febrile and sedated. Family cre conference scheduled for tomorrow at 1030am, providers,  and family aware and all plan to attend. CM to follow.

## 2021-05-31 NOTE — PLAN OF CARE
Problem: Mechanical Ventilation  Goal: Patient will maintain patent airway  Outcome: Progressing  Goal: Oral health is maintained or improved  Outcome: Progressing  Goal: ET tube will be managed safely  Outcome: Progressing     JERRY Lewis

## 2021-05-31 NOTE — PLAN OF CARE
Problem: Mechanical Ventilation  Goal: Patient will maintain patent airway  Outcome: Progressing  Goal: Oral health is maintained or improved  Outcome: Progressing  Goal: Tracheostomy will be managed safely  Outcome: Progressing  Goal: Respiratory status - ventilation  Description  Movement of air in and out of the lungs.    Liberate from ventilator  Outcome: Progressing     Weaning on hold at this time. Plan to go to IR on 8/13, no weaning until after if indicated.

## 2021-05-31 NOTE — TREATMENT PLAN
Hospitalist/Chart check   See Dr Castro's note   Multiple complex problems related to SAH  On Vent/trached and pegged  Very slow progress #20 day of ICU hospitalization  Will continue to follow   Management per ICU team and neurosurgery/IR

## 2021-05-31 NOTE — PROGRESS NOTES
RESPIRATORY CARE NOTE      Vent Mode: VCV  FiO2 (%):  [30 %-100 %] 30 %  S RR:  [16] 16  S VT:  [375 mL] 375 mL  PEEP/CPAP (cm H2O):  [5 cm H2O] 5 cm H2O  Minute Ventilation (L/min):  [6.3 L/min-6.9 L/min] 6.9 L/min  PIP:  [18 cm H2O-22 cm H2O] 18 cm H2O  MAP (cm H2O):  [7] 7    Scheduled nebs and vent check completed, no other events. RT following.     Alfredo Barber, KAET

## 2021-05-31 NOTE — PROCEDURES
[facility] Post Procedure    Procedure: Cerebral angiogram    Radiologist: Kameron Aguero    Contrast: 40 mL Omnipaque; 20 ml Visipaque  Fluoro Time: 1.9 minutes  Air Kerma: 887 mGy     Medications: fentanyl and Precedex gtts     Sedation Time: continuous     EBL: minimal  Complications: none     Preliminary findings: (see dictation for full detail)  6-7 mm recurrence at the neck of previously coiled right ICA aneurysm.     Assess/Plan:  Routine post procedure monitoring    Bedrest for 4 hours after sheath removed and groin hemostasis obtained  Plan to repeat cerebral angiogram next Tuesday  Report to CC and Neurosurgery      Kameron Aguero     vital signs/nurses notes

## 2021-05-31 NOTE — PROGRESS NOTES
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   Verapamil administered IA   7/31/19: Cerebral angiogram     Intra-arterial verapamil infusion into both internal carotid arteries   Fever, PNA-antibiotics started  8/01/19: Cerebral angiogram   Intra-arterial verapamil infusion into both internal carotid arteries in order to treat cerebral vasospasm  8/02/19: Cerebral angiogram   Intra-arterial verapamil infusion into both internal carotid arteries in order to treat cerebral vasospasm   see by Palliative care-family decided to proceed with below surgery   Trach and peg placed in OR by Dr Ledezma   LD replaced   8/05/19: UTI  8/08/19: LD replaced    Cerebral qgssxynbu-2-7 mm recurrence at the neck of previously coiled right ICA aneurysm  8/11/19: Phase 1 vent weaning started    Off  "norepi  8/12/19: head CT stable   LD clamped    Major events over the last 24 hours: off pressors  Head CT stable this am    Subjective: in bed trach dome on, follows me with her eyes  ROS: unable to obtain    Drips: precedex    Ventilator: Mechanical Ventilation: intubated 7/27/19   trached 8/2/19        Settings: presently on TC    /77   Pulse 62   Temp 99.7  F (37.6  C) (Oral)   Resp 19   Ht 4' 10\" (1.473 m)   Wt 162 lb 6.4 oz (73.7 kg)   LMP 07/01/2016   SpO2 96%   BMI 33.94 kg/m    CVP:  [0 mmHg-15 mmHg] 11 mmHg  I/O last 3 completed shifts:  In: 1289.7 [I.V.:909.7; NG/GT:180; IV Piggyback:200]  Out: 5059 [Urine:4775; Drains:144; Stool:140]  Weight change: -2 lb 4.8 oz (-1.043 kg)  Vent Mode: VCV  FiO2 (%):  [21 %] 21 %  S RR:  [14] 14  S VT:  [375 mL] 375 mL  PEEP/CPAP (cm H2O):  [5 cm H2O] 5 cm H2O  Minute Ventilation (L/min):  [6.1 L/min-9.8 L/min] 7.2 L/min  PIP:  [11 cm H2O-18 cm H2O] 18 cm H2O  AL SUP:  [5 cm H20] 5 cm H20  MAP (cm H2O):  [6-8] 7      EXAM:   GEN: awake, on TC Tracks and nods appropriately to questioning  HEENT: PERRL, EOMS, OP patent, # 8 Shiley trachea midline  PULM: unlabored respirations, moving air adequately on PS 5/5  CV: RRR, S1, S2, no murmurs, rubs, gallops, bilateral symmetric pulse  ABD: soft nontender, non distended, normal active bowel sound, no HSM, PEG site nonerythematous  EXT : warm well perfused, no cyanosis, clubbing, trace edema  NEURO: PERRL, more alert and responding to yes/no questions and commands, moving right arm and right leg with more strength, no movement on left but withdraws to pain on left  SKIN: no obvious rash, lesion    Labs Personally reviewed: yes  Results from last 7 days   Lab Units 08/12/19  0513 08/11/19  0426 08/10/19  0418   LN-WHITE BLOOD CELL COUNT thou/uL 7.5 7.5 9.8   LN-HEMOGLOBIN g/dL 11.4* 10.5* 11.2*   LN-HEMATOCRIT % 34.6* 31.8* 33.6*   LN-PLATELET COUNT thou/uL 354 307 349   LN-NEUTROPHILS RELATIVE PERCENT % 77* 76* 75* "   LN-MONOCYTES RELATIVE PERCENT % 5 6 6     Results from last 7 days   Lab Units 08/12/19  0513 08/11/19  1707 08/11/19  0426 08/10/19  0418   LN-SODIUM mmol/L 138  --  138 139   LN-POTASSIUM mmol/L 3.6 3.9 3.5 3.7   LN-CHLORIDE mmol/L 106  --  108* 109*   LN-CO2 mmol/L 25  --  23 23   LN-BLOOD UREA NITROGEN mg/dL 9  --  9 9   LN-CREATININE mg/dL 0.54*  --  0.53* 0.51*   LN-CALCIUM mg/dL 9.8  --  9.0 8.7   LN-PROTEIN TOTAL g/dL 6.6  --  6.1 6.2   LN-BILIRUBIN TOTAL mg/dL 0.4  --  0.3 0.3   LN-ALKALINE PHOSPHATASE U/L 100  --  102 105   LN-ALT (SGPT) U/L 121*  --  132* 132*   LN-AST (SGOT) U/L 49*  --  61* 57*           Last VBG 8/11/19:  PH 7.47  PCO2 34    Microbiology: reviewed   Imaging (all imaging is personally reviewed): yes    8/5/19: Appliances remain in stable and satisfactory position. Some new atelectasis in the right lower lobe. New Infiltrate or atelectasis left lower lobe behind the heart    Impression:  1. Aneurysmal SAH  2. MARIA L supraclinoid aneurysm    s/p endovascular coil embolization  3. Vasospasm  4. Recurrent Seizures   likely secondary to acute intracranial process   5. RMCA stroke related to vasospasm.   6. Acute hypoxic respiratory failure due to # 1-5  7. Anxiety  8. UTI   Ecoli  9. Fever       PLAN:   1. Start phase 2 vent weaning, hopefully will be able to tolerate TC during day, but put back on vent at [facility]  No PMV trials at this time  2. Neurosurgery to clamp Lumbar drain today  3. Get up in chair-ok'ed ny neurosurgery  4. CXR  5. AED  6. Nimodipine X 21 days  7. On HHH therapy; hydration and -160  8. Changed Cefepime to ceftriaxone on 8/4, procalcitonin low; completed antibiotics on 8/10  9. remove trach flange sutures 8/12, and consider downsizing-may wait until at [place]     ICU DAILY CHECKLIST                           Can patient transfer out of ICU? no    FAST HUG:    Feeding:  Feeding: Yes.  Patient is receiving NPO and TUBE FEEDS    Sharpe:Yes  Analgesia/Sedation:Yes  precedex  Thromboembolic prophylaxis: yes; Mode:  SCDs  HOB>30:  Yes  Stress Ulcer Protocol Active: yes; Mode: PPI  Glycemic Control: Any glucose > 180 no; Mode of Insulin Therapy: Sliding Scale Insulin    INTUBATED:  Can patient have daily waking:  yes  Can patient have spontaneous breathing trial:  Yes  On Phase 2 now    Restraints? no    PHYSICAL THERAPY AND MOBILITY:  Can patient have PT and mobility trial: yes  Activity: OOB in Chair    transfer/discharge plans: will need [facility] once LD out and ok with neurosurgery service    The patient is critically ill with impairment in organ system and high risk of life threatening deterioration.     Total CCT spent 45 minutes thus far today.       Rosy RHOADES, [phone]  [facility]

## 2021-05-31 NOTE — PROGRESS NOTES
"RESPIRATORY CARE NOTE      Vent Mode: VCV  FiO2 (%):  [30 %] 30 %  S RR:  [16] 16  S VT:  [375 mL] 375 mL  PEEP/CPAP (cm H2O):  [5 cm H2O] 5 cm H2O  Minute Ventilation (L/min):  [6.1 L/min-8.4 L/min] 8.4 L/min  PIP:  [19 cm H2O-28 cm H2O] 24 cm H2O  MAP (cm H2O):  [7-12] 7    /66   Pulse 86   Temp (!) 100.7  F (38.2  C) (Oral)   Resp 20   Ht 4' 10\" (1.473 m)   Wt 164 lb 8 oz (74.6 kg)   LMP 07/01/2016   SpO2 96%   BMI 34.38 kg/m    Vent day #11 Pt remains on the vent with settings above. No changes made to settings.  7.5 ETT 21cm at the teeth. BS coarse, Sx scant amount of thick clear white secretions.  Will continue to assess for wean. Plan for trach and peg. RT following.     KAE LewisT      "

## 2021-05-31 NOTE — PROGRESS NOTES
Video EEG was re-started for another 24 hrs. The patient is wearing MRI/CT safe leads but 2 ecg snap leads will need to be removed from the chest before MRI. This monitoring includes continuous video and audio recording.      EEG System [id number] was used for this recording.

## 2021-05-31 NOTE — OP NOTE
OPERATIVE REPORT    Darion Herrera   [facility]  Medical Record #:  [id number]  YOB: 1975  Age:  43 y.o.    PROCEDURE DATE:  8/7/2019    PREOPERATIVE DIAGNOSIS: Respiratory failure in need of tracheostomy tube and malnutrition in need of enteric feeding    POSTOPERATIVE DIAGNOSIS: Same    PROCEDURE: 1.  Placement of 8 Kittitian low pressure Shiley tracheostomy tube 2.  Placement of percutaneous endoscopic gastrostomy tube with upper GI endoscopy    OPERATING SURGEON:  Fer Ledezma    ASSISTANT: Technician    ANESTHESIA: General    DESCRIPTION OF PROCEDURE: With the patient in supine position and general anesthesia having been brought in operating room from the intensive care unit first the abdomen is prepped in usual fashion and a diagnostic endoscope introduced into the esophagus and esophagus stomach and duodenum were found to be normal.  The stomach is insufflated with air and using Seldinger technique the needle was placed through the abdominal wall and under direct visualization in the stomach and a guidewire introduced and grasped with a snare forceps and brought through the oropharynx.  The gastrostomy tube was then threaded in retrograde Seldinger technique approximating stomach to posterior abdominal wall under direct visualization.  The tube was secured in this portion procedure terminated.  Reposition he is then undertaken in the neck extended and prepped and draped in usual sterile fashion suprasternal collar incision is made midline fascial structures divided thyroid isthmus divided and oversewn with 3-0 silk suture.  Second tracheal ring identified and isolated and portion of it is resected and 8 low-pressure cuff interchangeable Shiley tube was introduced without difficulty.  Good ventilation is obtained.  The tracheostomy is secured with 0 silk sutures.  This may blood loss is minimal there were no complications and the patient tolerated the procedure well.  Sponge and counts are  correct x2.    EBL:  * No blood loss documented between In Room and Out of Room log events - 8/7/2019 12:31 PM to 8/7/2019  1:57 PM *    SPECIMENS:  [unfilled]    Fer jhaveri md  [facility]

## 2021-05-31 NOTE — PROGRESS NOTES
"Clinical Nutrition Therapy Reassessment Note     Reason for Assessment: Darion Herrera is a 43 y.o. female assessed by the dietitian for nutrition support.    Subjective: Pt in OR for  shunt placement, likely d/c to [facility] tomorrow.    Current Nutrition Prescription:   Diet: NPO  Supplements and Modulars: Benefiber- 1 pkt two times a day   IV dextrose or Fluids:     sodium chloride 0.9% 75 mL/hr (08/19/19 1200)       Current Nutrition Intake:  Enteral nutrition access is a percutaneous gastric tube, with a placement date of 8/7/19. Pt NPO for surgery.    Total nutrient intake from all sources does not meet estimated nutritional needs.    Anthropometrics:  Height: 4' 10\" (1.473 m)  Admission weight: 155 lb  Most recent weight: 141 lb 1.6 oz (64 kg)  BMI:Body mass index is 29.49 kg/m .  BMI indication: 25-29.9 overweight  Ideal body weight: 95 lb +/-10%  Weight History:   Wt Readings from Last 10 Encounters:   08/19/19 141 lb 1.6 oz (64 kg)   07/21/19 157 lb (71.2 kg)   08/01/17 140 lb (63.5 kg)   07/18/16 148 lb (67.1 kg)     GI Status/Output:   GI symptoms include:WDL per nurse  LBM?  No elevated gastric residuals noted    Skin/Wound:  Clifford score: 15    Medications:  Medications reviewed.  Noted: lipitor, K+ protocol    Labs:  Results from last 7 days   Lab Units 08/19/19  0525   LN-SODIUM mmol/L 138   LN-POTASSIUM mmol/L 3.8   LN-CHLORIDE mmol/L 102   LN-CO2 mmol/L 27   LN-BLOOD UREA NITROGEN mg/dL 13   LN-CREATININE mg/dL 0.60   LN-CALCIUM mg/dL 10.2     Results from last 7 days   Lab Units 08/19/19  0525   LN-ALBUMIN g/dL 4.3   LN-PROTEIN TOTAL g/dL 8.0   LN-BILIRUBIN TOTAL mg/dL 0.5   LN-ALKALINE PHOSPHATASE U/L 108   LN-ALT (SGPT) U/L 39   LN-AST (SGOT) U/L 34         Results from last 7 days   Lab Units 08/19/19  0525   LN-MAGNESIUM mg/dL 1.9         Lab Results   Component Value Date    POCGLUFGR 103 08/13/2019    POCGLUFGR 141 (H) 08/10/2019    POCGLUFGR 148 (H) 08/09/2019    POCGLUFGR 155 (H) 08/09/2019    " POCGLUFGR 151 (H) 08/08/2019    POCGLUFGR 145 (H) 08/08/2019    POCGLUFGR 123 08/08/2019    POCGLUFGR 104 08/07/2019    POCGLUFGR 126 08/07/2019    POCGLUFGR 150 (H) 08/07/2019      Estimated Nutrition Needs:  Using ideal weight of 43 kg.     Energy Needs: 4753-5572 kcals daily per 25-30 kcal/kg   Protein Needs: 52+ g daily, 1.2+ g/kg.  Fluid Needs: 1100+ mls daily, 25+ mls/kg    Malnutrition Assessment:  Not noted    Nutrition Risk Level: moderate risk    Nutrition Diagnosis:    Goals: Tolerate TF-met  Adequate hydration    Intervention:  Resume TF when able following surgery    Monitoring:  TF tolerance, wt, labs, hydration needs.    See Care Plan for further Problems, Goals, and Interventions    Electronically signed by:  Mariam Perez RD

## 2021-05-31 NOTE — PROGRESS NOTES
Neurosurgery Progress Note:    S:  S/p endovascular treatment of vasospasm with verapamil yesterday  SA drain replaced.     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 S/p coiling. Complicated hospital course with re-bleed s/p re-coil embolization and daily cerebral angiograms for severe vasospasm with verapamil last on 8/2. Last CT on 8/2 showed no evidence of large territory stroke. Exam poor no movement with noxious stim x 4 with sedation held for 20-30 minutes. Prelim TCDs showed again severe spasm in b/l MCAs, final report pending. Discussed ongoing severe spasm with IR. Will obtain MRI with DWI sequence only to evaluate for stroke given exam. Exam likely multifactorial given history of recent  seizures, E coli UTI etc.  Pending review of MRI, possible intrathecal milirone. Will re-discuss final plan with  once MRI obtained. Continue HHH. Nimodipine, -150, Na 140-150, HCT >30, CVP 10-12. No seizures on most recent EEG. Neurology following and managing AEDS, appreciate assistance. Appreciate ICU assistance for medical management.       Gale Hoffmann MD     MRI DWI reviewed with patients  and children. Discussed there is a large R MCA stroke. There is signal hyperintensity without definitive stroke in right caudate as well as b/l CATHI distributions. Unclear if strokes acute or early subacute. Discussed next steps with the family. Her exam currently is a GCS 3T. Discussed option of a ICP monitor given large  territory strokes and risk for edema given unclear if strokes acute or subacute. Patients family has declined to have that placed at this time. Discussed monitoring with serial imaging.  We will obtain a CT this evening. Rest of management as above.     Gale Hoffmann MD

## 2021-05-31 NOTE — PROGRESS NOTES
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  Neurology on board     Acute metabolic exploratory  ; Patient currently intubated     Straph epidermidis bacteremia  Blood cx from 7/30+, subsequent blood cx shows no growth.  On ceftriaxone.   ; Management as per intensivist     ?  Acute respiratory failure  ; Likely secondary for airway protection in view of seizure and encephalopathy  ; management as per intensivist  ; Undergoing trach cares to me today    Anemia  ; Neurosurgery once hematocrit more than 30  ; Received few blood transfusions    Current management per intensivist and neurosurgery  ; Chart reviewed  ; No role for hospitalist at present    This note was dictated using voice recognition software. Any grammatical or context distortions are unintentional and inherent to the software.  Subjective:   Patient intubated on mechanical ventilation  Patient's daughter at bedside;    Updates from nursing staff: States patient is planned for PEG and trach today    Brief History: 43-year-old female who presented 7/21/2019 with headache that started in the neck with radiation to the forehead ruptured aneurysm of the right ICA resulting in subarachnoid hemorrhage with extension intraventricular hemorrhage.  She is status post aneurysm coiling on 7/22 which was complicated by vasospasm.  There is also a complication of the right ICA aneurysm with dissection and rebleeding leading to seizure and unresponsiveness on 7/27.  This required intubation and recoiling and placement of lumbar drain on 8/2 for ICP management. She is currently requiring daily  "intraarterial verapamil infusions w/angiogram for acute vasospasms of the R ICA,  since 8/3. She is currently intubated in ICU. Intensivist, neurology and neurosurgery managing.    Chart reviewed, events noted. Pt seen and examined.     Objective     Vital signs in last 24 hours:  Temp:  [100  F (37.8  C)-102.2  F (39  C)] 100.5  F (38.1  C)  Heart Rate:  [] 85  Resp:  [0-26] 2  BP: (101-155)/(61-95) 118/72  FiO2 (%):  [30 %-100 %] 30 %  Weight:   168 lb 8 oz (76.4 kg)  Weight change: 4 lb (1.814 kg)  Body mass index is 35.22 kg/m .    Intake/Output last 3 shifts:  I/O last 3 completed shifts:  In: 2640.1 [I.V.:2060.1; Blood:300; NG/GT:30; IV Piggyback:250]  Out: 4579 [Urine:3325; Drains:254; Stool:1000]  Intake/Output this shift:  I/O this shift:  In: 1013 [I.V.:671; IV Piggyback:342]  Out: 521 [Urine:450; Drains:71]      Physical Exam:  /72   Pulse 85   Temp 100.5  F (38.1  C)   Resp (!) 2   Ht 4' 10\" (1.473 m)   Wt 168 lb 8 oz (76.4 kg)   LMP 07/01/2016   SpO2 95%   BMI 35.22 kg/m      FiO2 (%): 30 % O2 Device: Ambu O2 Flow Rate (L/min): 15 L/min    General Appearance:   Patient on mechanical ventilation   HEENT:    Normocephalic. Pupils equal and reactive. No scleral   Icterus. Moist oral mucosa    Lungs:     Clear to auscultation bilaterally, respirations unlabored   Heart::    Regular rate and rhythm, S1 and S2 normal, no murmur, rub   or gallop. No peripheral edema.   Abdomen:   Soft,  Non distended. Bowel sounds present.    Extremity :   No deformity   Pulses:   2+ and symmetric all extremities   CNS:  Intubated       Imaging:  personally reviewed.      Scheduled Meds:    [MAR Hold] albumin human  12.5 g Intravenous Once     [MAR Hold] atorvastatin  20 mg Enteral Tube DAILY     [MAR Hold] cefTRIAXone  2 g Intravenous Q24H     [MAR Hold] chlorhexidine  15 mL Topical Q12H     [MAR Hold] lacosamide (VIMPAT) IVPB  100 mg Intravenous Q12H 09-21     [MAR Hold] levETIRAcetam (KEPPRA) IVPB  1,000 " mg Intravenous Q12H     [MAR Hold] magnesium sulfate IVPB  4 g Intravenous Once     [MAR Hold] niMODipine  60 mg Enteral Tube Q4H     [MAR Hold] omeprazole  20 mg Enteral Tube QAM AC    Or     [MAR Hold] pantoprazole  40 mg Intravenous QAM AC     [MAR Hold] potassium chloride liquid/packet  20 mEq Enteral Tube BID     potassium chloride liquid/packet  40 mEq Oral Q4H     [MAR Hold] sodium chloride  10-30 mL Intravenous Q8H FIXED TIMES     Continuous Infusions:    [MAR Hold] dexmedetomidine 400 mcg/100 mL in NS (PRECEDEX) (4mcg/mL) 0.5 mcg/kg/hr (08/07/19 1143)     [MAR Hold] fentaNYL 2500 mcg/250 mL in NS (10 mcg/mL) 75 mcg/hr (08/07/19 1143)     [MAR Hold] niCARdipine       [MAR Hold] norepinephrine Stopped (08/07/19 0810)     [MAR Hold] sodium chloride/acetate 3% 25 mL/hr (08/07/19 1200)     PRN Meds:.[MAR Hold] acetaminophen, [MAR Hold] bisacodyl, [MAR Hold] HYDROmorphone, [MAR Hold] lipase-protease-amylase **AND** [MAR Hold] sodium bicarbonate, [MAR Hold] midazolam, [MAR Hold] naloxone **OR** [MAR Hold] naloxone, [MAR Hold] ondansetron, [MAR Hold] polyethylene glycol, [MAR Hold] senna **OR** [MAR Hold] senna (SENOKOT) syrup, [MAR Hold] sodium chloride bacteriostatic, [MAR Hold] sodium chloride, [MAR Hold] sodium chloride, [MAR Hold] sodium chloride, [MAR Hold] white barbra-mineral oil (LUBRIFRESH PM) ophthalmic ointment    Lab Results:  personally reviewed.   not applicable  Recent Results (from the past 24 hour(s))   Sodium lab - every 6 hours    Collection Time: 08/06/19  4:25 PM   Result Value Ref Range    Sodium 145 136 - 145 mmol/L   Potassium    Collection Time: 08/06/19  4:25 PM   Result Value Ref Range    Potassium 3.8 3.5 - 5.0 mmol/L   Hematocrit    Collection Time: 08/06/19  6:52 PM   Result Value Ref Range    Hematocrit 33.4 (L) 35.0 - 47.0 %   Crossmatch    Collection Time: 08/07/19 12:07 AM   Result Value Ref Range    Crossmatch Compatible     Blood Expiration Date [id number]     Unit Type O Pos      Unit Number [id number]     Status Transfused     Component Red Blood Cells     PRODUCT CODE R1536F99     Issue Date and Time [id number]     Blood Type 5100     CODING SYSTEM [id number]    POCT Glucose    Collection Time: 08/07/19 12:38 AM   Result Value Ref Range    Glucose 150 (H) 70 - 139 mg/dL   Sodium lab - every 6 hours    Collection Time: 08/07/19  1:54 AM   Result Value Ref Range    Sodium 144 136 - 145 mmol/L   Sodium lab - every 6 hours    Collection Time: 08/07/19  5:37 AM   Result Value Ref Range    Sodium 142 136 - 145 mmol/L   Magnesium    Collection Time: 08/07/19  5:37 AM   Result Value Ref Range    Magnesium 1.6 (L) 1.8 - 2.6 mg/dL   Comprehensive Metabolic Panel    Collection Time: 08/07/19  5:37 AM   Result Value Ref Range    Sodium 142 136 - 145 mmol/L    Potassium 3.1 (L) 3.5 - 5.0 mmol/L    Chloride 116 (H) 98 - 107 mmol/L    CO2 21 (L) 22 - 31 mmol/L    Anion Gap, Calculation 5 5 - 18 mmol/L    Glucose 122 70 - 125 mg/dL    BUN 8 8 - 22 mg/dL    Creatinine 0.43 (L) 0.60 - 1.10 mg/dL    GFR MDRD Af Amer >60 >60 mL/min/1.73m2    GFR MDRD Non Af Amer >60 >60 mL/min/1.73m2    Bilirubin, Total 0.3 0.0 - 1.0 mg/dL    Calcium 7.0 (L) 8.5 - 10.5 mg/dL    Protein, Total 4.9 (L) 6.0 - 8.0 g/dL    Albumin 2.3 (L) 3.5 - 5.0 g/dL    Alkaline Phosphatase 81 45 - 120 U/L     (H) 0 - 40 U/L     (H) 0 - 45 U/L   Phosphorus In AM if not already ordered    Collection Time: 08/07/19  5:37 AM   Result Value Ref Range    Phosphorus 3.1 2.5 - 4.5 mg/dL   HM1 (CBC with Diff)    Collection Time: 08/07/19  5:37 AM   Result Value Ref Range    WBC 10.3 4.0 - 11.0 thou/uL    RBC 3.42 (L) 3.80 - 5.40 mill/uL    Hemoglobin 10.2 (L) 12.0 - 16.0 g/dL    Hematocrit 30.1 (L) 35.0 - 47.0 %    MCV 88 80 - 100 fL    MCH 29.8 27.0 - 34.0 pg    MCHC 33.9 32.0 - 36.0 g/dL    RDW 14.2 11.0 - 14.5 %    Platelets 191 140 - 440 thou/uL    MPV 10.8 8.5 - 12.5 fL    Neutrophils % 79 (H) 50 - 70 %    Lymphocytes % 10  (L) 20 - 40 %    Monocytes % 6 2 - 10 %    Eosinophils % 5 0 - 6 %    Basophils % 1 0 - 2 %    Neutrophils Absolute 8.1 (H) 2.0 - 7.7 thou/uL    Lymphocytes Absolute 1.0 0.8 - 4.4 thou/uL    Monocytes Absolute 0.6 0.0 - 0.9 thou/uL    Eosinophils Absolute 0.5 (H) 0.0 - 0.4 thou/uL    Basophils Absolute 0.1 0.0 - 0.2 thou/uL   Manual Differential    Collection Time: 08/07/19  5:37 AM   Result Value Ref Range    Platelet Estimate Normal Normal    Ovalocytes 1+ (!) Negative    Polychromasia 1+ (!) Negative   POCT Glucose    Collection Time: 08/07/19  5:51 AM   Result Value Ref Range    Glucose 126 70 - 139 mg/dL   POCT Glucose    Collection Time: 08/07/19 11:59 AM   Result Value Ref Range    Glucose 104 70 - 139 mg/dL     Results from last 7 days   Lab Units 08/07/19  1159 08/07/19  0551 08/07/19  0038 08/05/19  1131 08/05/19  0628 08/05/19  0026 08/04/19  0545 08/04/19  0033 08/03/19  0542 08/03/19  0113   LN-POC GLUCOSE FINGERSTICK- HE mg/dL 104 126 150* 122 122 125 163* 119 125 147*           Advance Care Planning:   Barriers to discharge: Multiple issue  Anticipated discharge day: Unclear  Disposition: Unclear, ?  [facility]      Rain Williamson MD  [facility]  Hospitalist

## 2021-05-31 NOTE — PROGRESS NOTES
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   RMCA stroke related to vasospasm.       Initial head CT showed MARIA L giant aneurysm (sentinel headache)    S/p endovascular coil embolization    MRI/CT now shows large hypodensity/evolving ischemic stroke (from vasospasm)    TCD from today shows RMCA elevated velocities, mild improvement.     Continue Daily TCD.    MRA shows mild to moderate vasospasm.     On HHH therapy; hydration and -180    Lumbar drain    Nimodipine X 21 days    Keppra 1000 mg two times a day - Level therapeutic.     On Vimpat 100 mg two times a day. Question of LA - interval prolonging. Can switch to dilantin in 1-2 days as the vasospasm improves.     Monitor sodium. On 3% protocol.    Palliative care involved. Trach and PEG today.     Thank you again for this referral, please feel free to contact me if you have any questions.     CHIEF COMPLAINT Aneurysmal subarachnoid hemorrhage (H)     HISTORY OF PRESENT ILLNESS     We have been requested by Dr. Kwan to evaluate Darion Herrera who is a 43 y.o.  female for SAH.    This a 43-year-old female on whom neurology is been consulted to evaluate for subarachnoid hemorrhage.  Patient initially presented with 6 days ago with a headache.  She was trying to lift some fruits and felt something blowing up in her head.  She continued to have the headache.  She left the staff she was lifting on the table.  She was then brought to the emergency room where imaging showed a large frontal mass.  This was later on MRI identified to be a giant aneurysm.  Patient underwent angiogram and endovascular coil embolization of the aneurysm.  The  patient had a repeat angiogram.  MRI done today showed multiple foci of acute/subacute infarction in the right parietal-occipital region.  There is also small area of infarction in the right frontal lobe.  TCD's show vasospasm in the right MCA.  There was some narrowing of the M1 and M2 segments.  Neurology was consulted to further help with further cares.  Patient denies any ongoing symptoms he does complain of some headache.     7/29  Patient had 2 seizures over the weekend. Was started on fosphenytoin in addition to Keppra.   No seizures today.  Intubated, but waking up as the sedation has been withdrawn.     7/30  No new seizures. Patient remains on sedation and intubated. RMCA velocities remain elevated on the TCD.     7/31  No new seizures. Patient being kept sedation. Angiogram still shows vasospasm. Patient getting IA verapamil.     8/1  No seizures. Patient continues to remain on sedation. Angiogram continues to show vasospasm. Getting IA verapamil. Family conference tomorrow.     8/2  Patient remains on sedation. Angiogram shows stable vasospasm. Some twitching of right shoulder per nursing staff.     8/5  Weekend events - patient was found to have a RMCA evolving infarct over the weekend.   Family wanting to proceed with Trach and PEG.     8/6  TCD still shows vasospasm. Patient is being kept sedated till vasospasm resolves. Family interested in Trach and PEG.     8/7  MRI/MRA brain today to assess for vasospasm. Trach and PEG today.      PAST MEDICAL & SURGICAL HISTORY     Medical History  Patient Active Problem List    Diagnosis Date Noted     Pneumonia due to group B Streptococcus, unspecified laterality, unspecified part of lung (H)      Vasospasm of cerebral artery      E-coli UTI      Gram-positive bacteremia      Fever in other diseases      Acute respiratory failure, unspecified whether with hypoxia or hypercapnia (H)      Cerebrovascular accident (CVA) due to occlusion of right middle cerebral  artery (H)      Vasospasm (H)      S/P coil embolization of cerebral aneurysm 07/23/2019     Aneurysm of right internal carotid artery 07/23/2019     Carotid artery aneurysm (H) 07/22/2019     Nausea vomiting and diarrhea 07/22/2019     Aneurysmal subarachnoid hemorrhage (H) 07/22/2019     Intraventricular hemorrhage, nontraumatic (H) 07/22/2019     CVA (cerebral vascular accident) (H) 07/22/2019     Compression of brain (H)      Leiomyoma of uterus 07/18/2016     Past Medical History: No previous history.    Surgical History  She  has a past surgical history that includes IR Cerebral Angiogram (7/22/2019); IR Cerebral Angiogram (7/24/2019); PICC Team Line Insertion (7/26/2019); IR Cerebral Angiogram (7/27/2019); IR Lumbar Drain Insertion (7/27/2019); IR Cerebral Angiogram (7/29/2019); IR Cerebral Angiogram (7/30/2019); IR Cerebral Angiogram (7/31/2019); IR Cerebral Angiogram (8/1/2019); IR Lumbar Drain Insertion (8/2/2019); IR Cerebral Angiogram (8/2/2019); and IR Cerebral Angiogram (7/28/2019).     SOCIAL HISTORY     Reviewed, and she  reports that she has never smoked. She has never used smokeless tobacco. She reports that she has current or past drug history. She reports that she does not drink alcohol.     FAMILY HISTORY     Reviewed, and non-contributory.      ALLERGIES     Allergies   Allergen Reactions     Oxycodone      Hydrocodone Swelling and Rash        REVIEW OF SYSTEMS     12 system ROS was done and was negative other than HPI - except patient feeling tired.     HOME & HOSPITAL MEDICATIONS     Prior to Admission Medications  Medications Prior to Admission   Medication Sig Dispense Refill Last Dose     ascorbic acid, vitamin C, 500 mg Chew chew tab Chew 500 mg daily.   Unknown at Unknown time       Hospital Medications    albumin human  12.5 g Intravenous Once     atorvastatin  20 mg Enteral Tube DAILY     cefTRIAXone  2 g Intravenous Q24H     chlorhexidine  15 mL Topical Q12H     lacosamide (VIMPAT)  IVPB  100 mg Intravenous Q12H 09-21     levETIRAcetam (KEPPRA) IVPB  1,000 mg Intravenous Q12H     magnesium sulfate IVPB  4 g Intravenous Once     NaCl 0.9% IR  1,000 mL Irrigation Once     niMODipine  60 mg Enteral Tube Q4H     omeprazole  20 mg Enteral Tube QAM AC    Or     pantoprazole  40 mg Intravenous QAM AC     potassium chloride  20 mEq Intravenous Once     potassium chloride liquid/packet  20 mEq Enteral Tube BID     sodium chloride  10-30 mL Intravenous Q8H FIXED TIMES        PHYSICAL EXAM     Vital signs  Temp:  [99.1  F (37.3  C)-102.2  F (39  C)] 100.8  F (38.2  C)  Heart Rate:  [64-92] 75  Resp:  [0-26] 18  BP: (101-161)/(62-95) 147/81  FiO2 (%):  [30 %] 30 %    Weight:   168 lb 8 oz (76.4 kg)    GENERAL: Healthy appearing, alert, no acute distress, normal habitus.  CARDIOVASCULAR: Ext warm and well perfused. Pulses present.   NEUROLOGICAL:  Patient is intubated with reduced sedation. Does not speak or follow commands. Eyes open with right sided gaze deviation. Pupils symmetric. VFI on right.  NO movement in arms or legs to painful sedation. Tone is symmetric.      DIAGNOSTIC STUDIES     Pertinent Radiology   MRI brain - images reviewed stroke seen  IMPRESSION:   CONCLUSION:  1.  Multiple foci of developing acute/subacute infarction throughout the right parietal occipital lobes, with additional single small foci of acute/subacute infarction in the right frontal lobe and left parietal lobe.  2.  Status post endovascular coiling of large dissecting aneurysm of the ventromedial right supraclinoid ICA with redemonstration of mild persistent flow-related enhancement along the neck of the aneurysm. There is also faint flow-related enhancement   centrally within the coil mass.  3.  Mildly decreased caliber of the M1 and proximal M2 segments of the right middle cerebral artery compared to the left, which may represent mild vasospasm. There is more normal caliber of the distal M2 and M3  branches.    ECHO    1.Left ventricle ejection fraction is normal. The calculated left ventricular ejection fraction is 65%.    2.TAPSE is normal, which is consistent with normal right ventricular systolic function.    3.No hemodynamically significant valvular heart abnormalities.    4.No obvious embolic source seen, if highly suspect consider MICHAEL.    5.No previous study for comparison.     TCD - images reviewed.   IMPRESSION:   CONCLUSION:  New moderate right middle cerebral artery vasospasm.    EEG negative for seizures.    Angiogram  Preliminary findings: (see dictation for full detail)  - Stable right ICA aneurysm coil mass with residual interstitial neck filling.  - Possible mild vasospasm superimposed on right ICA dissection, verapamil 10 mg infused in right ICA.    TCD reviewed and velocities are elevated in the RMCA.    Angiogram shows MARIA L, RMCA, RACA vasospasm. Dissection in MARIA L.    Angiogram on 8/1 continues to show vasospasm.     MRI and CT reviewed - RMCA evolving stroke seen.     8/6 TCD - RMCA velocity 192 - still elevated.     MRI brain  IMPRESSION:   CONCLUSION:  HEAD MRI:   1.  New T2 FLAIR sulcal nonsuppression in the dependent temporal and occipital sulci compatible with subarachnoid hemorrhage although age indeterminant and a small amount of new subarachnoid hemorrhage is difficult to exclude.  2.  Hyperattenuation on the prior CT corresponds to faint susceptibility, favored to represent mild petechial hemorrhage.  3.  A few small scattered foci of new restricted diffusion in the right posterior limb internal capsule and right parietal cortex.  4.  No midline shift or herniation. No hydrocephalus.     HEAD MRA:   1.  New flow related signal near circumferential around the periphery of the coiled right internal carotid artery aneurysm, raising concern for recurrence.  2.  Mild to moderate vasospasm of the right M1, slightly improved compared to 7/26/2019. Mild vasospasm of the proximal right M2  middle cerebral artery branches, not significant change.  3.  No new significant stenosis or occlusion.      Recent Results (from the past 24 hour(s))   Crossmatch    Collection Time: 08/07/19 12:07 AM   Result Value Ref Range    Crossmatch Compatible     Blood Expiration Date [id number]     Unit Type O Pos     Unit Number [id number]     Status Transfused     Component Red Blood Cells     PRODUCT CODE K2939G61     Issue Date and Time [id number]     Blood Type 5100     CODING SYSTEM [id number]    POCT Glucose    Collection Time: 08/07/19 12:38 AM   Result Value Ref Range    Glucose 150 (H) 70 - 139 mg/dL   Sodium lab - every 6 hours    Collection Time: 08/07/19  1:54 AM   Result Value Ref Range    Sodium 144 136 - 145 mmol/L   Sodium lab - every 6 hours    Collection Time: 08/07/19  5:37 AM   Result Value Ref Range    Sodium 142 136 - 145 mmol/L   Magnesium    Collection Time: 08/07/19  5:37 AM   Result Value Ref Range    Magnesium 1.6 (L) 1.8 - 2.6 mg/dL   Comprehensive Metabolic Panel    Collection Time: 08/07/19  5:37 AM   Result Value Ref Range    Sodium 142 136 - 145 mmol/L    Potassium 3.1 (L) 3.5 - 5.0 mmol/L    Chloride 116 (H) 98 - 107 mmol/L    CO2 21 (L) 22 - 31 mmol/L    Anion Gap, Calculation 5 5 - 18 mmol/L    Glucose 122 70 - 125 mg/dL    BUN 8 8 - 22 mg/dL    Creatinine 0.43 (L) 0.60 - 1.10 mg/dL    GFR MDRD Af Amer >60 >60 mL/min/1.73m2    GFR MDRD Non Af Amer >60 >60 mL/min/1.73m2    Bilirubin, Total 0.3 0.0 - 1.0 mg/dL    Calcium 7.0 (L) 8.5 - 10.5 mg/dL    Protein, Total 4.9 (L) 6.0 - 8.0 g/dL    Albumin 2.3 (L) 3.5 - 5.0 g/dL    Alkaline Phosphatase 81 45 - 120 U/L     (H) 0 - 40 U/L     (H) 0 - 45 U/L   Phosphorus In AM if not already ordered    Collection Time: 08/07/19  5:37 AM   Result Value Ref Range    Phosphorus 3.1 2.5 - 4.5 mg/dL   HM1 (CBC with Diff)    Collection Time: 08/07/19  5:37 AM   Result Value Ref Range    WBC 10.3 4.0 - 11.0 thou/uL    RBC 3.42 (L) 3.80 -  5.40 mill/uL    Hemoglobin 10.2 (L) 12.0 - 16.0 g/dL    Hematocrit 30.1 (L) 35.0 - 47.0 %    MCV 88 80 - 100 fL    MCH 29.8 27.0 - 34.0 pg    MCHC 33.9 32.0 - 36.0 g/dL    RDW 14.2 11.0 - 14.5 %    Platelets 191 140 - 440 thou/uL    MPV 10.8 8.5 - 12.5 fL    Neutrophils % 79 (H) 50 - 70 %    Lymphocytes % 10 (L) 20 - 40 %    Monocytes % 6 2 - 10 %    Eosinophils % 5 0 - 6 %    Basophils % 1 0 - 2 %    Neutrophils Absolute 8.1 (H) 2.0 - 7.7 thou/uL    Lymphocytes Absolute 1.0 0.8 - 4.4 thou/uL    Monocytes Absolute 0.6 0.0 - 0.9 thou/uL    Eosinophils Absolute 0.5 (H) 0.0 - 0.4 thou/uL    Basophils Absolute 0.1 0.0 - 0.2 thou/uL   Manual Differential    Collection Time: 08/07/19  5:37 AM   Result Value Ref Range    Platelet Estimate Normal Normal    Ovalocytes 1+ (!) Negative    Polychromasia 1+ (!) Negative   POCT Glucose    Collection Time: 08/07/19  5:51 AM   Result Value Ref Range    Glucose 126 70 - 139 mg/dL   POCT Glucose    Collection Time: 08/07/19 11:59 AM   Result Value Ref Range    Glucose 104 70 - 139 mg/dL   Blood Gases, Arterial    Collection Time: 08/07/19  3:46 PM   Result Value Ref Range    pH, Arterial 7.43 7.37 - 7.44    pCO2, Arterial 37 35 - 45 mm Hg    pO2, Arterial 63 (L) 80 - 90 mm Hg    Bicarbonate, Arterial Calc 25.1 23.0 - 29.0 mmol/L    O2 Sat, Arterial 95.8 (L) 96.0 - 97.0 %    Oxyhemoglobin 92.2 (L) 96.0 - 97.0 %    Base Excess, Arterial Calc 0.4 mmol/L    Ventilation Mode VCV     Rate 16 rr/min    FIO2 30.00     Peep 5 cm H2O    Sample Stabilized Temperature 37.0 degrees C    Ventilator Tidal Volume 375 mL   Sodium    Collection Time: 08/07/19  6:19 PM   Result Value Ref Range    Sodium 141 136 - 145 mmol/L   Potassium    Collection Time: 08/07/19  6:19 PM   Result Value Ref Range    Potassium 3.8 3.5 - 5.0 mmol/L       Total time spent for face to face visit, reviewing labs/imaging studies, counseling and coordination of care was: Over 25 min More than 50% of this time was spent on  sister 32 viki counseling and coordination of care.       Justin Vasquez MD  Direct Secure Messaging: [email or web address]  Tel: [phone]  This note was dictated using voice recognition software.  Any grammatical or context distortions are unintentional and inherent to the software.                  Fight with father. sister 32 called

## 2021-05-31 NOTE — PLAN OF CARE
Problem: Daily Care  Goal: Daily care needs are met  Outcome: Progressing  Provided oral care, blackmon care. & partial bed bath     Problem: Potential for Compromised Skin Integrity  Goal: Skin integrity is maintained or improved  Outcome: Progressing  Q2 hour turns. Call light within reach. Pt is able to make needs known.

## 2021-05-31 NOTE — PLAN OF CARE
Problem: Pain  Goal: Patient's pain/discomfort is manageable  Outcome: Progressing     Problem: Daily Care  Goal: Daily care needs are met  Outcome: Progressing     Problem: Potential for Compromised Skin Integrity  Goal: Skin integrity is maintained or improved  Outcome: Progressing     Problem: Neurological Deficit  Goal: Neurological status is stable or improving  Outcome: Progressing     Problem: Mechanical Ventilation  Goal: Patient will maintain patent airway  Outcome: Progressing  Goal: Oral health is maintained or improved  Outcome: Progressing  Goal: ET tube will be managed safely  Outcome: Progressing

## 2021-05-31 NOTE — PROGRESS NOTES
NEUROLOGY PROGRESS NOTE     Darion Herrera,  1975, MRN [id number] Date: 2019     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     TCD shows elevated velocities in RMCA    Angiogram with IA verapamil.     On H therapy; hydration and -180    Nimodipine X 21 days    Keppra 1000 mg two times a day - Level therapeutic.     Phenytoin 130 mg three times a day - (increased due to low level, repeat level in 1-2 days). Check level for tomorrow.     Off EEG.    Consider switching from tramadol to other pain meds as this can lower the seizure threshold.    Continue TCD/angiogram evaluation.     Monitor sodium. On 3% protocol.     Exam changes intermittently - due to sedation. Continue Neurochecks.    Thank you again for this referral, please feel free to contact me if you have any questions.     CHIEF COMPLAINT Aneurysmal subarachnoid hemorrhage (H)     HISTORY OF PRESENT ILLNESS     We have been requested by Dr. Kwan to evaluate Darion Herrera who is a 43 y.o.  female for SAH.    This a 43-year-old female on whom neurology is been consulted to evaluate for subarachnoid hemorrhage.  Patient initially presented with 6 days ago with a headache.  She was trying to lift some fruits and felt something blowing up in her head.  She continued to have the headache.  She left the staff she was lifting on the table.  She was then brought to the emergency room where imaging showed a large frontal mass.  This was later on MRI identified to be a giant aneurysm.  Patient underwent angiogram and  endovascular coil embolization of the aneurysm.  The patient had a repeat angiogram.  MRI done today showed multiple foci of acute/subacute infarction in the right parietal-occipital region.  There is also small area of infarction in the right frontal lobe.  TCD's show vasospasm in the right MCA.  There was some narrowing of the M1 and M2 segments.  Neurology was consulted to further help with further cares.  Patient denies any ongoing symptoms he does complain of some headache.     7/29  Patient had 2 seizures over the weekend. Was started on fosphenytoin in addition to Keppra.   No seizures today.  Intubated, but waking up as the sedation has been withdrawn.     7/30  No new seizures. Patient remains on sedation and intubated. RMCA velocities remain elevated on the TCD.     7/31  No new seizures. Patient being kept sedation. Angiogram still shows vasospasm. Patient getting IA verapamil.      PAST MEDICAL & SURGICAL HISTORY     Medical History  Patient Active Problem List    Diagnosis Date Noted     E-coli UTI      Gram-positive bacteremia      Fever in other diseases      Acute respiratory failure, unspecified whether with hypoxia or hypercapnia (H)      Cerebrovascular accident (CVA) due to occlusion of right middle cerebral artery (H)      Vasospasm (H)      S/P coil embolization of cerebral aneurysm 07/23/2019     Aneurysm of right internal carotid artery 07/23/2019     Carotid artery aneurysm (H) 07/22/2019     Nausea vomiting and diarrhea 07/22/2019     Aneurysmal subarachnoid hemorrhage (H) 07/22/2019     Intraventricular hemorrhage, nontraumatic (H) 07/22/2019     Compression of brain (H)      Leiomyoma of uterus 07/18/2016     Past Medical History: No previous history.    Surgical History  She  has a past surgical history that includes IR Cerebral Angiogram (7/22/2019); IR Cerebral Angiogram (7/24/2019); PICC Team Line Insertion (7/26/2019); IR Cerebral Angiogram (7/27/2019); IR Lumbar Drain Insertion  (7/27/2019); IR Cerebral Angiogram (7/29/2019); IR Cerebral Angiogram (7/30/2019); and IR Cerebral Angiogram (7/31/2019).     SOCIAL HISTORY     Reviewed, and she  reports that she has never smoked. She has never used smokeless tobacco. She reports that she has current or past drug history. She reports that she does not drink alcohol.     FAMILY HISTORY     Reviewed, and non-contributory.      ALLERGIES     Allergies   Allergen Reactions     Oxycodone      Hydrocodone Swelling and Rash        REVIEW OF SYSTEMS     12 system ROS was done and was negative other than HPI - except patient feeling tired.     HOME & HOSPITAL MEDICATIONS     Prior to Admission Medications  Medications Prior to Admission   Medication Sig Dispense Refill Last Dose     ascorbic acid, vitamin C, 500 mg Chew chew tab Chew 500 mg daily.   Unknown at Unknown time       Hospital Medications    acetaminophen  650 mg Enteral Tube QID    Or     acetaminophen  650 mg Oral QID     albumin human  12.5 g Intravenous Once     atorvastatin  20 mg Enteral Tube DAILY     cefepime (MAXIPIME) IV  2 g Intravenous Q12H     chlorhexidine  15 mL Topical Q12H     fosphenytoin (CEREBYX) IV maintenance  130 mg PE Intravenous Q8H     levETIRAcetam (KEPPRA) IVPB  1,000 mg Intravenous Q12H     niMODipine  60 mg Enteral Tube Q4H     omeprazole  20 mg Enteral Tube QAM AC    Or     pantoprazole  40 mg Intravenous QAM AC     potassium chloride liquid/packet  20 mEq Enteral Tube BID     senna (SENOKOT) syrup  8.8 mg Enteral Tube BID    Or     senna  8.6 mg Oral BID     sodium chloride  10-30 mL Intravenous Q8H FIXED TIMES     sodium chloride  2 g Oral BID    Or     sodium chloride  2 g Enteral Tube BID     vancomycin  1,000 mg Intravenous Q8H        PHYSICAL EXAM     Vital signs  Temp:  [98.8  F (37.1  C)-102.9  F (39.4  C)] 100.4  F (38  C)  Heart Rate:  [] 92  Resp:  [15-34] 21  BP: ()/(52-79) 107/67  Arterial Line BP: (117-158)/(53-76) 122/57  FiO2 (%):  [25  %-100 %] 30 %    Weight:   155 lb 14.4 oz (70.7 kg)    GENERAL: Healthy appearing, alert, no acute distress, normal habitus.  CARDIOVASCULAR: Ext warm and well perfused. Pulses present.   NEUROLOGICAL:  Patient is intubated and on sedation. Pupils reactive. EOM absent. VF absent. NO movement in arms or legs to painful sedation. Tone is symmetric.      DIAGNOSTIC STUDIES     Pertinent Radiology   MRI brain - images reviewed stroke seen  IMPRESSION:   CONCLUSION:  1.  Multiple foci of developing acute/subacute infarction throughout the right parietal occipital lobes, with additional single small foci of acute/subacute infarction in the right frontal lobe and left parietal lobe.  2.  Status post endovascular coiling of large dissecting aneurysm of the ventromedial right supraclinoid ICA with redemonstration of mild persistent flow-related enhancement along the neck of the aneurysm. There is also faint flow-related enhancement   centrally within the coil mass.  3.  Mildly decreased caliber of the M1 and proximal M2 segments of the right middle cerebral artery compared to the left, which may represent mild vasospasm. There is more normal caliber of the distal M2 and M3 branches.    ECHO    1.Left ventricle ejection fraction is normal. The calculated left ventricular ejection fraction is 65%.    2.TAPSE is normal, which is consistent with normal right ventricular systolic function.    3.No hemodynamically significant valvular heart abnormalities.    4.No obvious embolic source seen, if highly suspect consider MICHAEL.    5.No previous study for comparison.     TCD - images reviewed.   IMPRESSION:   CONCLUSION:  New moderate right middle cerebral artery vasospasm.    EEG negative for seizures.    Angiogram  Preliminary findings: (see dictation for full detail)  - Stable right ICA aneurysm coil mass with residual interstitial neck filling.  - Possible mild vasospasm superimposed on right ICA dissection, verapamil 10 mg infused  in right ICA.    TCD reviewed and velocities are elevated in the RMCA.    Angiogram shows MARIA L, RMCA, RACA vasospasm. Dissection in MARIA L.    Recent Results (from the past 24 hour(s))   Sodium lab - every 6 hours    Collection Time: 07/31/19 12:07 AM   Result Value Ref Range    Sodium 148 (H) 136 - 145 mmol/L   Sodium lab - every 6 hours    Collection Time: 07/31/19  5:10 AM   Result Value Ref Range    Sodium 149 (H) 136 - 145 mmol/L   Potassium - Next AM    Collection Time: 07/31/19  5:10 AM   Result Value Ref Range    Potassium 3.3 (L) 3.5 - 5.0 mmol/L   Magnesium    Collection Time: 07/31/19  5:10 AM   Result Value Ref Range    Magnesium 2.0 1.8 - 2.6 mg/dL   Sputum culture    Collection Time: 07/31/19  9:15 AM   Result Value Ref Range    Gram Stain Result 3+ Polymorphonuclear leukocytes     Gram Stain Result 3+ Gram positive cocci in chains     Gram Stain Result 2+ Gram positive bacilli, diphtheroid like    Sodium lab - every 6 hours    Collection Time: 07/31/19 12:26 PM   Result Value Ref Range    Sodium 150 (H) 136 - 145 mmol/L   Potassium    Collection Time: 07/31/19  4:21 PM   Result Value Ref Range    Potassium 3.7 3.5 - 5.0 mmol/L   Sodium lab - every 6 hours    Collection Time: 07/31/19  5:38 PM   Result Value Ref Range    Sodium 148 (H) 136 - 145 mmol/L       Total time spent for face to face visit, reviewing labs/imaging studies, counseling and coordination of care was: Over 25 min More than 50% of this time was spent on counseling and coordination of care.  Discussing plan with family.     Justin Vasquez MD  Direct Secure Messaging: [email or web address]  Tel: [phone]  This note was dictated using voice recognition software.  Any grammatical or context distortions are unintentional and inherent to the software.

## 2021-05-31 NOTE — PROGRESS NOTES
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 S/p coiling. Complicated hospital course with re-bleed  and seizures s/p re-coil embolization and cerebral angiograms for severe vasospasm with verapamil last on 8/2, c/b right MCA infarct. She now has had hydrocephalus. CSF gram stain negative.  My partner Dr Sal had planned a right  shunt last week but was unable to get patient consent prior to proceeding. She is out of town and I am performing the right  shunt today. Risks and benefits discussed and she agreed to proceed.     Gale Hoffmann MD

## 2021-05-31 NOTE — PROGRESS NOTES
Vent Mode: VCV  FiO2 (%):  [30 %-40 %] 30 %  S RR:  [16] 16  S VT:  [375 mL] 375 mL  PEEP/CPAP (cm H2O):  [5 cm H2O] 5 cm H2O  Minute Ventilation (L/min):  [5.9 L/min-7.4 L/min] 7.4 L/min  PIP:  [22 cm H2O-30 cm H2O] 29 cm H2O  MAP (cm H2O):  [8-9] 9    Pt remains on full vent support with the above settings,tolerated well. Pplat 16cm H2O, BS bilaterally diminished, Sxned for small amount of thick white secretion. No change made with the  vent setting in this shift. Pt is not ready for PS trial at this moment.   Pt transported to MRI with transport vent and brought back to her room safely.  RT Will continue monitor SpO2/ABG,CXR, SXN PRN, and assess for PS trial when appropriate.       Bryce Bravo, KAET

## 2021-05-31 NOTE — PROGRESS NOTES
I reviewed the risks, benefits, and alternatives to surgery with Darion and specifically her  via  phone this evening ( [place] [id number]).    All questions were answered.    I explained that we would be repeating a head CT in the morning, and if this again showed progressive hydrocephalus, then I would recommend shunt placement. Her  expressed understanding and understood the risks of surgery but he is not enthusiastic about the idea of a shunt. He would like to hear the results of the head CT before giving the ok to move forward with surgery. We will discuss things further tomorrow when we can see the results of her scan.    Malou Sal MD  08/14/19

## 2021-05-31 NOTE — PROGRESS NOTES
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   RMCA stroke related to vasospasm.       Initial head CT showed MARIA L giant aneurysm (sentinel headache)    S/p endovascular coil embolization    MRI/CT now shows large hypodensity/evolving ischemic stroke (from vasospasm)    TCD from today shows RMCA elevated velocities, mild improvement.     Continue Daily TCD.    MRA shows mild to moderate vasospasm.     Angiogram shows recurrence of MARIA L aneurysm.     On HHH therapy; hydration and -180    Lumbar drain    Nimodipine X 21 days    Keppra 1000 mg two times a day - Level therapeutic.     On Vimpat 100 mg two times a day. Question of IL - interval prolonging. Can switch to dilantin in 1-2 days as the vasospasm improves.     Monitor sodium.     Palliative care involved. Trach and PEG done.     Patient following commands today.     Thank you again for this referral, please feel free to contact me if you have any questions.     CHIEF COMPLAINT Aneurysmal subarachnoid hemorrhage (H)     HISTORY OF PRESENT ILLNESS     We have been requested by Dr. Kwan to evaluate Darion Herrera who is a 43 y.o.  female for SAH.    This a 43-year-old female on whom neurology is been consulted to evaluate for subarachnoid hemorrhage.  Patient initially presented with 6 days ago with a headache.  She was trying to lift some fruits and felt something blowing up in her head.  She continued to have the headache.  She left the staff she was lifting on the table.  She was then brought to the emergency room where imaging showed a large frontal mass.  This was later on MRI identified to be a giant aneurysm.  Patient underwent  angiogram and endovascular coil embolization of the aneurysm.  The patient had a repeat angiogram.  MRI done today showed multiple foci of acute/subacute infarction in the right parietal-occipital region.  There is also small area of infarction in the right frontal lobe.  TCD's show vasospasm in the right MCA.  There was some narrowing of the M1 and M2 segments.  Neurology was consulted to further help with further cares.  Patient denies any ongoing symptoms he does complain of some headache.     7/29  Patient had 2 seizures over the weekend. Was started on fosphenytoin in addition to Keppra.   No seizures today.  Intubated, but waking up as the sedation has been withdrawn.     7/30  No new seizures. Patient remains on sedation and intubated. RMCA velocities remain elevated on the TCD.     7/31  No new seizures. Patient being kept sedation. Angiogram still shows vasospasm. Patient getting IA verapamil.     8/1  No seizures. Patient continues to remain on sedation. Angiogram continues to show vasospasm. Getting IA verapamil. Family conference tomorrow.     8/2  Patient remains on sedation. Angiogram shows stable vasospasm. Some twitching of right shoulder per nursing staff.     8/5  Weekend events - patient was found to have a RMCA evolving infarct over the weekend.   Family wanting to proceed with Trach and PEG.     8/6  TCD still shows vasospasm. Patient is being kept sedated till vasospasm resolves. Family interested in Trach and PEG.     8/7  MRI/MRA brain today to assess for vasospasm. Trach and PEG today.     8/8  Patient went for angiogram today. Exam improved off sedation. Angiogram showed aneurysm but no coiling done. Coiling planned for next week.      PAST MEDICAL & SURGICAL HISTORY     Medical History  Patient Active Problem List    Diagnosis Date Noted     Pneumonia due to group B Streptococcus, unspecified laterality, unspecified part of lung (H)      Vasospasm of cerebral artery      E-coli UTI       Gram-positive bacteremia      Fever in other diseases      Acute respiratory failure, unspecified whether with hypoxia or hypercapnia (H)      Cerebrovascular accident (CVA) due to occlusion of right middle cerebral artery (H)      Vasospasm (H)      S/P coil embolization of cerebral aneurysm 07/23/2019     Aneurysm of right internal carotid artery 07/23/2019     Carotid artery aneurysm (H) 07/22/2019     Nausea vomiting and diarrhea 07/22/2019     Aneurysmal subarachnoid hemorrhage (H) 07/22/2019     Intraventricular hemorrhage, nontraumatic (H) 07/22/2019     CVA (cerebral vascular accident) (H) 07/22/2019     Compression of brain (H)      Leiomyoma of uterus 07/18/2016     Past Medical History: No previous history.    Surgical History  She  has a past surgical history that includes IR Cerebral Angiogram (7/22/2019); IR Cerebral Angiogram (7/24/2019); PICC Team Line Insertion (7/26/2019); IR Cerebral Angiogram (7/27/2019); IR Lumbar Drain Insertion (7/27/2019); IR Cerebral Angiogram (7/29/2019); IR Cerebral Angiogram (7/30/2019); IR Cerebral Angiogram (7/31/2019); IR Cerebral Angiogram (8/1/2019); IR Lumbar Drain Insertion (8/2/2019); IR Cerebral Angiogram (8/2/2019); IR Cerebral Angiogram (7/28/2019); pr egd percutaneous placement gastrostomy tube (N/A, 8/7/2019); Tracheostomy (N/A, 8/7/2019); and IR Lumbar Drain Insertion (8/8/2019).     SOCIAL HISTORY     Reviewed, and she  reports that she has never smoked. She has never used smokeless tobacco. She reports that she has current or past drug history. She reports that she does not drink alcohol.     FAMILY HISTORY     Reviewed, and non-contributory.      ALLERGIES     Allergies   Allergen Reactions     Oxycodone      Hydrocodone Swelling and Rash        REVIEW OF SYSTEMS     12 system ROS was done and was negative other than HPI - except patient feeling tired.     HOME & HOSPITAL MEDICATIONS     Prior to Admission Medications  Medications Prior to Admission    Medication Sig Dispense Refill Last Dose     ascorbic acid, vitamin C, 500 mg Chew chew tab Chew 500 mg daily.   Unknown at Unknown time       Hospital Medications    albumin human  12.5 g Intravenous Once     atorvastatin  20 mg Enteral Tube DAILY     cefTRIAXone  2 g Intravenous Q24H     chlorhexidine  15 mL Topical Q12H     lacosamide (VIMPAT) IVPB  100 mg Intravenous Q12H 09-21     levETIRAcetam (KEPPRA) IVPB  1,000 mg Intravenous Q12H     magnesium sulfate IVPB  4 g Intravenous Once     NaCl 0.9% IR  1,000 mL Irrigation Once     niMODipine  60 mg Enteral Tube Q4H     omeprazole  20 mg Enteral Tube QAM AC    Or     pantoprazole  40 mg Intravenous QAM AC     potassium chloride liquid/packet  20 mEq Enteral Tube BID     sodium chloride  10-30 mL Intravenous Q8H FIXED TIMES        PHYSICAL EXAM     Vital signs  Temp:  [98.5  F (36.9  C)-101  F (38.3  C)] 98.8  F (37.1  C)  Heart Rate:  [] 54  Resp:  [8-29] 14  BP: (105-167)/() 133/84  FiO2 (%):  [30 %-50 %] 30 %    Weight:   165 lb 3.2 oz (74.9 kg)    GENERAL: Healthy appearing, alert, no acute distress, normal habitus.  CARDIOVASCULAR: Ext warm and well perfused. Pulses present.   NEUROLOGICAL:  Patient is trached with reduced sedation. Does not speak but does follow commands. EOMI. Pupils symmetric. VFI. NLF symmetric. Does not move arms of legs but nods to feeling pain in all 4 ext. Left foot moves a little bit.     DIAGNOSTIC STUDIES     Pertinent Radiology   MRI brain - images reviewed stroke seen  IMPRESSION:   CONCLUSION:  1.  Multiple foci of developing acute/subacute infarction throughout the right parietal occipital lobes, with additional single small foci of acute/subacute infarction in the right frontal lobe and left parietal lobe.  2.  Status post endovascular coiling of large dissecting aneurysm of the ventromedial right supraclinoid ICA with redemonstration of mild persistent flow-related enhancement along the neck of the aneurysm.  There is also faint flow-related enhancement   centrally within the coil mass.  3.  Mildly decreased caliber of the M1 and proximal M2 segments of the right middle cerebral artery compared to the left, which may represent mild vasospasm. There is more normal caliber of the distal M2 and M3 branches.    ECHO    1.Left ventricle ejection fraction is normal. The calculated left ventricular ejection fraction is 65%.    2.TAPSE is normal, which is consistent with normal right ventricular systolic function.    3.No hemodynamically significant valvular heart abnormalities.    4.No obvious embolic source seen, if highly suspect consider MICHAEL.    5.No previous study for comparison.     TCD - images reviewed.   IMPRESSION:   CONCLUSION:  New moderate right middle cerebral artery vasospasm.    EEG negative for seizures.    Angiogram  Preliminary findings: (see dictation for full detail)  - Stable right ICA aneurysm coil mass with residual interstitial neck filling.  - Possible mild vasospasm superimposed on right ICA dissection, verapamil 10 mg infused in right ICA.    TCD reviewed and velocities are elevated in the RMCA.    Angiogram shows MARIA L, RMCA, RACA vasospasm. Dissection in MARIA L.    Angiogram on 8/1 continues to show vasospasm.     MRI and CT reviewed - RMCA evolving stroke seen.     8/6 TCD - RMCA velocity 192 - still elevated.     MRI brain  IMPRESSION:   CONCLUSION:  HEAD MRI:   1.  New T2 FLAIR sulcal nonsuppression in the dependent temporal and occipital sulci compatible with subarachnoid hemorrhage although age indeterminant and a small amount of new subarachnoid hemorrhage is difficult to exclude.  2.  Hyperattenuation on the prior CT corresponds to faint susceptibility, favored to represent mild petechial hemorrhage.  3.  A few small scattered foci of new restricted diffusion in the right posterior limb internal capsule and right parietal cortex.  4.  No midline shift or herniation. No hydrocephalus.     HEAD  MRA:   1.  New flow related signal near circumferential around the periphery of the coiled right internal carotid artery aneurysm, raising concern for recurrence.  2.  Mild to moderate vasospasm of the right M1, slightly improved compared to 7/26/2019. Mild vasospasm of the proximal right M2 middle cerebral artery branches, not significant change.  3.  No new significant stenosis or occlusion.    Angiogram  6-7 mm recurrence at the neck of previously coiled right ICA aneurysm.     Recent Results (from the past 24 hour(s))   Magnesium    Collection Time: 08/08/19  4:02 AM   Result Value Ref Range    Magnesium 2.0 1.8 - 2.6 mg/dL   Comprehensive Metabolic Panel    Collection Time: 08/08/19  4:02 AM   Result Value Ref Range    Sodium 138 136 - 145 mmol/L    Potassium 3.7 3.5 - 5.0 mmol/L    Chloride 107 98 - 107 mmol/L    CO2 24 22 - 31 mmol/L    Anion Gap, Calculation 7 5 - 18 mmol/L    Glucose 133 (H) 70 - 125 mg/dL    BUN 9 8 - 22 mg/dL    Creatinine 0.54 (L) 0.60 - 1.10 mg/dL    GFR MDRD Af Amer >60 >60 mL/min/1.73m2    GFR MDRD Non Af Amer >60 >60 mL/min/1.73m2    Bilirubin, Total 0.6 0.0 - 1.0 mg/dL    Calcium 8.3 (L) 8.5 - 10.5 mg/dL    Protein, Total 6.1 6.0 - 8.0 g/dL    Albumin 2.8 (L) 3.5 - 5.0 g/dL    Alkaline Phosphatase 95 45 - 120 U/L     (H) 0 - 40 U/L     (H) 0 - 45 U/L   HM1 (CBC with Diff)    Collection Time: 08/08/19  4:02 AM   Result Value Ref Range    WBC 13.0 (H) 4.0 - 11.0 thou/uL    RBC 3.95 3.80 - 5.40 mill/uL    Hemoglobin 11.6 (L) 12.0 - 16.0 g/dL    Hematocrit 34.1 (L) 35.0 - 47.0 %    MCV 86 80 - 100 fL    MCH 29.4 27.0 - 34.0 pg    MCHC 34.0 32.0 - 36.0 g/dL    RDW 13.8 11.0 - 14.5 %    Platelets 253 140 - 440 thou/uL    MPV 11.0 8.5 - 12.5 fL    Neutrophils % 84 (H) 50 - 70 %    Lymphocytes % 8 (L) 20 - 40 %    Monocytes % 5 2 - 10 %    Eosinophils % 2 0 - 6 %    Basophils % 0 0 - 2 %    Neutrophils Absolute 11.0 (H) 2.0 - 7.7 thou/uL    Lymphocytes Absolute 1.0 0.8 - 4.4  thou/uL    Monocytes Absolute 0.6 0.0 - 0.9 thou/uL    Eosinophils Absolute 0.3 0.0 - 0.4 thou/uL    Basophils Absolute 0.1 0.0 - 0.2 thou/uL   Blood Gases, Arterial    Collection Time: 08/08/19  5:13 AM   Result Value Ref Range    pH, Arterial 7.47 (H) 7.37 - 7.44    pCO2, Arterial 33 (L) 35 - 45 mm Hg    pO2, Arterial 79 (L) 80 - 90 mm Hg    Bicarbonate, Arterial Calc 25.4 23.0 - 29.0 mmol/L    O2 Sat, Arterial 98.5 (H) 96.0 - 97.0 %    Oxyhemoglobin 94.9 (L) 96.0 - 97.0 %    Base Excess, Arterial Calc 0.7 mmol/L    Ventilation Mode VCV     Rate 14 rr/min    FIO2 30.00     Peep 5 cm H2O    Sample Stabilized Temperature 37.0 degrees C    Ventilator Tidal Volume 375 mL   POCT Glucose    Collection Time: 08/08/19  7:58 AM   Result Value Ref Range    Glucose 123 70 - 139 mg/dL       Total time spent for face to face visit, reviewing labs/imaging studies, counseling and coordination of care was: Over 25 min More than 50% of this time was spent on counseling and coordination of care.       Justin Vasquez MD  [employer] Messaging: [email or web address]  Tel: [phone]  This note was dictated using voice recognition software.  Any grammatical or context distortions are unintentional and inherent to the software.

## 2021-05-31 NOTE — PROGRESS NOTES
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   RMCA stroke related to vasospasm.       Initial head CT showed MARIA L giant aneurysm (sentinel headache)    S/p endovascular coil embolization    Aneurysm stable on repeat angiogram.     MRI/CT shows large hypodensity/evolving ischemic stroke (from vasospasm)    Head CT stable shows slightly enlarged ventricles and lumbar drain opened.    Exam improving clinically.    -160    Keppra 1000 mg two times a day - Level therapeutic.     Start PT.    Lumbar drain    NSG recommending  shunt - patient has not decided yet.    Thank you again for this referral, please feel free to contact me if you have any questions.     CHIEF COMPLAINT Aneurysmal subarachnoid hemorrhage (H)     HISTORY OF PRESENT ILLNESS     We have been requested by Dr. Kwan to evaluate Darion Herrera who is a 43 y.o.  female for SAH.    This a 43-year-old female on whom neurology is been consulted to evaluate for subarachnoid hemorrhage.  Patient initially presented with 6 days ago with a headache.  She was trying to lift some fruits and felt something blowing up in her head.  She continued to have the headache.  She left the staff she was lifting on the table.  She was then brought to the emergency room where imaging showed a large frontal mass.  This was later on MRI identified to be a giant aneurysm.  Patient underwent angiogram and endovascular coil embolization of the aneurysm.  The patient had a repeat angiogram.  MRI done today showed multiple foci of acute/subacute infarction in the right parietal-occipital region.  There is also small area of infarction in the right frontal  lobe.  TCD's show vasospasm in the right MCA.  There was some narrowing of the M1 and M2 segments.  Neurology was consulted to further help with further cares.  Patient denies any ongoing symptoms he does complain of some headache.     7/29  Patient had 2 seizures over the weekend. Was started on fosphenytoin in addition to Keppra.   No seizures today.  Intubated, but waking up as the sedation has been withdrawn.     7/30  No new seizures. Patient remains on sedation and intubated. RMCA velocities remain elevated on the TCD.     7/31  No new seizures. Patient being kept sedation. Angiogram still shows vasospasm. Patient getting IA verapamil.     8/1  No seizures. Patient continues to remain on sedation. Angiogram continues to show vasospasm. Getting IA verapamil. Family conference tomorrow.     8/2  Patient remains on sedation. Angiogram shows stable vasospasm. Some twitching of right shoulder per nursing staff.     8/5  Weekend events - patient was found to have a RMCA evolving infarct over the weekend.   Family wanting to proceed with Trach and PEG.     8/6  TCD still shows vasospasm. Patient is being kept sedated till vasospasm resolves. Family interested in Trach and PEG.     8/7  MRI/MRA brain today to assess for vasospasm. Trach and PEG today.     8/8  Patient went for angiogram today. Exam improved off sedation. Angiogram showed aneurysm but no coiling done. Coiling planned for next week.     8/9  TCD look improved. Sedation has been reduced.     8/10  More awake and able to move right arm and leg more. Following more commands.     8/11  Vimpat was decreased with no seizures. More interactive today. Still not able to lift left arm.     8/12  Exam has improved. Starting to move left side today. Head CT stable.  No seizures.    8/13  Angiogram today. Patients exam on left side has improved. She is able to lift it against gravity.     8/14  Patient very emotional today and crying all day long. Wants to go home.  NSG planning  shunt so lumbar drain can be removed.     8/15  Patient did not get  shunt surgery. She is not sure she wants to proceed. Left arm and leg keeps to get stronger. Patient remains very emotional. Lumbar drain still open and draining.     PAST MEDICAL & SURGICAL HISTORY     Medical History  Patient Active Problem List    Diagnosis Date Noted     Pneumonia due to group B Streptococcus, unspecified laterality, unspecified part of lung (H)      Vasospasm of cerebral artery      E-coli UTI      Gram-positive bacteremia      Fever in other diseases      Acute respiratory failure, unspecified whether with hypoxia or hypercapnia (H)      Cerebrovascular accident (CVA) due to occlusion of right middle cerebral artery (H)      Vasospasm (H)      S/P coil embolization of cerebral aneurysm 07/23/2019     Aneurysm of right internal carotid artery 07/23/2019     Carotid artery aneurysm (H) 07/22/2019     Nausea vomiting and diarrhea 07/22/2019     Aneurysmal subarachnoid hemorrhage (H) 07/22/2019     Intraventricular hemorrhage, nontraumatic (H) 07/22/2019     CVA (cerebral vascular accident) (H) 07/22/2019     Hydrocephalus 07/22/2019     Compression of brain (H)      Leiomyoma of uterus 07/18/2016     Past Medical History: No previous history.    Surgical History  She  has a past surgical history that includes IR Cerebral Angiogram (7/22/2019); IR Cerebral Angiogram (7/24/2019); PICC Team Line Insertion (7/26/2019); IR Cerebral Angiogram (7/27/2019); IR Lumbar Drain Insertion (7/27/2019); IR Cerebral Angiogram (7/29/2019); IR Cerebral Angiogram (7/30/2019); IR Cerebral Angiogram (7/31/2019); IR Cerebral Angiogram (8/1/2019); IR Lumbar Drain Insertion (8/2/2019); IR Cerebral Angiogram (8/2/2019); IR Cerebral Angiogram (7/28/2019); pr egd percutaneous placement gastrostomy tube (N/A, 8/7/2019); Tracheostomy (N/A, 8/7/2019); IR Lumbar Drain Insertion (8/8/2019); IR Cerebral Angiogram (8/13/2019); and IR Cerebral  Angiogram (8/8/2019).     SOCIAL HISTORY     Reviewed, and she  reports that she has never smoked. She has never used smokeless tobacco. She reports that she has current or past drug history. She reports that she does not drink alcohol.     FAMILY HISTORY     Reviewed, and non-contributory.      ALLERGIES     Allergies   Allergen Reactions     Oxycodone      Hydrocodone Swelling and Rash        REVIEW OF SYSTEMS     12 system ROS was done and was negative other than HPI - except patient feeling tired.     HOME & HOSPITAL MEDICATIONS     Prior to Admission Medications  Medications Prior to Admission   Medication Sig Dispense Refill Last Dose     ascorbic acid, vitamin C, 500 mg Chew chew tab Chew 500 mg daily.   Unknown at Unknown time       Hospital Medications    atorvastatin  20 mg Enteral Tube DAILY     chlorhexidine  15 mL Topical Q12H     guar gum  1 packet Enteral Tube BID     levETIRAcetam  (KEPPRA) solution 100 mg/mL  1,000 mg Enteral Tube BID     nystatin  5 mL Swish & Swallow QID     omeprazole  20 mg Enteral Tube QAM AC    Or     pantoprazole  40 mg Intravenous QAM AC     potassium chloride liquid/packet  20 mEq Enteral Tube BID     sodium chloride  10-30 mL Intravenous Q8H FIXED TIMES        PHYSICAL EXAM     Vital signs  Temp:  [98.1  F (36.7  C)-99.2  F (37.3  C)] 98.5  F (36.9  C)  Heart Rate:  [] 99  Resp:  [14-37] 20  BP: (108-133)/(68-94) 124/94  FiO2 (%):  [21 %] 21 %    Weight:   142 lb 4.8 oz (64.5 kg)    GENERAL: Healthy appearing, alert, no acute distress, normal habitus.  CARDIOVASCULAR: Ext warm and well perfused. Pulses present.   NEUROLOGICAL:  Patient is trached. Does not speak but does follow commands. EOMI. Pupils symmetric. VFI on present on both sides. Left sided facial droop.  Right side antigravity. Left leg 3+/5. Left arm 3+/5     DIAGNOSTIC STUDIES     Pertinent Radiology   MRI brain - images reviewed stroke seen  IMPRESSION:   CONCLUSION:  1.  Multiple foci of developing  acute/subacute infarction throughout the right parietal occipital lobes, with additional single small foci of acute/subacute infarction in the right frontal lobe and left parietal lobe.  2.  Status post endovascular coiling of large dissecting aneurysm of the ventromedial right supraclinoid ICA with redemonstration of mild persistent flow-related enhancement along the neck of the aneurysm. There is also faint flow-related enhancement   centrally within the coil mass.  3.  Mildly decreased caliber of the M1 and proximal M2 segments of the right middle cerebral artery compared to the left, which may represent mild vasospasm. There is more normal caliber of the distal M2 and M3 branches.    ECHO    1.Left ventricle ejection fraction is normal. The calculated left ventricular ejection fraction is 65%.    2.TAPSE is normal, which is consistent with normal right ventricular systolic function.    3.No hemodynamically significant valvular heart abnormalities.    4.No obvious embolic source seen, if highly suspect consider MICHAEL.    5.No previous study for comparison.     TCD - images reviewed.   IMPRESSION:   CONCLUSION:  New moderate right middle cerebral artery vasospasm.    EEG negative for seizures.    Angiogram  Preliminary findings: (see dictation for full detail)  - Stable right ICA aneurysm coil mass with residual interstitial neck filling.  - Possible mild vasospasm superimposed on right ICA dissection, verapamil 10 mg infused in right ICA.    TCD reviewed and velocities are elevated in the RMCA.    Angiogram shows MARIA L, RMCA, RACA vasospasm. Dissection in MARIA L.    Angiogram on 8/1 continues to show vasospasm.     MRI and CT reviewed - RMCA evolving stroke seen.     8/6 TCD - RMCA velocity 192 - still elevated.     MRI brain  IMPRESSION:   CONCLUSION:  HEAD MRI:   1.  New T2 FLAIR sulcal nonsuppression in the dependent temporal and occipital sulci compatible with subarachnoid hemorrhage although age indeterminant and  a small amount of new subarachnoid hemorrhage is difficult to exclude.  2.  Hyperattenuation on the prior CT corresponds to faint susceptibility, favored to represent mild petechial hemorrhage.  3.  A few small scattered foci of new restricted diffusion in the right posterior limb internal capsule and right parietal cortex.  4.  No midline shift or herniation. No hydrocephalus.     HEAD MRA:   1.  New flow related signal near circumferential around the periphery of the coiled right internal carotid artery aneurysm, raising concern for recurrence.  2.  Mild to moderate vasospasm of the right M1, slightly improved compared to 7/26/2019. Mild vasospasm of the proximal right M2 middle cerebral artery branches, not significant change.  3.  No new significant stenosis or occlusion.    Angiogram  6-7 mm recurrence at the neck of previously coiled right ICA aneurysm.     TCD shows improving RMCA velocities (images of today's test reviewed).     Angiogram shows unchanged aneurysm.     Head CT  1.  Stealth noncontrast head CT for preoperative planning.  2.  Mild interval enlargement of the lateral and third ventricles which are mild to moderately dilated. This suggests a worsening hydrocephalus. No definite transependymal CSF noted.  3.  Status post endovascular coiling of giant right paraclinoid aneurysm.    Recent Results (from the past 24 hour(s))   HM1 (CBC with Diff)    Collection Time: 08/15/19  4:24 AM   Result Value Ref Range    WBC 7.2 4.0 - 11.0 thou/uL    RBC 4.33 3.80 - 5.40 mill/uL    Hemoglobin 12.8 12.0 - 16.0 g/dL    Hematocrit 39.4 35.0 - 47.0 %    MCV 91 80 - 100 fL    MCH 29.6 27.0 - 34.0 pg    MCHC 32.5 32.0 - 36.0 g/dL    RDW 14.2 11.0 - 14.5 %    Platelets 486 (H) 140 - 440 thou/uL    MPV 10.1 8.5 - 12.5 fL    Neutrophils % 64 50 - 70 %    Lymphocytes % 19 (L) 20 - 40 %    Monocytes % 10 2 - 10 %    Eosinophils % 4 0 - 6 %    Basophils % 1 0 - 2 %    Neutrophils Absolute 4.6 2.0 - 7.7 thou/uL     Lymphocytes Absolute 1.4 0.8 - 4.4 thou/uL    Monocytes Absolute 0.8 0.0 - 0.9 thou/uL    Eosinophils Absolute 0.3 0.0 - 0.4 thou/uL    Basophils Absolute 0.1 0.0 - 0.2 thou/uL   Magnesium    Collection Time: 08/15/19  4:24 AM   Result Value Ref Range    Magnesium 2.2 1.8 - 2.6 mg/dL   Comprehensive Metabolic Panel    Collection Time: 08/15/19  4:24 AM   Result Value Ref Range    Sodium 136 136 - 145 mmol/L    Potassium 4.2 3.5 - 5.0 mmol/L    Chloride 103 98 - 107 mmol/L    CO2 24 22 - 31 mmol/L    Anion Gap, Calculation 9 5 - 18 mmol/L    Glucose 105 70 - 125 mg/dL    BUN 16 8 - 22 mg/dL    Creatinine 0.60 0.60 - 1.10 mg/dL    GFR MDRD Af Amer >60 >60 mL/min/1.73m2    GFR MDRD Non Af Amer >60 >60 mL/min/1.73m2    Bilirubin, Total 0.5 0.0 - 1.0 mg/dL    Calcium 10.2 8.5 - 10.5 mg/dL    Protein, Total 8.3 (H) 6.0 - 8.0 g/dL    Albumin 4.2 3.5 - 5.0 g/dL    Alkaline Phosphatase 110 45 - 120 U/L    AST 44 (H) 0 - 40 U/L    ALT 72 (H) 0 - 45 U/L       Total time spent for face to face visit, reviewing labs/imaging studies, counseling and coordination of care was: Over 25 min More than 50% of this time was spent on counseling and coordination of care.  Counseling family on plan. Discussing plan and events with nursing staff.     Justin Vasquez MD  [employer] Messaging: [email or web address]  Tel: [phone]  This note was dictated using voice recognition software.  Any grammatical or context distortions are unintentional and inherent to the software.

## 2021-05-31 NOTE — PROGRESS NOTES
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       Initial head CT showed MARIA L giant aneurysm (sentinel headache)    S/p endovascular coil embolization X 2    Aneurysm stable on repeat angiogram.     MRI/CT shows large hypodensity/evolving ischemic stroke (from vasospasm)    Head CT stable shows slightly enlarged ventricles and  shunt placed today.     -160    Keppra 1000 mg two times a day - Level therapeutic. (long term)    Start PT.    EEG tomorrow to get baseline.     Thank you again for this referral, please feel free to contact me if you have any questions.     CHIEF COMPLAINT Aneurysmal subarachnoid hemorrhage (H)     HISTORY OF PRESENT ILLNESS     We have been requested by Dr. Kwan to evaluate Darion Herrera who is a 43 y.o.  female for SAH.    This a 43-year-old female on whom neurology is been consulted to evaluate for subarachnoid hemorrhage.  Patient initially presented with 6 days ago with a headache.  She was trying to lift some fruits and felt something blowing up in her head.  She continued to have the headache.  She left the staff she was lifting on the table.  She was then brought to the emergency room where imaging showed a large frontal mass.  This was later on MRI identified to be a giant aneurysm.  Patient underwent angiogram and endovascular coil embolization of the aneurysm.  The patient had a repeat angiogram.  MRI done today showed multiple foci of acute/subacute infarction in the right parietal-occipital region.  There is also small area of infarction in the right frontal lobe.  TCD's show vasospasm in the right MCA.  There was some narrowing of the M1  and M2 segments.  Neurology was consulted to further help with further cares.  Patient denies any ongoing symptoms he does complain of some headache.     7/29  Patient had 2 seizures over the weekend. Was started on fosphenytoin in addition to Keppra.   No seizures today.  Intubated, but waking up as the sedation has been withdrawn.     7/30  No new seizures. Patient remains on sedation and intubated. RMCA velocities remain elevated on the TCD.     7/31  No new seizures. Patient being kept sedation. Angiogram still shows vasospasm. Patient getting IA verapamil.     8/1  No seizures. Patient continues to remain on sedation. Angiogram continues to show vasospasm. Getting IA verapamil. Family conference tomorrow.     8/2  Patient remains on sedation. Angiogram shows stable vasospasm. Some twitching of right shoulder per nursing staff.     8/5  Weekend events - patient was found to have a RMCA evolving infarct over the weekend.   Family wanting to proceed with Trach and PEG.     8/6  TCD still shows vasospasm. Patient is being kept sedated till vasospasm resolves. Family interested in Trach and PEG.     8/7  MRI/MRA brain today to assess for vasospasm. Trach and PEG today.     8/8  Patient went for angiogram today. Exam improved off sedation. Angiogram showed aneurysm but no coiling done. Coiling planned for next week.     8/9  TCD look improved. Sedation has been reduced.     8/10  More awake and able to move right arm and leg more. Following more commands.     8/11  Vimpat was decreased with no seizures. More interactive today. Still not able to lift left arm.     8/12  Exam has improved. Starting to move left side today. Head CT stable.  No seizures.    8/13  Angiogram today. Patients exam on left side has improved. She is able to lift it against gravity.     8/14  Patient very emotional today and crying all day long. Wants to go home. NSG planning  shunt so lumbar drain can be removed.     8/15  Patient did not  get  shunt surgery. She is not sure she wants to proceed. Left arm and leg keeps to get stronger. Patient remains very emotional. Lumbar drain still open and draining.    8/16  Patient in better mood today. Wanting to go home. Has agreed to  shunt but surgery would be next week. Lumbar drain repaired today. Mild headache but no new symptoms.    8/17  Patient underwent  shunt today. Eager to go home. Denies any other new symptoms. Reports some weakness on right side related to the anaesthesia.       PAST MEDICAL & SURGICAL HISTORY     Medical History  Patient Active Problem List    Diagnosis Date Noted     Adjustment disorder with mixed anxiety and depressed mood      Mood disorder (H)      Sleep difficulties      Pneumonia due to group B Streptococcus, unspecified laterality, unspecified part of lung (H)      Vasospasm of cerebral artery      E-coli UTI      Gram-positive bacteremia      Fever in other diseases      Acute respiratory failure, unspecified whether with hypoxia or hypercapnia (H)      Cerebrovascular accident (CVA) due to occlusion of right middle cerebral artery (H)      Vasospasm (H)      S/P coil embolization of cerebral aneurysm 07/23/2019     Aneurysm of right internal carotid artery 07/23/2019     Carotid artery aneurysm (H) 07/22/2019     Nausea vomiting and diarrhea 07/22/2019     Aneurysmal subarachnoid hemorrhage (H) 07/22/2019     Intraventricular hemorrhage, nontraumatic (H) 07/22/2019     CVA (cerebral vascular accident) (H) 07/22/2019     Hydrocephalus 07/22/2019     Compression of brain (H)      Leiomyoma of uterus 07/18/2016     Past Medical History: No previous history.    Surgical History  She  has a past surgical history that includes IR Cerebral Angiogram (7/22/2019); IR Cerebral Angiogram (7/24/2019); PICC Team Line Insertion (7/26/2019); IR Cerebral Angiogram (7/27/2019); IR Lumbar Drain Insertion (7/27/2019); IR Cerebral Angiogram (7/29/2019); IR Cerebral Angiogram  (7/30/2019); IR Cerebral Angiogram (7/31/2019); IR Cerebral Angiogram (8/1/2019); IR Lumbar Drain Insertion (8/2/2019); IR Cerebral Angiogram (8/2/2019); IR Cerebral Angiogram (7/28/2019); pr egd percutaneous placement gastrostomy tube (N/A, 8/7/2019); Tracheostomy (N/A, 8/7/2019); IR Lumbar Drain Insertion (8/8/2019); IR Cerebral Angiogram (8/13/2019); IR Cerebral Angiogram (8/8/2019); and IR Lumbar Drain Insertion (8/16/2019).     SOCIAL HISTORY     Reviewed, and she  reports that she has never smoked. She has never used smokeless tobacco. She reports that she has current or past drug history. She reports that she does not drink alcohol.     FAMILY HISTORY     Reviewed, and non-contributory.      ALLERGIES     Allergies   Allergen Reactions     Oxycodone      Hydrocodone Swelling and Rash        REVIEW OF SYSTEMS     12 system ROS was done and was negative other than HPI - except patient feeling tired.     HOME & HOSPITAL MEDICATIONS     Prior to Admission Medications  Medications Prior to Admission   Medication Sig Dispense Refill Last Dose     ascorbic acid, vitamin C, 500 mg Chew chew tab Chew 500 mg daily.   Unknown at Unknown time       Hospital Medications    atorvastatin  20 mg Enteral Tube DAILY     chlorhexidine  15 mL Topical Q12H     guar gum  1 packet Enteral Tube BID     levETIRAcetam  (KEPPRA) solution 100 mg/mL  1,000 mg Enteral Tube BID     nystatin  5 mL Swish & Swallow QID     potassium chloride liquid/packet  20 mEq Enteral Tube BID     sodium chloride  10-30 mL Intravenous Q8H FIXED TIMES        PHYSICAL EXAM     Vital signs  Temp:  [97.6  F (36.4  C)-99.2  F (37.3  C)] 99  F (37.2  C)  Heart Rate:  [] 95  Resp:  [11-34] 21  BP: ()/(58-91) 120/82  FiO2 (%):  [21 %] 21 %    Weight:   141 lb 1.6 oz (64 kg)    GENERAL: Healthy appearing, alert, no acute distress, normal habitus.  CARDIOVASCULAR: Ext warm and well perfused. Pulses present.   NEUROLOGICAL:  Patient is trached. Does not  speak but does follow commands. EOMI. Pupils symmetric. VFI on present on both sides. Left sided facial droop.  Right side antigravity. Left leg 4-/5. Left arm 3/5     DIAGNOSTIC STUDIES     Pertinent Radiology   MRI brain - images reviewed stroke seen  IMPRESSION:   CONCLUSION:  1.  Multiple foci of developing acute/subacute infarction throughout the right parietal occipital lobes, with additional single small foci of acute/subacute infarction in the right frontal lobe and left parietal lobe.  2.  Status post endovascular coiling of large dissecting aneurysm of the ventromedial right supraclinoid ICA with redemonstration of mild persistent flow-related enhancement along the neck of the aneurysm. There is also faint flow-related enhancement   centrally within the coil mass.  3.  Mildly decreased caliber of the M1 and proximal M2 segments of the right middle cerebral artery compared to the left, which may represent mild vasospasm. There is more normal caliber of the distal M2 and M3 branches.    ECHO    1.Left ventricle ejection fraction is normal. The calculated left ventricular ejection fraction is 65%.    2.TAPSE is normal, which is consistent with normal right ventricular systolic function.    3.No hemodynamically significant valvular heart abnormalities.    4.No obvious embolic source seen, if highly suspect consider MICHAEL.    5.No previous study for comparison.     TCD - images reviewed.   IMPRESSION:   CONCLUSION:  New moderate right middle cerebral artery vasospasm.    EEG negative for seizures.    Angiogram  Preliminary findings: (see dictation for full detail)  - Stable right ICA aneurysm coil mass with residual interstitial neck filling.  - Possible mild vasospasm superimposed on right ICA dissection, verapamil 10 mg infused in right ICA.    TCD reviewed and velocities are elevated in the RMCA.    Angiogram shows MARIA L, RMCA, RACA vasospasm. Dissection in MARIA L.    Angiogram on 8/1 continues to show vasospasm.      MRI and CT reviewed - RMCA evolving stroke seen.     8/6 TCD - RMCA velocity 192 - still elevated.     MRI brain  IMPRESSION:   CONCLUSION:  HEAD MRI:   1.  New T2 FLAIR sulcal nonsuppression in the dependent temporal and occipital sulci compatible with subarachnoid hemorrhage although age indeterminant and a small amount of new subarachnoid hemorrhage is difficult to exclude.  2.  Hyperattenuation on the prior CT corresponds to faint susceptibility, favored to represent mild petechial hemorrhage.  3.  A few small scattered foci of new restricted diffusion in the right posterior limb internal capsule and right parietal cortex.  4.  No midline shift or herniation. No hydrocephalus.     HEAD MRA:   1.  New flow related signal near circumferential around the periphery of the coiled right internal carotid artery aneurysm, raising concern for recurrence.  2.  Mild to moderate vasospasm of the right M1, slightly improved compared to 7/26/2019. Mild vasospasm of the proximal right M2 middle cerebral artery branches, not significant change.  3.  No new significant stenosis or occlusion.    Angiogram  6-7 mm recurrence at the neck of previously coiled right ICA aneurysm.     TCD shows improving RMCA velocities (images of today's test reviewed).     Angiogram shows unchanged aneurysm.     Head CT  1.  Stealth noncontrast head CT for preoperative planning.  2.  Mild interval enlargement of the lateral and third ventricles which are mild to moderately dilated. This suggests a worsening hydrocephalus. No definite transependymal CSF noted.  3.  Status post endovascular coiling of giant right paraclinoid aneurysm.    Head CT  IMPRESSION:   1.  Interval placement of right frontal approach ventricular shunt catheter. No significant change in ventricular caliber.  2.  No unexpected acute intracranial abnormality.    Recent Results (from the past 24 hour(s))   Magnesium    Collection Time: 08/19/19  5:25 AM   Result Value Ref  Range    Magnesium 1.9 1.8 - 2.6 mg/dL   Comprehensive Metabolic Panel    Collection Time: 08/19/19  5:25 AM   Result Value Ref Range    Sodium 138 136 - 145 mmol/L    Potassium 3.8 3.5 - 5.0 mmol/L    Chloride 102 98 - 107 mmol/L    CO2 27 22 - 31 mmol/L    Anion Gap, Calculation 9 5 - 18 mmol/L    Glucose 103 70 - 125 mg/dL    BUN 13 8 - 22 mg/dL    Creatinine 0.60 0.60 - 1.10 mg/dL    GFR MDRD Af Amer >60 >60 mL/min/1.73m2    GFR MDRD Non Af Amer >60 >60 mL/min/1.73m2    Bilirubin, Total 0.5 0.0 - 1.0 mg/dL    Calcium 10.2 8.5 - 10.5 mg/dL    Protein, Total 8.0 6.0 - 8.0 g/dL    Albumin 4.3 3.5 - 5.0 g/dL    Alkaline Phosphatase 108 45 - 120 U/L    AST 34 0 - 40 U/L    ALT 39 0 - 45 U/L   HM1 (CBC with Diff)    Collection Time: 08/19/19  5:25 AM   Result Value Ref Range    WBC 7.8 4.0 - 11.0 thou/uL    RBC 4.25 3.80 - 5.40 mill/uL    Hemoglobin 12.5 12.0 - 16.0 g/dL    Hematocrit 37.8 35.0 - 47.0 %    MCV 89 80 - 100 fL    MCH 29.4 27.0 - 34.0 pg    MCHC 33.1 32.0 - 36.0 g/dL    RDW 13.4 11.0 - 14.5 %    Platelets 384 140 - 440 thou/uL    MPV 9.2 8.5 - 12.5 fL    Neutrophils % 66 50 - 70 %    Lymphocytes % 20 20 - 40 %    Monocytes % 10 2 - 10 %    Eosinophils % 3 0 - 6 %    Basophils % 1 0 - 2 %    Neutrophils Absolute 5.2 2.0 - 7.7 thou/uL    Lymphocytes Absolute 1.6 0.8 - 4.4 thou/uL    Monocytes Absolute 0.8 0.0 - 0.9 thou/uL    Eosinophils Absolute 0.2 0.0 - 0.4 thou/uL    Basophils Absolute 0.1 0.0 - 0.2 thou/uL   CSF, Cell Count    Collection Time: 08/19/19  9:26 AM   Result Value Ref Range    Color, CSF Xanthochromic (!) Colorless    Appearance, CSF Clear Clear    RBC,  (H) <2 /ul    WBC, CSF 3 <6 /ul    Tube Number, CSF 4     Volume, CSF 2.0 ml   Stat Gram Stain    Collection Time: 08/19/19  9:26 AM   Result Value Ref Range    Gram Stain Result No polymorphonuclear leukocytes seen     Gram Stain Result No organisms seen    Glucose, CSF    Collection Time: 08/19/19  9:26 AM   Result Value Ref Range     Glucose, CSF 55 40 - 75 mg/dL   Protein, CSF    Collection Time: 08/19/19  9:26 AM   Result Value Ref Range    Protein, CSF 35 15 - 45 mg/dL   POCT Glucose    Collection Time: 08/19/19  7:50 PM   Result Value Ref Range    Glucose 138 70 - 139 mg/dL       Total time spent for face to face visit, reviewing labs/imaging studies, counseling and coordination of care was: Over 25 min More than 50% of this time was spent on counseling and coordination of care.  Counseling family on plan.     Justin Vasquez MD  [employer] Messaging: [email or web address]  Tel: [phone]  This note was dictated using voice recognition software.  Any grammatical or context distortions are unintentional and inherent to the software.

## 2021-05-31 NOTE — PROGRESS NOTES
NEUROSURGERY PROGRESS NOTE:    NEUROSURGERY ATTENDING: The patient's attending neurosurgeon is Dr. 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 Complicated hospital course with re-bleed, concern for hydrocephalus, daily cerebral angiograms for severe vasospasm.     If velocities continuing to go down will plan on MRI/MRA today. Following MRI/MRA will work on weaning sedation.     PLAN:   Patient Active Problem List   Diagnosis     Carotid artery aneurysm (H)     Aneurysmal subarachnoid hemorrhage (H)     Intraventricular hemorrhage, nontraumatic (H)     Compression of brain (H)     S/P coil embolization of cerebral aneurysm     Aneurysm of right internal carotid artery     Cerebrovascular accident (CVA) due to occlusion of right middle cerebral artery (H)     Vasospasm (H)  --Keep sedated, RASS -3 until velocities improving  --IV fluid goal 100 mL/hr, goal to be euvolemic or slightly hypervolemic to help mitigate vasospasm, appreciate intensivist assistance  --Target CVP > 10  --Run hypertonic sodium at set rate of 25 ml/hr  ---160  --TCD's daily. Continue these  --Keppra 500 mg two times a day x30 days, indefinite with seizure  --Nimodipine x21 days. Complete 8/13/2019  --Sharpe in place, strict I/O  --Mechanical DVT prophylaxis  --Must have accurate Is and Os  --Continue Lumbar drain at 10-15 ml/hour. Would not wean until velocities stabilize  --LR was discontinued 8/5/2019. Still running this morning, this has stopped       Fever  --On Rocephin, pseudomonas in sputum  --Likely also neurogenic component            Discharge Recommendations/Plan:  Barriers to Discharge: yes, requiring acute medical care.     Follow Up Plan: Pending       HPI: Darion Herrera is a 42 yo female who was admitted on 07/22/19 with SAH and IVH secondary to ruptured aneurysm of the right ICA.  Aneurysm  coiling on 7/22 that was complicated by vasospasm. On 07/27, found to be unresponsive and possibly seizing, was intubated and taken to IR where it was revealed that her right ICU aneurysm had dissected and re-bled.  In IR on 07/27, the vessel was recoiled and a lumbar drain was placed for ICP management.  On 07/29 underwent intraarterial verapamil infusion with angiogram for acute vasospasm of right ICA. Due to increased TCD values, back to IR for another cerebral angiogram 7/30 for acute vasospasm and intraarterial verapamil infusion.     BRIEF HOSPITAL COURSE:  7/21/2019  -Admit with nausea, vomiting, headache. CT with question of extra axial mass over the planum sphenoidale region measuring 2.2 x 2.3 cm    7/22/2019  -MRI indicates mass on CT actually a giant right supraclinoid internal carotid artery aneurysm. + intraventricular hemorrhage, small right subarachnoid hemorrhage.    -IR for angiogram, coil embolization of ruptured, dissecting aneurysm. Mild to moderate interstitial filling within the coil mass    7/24/2019  -Return to IR due to high likelihood of instability of aneurysm. Aneurysm was stable in appearance    7/27/2019  -Possible seizure, became unresponsive, intubated. CT + for increased amount of hemorrhage, cerebral edema, increasing size of ventricles.     -Return to IR for angio and placement of lumbar drain    -Aneurysm re-ruptured, additional coils placed with 95% occlusion    -Moderate to severe narrowing of R ICA likely secondary tot he dissecting aneurysm and not necessarily true spasm. Verapamil given.    -Mild spasm R MCA    7/28/2019  -Stable appearance of aneurysm, verapamil given    7/29/2019  -Stable to mildly increased filling of aneurysm.     -mildly increased spasm of R MCA, verapamil given. Plan for repeat angio 8/2/2019 7/30/2019  -TCD worsening. Patient sent for angio    -worsening spasm in multiple territories. Verapamil given to R ICA, L ICA, R vert    - Stable filling of  aneurysm    -Head CT with some hypodensity concerning for infarct of R MCA territory    -Temp 102.9. Standard fever workup    7/31/2019  -Return to IR for treatment of vasospasm, more verapamil    -CT head unchanged    -UA with e coli. Gram neg rods blood culture, may be contaminant    -Febrile, temp max 102.9    -Antibiotics started per ICU team    8/1/2019  -Return to IR for treatment of vasospasm, more verapamil    -CT head unchanged    -CSF sent from lumbar drain    -Fever persisted    8/2/2019          -Return to IR for treatment of vasospasm, higher dose verapamil given                          -TCDs worsening as compared to yesterday                          - Family conference to discuss G/J and Trach    8/5/2019          -Receiving blood products today and over weekend to keep HCT >30                          -Begin backing off on sedation, Goal RASS -1 to -2    8/7/2019          -To OR for trach and feeding tube                          -MRI/MRA brain                                 SUBJECTIVE:  Opens her eyes, not following commands per report of nursing    OBJECTIVE:  Vital signs in last 24 hours  Temp:  [100  F (37.8  C)-102.2  F (39  C)] 100.4  F (38  C)  Heart Rate:  [] 79  Resp:  [0-26] 18  BP: (101-155)/(61-95) 116/69  FiO2 (%):  [30 %-100 %] 30 %  Body mass index is 35.22 kg/m .    Mental Status: sedated, speech  intubated. Opens eyes spontaneously. Not following commands or attempting to communicate    Cranial Nerves: Exam limited to sedation.  PERRL. + cough/gag. Resists exam of R pupil, does not resist on the L. Brisk corneal on R, none on L may be due to neglect      Motor Strength: Does not withdraw to painful stim. Sedated.    Lumbar Drain: Lumbar drain patent, open and titrated to 10-15 mL output/hour.     Last 3 TCD Values       8/5/2019 8/6/2019 8/7/2019   LMCA    100 99 97   LACA    139 93 76   RMCA    211 194 176   RACA    84 87 72   BA    71 49 45         Pertinent Labs:  CBC:    Lab Results   Component Value Date    WBC 10.3 08/07/2019    RBC 3.42 (L) 08/07/2019     BMP:   Lab Results   Component Value Date    CO2 21 (L) 08/07/2019    BUN 8 08/07/2019    CREATININE 0.43 (L) 08/07/2019    CALCIUM 7.0 (L) 08/07/2019     Coagulation:   Lab Results   Component Value Date    INR 1.10 08/03/2019         Pertinent Radiology   Radiology Results: personally reviewed.    Katherin Youssef [facility]  [address] [place] [address], [address] [id number]    O: [phone]  P: [phone]

## 2021-05-31 NOTE — PROGRESS NOTES
Patient on heated trach mask through the night.  At 0500 RN noted that the trach tube had been removed from the stoma.  I was called to the room and replaced with a new #6 Bivona trach.  Patient tolerated well.  She maintained SpO2 in high 90s and was able to talk.  Suctioned for a small amount of secretions.  Breath sounds clear.  Pt is resting comfortably. Cuff is down.     Sanjay Sagastume, LRT

## 2021-05-31 NOTE — PROGRESS NOTES
Progress Note    Assessment/Plan  Patient in need of tracheostomy and feeding tube and the risk benefits complications of surgical event reviewed at length with the 2 daughters.  They desire to proceed with intervention is to be accomplished today.    Principal Problem:    Aneurysmal subarachnoid hemorrhage (H)  Active Problems:    Carotid artery aneurysm (H)    Intraventricular hemorrhage, nontraumatic (H)    Compression of brain (H)    S/P coil embolization of cerebral aneurysm    Aneurysm of right internal carotid artery    Cerebrovascular accident (CVA) due to occlusion of right middle cerebral artery (H)    Vasospasm (H)    Acute respiratory failure, unspecified whether with hypoxia or hypercapnia (H)    Fever in other diseases    E-coli UTI    Gram-positive bacteremia    CVA (cerebral vascular accident) (H)    Pneumonia due to group B Streptococcus, unspecified laterality, unspecified part of lung (H)      Subjective  On ventilator sedated but does have some response  Objective    Vital signs in last 24 hours  Temp:  [100  F (37.8  C)-102.2  F (39  C)] 100.5  F (38.1  C)  Heart Rate:  [] 76  Resp:  [0-26] 2  BP: (101-155)/(61-95) 102/69  FiO2 (%):  [30 %-100 %] 30 %  Weight:   168 lb 8 oz (76.4 kg)    Intake/Output last 3 shifts  I/O last 3 completed shifts:  In: 2640.1 [I.V.:2060.1; Blood:300; NG/GT:30; IV Piggyback:250]  Out: 4579 [Urine:3325; Drains:254; Stool:1000]  Intake/Output this shift:  I/O this shift:  In: 703 [I.V.:671; IV Piggyback:32]  Out: 509 [Urine:450; Drains:59]      Physical Exam  Abdomen benign    Pertinent Labs   Lab Results   Component Value Date    WBC 10.3 08/07/2019    HGB 10.2 (L) 08/07/2019    HCT 30.1 (L) 08/07/2019    MCV 88 08/07/2019     08/07/2019             Pertinent Radiology     Ct Head Without Contrast    Result Date: 8/7/2019  EXAM: CT HEAD WO CONTRAST LOCATION: [facility] DATE/TIME: 8/7/2019 4:55 AM INDICATION: Acute infarct COMPARISON: 8/5/2019  TECHNIQUE: Routine without IV contrast. Multiplanar reformats. Dose reduction techniques were used. FINDINGS: INTRACRANIAL CONTENTS: Slight increase in conspicuity and size of large right MCA distribution hypodensity; consistent with evolving acute/subacute ischemia. Small amount of subtle hyperdensity at the right temporal operculum likely represent mild amount  of petechial hemorrhage. No significant global mass effect. Redemonstration of coiling at the anterior right aspect of [place] with prominent streaking artifact. Normal parenchymal density for age. The ventricles and sulci are normal for age. VISUALIZED ORBITS/SINUSES/MASTOIDS: No significant orbital abnormality. No significant paranasal sinus mucosal disease. No significant middle ear or mastoid effusion. BONES/SOFT TISSUES: No significant abnormality.     CONCLUSION: 1.  Slight increase in conspicuity and size of large right MCA distribution hypodensity; consistent with evolving acute/subacute ischemia. 2.  Small amount of subtle hyperdensity at the right temporal operculum likely represents mild amount of petechial hemorrhage; new since prior. 3.  No significant global mass effect. 4.  Redemonstration of coiling at the anterior right aspect of [place] with prominent streaking artifact. Results were called to Dr. Pennington at 8/7/2019 5:14 AM.    Us Venous Legs Bilateral    Result Date: 8/6/2019  EXAM: US VENOUS LEGS BILATERAL LOCATION: [facility] DATE/TIME: 8/6/2019 10:40 PM INDICATION: Prolonged bedrest; ICU, fever COMPARISON: None. TECHNIQUE: Routine exam with compression, augmentation, and duplex utilizing 2D gray-scale imaging, Doppler interrogation with color-flow and spectral waveform analysis. FINDINGS: The common femoral, femoral, popliteal, and segmentally visualized calf veins were evaluated. Right leg veins are negative for deep venous thrombosis. Left leg veins are negative for deep venous thrombosis. No  popliteal cysts.     CONCLUSION: 1.  Bilateral leg veins are negative for DVT.    Us Transcranial    Result Date: 8/7/2019  TRANSCRANIAL DOPPLER EXAM 8/7/2019 9:30 AM INDICATION: Status post subarachnoid hemorrhage. Evaluate for transcranial Doppler evidence of cerebral vasospasm. TECHNIQUE: Transcranial Doppler exam was performed using transtemporal and skull base windows.  COMPARISON: Transcranial Doppler exam 8/6/2019. FINDINGS:  RIGHT ANTERIOR CIRCULATION A mean flow velocity of 176 cm/s is present within the right middle cerebral artery, previously 194 cm/s. A mean flow velocity of 72.4 cm/s is present within the right anterior cerebral artery, previously 87 cm/s. LEFT ANTERIOR CIRCULATION A mean flow velocity of 97.4 cm/s is present within the left middle cerebral artery, previously 99.3 cm/s. A mean flow velocity of 76.2 cm/s is present within the left anterior cerebral artery, previously 92.8 cm/s. POSTERIOR CIRCULATION A mean flow velocity of 45.4 cm/s is present within the basilar artery, previously 48.9 cm/s.     CONCLUSION: 1. Moderate vasospasm right middle cerebral artery. 2. No evidence of cerebral vasospasm in the remaining vascular territories. Degree of cerebral vasospasm based on institutional ultrasound velocity criteria.           Fer Ledezma

## 2021-05-31 NOTE — PLAN OF CARE
Pt informed Jailene in neurosurgery that she was very worried about her children being able to go to the company [employer] at [facility]. This CM called Customer Service at [facility] and plan is now in place for pt daughter Corina to  tickets for the family. I also clarified that the [employer] is tonight from 6pm to 10pm and called daughter, Corina with that information/update.

## 2021-05-31 NOTE — PROGRESS NOTES
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   RMCA stroke related to vasospasm.       Initial head CT showed MARIA L giant aneurysm (sentinel headache)    S/p endovascular coil embolization    MRI/CT now shows large hypodensity/evolving ischemic stroke (from vasospasm)    TCD shows RMCA mildly elevated velocities, improved from before.    Continue Daily TCD.    MRA shows mild to moderate vasospasm.     Angiogram shows recurrence of MARIA L aneurysm.     On HHH therapy; hydration and -160    Lumbar drain    Nimodipine X 21 days    Keppra 1000 mg two times a day - Level therapeutic.     On Vimpat 100 mg two times a day. Question of [place] - interval prolonging. Can wean off vimpat. Do not suspect she needs two AED at this time. If she does have a seizure - would load on dilantin since Vasospasm is improving and nimodipine is no longer needed.     Monitor sodium.     Palliative care involved. Trach and PEG done.     Could start in bed PT in 1-2 days.    Thank you again for this referral, please feel free to contact me if you have any questions.     CHIEF COMPLAINT Aneurysmal subarachnoid hemorrhage (H)     HISTORY OF PRESENT ILLNESS     We have been requested by Dr. Kwan to evaluate Darion Herrera who is a 43 y.o.  female for SAH.    This a 43-year-old female on whom neurology is been consulted to evaluate for subarachnoid hemorrhage.  Patient initially presented with 6 days ago with a headache.  She was trying to lift some fruits and felt something blowing up in her head.  She continued to have the headache.  She left the staff she was lifting on the table.  She was then brought to the emergency  room where imaging showed a large frontal mass.  This was later on MRI identified to be a giant aneurysm.  Patient underwent angiogram and endovascular coil embolization of the aneurysm.  The patient had a repeat angiogram.  MRI done today showed multiple foci of acute/subacute infarction in the right parietal-occipital region.  There is also small area of infarction in the right frontal lobe.  TCD's show vasospasm in the right MCA.  There was some narrowing of the M1 and M2 segments.  Neurology was consulted to further help with further cares.  Patient denies any ongoing symptoms he does complain of some headache.     7/29  Patient had 2 seizures over the weekend. Was started on fosphenytoin in addition to Keppra.   No seizures today.  Intubated, but waking up as the sedation has been withdrawn.     7/30  No new seizures. Patient remains on sedation and intubated. RMCA velocities remain elevated on the TCD.     7/31  No new seizures. Patient being kept sedation. Angiogram still shows vasospasm. Patient getting IA verapamil.     8/1  No seizures. Patient continues to remain on sedation. Angiogram continues to show vasospasm. Getting IA verapamil. Family conference tomorrow.     8/2  Patient remains on sedation. Angiogram shows stable vasospasm. Some twitching of right shoulder per nursing staff.     8/5  Weekend events - patient was found to have a RMCA evolving infarct over the weekend.   Family wanting to proceed with Trach and PEG.     8/6  TCD still shows vasospasm. Patient is being kept sedated till vasospasm resolves. Family interested in Trach and PEG.     8/7  MRI/MRA brain today to assess for vasospasm. Trach and PEG today.     8/8  Patient went for angiogram today. Exam improved off sedation. Angiogram showed aneurysm but no coiling done. Coiling planned for next week.     8/9  TCD look improved. Sedation has been reduced.     8/10  More awake and able to move right arm and leg more. Following more  commands.      PAST MEDICAL & SURGICAL HISTORY     Medical History  Patient Active Problem List    Diagnosis Date Noted     Pneumonia due to group B Streptococcus, unspecified laterality, unspecified part of lung (H)      Vasospasm of cerebral artery      E-coli UTI      Gram-positive bacteremia      Fever in other diseases      Acute respiratory failure, unspecified whether with hypoxia or hypercapnia (H)      Cerebrovascular accident (CVA) due to occlusion of right middle cerebral artery (H)      Vasospasm (H)      S/P coil embolization of cerebral aneurysm 07/23/2019     Aneurysm of right internal carotid artery 07/23/2019     Carotid artery aneurysm (H) 07/22/2019     Nausea vomiting and diarrhea 07/22/2019     Aneurysmal subarachnoid hemorrhage (H) 07/22/2019     Intraventricular hemorrhage, nontraumatic (H) 07/22/2019     CVA (cerebral vascular accident) (H) 07/22/2019     Compression of brain (H)      Leiomyoma of uterus 07/18/2016     Past Medical History: No previous history.    Surgical History  She  has a past surgical history that includes IR Cerebral Angiogram (7/22/2019); IR Cerebral Angiogram (7/24/2019); PICC Team Line Insertion (7/26/2019); IR Cerebral Angiogram (7/27/2019); IR Lumbar Drain Insertion (7/27/2019); IR Cerebral Angiogram (7/29/2019); IR Cerebral Angiogram (7/30/2019); IR Cerebral Angiogram (7/31/2019); IR Cerebral Angiogram (8/1/2019); IR Lumbar Drain Insertion (8/2/2019); IR Cerebral Angiogram (8/2/2019); IR Cerebral Angiogram (7/28/2019); pr egd percutaneous placement gastrostomy tube (N/A, 8/7/2019); Tracheostomy (N/A, 8/7/2019); and IR Lumbar Drain Insertion (8/8/2019).     SOCIAL HISTORY     Reviewed, and she  reports that she has never smoked. She has never used smokeless tobacco. She reports that she has current or past drug history. She reports that she does not drink alcohol.     FAMILY HISTORY     Reviewed, and non-contributory.      ALLERGIES     Allergies   Allergen  Reactions     Oxycodone      Hydrocodone Swelling and Rash        REVIEW OF SYSTEMS     12 system ROS was done and was negative other than HPI - except patient feeling tired.     HOME & HOSPITAL MEDICATIONS     Prior to Admission Medications  Medications Prior to Admission   Medication Sig Dispense Refill Last Dose     ascorbic acid, vitamin C, 500 mg Chew chew tab Chew 500 mg daily.   Unknown at Unknown time       Hospital Medications    atorvastatin  20 mg Enteral Tube DAILY     chlorhexidine  15 mL Topical Q12H     lacosamide (VIMPAT) IVPB  50 mg Intravenous Q12H 09-21     levETIRAcetam (KEPPRA) IVPB  1,000 mg Intravenous Q12H     magnesium sulfate IVPB  4 g Intravenous Once     NaCl 0.9% IR  1,000 mL Irrigation Once     niMODipine  60 mg Enteral Tube Q4H     omeprazole  20 mg Enteral Tube QAM AC    Or     pantoprazole  40 mg Intravenous QAM AC     potassium chloride liquid/packet  20 mEq Enteral Tube BID     sodium chloride  10-30 mL Intravenous Q8H FIXED TIMES        PHYSICAL EXAM     Vital signs  Temp:  [97.5  F (36.4  C)-99.3  F (37.4  C)] 98.9  F (37.2  C)  Heart Rate:  [57-86] 83  Resp:  [8-27] 23  BP: ()/(50-94) 102/58  FiO2 (%):  [21 %-100 %] 21 %    Weight:   163 lb 1.6 oz (74 kg)    GENERAL: Healthy appearing, alert, no acute distress, normal habitus.  CARDIOVASCULAR: Ext warm and well perfused. Pulses present.   NEUROLOGICAL:  Patient is trached with reduced sedation. Does not speak but does follow commands. EOMI. Pupils symmetric. VFI on present on both sides. Left sided facial droop.  Does not move arms or legs on left side but is able to lift arm/leg on right side.      DIAGNOSTIC STUDIES     Pertinent Radiology   MRI brain - images reviewed stroke seen  IMPRESSION:   CONCLUSION:  1.  Multiple foci of developing acute/subacute infarction throughout the right parietal occipital lobes, with additional single small foci of acute/subacute infarction in the right frontal lobe and left parietal  lobe.  2.  Status post endovascular coiling of large dissecting aneurysm of the ventromedial right supraclinoid ICA with redemonstration of mild persistent flow-related enhancement along the neck of the aneurysm. There is also faint flow-related enhancement   centrally within the coil mass.  3.  Mildly decreased caliber of the M1 and proximal M2 segments of the right middle cerebral artery compared to the left, which may represent mild vasospasm. There is more normal caliber of the distal M2 and M3 branches.    ECHO    1.Left ventricle ejection fraction is normal. The calculated left ventricular ejection fraction is 65%.    2.TAPSE is normal, which is consistent with normal right ventricular systolic function.    3.No hemodynamically significant valvular heart abnormalities.    4.No obvious embolic source seen, if highly suspect consider MICHAEL.    5.No previous study for comparison.     TCD - images reviewed.   IMPRESSION:   CONCLUSION:  New moderate right middle cerebral artery vasospasm.    EEG negative for seizures.    Angiogram  Preliminary findings: (see dictation for full detail)  - Stable right ICA aneurysm coil mass with residual interstitial neck filling.  - Possible mild vasospasm superimposed on right ICA dissection, verapamil 10 mg infused in right ICA.    TCD reviewed and velocities are elevated in the RMCA.    Angiogram shows MARIA L, RMCA, RACA vasospasm. Dissection in MARIA L.    Angiogram on 8/1 continues to show vasospasm.     MRI and CT reviewed - RMCA evolving stroke seen.     8/6 TCD - RMCA velocity 192 - still elevated.     MRI brain  IMPRESSION:   CONCLUSION:  HEAD MRI:   1.  New T2 FLAIR sulcal nonsuppression in the dependent temporal and occipital sulci compatible with subarachnoid hemorrhage although age indeterminant and a small amount of new subarachnoid hemorrhage is difficult to exclude.  2.  Hyperattenuation on the prior CT corresponds to faint susceptibility, favored to represent mild  petechial hemorrhage.  3.  A few small scattered foci of new restricted diffusion in the right posterior limb internal capsule and right parietal cortex.  4.  No midline shift or herniation. No hydrocephalus.     HEAD MRA:   1.  New flow related signal near circumferential around the periphery of the coiled right internal carotid artery aneurysm, raising concern for recurrence.  2.  Mild to moderate vasospasm of the right M1, slightly improved compared to 7/26/2019. Mild vasospasm of the proximal right M2 middle cerebral artery branches, not significant change.  3.  No new significant stenosis or occlusion.    Angiogram  6-7 mm recurrence at the neck of previously coiled right ICA aneurysm.     TCD shows improving RMCA velocities (images of today's test reviewed).     Recent Results (from the past 24 hour(s))   Potassium    Collection Time: 08/09/19  5:39 PM   Result Value Ref Range    Potassium 4.0 3.5 - 5.0 mmol/L   POCT Glucose    Collection Time: 08/09/19  6:00 PM   Result Value Ref Range    Glucose 148 (H) 70 - 139 mg/dL   POCT Glucose    Collection Time: 08/10/19 12:32 AM   Result Value Ref Range    Glucose 141 (H) 70 - 139 mg/dL   Magnesium    Collection Time: 08/10/19  4:18 AM   Result Value Ref Range    Magnesium 1.9 1.8 - 2.6 mg/dL   Comprehensive Metabolic Panel    Collection Time: 08/10/19  4:18 AM   Result Value Ref Range    Sodium 139 136 - 145 mmol/L    Potassium 3.7 3.5 - 5.0 mmol/L    Chloride 109 (H) 98 - 107 mmol/L    CO2 23 22 - 31 mmol/L    Anion Gap, Calculation 7 5 - 18 mmol/L    Glucose 138 (H) 70 - 125 mg/dL    BUN 9 8 - 22 mg/dL    Creatinine 0.51 (L) 0.60 - 1.10 mg/dL    GFR MDRD Af Amer >60 >60 mL/min/1.73m2    GFR MDRD Non Af Amer >60 >60 mL/min/1.73m2    Bilirubin, Total 0.3 0.0 - 1.0 mg/dL    Calcium 8.7 8.5 - 10.5 mg/dL    Protein, Total 6.2 6.0 - 8.0 g/dL    Albumin 3.0 (L) 3.5 - 5.0 g/dL    Alkaline Phosphatase 105 45 - 120 U/L    AST 57 (H) 0 - 40 U/L     (H) 0 - 45 U/L    Procalcitonin    Collection Time: 08/10/19  4:18 AM   Result Value Ref Range    Procalcitonin 0.05 0.00 - 0.49 ng/mL   HM1 (CBC with Diff)    Collection Time: 08/10/19  4:18 AM   Result Value Ref Range    WBC 9.8 4.0 - 11.0 thou/uL    RBC 3.77 (L) 3.80 - 5.40 mill/uL    Hemoglobin 11.2 (L) 12.0 - 16.0 g/dL    Hematocrit 33.6 (L) 35.0 - 47.0 %    MCV 89 80 - 100 fL    MCH 29.7 27.0 - 34.0 pg    MCHC 33.3 32.0 - 36.0 g/dL    RDW 14.7 (H) 11.0 - 14.5 %    Platelets 349 140 - 440 thou/uL    MPV 10.8 8.5 - 12.5 fL    Neutrophils % 75 (H) 50 - 70 %    Lymphocytes % 12 (L) 20 - 40 %    Monocytes % 6 2 - 10 %    Eosinophils % 6 0 - 6 %    Basophils % 1 0 - 2 %    Neutrophils Absolute 7.4 2.0 - 7.7 thou/uL    Lymphocytes Absolute 1.2 0.8 - 4.4 thou/uL    Monocytes Absolute 0.6 0.0 - 0.9 thou/uL    Eosinophils Absolute 0.6 (H) 0.0 - 0.4 thou/uL    Basophils Absolute 0.1 0.0 - 0.2 thou/uL       Total time spent for face to face visit, reviewing labs/imaging studies, counseling and coordination of care was: Over 35 min More than 50% of this time was spent on counseling and coordination of care.   Discussion of test results with family. Discussion of plan with ICU team and NSG.     Justin Vasquez MD  [employer] Messaging: [email or web address]  Tel: [phone]  This note was dictated using voice recognition software.  Any grammatical or context distortions are unintentional and inherent to the software.

## 2021-05-31 NOTE — PROGRESS NOTES
"NEUROSURGERY PROGRESS NOTE:    NEUROSURGERY ATTENDING: The patient's attending neurosurgeon is Dr. Sal. Plan of care discussed with Dr Sal.     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 Complicated hospital course with re-bleed, concern for hydrocephalus, vasospasms, right MCA infarct. MRI last week questionable for recurrent aneurysm. No change on repeat angio earlier this week. CT head showed slightly larger ventricles after having lumbar drain clamped for 24 hours.     Long discussion with patient and her  regarding the  shunt. Patient is refusing surgery. \"if I die he () can just find someone else\"     Patient examined with professional .     PLAN:   Patient Active Problem List   Diagnosis     Carotid artery aneurysm (H)     Aneurysmal subarachnoid hemorrhage (H)     Intraventricular hemorrhage, nontraumatic (H)     Compression of brain (H)     S/P coil embolization of cerebral aneurysm     Aneurysm of right internal carotid artery     Cerebrovascular accident (CVA) due to occlusion of right middle cerebral artery (H)     Vasospasm (H)     1.   If BP drops, would allow natural BP, do not use pressors.            Notify Neurosurgery if BP less than 100     2.  Keppra 500 mg two times a day, indefinite with seizure      3.  Nimodipine dosing complete - no benefit of extending per Dr Sal     4.  Lumbar drain to 5 ml/hr.       5.  OK for PT to see patient and get patient up to chair. No restrictions per Dr Sal     6.  Lumbar drain needs to be replaced. Will ask IR. NPO midnight.     7.  Psych consult for situational depression.        Discharge Recommendations/Plan:  Barriers to Discharge: yes, requiring acute medical care.     Follow Up Plan: Pending       HPI: Darion Herrera is a 44 yo female who was admitted on 07/22/19 with SAH and IVH secondary to ruptured aneurysm of " the right ICA. Aneurysm coiling on 7/22 that was complicated by vasospasm. On 07/27, found to be unresponsive and possibly seizing, was intubated and taken to IR where it was revealed that her right ICU aneurysm had dissected and re-bled.  In IR on 07/27, the vessel was recoiled and a lumbar drain was placed for ICP management.  On 07/29 underwent intraarterial verapamil infusion with angiogram for acute vasospasm of right ICA. Due to increased TCD values, back to IR for another cerebral angiogram 7/30 for acute vasospasm and intraarterial verapamil infusion.     BRIEF HOSPITAL COURSE:  7/21/2019  -Admit with nausea, vomiting, headache. CT with question of extra axial mass over the planum sphenoidale region measuring 2.2 x 2.3 cm    7/22/2019  -MRI indicates mass on CT actually a giant right supraclinoid internal carotid artery aneurysm. + intraventricular hemorrhage, small right subarachnoid hemorrhage.    -IR for angiogram, coil embolization of ruptured, dissecting aneurysm. Mild to moderate interstitial filling within the coil mass    7/24/2019  -Return to IR due to high likelihood of instability of aneurysm. Aneurysm was stable in appearance    7/27/2019  -Possible seizure, became unresponsive, intubated. CT + for increased amount of hemorrhage, cerebral edema, increasing size of ventricles.     -Return to IR for angio and placement of lumbar drain    -Aneurysm re-ruptured, additional coils placed with 95% occlusion    -Moderate to severe narrowing of R ICA likely secondary tot he dissecting aneurysm and not necessarily true spasm. Verapamil given.    -Mild spasm R MCA    7/28/2019  -Stable appearance of aneurysm, verapamil given    7/29/2019  -Stable to mildly increased filling of aneurysm.     -mildly increased spasm of R MCA, verapamil given. Plan for repeat angio 8/2/2019 7/30/2019  -TCD worsening. Patient sent for angio    -worsening spasm in multiple territories. Verapamil given to R ICA, L ICA, R  vert    - Stable filling of aneurysm    -Head CT with some hypodensity concerning for infarct of R MCA territory    -Temp 102.9. Standard fever workup    7/31/2019  -Return to IR for treatment of vasospasm, more verapamil    -CT head unchanged    -UA with e coli. Gram neg rods blood culture, may be contaminant    -Febrile, temp max 102.9    -Antibiotics started per ICU team    8/1/2019  -Return to IR for treatment of vasospasm, more verapamil    -CT head unchanged    -CSF sent from lumbar drain    -Fever persisted    8/2/2019          -Return to IR for treatment of vasospasm, higher dose verapamil given                          -TCDs worsening as compared to yesterday                          - Family conference to discuss G/J and Trach    8/7/2019           - Trach and Peg completed                          - MRI/MRA    8/8/2019           - IR (no additional coiling)                          - New lumbar drain    8/11/2019         - weaning lumbar drain                           - weaning from the vent                           - off pressors                           - velocities improved  8/12:                 - clamped lumbar drain  8/13:                 - ventricles slightly larger on CT - lumbar drain re-opened                           - angio without intervention - aneurysm unchanged  8/14:                 - Passey Dawson valve                           - planning  shunt 8/15/2019         SUBJECTIVE:      OBJECTIVE:  Vital signs in last 24 hour  Temp:  [98.1  F (36.7  C)-99.2  F (37.3  C)] 99.2  F (37.3  C)  Heart Rate:  [] 105  Resp:  [14-37] 25  BP: (108-133)/(68-88) 118/79  SpO2:  [94 %-100 %] 97 %  ETCO2 (mmHg):  [0 mmHg] 0 mmHg  FiO2 (%):  [21 %] 21 %  Body mass index is 29.74 kg/m .    Mental Status: Trach, Vent, Follows commands and appears appropriate.  Cranial Nerves: Exam limited.  PERRL. + cough/gag. EOM intact.   She is speaking with me today via Passey mir    Motor Strength: Minimal  movement left arm and leg. Minimal hand grasp on the left.  Some horizontal movement of left leg and dorsiflexion/plantar flexion. Able to lift and hold right arm in the air. Can lift right leg.   Lumbar Drain: Lumbar drain to drain 5 ml/hr. Output clear/yellow.     Pertinent Labs:    Reviewed  Pertinent Radiology   Radiology Results: personally reviewed.    Head CT 8/15/2019  CONCLUSION:  1.  Stealth noncontrast head CT for preoperative planning.  2.  Mild interval enlargement of the lateral and third ventricles which are mild to moderately dilated. This suggests a worsening hydrocephalus. No definite transependymal CSF noted.  3.  Status post endovascular coiling of giant right paraclinoid aneurysm    Jailene Mcknight NP  [facility] Neurosurgery  O: [phone]

## 2021-05-31 NOTE — PROGRESS NOTES
PROVIDER RESTRAINT FOR NON-VIOLENT BEHAVIOR FACE TO FACE EVALUATION  8/1/2019   8:33 AM     Patient seen on morning interdisciplinary rounds.     Patient's Immediate Situation:  Patient demonstrated the following behaviors: Impulsive behavior, poor safety judgment, with other less restrictive interventions/devices ineffective    Patient's Reaction to the intervention:  Does patient understand the reason for restraint/seclusion? Unable to Express    Medical Condition:  Is there any evidence of compromise of Skin integrity, Respiratory, Cardiovascular, Musculoskeletal, Hydration? No    Behavioral Condition:  In consultation with the RN, is there a need to continue this restraint or seclusion? Yes    See Restraint Flowsheet for complete restraint documentation and assessment.    Kenia Adams, CNP

## 2021-05-31 NOTE — PROGRESS NOTES
NEUROLOGY   INPATIENT PROGRESS NOTE       1. Assessment/Plan for Hospital Day: 27     Status post ruptured dissecting ventromedial supraclinoid right internal carotid artery aneurysm  Vasospasms  Seizure  Right MCA stroke related to vasospasms    Recurrent subarachnoid hemorrhage and intraventricular hemorrhage    Status post coiling 7/22/2019    Status post coiling 7/27/2019    Status post IR procedures for vasospasms and verapamil infusions    Tracheostomy    PEG placement    lumbar drain    Right MCA vasospasm with subsequent right MCA ischemic strokes    Recommendations  1. Medical management by ICU team and neurosurgery  2.  shunt placement next week  3. Continue Keppra 1000 mill grams twice a day       2. SUBJECTIVE     CC: none    Darion Herrera is a 43 y.o. female seen for a follow up subarachnoid hemorrhage.  Please see prior notes from this 26-day long hospitalization no overnight concerns.  Patient is resting.  Family at the bedside.       Patient Active Problem List    Diagnosis Date Noted     Adjustment disorder with mixed anxiety and depressed mood      Mood disorder (H)      Sleep difficulties      Pneumonia due to group B Streptococcus, unspecified laterality, unspecified part of lung (H)      Vasospasm of cerebral artery      E-coli UTI      Gram-positive bacteremia      Fever in other diseases      Acute respiratory failure, unspecified whether with hypoxia or hypercapnia (H)      Cerebrovascular accident (CVA) due to occlusion of right middle cerebral artery (H)      Vasospasm (H)      S/P coil embolization of cerebral aneurysm 07/23/2019     Aneurysm of right internal carotid artery 07/23/2019     Carotid artery aneurysm (H) 07/22/2019     Nausea vomiting and diarrhea 07/22/2019     Aneurysmal subarachnoid hemorrhage (H) 07/22/2019     Intraventricular hemorrhage, nontraumatic (H) 07/22/2019     CVA (cerebral vascular accident) (H) 07/22/2019     Hydrocephalus 07/22/2019     Compression of brain  (H)      Leiomyoma of uterus 07/18/2016       Past Surgical History:   Procedure Laterality Date     IR CEREBRAL ANGIOGRAM  7/22/2019     IR CEREBRAL ANGIOGRAM  7/24/2019     IR CEREBRAL ANGIOGRAM  7/27/2019     IR CEREBRAL ANGIOGRAM  7/29/2019     IR CEREBRAL ANGIOGRAM  7/30/2019     IR CEREBRAL ANGIOGRAM  7/31/2019     IR CEREBRAL ANGIOGRAM  8/1/2019     IR CEREBRAL ANGIOGRAM  8/2/2019     IR CEREBRAL ANGIOGRAM  7/28/2019     IR CEREBRAL ANGIOGRAM  8/13/2019     IR CEREBRAL ANGIOGRAM  8/8/2019     IR LUMBAR DRAIN INSERTION  7/27/2019     IR LUMBAR DRAIN INSERTION  8/2/2019     IR LUMBAR DRAIN INSERTION  8/8/2019     IR LUMBAR DRAIN INSERTION  8/16/2019     PICC AND MIDLINE TEAM LINE INSERTION  7/26/2019          OH EGD PERCUTANEOUS PLACEMENT GASTROSTOMY TUBE N/A 8/7/2019    Procedure: CREATION, GASTROSTOMY, PERCUTANEOUS, ENDOSCOPIC;  Surgeon: Fer Ledezma MD;  Location: [facility];  Service: General     TRACHEOSTOMY N/A 8/7/2019    Procedure: CREATION, TRACHEOSTOMY;  Surgeon: Fer Ledezma MD;  Location: [facility];  Service: General       History reviewed. No pertinent family history.    Social History     Socioeconomic History     Marital status:      Spouse name: Not on file     Number of children: Not on file     Years of education: Not on file     Highest education level: Not on file   Occupational History     Not on file   Social Needs     Financial resource strain: Not on file     Food insecurity:     Worry: Not on file     Inability: Not on file     Transportation needs:     Medical: Not on file     Non-medical: Not on file   Tobacco Use     Smoking status: Never Smoker     Smokeless tobacco: Never Used   Substance and Sexual Activity     Alcohol use: No     Drug use: Not Currently     Sexual activity: Yes   Lifestyle     Physical activity:     Days per week: Not on file     Minutes per session: Not on file     Stress: Not on file   Relationships     Social connections:      Talks on phone: Not on file     Gets together: Not on file     Attends Hoahaoism service: Not on file     Active member of club or organization: Not on file     Attends meetings of clubs or organizations: Not on file     Relationship status: Not on file     Intimate partner violence:     Fear of current or ex partner: Not on file     Emotionally abused: Not on file     Physically abused: Not on file     Forced sexual activity: Not on file   Other Topics Concern     Not on file   Social History Narrative     Not on file       Allergies   Allergen Reactions     Oxycodone      Hydrocodone Swelling and Rash         3. SCHEDULED MEDICATIONS       atorvastatin  20 mg Enteral Tube DAILY     chlorhexidine  15 mL Topical Q12H     guar gum  1 packet Enteral Tube BID     levETIRAcetam  (KEPPRA) solution 100 mg/mL  1,000 mg Enteral Tube BID     nystatin  5 mL Swish & Swallow QID     omeprazole  20 mg Enteral Tube QAM AC    Or     pantoprazole  40 mg Intravenous QAM AC     potassium chloride liquid/packet  20 mEq Enteral Tube BID     sodium chloride  10-30 mL Intravenous Q8H FIXED TIMES       4. INFUSIONS         5. PRN MEDICATIONS   acetaminophen, bisacodyl, HYDROmorphone, lipase-protease-amylase **AND** sodium bicarbonate, melatonin, menthol-zinc oxide, naloxone **OR** naloxone, ondansetron, polyethylene glycol, senna **OR** senna (SENOKOT) syrup, simethicone, sodium chloride bacteriostatic, sodium chloride, sodium chloride, sodium chloride, white barbra-mineral oil (LUBRIFRESH PM) ophthalmic ointment    6. ROS   Review of systems not obtained due to inability to communicate with the patient.     7. PHYSICAL EXAMINATION   Vital signs in last 24 hours:  Vitals:    08/17/19 0800 08/17/19 0900 08/17/19 1000 08/17/19 1100   BP: 125/77 118/83 122/83 121/81   Patient Position:       Pulse: (!) 103 (!) 105 (!) 109 (!) 106   Resp: 28 28 23 26   Temp: 98.8  F (37.1  C)  99.2  F (37.3  C)    TempSrc:       SpO2: 93% 96% 96% 96%    Weight:       Height:         I/O last 3 completed shifts:  In: 360 [NG/GT:360]  Out: 1235 [Urine:1150; Drains:85]  No intake/output data recorded.  Vent Mode: VCV  FiO2 (%):  [21 %] 21 %  S RR:  [14] 14  S VT:  [375 mL] 375 mL  PEEP/CPAP (cm H2O):  [5 cm H2O] 5 cm H2O    Neurological Exam:     Patient is awake.  She takes good eye contact.  She can answer my questions with yes and no and is very appropriate.  She has a tracheostomy so she cannot talk.  She follows my commands.  She can move her head for yes and no answers.  Her cranial nerve examination showed a slight facial droop on the left.  Her arm strength on the right arm and right leg is normal  On the left arm proximal 3/5 distal 1-2/5 mostly handgrip.  No elbow flexion extension or wrist flexion extension strength  Leg seems fairly plegic  Light touch and pinprick normal         8. RESULTS REVIEW       8.1 Imaging studies   All studies were personally reviewed        MR BRAIN W WO CONTRAST  7/22/2019 11:07 AM  1.  The right suprasellar mass measuring up to 2.4 cm almost certainly represents a right supraclinoid internal carotid artery aneurysm. Recommend consultation with neurosurgery/neuro interventional radiology and conventional angiogram or CTA.  2.  Intraventricular hemorrhage and probable small amount of right parietal subarachnoid hemorrhage in the left sylvian fissure. No acute intracranial body. No finding to suggest subarachnoid hemorrhage. No hydrocephalus.    CT HEAD WITHOUT CONTRAST   07/28/2019, 4:52 AM  1.  Overall, no significant change compared with 07/27/2019 at 1711.  2.  Redemonstration of changes of endovascular coiling of giant right supraclinoid aneurysm.  3.  Stable mild to moderate ventriculomegaly of the lateral and third ventricles. Continued close follow-up is advised.  4.  Overall, stable amount of recent intraventricular and subarachnoid hemorrhage. No definite evidence of interval acute intracranial hemorrhage.    CT HEAD  WO CONTRAST : 8/15/2019 11:58 PM  1.  Redemonstration of large endovascular coil mass in in the right paraclinoid region resulting in significant streak artifact and locally limiting evaluation.  2.  Within this limitation, no acute intracranial hemorrhage or evidence of transcortical infarct.  3.  Stable mild to moderate enlargement of the lateral and third ventricles.       8.2 Laboratory testing         Lab Results   Component Value Date    ALT 51 (H) 08/17/2019    AST 37 08/17/2019    ALKPHOS 115 08/17/2019    BILITOT 0.6 08/17/2019     Results from last 7 days   Lab Units 08/17/19  0423 08/16/19  0453 08/15/19  0424   LN-SODIUM mmol/L 136 137 136   LN-POTASSIUM mmol/L 4.1 4.1 4.2   LN-CHLORIDE mmol/L 101 102 103   LN-CO2 mmol/L 26 26 24   LN-BLOOD UREA NITROGEN mg/dL 19 19 16   LN-CREATININE mg/dL 0.65 0.61 0.60   LN-CALCIUM mg/dL 10.4 10.3 10.2   LN-PROTEIN TOTAL g/dL 8.4* 8.4* 8.3*   LN-BILIRUBIN TOTAL mg/dL 0.6 0.7 0.5   LN-ALKALINE PHOSPHATASE U/L 115 112 110   LN-ALT (SGPT) U/L 51* 60* 72*   LN-AST (SGOT) U/L 37 39 44*       Time spent on counseling/coordination of care: 15Minutes. More than 50% of this time during the visit for Darion Herrera was devoted to counseling and coordination of care, including face to face time with the patient, time spent reviewing data, obtaining patient information, and discussing the case with other health care providers. I discussed stroke       Farhad Styles MD, PhD  Neurology & Sleep Medicine

## 2021-05-31 NOTE — PROGRESS NOTES
Palliative Care Progress Note  NAME: Darion Herrera, : 1975,   MRN: [id number] Date: 2019    Problem List:  Active Problems   Principal Problem:    Aneurysmal subarachnoid hemorrhage (H)  Active Problems:    Carotid artery aneurysm (H)    Intraventricular hemorrhage, nontraumatic (H)    Compression of brain (H)    S/P coil embolization of cerebral aneurysm    Aneurysm of right internal carotid artery    Cerebrovascular accident (CVA) due to occlusion of right middle cerebral artery (H)    Vasospasm (H)    Acute respiratory failure, unspecified whether with hypoxia or hypercapnia (H)    Fever in other diseases    E-coli UTI    Gram-positive bacteremia    CVA (cerebral vascular accident) (H)    Pneumonia due to group B Streptococcus, unspecified laterality, unspecified part of lung (H)    Vasospasm of cerebral artery    Hydrocephalus    Adjustment disorder with mixed anxiety and depressed mood    Mood disorder (H)    Sleep difficulties    Assessment: Darion Herrera, 43 y.o., female with no significant medical history admitted on 2019 with SAH and IVH 2/2 to ruptured aneurysm of the R ICA. Coiling done on  that was c/b vasospasm. On  found to be unresponsive and possible seizing, subsequently intubated and taken to IR, which found her R aneurysm had dissected and re-bled. In IR vessel was re-coiled and a lumbar drain was placed for ICP management. Required almost daily intraarterial verapamil infusions w/angiogram for acute vasospasm of the R ICA initially. Trach/PEG placed. Plan for  shunt later today.      Recommendations:   Weakness: initially due to SAH/IVH 2/2 to a ruptured dissecting ventral medial supraclinoid R ICA aneurysm, treated w/endovascualr coil embolization on , subsequent regrowth of aneurysm resulting in re-rupture on  and coil embolization re-treatment on . Lumbar drain placed  for secondary hydrocephalus. Continued cerebral vasospasm requiring IA verapamil, almost  "daily. Plan for  shunt to be placed on 8/19.   - Neurosurgery following   - Continue to work with PT/OT/SLP   - Awaiting discharge to LTACH      Shortness of breath: doing well with tracheostomy, tolerating trach masking and PMV  - Continue working with RT     Dry Mouth: due to NPO status from tracheostomy   - Aggressive mouth cares  - Biotene spray PRN      Decision making capacity: Decisional       Goals of Care: Restorative  - Plan for [place] when able to [place] [facility] [place]      Code Status: Full code    Palliative care will sign off at this time as goals are clear at this time. Please feel free to contact us with any concerns or changes in patient condition. Thank you for allowing us to participate in this patient's care  =========================================================  Current Medical Status:  No acute events overnight. Patient in a chair at bedside. Notes that she's feeling \"fine\". Is upset that she can't eat or drink anything. Explained to her at length why this was due to her tracheostomy and how she would need to work with SLP before she would be able to eat/drink normally again. Did not report any pain or discomfort. Still having loose stools due to her TF formula.     Symptom-Focused ROS:  Negative, unless otherwise noted above.     Palliative Care Focused Medications:  Hydromorphone PO - 2mg in last 24 hours     PERTINENT PHYSICAL EXAMINATION:  Vital Signs: Blood pressure 113/79, pulse 89, temperature 99.1  F (37.3  C), temperature source Oral, resp. rate 25, height 4' 10\" (1.473 m), weight 141 lb 1.6 oz (64 kg), last menstrual period 07/01/2016, SpO2 95 %.   GENERAL: alert, sitting in a chair at bedside, in no distress   SKIN: warm/dry  HEENT: Normocephalic, anicteric sclera, tracheostomy in place   LUNGS: clear anteriorly   CARDIAC: RRR, normal s1/s2, w/o m/r/g   ABDOMINAL: BS (+), soft, non distended, non tender  EXTREMITIES: No edema/cyanosis   NEUROLOGIC: alert/oriented x 3, no " gross focal deficits   PSYCH: mood appropriate, upset over not being able to eat, otherwise calm     I have reviewed labs and imaging in EPIC     ----------------------------------------------------  TT: I have personally spent a total of 25 minutes  today on the unit in review of medical record, consultation with the medical providers and assessment of patient today, with more than 50% of this time spent in counseling, coordination of care, and discussion re: symptom management, risks and benefits of management options, and development of plan of care.    Rikki Hall MD  Palliative Medicine   Pager [phone]

## 2021-05-31 NOTE — PROGRESS NOTES
Neurosurgery Progress Note:    S:  Limited MRI yesterday showed new right MCA stroke and possible b/l CATHI strokes. CT repeated last evening to assess for any increased cerebral edema. Appeared stable. TCD today showed left MCA decreased, R MCA continues to be in the 200s. 1 unit PRBC given HCT> 30 now.     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 S/p coiling. Complicated hospital course with re-bleed s/p re-coil embolization and daily cerebral angiograms for severe vasospasm with verapamil last on 8/2.  Exam poor no movement with noxious stim x 4 with sedation held for several hours yesterday. Exam likely multifactorial given history of recent seizures, E coli UTI etc but MRI yesterday showed R MCA stroke with possible b/l CATHI strokes. CT last night negative for any significant mas effect or hemorrhagic conversion.  Continue HHH. Nimodipine, -150, Na 140-150, HCT >30, CVP 10-12. No seizures on most recent EEG. Appreciate ICU assistance for medical management. CT vs full MRI brain tomorrow.    Gale Hoffmann MD   Staff Neurosurgeon

## 2021-05-31 NOTE — PROGRESS NOTES
Progress Note    Assessment/Plan   shunt canceled yesterday apparently will be rescheduled    Principal Problem:    Aneurysmal subarachnoid hemorrhage (H)  Active Problems:    Carotid artery aneurysm (H)    Intraventricular hemorrhage, nontraumatic (H)    Compression of brain (H)    S/P coil embolization of cerebral aneurysm    Aneurysm of right internal carotid artery    Cerebrovascular accident (CVA) due to occlusion of right middle cerebral artery (H)    Vasospasm (H)    Acute respiratory failure, unspecified whether with hypoxia or hypercapnia (H)    Fever in other diseases    E-coli UTI    Gram-positive bacteremia    CVA (cerebral vascular accident) (H)    Pneumonia due to group B Streptococcus, unspecified laterality, unspecified part of lung (H)    Vasospasm of cerebral artery    Hydrocephalus    Adjustment disorder with mixed anxiety and depressed mood    Mood disorder (H)    Sleep difficulties      Subjective  Noted  Objective    Vital signs in last 24 hours  Temp:  [98.5  F (36.9  C)-99.5  F (37.5  C)] 99  F (37.2  C)  Heart Rate:  [] 112  Resp:  [14-29] 23  BP: ()/(65-94) 116/77  FiO2 (%):  [21 %] 21 %  Weight:   152 lb 4.8 oz (69.1 kg)    Intake/Output last 3 shifts  I/O last 3 completed shifts:  In: 270 [NG/GT:270]  Out: 1202 [Urine:1110; Drains:92]  Intake/Output this shift:  No intake/output data recorded.      Physical Exam  No change    Pertinent Labs   Lab Results   Component Value Date    WBC 8.4 08/16/2019    HGB 12.9 08/16/2019    HCT 39.6 08/16/2019    MCV 90 08/16/2019     (H) 08/16/2019             Pertinent Radiology     Ct Head Without Contrast    Result Date: 8/16/2019  EXAM: CT HEAD WO CONTRAST LOCATION: [facility] DATE/TIME: 8/15/2019 11:58 PM INDICATION: Headache, intracranial hemorrhage suspected. COMPARISON: 8/15/2019 at 0425 TECHNIQUE: Routine without IV contrast. Multiplanar reformats. Dose reduction techniques were used. FINDINGS: INTRACRANIAL  CONTENTS: Again demonstrated is significant streak artifact relating to 2.2 cm endovascular coil mass in the right paraclinoid region. Within this limitation, no acute intracranial hemorrhage is identified. Stable mild to moderate enlargement of the lateral and third ventricles. No intracranial mass or abnormal mass effect. No CT evidence of recent transcortical infarct. VISUALIZED ORBITS/SINUSES/MASTOIDS: No significant orbital abnormality. No significant paranasal sinus mucosal disease. No significant middle ear or mastoid effusion. BONES/SOFT TISSUES: No significant abnormality.     1.  Redemonstration of large endovascular coil mass in in the right paraclinoid region resulting in significant streak artifact and locally limiting evaluation. 2.  Within this limitation, no acute intracranial hemorrhage or evidence of transcortical infarct. 3.  Stable mild to moderate enlargement of the lateral and third ventricles.          Fer Ledezma

## 2021-05-31 NOTE — PROGRESS NOTES
Respiratory Therapy Note: Pt transported to OR.  No complications.  Winnie Colbert, LRT, 8/7/2019

## 2021-05-31 NOTE — PROGRESS NOTES
Vent Mode: VCV  FiO2 (%):  [30 %-100 %] 30 %  S RR:  [16] 16  S VT:  [375 mL] 375 mL  PEEP/CPAP (cm H2O):  [5 cm H2O] 5 cm H2O  Minute Ventilation (L/min):  [6.3 L/min-7.4 L/min] 6.5 L/min  PIP:  [18 cm H2O-22 cm H2O] 21 cm H2O  MAP (cm H2O):  [7] 7    Pt remains on full vent support with the above settings,tolerated well. Pplat 14cm H2O, PIP 21 cm H2O. BS bilaterally coarse, Sxned for scant amount of thick white secretion. No change made with the  vent setting in this shift. Pt is not ready for PS trial at this moment.   RT Will continue monitor SpO2/ABG,CXR, SXN PRN, and assess for PS trial when appropriate.       Bryce Bravo, LRT

## 2021-05-31 NOTE — PLAN OF CARE
"  Problem: Speech Therapy  Goal: SLP Goals  Description  Initial Goals entered on 8/14/2019 by SARAH Gutiérrez and to be met by 8/16/2019:   Frequency: 6x/week  Patient Stated Goal(s): \"talk\"  1) patient will tolerate PMV >35 minutes with audible phonation given mod verbal cuing/strategy implementation to elicit increased verbal expression. -MET (8/16/19)  2) patient will participate in speech language evaluation pending retrieval of orders. -MET (8/16/19)      Outcome: Completed     Speech Therapy Discharge Summary    Patient was seen for Speech/[employer] Evaluation and speaking valve trial(s). The above goals were met. Further Speech Therapy is not recommended at this time, however, recommend further cognitive assessment and Bedside Swallow Study as appropriate upon transfer to [facility] [place]. Please refer to daily  progress notes for additional information.    "

## 2021-05-31 NOTE — PROGRESS NOTES
addendum    awaiting final read on cerberal angio from today  Pt still reaching intermittently for trach    Will remove trach sutures in am and downsize trach to # 6 Biv[place]-and I will trial PMV

## 2021-05-31 NOTE — PLAN OF CARE
Problem: Pain  Goal: Patient's pain/discomfort is manageable  Outcome: Progressing     Problem: Safety  Goal: Patient will be injury free during hospitalization  Outcome: Progressing     Problem: Daily Care  Goal: Daily care needs are met  Outcome: Progressing

## 2021-05-31 NOTE — PLAN OF CARE
Care Management Goals of the Day: Follow progression of care      Care Progression Reviewed With: Charge RN, MD, HUC, RNCM     Barriers to Discharge: ICU medical cares, repeat angio pending today,     Discharge Disposition: TBD     Expected Discharge Date: TBD     Transportation: TBD    Care Coordination Narrative: Pt continues to be critically ill. Repeat angios pending. Will need therapy evals again when able to participate. CM to follow and assist as needed.      [facility] care conference set for this Friday, Aug 2 at 1030am. Provider Mikael and Dr Reyes aware,  services scheduled, conference room signed out. Spoke with daughter, Corina, who interpreted for pt spouse and son, all plan to attend.

## 2021-05-31 NOTE — OP NOTE
NEUROSURGERY OPERATIVE REPORT     DATE OF SERVICE:8/19/19    PREOPERATIVE DIAGNOSES:  1.  Hydrocephalus     POSTOPERATIVE DIAGNOSES:  same     PROCEDURES:  1.  Right  frontal Ventriculoperitoneal shunt placement, certas programmable set at 4 using stealth navigation    2.  Peritoneal catheter placement through a laparoscope (see separate dictations by general surgery)    SURGEON:  Gale Hoffmann    INDICATIONS:  Darion Herrera is a 43 y.o. female female admitted 7/21/2019 with ruptured dissecting ventral medial supraclinoid right internal carotid artery aneurysm resulting in SAH/IVH. S/p coiling. Complicated hospital course with re-bleed and seizures s/p re-coil embolization and cerebral angiograms for severe vasospasm with verapamil last on 8/2, c/b right MCA infarct. She now has hydrocephalus and inability to wean the SA drain. CSF gram stain negative. My partner Dr Sal had planned a right  shunt last week but was unable to get patient consent prior to proceeding. She is out of town and I am performing the right  shunt today. Risks and benefits discussed and she agreed to proceed.     PROCEDURE:  After obtaining informed consent, the patient was brought to the operating room with TEDs and pneumatic stockings in place.  IV antibiotics was administered.  She was placed under general anesthesia through her trach.  She was positioned supine with a bump under the right shoulder/torso with all pressure points well padded.   Sharpe catheter was already in place. The patient's R frontal cranium was shaved prepped and draped in the usual sterile fashion. The neck, chest and abdomen were also prepped and draped in the usual sterile fashion. A curved incision over Kocher's point on the right frontal cranium.   The incision was opened sharply down to the level of the periosteum superiorly flapped laterally held open with a clamp. We then proceeded to drill a bur holes at Kocher's point with a power .We opened  the dura and achieved hemostasis. We next made a pocket for the valve from the cranial incision down to a stab incision behind the ear. A piece of silk was then placed through this area for later use. We next placed the ventricular catheter using stealth navigation. We had good csf flow. The shunt tubing was connected to the certas valve.  We prior had filled the shunt (Certas in-line valve set magnetically at 4 on the back table).  The shunt was filled on the back table with saline using a manometer and verified to have all the internal air removed and to have a correct closing pressure. This connection was secured with a silk tie. We then used a shunt tunneler to tunnel from the incision behind the ear down to the abdomen. See the distal abdominal dictation by general surgery.  The plastic sheath was removed and the shunt tubing was then put into the shunt tunneler. Once the shunt tubing was through the tunneler we then removed the tunneler. The proximal portion of the distal catheter was connected to the silk tie behind the ear and tunneled through to the cranial incision. This was then connected to the valve and secured with a silk tie. This was then pulled down from the abdomen until the valve set flush under the skin. The ventricular catheter was secured to the bone in an elbow which was affixed to the bone with 2 screws.    Once we were satisfied that there was good flow of the CSF through to the peritoneal portion of the catheter we then proceeded to close the incision sites.  All incisions were irrigated.  We approximated the galea with 2-0 Vicryl sutures and closed the skin edges with running monocryl. For the posterior cervical ear incision only running monocryl was used. dermabond was placed over both incisions.    Sponge and needle counts were correct prior to closure x2.  Sterile dressings were placed.  Patient tolerated the procedure well, was taken to the the recovery room for further  monitoring.    Findings:    certas at 4  Intraoperative xray shunt appeared to be not disconnected without kinks      Estimated Blood Loss:   15 mL from 8/19/2019  1:17 PM to 8/19/2019  4:04 PM     Specimens:    none       Drains:        Gastrostomy/Enterostomy (Active)   Surrounding Skin Dry;Intact 8/19/2019 12:00 PM   Stabilization DTAD 8/19/2019  9:02 AM   Drain Status Clamped 8/19/2019 12:00 PM   Drainage Appearance Tan 8/19/2019 12:00 PM   Site Description Healing 8/19/2019  9:02 AM   Dressing Status  Clean;Dry;Intact 8/19/2019 12:00 PM   Dressing Type Split gauze 8/19/2019 12:00 PM   Dressing Change Due 08/20/19 8/19/2019  9:02 AM   Tube Feeding Bag Changed No 8/18/2019  4:00 PM   Feeding Tube Rate (mL/hr) 35 mL 8/18/2019  4:00 PM   Tube Feeding Flushes (mL) 100 mL 8/19/2019  9:02 AM   Tube Feeding Residual (mL) 0 mL 8/19/2019  9:02 AM   Intake (mL) 280 mL 8/18/2019  9:45 PM   Output (mL) 0 mL 8/13/2019  3:42 AM       Urethral Catheter Non-latex 16 Fr. (Active)   Site Skin Assessment Clean;Intact 8/19/2019 12:00 PM   Care/Interventions Patent and draining;Closed drainage system maintained;Unclamped 8/19/2019 12:00 PM   Securement Method Stabilization device 8/19/2019 12:00 PM   CAUTI Infection Prevention Education Handout Given to Patient Yes 8/19/2019 12:00 PM   Protocol: Indication to Continue Output monitoring in critical patient 8/19/2019 12:00 PM   Output (mL) 150 mL 8/19/2019 12:00 PM   Drainage Color Chantel 8/19/2019  5:00 AM   Drainage Appearance No clots 8/15/2019  4:00 AM       Lumbar Drain (Active)   Drain Status Clamped 8/19/2019 12:00 PM   Drain Level (cm) 10cm 8/18/2019 12:00 AM   CSF Color Clear 8/18/2019 10:45 PM   Site Description Healing 8/19/2019  9:02 AM   Dressing Status  Clean;Intact;Dry 8/19/2019  9:02 AM   Output (mL) 5 mL 8/18/2019 10:45 PM       ICP/Ventriculostomy Right  (Active)         Implants:  Implant Name Type Inv. Item Serial No.  Lot No. LRB No. Used Action   KIT  CATH ANTIBIOTIC IMPREGNATED [id number] SNA   KIT CATH ANTIBIOTIC IMPREGNATED [id number] [employer] [id number] Right 1 Implanted         Complications:    None    Gale Hoffmann    Cc: Monroe Cassidy MD

## 2021-05-31 NOTE — PROGRESS NOTES
"Pharmacy Consult: Vancomycin Dosing    Pharmacist consulted to dose vancomycin for Darion Herrera, a 43 y.o. female.    Ordering provider: Dr ARIANE Reyes, Intrensivist    Indication for vancomycin therapy: r/o sepsis, Fevers, on 7/30 1/2 BC +, E-coli in urine, repeat BC x2 on 7/31, CXR negative    Goal Trough Range:  10-20 mcg/mL based on indication    Other current antimicrobials             cefepime (MAXIPIME) 2 grams  Every 12 hours             Subjective/Objective:    Patient was admitted for Aneurysmal subarachnoid hemorrhage (H) on 7/22/2019    Height: 4' 10\" (1.473 m)    Actual Body Weight (ABW): 70.7 kg (155 lb 14.4 oz)    Patient must be at least 60 in tall to calculate ideal body weight    BMI: Body mass index is 32.58 kg/m .    Allergies   Allergen Reactions     Oxycodone      Hydrocodone Swelling and Rash       Patient Active Problem List   Diagnosis     Leiomyoma of uterus     Carotid artery aneurysm (H)     Nausea vomiting and diarrhea     Aneurysmal subarachnoid hemorrhage (H)     Intraventricular hemorrhage, nontraumatic (H)     Compression of brain (H)     S/P coil embolization of cerebral aneurysm     Aneurysm of right internal carotid artery     Cerebrovascular accident (CVA) due to occlusion of right middle cerebral artery (H)     Vasospasm (H)     Acute respiratory failure, unspecified whether with hypoxia or hypercapnia (H)     Fever in other diseases    History reviewed. No pertinent past medical history.     Temp Readings from Current Encounter:     07/31/19 0800 07/31/19 1000 07/31/19 1039   Temp: (!) 102.9  F (39.4  C) (!) 101  F (38.3  C) 98.8  F (37.1  C)     Net Intake/Output (last 24 hours):  I/O last 3 completed shifts:  In: 5807 [I.V.:5172; NG/GT:385; IV Piggyback:250]  Out: 4784 [Urine:4470; Drains:314]    Recent Labs     07/29/19  0852 07/30/19  0504 07/30/19  0954   WBC 17.8*  --  17.1*   NEUTROABS  --   --  14.9*   BUN  --  3*  --    CREATININE  --  0.57*  --      Estimated Creatinine " Clearance: 142 mL/min (A) (by C-G formula based on SCr of 0.57 mg/dL (L)).    Recent Labs     07/30/19  1005   CULTURE >100,000 col/ml Escherichia coli*       Results for orders placed or performed during the hospital encounter of 07/22/19   Culture, Urine    Collection Time: 07/30/19 10:05 AM   Result Value Status    Culture >100,000 col/ml Escherichia coli (!) Preliminary   7/30 BC x2 - 1 of 2 + for GPC - (peripheral)  7/31 sputum Gm stain:   3+ Gram positive cocci in chains, 2+ Gram positive bacilli, diphtheroid like   7/31 BC x2 (P)    No results for input(s): VANCOMYCIN in the last 168 hours.    Vancomycin administrations: (last 120 hours)     None          Assessment/Plan:    Pharmacist consulted to dose vancomycin for Sepsis, goal trough range 10-20 mcg/mL.  1. Initiate vancomycin 1000 IV every 8 hours (15 mg/kg actual body weight).  2. No vancomycin level available for assessment.  3. Pharmacist will plan to check a vancomycin trough level dose 4.  4. Pharmacist will continue to follow.    Thank you for the consult.  Nirmala Harvey RPh 7/31/2019 2:28 PM

## 2021-05-31 NOTE — PLAN OF CARE
Problem: Pain  Goal: Patient's pain/discomfort is manageable  Outcome: Progressing     Problem: Safety  Goal: Patient will be injury free during hospitalization  Outcome: Progressing     Problem: Daily Care  Goal: Daily care needs are met  Outcome: Progressing      Problem: Neurological Deficit  Goal: Neurological status is stable or improving  Outcome: Progressing     Problem: Mechanical Ventilation  Goal: Patient will maintain patent airway  Outcome: Progressing  Goal: Oral health is maintained or improved  Outcome: Progressing  Goal: ET tube will be managed safely  Outcome: Progressing

## 2021-05-31 NOTE — PROGRESS NOTES
Interventional Neuroradiology    Patient well known to us from previous LD and angiogram.  Current LD was placed 8/8.  Plan was to have  shunt placed yesterday but pt refused.  LD to be replaced today with plan for  shunt Monday (patient now would like to proceed)    Discussed the procedure with patient using an .  She gives written and verbal consent to proceed.    Narcisa Gamble, JF, CNP

## 2021-05-31 NOTE — PROGRESS NOTES
.............RESPIRATORY CARE NOTE        Vent Mode: VCV  FiO2 (%):  [21 %-50 %] 21 %  S RR:  [14] 14  S VT:  [375 mL] 375 mL  PEEP/CPAP (cm H2O):  [5 cm H2O] 5 cm H2O  Minute Ventilation (L/min):  [5.3 L/min-8.2 L/min] 5.3 L/min  PIP:  [15 cm H2O-19 cm H2O] 18 cm H2O  MAP (cm H2O):  [6-12] 6    No vent changes this shift.     Plan is to wean from vent two times a day as tolerated.     Carl Reeder, LRT

## 2021-05-31 NOTE — PROGRESS NOTES
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   RMCA stroke related to vasospasm.       Initial head CT showed MARIA L giant aneurysm (sentinel headache)    S/p endovascular coil embolization    MRI/CT shows large hypodensity/evolving ischemic stroke (from vasospasm)    Imaging showed improving vasospasm at this point.     On H therapy; hydration and -160    Nimodipine X 21 days    Keppra 1000 mg two times a day - Level therapeutic.     Stop Vimpat today.     Monitor sodium.     Palliative care involved. Trach and PEG done.     Could start in bed PT in 1-2 days.    Head CT ordered by NS for tomorrow.    Thank you again for this referral, please feel free to contact me if you have any questions.     CHIEF COMPLAINT Aneurysmal subarachnoid hemorrhage (H)     HISTORY OF PRESENT ILLNESS     We have been requested by Dr. Kwan to evaluate Darion Herrera who is a 43 y.o.  female for SAH.    This a 43-year-old female on whom neurology is been consulted to evaluate for subarachnoid hemorrhage.  Patient initially presented with 6 days ago with a headache.  She was trying to lift some fruits and felt something blowing up in her head.  She continued to have the headache.  She left the staff she was lifting on the table.  She was then brought to the emergency room where imaging showed a large frontal mass.  This was later on MRI identified to be a giant aneurysm.  Patient underwent angiogram and endovascular coil embolization of the aneurysm.  The patient had a repeat angiogram.  MRI done today showed multiple foci of acute/subacute infarction in the right parietal-occipital region.  There is also  small area of infarction in the right frontal lobe.  TCD's show vasospasm in the right MCA.  There was some narrowing of the M1 and M2 segments.  Neurology was consulted to further help with further cares.  Patient denies any ongoing symptoms he does complain of some headache.     7/29  Patient had 2 seizures over the weekend. Was started on fosphenytoin in addition to Keppra.   No seizures today.  Intubated, but waking up as the sedation has been withdrawn.     7/30  No new seizures. Patient remains on sedation and intubated. RMCA velocities remain elevated on the TCD.     7/31  No new seizures. Patient being kept sedation. Angiogram still shows vasospasm. Patient getting IA verapamil.     8/1  No seizures. Patient continues to remain on sedation. Angiogram continues to show vasospasm. Getting IA verapamil. Family conference tomorrow.     8/2  Patient remains on sedation. Angiogram shows stable vasospasm. Some twitching of right shoulder per nursing staff.     8/5  Weekend events - patient was found to have a RMCA evolving infarct over the weekend.   Family wanting to proceed with Trach and PEG.     8/6  TCD still shows vasospasm. Patient is being kept sedated till vasospasm resolves. Family interested in Trach and PEG.     8/7  MRI/MRA brain today to assess for vasospasm. Trach and PEG today.     8/8  Patient went for angiogram today. Exam improved off sedation. Angiogram showed aneurysm but no coiling done. Coiling planned for next week.     8/9  TCD look improved. Sedation has been reduced.     8/10  More awake and able to move right arm and leg more. Following more commands.     8/11  Vimpat was decreased with no seizures. More interactive today. Still not able to lift left arm.      PAST MEDICAL & SURGICAL HISTORY     Medical History  Patient Active Problem List    Diagnosis Date Noted     Pneumonia due to group B Streptococcus, unspecified laterality, unspecified part of lung (H)      Vasospasm of cerebral  artery      E-coli UTI      Gram-positive bacteremia      Fever in other diseases      Acute respiratory failure, unspecified whether with hypoxia or hypercapnia (H)      Cerebrovascular accident (CVA) due to occlusion of right middle cerebral artery (H)      Vasospasm (H)      S/P coil embolization of cerebral aneurysm 07/23/2019     Aneurysm of right internal carotid artery 07/23/2019     Carotid artery aneurysm (H) 07/22/2019     Nausea vomiting and diarrhea 07/22/2019     Aneurysmal subarachnoid hemorrhage (H) 07/22/2019     Intraventricular hemorrhage, nontraumatic (H) 07/22/2019     CVA (cerebral vascular accident) (H) 07/22/2019     Compression of brain (H)      Leiomyoma of uterus 07/18/2016     Past Medical History: No previous history.    Surgical History  She  has a past surgical history that includes IR Cerebral Angiogram (7/22/2019); IR Cerebral Angiogram (7/24/2019); PICC Team Line Insertion (7/26/2019); IR Cerebral Angiogram (7/27/2019); IR Lumbar Drain Insertion (7/27/2019); IR Cerebral Angiogram (7/29/2019); IR Cerebral Angiogram (7/30/2019); IR Cerebral Angiogram (7/31/2019); IR Cerebral Angiogram (8/1/2019); IR Lumbar Drain Insertion (8/2/2019); IR Cerebral Angiogram (8/2/2019); IR Cerebral Angiogram (7/28/2019); pr egd percutaneous placement gastrostomy tube (N/A, 8/7/2019); Tracheostomy (N/A, 8/7/2019); and IR Lumbar Drain Insertion (8/8/2019).     SOCIAL HISTORY     Reviewed, and she  reports that she has never smoked. She has never used smokeless tobacco. She reports that she has current or past drug history. She reports that she does not drink alcohol.     FAMILY HISTORY     Reviewed, and non-contributory.      ALLERGIES     Allergies   Allergen Reactions     Oxycodone      Hydrocodone Swelling and Rash        REVIEW OF SYSTEMS     12 system ROS was done and was negative other than HPI - except patient feeling tired.     HOME & HOSPITAL MEDICATIONS     Prior to Admission  Medications  Medications Prior to Admission   Medication Sig Dispense Refill Last Dose     ascorbic acid, vitamin C, 500 mg Chew chew tab Chew 500 mg daily.   Unknown at Unknown time       Hospital Medications    atorvastatin  20 mg Enteral Tube DAILY     chlorhexidine  15 mL Topical Q12H     guar gum  1 packet Enteral Tube BID     lacosamide (VIMPAT) IVPB  50 mg Intravenous Q12H 09-21     levETIRAcetam (KEPPRA) IVPB  1,000 mg Intravenous Q12H     magnesium sulfate IVPB  4 g Intravenous Once     NaCl 0.9% IR  1,000 mL Irrigation Once     niMODipine  60 mg Enteral Tube Q4H     omeprazole  20 mg Enteral Tube QAM AC    Or     pantoprazole  40 mg Intravenous QAM AC     potassium chloride liquid/packet  20 mEq Enteral Tube BID     sodium chloride  10-30 mL Intravenous Q8H FIXED TIMES        PHYSICAL EXAM     Vital signs  Temp:  [98.5  F (36.9  C)-99.6  F (37.6  C)] 99.6  F (37.6  C)  Heart Rate:  [] 81  Resp:  [12-30] 27  BP: ()/(54-88) 117/73  FiO2 (%):  [21 %] 21 %    Weight:   164 lb 11.2 oz (74.7 kg)    GENERAL: Healthy appearing, alert, no acute distress, normal habitus.  CARDIOVASCULAR: Ext warm and well perfused. Pulses present.   NEUROLOGICAL:  Patient is trached with reduced sedation. Does not speak but does follow commands. EOMI. Pupils symmetric. VFI on present on both sides. Left sided facial droop.  Does not move arms or legs on left side but is able to lift arm/leg on right side.      DIAGNOSTIC STUDIES     Pertinent Radiology   MRI brain - images reviewed stroke seen  IMPRESSION:   CONCLUSION:  1.  Multiple foci of developing acute/subacute infarction throughout the right parietal occipital lobes, with additional single small foci of acute/subacute infarction in the right frontal lobe and left parietal lobe.  2.  Status post endovascular coiling of large dissecting aneurysm of the ventromedial right supraclinoid ICA with redemonstration of mild persistent flow-related enhancement along the neck  of the aneurysm. There is also faint flow-related enhancement   centrally within the coil mass.  3.  Mildly decreased caliber of the M1 and proximal M2 segments of the right middle cerebral artery compared to the left, which may represent mild vasospasm. There is more normal caliber of the distal M2 and M3 branches.    ECHO    1.Left ventricle ejection fraction is normal. The calculated left ventricular ejection fraction is 65%.    2.TAPSE is normal, which is consistent with normal right ventricular systolic function.    3.No hemodynamically significant valvular heart abnormalities.    4.No obvious embolic source seen, if highly suspect consider MICHAEL.    5.No previous study for comparison.     TCD - images reviewed.   IMPRESSION:   CONCLUSION:  New moderate right middle cerebral artery vasospasm.    EEG negative for seizures.    Angiogram  Preliminary findings: (see dictation for full detail)  - Stable right ICA aneurysm coil mass with residual interstitial neck filling.  - Possible mild vasospasm superimposed on right ICA dissection, verapamil 10 mg infused in right ICA.    TCD reviewed and velocities are elevated in the RMCA.    Angiogram shows MARIA L, RMCA, RACA vasospasm. Dissection in MARIA L.    Angiogram on 8/1 continues to show vasospasm.     MRI and CT reviewed - RMCA evolving stroke seen.     8/6 TCD - RMCA velocity 192 - still elevated.     MRI brain  IMPRESSION:   CONCLUSION:  HEAD MRI:   1.  New T2 FLAIR sulcal nonsuppression in the dependent temporal and occipital sulci compatible with subarachnoid hemorrhage although age indeterminant and a small amount of new subarachnoid hemorrhage is difficult to exclude.  2.  Hyperattenuation on the prior CT corresponds to faint susceptibility, favored to represent mild petechial hemorrhage.  3.  A few small scattered foci of new restricted diffusion in the right posterior limb internal capsule and right parietal cortex.  4.  No midline shift or herniation. No  hydrocephalus.     HEAD MRA:   1.  New flow related signal near circumferential around the periphery of the coiled right internal carotid artery aneurysm, raising concern for recurrence.  2.  Mild to moderate vasospasm of the right M1, slightly improved compared to 7/26/2019. Mild vasospasm of the proximal right M2 middle cerebral artery branches, not significant change.  3.  No new significant stenosis or occlusion.    Angiogram  6-7 mm recurrence at the neck of previously coiled right ICA aneurysm.     TCD shows improving RMCA velocities (images of today's test reviewed).     Recent Results (from the past 24 hour(s))   Magnesium    Collection Time: 08/11/19  4:26 AM   Result Value Ref Range    Magnesium 1.9 1.8 - 2.6 mg/dL   Comprehensive Metabolic Panel    Collection Time: 08/11/19  4:26 AM   Result Value Ref Range    Sodium 138 136 - 145 mmol/L    Potassium 3.5 3.5 - 5.0 mmol/L    Chloride 108 (H) 98 - 107 mmol/L    CO2 23 22 - 31 mmol/L    Anion Gap, Calculation 7 5 - 18 mmol/L    Glucose 112 70 - 125 mg/dL    BUN 9 8 - 22 mg/dL    Creatinine 0.53 (L) 0.60 - 1.10 mg/dL    GFR MDRD Af Amer >60 >60 mL/min/1.73m2    GFR MDRD Non Af Amer >60 >60 mL/min/1.73m2    Bilirubin, Total 0.3 0.0 - 1.0 mg/dL    Calcium 9.0 8.5 - 10.5 mg/dL    Protein, Total 6.1 6.0 - 8.0 g/dL    Albumin 3.1 (L) 3.5 - 5.0 g/dL    Alkaline Phosphatase 102 45 - 120 U/L    AST 61 (H) 0 - 40 U/L     (H) 0 - 45 U/L   HM1 (CBC with Diff)    Collection Time: 08/11/19  4:26 AM   Result Value Ref Range    WBC 7.5 4.0 - 11.0 thou/uL    RBC 3.60 (L) 3.80 - 5.40 mill/uL    Hemoglobin 10.5 (L) 12.0 - 16.0 g/dL    Hematocrit 31.8 (L) 35.0 - 47.0 %    MCV 88 80 - 100 fL    MCH 29.2 27.0 - 34.0 pg    MCHC 33.0 32.0 - 36.0 g/dL    RDW 14.5 11.0 - 14.5 %    Platelets 307 140 - 440 thou/uL    MPV 10.3 8.5 - 12.5 fL    Neutrophils % 76 (H) 50 - 70 %    Lymphocytes % 12 (L) 20 - 40 %    Monocytes % 6 2 - 10 %    Eosinophils % 5 0 - 6 %    Basophils % 1 0 -  2 %    Neutrophils Absolute 5.7 2.0 - 7.7 thou/uL    Lymphocytes Absolute 0.9 0.8 - 4.4 thou/uL    Monocytes Absolute 0.5 0.0 - 0.9 thou/uL    Eosinophils Absolute 0.4 0.0 - 0.4 thou/uL    Basophils Absolute 0.1 0.0 - 0.2 thou/uL   Blood Gases, Venous    Collection Time: 08/11/19  1:13 PM   Result Value Ref Range    pH, Venous 7.45 7.35 - 7.45    pCO2, Venous 34 (L) 35 - 50 mm Hg    pO2, Eliud 39 25 - 47 mm Hg    Base Excess, Venous 0.0 mmol/L    HCO3, Venous 24.4 24.0 - 30.0 mmol/L    Oxyhemoglobin 75.3 (H) 70.0 - 75.0 %    O2 Sat, Venous 78.1 (H) 70.0 - 75.0 %       Total time spent for face to face visit, reviewing labs/imaging studies, counseling and coordination of care was: Over 25 min More than 50% of this time was spent on counseling and coordination of care.      Justin Vasquez MD  Direct Secure Messaging: [email or web address]  Tel: [phone]  This note was dictated using voice recognition software.  Any grammatical or context distortions are unintentional and inherent to the software.

## 2021-05-31 NOTE — PLAN OF CARE
Care Management Goals of the Day: Follow progression of care, assist with DC planning     Care Progression Reviewed With: Charge RN, MD, HUC, RNCM     Barriers to Discharge: ICU medical cares, intubated/sedated, new trach and PEG, pending coiling    Discharge Disposition: [place]     Expected Discharge Date: 8/14/19     Transportation:  stretcher     Care Coordination Narrative: Pt remains in critical condition, plan is [place] when medically stable with HE to transport. Anupam continues to follow. CM to follow and assist as needed.

## 2021-05-31 NOTE — PROCEDURES
[facility] Radiology Post Procedure    Procedure: lumbar drain replacement     Radiologist: Kameron Aguero    Contrast: 0 mL Omnipaque  Fluoro Time: 2.3 minutes  Air Kerma: 163 mGy     Medications: fentanyl and Precedex gtts     Sedation Time: continuous     EBL: minimal  Complications: none     Preliminary findings: (see dictation for full detail)  Technically successful lumbar drain placement at L3-4, tip at T11.     Assess/Plan:  Drain management per Neurosurgery.       Kameron Aguero

## 2021-05-31 NOTE — BRIEF OP NOTE
Brief Operative Note    Name:  Darion Herrera  Location: [facility] Main OR  Procedure Date:  7/22/2019 - 8/19/2019  PCP:  Monroe Cassidy MD      INSERTION, SHUNT, VENTRICULOPERITONEAL, USING FRAMELESS STEREOTAXY  (Right), INSERTION, CATHETER, PERITONEAL, LAPAROSCOPIC (Right)    Pre-Procedure Diagnosis:    Hydrocephalus [G91.9]     Post-Procedure Diagnosis:    * No post-op diagnosis entered *    Surgeon(s):  Gale Hoffmann MD Shearen, John G, MD    Findings:    certas at 4  Intraoperative xray shunt appeared to be not disconnected without kinks     Estimated Blood Loss:   15 mL from 8/19/2019  1:17 PM to 8/19/2019  4:04 PM    Specimens:    none       Drains:   Gastrostomy/Enterostomy (Active)   Surrounding Skin Dry;Intact 8/19/2019 12:00 PM   Stabilization DTAD 8/19/2019  9:02 AM   Drain Status Clamped 8/19/2019 12:00 PM   Drainage Appearance Tan 8/19/2019 12:00 PM   Site Description Healing 8/19/2019  9:02 AM   Dressing Status  Clean;Dry;Intact 8/19/2019 12:00 PM   Dressing Type Split gauze 8/19/2019 12:00 PM   Dressing Change Due 08/20/19 8/19/2019  9:02 AM   Tube Feeding Bag Changed No 8/18/2019  4:00 PM   Feeding Tube Rate (mL/hr) 35 mL 8/18/2019  4:00 PM   Tube Feeding Flushes (mL) 100 mL 8/19/2019  9:02 AM   Tube Feeding Residual (mL) 0 mL 8/19/2019  9:02 AM   Intake (mL) 280 mL 8/18/2019  9:45 PM   Output (mL) 0 mL 8/13/2019  3:42 AM       Urethral Catheter Non-latex 16 Fr. (Active)   Site Skin Assessment Clean;Intact 8/19/2019 12:00 PM   Care/Interventions Patent and draining;Closed drainage system maintained;Unclamped 8/19/2019 12:00 PM   Securement Method Stabilization device 8/19/2019 12:00 PM   CAUTI Infection Prevention Education Handout Given to Patient Yes 8/19/2019 12:00 PM   Protocol: Indication to Continue Output monitoring in critical patient 8/19/2019 12:00 PM   Output (mL) 150 mL 8/19/2019 12:00 PM   Drainage Color Chantel 8/19/2019  5:00 AM   Drainage Appearance No clots 8/15/2019  4:00 AM        Lumbar Drain (Active)   Drain Status Clamped 8/19/2019 12:00 PM   Drain Level (cm) 10cm 8/18/2019 12:00 AM   CSF Color Clear 8/18/2019 10:45 PM   Site Description Healing 8/19/2019  9:02 AM   Dressing Status  Clean;Intact;Dry 8/19/2019  9:02 AM   Output (mL) 5 mL 8/18/2019 10:45 PM       ICP/Ventriculostomy Right  (Active)       Implants:  Implant Name Type Inv. Item Serial No.  Lot No. LRB No. Used Action   KIT CATH ANTIBIOTIC IMPREGNATED [id number] - SNA  KIT CATH ANTIBIOTIC IMPREGNATED [id number] NA [employer] [id number] Right 1 Implanted       Complications:    None    Gale Hoffmann     Date: 8/19/2019  Time: 4:04 PM

## 2021-05-31 NOTE — TELEPHONE ENCOUNTER
PATIENT NAME:  Darion Herrera  YOB: 1975  MRN: [id number]  SURGEON: DR. HARTMAN FOR DR. SAL  DATE of SURGERY: 8-19-19  PROCEDURE: ADMIT 7-21-19 WITH  GIANT ANEURYSM, SAH.  Now s/p  shunt placement 8-19-19, CERTAS set to 4.          FOLLOW-UP:    STAPLE REMOVAL:  2 WEEKS (WILL LIKELY BE AT [facility])  Post Op Visit:  Pending discharge from [place]  Post-op Provider: NP  DIAGNOSTICS:  radiology: CT scan: HEAD WITHOUT  (Plan CT 8/23 or 8/26,  [facility] TO call neurosurgery when complete for remote review)  DISPOSITION:  TO [facility] 8-20-19    ADDITIONAL INSTRUCTIONS FOR MEDICAL STAFF:    admitted 7/21/2019 with ruptured dissecting ventral medial supraclinoid right internal carotid artery aneurysm resulting in SAH/IVH. Complicated hospital course   with re-bleed and additional coils placed, concern for hydrocephalus, vasospasms with subsequent right MCA infarct. Most recent angio on 8/13/2019 stable. TCDs stopped 8/10/2019 with near resolution of the global vasospasm (slight residual in R MCA).     Keppra 1000 mg two times a day, indefinite or as directed by neurology Nimodipine dosing complete - no benefit of extending per Dr Sal     Follow CSF cultures

## 2021-05-31 NOTE — PROGRESS NOTES
NEUROSURGERY PROGRESS NOTE:    NEUROSURGERY ATTENDING: The patient's attending neurosurgeon is Dr. Sal. Plan of care discussed with Dr Sal.     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 Complicated hospital course with re-bleed, concern for hydrocephalus, vasospasms, right MCA infarct. MRI last week questionable for recurrent aneurysm. No change on repeat angio earlier this week. CT head showed slightly larger ventricles after having lumbar drain clamped for 24 hours.     She's conversational with Tylor Christensen and . She's worried that some people aren't treating her well due to the color of her skin.          PLAN:   Patient Active Problem List   Diagnosis     Carotid artery aneurysm (H)     Aneurysmal subarachnoid hemorrhage (H)     Intraventricular hemorrhage, nontraumatic (H)     Compression of brain (H)     S/P coil embolization of cerebral aneurysm     Aneurysm of right internal carotid artery     Cerebrovascular accident (CVA) due to occlusion of right middle cerebral artery (H)     Vasospasm (H)     1.   If BP drops, would allow natural BP, do not use pressors.               2.  Keppra 500 mg two times a day, indefinite with seizure      3.  Nimodipine dosing complete - no benefit of extending per Dr Sal     4.  Lumbar drain to 5 ml/hr.       5.   shunt Monday with Dr. Hoffmann.        Discharge Recommendations/Plan:  Barriers to Discharge: yes, requiring acute medical care.     Follow Up Plan:     [facility] POD #1  shunt from neurosurgery view.        HPI: Darion Herrera is a 42 yo female who was admitted on 07/22/19 with SAH and IVH secondary to ruptured aneurysm of the right ICA. Aneurysm coiling on 7/22 that was complicated by vasospasm. On 07/27, found to be unresponsive and possibly seizing, was intubated and taken to IR where it was revealed that her right ICU aneurysm had  dissected and re-bled.  In IR on 07/27, the vessel was recoiled and a lumbar drain was placed for ICP management.  On 07/29 underwent intraarterial verapamil infusion with angiogram for acute vasospasm of right ICA. Due to increased TCD values, back to IR for another cerebral angiogram 7/30 for acute vasospasm and intraarterial verapamil infusion.     BRIEF HOSPITAL COURSE:  7/21/2019  -Admit with nausea, vomiting, headache. CT with question of extra axial mass over the planum sphenoidale region measuring 2.2 x 2.3 cm    7/22/2019  -MRI indicates mass on CT actually a giant right supraclinoid internal carotid artery aneurysm. + intraventricular hemorrhage, small right subarachnoid hemorrhage.    -IR for angiogram, coil embolization of ruptured, dissecting aneurysm. Mild to moderate interstitial filling within the coil mass    7/24/2019  -Return to IR due to high likelihood of instability of aneurysm. Aneurysm was stable in appearance    7/27/2019  -Possible seizure, became unresponsive, intubated. CT + for increased amount of hemorrhage, cerebral edema, increasing size of ventricles.     -Return to IR for angio and placement of lumbar drain    -Aneurysm re-ruptured, additional coils placed with 95% occlusion    -Moderate to severe narrowing of R ICA likely secondary tot he dissecting aneurysm and not necessarily true spasm. Verapamil given.    -Mild spasm R MCA    7/28/2019  -Stable appearance of aneurysm, verapamil given    7/29/2019  -Stable to mildly increased filling of aneurysm.     -mildly increased spasm of R MCA, verapamil given. Plan for repeat angio 8/2/2019 7/30/2019  -TCD worsening. Patient sent for angio    -worsening spasm in multiple territories. Verapamil given to R ICA, L ICA, R vert    - Stable filling of aneurysm    -Head CT with some hypodensity concerning for infarct of R MCA territory    -Temp 102.9. Standard fever workup    7/31/2019  -Return to IR for treatment of vasospasm, more  verapamil    -CT head unchanged    -UA with e coli. Gram neg rods blood culture, may be contaminant    -Febrile, temp max 102.9    -Antibiotics started per ICU team    8/1/2019  -Return to IR for treatment of vasospasm, more verapamil    -CT head unchanged    -CSF sent from lumbar drain    -Fever persisted    8/2/2019          -Return to IR for treatment of vasospasm, higher dose verapamil given                          -TCDs worsening as compared to yesterday                          - Family conference to discuss G/J and Trach    8/7/2019           - Trach and Peg completed                          - MRI/MRA    8/8/2019           - IR (no additional coiling)                          - New lumbar drain    8/11/2019         - weaning lumbar drain                           - weaning from the vent                           - off pressors                           - velocities improved  8/12:                 - clamped lumbar drain  8/13:                 - ventricles slightly larger on CT - lumbar drain re-opened                           - angio without intervention - aneurysm unchanged  8/14:                 - [place] valve                           - planning  shunt 8/15/2019                              Which she refused.   8/18 she will have  shunt         SUBJECTIVE:  She feels good but gets tired sitting in chair. She feels terrible because she hasn't showered in almost a month.     OBJECTIVE:  Vital signs in last 24 hour  Temp:  [98.2  F (36.8  C)-99.7  F (37.6  C)] 98.2  F (36.8  C)  Heart Rate:  [] 105  Resp:  [14-36] 36  BP: ()/(56-79) 120/74  SpO2:  [92 %-100 %] 98 %  FiO2 (%):  [21 %] 21 %  Body mass index is 29.16 kg/m .    Mental Status: Trach, Follows commands and appears appropriate.  Cranial Nerves:  PERRL. + cough/gag. EOM intact.   She is speaking with me today via [place] mir    Motor Strength: Minimal left arm . Minimal hand grasp on the left. Some horizontal movement of left  leg and dorsiflexion/plantar flexion. Lifts right arm and leg.    Lumbar Drain: Lumbar drain to drain 5 ml/hr. Output clear/yellow.     Pertinent Labs:  Reviewed    Pertinent Radiology   None today      Jailene Mcknight NP  [facility] Neurosurgery  O: [phone]

## 2021-05-31 NOTE — PROGRESS NOTES
Vent Mode: VCV  FiO2 (%):  [21 %-30 %] 21 %  S RR:  [14] 14  S VT:  [375 mL] 375 mL  PEEP/CPAP (cm H2O):  [5 cm H2O] 5 cm H2O  Minute Ventilation (L/min):  [6.1 L/min-8.9 L/min] 8.8 L/min  PIP:  [12 cm H2O-19 cm H2O] 18 cm H2O  MAP (cm H2O):  [6-7] 7    Pt remained on full vent support with above settings. Pt weaned on trach mask 25L 30% for approximately 4 hrs this morning. Plan is to place patient on trach mask two times a day as pt tolerates and full vent support at night. Pt has #8 shiley  Sx; small amount of thin secretions. BS: Coarse. Plan is to wean again this evening. RT following.    KAE OrozcoT

## 2021-05-31 NOTE — PROGRESS NOTES
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 Complicated hospital course with re-bleed, concern for hydrocephalus, daily cerebral angiograms for severe vasospasm.   LD for ICP management. Has had ICA/MCA vasospasm.     Velocities stable but not decreasing. Will continue current RASS  CVP goal 10-12  BP goal 120-160  HCT goal 30-32, transfuse PRBC today.   Nimodipine x 21 days  Lumbar drain continue 10-15 ml hour. Will need to be replaced 8/9 if still needed.   TCD daily  Keppra indefinite for now.   Continue 3% protocol.      PLAN:   Patient Active Problem List   Diagnosis     Leiomyoma of uterus     Carotid artery aneurysm (H)     Nausea vomiting and diarrhea     Aneurysmal subarachnoid hemorrhage (H)     Intraventricular hemorrhage, nontraumatic (H)     Compression of brain (H)     S/P coil embolization of cerebral aneurysm     Aneurysm of right internal carotid artery     Cerebrovascular accident (CVA) due to occlusion of right middle cerebral artery (H)     Vasospasm (H)     Acute respiratory failure, unspecified whether with hypoxia or hypercapnia (H)     Fever in other diseases     E-coli UTI     Gram-positive bacteremia        Discharge Recommendations/Plan:  Barriers to Discharge: yes  Acute medical management     Follow Up Plan:     pending         HPI: Darion Herrera is a 44 yo female who was admitted on 07/22/19 with SAH and IVH secondary to ruptured aneurysm of the right ICA.  Aneurysm coiling on 7/22 that was complicated by vasospasm. On 07/27, found to be unresponsive and possibly seizing, was intubated and taken to IR where it was revealed that her right ICU aneurysm had dissected and re-bled.  In IR on 07/27, the vessel was recoiled and a lumbar drain was placed for ICP management.  On 07/29 underwent intraarterial verapamil infusion with  angiogram for acute vasospasm of right ICA. Due to increased TCD values, back to IR for another cerebral angiogram 7/30 for acute vasospasm and intraarterial verapamil infusion.     SUBJECTIVE:    Intubated, sedated      OBJECTIVE:  Vital signs in last 24 hours  Temp:  [99.9  F (37.7  C)-102  F (38.9  C)] 100.2  F (37.9  C)  Heart Rate:  [] 81  Resp:  [0-24] 16  BP: (116-149)/(59-87) 142/83  FiO2 (%):  [30 %-100 %] 30 %  Body mass index is 34.38 kg/m .    EXAM    Intubated, sedated.  PERRL, opens eyes spontaneously and to voice.  Does not follow commands.     Drain:  Lumbar drain patent 10-15 mL hour blood tinged    Pertinent Labs   Lab Results:   Lab Results   Component Value Date     08/06/2019    K 3.3 (L) 08/06/2019     (H) 08/06/2019    CO2 24 08/06/2019    BUN 10 08/06/2019    CREATININE 0.58 (L) 08/06/2019    CALCIUM 8.4 (L) 08/06/2019     Lab Results   Component Value Date    WBC 8.6 08/06/2019    HGB 9.4 (L) 08/06/2019    HCT 28.9 (L) 08/06/2019    MCV 91 08/06/2019     08/06/2019       Pertinent Radiology   Radiology Results: None       Jailene Mcknight NP

## 2021-05-31 NOTE — PLAN OF CARE
Problem: Pain  Goal: Patient's pain/discomfort is manageable  Outcome: Progressing     Problem: Knowledge Deficit  Goal: Patient/family/caregiver demonstrates understanding of disease process, treatment plan, medications, and discharge instructions  Outcome: Progressing     Problem: Neurological Deficit  Goal: Neurological status is stable or improving  Outcome: Progressing

## 2021-05-31 NOTE — PLAN OF CARE
"  Problem: Mechanical Ventilation  Goal: Respiratory status - ventilation  Description  Movement of air in and out of the lungs.    Liberate from ventilator  Outcome: Progressing     Pt on Trach mask 21% this shift. PMV removed per MD order for night. Pt suctioned for moderate white secretions. No changes this shift.     /74   Pulse 82   Temp 99  F (37.2  C) (Oral)   Resp 21   Ht 4' 10\" (1.473 m)   Wt 141 lb 1.6 oz (64 kg)   LMP 07/01/2016   SpO2 93%   BMI 29.49 kg/m      "

## 2021-05-31 NOTE — PLAN OF CARE
Problem: Mechanical Ventilation  Goal: Patient will maintain patent airway  Outcome: Progressing  Goal: Tracheostomy will be managed safely  Outcome: Progressing  Goal: Respiratory status - ventilation  Description  Movement of air in and out of the lungs.    Liberate from ventilator  Outcome: Progressing   JERRY Lewis

## 2021-05-31 NOTE — PROGRESS NOTES
"Speech [employer]/Pathology  Speech Therapy Daily Progress Note    Patient presents as alert and cooperative during this session.  An  was not applicable as patient understood English and spoke English, however she would speak Hmong and daughter in room interpreting.    Objective  Spoke to RT, Pb,  prior to going into room and he informed me that patient has had speaking valve on throughout the day.  Multiple family members and nurse present.  Purpose of therapy is to teach/train patient to project louder voice with speaking valve in place to increase speech intelligibility for communication.   Patient was able to demonstrate this with max cues. She started off with whisper but was able to achieve a louder voice with cue to speak louder. She was able to project voice loud enough to ed, \"hey you\" to family members across room. Patient was able to speak in Hmong to her young grand daughter standing approximately 8' away. She was instructed to speak as loud as she can as I told her what command for her to say, such as \"touch your nose\". She was able to say this command to her granddaughter given multiple cues and with multiple attempts to achieve adequate loudness. The granddaughter eventually heard it and was able to follow the command.    Assessment  Patient is able to achieve adequate loudness with speaking valve in place given max cues  Family educated to cue patient to speak louder.    Plan/Recommendations    The  Care Plan has been reviewed and current plan remains appropriate.    30 speech/language minutes     Perlita Rondon MS, CCC-SLP      "

## 2021-05-31 NOTE — PROGRESS NOTES
"Vent Mode: VCV  FiO2 (%):  [30 %] 30 %  S RR:  [16] 16  S VT:  [375 mL] 375 mL  PEEP/CPAP (cm H2O):  [5 cm H2O] 5 cm H2O  Minute Ventilation (L/min):  [6.1 L/min-8.4 L/min] 7.4 L/min  PIP:  [20 cm H2O-28 cm H2O] 20 cm H2O  MAP (cm H2O):  [7-12] 7    Patient remained intubated and ventilated on above settings. No change in respiratory status. Patient did have moderate amount of thick white secretions. RT will continue to follow.    /78   Pulse 88   Temp (!) 102  F (38.9  C) (Oral)   Resp 24   Ht 4' 10\" (1.473 m)   Wt 164 lb 8 oz (74.6 kg)   LMP 07/01/2016   SpO2 95%   BMI 34.38 kg/m      "

## 2021-05-31 NOTE — PROGRESS NOTES
NEUROLOGY PROGRESS NOTE     Darion Herrera,  1975, MRN [id number] Date: 2019     [facility]   Code status:  Full Code   PCP: Monroe Cassidy MD, [phone]      ASSESSMENT & PLAN   Hospital Day#: 11  Diagnosis code: SAH    Aneurysmal SAH  MARIA L supraclinoid aneurysm s/p endovascular coil embolization  Vasospasm  Seizures  Twitching right shoulder.       Initial head CT showed MARIA L giant aneurysm (sentinel headache)    S/p endovascular coil embolization    MRI showed small right sided infarcts - these could be related to the angiogram procedure vs from vasospasm.     TCD shows elevated velocities in RMCA and angiogram shows vasospasm.     Angiogram with IA verapamil.     On H therapy; hydration and -180    Nimodipine X 21 days    Keppra 1000 mg two times a day - Level therapeutic.     Phenytoin can reduce effectiveness of nimodipine. Will switch to Vimpat 100 mg two times a day. Stop Phenytoin. Monitor for seizures.    Consider switching from tramadol to other pain meds as this can lower the seizure threshold.    Continue TCD/angiogram evaluation.     Monitor sodium. On 3% protocol.    Twitching of right shoulder - ? Seizure. Repeat EEG.      Thank you again for this referral, please feel free to contact me if you have any questions.     CHIEF COMPLAINT Aneurysmal subarachnoid hemorrhage (H)     HISTORY OF PRESENT ILLNESS     We have been requested by Dr. Kwan to evaluate Darion Herrera who is a 43 y.o.  female for SAH.    This a 43-year-old female on whom neurology is been consulted to evaluate for subarachnoid hemorrhage.  Patient initially presented with 6 days ago with a headache.  She was trying to lift some fruits and felt something blowing up in her head.  She continued to have the headache.  She left the staff she was lifting on the table.  She was then brought to the emergency room where imaging showed a large frontal mass.  This was later on MRI identified to be a giant  aneurysm.  Patient underwent angiogram and endovascular coil embolization of the aneurysm.  The patient had a repeat angiogram.  MRI done today showed multiple foci of acute/subacute infarction in the right parietal-occipital region.  There is also small area of infarction in the right frontal lobe.  TCD's show vasospasm in the right MCA.  There was some narrowing of the M1 and M2 segments.  Neurology was consulted to further help with further cares.  Patient denies any ongoing symptoms he does complain of some headache.     7/29  Patient had 2 seizures over the weekend. Was started on fosphenytoin in addition to Keppra.   No seizures today.  Intubated, but waking up as the sedation has been withdrawn.     7/30  No new seizures. Patient remains on sedation and intubated. RMCA velocities remain elevated on the TCD.     7/31  No new seizures. Patient being kept sedation. Angiogram still shows vasospasm. Patient getting IA verapamil.     8/1  No seizures. Patient continues to remain on sedation. Angiogram continues to show vasospasm. Getting IA verapamil. Family conference tomorrow.     8/2  Patient remains on sedation. Angiogram shows stable vasospasm. Some twitching of right shoulder per nursing staff.      PAST MEDICAL & SURGICAL HISTORY     Medical History  Patient Active Problem List    Diagnosis Date Noted     E-coli UTI      Gram-positive bacteremia      Fever in other diseases      Acute respiratory failure, unspecified whether with hypoxia or hypercapnia (H)      Cerebrovascular accident (CVA) due to occlusion of right middle cerebral artery (H)      Vasospasm (H)      S/P coil embolization of cerebral aneurysm 07/23/2019     Aneurysm of right internal carotid artery 07/23/2019     Carotid artery aneurysm (H) 07/22/2019     Nausea vomiting and diarrhea 07/22/2019     Aneurysmal subarachnoid hemorrhage (H) 07/22/2019     Intraventricular hemorrhage, nontraumatic (H) 07/22/2019     Compression of brain (H)       Leiomyoma of uterus 07/18/2016     Past Medical History: No previous history.    Surgical History  She  has a past surgical history that includes IR Cerebral Angiogram (7/22/2019); IR Cerebral Angiogram (7/24/2019); PICC Team Line Insertion (7/26/2019); IR Cerebral Angiogram (7/27/2019); IR Lumbar Drain Insertion (7/27/2019); IR Cerebral Angiogram (7/29/2019); IR Cerebral Angiogram (7/30/2019); IR Cerebral Angiogram (7/31/2019); IR Cerebral Angiogram (8/1/2019); and IR Lumbar Drain Insertion (8/2/2019).     SOCIAL HISTORY     Reviewed, and she  reports that she has never smoked. She has never used smokeless tobacco. She reports that she has current or past drug history. She reports that she does not drink alcohol.     FAMILY HISTORY     Reviewed, and non-contributory.      ALLERGIES     Allergies   Allergen Reactions     Oxycodone      Hydrocodone Swelling and Rash        REVIEW OF SYSTEMS     12 system ROS was done and was negative other than HPI - except patient feeling tired.     HOME & HOSPITAL MEDICATIONS     Prior to Admission Medications  Medications Prior to Admission   Medication Sig Dispense Refill Last Dose     ascorbic acid, vitamin C, 500 mg Chew chew tab Chew 500 mg daily.   Unknown at Unknown time       Hospital Medications    albumin human  12.5 g Intravenous Once     amino acids-protein hydrolys  1 packet Enteral Tube BID     atorvastatin  20 mg Enteral Tube DAILY     cefepime (MAXIPIME) IV  2 g Intravenous Q12H     chlorhexidine  15 mL Topical Q12H     lacosamide (VIMPAT) IVPB  100 mg Intravenous Q12H 09-21     levETIRAcetam (KEPPRA) IVPB  1,000 mg Intravenous Q12H     niMODipine  60 mg Enteral Tube Q4H     omeprazole  20 mg Enteral Tube QAM AC    Or     pantoprazole  40 mg Intravenous QAM AC     potassium chloride liquid/packet  20 mEq Enteral Tube BID     senna (SENOKOT) syrup  8.8 mg Enteral Tube BID    Or     senna  8.6 mg Oral BID     sodium chloride  10-30 mL Intravenous Q8H FIXED TIMES      sodium chloride  2 g Oral BID    Or     sodium chloride  2 g Enteral Tube BID        PHYSICAL EXAM     Vital signs  Temp:  [102  F (38.9  C)-103.1  F (39.5  C)] 102  F (38.9  C)  Heart Rate:  [] 79  Resp:  [7-34] 16  BP: (103-156)/(46-88) 130/79  Arterial Line BP: (117-165)/(57-87) 144/65  FiO2 (%):  [40 %] 40 %    Weight:   160 lb 9.6 oz (72.8 kg)    GENERAL: Healthy appearing, alert, no acute distress, normal habitus.  CARDIOVASCULAR: Ext warm and well perfused. Pulses present.   NEUROLOGICAL:  Patient is intubated and on sedation. Pupils reactive. EOM absent. VF absent. NO movement in arms or legs to painful sedation. Tone is symmetric.      DIAGNOSTIC STUDIES     Pertinent Radiology   MRI brain - images reviewed stroke seen  IMPRESSION:   CONCLUSION:  1.  Multiple foci of developing acute/subacute infarction throughout the right parietal occipital lobes, with additional single small foci of acute/subacute infarction in the right frontal lobe and left parietal lobe.  2.  Status post endovascular coiling of large dissecting aneurysm of the ventromedial right supraclinoid ICA with redemonstration of mild persistent flow-related enhancement along the neck of the aneurysm. There is also faint flow-related enhancement   centrally within the coil mass.  3.  Mildly decreased caliber of the M1 and proximal M2 segments of the right middle cerebral artery compared to the left, which may represent mild vasospasm. There is more normal caliber of the distal M2 and M3 branches.    ECHO    1.Left ventricle ejection fraction is normal. The calculated left ventricular ejection fraction is 65%.    2.TAPSE is normal, which is consistent with normal right ventricular systolic function.    3.No hemodynamically significant valvular heart abnormalities.    4.No obvious embolic source seen, if highly suspect consider MICHAEL.    5.No previous study for comparison.     TCD - images reviewed.   IMPRESSION:   CONCLUSION:  New moderate  right middle cerebral artery vasospasm.    EEG negative for seizures.    Angiogram  Preliminary findings: (see dictation for full detail)  - Stable right ICA aneurysm coil mass with residual interstitial neck filling.  - Possible mild vasospasm superimposed on right ICA dissection, verapamil 10 mg infused in right ICA.    TCD reviewed and velocities are elevated in the RMCA.    Angiogram shows MARIA L, RMCA, RACA vasospasm. Dissection in MARIA L.    Angiogram on 8/1 continues to show vasospasm.   Recent Results (from the past 24 hour(s))   Sodium lab - every 6 hours    Collection Time: 08/01/19  6:30 PM   Result Value Ref Range    Sodium 148 (H) 136 - 145 mmol/L   POCT Glucose    Collection Time: 08/01/19  6:36 PM   Result Value Ref Range    Glucose 128 70 - 139 mg/dL   Sodium lab - every 6 hours    Collection Time: 08/02/19 12:15 AM   Result Value Ref Range    Sodium 146 (H) 136 - 145 mmol/L   Potassium - Next AM    Collection Time: 08/02/19  4:06 AM   Result Value Ref Range    Potassium 3.6 3.5 - 5.0 mmol/L   Magnesium    Collection Time: 08/02/19  4:06 AM   Result Value Ref Range    Magnesium 1.8 1.8 - 2.6 mg/dL   Sodium lab - every 6 hours    Collection Time: 08/02/19  5:50 AM   Result Value Ref Range    Sodium 145 136 - 145 mmol/L   Creatinine    Collection Time: 08/02/19  5:50 AM   Result Value Ref Range    Creatinine 0.63 0.60 - 1.10 mg/dL    GFR MDRD Af Amer >60 >60 mL/min/1.73m2    GFR MDRD Non Af Amer >60 >60 mL/min/1.73m2   Basic Metabolic Panel    Collection Time: 08/02/19  5:50 AM   Result Value Ref Range    Sodium 145 136 - 145 mmol/L    Potassium 3.6 3.5 - 5.0 mmol/L    Chloride 113 (H) 98 - 107 mmol/L    CO2 23 22 - 31 mmol/L    Anion Gap, Calculation 9 5 - 18 mmol/L    Glucose 137 (H) 70 - 125 mg/dL    Calcium 8.5 8.5 - 10.5 mg/dL    BUN 8 8 - 22 mg/dL    Creatinine 0.63 0.60 - 1.10 mg/dL    GFR MDRD Af Amer >60 >60 mL/min/1.73m2    GFR MDRD Non Af Amer >60 >60 mL/min/1.73m2   Sodium lab - every 6 hours     Collection Time: 08/02/19  1:07 PM   Result Value Ref Range    Sodium 143 136 - 145 mmol/L   POCT Glucose    Collection Time: 08/02/19  1:08 PM   Result Value Ref Range    Glucose 141 (H) 70 - 139 mg/dL   POCT Glucose    Collection Time: 08/02/19  6:07 PM   Result Value Ref Range    Glucose 130 70 - 139 mg/dL       Total time spent for face to face visit, reviewing labs/imaging studies, counseling and coordination of care was: Over 25 min More than 50% of this time was spent on counseling and coordination of care.  Discussing plan with family and nursing staff.     Justin Vasquez MD  [employer] Secure Messaging: [email or web address]  Tel: [phone]  This note was dictated using voice recognition software.  Any grammatical or context distortions are unintentional and inherent to the software.

## 2021-05-31 NOTE — PROGRESS NOTES
Critical Care Progress Note  8/7/2019      Admit Date: 7/22/2019  ICU Day: 16   CODE: Full Code    Critical Care Chief Complaint: respiratory failure      Problem List/Hospital Course:   Principal Problem:    Aneurysmal subarachnoid hemorrhage (H)  Active Problems:    Carotid artery aneurysm (H)    Intraventricular hemorrhage, nontraumatic (H)    Compression of brain (H)    S/P coil embolization of cerebral aneurysm    Aneurysm of right internal carotid artery    Cerebrovascular accident (CVA) due to occlusion of right middle cerebral artery (H)    Vasospasm (H)    Acute respiratory failure, unspecified whether with hypoxia or hypercapnia (H)    Fever in other diseases    E-coli UTI    Gram-positive bacteremia    CVA (cerebral vascular accident) (H)          Interim Events:     Hypertonic saline ordered by NSG. Still not responsive nor purposeful neurological activity noted by nursing    Assessment/Plan by System:   43 y.o. woman who was admitted on 7/22/2019 with SAH & IVH secondary to ruptured aneurysm of the right ICA, aneurysm coiling 7/22 that was complicated by vasospasm.  Hospitalization course was complicated by right ICA aneurysm dissection with rebleeding that presented with acute onset seizures and unresponsiveness on 7/27.  Patient was intubated on 7/27, underwent a recoiling of the culprit vessel, and placement of lumbar drain for ICP management.  Ongoing issues with ICA/MCA vasospasm.     Neuro/Psych:   #. Analgesia/Sedation: On propofol and fentanyl, prn versed available mostly for agitation with cares.  #.  Right ICA aneurysm complicated by subarachnoid hemorrhage with IVH, status post dissection and coiling x2 with hydrocephalus requiring lumbar drain placement 7/27/2019.  Ongoing vasospasm complicated the clinical presentation and has had verapamil infusion on multiple days.    Neurosurgery and IR closely following    Cerebral angiography per IR, follow TCD to determine further  angiogram    Continue with every 4 hours nimodipine x21 days    Goal -150 by arterial line, currently on NE to maintain  ? If BP dips, and CVP less than 10, will use albumin but otherwise should be titrating NE  ? Trying to avoid complications of hypervolemia     Goal sodium 145-150, hypertonic saline infusion per NSG     Goal HCT level 30-32.     Goal CVP 10-12    Strict I/O, strict lumbar drain output monitoring (if increased output may be sign of further edema/swelling and consider stat HCT) has been stable    Repeat TCDs to determine need for MRI    Monitor for volume overload  #.  Recurrent seizures, likely secondary to acute intracranial process as detailed above.  Currently on propofol, IV levetiracetam, and IV fosphenytoin.  #.  Moderate Sedation with lower RASS -2 as goal                Due to ongoing issues will pursue deeper RASS goal of -2                fentanyl infusion - titrate to sedation                Prn versed especially with turning/routine cares  #.  Fever - possible central fever contributing.  Elevated protein, PMNs, and WBC, but likely due to blood.  Changed Cefepime to ceftraixone on 8/4, plan for 10 total days cover for Strep pneumo HCAP/VAP, other cultures contaminates.     Pulmonary: Mechanical ventilation instituted for airway protection in setting of seizure and encephalopathy secondary to SAH.  The patient is on minimal ventilator settings.  Not clinically appropriate for ventilator weaning, and due to ongoing issues no further sedation holidays.  Monitor.  - goal SpO2 > 92%, on 30% FiO2  - no weaning for now  - secretions mostly small  - Abx per ID section below  - Discussed case with Dr. Ledezma and so family likely to proceed with tracheostomy this week.  Dr Ledezma's      Cardiovascular: goal -150 but will try to aim for higher end of this (note when goal SBP was higher 150-180 then patient had further bleeding). Avoid medications that would cause bradycardia.  -  continue NE as needed to maintain SBP goal                - intermittent albumin for low CVP   - CVP to be maintained 10-12     Renal: No acute issues.  Good urine output.  - E.coli UTI (CAUTI)  - potassium/magnesium replacement per protocol  - per NSG avoid LR     GI: NPO for now.  - tube feeding currently held for possible OR trach/PEG today  - PPI     Heme: No acute issues, monitor.  - IVF/Tx to maintain HCT 30-32  - SCDs     Endo: No acute issues.  Continue close monitoring.  - ICU q 4 hour SSI     ID: E. Coli UTI and GPC in clusters in blood stream (1/4 positive, but less than 24 hours), fevers & leukocytosis improved  - blood cultures Strep epidermidis - likely contaminate  - Changed Cefepime to ceftraixone on 8/4, plan for 10 total days cover for Strep pneumo HCAP/VAP, other cultures contaminates  - vancomycin - discontinued after 1 day as CoNS grew from culture, follow up cultures negative      Access/Lines/Tubes: required and necessary for continued patient cares  ETT, 7.5  PICC, RUE  PIV  Arterial sheath, right femoral artery - removed on 8/3  Sharpe catheter  Lumbar drain  OGT     ICU prophylaxis:   #. VAP ppx: HOB 30 degrees, chlorhexidine rinses  #. Stress ulcer: PPI  #. Diet: NPO, trophic feedings now  #. VTE: SCD, chemical prophylaxis contraindicated due to SAH/bleeding  #. Restraints: required and necessary for continued patient cares     CODE: FULL     Dispo: ICU for mechanical ventilation, hemodynamic support, neurological monitoring, remains critically ill with constant threat to life.    Medications:       dexmedetomidine 400 mcg/100 mL in NS (PRECEDEX) (4mcg/mL) 0.5 mcg/kg/hr (08/07/19 0400)     fentaNYL 2500 mcg/250 mL in NS (10 mcg/mL) 75 mcg/hr (08/06/19 2040)     lactated Ringers 100 mL/hr (08/06/19 1922)     niCARdipine       norepinephrine Stopped (08/07/19 0810)     sodium chloride/acetate 3%         albumin human  12.5 g Intravenous Once     atorvastatin  20 mg Enteral Tube DAILY      "cefTRIAXone  2 g Intravenous Q24H     chlorhexidine  15 mL Topical Q12H     lacosamide (VIMPAT) IVPB  100 mg Intravenous Q12H 09-21     levETIRAcetam (KEPPRA) IVPB  1,000 mg Intravenous Q12H     magnesium sulfate IVPB  4 g Intravenous Once     magnesium sulfate IVPB  4 g Intravenous Once     niMODipine  60 mg Enteral Tube Q4H     omeprazole  20 mg Enteral Tube QAM AC    Or     pantoprazole  40 mg Intravenous QAM AC     potassium chloride liquid/packet  20 mEq Enteral Tube BID     potassium chloride liquid/packet  40 mEq Oral Q4H     sodium chloride  10-30 mL Intravenous Q8H FIXED TIMES         Exam/Data:   Vitals  /75   Pulse 75   Temp 100.4  F (38  C)   Resp 11   Ht 4' 10\" (1.473 m)   Wt 168 lb 8 oz (76.4 kg)   LMP 07/01/2016   SpO2 96%   BMI 35.22 kg/m    CVP:  [7 mmHg-349 mmHg] 345 mmHg  I/O last 3 completed shifts:  In: 2640.1 [I.V.:2060.1; Blood:300; NG/GT:30; IV Piggyback:250]  Out: 4579 [Urine:3325; Drains:254; Stool:1000]  Weight change: 4 lb (1.814 kg)  Wt Readings from Last 3 Encounters:   08/07/19 168 lb 8 oz (76.4 kg)   07/21/19 157 lb (71.2 kg)   08/01/17 140 lb (63.5 kg)     Vent Mode: VCV  FiO2 (%):  [30 %-100 %] 30 %  S RR:  [16] 16  S VT:  [375 mL] 375 mL  PEEP/CPAP (cm H2O):  [5 cm H2O] 5 cm H2O  Minute Ventilation (L/min):  [6.3 L/min-9.3 L/min] 6.3 L/min  PIP:  [13 cm H2O-36 cm H2O] 22 cm H2O  MAP (cm H2O):  [6-9] 7    EXAM:  GEN: patient intubated, sedated  HEENT: PERRL, EOMS, OP patent, trachea midline  PULM: unlabored respirations, moving air adequately  CV: RRR, S1, S2, no murmurs, rubs, gallops, bilateral symmetric pulse  ABD: soft nontender, non distended, normal active bowel sound, no HSM  EXT : warm well perfused, no cyanosis, clubbing, no edema  NEURO: opens eyes, possible occasional tracking but may be artifact. Blinks near-eye movement, no other purposeful neurological activity  SKIN: no obvious rash, lesion    DATA  All laboratory and radiology has been personally " reviewed by myself today.  Results from last 7 days   Lab Units 08/07/19  0537   LN-WHITE BLOOD CELL COUNT thou/uL 10.3   LN-HEMOGLOBIN g/dL 10.2*   LN-HEMATOCRIT % 30.1*   LN-PLATELET COUNT thou/uL 191     Results from last 7 days   Lab Units 08/07/19  0537 08/07/19  0154 08/06/19  1625 08/06/19  0535  08/05/19  0605   LN-SODIUM mmol/L 142  142 144 145 142   < > 139   LN-POTASSIUM mmol/L 3.1*  --  3.8 3.3*   < > 3.5   LN-CHLORIDE mmol/L 116*  --   --  112*  --  108*   LN-CO2 mmol/L 21*  --   --  24  --  25   LN-BLOOD UREA NITROGEN mg/dL 8  --   --  10  --  6*   LN-CREATININE mg/dL 0.43*  --   --  0.58*  --  0.52*   LN-CALCIUM mg/dL 7.0*  --   --  8.4*  --  8.0*   LN-PROTEIN TOTAL g/dL 4.9*  --   --  5.6*  --  5.9*   LN-BILIRUBIN TOTAL mg/dL 0.3  --   --  0.3  --  0.3   LN-ALKALINE PHOSPHATASE U/L 81  --   --  76  --  80   LN-ALT (SGPT) U/L 142*  --   --  86*  --  65*   LN-AST (SGOT) U/L 143*  --   --  87*  --  66*    < > = values in this interval not displayed.         Patient is critically ill with total CCT spent 45 minutes thus far today.     Mega Perla MD  [facility]

## 2021-05-31 NOTE — PROGRESS NOTES
Chart checked.      47-year-old female with ruptured dissecting ventral medial supraclinoid right internal carotid artery aneurysm resulting in sub-arachnoid hemorrhage and IVH.  Complicated hospital course history hemorrhage, vasospasm treated with verapamil.  Her aneurysm is larger on the last angiogram and plan to repeat angiogram IR tomorrow.      Patient continues to stay in ICU.  Primarily being managed by intensivist/neurosurgery/IR.  Currently on vent/trach and PEG tube.  Continue PT/OT.  Angiogram in the a.m.      Francy Bravo MD  [facility] medicine service.

## 2021-05-31 NOTE — PROCEDURES
[facility] Radiology Post Procedure    Procedure:   1. Diagnostic cerebral angiogram  2. Intra-arterial pharmacotherapy with verapamil for treatment of cerebral vasospasm    Radiologist: Souleymane Monterroso    Contrast: 110 mL Omnipaque 350  Fluoro time: 9.9 minutes  Fluoro dose: 883 mGy    Medications:   Right ICA: 20 mg verapamil  Left ICA: 20 mg verapamil  Left VA: 10 mg verapamil     EBL: Minimal    Complications: None evident    Preliminary findings: (see dictation for full detail)  - Overall stable cerebral vasospasm. Verapamil administered as above.    Assess/Plan:   Report to ordering provider.  Bed rest leg straight. Sheath remains in for possible repeat angio/verapamil pending TCD review.    Souleymane Monterroso

## 2021-05-31 NOTE — PLAN OF CARE
Care Management Goals of the Day: Follow progression of care, assist with DC planning     Care Progression Reviewed With: Charge RN, MD, HUC, RNCM     Barriers to Discharge: ICU medical cares, intubated/sedated, pending trach and PEG    Discharge Disposition: [facility] ref sent     Expected Discharge Date: TBD     Transportation: likely HE stretcher    Care Coordination Narrative: Pt remains intubated, febrile and sedated. Family continues to discuss goals of care, await response of one elder per discussion in AM rounds. CM to follow and assist.

## 2021-05-31 NOTE — PLAN OF CARE
Care Management Goals of the Day: Follow progression of care, assist with DC planning     Care Progression Reviewed With: Charge RN, MD, HUC, RNCM     Barriers to Discharge: ICU medical cares, intubated/sedated, pending trach and PEG today    Discharge Disposition: [place]     Expected Discharge Date: 8/10/19     Transportation: HE stretcher    Care Coordination Narrative: Pt remains intubated, febrile and sedated. Plan is trach and PEG placement today with likely DC to [place] when stable. Anupam continues to follow. CM to follow and assist as needed.

## 2021-05-31 NOTE — PLAN OF CARE
Problem: Pain  Goal: Patient's pain/discomfort is manageable  Outcome: Adequate for Discharge     Problem: Safety  Goal: Patient will be injury free during hospitalization  Outcome: Adequate for Discharge   Pt denies pain and is safe in bed.  Pt discharged to [facility] via stretcher accompanied by HE transporters.  Report called to 6W RN.  Belongings with family who will meet pt there. VSS.

## 2021-05-31 NOTE — PROGRESS NOTES
Vent Mode: VCV  FiO2 (%):  [25 %-30 %] 25 %  S RR:  [14] 14  S VT:  [375 mL] 375 mL  PEEP/CPAP (cm H2O):  [5 cm H2O] 5 cm H2O  Minute Ventilation (L/min):  [7.7 L/min-10.7 L/min] 8.3 L/min  PIP:  [19 cm H2O-27 cm H2O] 20 cm H2O  MAP (cm H2O):  [7-9] 7     Pt was on full vent support with the above setting throughout the night. Pt's BS were diminished bilaterally, pt has a #8.0 Shiley trach and was not weaned. Sxn scant  thick white secretion. Pt tolerated fairly well. RT will continue to monitor.    KAE McclainT

## 2021-05-31 NOTE — PLAN OF CARE
Problem: Mechanical Ventilation  Goal: Patient will maintain patent airway  Outcome: Progressing  Goal: Oral health is maintained or improved  Outcome: Progressing  Goal: Tracheostomy will be managed safely  Outcome: Progressing  Goal: Respiratory status - ventilation  Description  Movement of air in and out of the lungs.    Liberate from ventilator  Outcome: Progressing     Vent Mode: VCV  FiO2 (%):  [25 %-30 %] 25 %  S RR:  [14] 14  S VT:  [375 mL] 375 mL  PEEP/CPAP (cm H2O):  [5 cm H2O] 5 cm H2O  Minute Ventilation (L/min):  [6.4 L/min-11.3 L/min] 9.7 L/min  PIP:  [15 cm H2O-29 cm H2O] 26 cm H2O  MAP (cm H2O):  [6-8] 7     Pt continues to be on full mechanical ventilator support, day 14, with the above settings. Tracheostomy tube size 8.0 Gladys. Breath sounds coarse bilaterally.  Small amount of thick white secretions via inline suction.  RR 28, SpO2 99%. Plateau 15, peak 22. SBT on hold. Pt will remain on full mechanical ventilator support at this time.     KAE PerkinsT

## 2021-05-31 NOTE — PROGRESS NOTES
Patient remains fully supported with the following settings: VCV 14 375 21% 5. BS are coarse; moderate/white secretions are suctioned. Vent changes are not made. RT will monitor.    JERRY Sanchez

## 2021-05-31 NOTE — PROGRESS NOTES
Critical Care Progress Note     Admit Date: 7/22/2019  ICU Day: 25     Code Status: full code    CC: Headache     HPI: 43 year old female who was admitted on 7/22/2019 with ruptured dissecting ventral medial supraclinoid right internal carotid artery aneurysm resuling in SAH/IVH, aneurysm coiling 7/22 that was complicated by right MCA vasospasm, seizure 7/27 s/p intubation and lumbar drain, repeat coiling 8/8 and replacement of lumbar drain, s/p trach/PEG 8/7/19.    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   Verapamil administered IA   7/31/19: Cerebral angiogram     Intra-arterial verapamil infusion into both internal carotid arteries   Fever, PNA-antibiotics started  8/01/19: Cerebral angiogram   Intra-arterial verapamil infusion into both internal carotid arteries in order to treat cerebral vasospasm  8/02/19: Cerebral angiogram   Intra-arterial verapamil infusion into both internal carotid arteries in order to treat cerebral vasospasm   see by Palliative care-family decided to proceed with below surgery   Trach and peg placed in OR by Dr Ledezma   LD replaced   8/05/19: UTI  8/08/19: LD replaced    Cerebral ggymwhyhg-4-7 mm recurrence at the neck of previously coiled right ICA aneurysm  8/11/19: Phase 1 vent weaning started    Off norepi and  "precedex  8/12/19: head CT stable   LD clamped   On TC most of day, back on vent at HS  8/13/19: head Ct shows hydrocephalus with LD clamped   LD opened   Will need shunt   Angio with no changes   On TC most of after noon  8/14/19: trach downsized to # 6 bivona   Working with SLP and PMV   Will need  shunt per neurosurgery  8/15/19: was scheduled for  shunt-pt refused    shunt to be re scheduled-lumbar drain remains unclamped   Very depressed, psych consult ordered  8/16/19: pt now agrees to  shunt-will  happen early next week   TC day, back on vent at noc   To IR today for Lumbar drain replacement     Major events over the last 24 hours: LD replaced today in IR      Subjective: in bed, I placed PMV pt talking clearly with  and family that is present.  She agrees to  shunt when surgery time available    ROS: asking if her trach can every come out    Drips: none    Ventilator: Mechanical Ventilation: intubated 7/27/19   trached 8/2/19        Settings: presently on TC  Back on vent at noc    /71   Pulse 83   Temp 98.8  F (37.1  C)   Resp 23   Ht 4' 10\" (1.473 m)   Wt 152 lb 4.8 oz (69.1 kg)   LMP 07/01/2016   SpO2 99%   BMI 31.83 kg/m       I/O last 3 completed shifts:  In: 270 [NG/GT:270]  Out: 1202 [Urine:1110; Drains:92]  Weight change: 10 lb (4.536 kg)  Vent Mode: VCV  FiO2 (%):  [21 %] 21 %  S RR:  [14] 14  S VT:  [375 mL] 375 mL  PEEP/CPAP (cm H2O):  [5 cm H2O] 5 cm H2O  Minute Ventilation (L/min):  [1.7 L/min-8.5 L/min] 5.6 L/min  PIP:  [22 cm H2O-46 cm H2O] 30 cm H2O  MAP (cm H2O):  [7-18] 8      EXAM:   GEN: awake, on TC Tracks and nods appropriately to questioning  HEENT: PERRL,left  facial droop  # 6 Bivona  PULM: few anterior rhonchi  Strong cough on TC  CV: RRR  ABD: soft nontender  EXT : warm well perfused, no cyanosis, clubbing, trace edema  NEURO: moving right arm and right leg with more strength, left leg moved=s more than arm but both weaker than right  SKIN: no " obvious rash, lesion    Labs Personally reviewed: yes  Results from last 7 days   Lab Units 08/16/19  0453 08/15/19  0424 08/14/19  0448   LN-WHITE BLOOD CELL COUNT thou/uL 8.4 7.2 8.1   LN-HEMOGLOBIN g/dL 12.9 12.8 12.1   LN-HEMATOCRIT % 39.6 39.4 36.4   LN-PLATELET COUNT thou/uL 491* 486* 435   LN-NEUTROPHILS RELATIVE PERCENT % 67 64 79*   LN-MONOCYTES RELATIVE PERCENT % 10 10 6     Results from last 7 days   Lab Units 08/16/19  0453 08/15/19  0424 08/14/19  0448   LN-SODIUM mmol/L 137 136 136   LN-POTASSIUM mmol/L 4.1 4.2 3.9   LN-CHLORIDE mmol/L 102 103 104   LN-CO2 mmol/L 26 24 22   LN-BLOOD UREA NITROGEN mg/dL 19 16 13   LN-CREATININE mg/dL 0.61 0.60 0.59*   LN-CALCIUM mg/dL 10.3 10.2 9.8   LN-PROTEIN TOTAL g/dL 8.4* 8.3* 7.8   LN-BILIRUBIN TOTAL mg/dL 0.7 0.5 0.6   LN-ALKALINE PHOSPHATASE U/L 112 110 111   LN-ALT (SGPT) U/L 60* 72* 86*   LN-AST (SGOT) U/L 39 44* 40     Results from last 7 days   Lab Units 08/14/19  1847   LN-INR  1.01   LN-PARTIAL THROMBOPLASTIN TIME seconds 31       Last VBG 8/11/19:  PH 7.47  PCO2 34    Microbiology: reviewed   Imaging (all imaging is personally reviewed): yes    8/5/19: Appliances remain in stable and satisfactory position. Some new atelectasis in the right lower lobe. New Infiltrate or atelectasis left lower lobe behind the heart    Impression:  1. Aneurysmal SAH  2. MARIA L supraclinoid aneurysm    s/p endovascular coil embolization  3. Vasospasm  4. Recurrent Seizures   likely secondary to acute intracranial process   5. RMCA stroke related to vasospasm.   6. Acute hypoxic respiratory failure due to # 1-5  7. Anxiety  8. UTI   Ecoli  9. Hydrocephalous    Needs  shunt  10. Depression       PLAN:   1. On phase  2 since 8/12, will try off vent tonight-get ABG in am  2. PMV trials only with supervision  3. Per Neurosurgery-lumbar drain replaced today,  early next week   4. OOB up ion chair  5. AED  6. Nimodipine completed   7. Goal -160  8. Changed Cefepime to  ceftriaxone on 8/4, procalcitonin low; completed antibiotics on 8/10       ICU DAILY CHECKLIST                           Can patient transfer out of ICU? no    FAST HUG:    Feeding:  Feeding: Yes.  Patient is receiving NPO and TUBE FEEDS    Sharpe:Yes  Analgesia/Sedation:Yes precedex  Thromboembolic prophylaxis: yes; Mode:  SCDs  HOB>30:  Yes  Stress Ulcer Protocol Active: yes; Mode: PPI  Glycemic Control: Any glucose > 180 no; Mode of Insulin Therapy: Sliding Scale Insulin    INTUBATED:  Can patient have daily waking:  yes  Can patient have spontaneous breathing trial:  Yes  On Phase 2 now    Restraints? no    PHYSICAL THERAPY AND MOBILITY:  Can patient have PT and mobility trial: yes  Activity: OOB in Chair    transfer/discharge plans: will need [facility] once shunt placed  with neurosurgery service    The patient is critically ill with impairment in organ system and high risk of life threatening deterioration.     Total CCT spent 35 minutes thus far today.       Rosy Clemente APRN, [phone]  [facility]

## 2021-05-31 NOTE — PROGRESS NOTES
Vent Mode: VCV  FiO2 (%):  [30 %-100 %] 30 %  S RR:  [16] 16  S VT:  [375 mL] 375 mL  PEEP/CPAP (cm H2O):  [5 cm H2O] 5 cm H2O  Minute Ventilation (L/min):  [6.9 L/min-9.3 L/min] 6.9 L/min  PIP:  [13 cm H2O-36 cm H2O] 13 cm H2O  MAP (cm H2O):  [6-9] 6    No weaning done this shift. Pt remains on full vent support, no changes. Pt transported to CT and back to ICU without incident. BS coarse, sxn for small amounts of thick white secretions, pt tolerated well.      Maria Elena Le, LRT

## 2021-05-31 NOTE — PROGRESS NOTES
PROVIDER RESTRAINT FOR NON-VIOLENT BEHAVIOR FACE TO FACE EVALUATION  8/2/2019   8:48 AM     Patient seen on morning interdisciplinary rounds.     Patient's Immediate Situation:  Patient demonstrated the following behaviors: Impulsive behavior, poor safety judgment, with other less restrictive interventions/devices ineffective    Patient's Reaction to the intervention:  Does patient understand the reason for restraint/seclusion? Unable to Express    Medical Condition:  Is there any evidence of compromise of Skin integrity, Respiratory, Cardiovascular, Musculoskeletal, Hydration? No    Behavioral Condition:  In consultation with the RN, is there a need to continue this restraint or seclusion? Yes    See Restraint Flowsheet for complete restraint documentation and assessment.    Kenia Adams, CNP

## 2021-05-31 NOTE — PROGRESS NOTES
NEUROLOGY PROGRESS NOTE     Darion Herrera,  1975, MRN [id number] Date: 2019     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   RMCA stroke related to vasospasm.       Initial head CT showed MARIA L giant aneurysm (sentinel headache)    S/p endovascular coil embolization    MRI/CT now shows large hypodensity/evolving ischemic stroke (from vasospasm)    TCD from today shows RMCA elevated velocities, mild improvement.     Continue Daily TCD.    On HHH therapy; hydration and -180    Lumbar drain    Nimodipine X 21 days    Keppra 1000 mg two times a day - Level therapeutic.     On Vimpat 100 mg two times a day    Monitor sodium. On 3% protocol.    Palliative care involved. Family wanting to move forward with Trach and PEG.     Thank you again for this referral, please feel free to contact me if you have any questions.     CHIEF COMPLAINT Aneurysmal subarachnoid hemorrhage (H)     HISTORY OF PRESENT ILLNESS     We have been requested by Dr. Kwan to evaluate Darion Herrera who is a 43 y.o.  female for SAH.    This a 43-year-old female on whom neurology is been consulted to evaluate for subarachnoid hemorrhage.  Patient initially presented with 6 days ago with a headache.  She was trying to lift some fruits and felt something blowing up in her head.  She continued to have the headache.  She left the staff she was lifting on the table.  She was then brought to the emergency room where imaging showed a large frontal mass.  This was later on MRI identified to be a giant aneurysm.  Patient underwent angiogram and endovascular coil embolization of the aneurysm.  The patient had a repeat angiogram.  MRI done today showed multiple foci of acute/subacute infarction in the right  parietal-occipital region.  There is also small area of infarction in the right frontal lobe.  TCD's show vasospasm in the right MCA.  There was some narrowing of the M1 and M2 segments.  Neurology was consulted to further help with further cares.  Patient denies any ongoing symptoms he does complain of some headache.     7/29  Patient had 2 seizures over the weekend. Was started on fosphenytoin in addition to Keppra.   No seizures today.  Intubated, but waking up as the sedation has been withdrawn.     7/30  No new seizures. Patient remains on sedation and intubated. RMCA velocities remain elevated on the TCD.     7/31  No new seizures. Patient being kept sedation. Angiogram still shows vasospasm. Patient getting IA verapamil.     8/1  No seizures. Patient continues to remain on sedation. Angiogram continues to show vasospasm. Getting IA verapamil. Family conference tomorrow.     8/2  Patient remains on sedation. Angiogram shows stable vasospasm. Some twitching of right shoulder per nursing staff.     8/5  Weekend events - patient was found to have a RMCA evolving infarct over the weekend.   Family wanting to proceed with Trach and PEG.     8/6  TCD still shows vasospasm. Patient is being kept sedated till vasospasm resolves. Family interested in Trach and PEG.      PAST MEDICAL & SURGICAL HISTORY     Medical History  Patient Active Problem List    Diagnosis Date Noted     E-coli UTI      Gram-positive bacteremia      Fever in other diseases      Acute respiratory failure, unspecified whether with hypoxia or hypercapnia (H)      Cerebrovascular accident (CVA) due to occlusion of right middle cerebral artery (H)      Vasospasm (H)      S/P coil embolization of cerebral aneurysm 07/23/2019     Aneurysm of right internal carotid artery 07/23/2019     Carotid artery aneurysm (H) 07/22/2019     Nausea vomiting and diarrhea 07/22/2019     Aneurysmal subarachnoid hemorrhage (H) 07/22/2019     Intraventricular hemorrhage,  nontraumatic (H) 07/22/2019     Compression of brain (H)      Leiomyoma of uterus 07/18/2016     Past Medical History: No previous history.    Surgical History  She  has a past surgical history that includes IR Cerebral Angiogram (7/22/2019); IR Cerebral Angiogram (7/24/2019); PICC Team Line Insertion (7/26/2019); IR Cerebral Angiogram (7/27/2019); IR Lumbar Drain Insertion (7/27/2019); IR Cerebral Angiogram (7/29/2019); IR Cerebral Angiogram (7/30/2019); IR Cerebral Angiogram (7/31/2019); IR Cerebral Angiogram (8/1/2019); IR Lumbar Drain Insertion (8/2/2019); and IR Cerebral Angiogram (8/2/2019).     SOCIAL HISTORY     Reviewed, and she  reports that she has never smoked. She has never used smokeless tobacco. She reports that she has current or past drug history. She reports that she does not drink alcohol.     FAMILY HISTORY     Reviewed, and non-contributory.      ALLERGIES     Allergies   Allergen Reactions     Oxycodone      Hydrocodone Swelling and Rash        REVIEW OF SYSTEMS     12 system ROS was done and was negative other than HPI - except patient feeling tired.     HOME & HOSPITAL MEDICATIONS     Prior to Admission Medications  Medications Prior to Admission   Medication Sig Dispense Refill Last Dose     ascorbic acid, vitamin C, 500 mg Chew chew tab Chew 500 mg daily.   Unknown at Unknown time       Hospital Medications    albumin human  12.5 g Intravenous Once     atorvastatin  20 mg Enteral Tube DAILY     cefTRIAXone  2 g Intravenous Q24H     chlorhexidine  15 mL Topical Q12H     lacosamide (VIMPAT) IVPB  100 mg Intravenous Q12H 09-21     levETIRAcetam (KEPPRA) IVPB  1,000 mg Intravenous Q12H     niMODipine  60 mg Enteral Tube Q4H     omeprazole  20 mg Enteral Tube QAM AC    Or     pantoprazole  40 mg Intravenous QAM AC     potassium chloride liquid/packet  20 mEq Enteral Tube BID     sodium chloride  10-30 mL Intravenous Q8H FIXED TIMES     sodium chloride  2 g Oral BID    Or     sodium chloride  2 g  Enteral Tube BID        PHYSICAL EXAM     Vital signs  Temp:  [99.9  F (37.7  C)-102  F (38.9  C)] 102  F (38.9  C)  Heart Rate:  [] 88  Resp:  [0-24] 24  BP: (116-153)/(59-94) 141/78  FiO2 (%):  [30 %] 30 %    Weight:   164 lb 8 oz (74.6 kg)    GENERAL: Healthy appearing, alert, no acute distress, normal habitus.  CARDIOVASCULAR: Ext warm and well perfused. Pulses present.   NEUROLOGICAL:  Patient is intubated and on sedation. Eyes open but does not track or follow commands. VFI. EOM absent. Pupils symmetric. NO movement in arms or legs to painful sedation. Tone is symmetric.      DIAGNOSTIC STUDIES     Pertinent Radiology   MRI brain - images reviewed stroke seen  IMPRESSION:   CONCLUSION:  1.  Multiple foci of developing acute/subacute infarction throughout the right parietal occipital lobes, with additional single small foci of acute/subacute infarction in the right frontal lobe and left parietal lobe.  2.  Status post endovascular coiling of large dissecting aneurysm of the ventromedial right supraclinoid ICA with redemonstration of mild persistent flow-related enhancement along the neck of the aneurysm. There is also faint flow-related enhancement   centrally within the coil mass.  3.  Mildly decreased caliber of the M1 and proximal M2 segments of the right middle cerebral artery compared to the left, which may represent mild vasospasm. There is more normal caliber of the distal M2 and M3 branches.    ECHO    1.Left ventricle ejection fraction is normal. The calculated left ventricular ejection fraction is 65%.    2.TAPSE is normal, which is consistent with normal right ventricular systolic function.    3.No hemodynamically significant valvular heart abnormalities.    4.No obvious embolic source seen, if highly suspect consider MICHAEL.    5.No previous study for comparison.     TCD - images reviewed.   IMPRESSION:   CONCLUSION:  New moderate right middle cerebral artery vasospasm.    EEG negative for  seizures.    Angiogram  Preliminary findings: (see dictation for full detail)  - Stable right ICA aneurysm coil mass with residual interstitial neck filling.  - Possible mild vasospasm superimposed on right ICA dissection, verapamil 10 mg infused in right ICA.    TCD reviewed and velocities are elevated in the RMCA.    Angiogram shows MARIA L, RMCA, RACA vasospasm. Dissection in MARIA L.    Angiogram on 8/1 continues to show vasospasm.     MRI and CT reviewed - RMCA evolving stroke seen.     8/6 TCD - RMCA velocity 192 - still elevated.   Recent Results (from the past 24 hour(s))   Sodium lab - every 6 hours    Collection Time: 08/05/19  6:05 PM   Result Value Ref Range    Sodium 142 136 - 145 mmol/L   Potassium    Collection Time: 08/05/19  6:05 PM   Result Value Ref Range    Potassium 3.6 3.5 - 5.0 mmol/L   Crossmatch    Collection Time: 08/06/19 12:04 AM   Result Value Ref Range    Crossmatch Compatible     Blood Expiration Date 01617151342413     Unit Type O Neg     Unit Number [id number]     Status Transfused     Component Red Blood Cells     PRODUCT CODE L9402E38     Issue Date and Time [id number]     Blood Type 9500     CODING SYSTEM [id number]    Sodium lab - every 6 hours    Collection Time: 08/06/19 12:04 AM   Result Value Ref Range    Sodium 142 136 - 145 mmol/L   Magnesium    Collection Time: 08/06/19  5:35 AM   Result Value Ref Range    Magnesium 1.9 1.8 - 2.6 mg/dL   Comprehensive Metabolic Panel    Collection Time: 08/06/19  5:35 AM   Result Value Ref Range    Sodium 142 136 - 145 mmol/L    Potassium 3.3 (L) 3.5 - 5.0 mmol/L    Chloride 112 (H) 98 - 107 mmol/L    CO2 24 22 - 31 mmol/L    Anion Gap, Calculation 6 5 - 18 mmol/L    Glucose 152 (H) 70 - 125 mg/dL    BUN 10 8 - 22 mg/dL    Creatinine 0.58 (L) 0.60 - 1.10 mg/dL    GFR MDRD Af Amer >60 >60 mL/min/1.73m2    GFR MDRD Non Af Amer >60 >60 mL/min/1.73m2    Bilirubin, Total 0.3 0.0 - 1.0 mg/dL    Calcium 8.4 (L) 8.5 - 10.5 mg/dL    Protein, Total  5.6 (L) 6.0 - 8.0 g/dL    Albumin 2.6 (L) 3.5 - 5.0 g/dL    Alkaline Phosphatase 76 45 - 120 U/L    AST 87 (H) 0 - 40 U/L    ALT 86 (H) 0 - 45 U/L   Triglycerides    Collection Time: 08/06/19  5:35 AM   Result Value Ref Range    Triglycerides 208 (H) <=149 mg/dL    Patient Fasting > 8hrs? No    HM1 (CBC with Diff)    Collection Time: 08/06/19  5:35 AM   Result Value Ref Range    WBC 8.6 4.0 - 11.0 thou/uL    RBC 3.19 (L) 3.80 - 5.40 mill/uL    Hemoglobin 9.4 (L) 12.0 - 16.0 g/dL    Hematocrit 28.9 (L) 35.0 - 47.0 %    MCV 91 80 - 100 fL    MCH 29.5 27.0 - 34.0 pg    MCHC 32.5 32.0 - 36.0 g/dL    RDW 13.6 11.0 - 14.5 %    Platelets 217 140 - 440 thou/uL    MPV 10.8 8.5 - 12.5 fL    Neutrophils % 75 (H) 50 - 70 %    Lymphocytes % 13 (L) 20 - 40 %    Monocytes % 6 2 - 10 %    Eosinophils % 5 0 - 6 %    Basophils % 1 0 - 2 %    Neutrophils Absolute 6.4 2.0 - 7.7 thou/uL    Lymphocytes Absolute 1.1 0.8 - 4.4 thou/uL    Monocytes Absolute 0.5 0.0 - 0.9 thou/uL    Eosinophils Absolute 0.4 0.0 - 0.4 thou/uL    Basophils Absolute 0.1 0.0 - 0.2 thou/uL   Crossmatch    Collection Time: 08/06/19 12:05 PM   Result Value Ref Range    Crossmatch Compatible     Blood Expiration Date [id number]     Unit Type O Pos     Unit Number [id number]     Status Issued     Component Red Blood Cells     PRODUCT CODE I5794N01     Issue Date and Time [id number]     Blood Type 5100     CODING SYSTEM [id number]        Total time spent for face to face visit, reviewing labs/imaging studies, counseling and coordination of care was: Over 25 min More than 50% of this time was spent on counseling and coordination of care.       Justin Vasquez MD  Direct Secure Messaging: [email or web address]  Tel: [phone]  This note was dictated using voice recognition software.  Any grammatical or context distortions are unintentional and inherent to the software.

## 2021-05-31 NOTE — PROCEDURES
[facility] Radiology Post Procedure    Procedure: Lumbar drain    Radiologist: Souleymane Monterroso     Fluoro time: 1.2 minutes    Fluoro dose: 124 mGy    EBL: Minimal    Complications: None evident    Preliminary findings: (see dictation for full detail)  - L5-S1 lumbar drain placement, catheter tip terminates at T12.  - Prior lumbar drain sent for culture.    Assess/Plan:   Report to ordering provider.  Drain parameters per primary team.    Souleymane Monterroso

## 2021-05-31 NOTE — PROGRESS NOTES
NEUROLOGY   INPATIENT PROGRESS NOTE       1. 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 Medical management by ICU team and neurosurgery  2.  shunt placement next week  3. Continue Keppra 1000 mill grams twice a day       2. SUBJECTIVE     CC: none    Darion Herrera is a 43 y.o. female seen for a follow up subarachnoid hemorrhage.  Please see prior notes from this 27-day long hospitalization no overnight concerns.  No overnight events.  She has a On her tracheostomy and is talking.  She seems comfortable.  She can be in a wheelchair and believe the room can go outside with family.  She has no pain.  No headaches.  No dizziness.     Patient Active Problem List    Diagnosis Date Noted     Adjustment disorder with mixed anxiety and depressed mood      Mood disorder (H)      Sleep difficulties      Pneumonia due to group B Streptococcus, unspecified laterality, unspecified part of lung (H)      Vasospasm of cerebral artery      E-coli UTI      Gram-positive bacteremia      Fever in other diseases      Acute respiratory failure, unspecified whether with hypoxia or hypercapnia (H)      Cerebrovascular accident (CVA) due to occlusion of right middle cerebral artery (H)      Vasospasm (H)      S/P coil embolization of cerebral aneurysm 07/23/2019     Aneurysm of right internal carotid artery 07/23/2019     Carotid artery aneurysm (H) 07/22/2019     Nausea vomiting and diarrhea 07/22/2019     Aneurysmal subarachnoid hemorrhage (H)  07/22/2019     Intraventricular hemorrhage, nontraumatic (H) 07/22/2019     CVA (cerebral vascular accident) (H) 07/22/2019     Hydrocephalus 07/22/2019     Compression of brain (H)      Leiomyoma of uterus 07/18/2016       Past Surgical History:   Procedure Laterality Date     IR CEREBRAL ANGIOGRAM  7/22/2019     IR CEREBRAL ANGIOGRAM  7/24/2019     IR CEREBRAL ANGIOGRAM  7/27/2019     IR CEREBRAL ANGIOGRAM  7/29/2019     IR CEREBRAL ANGIOGRAM  7/30/2019     IR CEREBRAL ANGIOGRAM  7/31/2019     IR CEREBRAL ANGIOGRAM  8/1/2019     IR CEREBRAL ANGIOGRAM  8/2/2019     IR CEREBRAL ANGIOGRAM  7/28/2019     IR CEREBRAL ANGIOGRAM  8/13/2019     IR CEREBRAL ANGIOGRAM  8/8/2019     IR LUMBAR DRAIN INSERTION  7/27/2019     IR LUMBAR DRAIN INSERTION  8/2/2019     IR LUMBAR DRAIN INSERTION  8/8/2019     IR LUMBAR DRAIN INSERTION  8/16/2019     PICC AND MIDLINE TEAM LINE INSERTION  7/26/2019          PA EGD PERCUTANEOUS PLACEMENT GASTROSTOMY TUBE N/A 8/7/2019    Procedure: CREATION, GASTROSTOMY, PERCUTANEOUS, ENDOSCOPIC;  Surgeon: Fer Ledezma MD;  Location: [facility];  Service: General     TRACHEOSTOMY N/A 8/7/2019    Procedure: CREATION, TRACHEOSTOMY;  Surgeon: Fer Ledezma MD;  Location: [facility];  Service: General       History reviewed. No pertinent family history.    Social History     Socioeconomic History     Marital status:      Spouse name: Not on file     Number of children: Not on file     Years of education: Not on file     Highest education level: Not on file   Occupational History     Not on file   Social Needs     Financial resource strain: Not on file     Food insecurity:     Worry: Not on file     Inability: Not on file     Transportation needs:     Medical: Not on file     Non-medical: Not on file   Tobacco Use     Smoking status: Never Smoker     Smokeless tobacco: Never Used   Substance and Sexual Activity     Alcohol use: No     Drug use: Not Currently     Sexual  activity: Yes   Lifestyle     Physical activity:     Days per week: Not on file     Minutes per session: Not on file     Stress: Not on file   Relationships     Social connections:     Talks on phone: Not on file     Gets together: Not on file     Attends Sabianism service: Not on file     Active member of club or organization: Not on file     Attends meetings of clubs or organizations: Not on file     Relationship status: Not on file     Intimate partner violence:     Fear of current or ex partner: Not on file     Emotionally abused: Not on file     Physically abused: Not on file     Forced sexual activity: Not on file   Other Topics Concern     Not on file   Social History Narrative     Not on file       Allergies   Allergen Reactions     Oxycodone      Hydrocodone Swelling and Rash         3. SCHEDULED MEDICATIONS       atorvastatin  20 mg Enteral Tube DAILY     chlorhexidine  15 mL Topical Q12H     guar gum  1 packet Enteral Tube BID     levETIRAcetam  (KEPPRA) solution 100 mg/mL  1,000 mg Enteral Tube BID     nystatin  5 mL Swish & Swallow QID     omeprazole  20 mg Enteral Tube QAM AC    Or     pantoprazole  40 mg Intravenous QAM AC     potassium chloride liquid/packet  20 mEq Enteral Tube BID     sodium chloride  10-30 mL Intravenous Q8H FIXED TIMES       4. INFUSIONS         5. PRN MEDICATIONS   acetaminophen, bisacodyl, diazePAM, HYDROmorphone, lipase-protease-amylase **AND** sodium bicarbonate, melatonin, menthol-zinc oxide, naloxone **OR** naloxone, ondansetron, polyethylene glycol, senna **OR** senna (SENOKOT) syrup, simethicone, sodium chloride bacteriostatic, sodium chloride, sodium chloride, sodium chloride, white barbra-mineral oil (LUBRIFRESH PM) ophthalmic ointment    6. ROS   Review of systems not obtained due to inability to communicate with the patient.     7. PHYSICAL EXAMINATION   Vital signs in last 24 hours:  Vitals:    08/18/19 0800 08/18/19 0830 08/18/19 0900 08/18/19 0930   BP: 90/61 96/64  100/72 92/65   Pulse: 95 73 97 85   Resp: 23 26 14 27   Temp: 98.8  F (37.1  C)      TempSrc:       SpO2: 100% 100% 99% 100%   Weight:       Height:         I/O last 3 completed shifts:  In: 1965 [I.V.:500; NG/GT:1465]  Out: 1591 [Urine:1475; Drains:116]  I/O this shift:  In: -   Out: 660 [Urine:650; Drains:10]  FiO2 (%):  [21 %] 21 %    Neurological Exam:     Patient is awake.  She takes good eye contact.  And answers my questions very well in English with cap On her tracheostomy.  Her cranial nerve examination showed a slight facial droop on the left.  Her arm strength on the right arm and right leg is normal  On the left arm proximal 3/5 distal 1-2/5 mostly handgrip.  No elbow flexion extension or wrist flexion extension strength, Leg seems fairly plegic, Light touch and pinprick normal         8. RESULTS REVIEW       8.1 Imaging studies   All studies were personally reviewed        MR BRAIN W WO CONTRAST  7/22/2019 11:07 AM  1.  The right suprasellar mass measuring up to 2.4 cm almost certainly represents a right supraclinoid internal carotid artery aneurysm. Recommend consultation with neurosurgery/neuro interventional radiology and conventional angiogram or CTA.  2.  Intraventricular hemorrhage and probable small amount of right parietal subarachnoid hemorrhage in the left sylvian fissure. No acute intracranial body. No finding to suggest subarachnoid hemorrhage. No hydrocephalus.    CT HEAD WITHOUT CONTRAST   07/28/2019, 4:52 AM  1.  Overall, no significant change compared with 07/27/2019 at 1711.  2.  Redemonstration of changes of endovascular coiling of giant right supraclinoid aneurysm.  3.  Stable mild to moderate ventriculomegaly of the lateral and third ventricles. Continued close follow-up is advised.  4.  Overall, stable amount of recent intraventricular and subarachnoid hemorrhage. No definite evidence of interval acute intracranial hemorrhage.    CT HEAD WO CONTRAST : 8/15/2019 11:58 PM  1.   Redemonstration of large endovascular coil mass in in the right paraclinoid region resulting in significant streak artifact and locally limiting evaluation.  2.  Within this limitation, no acute intracranial hemorrhage or evidence of transcortical infarct.  3.  Stable mild to moderate enlargement of the lateral and third ventricles.       8.2 Laboratory testing         Lab Results   Component Value Date    ALT 44 08/18/2019    AST 36 08/18/2019    ALKPHOS 106 08/18/2019    BILITOT 0.5 08/18/2019     Results from last 7 days   Lab Units 08/18/19  0547 08/17/19  0423 08/16/19  0453   LN-SODIUM mmol/L 135* 136 137   LN-POTASSIUM mmol/L 3.8 4.1 4.1   LN-CHLORIDE mmol/L 102 101 102   LN-CO2 mmol/L 25 26 26   LN-BLOOD UREA NITROGEN mg/dL 15 19 19   LN-CREATININE mg/dL 0.58* 0.65 0.61   LN-CALCIUM mg/dL 9.9 10.4 10.3   LN-PROTEIN TOTAL g/dL 7.8 8.4* 8.4*   LN-BILIRUBIN TOTAL mg/dL 0.5 0.6 0.7   LN-ALKALINE PHOSPHATASE U/L 106 115 112   LN-ALT (SGPT) U/L 44 51* 60*   LN-AST (SGOT) U/L 36 37 39       Time spent on counseling/coordination of care: 15Minutes. More than 50% of this time during the visit for Darion Herrera was devoted to counseling and coordination of care, including face to face time with the patient, time spent reviewing data, obtaining patient information, and discussing the case with other health care providers. I discussed stroke       Farhad Styles MD, PhD  Neurology & Sleep Medicine

## 2021-05-31 NOTE — PLAN OF CARE
Problem: Mechanical Ventilation  Goal: Patient will maintain patent airway  Outcome: Progressing  Goal: ET tube will be managed safely  Outcome: Progressing     JERRY Mcclain

## 2021-05-31 NOTE — PROGRESS NOTES
NEUROSURGERY PROGRESS NOTE:    NEUROSURGERY ATTENDING: The patient's attending neurosurgeon is Dr. Sal. Plan of care discussed with Dr Sal.     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 Complicated hospital course with re-bleed, concern for hydrocephalus, vasospasms, right MCA infarct. MRI last week questionable for recurrent aneurysm. No change on repeat angio today. CT head today shows slightly larger ventricles after having lumbar drain clamped for 24 hours. Re-opened with draining parameter of 10 ml/hr. Patient may require shunt placement. Patient has been weaning during the day from the vent.    PT to work with patient without restriction from our perspective. Her exam continues to improve each day with more and more movement. Today has minimal but improved movement of left arm and leg. If BP drops, would allow natural BP, do not use pressors, per discussion with Dr Sal. Notify Neurosurgery if BP less than 100.     Patient examined with professional     PLAN:   Patient Active Problem List   Diagnosis     Carotid artery aneurysm (H)     Aneurysmal subarachnoid hemorrhage (H)     Intraventricular hemorrhage, nontraumatic (H)     Compression of brain (H)     S/P coil embolization of cerebral aneurysm     Aneurysm of right internal carotid artery     Cerebrovascular accident (CVA) due to occlusion of right middle cerebral artery (H)     Vasospasm (H)  --RASS 0, can try to wean off Precedex  --stable sodium  ---160, pressors off  --Keppra 500 mg two times a day, indefinite with seizure   --Nimodipine dosing complete after today - no benefit of extending per Dr Sal  --Sharpe in place, strict I/O  --Mechanical DVT prophylaxis  --Must have accurate Is and Os  --Reopen lumbar drain to drain 10 ml/hr  --ROM every shift  --OK for PT to see patient and get patient up to chair. No  restrictions per Dr Sal  --HOB greater than 30 degrees  --Monitor for fevers        Discharge Recommendations/Plan:  Barriers to Discharge: yes, requiring acute medical care.     Follow Up Plan: Pending       HPI: Darion Herrera is a 42 yo female who was admitted on 07/22/19 with SAH and IVH secondary to ruptured aneurysm of the right ICA. Aneurysm coiling on 7/22 that was complicated by vasospasm. On 07/27, found to be unresponsive and possibly seizing, was intubated and taken to IR where it was revealed that her right ICU aneurysm had dissected and re-bled.  In IR on 07/27, the vessel was recoiled and a lumbar drain was placed for ICP management.  On 07/29 underwent intraarterial verapamil infusion with angiogram for acute vasospasm of right ICA. Due to increased TCD values, back to IR for another cerebral angiogram 7/30 for acute vasospasm and intraarterial verapamil infusion.     BRIEF HOSPITAL COURSE:  7/21/2019  -Admit with nausea, vomiting, headache. CT with question of extra axial mass over the planum sphenoidale region measuring 2.2 x 2.3 cm    7/22/2019  -MRI indicates mass on CT actually a giant right supraclinoid internal carotid artery aneurysm. + intraventricular hemorrhage, small right subarachnoid hemorrhage.    -IR for angiogram, coil embolization of ruptured, dissecting aneurysm. Mild to moderate interstitial filling within the coil mass    7/24/2019  -Return to IR due to high likelihood of instability of aneurysm. Aneurysm was stable in appearance    7/27/2019  -Possible seizure, became unresponsive, intubated. CT + for increased amount of hemorrhage, cerebral edema, increasing size of ventricles.     -Return to IR for angio and placement of lumbar drain    -Aneurysm re-ruptured, additional coils placed with 95% occlusion    -Moderate to severe narrowing of R ICA likely secondary tot he dissecting aneurysm and not necessarily true spasm. Verapamil given.    -Mild spasm R MCA    7/28/2019  -Stable  appearance of aneurysm, verapamil given    7/29/2019  -Stable to mildly increased filling of aneurysm.     -mildly increased spasm of R MCA, verapamil given. Plan for repeat angio 8/2/2019 7/30/2019  -TCD worsening. Patient sent for angio    -worsening spasm in multiple territories. Verapamil given to R ICA, L ICA, R vert    - Stable filling of aneurysm    -Head CT with some hypodensity concerning for infarct of R MCA territory    -Temp 102.9. Standard fever workup    7/31/2019  -Return to IR for treatment of vasospasm, more verapamil    -CT head unchanged    -UA with e coli. Gram neg rods blood culture, may be contaminant    -Febrile, temp max 102.9    -Antibiotics started per ICU team    8/1/2019  -Return to IR for treatment of vasospasm, more verapamil    -CT head unchanged    -CSF sent from lumbar drain    -Fever persisted    8/2/2019          -Return to IR for treatment of vasospasm, higher dose verapamil given                          -TCDs worsening as compared to yesterday                          - Family conference to discuss G/J and Trach    8/7/2019           - Trach and Peg completed                          - MRI/MRA    8/8/2019           - IR (no additional coiling)                          - New lumbar drain    8/11/2019         - weaning lumbar drain                           - weaning from the vent                           - off pressors                           - velocities improved  8/12:                 - clamped lumbar drain  8/13:                 - ventricles slightly larger on CT - lumbar drain re-opened                           - angio without intervention - aneurysm unchanged         SUBJECTIVE:  Nods head seemingly appropriate to questions. Denies pain. Denies vision disturbance. Smiled and lifted right arm to wave at me upon entering her room today. Attempts to mouth words.     OBJECTIVE:  Vital signs in last 24 hours  Temp:  [98  F (36.7  C)-99.2  F (37.3  C)] 99.2  F (37.3   C)  Heart Rate:  [] 93  Resp:  [14-39] 25  BP: ()/(63-84) 113/63  SpO2:  [94 %-100 %] 99 %  ETCO2 (mmHg):  [0 mmHg-31 mmHg] 0 mmHg  FiO2 (%):  [21 %-100 %] 21 %  Body mass index is 31.14 kg/m .    Mental Status: Trach, Vent, precedex minimal amount. Follows all commands and appears appropriate.   Cranial Nerves: Exam limited.  PERRL. + cough/gag. EOM intact.     Motor Strength: Minimal movement left arm and leg. Weak hand grasp on the left. Some horizontal movement of left leg and dorsiflexion/plantar flexion. Able to lift and hold right arm in the air. Can lift right leg.   Lumbar Drain: Lumbar drain re-opened to drain 10 ml/hr. Output clear/yellow.     Last 3 TCD Values     8/10/2019   8/9/2019 8/8/2019 8/7/2019   LMCA          69    90 89 97   LACA          64   73 72 76   RMCA          129   156 150 176   RACA         76    99 87 72   BA        36   41 40 45         Pertinent Labs:    Results for SHAMA LIMON (MRN [id number]) as of 8/13/2019 14:54   8/13/2019 05:00   Sodium 139   Potassium 3.6   Chloride 105   CO2 22   Anion Gap, Calculation 12   BUN 10   Creatinine 0.59 (L)   GFR MDRD Af Amer >60   GFR MDRD Non Af Amer >60   Calcium 9.8   AST 56 (H)    (H)   ALBUMIN 4.0   Protein, Total 7.6   Alkaline Phosphatase 108   Bilirubin, Total 0.6   Magnesium 2.0   Glucose 111   WBC 8.3   RBC 4.12   Hemoglobin 12.2   Hematocrit 36.0   MCV 87   MCH 29.6   MCHC 33.9   RDW 14.5   Platelets 408   MPV 10.2   Neutrophils % 75 (H)   Lymphocytes % 14 (L)   Monocytes % 7   Eosinophils % 3   Basophils % 1   Neutrophils Absolute 6.2   Lymphocytes Absolute 1.1   Monocytes Absolute 0.6   Eosinophils Absolute 0.3   Basophils Absolute 0.1         Pertinent Radiology   Radiology Results: personally reviewed.    Head CT 8/13/2019  CONCLUSION:  1.  Considerable streak artifact related to the coil mass positioned in the anterior right circulation. No definite new or worsening intracranial hemorrhage.  2.  Subtle  prominence of the lateral and third ventricles compared to prior could reflect very early ventricular dilatation/hydrocephalus. Follow-up recommended.  3.  No definite interval change in evolving ischemic changes in the right middle cerebral artery territory. No definite new infarct.    8/7/2019  HEAD MRI:   1.  New T2 FLAIR sulcal nonsuppression in the dependent temporal and occipital sulci compatible with subarachnoid hemorrhage although age indeterminant and a small amount of new subarachnoid hemorrhage is difficult to exclude.  2.  Hyperattenuation on the prior CT corresponds to faint susceptibility, favored to represent mild petechial hemorrhage.  3.  A few small scattered foci of new restricted diffusion in the right posterior limb internal capsule and right parietal cortex.  4.  No midline shift or herniation. No hydrocephalus.     HEAD MRA:   1.  New flow related signal near circumferential around the periphery of the coiled right internal carotid artery aneurysm, raising concern for recurrence.  2.  Mild to moderate vasospasm of the right M1, slightly improved compared to 7/26/2019. Mild vasospasm of the proximal right M2 middle cerebral artery branches, not significant change.  3.  No new significant stenosis or occlusion.    MANDY Ho-[facility]  O: [phone]

## 2021-05-31 NOTE — PROGRESS NOTES
NEUROSURGERY PROGRESS NOTE:    NEUROSURGERY ATTENDING: The patient's attending neurosurgeon is Dr. Sal. Plan of care discussed with Dr Sal.     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 Complicated hospital course with re-bleed, concern for hydrocephalus, vasospasms, right MCA infarct. MRI last week questionable for recurrent aneurysm. Dr Sal discussed with Dr Aguero. Plan is for Angio Tuesday the 13th and if there is continued changed, a stent will be considered.     Velocities improving, completed 8/10. Continue current BP goals 100-160. Pressors are off. Can stop CVP monitoring. Patient appears volume down per I/Obut weight is up 9 lbs and last CVP 14. Sodium stable off 3%. Wean off sedation as able. Increase activity as long as patient tolerates. ROM every shift and OK for PT to work with patient without restriction from our perspective.  Lumbar drain position to drain 5 ml/hr and should be clamped for re-positioning. Patient was successful on vent wean today.     Patient is alert, follows commands on the right with more movement of her right side each day. Has sensation on the left but no movement.     Plan includes CT head in the AM. If stable without hydrocephalus, clamp lumbar drain and repeat CT Tuesday morning. Wound consider removal if patient remains stable on Wednesday. If BP drops, would allow natural BP, do not use pressors, per discussion with Dr Sal. Notify Neurosurgery if BP less than 100.     PLAN:   Patient Active Problem List   Diagnosis     Carotid artery aneurysm (H)     Aneurysmal subarachnoid hemorrhage (H)     Intraventricular hemorrhage, nontraumatic (H)     Compression of brain (H)     S/P coil embolization of cerebral aneurysm     Aneurysm of right internal carotid artery     Cerebrovascular accident (CVA) due to occlusion of right middle cerebral artery (H)      Vasospasm (H)  --RASS 0, can try to wean off Precedex  --Daily sodium - stable today  ---160, pressors off  --TCD's are complete  --Keppra 500 mg two times a day, indefinite with seizure  --Vimpat continues per neurology    --Nimodipine x21 days. Complete 8/13/2019. No benefit to extend dosing.  --Sharpe in place, strict I/O  --Mechanical DVT prophylaxis  --Must have accurate Is and Os  --Continue Lumbar drain at 5ml/hour  --Head CT in am - If stable, clamp lumbar drain  --ROM every shift  --OK for PT to see patient and get patient up to chair. No restrictions per Dr Sal  --OK to keep weaning vent - Discussed with Dr Castro     Fever  --was on Rocephin, pseudomonas in sputum - Abx course complete and managed by critical care team  --Likely also neurogenic component        Discharge Recommendations/Plan:  Barriers to Discharge: yes, requiring acute medical care.     Follow Up Plan: Pending       HPI: Darion Herrera is a 44 yo female who was admitted on 07/22/19 with SAH and IVH secondary to ruptured aneurysm of the right ICA. Aneurysm coiling on 7/22 that was complicated by vasospasm. On 07/27, found to be unresponsive and possibly seizing, was intubated and taken to IR where it was revealed that her right ICU aneurysm had dissected and re-bled.  In IR on 07/27, the vessel was recoiled and a lumbar drain was placed for ICP management.  On 07/29 underwent intraarterial verapamil infusion with angiogram for acute vasospasm of right ICA. Due to increased TCD values, back to IR for another cerebral angiogram 7/30 for acute vasospasm and intraarterial verapamil infusion.     BRIEF HOSPITAL COURSE:  7/21/2019  -Admit with nausea, vomiting, headache. CT with question of extra axial mass over the planum sphenoidale region measuring 2.2 x 2.3 cm    7/22/2019  -MRI indicates mass on CT actually a giant right supraclinoid internal carotid artery aneurysm. + intraventricular hemorrhage, small right subarachnoid  hemorrhage.    -IR for angiogram, coil embolization of ruptured, dissecting aneurysm. Mild to moderate interstitial filling within the coil mass    7/24/2019  -Return to IR due to high likelihood of instability of aneurysm. Aneurysm was stable in appearance    7/27/2019  -Possible seizure, became unresponsive, intubated. CT + for increased amount of hemorrhage, cerebral edema, increasing size of ventricles.     -Return to IR for angio and placement of lumbar drain    -Aneurysm re-ruptured, additional coils placed with 95% occlusion    -Moderate to severe narrowing of R ICA likely secondary tot he dissecting aneurysm and not necessarily true spasm. Verapamil given.    -Mild spasm R MCA    7/28/2019  -Stable appearance of aneurysm, verapamil given    7/29/2019  -Stable to mildly increased filling of aneurysm.     -mildly increased spasm of R MCA, verapamil given. Plan for repeat angio 8/2/2019 7/30/2019  -TCD worsening. Patient sent for angio    -worsening spasm in multiple territories. Verapamil given to R ICA, L ICA, R vert    - Stable filling of aneurysm    -Head CT with some hypodensity concerning for infarct of R MCA territory    -Temp 102.9. Standard fever workup    7/31/2019  -Return to IR for treatment of vasospasm, more verapamil    -CT head unchanged    -UA with e coli. Gram neg rods blood culture, may be contaminant    -Febrile, temp max 102.9    -Antibiotics started per ICU team    8/1/2019  -Return to IR for treatment of vasospasm, more verapamil    -CT head unchanged    -CSF sent from lumbar drain    -Fever persisted    8/2/2019          -Return to IR for treatment of vasospasm, higher dose verapamil given                          -TCDs worsening as compared to yesterday                          - Family conference to discuss G/J and Trach    8/7/2019           - Trach and Peg completed                          - MRI/MRA    8/8/2019           - IR (no additional coiling)                          - New  lumbar drain    8/11/2019         - weaning lumbar drain                           - weaning from the vent                           - off pressors         SUBJECTIVE:  Nods head seemingly appropriate to questions. Denies pain. Denies vision disturbance. Tearful during exam and conversation.     OBJECTIVE:  Vital signs in last 24 hours  Temp:  [98.5  F (36.9  C)-99.6  F (37.6  C)] 99.6  F (37.6  C)  Heart Rate:  [] 81  Resp:  [12-30] 27  BP: ()/(54-88) 117/73  SpO2:  [90 %-100 %] 95 %  ETCO2 (mmHg):  [22 mmHg-30 mmHg] 27 mmHg  FiO2 (%):  [21 %] 21 %  Body mass index is 34.42 kg/m .    Mental Status: Trach, Vent, precedex minimal amount. Patient with eyes open and tracks writer in the room. Squeezes eyes shut to command. Nods head to questions, seems appropriate.     Cranial Nerves: Exam limited.  PERRL. + cough/gag. EOM intact.     Motor Strength:  No movement on left. RN reports withdrawal to painful stimuli left UE. Spontaneous movement right leg periodically. Some antigravity right forearm. Can weakly squeeze to command on the right hand. Gives thumbs up.     Lumbar Drain: Lumbar drain patent, open and draining 5 ml/hr today. Output yellow. Dressing changed and site cleansed due to potential stool contamination near end of tegaderm. LP insertion site not affected.     Last 3 TCD Values     8/10/2019   8/9/2019 8/8/2019 8/7/2019   LMCA          69    90 89 97   LACA          64   73 72 76   RMCA          129   156 150 176   RACA         76    99 87 72   BA        36   41 40 45         Pertinent Labs:     8/11/2019 04:26   Sodium 138   Potassium 3.5   Chloride 108 (H)   CO2 23   Anion Gap, Calculation 7   BUN 9   Creatinine 0.53 (L)   GFR MDRD Af Amer >60   GFR MDRD Non Af Amer >60   Calcium 9.0   AST 61 (H)    (H)   ALBUMIN 3.1 (L)   Protein, Total 6.1   Alkaline Phosphatase 102   Bilirubin, Total 0.3   Magnesium 1.9   Glucose 112   WBC 7.5   RBC 3.60 (L)   Hemoglobin 10.5 (L)   Hematocrit 31.8  (L)   MCV 88   MCH 29.2   MCHC 33.0   RDW 14.5   Platelets 307   MPV 10.3   Neutrophils % 76 (H)   Lymphocytes % 12 (L)   Monocytes % 6   Eosinophils % 5   Basophils % 1   Neutrophils Absolute 5.7   Lymphocytes Absolute 0.9   Monocytes Absolute 0.5   Eosinophils Absolute 0.4   Basophils Absolute 0.1         Pertinent Radiology   Radiology Results: personally reviewed.    Head CT 8/8/2019  INTRACRANIAL CONTENTS: Stable large right MCA distribution hypodensity; consistent with evolving acute/subacute ischemia. 6 stable small amount of subtle hyperdensity at the right temporal operculum likely representing mild amount of petechial hemorrhage. No significant global mass effect. Redemonstration of coiling at the anterior right aspect of [place] with prominent streaking artifact. The ventricles and sulci are normal for age.     VISUALIZED ORBITS/SINUSES/MASTOIDS: No significant orbital abnormality. No significant paranasal sinus mucosal disease. Moderate opacification mastoid air cells bilaterally.     BONES/SOFT TISSUES: No significant abnormality.     CONCLUSION:  No significant change.    8/7/2019  HEAD MRI:   1.  New T2 FLAIR sulcal nonsuppression in the dependent temporal and occipital sulci compatible with subarachnoid hemorrhage although age indeterminant and a small amount of new subarachnoid hemorrhage is difficult to exclude.  2.  Hyperattenuation on the prior CT corresponds to faint susceptibility, favored to represent mild petechial hemorrhage.  3.  A few small scattered foci of new restricted diffusion in the right posterior limb internal capsule and right parietal cortex.  4.  No midline shift or herniation. No hydrocephalus.     HEAD MRA:   1.  New flow related signal near circumferential around the periphery of the coiled right internal carotid artery aneurysm, raising concern for recurrence.  2.  Mild to moderate vasospasm of the right M1, slightly improved compared to 7/26/2019. Mild vasospasm  of the proximal right M2 middle cerebral artery branches, not significant change.  3.  No new significant stenosis or occlusion.    MANDY Ho-[facility]surgery  O: [phone]

## 2021-05-31 NOTE — PLAN OF CARE
Problem: Daily Care  Goal: Daily care needs are met  Outcome: Progressing  - Npo at midnight for sangeetha on 8/15. Lumbar drain 5 cc/ hr until 2200 and clamp.  - Head CT at 0500.   - Digni care has been removed do to discomfort.   - Up on chair for 2 hrs, encourage turn and reposition every two hours while in bed.   - Isosource 1.5 running at 35 cc/ hr. Flash 50 cc every 4 hrs.        Problem: Knowledge Deficit  Goal: Patient/family/caregiver demonstrates understanding of disease process, treatment plan, medications, and discharge instructions  Outcome: Progressing

## 2021-05-31 NOTE — PLAN OF CARE
Pt touching head , iram explained for infection purpose of not scratching incision on head and so she leves trach in. Pain med for abdominal discomfort given. Restraints for safety .

## 2021-05-31 NOTE — PLAN OF CARE
Problem: Mechanical Ventilation  Goal: Patient will maintain patent airway  Outcome: Progressing  Goal: Tracheostomy will be managed safely  Outcome: Progressing  Goal: Respiratory status - ventilation  Description  Movement of air in and out of the lungs.    Liberate from ventilator  Outcome: Progressing

## 2021-05-31 NOTE — PROGRESS NOTES
Patient continues on mechanical ventilation with no weaning anticipated this shift. Br sounds coarse rhonchi Rt upper and middle lobe, suctioned small amount of tan, yellow secretions. Tolerated well, see ventilator flow sheet.  Will continue to monitor.       RESPIRATORY CARE NOTE      Vent Mode: VCV  FiO2 (%):  [21 %-100 %] 21 %  S RR:  [14] 14  S VT:  [375 mL] 375 mL  PEEP/CPAP (cm H2O):  [5 cm H2O] 5 cm H2O  Minute Ventilation (L/min):  [7.7 L/min-10.7 L/min] 8.3 L/min  PIP:  [19 cm H2O-27 cm H2O] 20 cm H2O  MAP (cm H2O):  [7-9] 7         Fili Keller, LRT

## 2021-05-31 NOTE — PLAN OF CARE
Problem: Pain  Goal: Patient's pain/discomfort is manageable  Outcome: Progressing     Tylenol given x1 for R side abd discomfort. Pt observed to be sleeping most of night.       Problem: Neurological Deficit  Goal: Neurological status is stable or improving  Outcome: Progressing     No acute neuro changes overnight. See flowsheets.

## 2021-05-31 NOTE — PROGRESS NOTES
NEUROSURGERY PROGRESS NOTE:    NEUROSURGERY ATTENDING: The patient's attending neurosurgeon is Dr. Daniel Kimbrough.     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 Complicated hospital course with re-bleed, concern for hydrocephalus, daily cerebral angiograms for severe vasospasm.     Diffusion MRI over the weekend confirms R MCA infarction, CT today with expected evolution of the infarct. No dramatic worsening of cerebral edema.     Velocities mostly improved, ok to begin reducing sedation with goal RASS -1- -2. If stable can consider further reduction tomorrow.     PLAN:   Patient Active Problem List   Diagnosis     Carotid artery aneurysm (H)     Aneurysmal subarachnoid hemorrhage (H)     Intraventricular hemorrhage, nontraumatic (H)     Compression of brain (H)     S/P coil embolization of cerebral aneurysm     Aneurysm of right internal carotid artery     Cerebrovascular accident (CVA) due to occlusion of right middle cerebral artery (H)     Vasospasm (H)  --Keep sedated, RASS -3 until velocities improving  --IV fluid goal 100 mL/hr, goal to be euvolemic or slightly hypervolemic to help mitigate vasospasm, appreciate intensivist assistance  --Target CVP > 10  --Full 3% protocol  ---160, Albumin PRN x5 doses first, then titrate levophed once Albumin exhausted  --TCD's daily. Continue these  --Keppra 500 mg two times a day x30 days, indefinite with seizure  --Nimodipine x21 days. Complete 8/13/2019  --Sharpe in place, strict I/O  --Mechanical DVT prophylaxis  --Must have accurate Is and Os  --Continue Lumbar drain at 10-15 ml/hour. Would not wean until velocities stabilize  --No plans for MRI at this time as overall imaging has remained stable  --DC LR infusion       Fever  --On Rocephin, pseudomonas in sputum  --Likely also neurogenic component            Discharge Recommendations/Plan:   Barriers to Discharge: yes, requiring acute medical care.     Follow Up Plan: Pending       HPI: Darion Herrera is a 44 yo female who was admitted on 07/22/19 with SAH and IVH secondary to ruptured aneurysm of the right ICA.  Aneurysm coiling on 7/22 that was complicated by vasospasm. On 07/27, found to be unresponsive and possibly seizing, was intubated and taken to IR where it was revealed that her right ICU aneurysm had dissected and re-bled.  In IR on 07/27, the vessel was recoiled and a lumbar drain was placed for ICP management.  On 07/29 underwent intraarterial verapamil infusion with angiogram for acute vasospasm of right ICA. Due to increased TCD values, back to IR for another cerebral angiogram 7/30 for acute vasospasm and intraarterial verapamil infusion.     BRIEF HOSPITAL COURSE:  7/21/2019  -Admit with nausea, vomiting, headache. CT with question of extra axial mass over the planum sphenoidale region measuring 2.2 x 2.3 cm    7/22/2019  -MRI indicates mass on CT actually a giant right supraclinoid internal carotid artery aneurysm. + intraventricular hemorrhage, small right subarachnoid hemorrhage.    -IR for angiogram, coil embolization of ruptured, dissecting aneurysm. Mild to moderate interstitial filling within the coil mass    7/24/2019  -Return to IR due to high likelihood of instability of aneurysm. Aneurysm was stable in appearance    7/27/2019  -Possible seizure, became unresponsive, intubated. CT + for increased amount of hemorrhage, cerebral edema, increasing size of ventricles.     -Return to IR for angio and placement of lumbar drain    -Aneurysm re-ruptured, additional coils placed with 95% occlusion    -Moderate to severe narrowing of R ICA likely secondary tot he dissecting aneurysm and not necessarily true spasm. Verapamil given.    -Mild spasm R MCA    7/28/2019  -Stable appearance of aneurysm, verapamil given    7/29/2019  -Stable to mildly increased filling of aneurysm.     -mildly  increased spasm of R MCA, verapamil given. Plan for repeat angio 8/2/2019 7/30/2019  -TCD worsening. Patient sent for angio    -worsening spasm in multiple territories. Verapamil given to R ICA, L ICA, R vert    - Stable filling of aneurysm    -Head CT with some hypodensity concerning for infarct of R MCA territory    -Temp 102.9. Standard fever workup    7/31/2019  -Return to IR for treatment of vasospasm, more verapamil    -CT head unchanged    -UA with e coli. Gram neg rods blood culture, may be contaminant    -Febrile, temp max 102.9    -Antibiotics started per ICU team    8/1/2019  -Return to IR for treatment of vasospasm, more verapamil    -CT head unchanged    -CSF sent from lumbar drain    -Fever persisted    8/2/2019          -Return to IR for treatment of vasospasm, higher dose verapamil given                          -TCDs worsening as compared to yesterday                          - Family conference to discuss G/J and Trach       SUBJECTIVE:  No change, patient unable to participate.    OBJECTIVE:  Vital signs in last 24 hours  Temp:  [99.5  F (37.5  C)-103.1  F (39.5  C)] 100.5  F (38.1  C)  Heart Rate:  [] 77  Resp:  [10-26] 16  BP: (108-160)/(55-96) 151/93  FiO2 (%):  [30 %-100 %] 30 %  Body mass index is 34.21 kg/m .    Mental Status: sedated, speech  intubated.     Cranial Nerves: Exam limited to sedation.  PERRL. + cough/gag. Resists exam of R pupil, does not resist on the L. Brisk corneal on R, none on L may be due to neglect      Motor Strength: Does not withdraw to painful stim. Sedated.    Lumbar Drain: Lumbar drain patent, open and titrated to 10-15 mL output/hour.  Output remains bloody.     Last 3 TCD Values       8/3/19 8/4/19 8/5/19   LMCA    231 119 100   LACA    90 132 139   RMCA    231 236 211   RACA    110 111 84   BA    52 50 71         Pertinent Labs:  CBC:   Lab Results   Component Value Date    WBC 11.2 (H) 08/05/2019    RBC 3.10 (L) 08/05/2019     BMP:   Lab Results    Component Value Date    CO2 25 08/05/2019    BUN 6 (L) 08/05/2019    CREATININE 0.52 (L) 08/05/2019    CALCIUM 8.0 (L) 08/05/2019     Coagulation:   Lab Results   Component Value Date    INR 1.10 08/03/2019         Pertinent Radiology   Radiology Results: personally reviewed.    Katherin Youssef [facility]  [address] [place] [address], [address] [place] [id number]    O: [phone]  P: [phone]

## 2021-05-31 NOTE — PROGRESS NOTES
NEUROSURGERY PROGRESS NOTE:    NEUROSURGERY ATTENDING: The patient's attending neurosurgeon is Dr. Sal. Plan of care discussed with Dr Hoffmann in Dr. Sal's absence.     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 Complicated hospital course with re-bleed and additional coils placed, concern for hydrocephalus, vasospasms with subsequent right MCA infarct. Most recent angio on 8/13/2019 stable. TCDs stopped 8/10/2019 with near resolution of the global vasospasm (slight residual in R MCA). IR procedure note indicates plan for MRA 1-2 weeks from last angio (due 8/20-8/27) to ensure stability of the aneurysm followed by stent assisted coil embolization in one month.     She will have  shunt placed today for failure to tolerate clamping of the lumbar drain. Could DC to [place] if stable tomorrow vs. Wednesday.       PLAN:   Patient Active Problem List   Diagnosis     Carotid artery aneurysm (H)     Aneurysmal subarachnoid hemorrhage (H)     Intraventricular hemorrhage, nontraumatic (H)     Compression of brain (H)     S/P coil embolization of cerebral aneurysm     Aneurysm of right internal carotid artery     Cerebrovascular accident (CVA) due to occlusion of right middle cerebral artery (H)     Vasospasm (H)     1.  Allow BP to normalize but notify neurosurgery if SBP <100 as would want to avoid hypotension          2.  Keppra 1000 mg two times a day, indefinite or as directed by neurology     3.  Nimodipine dosing complete - no benefit of extending per Dr Sal     4.  Lumbar drain clamped, plan for shunt this afternoon      5.  Send CSF prior to shunt this afternoon          Discharge Recommendations/Plan:  Barriers to Discharge: yes, requiring acute medical care.     Follow Up Plan:     [place] POD #1  shunt from neurosurgery view.        HPI: Darion Herrera is a 44 yo female who was admitted on  07/22/19 with SAH and IVH secondary to ruptured aneurysm of the right ICA. Aneurysm coiling on 7/22 that was complicated by vasospasm. On 07/27, found to be unresponsive and possibly seizing, was intubated and taken to IR where it was revealed that her right ICU aneurysm had dissected and re-bled.  In IR on 07/27, the vessel was recoiled and a lumbar drain was placed for ICP management.  On 07/29 underwent intraarterial verapamil infusion with angiogram for acute vasospasm of right ICA. Due to increased TCD values, back to IR for another cerebral angiogram 7/30 for acute vasospasm and intraarterial verapamil infusion. Complicated hospital course outlined below.     BRIEF HOSPITAL COURSE:    7/21/2019  -Admit with nausea, vomiting, headache. CT with question of extra axial mass over the planum sphenoidale region measuring 2.2 x 2.3 cm    7/22/2019  -MRI indicates mass on CT actually a giant right supraclinoid internal carotid artery aneurysm. + intraventricular hemorrhage, small right subarachnoid hemorrhage.    -IR for angiogram, coil embolization of ruptured, dissecting aneurysm. Mild to moderate interstitial filling within the coil mass    7/24/2019  -Return to IR due to high likelihood of instability of aneurysm. Aneurysm was stable in appearance    7/27/2019  -Possible seizure, became unresponsive, intubated. CT + for increased amount of hemorrhage, cerebral edema, increasing size of ventricles.     -Return to IR for angio and placement of lumbar drain    -Aneurysm re-ruptured, additional coils placed with 95% occlusion    -Moderate to severe narrowing of R ICA likely secondary tot he dissecting aneurysm and not necessarily true spasm. Verapamil given.    -Mild spasm R MCA    7/28/2019  -Stable appearance of aneurysm, verapamil given    7/29/2019  -Stable to mildly increased filling of aneurysm.     -mildly increased spasm of R MCA, verapamil given. Plan for repeat angio 8/2/2019 7/30/2019  -TCD worsening.  Patient sent for angio    -worsening spasm in multiple territories. Verapamil given to R ICA, L ICA, R vert    - Stable filling of aneurysm    -Head CT with some hypodensity concerning for infarct of R MCA territory    -Temp 102.9. Standard fever workup    7/31/2019  -Return to IR for treatment of vasospasm, more verapamil    -CT head unchanged    -UA with e coli. Gram neg rods blood culture, may be contaminant    -Febrile, temp max 102.9    -Antibiotics started per ICU team    8/1/2019  -Return to IR for treatment of vasospasm, more verapamil    -CT head unchanged    -CSF sent from lumbar drain    -Fever persisted    8/2/2019          -Return to IR for treatment of vasospasm, higher dose verapamil given                          -TCDs worsening as compared to yesterday                          - Family conference to discuss G/J and Trach    8/7/2019           - Trach and Peg completed                          - MRI/MRA    8/8/2019           - IR (no additional coiling)                          - New lumbar drain    8/10/2019         - TCDs stopped                           - Resolution of vasospasm in other vessels, mild residual in R MCA    8/11/2019         - weaning lumbar drain                           - weaning from the vent                           - off pressors                           - velocities improved    8/12/2019:        - clamped lumbar drain    8/13/2019:        - ventricles slightly larger on CT - lumbar drain re-opened                           - angio without intervention - aneurysm unchanged  8/14/2019:        - [place] valve                           - planning  shunt 8/15/2019                            -Which she refused.     8/16/2019         - Replacement of lumbar drain    8/18/2019         -  shunt placement         SUBJECTIVE:  Headache today. No other new complaints or symptoms. Feels she is being starved despite rationale for tube feeding being explained. Frustrated with  prolonged illness, wants to get home to her family. Tearful at times.     OBJECTIVE:  Vital signs in last 24 hour  Temp:  [98.1  F (36.7  C)-99.6  F (37.6  C)] 99.1  F (37.3  C)  Heart Rate:  [] 89  Resp:  [15-36] 25  BP: ()/(58-95) 113/79  SpO2:  [90 %-100 %] 95 %  FiO2 (%):  [21 %] 21 %  Body mass index is 29.49 kg/m .    Mental Status: Trach, Follows commands and appears appropriate. Responds appropriately per report of [facility] . Oriented to self, place, recent events.  Cranial Nerves:  PERRL, EOMI, L droop, tongue midline  Motor Strength:   Biceps 4+/5 right, 0/5 left   Hand grasp 4/5 right, 3/5 left  Triceps 4+/5 right, 0/5 left   Deltoids 4+/5 right, 0/5 left   Hip flexors 4+/5 right, 3/5 left   Quadriceps: 4+/5 right, 3/5 left   Ankle dorsiflexion: 4+/5 right, 3/5 left   Extensor hallicus longus: 4+/5 right, 3/5 left   Ankle plantar flexion : 4+/5 right, 3/5 left     Lumbar Drain: Lumbar drain to drain 5 ml/hr. Output clear/yellow.     Katherin Youssef -[facility]  [address] [place] [address], [address]  [place] [address]    O: [phone]  P: [phone]

## 2021-05-31 NOTE — PLAN OF CARE
Problem: Mechanical Ventilation  Goal: Patient will maintain patent airway  Outcome: Progressing  Goal: Oral health is maintained or improved  Outcome: Progressing  Goal: ET tube will be managed safely  Outcome: Progressing

## 2021-05-31 NOTE — PLAN OF CARE
To or , report to jaime palacios sbp 100- 160 vs in limits 99.1 temp, meds to be given in or antibiotic , decadron, and pepcid.  Lumbar drain is clamped. Hair washed x 2 lyesterday, unable to relieve nest in hair. 250 ml  urine output thus far.

## 2021-05-31 NOTE — PROGRESS NOTES
S Daily Progress Note    Assessment/Plan:  Darion Herrera is a 43-year-old female who presented 7/21/2019 with Headache that started in the Neck with radiation to the forehead ruptured aneurysm of the right ICA resulting in subarachnoid hemorrhage with extension intraventricular hemorrhage.  She is status post aneurysm coiling on 7/22 which was complicated by vasospasm.  There is also a complication of the right ICA aneurysm with dissection and rebleeding leading to seizure and unresponsiveness on 7/27.  This required intubation and recoiling and placement of lumbar drain.  She is currently intubated in ICU.  Intensivist and neurosurgery managing.      Assessment:  Right ICA Aneurysm complicated with SAH, IVH  S/p Dissection and Coling x 2 with Hydrocephalus s/p Lumbar drainage     Recurrent Seizures  On Propofol, Keppra and Fosphenytoin     Metabolic Encephalopathy  Management as per above    E. Coli CAUTI  On Cefepime       Plan:  Plan to give Nimodipine q 4 x 21 days   Keep Sodium 145-150  Management as per Neurosurgery, Intensivist, Neurology     Code status:Full Code        Subjective:  Patient is intubated     Review of Systems:   As above    Current Medications Reviewed via EHR List    Objective:  Vital signs in last 24 hours:  Temp:  [98.8  F (37.1  C)-102.8  F (39.3  C)] 101.3  F (38.5  C)  Heart Rate:  [] 93  Resp:  [15-38] 22  BP: ()/(52-79) 152/77  SpO2:  [91 %-100 %] 100 %  ETCO2 (mmHg):  [0 mmHg-39 mmHg] 37 mmHg  Arterial Line BP: (117-158)/(14-82) 152/77  FiO2 (%):  [30 %-100 %] 30 %    Intake/Output last 3 shifts:  I/O last 3 completed shifts:  In: 5670.9 [I.V.:4560.9; NG/GT:545; IV Piggyback:565]  Out: 5552 [Urine:4245; Drains:307; Stool:1000]      Physical Exam:  EYES: EOM cannot be elicited   NECK: no JVD  HEART : S1 S2 Tachycardia   LUNGS: Clear to auscultation without Crepitations or Wheezing    ABDOMEN:   Soft, No tenderness , Bowel sounds are positive  CNS Sedated on Mechanical  Ventilator               Lab Results:  Personally Reviewed.   Fingerstick Blood Glucose: No results for input(s): [id number] in the last 48 hours.    Recent Results (from the past 24 hour(s))   Sputum culture    Collection Time: 07/31/19  9:15 AM   Result Value Ref Range    Gram Stain Result 3+ Polymorphonuclear leukocytes     Gram Stain Result 3+ Gram positive cocci in chains     Gram Stain Result 2+ Gram positive bacilli, diphtheroid like    Sodium lab - every 6 hours    Collection Time: 07/31/19 12:26 PM   Result Value Ref Range    Sodium 150 (H) 136 - 145 mmol/L   Potassium    Collection Time: 07/31/19  4:21 PM   Result Value Ref Range    Potassium 3.7 3.5 - 5.0 mmol/L   Sodium lab - every 6 hours    Collection Time: 07/31/19  5:38 PM   Result Value Ref Range    Sodium 148 (H) 136 - 145 mmol/L   Sodium lab - every 6 hours    Collection Time: 08/01/19 12:07 AM   Result Value Ref Range    Sodium 149 (H) 136 - 145 mmol/L   Sodium lab - every 6 hours    Collection Time: 08/01/19  4:46 AM   Result Value Ref Range    Sodium 147 (H) 136 - 145 mmol/L   Triglycerides    Collection Time: 08/01/19  4:46 AM   Result Value Ref Range    Triglycerides 183 (H) <=149 mg/dL    Patient Fasting > 8hrs? No    Potassium - Next AM    Collection Time: 08/01/19  4:46 AM   Result Value Ref Range    Potassium 3.8 3.5 - 5.0 mmol/L   Phenytoin (Dilantin )    Collection Time: 08/01/19  4:46 AM   Result Value Ref Range    Phenytoin 11.3 10.0 - 20.0 ug/mL   HM1 (CBC with Diff)    Collection Time: 08/01/19  4:46 AM   Result Value Ref Range    WBC 13.4 (H) 4.0 - 11.0 thou/uL    RBC 2.79 (L) 3.80 - 5.40 mill/uL    Hemoglobin 8.0 (L) 12.0 - 16.0 g/dL    Hematocrit 25.1 (L) 35.0 - 47.0 %    MCV 90 80 - 100 fL    MCH 28.7 27.0 - 34.0 pg    MCHC 31.9 (L) 32.0 - 36.0 g/dL    RDW 13.7 11.0 - 14.5 %    Platelets 187 140 - 440 thou/uL    MPV 9.4 8.5 - 12.5 fL    Neutrophils % 80 (H) 50 - 70 %    Lymphocytes % 12 (L) 20 - 40 %    Monocytes % 5 2 - 10 %     Eosinophils % 2 0 - 6 %    Basophils % 0 0 - 2 %    Neutrophils Absolute 10.7 (H) 2.0 - 7.7 thou/uL    Lymphocytes Absolute 1.6 0.8 - 4.4 thou/uL    Monocytes Absolute 0.7 0.0 - 0.9 thou/uL    Eosinophils Absolute 0.3 0.0 - 0.4 thou/uL    Basophils Absolute 0.0 0.0 - 0.2 thou/uL   Magnesium    Collection Time: 08/01/19  4:46 AM   Result Value Ref Range    Magnesium 2.0 1.8 - 2.6 mg/dL   Sodium    Collection Time: 08/01/19  6:03 AM   Result Value Ref Range    Sodium 147 (H) 136 - 145 mmol/L   Chloride    Collection Time: 08/01/19  6:03 AM   Result Value Ref Range    Chloride 122 (H) 98 - 107 mmol/L   Carbon Dioxide (CO2)    Collection Time: 08/01/19  6:03 AM   Result Value Ref Range    CO2 20 (L) 22 - 31 mmol/L           Advanced Care Planning  Discharge plan: Unknown      Keanu Gonzales  Date: 8/1/2019  Time: 8:13 AM  Hospitalist Service  Part of this chart was created using a dictation software. Typographic errors, word substitutions, and Grammatical errors may unintentionally occur.

## 2021-05-31 NOTE — PROGRESS NOTES
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   RMCA stroke related to vasospasm.       Initial head CT showed MARIA L giant aneurysm (sentinel headache)    S/p endovascular coil embolization    Aneurysm stable on repeat angiogram.     MRI/CT shows large hypodensity/evolving ischemic stroke (from vasospasm)    Head CT stable shows slightly enlarged ventricles and lumbar drain opened.    Exam improving clinically.    -160    Keppra 1000 mg two times a day - Level therapeutic.     Start PT.    NSG planning  shunt tomorrow.    Thank you again for this referral, please feel free to contact me if you have any questions.     CHIEF COMPLAINT Aneurysmal subarachnoid hemorrhage (H)     HISTORY OF PRESENT ILLNESS     We have been requested by Dr. Kwan to evaluate Darion Herrera who is a 43 y.o.  female for SAH.    This a 43-year-old female on whom neurology is been consulted to evaluate for subarachnoid hemorrhage.  Patient initially presented with 6 days ago with a headache.  She was trying to lift some fruits and felt something blowing up in her head.  She continued to have the headache.  She left the staff she was lifting on the table.  She was then brought to the emergency room where imaging showed a large frontal mass.  This was later on MRI identified to be a giant aneurysm.  Patient underwent angiogram and endovascular coil embolization of the aneurysm.  The patient had a repeat angiogram.  MRI done today showed multiple foci of acute/subacute infarction in the right parietal-occipital region.  There is also small area of infarction in the right frontal lobe.  TCD's show vasospasm in the right  MCA.  There was some narrowing of the M1 and M2 segments.  Neurology was consulted to further help with further cares.  Patient denies any ongoing symptoms he does complain of some headache.     7/29  Patient had 2 seizures over the weekend. Was started on fosphenytoin in addition to Keppra.   No seizures today.  Intubated, but waking up as the sedation has been withdrawn.     7/30  No new seizures. Patient remains on sedation and intubated. RMCA velocities remain elevated on the TCD.     7/31  No new seizures. Patient being kept sedation. Angiogram still shows vasospasm. Patient getting IA verapamil.     8/1  No seizures. Patient continues to remain on sedation. Angiogram continues to show vasospasm. Getting IA verapamil. Family conference tomorrow.     8/2  Patient remains on sedation. Angiogram shows stable vasospasm. Some twitching of right shoulder per nursing staff.     8/5  Weekend events - patient was found to have a RMCA evolving infarct over the weekend.   Family wanting to proceed with Trach and PEG.     8/6  TCD still shows vasospasm. Patient is being kept sedated till vasospasm resolves. Family interested in Trach and PEG.     8/7  MRI/MRA brain today to assess for vasospasm. Trach and PEG today.     8/8  Patient went for angiogram today. Exam improved off sedation. Angiogram showed aneurysm but no coiling done. Coiling planned for next week.     8/9  TCD look improved. Sedation has been reduced.     8/10  More awake and able to move right arm and leg more. Following more commands.     8/11  Vimpat was decreased with no seizures. More interactive today. Still not able to lift left arm.     8/12  Exam has improved. Starting to move left side today. Head CT stable.  No seizures.    8/13  Angiogram today. Patients exam on left side has improved. She is able to lift it against gravity.     8/14  Patient very emotional today and crying all day long. Wants to go home. NSG planning  shunt so lumbar drain can  be removed.      PAST MEDICAL & SURGICAL HISTORY     Medical History  Patient Active Problem List    Diagnosis Date Noted     Pneumonia due to group B Streptococcus, unspecified laterality, unspecified part of lung (H)      Vasospasm of cerebral artery      E-coli UTI      Gram-positive bacteremia      Fever in other diseases      Acute respiratory failure, unspecified whether with hypoxia or hypercapnia (H)      Cerebrovascular accident (CVA) due to occlusion of right middle cerebral artery (H)      Vasospasm (H)      S/P coil embolization of cerebral aneurysm 07/23/2019     Aneurysm of right internal carotid artery 07/23/2019     Carotid artery aneurysm (H) 07/22/2019     Nausea vomiting and diarrhea 07/22/2019     Aneurysmal subarachnoid hemorrhage (H) 07/22/2019     Intraventricular hemorrhage, nontraumatic (H) 07/22/2019     CVA (cerebral vascular accident) (H) 07/22/2019     Hydrocephalus 07/22/2019     Compression of brain (H)      Leiomyoma of uterus 07/18/2016     Past Medical History: No previous history.    Surgical History  She  has a past surgical history that includes IR Cerebral Angiogram (7/22/2019); IR Cerebral Angiogram (7/24/2019); PICC Team Line Insertion (7/26/2019); IR Cerebral Angiogram (7/27/2019); IR Lumbar Drain Insertion (7/27/2019); IR Cerebral Angiogram (7/29/2019); IR Cerebral Angiogram (7/30/2019); IR Cerebral Angiogram (7/31/2019); IR Cerebral Angiogram (8/1/2019); IR Lumbar Drain Insertion (8/2/2019); IR Cerebral Angiogram (8/2/2019); IR Cerebral Angiogram (7/28/2019); pr egd percutaneous placement gastrostomy tube (N/A, 8/7/2019); Tracheostomy (N/A, 8/7/2019); IR Lumbar Drain Insertion (8/8/2019); and IR Cerebral Angiogram (8/13/2019).     SOCIAL HISTORY     Reviewed, and she  reports that she has never smoked. She has never used smokeless tobacco. She reports that she has current or past drug history. She reports that she does not drink alcohol.     FAMILY HISTORY     Reviewed, and  non-contributory.      ALLERGIES     Allergies   Allergen Reactions     Oxycodone      Hydrocodone Swelling and Rash        REVIEW OF SYSTEMS     12 system ROS was done and was negative other than HPI - except patient feeling tired.     HOME & HOSPITAL MEDICATIONS     Prior to Admission Medications  Medications Prior to Admission   Medication Sig Dispense Refill Last Dose     ascorbic acid, vitamin C, 500 mg Chew chew tab Chew 500 mg daily.   Unknown at Unknown time       Hospital Medications    atorvastatin  20 mg Enteral Tube DAILY     chlorhexidine  15 mL Topical Q12H     guar gum  1 packet Enteral Tube BID     levETIRAcetam  (KEPPRA) solution 100 mg/mL  1,000 mg Enteral Tube BID     nystatin  5 mL Swish & Swallow QID     omeprazole  20 mg Enteral Tube QAM AC    Or     pantoprazole  40 mg Intravenous QAM AC     potassium chloride liquid/packet  20 mEq Enteral Tube BID     sodium chloride  10-30 mL Intravenous Q8H FIXED TIMES        PHYSICAL EXAM     Vital signs  Temp:  [99  F (37.2  C)-99.2  F (37.3  C)] 99.2  F (37.3  C)  Heart Rate:  [] 100  Resp:  [14-34] 19  BP: (103-139)/(64-85) 111/69  FiO2 (%):  [21 %-30 %] 21 %    Weight:   145 lb 4.8 oz (65.9 kg)    GENERAL: Healthy appearing, alert, no acute distress, normal habitus.  CARDIOVASCULAR: Ext warm and well perfused. Pulses present.   NEUROLOGICAL:  Patient is trached. Does not speak but does follow commands. EOMI. Pupils symmetric. VFI on present on both sides. Left sided facial droop.  Right side antigravity. Left leg 3+/5. Left arm 3+/5     DIAGNOSTIC STUDIES     Pertinent Radiology   MRI brain - images reviewed stroke seen  IMPRESSION:   CONCLUSION:  1.  Multiple foci of developing acute/subacute infarction throughout the right parietal occipital lobes, with additional single small foci of acute/subacute infarction in the right frontal lobe and left parietal lobe.  2.  Status post endovascular coiling of large dissecting aneurysm of the ventromedial  right supraclinoid ICA with redemonstration of mild persistent flow-related enhancement along the neck of the aneurysm. There is also faint flow-related enhancement   centrally within the coil mass.  3.  Mildly decreased caliber of the M1 and proximal M2 segments of the right middle cerebral artery compared to the left, which may represent mild vasospasm. There is more normal caliber of the distal M2 and M3 branches.    ECHO    1.Left ventricle ejection fraction is normal. The calculated left ventricular ejection fraction is 65%.    2.TAPSE is normal, which is consistent with normal right ventricular systolic function.    3.No hemodynamically significant valvular heart abnormalities.    4.No obvious embolic source seen, if highly suspect consider MICHAEL.    5.No previous study for comparison.     TCD - images reviewed.   IMPRESSION:   CONCLUSION:  New moderate right middle cerebral artery vasospasm.    EEG negative for seizures.    Angiogram  Preliminary findings: (see dictation for full detail)  - Stable right ICA aneurysm coil mass with residual interstitial neck filling.  - Possible mild vasospasm superimposed on right ICA dissection, verapamil 10 mg infused in right ICA.    TCD reviewed and velocities are elevated in the RMCA.    Angiogram shows MARIA L, RMCA, RACA vasospasm. Dissection in MARIA L.    Angiogram on 8/1 continues to show vasospasm.     MRI and CT reviewed - RMCA evolving stroke seen.     8/6 TCD - RMCA velocity 192 - still elevated.     MRI brain  IMPRESSION:   CONCLUSION:  HEAD MRI:   1.  New T2 FLAIR sulcal nonsuppression in the dependent temporal and occipital sulci compatible with subarachnoid hemorrhage although age indeterminant and a small amount of new subarachnoid hemorrhage is difficult to exclude.  2.  Hyperattenuation on the prior CT corresponds to faint susceptibility, favored to represent mild petechial hemorrhage.  3.  A few small scattered foci of new restricted diffusion in the right  posterior limb internal capsule and right parietal cortex.  4.  No midline shift or herniation. No hydrocephalus.     HEAD MRA:   1.  New flow related signal near circumferential around the periphery of the coiled right internal carotid artery aneurysm, raising concern for recurrence.  2.  Mild to moderate vasospasm of the right M1, slightly improved compared to 7/26/2019. Mild vasospasm of the proximal right M2 middle cerebral artery branches, not significant change.  3.  No new significant stenosis or occlusion.    Angiogram  6-7 mm recurrence at the neck of previously coiled right ICA aneurysm.     TCD shows improving RMCA velocities (images of today's test reviewed).     Angiogram shows unchanged aneurysm.     Recent Results (from the past 24 hour(s))   POCT Glucose    Collection Time: 08/13/19  9:01 PM   Result Value Ref Range    Glucose 103 70 - 139 mg/dL   Magnesium    Collection Time: 08/14/19  4:48 AM   Result Value Ref Range    Magnesium 2.1 1.8 - 2.6 mg/dL   Comprehensive Metabolic Panel    Collection Time: 08/14/19  4:48 AM   Result Value Ref Range    Sodium 136 136 - 145 mmol/L    Potassium 3.9 3.5 - 5.0 mmol/L    Chloride 104 98 - 107 mmol/L    CO2 22 22 - 31 mmol/L    Anion Gap, Calculation 10 5 - 18 mmol/L    Glucose 118 70 - 125 mg/dL    BUN 13 8 - 22 mg/dL    Creatinine 0.59 (L) 0.60 - 1.10 mg/dL    GFR MDRD Af Amer >60 >60 mL/min/1.73m2    GFR MDRD Non Af Amer >60 >60 mL/min/1.73m2    Bilirubin, Total 0.6 0.0 - 1.0 mg/dL    Calcium 9.8 8.5 - 10.5 mg/dL    Protein, Total 7.8 6.0 - 8.0 g/dL    Albumin 4.1 3.5 - 5.0 g/dL    Alkaline Phosphatase 111 45 - 120 U/L    AST 40 0 - 40 U/L    ALT 86 (H) 0 - 45 U/L   HM1 (CBC with Diff)    Collection Time: 08/14/19  4:48 AM   Result Value Ref Range    WBC 8.1 4.0 - 11.0 thou/uL    RBC 4.14 3.80 - 5.40 mill/uL    Hemoglobin 12.1 12.0 - 16.0 g/dL    Hematocrit 36.4 35.0 - 47.0 %    MCV 88 80 - 100 fL    MCH 29.2 27.0 - 34.0 pg    MCHC 33.2 32.0 - 36.0 g/dL     RDW 14.3 11.0 - 14.5 %    Platelets 435 140 - 440 thou/uL    MPV 9.8 8.5 - 12.5 fL    Neutrophils % 79 (H) 50 - 70 %    Lymphocytes % 10 (L) 20 - 40 %    Monocytes % 6 2 - 10 %    Eosinophils % 3 0 - 6 %    Basophils % 1 0 - 2 %    Neutrophils Absolute 6.4 2.0 - 7.7 thou/uL    Lymphocytes Absolute 0.8 0.8 - 4.4 thou/uL    Monocytes Absolute 0.5 0.0 - 0.9 thou/uL    Eosinophils Absolute 0.3 0.0 - 0.4 thou/uL    Basophils Absolute 0.1 0.0 - 0.2 thou/uL   Platelet Function Test    Collection Time: 08/14/19  3:00 PM   Result Value Ref Range    PFA-COL/ 1 - 180 sec       Total time spent for face to face visit, reviewing labs/imaging studies, counseling and coordination of care was: Over 25 min More than 50% of this time was spent on counseling and coordination of care.  Counseling family on plan.     Justin Vasquez MD  Direct Secure Messaging: [email or web address]  Tel: [phone]  This note was dictated using voice recognition software.  Any grammatical or context distortions are unintentional and inherent to the software.

## 2021-05-31 NOTE — PROGRESS NOTES
Interventional Neuroradiology    Lumbar drain check.  Placed 8/8/19.  Would be due to change tomorrow.  Spoke with patients nurse.  Plan is for  shunt tomorrow so LD no longer needed after that.    Dr Duarte discussed case with 'marcelina Aguero and Agatha.  Will plan for recoil with stent of aneurysm in 1 month.  Will need to start on plavix 7-10 days prior so allowing enough time for additional recovery from stroke to start antiplatelet therapy.  He also recommends an MRA in 2 weeks for aneurysm surveillance. If the aneurysm looks larger we may consider proceeding with coiling sooner.     I placed this information in the discharge instructions as I am unsure where or when she will be discharged.      Any questions or concerns please call [phone].    JF Inman, CNP

## 2021-05-31 NOTE — ANESTHESIA CARE TRANSFER NOTE
Last vitals:   Vitals:    08/07/19 1408   BP: 118/72   Pulse: 85   Resp:    Temp:    SpO2: 95%     Patient's level of consciousness is unresponsive  Spontaneous respirations: no: Vented.  Maintains airway independently: no: Tracheal tube in place.  Dentition unchanged: yes  Oropharynx: oropharynx clear of all foreign objects    QCDR Measures:  ASA# 20 - Surgical Safety Checklist: WHO surgical safety checklist completed prior to induction    PQRS# 430 - Adult PONV Prevention: NA - Not adult patient, not GA or 3 or more risk factors NOT present  ASA# 8 - Peds PONV Prevention: NA - Not pediatric patient, not GA or 2 or more risk factors NOT present  PQRS# 424 - Nina-op Temp Management: 4559F - At least one body temp DOCUMENTED => 35.5C or 95.9F within required timeframe  PQRS# 426 - PACU Transfer Protocol:NA - Patient did not go to PACU  ASA# 14 - Acute Post-op Pain: ASA14B - Patient did NOT experience pain >= 7 out of 10

## 2021-05-31 NOTE — PRE-PROCEDURE
Procedure Name: Lumbar drain replacement, cerebral angiogram possible treatment of recurrent aneurysm   Date/Time: 8/8/2019 8:50 AM    Verbal consent obtained?: Yes  Written consent obtained?: Yes  Risks and benefits: Risks, benefits and alternatives were discussed  Consent given by: spouse  Expected level of sedation: moderate (general if needed for aneurysm treatment )  ASA Class: Class 4- Severe systemic disease, acute unstable problems  Mallampati: N/A- Alternate secured airway  Patient states understanding of procedure being performed: Yes  Patient's understanding of procedure matches consent: Yes  Procedure consent matches procedure scheduled: Yes  Appropriately NPO: yes  Lungs: lungs clear with good breath sounds bilaterally and other (comment)  Lung exam comment: on mechanical ventilator   Heart: normal heart sounds and rate  History & Physical reviewed: History and physical reviewed and no updates needed  Statement of review: I have reviewed the lab findings, diagnostic data, medications, and the plan for sedation

## 2021-05-31 NOTE — INTERVAL H&P NOTE
I have performed an assessment and examined the patient, as necessary, to update the patient's current status that may have changed since the prior History and Physical.  The History & Physical has been reviewed and no updates are needed.

## 2021-05-31 NOTE — PROGRESS NOTES
NEUROSURGERY PROGRESS NOTE:    NEUROSURGERY ATTENDING: The patient's attending neurosurgeon is Dr. Sal. Plan of care discussed with Dr Sal.     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 Complicated hospital course with re-bleed, concern for hydrocephalus, vasospasms, right MCA infarct. MRI last week questionable for recurrent aneurysm. No change on repeat angio earlier this week. CT head showed slightly larger ventricles after having lumbar drain clamped for 24 hours.     She is having lumbar drain replaced today. She has now agreed to  shunt.     Patient examined with professional .     PLAN:   Patient Active Problem List   Diagnosis     Carotid artery aneurysm (H)     Aneurysmal subarachnoid hemorrhage (H)     Intraventricular hemorrhage, nontraumatic (H)     Compression of brain (H)     S/P coil embolization of cerebral aneurysm     Aneurysm of right internal carotid artery     Cerebrovascular accident (CVA) due to occlusion of right middle cerebral artery (H)     Vasospasm (H)     1.   If BP drops, would allow natural BP, do not use pressors.            Notify Neurosurgery if BP less than 100     2.  Keppra 500 mg two times a day, indefinite with seizure      3.  Nimodipine dosing complete - no benefit of extending per Dr Sal     4.  Lumbar drain to 5 ml/hr.       5.  OK for PT to see patient and get patient up to chair. No restrictions per Dr Sal     6.  Lumbar drain replaced today. She is agreeing to  shunt. Drain over weekend.     7.  Add to surgical schedule next available.   Monday afternoon with Dr. Hoffmann.        Discharge Recommendations/Plan:  Barriers to Discharge: yes, requiring acute medical care.     Follow Up Plan: Pending       HPI: Darion Herrera is a 42 yo female who was admitted on 07/22/19 with SAH and IVH secondary to ruptured aneurysm of the right ICA.  Aneurysm coiling on 7/22 that was complicated by vasospasm. On 07/27, found to be unresponsive and possibly seizing, was intubated and taken to IR where it was revealed that her right ICU aneurysm had dissected and re-bled.  In IR on 07/27, the vessel was recoiled and a lumbar drain was placed for ICP management.  On 07/29 underwent intraarterial verapamil infusion with angiogram for acute vasospasm of right ICA. Due to increased TCD values, back to IR for another cerebral angiogram 7/30 for acute vasospasm and intraarterial verapamil infusion.     BRIEF HOSPITAL COURSE:  7/21/2019  -Admit with nausea, vomiting, headache. CT with question of extra axial mass over the planum sphenoidale region measuring 2.2 x 2.3 cm    7/22/2019  -MRI indicates mass on CT actually a giant right supraclinoid internal carotid artery aneurysm. + intraventricular hemorrhage, small right subarachnoid hemorrhage.    -IR for angiogram, coil embolization of ruptured, dissecting aneurysm. Mild to moderate interstitial filling within the coil mass    7/24/2019  -Return to IR due to high likelihood of instability of aneurysm. Aneurysm was stable in appearance    7/27/2019  -Possible seizure, became unresponsive, intubated. CT + for increased amount of hemorrhage, cerebral edema, increasing size of ventricles.     -Return to IR for angio and placement of lumbar drain    -Aneurysm re-ruptured, additional coils placed with 95% occlusion    -Moderate to severe narrowing of R ICA likely secondary tot he dissecting aneurysm and not necessarily true spasm. Verapamil given.    -Mild spasm R MCA    7/28/2019  -Stable appearance of aneurysm, verapamil given    7/29/2019  -Stable to mildly increased filling of aneurysm.     -mildly increased spasm of R MCA, verapamil given. Plan for repeat angio 8/2/2019 7/30/2019  -TCD worsening. Patient sent for angio    -worsening spasm in multiple territories. Verapamil given to R ICA, L ICA, R vert    - Stable  filling of aneurysm    -Head CT with some hypodensity concerning for infarct of R MCA territory    -Temp 102.9. Standard fever workup    7/31/2019  -Return to IR for treatment of vasospasm, more verapamil    -CT head unchanged    -UA with e coli. Gram neg rods blood culture, may be contaminant    -Febrile, temp max 102.9    -Antibiotics started per ICU team    8/1/2019  -Return to IR for treatment of vasospasm, more verapamil    -CT head unchanged    -CSF sent from lumbar drain    -Fever persisted    8/2/2019          -Return to IR for treatment of vasospasm, higher dose verapamil given                          -TCDs worsening as compared to yesterday                          - Family conference to discuss G/J and Trach    8/7/2019           - Trach and Peg completed                          - MRI/MRA    8/8/2019           - IR (no additional coiling)                          - New lumbar drain    8/11/2019         - weaning lumbar drain                           - weaning from the vent                           - off pressors                           - velocities improved  8/12:                 - clamped lumbar drain  8/13:                 - ventricles slightly larger on CT - lumbar drain re-opened                           - angio without intervention - aneurysm unchanged  8/14:                 - Passey Fermin valve                           - planning  shunt 8/15/2019                              Which she refused.          SUBJECTIVE:      OBJECTIVE:  Vital signs in last 24 hour  Temp:  [98.5  F (36.9  C)-99.5  F (37.5  C)] 98.8  F (37.1  C)  Heart Rate:  [] 91  Resp:  [15-26] 18  BP: ()/(66-94) 116/72  SpO2:  [96 %-100 %] 99 %  ETCO2 (mmHg):  [0 mmHg-36 mmHg] 0 mmHg  FiO2 (%):  [21 %] 21 %  Body mass index is 31.83 kg/m .    Mental Status: Trach, Vent, Follows commands and appears appropriate.  Cranial Nerves: Exam limited.  PERRL. + cough/gag. EOM intact.   She is speaking with me today via Passey  mir    Motor Strength: Minimal movement left arm and leg. Minimal hand grasp on the left.  Some horizontal movement of left leg and dorsiflexion/plantar flexion. Able to lift and hold right arm in the air. Can lift right leg.   Lumbar Drain: Lumbar drain to drain 5 ml/hr. Output clear/yellow.     Pertinent Labs:    Reviewed  Pertinent Radiology   Radiology Results: personally reviewed.    Head CT 8/15/2019  1.  Redemonstration of large endovascular coil mass in in the right paraclinoid region resulting in significant streak artifact and locally limiting evaluation.  2.  Within this limitation, no acute intracranial hemorrhage or evidence of transcortical infarct.  3.  Stable mild to moderate enlargement of the lateral and third ventricles.    Jailene Mcknight NP  [facility] Neurosurgery  O: [phone]

## 2021-05-31 NOTE — PROGRESS NOTES
NEUROSURGERY PROGRESS NOTE:    NEUROSURGERY ATTENDING: The patient's attending neurosurgeon is Dr. Sal. Plan of care discussed with Dr Sal.     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 Complicated hospital course with re-bleed, concern for hydrocephalus, vasospasms, right MCA infarct. MRI last week questionable for recurrent aneurysm. No change on repeat angio earlier this week. CT head showed slightly larger ventricles after having lumbar drain clamped for 24 hours. Patient to OR tomorrow for  shunt. Long discussion with patient and her spouse today through professional . Questions answered. They are agreeable to the procedure. Patient has been weaning during the day from the vent and tried a Passey mir valve today.     PT to work with patient without restriction from our perspective. Her exam continues to improve each day with more and more movement. Today has minimal but improved movement of left arm and leg. If BP drops, would allow natural BP, do not use pressors, per discussion with Dr Sal. Notify Neurosurgery if BP less than 100.     Patient examined with professional     PLAN:   Patient Active Problem List   Diagnosis     Carotid artery aneurysm (H)     Aneurysmal subarachnoid hemorrhage (H)     Intraventricular hemorrhage, nontraumatic (H)     Compression of brain (H)     S/P coil embolization of cerebral aneurysm     Aneurysm of right internal carotid artery     Cerebrovascular accident (CVA) due to occlusion of right middle cerebral artery (H)     Vasospasm (H)  ---160, pressors off  --Keppra 500 mg two times a day, indefinite with seizure   --Nimodipine dosing complete - no benefit of extending per Dr Sal  --Sharpe in place, strict I/O  --Mechanical DVT prophylaxis  --Must have accurate Is and Os  --Lumbar drain to 5 ml/hr. Clamp last this evening. Call  if change in exam.   --ROM every shift  --OK for PT to see patient and get patient up to chair. No restrictions per Dr Sal  --HOB greater than 30 degrees  --Monitor for fevers        Discharge Recommendations/Plan:  Barriers to Discharge: yes, requiring acute medical care.     Follow Up Plan: Pending       HPI: Darion Herrera is a 44 yo female who was admitted on 07/22/19 with SAH and IVH secondary to ruptured aneurysm of the right ICA. Aneurysm coiling on 7/22 that was complicated by vasospasm. On 07/27, found to be unresponsive and possibly seizing, was intubated and taken to IR where it was revealed that her right ICU aneurysm had dissected and re-bled.  In IR on 07/27, the vessel was recoiled and a lumbar drain was placed for ICP management.  On 07/29 underwent intraarterial verapamil infusion with angiogram for acute vasospasm of right ICA. Due to increased TCD values, back to IR for another cerebral angiogram 7/30 for acute vasospasm and intraarterial verapamil infusion.     BRIEF HOSPITAL COURSE:  7/21/2019  -Admit with nausea, vomiting, headache. CT with question of extra axial mass over the planum sphenoidale region measuring 2.2 x 2.3 cm    7/22/2019  -MRI indicates mass on CT actually a giant right supraclinoid internal carotid artery aneurysm. + intraventricular hemorrhage, small right subarachnoid hemorrhage.    -IR for angiogram, coil embolization of ruptured, dissecting aneurysm. Mild to moderate interstitial filling within the coil mass    7/24/2019  -Return to IR due to high likelihood of instability of aneurysm. Aneurysm was stable in appearance    7/27/2019  -Possible seizure, became unresponsive, intubated. CT + for increased amount of hemorrhage, cerebral edema, increasing size of ventricles.     -Return to IR for angio and placement of lumbar drain    -Aneurysm re-ruptured, additional coils placed with 95% occlusion    -Moderate to severe narrowing of R ICA likely secondary tot he dissecting  aneurysm and not necessarily true spasm. Verapamil given.    -Mild spasm R MCA    7/28/2019  -Stable appearance of aneurysm, verapamil given    7/29/2019  -Stable to mildly increased filling of aneurysm.     -mildly increased spasm of R MCA, verapamil given. Plan for repeat angio 8/2/2019 7/30/2019  -TCD worsening. Patient sent for angio    -worsening spasm in multiple territories. Verapamil given to R ICA, L ICA, R vert    - Stable filling of aneurysm    -Head CT with some hypodensity concerning for infarct of R MCA territory    -Temp 102.9. Standard fever workup    7/31/2019  -Return to IR for treatment of vasospasm, more verapamil    -CT head unchanged    -UA with e coli. Gram neg rods blood culture, may be contaminant    -Febrile, temp max 102.9    -Antibiotics started per ICU team    8/1/2019  -Return to IR for treatment of vasospasm, more verapamil    -CT head unchanged    -CSF sent from lumbar drain    -Fever persisted    8/2/2019          -Return to IR for treatment of vasospasm, higher dose verapamil given                          -TCDs worsening as compared to yesterday                          - Family conference to discuss G/J and Trach    8/7/2019           - Trach and Peg completed                          - MRI/MRA    8/8/2019           - IR (no additional coiling)                          - New lumbar drain    8/11/2019         - weaning lumbar drain                           - weaning from the vent                           - off pressors                           - velocities improved  8/12:                 - clamped lumbar drain  8/13:                 - ventricles slightly larger on CT - lumbar drain re-opened                           - angio without intervention - aneurysm unchanged  8/14:                 - Passey [place] valve                           - planning  shunt 8/15/2019         SUBJECTIVE:  Nods head seemingly appropriate to questions. Endorses headache, mild. No vision disturbance.  Attempts to mouth words. Seems as though she is hot and wants to wash up.     OBJECTIVE:  Vital signs in last 24 hour  Temp:  [99  F (37.2  C)-99.7  F (37.6  C)] 99  F (37.2  C)  Heart Rate:  [] 79  Resp:  [14-32] 22  BP: (101-139)/(64-85) 115/85  SpO2:  [94 %-100 %] 98 %  ETCO2 (mmHg):  [0 mmHg-33 mmHg] 0 mmHg  FiO2 (%):  [21 %-30 %] 21 %  Body mass index is 30.37 kg/m .    Mental Status: Trach, Vent, Follows commands and appears appropriate.  Cranial Nerves: Exam limited.  PERRL. + cough/gag. EOM intact.     Motor Strength: Minimal movement left arm and leg. Weaker hand grasp on the left today than yesterday, seems to fluctuate. Some horizontal movement of left leg and dorsiflexion/plantar flexion. Able to lift and hold right arm in the air. Can lift right leg.   Lumbar Drain: Lumbar drain to drain 5 ml/hr. Output clear/yellow.     Last 3 TCD Values     8/10/2019   8/9/2019 8/8/2019 8/7/2019   LMCA          69    90 89 97   LACA          64   73 72 76   RMCA          129   156 150 176   RACA         76    99 87 72   BA        36   41 40 45         Pertinent Labs:    Results for SHAMA LIMON (MRN [id number]) as of 8/14/2019 15:20   8/14/2019 04:48   Sodium 136   Potassium 3.9   Chloride 104   CO2 22   Anion Gap, Calculation 10   BUN 13   Creatinine 0.59 (L)   GFR MDRD Af Amer >60   GFR MDRD Non Af Amer >60   Calcium 9.8   AST 40   ALT 86 (H)   ALBUMIN 4.1   Protein, Total 7.8   Alkaline Phosphatase 111   Bilirubin, Total 0.6   Magnesium 2.1   Glucose 118   WBC 8.1   RBC 4.14   Hemoglobin 12.1   Hematocrit 36.4   MCV 88   MCH 29.2   MCHC 33.2   RDW 14.3   Platelets 435   MPV 9.8   Neutrophils % 79 (H)   Lymphocytes % 10 (L)   Monocytes % 6   Eosinophils % 3   Basophils % 1   Neutrophils Absolute 6.4   Lymphocytes Absolute 0.8   Monocytes Absolute 0.5   Eosinophils Absolute 0.3   Basophils Absolute 0.1       Pertinent Radiology   Radiology Results: personally reviewed.    Head CT 8/13/2019  CONCLUSION:  1.   Considerable streak artifact related to the coil mass positioned in the anterior right circulation. No definite new or worsening intracranial hemorrhage.  2.  Subtle prominence of the lateral and third ventricles compared to prior could reflect very early ventricular dilatation/hydrocephalus. Follow-up recommended.  3.  No definite interval change in evolving ischemic changes in the right middle cerebral artery territory. No definite new infarct.    8/7/2019  HEAD MRI:   1.  New T2 FLAIR sulcal nonsuppression in the dependent temporal and occipital sulci compatible with subarachnoid hemorrhage although age indeterminant and a small amount of new subarachnoid hemorrhage is difficult to exclude.  2.  Hyperattenuation on the prior CT corresponds to faint susceptibility, favored to represent mild petechial hemorrhage.  3.  A few small scattered foci of new restricted diffusion in the right posterior limb internal capsule and right parietal cortex.  4.  No midline shift or herniation. No hydrocephalus.     HEAD MRA:   1.  New flow related signal near circumferential around the periphery of the coiled right internal carotid artery aneurysm, raising concern for recurrence.  2.  Mild to moderate vasospasm of the right M1, slightly improved compared to 7/26/2019. Mild vasospasm of the proximal right M2 middle cerebral artery branches, not significant change.  3.  No new significant stenosis or occlusion.    MANDY [facility] Neurosurgery  O: [phone]

## 2021-05-31 NOTE — TREATMENT PLAN
Hospitalist Service  Chart Check  Events consultations review. Complex situation with SAH and vasospasm  On Vent; pegged and trached   Palliative care involved/   Will continue to follow course in ICU and plan per intensivists/NS/Neurology/IR

## 2021-05-31 NOTE — PROGRESS NOTES
Vent Mode: VCV  FiO2 (%):  [30 %-100 %] 30 %  S RR:  [16] 16  S VT:  [375 mL] 375 mL  PEEP/CPAP (cm H2O):  [5 cm H2O] 5 cm H2O  Minute Ventilation (L/min):  [7.4 L/min-9.3 L/min] 9.3 L/min  PIP:  [20 cm H2O-36 cm H2O] 36 cm H2O  MAP (cm H2O):  [7-9] 9      Pt remained on full support vent with above settings.BS:Coarse sx white thin secretions. Pt has 7.5 ETT 21@ teeth. No weaning done this shift. RT following.    Dalia Argueta, LRT

## 2021-05-31 NOTE — ANESTHESIA POSTPROCEDURE EVALUATION
Patient: Darion Herrera  * No procedures listed *  Anesthesia type: No value filed.    Patient location: in MRI, prior to transfer to ICU  Last vitals:   Vitals Value Taken Time   /82 8/7/2019  5:50 PM   Temp 37.3  C (99.1  F) 8/7/2019  5:50 PM   Pulse 68 8/7/2019  5:50 PM   Resp 14 8/7/2019  5:50 PM   SpO2 100 % 8/7/2019  5:50 PM     Post vital signs: stable  Level of consciousness: awake, unable to follow commands. No meaningful movement.  Post-anesthesia pain: pain controlled  Post-anesthesia nausea and vomiting: no  Pulmonary: trached, on vent  Cardiovascular: stable  Hydration: adequate  Anesthetic events: no    QCDR Measures:  ASA# 11 - Nina-op Cardiac Arrest: ASA11B - Patient did NOT experience unanticipated cardiac arrest  ASA# 12 - Nina-op Mortality Rate: ASA12B - Patient did NOT die  ASA# 13 - PACU Re-Intubation Rate: ASA13X - Exclusion: organ donor or direct ICU transfer  ASA# 10 - Composite Anes Safety: ASA10A - No serious adverse event    Additional Notes:

## 2021-05-31 NOTE — PLAN OF CARE
Gave Daniel phone number to neurosurg -they tried to reach him but no anwser. VPShunt scheduled for Monday early afternoon

## 2021-05-31 NOTE — PROGRESS NOTES
Critical Care Progress Note  8/10/2019      Admit Date: 7/22/2019  ICU Day: 19   CODE: Full Code    Critical Care Chief Complaint: acute resp failure      Problem List/Hospital Course:   Principal Problem:    Aneurysmal subarachnoid hemorrhage (H)  Active Problems:    Carotid artery aneurysm (H)    Intraventricular hemorrhage, nontraumatic (H)    Compression of brain (H)    S/P coil embolization of cerebral aneurysm    Aneurysm of right internal carotid artery    Cerebrovascular accident (CVA) due to occlusion of right middle cerebral artery (H)    Vasospasm (H)    Acute respiratory failure, unspecified whether with hypoxia or hypercapnia (H)    Fever in other diseases    E-coli UTI    Gram-positive bacteremia    CVA (cerebral vascular accident) (H)    Pneumonia due to group B Streptococcus, unspecified laterality, unspecified part of lung (H)    Vasospasm of cerebral artery          Interim Events:   Ongoing vasopressin, started on norepinephrine drip.    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     Neuro/Psych:   #. Analgesia/Sedation: Dexmedetomidine as needed for anxiety/agitation. Can go to [facility] on dexmedetomidine. Avoid benzos.  #.  Right ICA aneurysm complicated by subarachnoid hemorrhage with IVH, status post dissection and coiling x2 with hydrocephalus requiring lumbar drain placement 7/27/2019, replaced on 8/8. Ongoing vasospasm complicated the clinical presentation and has had verapamil infusion on multiple days. Cerebral angiogram on 8/8 with 6-7 mm recurrent at the neck of previously coiled right ICA aneurysm. TCD today reportedly shows improved distal right MCA  vasospasm, persistent proximal right MCA vasospasm; report pending.    Appreciate neurosurgery and neurology.    No further routine TCD after today    Continue with every 4 hours nimodipine x21 days unless course extended by neurosurgery    Goal SBP changed to 100-160    Monitor sodium and avoid hyponatremia    Goal HCT level 30-32.     Goal CVP 10-12 but measuring by PICC and quite variable    Appears a bit hypovolemic;  mL given and now off norepi, will given additional 500 mL bolus for total of 1000 mL NS today    Strict I/O, strict lumbar drain output monitoring (if increased output may be sign of further edema/swelling and consider stat HCT) has been stable    Trial of additional volume to attempt to wean off pressors  #.  Recurrent seizures, likely secondary to acute intracranial process as detailed above. Currently on IV levetiracetam and IV lacosamide  #.  Moderate Sedation with goal RASS 0 as goal currently with dexmedetomidine, as-needed fentanyl  #.  Fever - possible central fever contributing, also with pneumococcal pneumonia identified on sputum culture 7/31. Elevated CSF protein, PMNs, and WBC, but likely due to blood.  Changed Cefepime to ceftriaxone on 8/4, procalcitonin low; will stop antibiotics after today.     Pulmonary: Mechanical ventilation instituted for airway protection in setting of seizure and encephalopathy secondary to SAH.  The patient is on minimal ventilator settings. Underwent tracheostomy with 8 shiley on 8/7.  - goal SpO2 > 92%  - can start weaning equivalent to LTACH phase 1  - ceftriaxone for pneumococcal pneumonia ending today  - remove sutures 8/12     Cardiovascular: goal -160 but will try to aim for higher end of this (note when goal SBP was higher 150-180 then patient had further bleeding). Avoid medications that would cause bradycardia.  - continue NE as needed to maintain SBP goal  - Goal CVP 10-12 but measuring by PICC and quite variable  - Appears a bit  hypovolemic;  mL given and now off norepi, will given additional 500 mL bolus for total of 1000 mL NS today     Renal: No acute issues. Good urine output.  - E.coli UTI (CAUTI) on culture 7/30  - potassium/magnesium replacement per protocol  - per NSG avoid LR  - gave 1000 mL NS today with good response as above     GI: NPO for now.  - PEG placed 8/7; continue tube feeding  - PPI     Heme: No acute issues, monitor.  - IVF/Tx to maintain HCT 30-32  - SCDs     Endo: No acute issues. Continue close monitoring.  - ICU q 4 hour SSI     ID: E. Coli UTI (7/30) and pneumococcal PNA (7/31)  - blood cultures Strep epidermidis - likely contaminant  - Cefepime 7/31-8/4, then changed to ceftriaxone through 8/10; PCT low, ceftriaxone end date 8/10  - vancomycin - discontinued after 1 day as coag-negative staph grew from culture, follow up cultures negative     Access/Lines/Tubes: required and necessary for continued patient cares  Trach (8 shiley) placed 8/7/19, two flange sutures in place  PEG placed 8/7/19  PICC, RUE placed 7/26/19  Arterial sheath, right femoral artery - removed on 8/3  Sharpe catheter replaced 8/7/19  Lumbar drain placed 7/27/19, changed 8/8/19     ICU prophylaxis:   #. VAP ppx: HOB 30 degrees, chlorhexidine rinses  #. Stress ulcer: PPI  #. Diet: NPO, tube feeding well tolerated  #. VTE: SCD, chemical prophylaxis contraindicated due to SAH/IVH  #. Restraints: not indicated     CODE: FULL     Dispo: ICU for mechanical ventilation, hemodynamic support, neurological monitoring, remains critically ill with constant threat to life.    Family communication: Communicated with family at the bedside.    Medications:       dexmedetomidine 400 mcg/100 mL in NS (PRECEDEX) (4mcg/mL) 0.5 mcg/kg/hr (08/10/19 0202)     niCARdipine       norepinephrine 0.1 mcg/kg/min (08/10/19 1023)     vasopressin Stopped (08/09/19 1300)       atorvastatin  20 mg Enteral Tube DAILY     cefTRIAXone  2 g Intravenous Q24H     chlorhexidine  " 15 mL Topical Q12H     lacosamide (VIMPAT) IVPB  100 mg Intravenous Q12H 09-21     levETIRAcetam (KEPPRA) IVPB  1,000 mg Intravenous Q12H     magnesium sulfate IVPB  4 g Intravenous Once     NaCl 0.9% IR  1,000 mL Irrigation Once     niMODipine  60 mg Enteral Tube Q4H     omeprazole  20 mg Enteral Tube QAM AC    Or     pantoprazole  40 mg Intravenous QAM AC     potassium chloride liquid/packet  20 mEq Enteral Tube BID     sodium chloride  10-30 mL Intravenous Q8H FIXED TIMES         Exam/Data:   Vitals  /87   Pulse 73   Temp 99.2  F (37.3  C) (Oral)   Resp 22   Ht 4' 10\" (1.473 m)   Wt 163 lb 1.6 oz (74 kg)   LMP 07/01/2016   SpO2 97%   BMI 34.09 kg/m    CVP:  [7 mmHg-16 mmHg] 12 mmHg  I/O last 3 completed shifts:  In: 2816.2 [I.V.:729.2; NG/GT:1887; IV Piggyback:200]  Out: 4492 [Urine:3800; Drains:272; Stool:420]  Weight change: -6.4 oz (-0.181 kg)  Wt Readings from Last 3 Encounters:   08/10/19 163 lb 1.6 oz (74 kg)   07/21/19 157 lb (71.2 kg)   08/01/17 140 lb (63.5 kg)     Vent Mode: VCV  FiO2 (%):  [21 %-100 %] 21 %  S RR:  [14] 14  S VT:  [375 mL] 375 mL  PEEP/CPAP (cm H2O):  [5 cm H2O] 5 cm H2O  Minute Ventilation (L/min):  [7.7 L/min-10.7 L/min] 8.3 L/min  PIP:  [19 cm H2O-27 cm H2O] 20 cm H2O  MAP (cm H2O):  [7-9] 7    EXAM:  GEN: on mechanical vent via trach. Tracks and nods appropriately to questioning  HEENT: PERRL, EOMS, OP patent, trachea midline  PULM: unlabored respirations, moving air adequately  CV: RRR, S1, S2, no murmurs, rubs, gallops, bilateral symmetric pulse  ABD: soft nontender, non distended, normal active bowel sound, no HSM, PEG site nonerythematous  EXT : warm well perfused, no cyanosis, clubbing, trace edema  NEURO: PERRL, more alert and responding to yes/no questions and commands, today moving right arm and right leg with more strength, no movement on left  SKIN: no obvious rash, lesion    DATA  All laboratory and radiology has been personally reviewed by myself " today.  Results from last 7 days   Lab Units 08/10/19  0418   LN-WHITE BLOOD CELL COUNT thou/uL 9.8   LN-HEMOGLOBIN g/dL 11.2*   LN-HEMATOCRIT % 33.6*   LN-PLATELET COUNT thou/uL 349     Results from last 7 days   Lab Units 08/10/19  0418 08/09/19  1739 08/09/19  0921 08/09/19  0509  08/08/19  0402   LN-SODIUM mmol/L 139  --  138 139   < > 138   LN-POTASSIUM mmol/L 3.7 4.0  --  2.9*  --  3.7   LN-CHLORIDE mmol/L 109*  --   --  108*  --  107   LN-CO2 mmol/L 23  --   --  24  --  24   LN-BLOOD UREA NITROGEN mg/dL 9  --   --  12  --  9   LN-CREATININE mg/dL 0.51*  --   --  0.54*  --  0.54*   LN-CALCIUM mg/dL 8.7  --   --  8.3*  --  8.3*   LN-PROTEIN TOTAL g/dL 6.2  --   --  6.2  --  6.1   LN-BILIRUBIN TOTAL mg/dL 0.3  --   --  0.3  --  0.6   LN-ALKALINE PHOSPHATASE U/L 105  --   --  119  --  95   LN-ALT (SGPT) U/L 132*  --   --  159*  --  203*   LN-AST (SGOT) U/L 57*  --   --  81*  --  146*    < > = values in this interval not displayed.     Critical care time: 40 minutes    Rikki Castro MD  Pulmonary and Critical Care Medicine  [facility]  Cell [phone]  Office [phone]  Pager [phone]

## 2021-05-31 NOTE — PROGRESS NOTES
Progress Note      Chart Check/Hospitalist - not much to add see intensivist and related notes  Will continue to follow     Assessment/Plan:    Same- see problem list    Active Hospital Problems    S/P coil embolization of cerebral aneurysm      Carotid artery aneurysm (H)      *Aneurysmal subarachnoid hemorrhage (H)      Intraventricular hemorrhage, nontraumatic (H)      Pneumonia due to group B Streptococcus, unspecified laterality, unspecified part of lung (H)      Vasospasm of cerebral artery      E-coli UTI      Gram-positive bacteremia      Fever in other diseases      Acute respiratory failure, unspecified whether with hypoxia or hypercapnia (H)      Cerebrovascular accident (CVA) due to occlusion of right middle cerebral artery (H)      Vasospasm (H)      Aneurysm of right internal carotid artery      CVA (cerebral vascular accident) (H)      Compression of brain (H)        Subjective:      Objective:    Vital signs in last 24 hours  Temp:  [98.5  F (36.9  C)-101  F (38.3  C)] 98.5  F (36.9  C)  Heart Rate:  [] 60  Resp:  [8-29] 21  BP: (105-167)/() 144/91  FiO2 (%):  [30 %-50 %] 30 %  Weight:   165 lb 3.2 oz (74.9 kg)    Intake/Output last 3 shifts  I/O last 3 completed shifts:  In: 1893.5 [I.V.:1553.5; NG/GT:60; IV Piggyback:280]  Out: 2709 [Urine:2375; Emesis/NG output:100; Drains:234]  Intake/Output this shift:  No intake/output data recorded.    ROS:   compete 10 system review otherwise negative except as noted (see H&P)    1. Constitutional: Denies fever, weight loss and headaches.  Energy level is stable  2. Eyes: Denies double vision, pain and acutely decreased vision.  3. Ears, Nose, Mouth, Throat: Denies acute hearing changes, nosebleeds, sore throat and mouth ulcers.  4. Cardiovascular: no chest pain, no palpitations  5. Respiratory: denies cough, shortness of breath  6. Gastrointestinal: denies diarrhea or bloody stools and nausea or vomiting  7. Genitourinary: Denies hematuria and  dysuria.  8. Musculoskeletal : See HPI.  9. Integumentary: Denies skin ulcerations, nodules or rashes .  10. Neurological: Denies new limb weakness, limb numbness and changes in speech.  Physical Exam      Pertinent Labs   Lab Results   Component Value Date    WBC 13.0 (H) 08/08/2019    HGB 11.6 (L) 08/08/2019    HCT 34.1 (L) 08/08/2019    MCV 86 08/08/2019     08/08/2019     Lab Results   Component Value Date    CREATININE 0.54 (L) 08/08/2019    BUN 9 08/08/2019     08/08/2019    K 3.7 08/08/2019     08/08/2019    CO2 24 08/08/2019         Fer Machuca MD.

## 2021-05-31 NOTE — PROGRESS NOTES
"  Clinical Nutrition Therapy Nutrition Support Note      Subjective:  Pt intubated 7/27. Temp today of 100.7 degrees.  Propofol off  Chart reviewed.    Nutrition Prescription:   Diet: TF- Isosource 1.5 at 20 ml/hr continuous (TF started 7/30/19.)  Modular:  No carb prosource bid    Water flush 30 ml q 4 hrs  IV dextrose or Fluids:    dexmedetomidine 400 mcg/100 mL in NS (PRECEDEX) (4mcg/mL) Last Rate: 0.5 mcg/kg/hr (08/06/19 0403)   fentaNYL 2500 mcg/250 mL in NS (10 mcg/mL) Last Rate: 75 mcg/hr (08/05/19 3630)   lactated Ringers Last Rate: Stopped (08/03/19 7655)   niCARdipine    norepinephrine Last Rate: 0.08 mcg/kg/min (08/06/19 6678)   sodium chloride/acetate 3% Last Rate: 50 mL/hr (08/06/19 0614)       Nutrition Intake:  FT is an OG placed 7/27.    Frequent NPO for procedures.    TF provides:   840 calories, 62 g protein, 84 g carb, 455 ml free water.      Anthropometrics:  Height: 4' 10\" (147.3 cm)  Admission weight: 155#  Weight: 164 lb 8 oz (74.6 kg)    Physical Findings:  Edema noted    GI Status/Output:   GI symptoms per nursing:   Last BM recorded: pt with rectal tube 150 ml out in past 24 hrs.    Lumbar drain 288 ml out.    Skin/Wound:   Clifford Scale Score: 11     Medications:  3% saline, NaCl tabs  K replacement protocol  Medications reviewed.    Labs:  Na 142  K 3.3    Glucose 152  Labs reviewed    Estimated Nutrition Needs:  Using ideal weight of 43 kg.    Energy Needs: 8703-9955 kcals daily per 25-30 kcal/kg   Protein Needs: 52+ g daily, 1.2+ g/kg.  Fluid Needs: 1100+ mls daily, 25+ mls/kg    Malnutrition: Not noted    Nutrition Risk Level: moderate risk    Nutrition DX:  Swallow difficulty r/t intubation evidenced by NPO    Goals: Start nutrition in 24-48 hrs-met  Tolerate TF-met    Plan:  Adjust TF to meet pt's nutrition needs.   Stop No carb prosource.  Increase TF 5 ml q 12 hrs to goal of 35 ml/hr.    Check phos as pt may be at risk of refeeding with increase in TF.  At goal to provide:  " 1260 calories, 57 g protein, 148 g carb, 13 g fiber, 822 ml free water.    Monitor:  TF tolerance, wt, labs.    See Care Plan for Problems, Goals, and Interventions.

## 2021-05-31 NOTE — PROGRESS NOTES
NEUROSURGERY PROGRESS NOTE:    NEUROSURGERY ATTENDING: The patient's attending neurosurgeon is Dr. Sal. Plan of care discussed with Dr Sal.     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 Complicated hospital course with re-bleed, concern for hydrocephalus, daily cerebral angiograms for severe vasospasm.     Diffusion MRI confirmed R MCA infarction, CT with expected evolution of the infarct. No dramatic worsening of cerebral edema. MRA with concern for recurrent aneurysm. No additional coiling in IR 8/8/2019. Plan to return to IR Tuesday. Question for IR would be if there is possibility of stent.     Velocities improving, today's TCD similar to yesterday. OK to have sedation at a level with goal RASS 0 - -1. Continue current BP goals 120-60. Aware pressors are being utilitzed. CVP 14. Stop 3% sodium. Check sodium in the AM.     PLAN:   Patient Active Problem List   Diagnosis     Carotid artery aneurysm (H)     Aneurysmal subarachnoid hemorrhage (H)     Intraventricular hemorrhage, nontraumatic (H)     Compression of brain (H)     S/P coil embolization of cerebral aneurysm     Aneurysm of right internal carotid artery     Cerebrovascular accident (CVA) due to occlusion of right middle cerebral artery (H)     Vasospasm (H)  --RASS 0 - (-)1  --IV fluid goal 100 mL/hr, goal to be euvolemic or slightly hypervolemic to help mitigate vasospasm, appreciate intensivist assistance  --Target CVP > 10  --Stop 3%. Check sodium in the AM  ---160  --TCD's - daily   --Keppra 500 mg two times a day x30 days, indefinite with seizure  --Nimodipine x21 days. Complete 8/13/2019  --Sharpe in place, strict I/O  --Mechanical DVT prophylaxis  --Must have accurate Is and Os  --Continue Lumbar drain at 10-15 ml/hour. Would not wean until velocities stabilize     Fever  --On Rocephin, pseudomonas in sputum  --Likely also  neurogenic component        Discharge Recommendations/Plan:  Barriers to Discharge: yes, requiring acute medical care.     Follow Up Plan: Pending       HPI: Darion Herrera is a 44 yo female who was admitted on 07/22/19 with SAH and IVH secondary to ruptured aneurysm of the right ICA. Aneurysm coiling on 7/22 that was complicated by vasospasm. On 07/27, found to be unresponsive and possibly seizing, was intubated and taken to IR where it was revealed that her right ICU aneurysm had dissected and re-bled.  In IR on 07/27, the vessel was recoiled and a lumbar drain was placed for ICP management.  On 07/29 underwent intraarterial verapamil infusion with angiogram for acute vasospasm of right ICA. Due to increased TCD values, back to IR for another cerebral angiogram 7/30 for acute vasospasm and intraarterial verapamil infusion.     BRIEF HOSPITAL COURSE:  7/21/2019  -Admit with nausea, vomiting, headache. CT with question of extra axial mass over the planum sphenoidale region measuring 2.2 x 2.3 cm    7/22/2019  -MRI indicates mass on CT actually a giant right supraclinoid internal carotid artery aneurysm. + intraventricular hemorrhage, small right subarachnoid hemorrhage.    -IR for angiogram, coil embolization of ruptured, dissecting aneurysm. Mild to moderate interstitial filling within the coil mass    7/24/2019  -Return to IR due to high likelihood of instability of aneurysm. Aneurysm was stable in appearance    7/27/2019  -Possible seizure, became unresponsive, intubated. CT + for increased amount of hemorrhage, cerebral edema, increasing size of ventricles.     -Return to IR for angio and placement of lumbar drain    -Aneurysm re-ruptured, additional coils placed with 95% occlusion    -Moderate to severe narrowing of R ICA likely secondary tot he dissecting aneurysm and not necessarily true spasm. Verapamil given.    -Mild spasm R MCA    7/28/2019  -Stable appearance of aneurysm, verapamil given    7/29/2019   -Stable to mildly increased filling of aneurysm.     -mildly increased spasm of R MCA, verapamil given. Plan for repeat angio 8/2/2019 7/30/2019  -TCD worsening. Patient sent for angio    -worsening spasm in multiple territories. Verapamil given to R ICA, L ICA, R vert    - Stable filling of aneurysm    -Head CT with some hypodensity concerning for infarct of R MCA territory    -Temp 102.9. Standard fever workup    7/31/2019  -Return to IR for treatment of vasospasm, more verapamil    -CT head unchanged    -UA with e coli. Gram neg rods blood culture, may be contaminant    -Febrile, temp max 102.9    -Antibiotics started per ICU team    8/1/2019  -Return to IR for treatment of vasospasm, more verapamil    -CT head unchanged    -CSF sent from lumbar drain    -Fever persisted    8/2/2019          -Return to IR for treatment of vasospasm, higher dose verapamil given                          -TCDs worsening as compared to yesterday                          - Family conference to discuss G/J and Trach    8/7/2019           - Trach and Peg completed                          - MRI/MRA    8/8/2019           - IR (no additional coiling)                          - New lumbar drain         SUBJECTIVE:  Nods head seemingly appropriate to questions. Denies pain. Denies vision disturbance.     OBJECTIVE:  Vital signs in last 24 hours  Temp:  [98.8  F (37.1  C)-100.4  F (38  C)] 99.8  F (37.7  C)  Heart Rate:  [51-94] 73  Resp:  [0-30] 25  BP: ()/(62-97) 126/76  SpO2:  [93 %-100 %] 98 %  ETCO2 (mmHg):  [22 mmHg-32 mmHg] 28 mmHg  FiO2 (%):  [25 %-30 %] 25 %  Body mass index is 34.17 kg/m .    Mental Status: Trach, Vent, precedex minimal amount. Patient with eyes open and tracks writer in the room. Squeezes eyes shut to command. Nods head to questions, seems appropriate.     Cranial Nerves: Exam limited.  PERRL. + cough/gag. Does appear to have right sided gaze intermittently. But does have full EOM.     Motor Strength:   No movement on left. No movement bilateral lower extremity. No antigravity right arm. Can weakly squeeze to command on the right hand. Gives thumbs up.     Lumbar Drain: Lumbar drain patent, open and titrated to 10-15 mL output/hour.  Output yellow.     Last 3 TCD Values       8/9/2019 8/8/2019 8/7/2019   LMCA    90 89 97   LACA    73 72 76   RMCA    156 150 176   RACA    99 87 72   BA    41 40 45         Pertinent Labs:  Results for SHAMA LIMON (MRN [id number]) as of 8/9/2019 15:45   Ref. Range 8/9/2019 05:09 8/9/2019 09:21   Sodium Latest Ref Range: 136 - 145 mmol/L 139 138   Potassium Latest Ref Range: 3.5 - 5.0 mmol/L 2.9 (L)    Chloride Latest Ref Range: 98 - 107 mmol/L 108 (H)    CO2 Latest Ref Range: 22 - 31 mmol/L 24    Anion Gap, Calculation Latest Ref Range: 5 - 18 mmol/L 7    BUN Latest Ref Range: 8 - 22 mg/dL 12    Creatinine Latest Ref Range: 0.60 - 1.10 mg/dL 0.54 (L)    GFR MDRD Af Amer Latest Ref Range: >60 mL/min/1.73m2 >60    GFR MDRD Non Af Amer Latest Ref Range: >60 mL/min/1.73m2 >60    Calcium Latest Ref Range: 8.5 - 10.5 mg/dL 8.3 (L)    AST Latest Ref Range: 0 - 40 U/L 81 (H)    ALT Latest Ref Range: 0 - 45 U/L 159 (H)    ALBUMIN Latest Ref Range: 3.5 - 5.0 g/dL 2.9 (L)    Protein, Total Latest Ref Range: 6.0 - 8.0 g/dL 6.2    Alkaline Phosphatase Latest Ref Range: 45 - 120 U/L 119    Bilirubin, Total Latest Ref Range: 0.0 - 1.0 mg/dL 0.3    Magnesium Latest Ref Range: 1.8 - 2.6 mg/dL 1.8    Phosphorus Latest Ref Range: 2.5 - 4.5 mg/dL 2.7    Glucose Latest Ref Range: 70 - 125 mg/dL 184 (H)    WBC Latest Ref Range: 4.0 - 11.0 thou/uL 10.6    RBC Latest Ref Range: 3.80 - 5.40 mill/uL 3.67 (L)    Hemoglobin Latest Ref Range: 12.0 - 16.0 g/dL 10.8 (L)    Hematocrit Latest Ref Range: 35.0 - 47.0 % 32.0 (L)    MCV Latest Ref Range: 80 - 100 fL 87    MCH Latest Ref Range: 27.0 - 34.0 pg 29.4    MCHC Latest Ref Range: 32.0 - 36.0 g/dL 33.8    RDW Latest Ref Range: 11.0 - 14.5 % 14.1    Platelets  Latest Ref Range: 140 - 440 thou/uL 292    MPV Latest Ref Range: 8.5 - 12.5 fL 10.9    Neutrophils % Latest Ref Range: 50 - 70 % 82 (H)    Lymphocytes % Latest Ref Range: 20 - 40 % 8 (L)    Monocytes % Latest Ref Range: 2 - 10 % 5    Eosinophils % Latest Ref Range: 0 - 6 % 4    Basophils % Latest Ref Range: 0 - 2 % 0    Neutrophils Absolute Latest Ref Range: 2.0 - 7.7 thou/uL 8.7 (H)    Lymphocytes Absolute Latest Ref Range: 0.8 - 4.4 thou/uL 0.8    Monocytes Absolute Latest Ref Range: 0.0 - 0.9 thou/uL 0.5    Eosinophils Absolute Latest Ref Range: 0.0 - 0.4 thou/uL 0.5 (H)    Basophils Absolute Latest Ref Range: 0.0 - 0.2 thou/uL 0.0        Pertinent Radiology   Radiology Results: personally reviewed.    8/7/2019  HEAD MRI:   1.  New T2 FLAIR sulcal nonsuppression in the dependent temporal and occipital sulci compatible with subarachnoid hemorrhage although age indeterminant and a small amount of new subarachnoid hemorrhage is difficult to exclude.  2.  Hyperattenuation on the prior CT corresponds to faint susceptibility, favored to represent mild petechial hemorrhage.  3.  A few small scattered foci of new restricted diffusion in the right posterior limb internal capsule and right parietal cortex.  4.  No midline shift or herniation. No hydrocephalus.     HEAD MRA:   1.  New flow related signal near circumferential around the periphery of the coiled right internal carotid artery aneurysm, raising concern for recurrence.  2.  Mild to moderate vasospasm of the right M1, slightly improved compared to 7/26/2019. Mild vasospasm of the proximal right M2 middle cerebral artery branches, not significant change.  3.  No new significant stenosis or occlusion.    Estefany Marie, [facility] Neurosurgery  O: [phone]

## 2021-05-31 NOTE — PLAN OF CARE
Problem: Mechanical Ventilation  Goal: Patient will maintain patent airway  Outcome: Progressing     Problem: Mechanical Ventilation  Goal: Tracheostomy will be managed safely  Outcome: Progressing    JERRY Sanchez

## 2021-05-31 NOTE — PRE-PROCEDURE
Procedure Name: Cerebral angiogram  Date/Time: 8/13/2019 11:01 AM    Verbal consent obtained?: Yes  Written consent obtained?: Yes  Risks and benefits: Risks, benefits and alternatives were discussed  Consent given by: guardian  Expected level of sedation: moderate  ASA Class: Class 4- Severe systemic disease, acute unstable problems  Mallampati: N/A- Alternate secured airwayNo (non verbal)  Patient's understanding of procedure matches consent: non verbal.  Procedure consent matches procedure scheduled: Yes  Appropriately NPO: yes  Lungs: lungs clear with good breath sounds bilaterally  Heart: normal heart sounds and rate  History & Physical reviewed: History and physical reviewed and no updates needed  Statement of review: I have reviewed the lab findings, diagnostic data, medications, and the plan for sedation

## 2021-05-31 NOTE — PLAN OF CARE
Problem: Pain  Goal: Patient's pain/discomfort is manageable  Outcome: Progressing     Problem: Daily Care  Goal: Daily care needs are met  Outcome: Progressing     Problem: Psychosocial Needs  Goal: Collaborate with patient/family/caregiver to identify patient specific goals for this hospitalization  Outcome: Progressing     Problem: Knowledge Deficit  Goal: Patient/family/caregiver demonstrates understanding of disease process, treatment plan, medications, and discharge instructions  Outcome: Progressing     Problem: Neurological Deficit  Goal: Neurological status is stable or improving  Outcome: Progressing     Problem: Mechanical Ventilation  Goal: Patient will maintain patent airway  Outcome: Progressing  Goal: Oral health is maintained or improved  Outcome: Progressing  Goal: ET tube will be managed safely  Outcome: Progressing

## 2021-05-31 NOTE — PROGRESS NOTES
"  Clinical Nutrition Therapy Nutrition Support Note      Subjective:  Met with pt and pt's spouse with assist of .  Pt's spouse reports pt has c/o stomach discomfort.  Pt having loose stools.  Isosource 1.5 infusing currently.  Chart reviewed.    Nutrition Prescription:   Diet: TF:  Changed to Peptamen 1.5 on 8/13 though this formula not yet hanging.  Isosource infusing  Water flush 60 ml q 4 hrs  Modular:  Nutrifiber 1 pkt bid    Nutrition Intake:  FT is an PEG placed 8/7/19.    TF at goal provides:  1260 calories, 57 g protein, 158 g carb, 6 g fiber, 1005 ml free water.      Anthropometrics:  Height: 4' 10\" (147.3 cm)  Admission weight: 155#  Weight: 145 lb 4.8 oz (65.9 kg)     GI Status/Output:   GI symptoms per nursing:   Last BM recorded: 400 ml per rectal tube 8/13.   Note diuresing with 2000 ml urine out in past 24 hrs.    Skin/Wound:   Clifford Scale Score: 14     Plan:  Semi-elemental formula now infusing.  Discussed with pt and pt's spouse the change in TF to help with absorption and tolerance.      Anticipate pt will need additional hydration as IV fluids wean.    Monitor:  TF tolerance, wt, labs, hydration needs.    See Care Plan for Problems, Goals, and Interventions.        "

## 2021-05-31 NOTE — PLAN OF CARE
"  Problem: Psychosocial Needs  Goal: Demonstrates ability to cope with hospitalization/illness  Outcome: Not Progressing    [id number]:    Patient has had increased anxiety/agitation since start of shift.  Behaviors include:  crying, reaching for trach., coughing trach.off continuously, noticeably holding her breath.  When asked what is wrong or why she holds her breath she mouths \"I want to go home.  I want to go home.   I want these things off.\"   She also endorced to family she wanted to walk to the bathroom, get in the shower, and eat.   When explained to patient by family she couldn't do these things she became more upset.   Writer asked patient to lift LUE and LLE.  When she could not do so, writer asked patient if she was aware she could not move those extremities.  She nodded yes, but still wanted to get up and walk.  No PO/TF PRNs noted for anxiety.  Precedex had to be increased d/t agitation and safety with popping trach.off constantly.   See EMAR for current infusion rate.     "

## 2021-05-31 NOTE — PROGRESS NOTES
08/11/19 1941   Vent Information   Interface Invasive   Vent ID [id number]   Vent Mode VCV   Patient Ventilator Status On   Flowmeter at Bedside Yes   Ambu-Bag With Mask at Bedside Yes   Respiratory Assessment   Assessment Type Assess only   Respiratory Pattern Regular   Chest Assessment Chest expansion symmetrical   Bilateral Breath Sounds Coarse   Vent Settings   FiO2 (%) 21 %   Resp Rate (Set) 14   Vt (Set, mL) 375 mL   PEEP/CPAP (cm H2O) 5 cm H2O   Insp Flow (L/min) 30 L/min   Waveform Square   Humidification Heater   Heater Temperature 98.6  F (37  C)   Patient Data   ETCO2 (mmHg) 29 mmHg   Vt Exp (mL) 372 mL   Minute Ventilation (L/min) 8.9 L/min   Total Resp Rate  24 BPM   PIP Observed (cm H2O) 15 cm H2O   MAP (cm H2O) 7   Auto/Intrinsic PEEP Observed (cm H2O) 1 cm H2O   Plateau Pressure (cm H2O) 12 cm H2O   SpO2 98 %   Heart Rate 78   Alarms   High Resp Rate 35   Low Resp Rate 10   Low PEEP 3 cm H2O   Insp Pressure High (cm H2O) 45 cm H2O   Insp Pressure Low (cm H2O) 9 cm H2O   MV High (L/min) 18 L/min   MV Low (L/min) 3 L/min   Vt High (mL) 1200 mL   Vt Low (mL) 250 mL   Apnea Interval (sec) 30 seconds   Apnea Rate 14   Apnea Volume (mL) 375 mL   Alarm Functional and On Yes   Backup Mode Set Yes   Airway Suctioning/Secretions   Suction Device  Inline   Secretion Amount Small   Secretion Color White   Secretion Consistency Thick   Suction Tolerance Tolerated fairly well   Suctioning Adverse Effects None   Surgical Airway Gladys 8 Cuffed   Placement Date/Time: 08/07/19 1300   Brand: Gladys  Size : 8  Style: Cuffed   Status Ventilator   Site Skin Assessment Clean   Face Plate Assessment Inspected - Normal   Ties Assessment Changed   Cuff Pressure (cm H2O)   (checked)   Weaning Assessment    Wean Start Time  0821   Wean End Time 1941   Vent Wean Time Calculation (min) 680 min   $ Weaning Trial Charge Yes     Patient weaned for a total of 680 minutes 5/5 without complication. Patient was placed back on full  "support to rest overnight will wean tomorrow to  Trache mask if possible and cleared with MD. Breath sounds were clear suctioned small amount of white secretions. RT will continue to follow.    /73   Pulse 78   Temp 99.6  F (37.6  C)   Resp 27   Ht 4' 10\" (1.473 m)   Wt 164 lb 11.2 oz (74.7 kg)   LMP 07/01/2016   SpO2 98%   BMI 34.42 kg/m      "

## 2021-05-31 NOTE — ANESTHESIA POSTPROCEDURE EVALUATION
Patient: Darion Herrera  * No procedures listed *  Anesthesia type: general    Patient location: ICU  Last vitals:   Vitals Value Taken Time   /84 8/8/2019  7:15 AM   Temp  8/8/2019  7:22 AM   Pulse 72 8/8/2019  7:20 AM   Resp 19 8/8/2019  7:20 AM   SpO2 96 % 8/8/2019  7:20 AM   Vitals shown include unvalidated device data.  Post vital signs: stable  Level of consciousness: sedated  Post-anesthesia pain: pain controlled  Post-anesthesia nausea and vomiting: no  Pulmonary: ventilator  Cardiovascular: stable and blood pressure at baseline  Hydration: adequate  Anesthetic events: no    QCDR Measures:  ASA# 11 - Nina-op Cardiac Arrest: ASA11B - Patient did NOT experience unanticipated cardiac arrest  ASA# 12 - Nina-op Mortality Rate: ASA12B - Patient did NOT die  ASA# 13 - PACU Re-Intubation Rate: ASA13B - Patient did NOT require a new airway mgmt  ASA# 10 - Composite Anes Safety: ASA10A - No serious adverse event    Additional Notes:

## 2021-05-31 NOTE — CONSULTS
Palliative Care New Consult  NAME: Darion Herrera, : 1975,   MRN: [id number] Date: 2019    Palliative Medicine Consult Service:  Consulted by Dr. Reyes to assist with symptom management, clarification of goals of care, and development of plan of care.     Palliative Care Assessment and Plan:  Darion Herrera, 43 y.o., female with no significant medical history admitted on 2019 with SAH and IVH 2/2 to ruptured aneurysm of the R ICA. Coiling done on  that was c/b vasospasm. On  found to be unresponsive and possible seizing, subsequently intubated and taken to IR, which found her R aneurysm had dissected and re-bled. In IR vessel was re-coiled and a lumbar drain was placed for ICP management. Currently requiring daily intraarterial verapamil infusions w/angiogram for acute vasospasm of the R ICA.    Physical Symptoms:   Weakness: due to SAH and IVH 2/2 ruptured aneurysm, complicated hospital course requiring continued medical sedation to avoid further vasospasms  - Reposition for comfort as tolerated    Shortness of breath: required intubation for airway protection and due to continued sedation needs, unable to PST due to level of sedation   - ICU/Pulm managing ventilator    Psychosocial Issues: Unable to assess fully as patient is sedated     Spiritual Issues: None presently     Decision making capacity: Not decisional, defer to family   - Advance directive on file: No      Estimated length of life: defer to primary team for definitive prognosis    Goals of Care: Restorative     Understanding: Fair - due to language and cultural barrier     Code Status: Full Code    ==============================================================================  HPI  Darion Herrera, 43 y.o., female with a medical history as noted above admitted on 2019 for headaches. Currently is intubated and sedated in the ICU, unable to provide any history. History obtained from chart review and discussion with care team.     Goals  "of care/Advance Directives (discussion): Introduced palliative care to family (12+ family members), our role in pt's care, current medical status, and proposed treatment plans. Family meeting held today to discuss ongoing medical needs, with neurosurgery and ICU teams. Medical updates provided by both NSG and ICU. Also went over different procedures that will likely need to be done to continue with cares, that being a Trach/PEG. Family asked many questions in regards to outcomes, prognosis, and risks/benefits of Trach/PEG. Some family concerned if patient is \"doing things on her own\". Explained necessity for patient to be in a medically induced coma to prevent further vasospasms and how this limits the ability to determine what level of neurological function remains. Some family members, especially the , found this hard to understand. Eldest son, states he feels the family will lean towards Trach/PEG, but that they need to discuss this further with other members of the family that were not present today.     Psychosocial/Spiritual Aspects of Care: Hmong   Marital status:   Children: Yes  Occupation: Not addressed     Current PPS% (100% normal, 0% death): 10%  Baseline PPS% (2 weeks prior to admit): 100%    Current Palliative Medications (If any):   Propofol gtt     ROS:   Unable to assess given patient intubated/sedated     -----------------------------------------------------------------------------------------------------------------  I have reviewed and supplemented the documentation in this patient's medical record listed below regarding past medical history, social history, active medical problems, allergies and medications.     Current Problem List:   Principal Problem:    Aneurysmal subarachnoid hemorrhage (H)  Active Problems:    Carotid artery aneurysm (H)    Intraventricular hemorrhage, nontraumatic (H)    Compression of brain (H)    S/P coil embolization of cerebral aneurysm    Aneurysm of " right internal carotid artery    Cerebrovascular accident (CVA) due to occlusion of right middle cerebral artery (H)    Vasospasm (H)    Acute respiratory failure, unspecified whether with hypoxia or hypercapnia (H)    Fever in other diseases    E-coli UTI    Gram-positive bacteremia    Medical/Surgical History :  History reviewed. No pertinent past medical history.  Past Surgical History:   Procedure Laterality Date     IR CEREBRAL ANGIOGRAM  7/22/2019     IR CEREBRAL ANGIOGRAM  7/24/2019     IR CEREBRAL ANGIOGRAM  7/27/2019     IR CEREBRAL ANGIOGRAM  7/29/2019     IR CEREBRAL ANGIOGRAM  7/30/2019     IR CEREBRAL ANGIOGRAM  7/31/2019     IR CEREBRAL ANGIOGRAM  8/1/2019     IR LUMBAR DRAIN INSERTION  7/27/2019     PICC AND MIDLINE TEAM LINE INSERTION  7/26/2019          Relevant Family History  History reviewed. No pertinent family history.  No family status information on file.     Social History:    she  reports that she has never smoked. She has never used smokeless tobacco. She reports that she has current or past drug history. She reports that she does not drink alcohol.  Medication History:  Medications Prior to Admission   Medication Sig Dispense Refill Last Dose     ascorbic acid, vitamin C, 500 mg Chew chew tab Chew 500 mg daily.   Unknown at Unknown time     Allergies:  Allergies   Allergen Reactions     Oxycodone      Hydrocodone Swelling and Rash     Emergency Contact Info (Pulled from chart)  Extended Emergency Contact Information  Primary Emergency Contact: SharonAnyi negrete  Address: [address] [place] [address] [facility]  Home Phone: [phone]  Mobile Phone: [phone]  Relation: Child  Secondary Emergency Contact: Kusum Herrera  Address: [address] [place] [address] [facility]  Home Phone: [phone]  Relation: Spouse    PERTINENT PHYSICAL EXAMINATION:  Vital Signs: Blood pressure 130/79, pulse 91, temperature (!) 102.9  F (39.4  C),  "temperature source Oral, resp. rate 17, height 4' 10\" (1.473 m), weight 160 lb 9.6 oz (72.8 kg), last menstrual period 07/01/2016, SpO2 100 %.   GENERAL: intubated/sedated   SKIN: Warm and dry  HEENT: Normocephalic, anicteric sclera, intubated   LUNGS: ventilated BS bilaterally   CARDIAC: RRR, normal s1/s2, w/o m/r/g   ABDOMINAL: BS (+), soft, non distended, non tender  : blackmon in place   MUSKL: no gross joint deformities   EXTREMITIES: generalized edema  NEUROLOGIC: intubated/sedated   PSYCH: intubated/sedated     All labs/imaging reviewed in Epic     ====================================================  TT: I have personally spent a total of 80 minutes on the unit in review of medical record, consultation with the medical providers and assessment of patient today, with more than 50% of this time spent in counseling, coordination of care, and discussion with family in a family meeting re: diagnostic results, prognosis, symptom management, risks and benefits of management options, and development of plan of care as noted above.  ====================================================    Rikki Hall MD  Palliative Medicine  Office: [phone]    "

## 2021-05-31 NOTE — PROGRESS NOTES
Patient remains off vent and on trach mask 10 lpm 21%. sats 96% RR 20 . Breath sounds clear diminished. Patient has speaking valve on visiting with family. Plan to take speaking valve off, and keep trach mask on for the night. Rt continue to monitor.

## 2021-05-31 NOTE — PROGRESS NOTES
Critical Care Progress Note     Admit Date: 7/22/2019  ICU Day: 22     Code Status: full code    CC: Headache     HPI: 43 year old female who was admitted on 7/22/2019 with ruptured dissecting ventral medial supraclinoid right internal carotid artery aneurysm resuling in SAH/IVH, aneurysm coiling 7/22 that was complicated by right MCA vasospasm, seizure 7/27 s/p intubation and lumbar drain, repeat coiling 8/8 and replacement of lumbar drain, s/p trach/PEG 8/7/19.    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   Verapamil administered IA   7/31/19: Cerebral angiogram     Intra-arterial verapamil infusion into both internal carotid arteries   Fever, PNA-antibiotics started  8/01/19: Cerebral angiogram   Intra-arterial verapamil infusion into both internal carotid arteries in order to treat cerebral vasospasm  8/02/19: Cerebral angiogram   Intra-arterial verapamil infusion into both internal carotid arteries in order to treat cerebral vasospasm   see by Palliative care-family decided to proceed with below surgery   Trach and peg placed in OR by Dr Ledezma   LD replaced   8/05/19: UTI  8/08/19: LD replaced    Cerebral [id number]-9-9 mm recurrence at the neck of previously coiled right ICA aneurysm  8/11/19: Phase 1 vent weaning started    Off  "norepi  8/12/19: head CT stable   LD clamped   On TC most of day, back on vent at HS    Major events over the last 24 hours: lumbar drain now unclamped per neurosurgery head CT showed increased ventricles today    Subjective: in bed trach dome on, follows me with her eyes  Trying to talk  ROS: unable to obtain    Drips: none    Ventilator: Mechanical Ventilation: intubated 7/27/19   trached 8/2/19        Settings: presently on TC    /81   Pulse 86   Temp 98.9  F (37.2  C) (Oral)   Resp 18   Ht 4' 10\" (1.473 m)   Wt 149 lb (67.6 kg)   LMP 07/01/2016   SpO2 98%   BMI 31.14 kg/m       I/O last 3 completed shifts:  In: 1845 [NG/GT:1735; IV Piggyback:110]  Out: 4575 [Urine:3825; Stool:750]  Weight change: -13 lb 6.4 oz (-6.078 kg)  Vent Mode: VCV  FiO2 (%):  [21 %-100 %] 21 %  S RR:  [14] 14  S VT:  [375 mL] 375 mL  PEEP/CPAP (cm H2O):  [5 cm H2O] 5 cm H2O  Minute Ventilation (L/min):  [6.1 L/min-9.7 L/min] 8.2 L/min  PIP:  [18 cm H2O-37 cm H2O] 19 cm H2O  MN SUP:  [5 cm H20] 5 cm H20  MAP (cm H2O):  [6-12] 12      EXAM:   GEN: awake, on TC Tracks and nods appropriately to questioning  HEENT: PERRL, EOMS, OP patent, # 8 Shiley trachea midline  PULM: unlabored respirations, moving air adequately on PS 5/5  CV: RRR, S1, S2, no murmurs, rubs, gallops, bilateral symmetric pulse  ABD: soft nontender, non distended, normal active bowel sound, no HSM, PEG site nonerythematous  EXT : warm well perfused, no cyanosis, clubbing, trace edema  NEURO: PERRL, more alert and responding to yes/no questions and commands, moving right arm and right leg with more strength, no movement on left but withdraws to pain on left  SKIN: no obvious rash, lesion    Labs Personally reviewed: yes  Results from last 7 days   Lab Units 08/13/19  0500 08/12/19  0513 08/11/19  0426   LN-WHITE BLOOD CELL COUNT thou/uL 8.3 7.5 7.5   LN-HEMOGLOBIN g/dL 12.2 11.4* 10.5*   LN-HEMATOCRIT % 36.0 34.6* 31.8*   LN-PLATELET COUNT thou/uL 408 354 307 "   LN-NEUTROPHILS RELATIVE PERCENT % 75* 77* 76*   LN-MONOCYTES RELATIVE PERCENT % 7 5 6     Results from last 7 days   Lab Units 08/13/19  0500 08/12/19  0513 08/11/19  1707 08/11/19  0426   LN-SODIUM mmol/L 139 138  --  138   LN-POTASSIUM mmol/L 3.6 3.6 3.9 3.5   LN-CHLORIDE mmol/L 105 106  --  108*   LN-CO2 mmol/L 22 25  --  23   LN-BLOOD UREA NITROGEN mg/dL 10 9  --  9   LN-CREATININE mg/dL 0.59* 0.54*  --  0.53*   LN-CALCIUM mg/dL 9.8 9.8  --  9.0   LN-PROTEIN TOTAL g/dL 7.6 6.6  --  6.1   LN-BILIRUBIN TOTAL mg/dL 0.6 0.4  --  0.3   LN-ALKALINE PHOSPHATASE U/L 108 100  --  102   LN-ALT (SGPT) U/L 113* 121*  --  132*   LN-AST (SGOT) U/L 56* 49*  --  61*           Last VBG 8/11/19:  PH 7.47  PCO2 34    Microbiology: reviewed   Imaging (all imaging is personally reviewed): yes    8/5/19: Appliances remain in stable and satisfactory position. Some new atelectasis in the right lower lobe. New Infiltrate or atelectasis left lower lobe behind the heart    Impression:  1. Aneurysmal SAH  2. MARIA L supraclinoid aneurysm    s/p endovascular coil embolization  3. Vasospasm  4. Recurrent Seizures   likely secondary to acute intracranial process   5. RMCA stroke related to vasospasm.   6. Acute hypoxic respiratory failure due to # 1-5  7. Anxiety  8. UTI   Ecoli  9. Fever       PLAN:   1. On pahse 2 since 8/12, put back on vent at [facility]  No PMV trials at this time  2. Neurosurgery opened clamp on lumbar drain this am  3. Bedrest and NPO for now for angio this am  4. AED  5. Nimodipine X 21 days  6. On HHH therapy; hydration and -160  7. Changed Cefepime to ceftriaxone on 8/4, procalcitonin low; completed antibiotics on 8/10  8. remove trach flange sutures either today or tomorrow, I will then downsize trach and try PMV with pt     ICU DAILY CHECKLIST                           Can patient transfer out of ICU? no    FAST HUG:    Feeding:  Feeding: Yes.  Patient is receiving NPO and TUBE FEEDS     Sharpe:Yes  Analgesia/Sedation:Yes precedex  Thromboembolic prophylaxis: yes; Mode:  SCDs  HOB>30:  Yes  Stress Ulcer Protocol Active: yes; Mode: PPI  Glycemic Control: Any glucose > 180 no; Mode of Insulin Therapy: Sliding Scale Insulin    INTUBATED:  Can patient have daily waking:  yes  Can patient have spontaneous breathing trial:  Yes  On Phase 2 now    Restraints? no    PHYSICAL THERAPY AND MOBILITY:  Can patient have PT and mobility trial: yes  Activity: OOB in Chair    transfer/discharge plans: will need [facility] once LD out and ok with neurosurgery service    The patient is critically ill with impairment in organ system and high risk of life threatening deterioration.     Total CCT spent 40 minutes thus far today.       Rosy Clemente APRN, [phone]  [facility] Critical [facility]

## 2021-05-31 NOTE — PROGRESS NOTES
NEUROSURGERY PROGRESS NOTE:    NEUROSURGERY ATTENDING: The patient's attending neurosurgeon is Dr. Daniel Kimbrough.   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  Coiled 07/22/19, but not completely occluded, some filling persisted.  MRI concerning for vasospasm and new scattered small infarcts.  Held off on angio at that time as patient was asymptomatic without new focal deficits and velocity 160s on TCD.     Overnight 7/26-7/27 found to have decreased LOC and possible seizure.  Repeat CT concerning for re-bleed and indicative of hydrocephalus and increased ICP  Patient was intubated and taken to IR where the aneurysm was recoiled tavo lumbar drain placed due to concern for risk of further bleeding with ventric placement with aspirin on board.  A follow up cerebral angio was performed on 7/29 and intra-arterial verapamil was infused in the right ICA.  Back to IR for emergent cerebral angiogram yesterday (7/30) with intra-arterial administration of verapamil.      Today she remains sedated (RASS -3).  Plan to go back to IR for further evaluation of vasospasm with possible continued injection of intra-arterial verapamil.       PLAN:   Patient Active Problem List   Diagnosis     Carotid artery aneurysm (H)     Aneurysmal subarachnoid hemorrhage (H)     Intraventricular hemorrhage, nontraumatic (H)     Compression of brain (H)     S/P coil embolization of cerebral aneurysm     Aneurysm of right internal carotid artery     Cerebrovascular accident (CVA) due to occlusion of right middle cerebral artery (H)     Vasospasm (H)  --Repeat cerebral angio today to evaluate and treat vasospasm  --Another angio tomorrow (8/1) per IR  --Keep sedated, RASS -3  --IV fluid goal 100 mL/hr, goal to be euvolemic or slightly hypervolemic to help mitigate vasospasm  --Full 3% protocol for goal Na  145-150  ---160, Albumin PRN x5 doses first, then titrate levophed once Albumin exhausted  --TCD's daily  --Keppra 500 mg two times a day x30 days, indefinite with seizure  --Nimodipine x21 days. Please do not hold this medication.  --Sharpe in place, strict I/O  --Mechanical DVT prophylaxis            Discharge Recommendations/Plan:  Barriers to Discharge: yes, requiring acute medical care.     Follow Up Plan: Pending         HPI: Darion Herrera is a 42 yo female who was admitted on 19 with SAH and IVH secondary to ruptured aneurysm of the right ICA.  Aneurysm coiling on  that was complicated by vasospasm. On , found to be unresponsive and possibly seizing, was intubated and taken to IR where it was revealed that her right ICU aneurysm had dissected and re-bled.  In IR on , the vessel was recoiled and a lumbar drain was placed for ICP management.  On  underwent intraarterial verapamil infusion with angiogram for acute vasospasm of right ICA. Due to increased TCD values, back to IR for another cerebral angiogram  for acute vasospasm and intraarterial verapamil infusion.     SUBJECTIVE:  Unable to assess due to deep sedation.       OBJECTIVE:  Vital signs in last 24 hours  Temp:  [98.8  F (37.1  C)-102.9  F (39.4  C)] 98.8  F (37.1  C)  Heart Rate:  [] 103  Resp:  [15-34] 34  BP: ()/(52-85) 100/64  Arterial Line BP: (123-153)/(60-75) 135/69  FiO2 (%):  [25 %-100 %] 100 %  Body mass index is 32.58 kg/m .    Mental Status: sedated, speech  intubated.     Solitario Coma Score  Eye openin - Does not open eyes   Verbal:  1 - Makes no noise   Motor:  1 - No motor response to pain   GCS Total: 3i         Cranial Nerves: Exam limited to deep sedation.  PERRL. + cough/gag. Face appears midline.     Motor Strength: Unable to examine due to deep sedation. Propofol infusing at 70 mcg/kg/min, does not withdraw to painful stimulus.     Lumbar Drain: Lumbar drain patent, open and  titrated to 10-15 mL output/hour.  Output remains bloody.     Last 3 TCD Values       7/29 7/30 7/31   LMCA    107 172 170   LACA    127 189 131   RMCA    142 266 216   RACA    99 39 63   BA    65 52 66         Pertinent Labs:  CBC:   Lab Results   Component Value Date    WBC 17.1 (H) 07/30/2019    RBC 3.37 (L) 07/30/2019     BMP:   Lab Results   Component Value Date    CO2 19 (L) 07/30/2019    BUN 3 (L) 07/30/2019    CREATININE 0.57 (L) 07/30/2019    CALCIUM 7.7 (L) 07/30/2019     Coagulation:   Lab Results   Component Value Date    INR 1.12 (H) 07/27/2019         Pertinent Radiology   Radiology Results: Reviewed with Katherin Youssef CNP, [facility] Neurosurgery    MANDY Sandoval-MOHITP Student  [facility] Neurosurgery

## 2021-05-31 NOTE — PROGRESS NOTES
Patient was transported to ICU from , on transport vent.  Patient tolerated well.  Transport was done with no complications.  When returned to room was placed back on ventilator on the following settings:    Vent Mode: VCV  FiO2 (%):  [40 %] 40 %  S RR:  [16] 16  S VT:  [375 mL] 375 mL  PEEP/CPAP (cm H2O):  [5 cm H2O] 5 cm H2O  Minute Ventilation (L/min):  [6.5 L/min-8.2 L/min] 8.2 L/min  PIP:  [10 cm H2O-29 cm H2O] 29 cm H2O  MAP (cm H2O):  [5-8] 8    KAE CarterT

## 2021-05-31 NOTE — PROGRESS NOTES
"[facility]:    S:  Intubated / sedated    O: Visit Vital Signs:   /74   Pulse 71   Temp (!) 102.5  F (39.2  C) (Oral)   Resp 15   Ht 4' 10\" (1.473 m)   Wt 155 lb 6.4 oz (70.5 kg)   LMP 07/01/2016   SpO2 100%   BMI 32.48 kg/m        Intake/Output Summary (Last 24 hours) at 8/1/2019 1258  Last data filed at 8/1/2019 1123  Gross per 24 hour   Intake 5670.9 ml   Output 5018 ml   Net 652.9 ml       Imaging:    Cerebral angiogram/IA Verapamil procedure today ~  1.  Stable appearance of coil embolized dissecting ventromedial supraclinoid right internal carotid artery aneurysm.  2.  In the conglomerate, no significant change in moderate to severe vasospasm of the left anterior cerebral artery as described above. Slight worsening of mild right anterior cerebral artery vasospasm.  3.  Worsened moderate to severe narrowing of the supraclinoid right internal carotid artery due to a combination of dissection and vasospasm.  4. Worsened moderate to severe vasospasm proximal right middle cerebral artery    CT head today ~  1.  Large coil the right paraclinoid aneurysm with beam hardening artifact.  2.  Question some loss of gray-white differentiation in the right insular cortex and the lateral right MCA territory. There may be an evolving right MCA infarct, as observed previously. However, there is been no significant increase in the amount of   edema in the right cerebral hemisphere over series of exams and these findings could be artifactual. Continued follow-up is recommended.  3.  Small volume hemorrhage in the occipital horns.    Labs:  Sodium 147, Magnesium 2.0  Hemoglobin 8.0, Platelet count 187    Exam: Orally intubated on mechanical ventilator.  Sedated on continuous infusion of Propofol and Fentanyl.  Limited exam secondary to sedation.  Pupils reactive.  Right femoral arterial sheath site is dry and intact, no hematoma.  Distal pulses present. Lumbar drain cathter is patent with " 307 ml out over last 24 hours.    A:  This is a 43 year old female with IVH/SAH secondary to 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 This aneurysm was treated with endovascular coil embolization 7/22/19. Interval regrowth of aneurysm resulting in re-rupture on 7/27/19 and subsequent coil embolization re-treatment on 7/27/19. Aneurysm has remained stable over last 5 days.  Secondary hydrocephalus is managed with lumbar drain catheter (placed 5 days ago).  Persistent cerebral vasospasm treated with IA Verapamil on 7/27,7/28,7/29,7/30,7/31 and 8/1. Cerebral salt wasting with stable Sodium levels on 3% saline replacement protocol.  Respiratory failure on mechanical ventilator. Sedated with limited neurological exam.    P:  Appreciate ICU care and management per Critical Care team.  Neurosurgery following.  We will replace lumbar drain catheter tomorrow at 1030.  Culture tip as requested by Neurosurgery team.  We will follow along.  Planning for repeat angio with IA Verapamil tomorrow at 1030.  Please call us with questions or concerns.  [phone]    The angiogram with IA Verapamil and lumbar drain replacement procedure benefits and risks were discussed with the patient's family.  They express an understanding of the procedure and provide permission for us to proceed.  Informed consent obtained and signed.    Yoli Reyes CNP     For billing:  Rounding note # [id number]

## 2021-05-31 NOTE — PROGRESS NOTES
ETT # 7.5 and is secured 21 cm at the teeth. Pt desaturated FIO2 is increased to 30%. Moderate amount of thin/tan secretions are suctioned orally/endotrachially. BS are coarse; pt has gag reflex. Current vent settings: VCV 12 375 30% 5. Ventilation/oxygenation are adequate. RT will monitor.     KAE SanchezT

## 2021-05-31 NOTE — PROGRESS NOTES
Hospitalist Progress Note     Assessment/Plan:     Darion Herrera is a 43-year-old female who presented with ruptured aneurysm of the right ICA resulting in subarachnoid hemorrhage with extension intraventricular hemorrhage.  She is status post aneurysm coiling on 7/22 which was complicated by vasospasm.  There is also a complication of the right ICA aneurysm with dissection and rebleeding leading to seizure and unresponsiveness on 7/27.  This required intubation and recoiling and placement of lumbar drain.  She is currently intubated in ICU.  Intensivist and neurosurgery managing.    Active Problem:     Principal Problem:    Aneurysmal subarachnoid hemorrhage (H)  Active Problems:    Carotid artery aneurysm (H)    Intraventricular hemorrhage, nontraumatic (H)    Compression of brain (H)    S/P coil embolization of cerebral aneurysm    Aneurysm of right internal carotid artery    Cerebrovascular accident (CVA) due to occlusion of right middle cerebral artery (H)    Vasospasm (H)    Acute respiratory failure, unspecified whether with hypoxia or hypercapnia (H)    Fever in other diseases    E-coli UTI    Gram-positive bacteremia  -Remains intubated in ICU. Fevers, work-up underway.    Plan:  -Intensivist and neurosurgery involved.  Neurology involved.  On verapamil to MARIA L, IR angiogram today.  On sedation, and antiepileptics.    Subjective:     Intubated.     Review of systems:     Please see Subjective.    Current Medications:     Scheduled Meds:    acetaminophen  650 mg Enteral Tube QID    Or     acetaminophen  650 mg Oral QID     albumin human  12.5 g Intravenous Once     atorvastatin  20 mg Enteral Tube DAILY     cefepime (MAXIPIME) IV  2 g Intravenous Q12H     chlorhexidine  15 mL Topical Q12H     fosphenytoin (CEREBYX) IV maintenance  130 mg PE Intravenous Q8H     levETIRAcetam (KEPPRA) IVPB  1,000 mg Intravenous Q12H     niMODipine  60 mg Enteral Tube Q4H     omeprazole  20 mg Enteral Tube QAM AC    Or      pantoprazole  40 mg Intravenous QAM AC     potassium chloride liquid/packet  20 mEq Enteral Tube BID     senna (SENOKOT) syrup  8.8 mg Enteral Tube BID    Or     senna  8.6 mg Oral BID     sodium chloride  10-30 mL Intravenous Q8H FIXED TIMES     sodium chloride  2 g Oral BID    Or     sodium chloride  2 g Enteral Tube BID     vancomycin  1,000 mg Intravenous Q8H     Continuous Infusions:    fentaNYL 2500 mcg/250 mL in NS (10 mcg/mL) 50 mcg/hr (07/31/19 1500)     lactated Ringers       niCARdipine       norepinephrine 0.15 mcg/kg/min (07/31/19 1500)     propofol 60 mcg/kg/min (07/31/19 1257)     sodium chloride 0.9 % with KCl 20 mEq       sodium chloride 3 % Stopped (07/31/19 1435)     PRN Meds:.acetaminophen, albumin human, bisacodyl, HYDROmorphone, lactated Ringers, lipase-protease-amylase **AND** sodium bicarbonate, midazolam, naloxone **OR** naloxone, ondansetron, polyethylene glycol, sodium chloride bacteriostatic, sodium chloride, sodium chloride, sodium chloride    Objective:       Vital signs in last 24 hours:     Temp:  [98.8  F (37.1  C)-102.9  F (39.4  C)] 100.9  F (38.3  C)  Heart Rate:  [] 99  Resp:  [15-34] 34  BP: ()/(52-79) 124/69  Arterial Line BP: (117-158)/(58-76) 142/69  FiO2 (%):  [25 %-100 %] 30 %  Weight: 155 lb 14.4 oz (70.7 kg)    Physical Exam:   General: Alert, intubated, no acute distress  Chest: intubated  Cardiac: RRR  Abdomen: ND  Neuro: non-verbal, intubated,     Intake/Output this shift:     I/O last 3 completed shifts:  In: 7675.5 [I.V.:6975.5; NG/GT:285; IV Piggyback:415]  Out: 4329 [Urine:4025; Drains:304]    Pertinent Labs:     Lab Results: personally reviewed.     Pertinent Radiology:       Advanced Care Planning:     Barrier to discharge: Intubated  Anticipated LOS: To be determined  Disposition: To be determined    Gardenia Luu M.D.  [facility]  Internal Medicine

## 2021-05-31 NOTE — TREATMENT PLAN
Chart check.- Hospitalist   Not much new; difficult situation  Reviewed with RN and continue to follow  Plan per ICU/Neuro team

## 2021-05-31 NOTE — PLAN OF CARE
Problem: Pain  Goal: Patient's pain/discomfort is manageable  Outcome: Progressing  Pt denied C/O pain this shift.     Problem: Daily Care  Goal: Daily care needs are met  Outcome: Progressing  Performed bed bath & oral care. Pt attempted to brush her own teeth. Pt is able to make needs known. Call light within reach.

## 2021-05-31 NOTE — PLAN OF CARE
Actively titrating norepi and using albumin per orders to keep -160 per orders using art line pressures. Pt having fevers today, sputum and blood cultures sent. Tylenol and ice applied, antibiotics given per order. Sedation titrated to RASS -3 with pre med for cares and turning done. Pt pupils remain equal and reactive. Lumbar drained 10-15cc per hour except when off the floor at IR.     Discussed the plan of care on multiple occasions with neurosurg and critical care team.   Problem: Pain  Goal: Patient's pain/discomfort is manageable  Outcome: Progressing     Problem: Safety  Goal: Patient will be injury free during hospitalization  Outcome: Progressing     Problem: Daily Care  Goal: Daily care needs are met  Outcome: Progressing     Problem: Psychosocial Needs  Goal: Demonstrates ability to cope with hospitalization/illness  Outcome: Progressing  Goal: Collaborate with patient/family/caregiver to identify patient specific goals for this hospitalization  Outcome: Progressing     Problem: Knowledge Deficit  Goal: Patient/family/caregiver demonstrates understanding of disease process, treatment plan, medications, and discharge instructions  Outcome: Progressing     Problem: Potential for Falls  Goal: Patient will remain free of falls  Outcome: Progressing     Problem: Potential for Compromised Skin Integrity  Goal: Skin integrity is maintained or improved  Outcome: Progressing     Problem: Risk of Injury Due to Unsafe Behavior  Goal: Patient will remain safe while in restraint; physical/psychological needs will be met  Outcome: Progressing  Goal: Alternatives to restraint will be continually assessed with use of least restrictive device and discontinuation as soon as possible  Outcome: Progressing  Goal: Patient/Family will be able to communicate reason for restraint and steps for restraint application and removal  Outcome: Progressing

## 2021-05-31 NOTE — PROGRESS NOTES
"  Clinical Nutrition Therapy Nutrition Support Note        Subjective:  Pt intubated 7/27. Temp today of 103.1 degrees.  Staff getting pt ready for CT.  Chart reviewed.    Nutrition Prescription:   Diet: TF on hold for CT. (started Isosource 1.5 at 15 ml/hr continuous on 7/30/19.  Water flush 30 ml q 4 hrs)  IV dextrose or Fluids:    dexmedetomidine infusion orderable (PRECEDEX) Last Rate: 1 mcg/kg/hr (08/05/19 1238)   fentaNYL 2500 mcg/250 mL in NS (10 mcg/mL) Last Rate: 75 mcg/hr (08/05/19 1306)   lactated Ringers Last Rate: Stopped (08/03/19 1335)   lactated ringers Last Rate: Stopped (08/05/19 1313)   niCARdipine    norepinephrine Last Rate: 0.06 mcg/kg/min (08/05/19 1400)   sodium chloride/acetate 3% Last Rate: 75 mL/hr (08/05/19 1348)       Nutrition Intake:  FT is an OG placed 7/27.  X-ray of 7/27 confirms gastric.  Frequent NPO for procedures.    TF if running continuous to provide:   660 calories, 54 g protein, 63 g carb, 455 ml free water.  321.6 ml propofol in past 24 hrs.  This provides 354 calories/day.    Anthropometrics:  Height: 4' 10\" (147.3 cm)  Admission weight: 155#  Weight: 163 lb 11.2 oz (74.3 kg)    Physical Findings:  Edema noted    GI Status/Output:   GI symptoms per nursing:   Last BM recorded: pt with rectal tube 1000 ml out in past 24 hrs.    Lumbar drain 283 ml out.    Skin/Wound:   Clifford Scale Score: 10     Medications:  3% saline, NaCl tabs  K replacement protocol  Medications reviewed.    Labs:  Na 139    Labs reviewed    Estimated Nutrition Needs:  Using ideal weight of 43 kg.    Energy Needs: 5307-2444 kcals daily per 25-30 kcal/kg   Protein Needs: 52+ g daily, 1.2+ g/kg.  Fluid Needs: 1100+ mls daily, 25+ mls/kg    Malnutrition: Not noted    Nutrition Risk Level: moderate risk    Nutrition DX:  Swallow difficulty r/t intubation evidenced by NPO    Goals: Start nutrition in 24-48 hrs-met  Tolerate TF    Plan:  Propofol now off?  Frequent NPO preventing pt from getting much " TF.  When able to restart TF plan to increase TF to 20 ml/hr.  If propofol remains off TF goal rate to increase further.      Monitor:  TF tolerance, wt, labs.    See Care Plan for Problems, Goals, and Interventions.

## 2021-05-31 NOTE — PROGRESS NOTES
Vent Mode: VCV  FiO2 (%):  [30 %-50 %] 30 %  S RR:  [14] 14  S VT:  [375 mL] 375 mL  PEEP/CPAP (cm H2O):  [5 cm H2O] 5 cm H2O  Minute Ventilation (L/min):  [6.4 L/min-11.3 L/min] 8.8 L/min  PIP:  [14 cm H2O-29 cm H2O] 21 cm H2O  MAP (cm H2O):  [6-8] 6    BS coarse, clear after suctioning. Sxn for small to moderate amounts of thick white secretions, tolerated well. No changes this shift. Will continue to follow.     KAE DunawayT

## 2021-05-31 NOTE — PLAN OF CARE
Problem: Pain  Goal: Patient's pain/discomfort is manageable  Outcome: Progressing     Problem: Daily Care  Goal: Daily care needs are met  Outcome: Progressing     Problem: Potential for Compromised Skin Integrity  Goal: Skin integrity is maintained or improved  Outcome: Progressing

## 2021-05-31 NOTE — PROGRESS NOTES
NEUROLOGY PROGRESS NOTE     Darion Herrera,  1975, MRN [id number] Date: 2019     [facility]   Code status:  Full Code   PCP: Monroe Cassidy MD, [phone]      ASSESSMENT & PLAN   Hospital Day#: 14  Diagnosis code: SAH    Aneurysmal SAH  MARIA L supraclinoid aneurysm s/p endovascular coil embolization  Vasospasm  Seizures  Twitching right shoulder.   RMCA stroke related to vasospasm.       Initial head CT showed MARIA L giant aneurysm (sentinel headache)    S/p endovascular coil embolization    MRI/CT now shows large hypodensity/evolving ischemic stroke.     TCD from yesterday shows RMCA elevated velocities, though improving compared to before.     On HHH therapy; hydration and -180    Lumbar drain    Nimodipine X 21 days    Keppra 1000 mg two times a day - Level therapeutic.     On Vimpat 100 mg two times a day    EEG negative for seizures.     Monitor sodium. On 3% protocol.    Palliative care involved. Family wanting to move forward with Trach and PEG.     Thank you again for this referral, please feel free to contact me if you have any questions.     CHIEF COMPLAINT Aneurysmal subarachnoid hemorrhage (H)     HISTORY OF PRESENT ILLNESS     We have been requested by Dr. Kwan to evaluate Darion Herrera who is a 43 y.o.  female for SAH.    This a 43-year-old female on whom neurology is been consulted to evaluate for subarachnoid hemorrhage.  Patient initially presented with 6 days ago with a headache.  She was trying to lift some fruits and felt something blowing up in her head.  She continued to have the headache.  She left the staff she was lifting on the table.  She was then brought to the emergency room where imaging showed a large frontal mass.  This was later on MRI identified to be a giant aneurysm.  Patient underwent angiogram and endovascular coil embolization of the aneurysm.  The patient had a repeat angiogram.  MRI done today showed multiple foci of acute/subacute infarction in  the right parietal-occipital region.  There is also small area of infarction in the right frontal lobe.  TCD's show vasospasm in the right MCA.  There was some narrowing of the M1 and M2 segments.  Neurology was consulted to further help with further cares.  Patient denies any ongoing symptoms he does complain of some headache.     7/29  Patient had 2 seizures over the weekend. Was started on fosphenytoin in addition to Keppra.   No seizures today.  Intubated, but waking up as the sedation has been withdrawn.     7/30  No new seizures. Patient remains on sedation and intubated. RMCA velocities remain elevated on the TCD.     7/31  No new seizures. Patient being kept sedation. Angiogram still shows vasospasm. Patient getting IA verapamil.     8/1  No seizures. Patient continues to remain on sedation. Angiogram continues to show vasospasm. Getting IA verapamil. Family conference tomorrow.     8/2  Patient remains on sedation. Angiogram shows stable vasospasm. Some twitching of right shoulder per nursing staff.     8/5  Weekend events - patient was found to have a RMCA evolving infarct over the weekend.   Family wanting to proceed with Trach and PEG.      PAST MEDICAL & SURGICAL HISTORY     Medical History  Patient Active Problem List    Diagnosis Date Noted     E-coli UTI      Gram-positive bacteremia      Fever in other diseases      Acute respiratory failure, unspecified whether with hypoxia or hypercapnia (H)      Cerebrovascular accident (CVA) due to occlusion of right middle cerebral artery (H)      Vasospasm (H)      S/P coil embolization of cerebral aneurysm 07/23/2019     Aneurysm of right internal carotid artery 07/23/2019     Carotid artery aneurysm (H) 07/22/2019     Nausea vomiting and diarrhea 07/22/2019     Aneurysmal subarachnoid hemorrhage (H) 07/22/2019     Intraventricular hemorrhage, nontraumatic (H) 07/22/2019     Compression of brain (H)      Leiomyoma of uterus 07/18/2016     Past Medical  History: No previous history.    Surgical History  She  has a past surgical history that includes IR Cerebral Angiogram (7/22/2019); IR Cerebral Angiogram (7/24/2019); PICC Team Line Insertion (7/26/2019); IR Cerebral Angiogram (7/27/2019); IR Lumbar Drain Insertion (7/27/2019); IR Cerebral Angiogram (7/29/2019); IR Cerebral Angiogram (7/30/2019); IR Cerebral Angiogram (7/31/2019); IR Cerebral Angiogram (8/1/2019); IR Lumbar Drain Insertion (8/2/2019); and IR Cerebral Angiogram (8/2/2019).     SOCIAL HISTORY     Reviewed, and she  reports that she has never smoked. She has never used smokeless tobacco. She reports that she has current or past drug history. She reports that she does not drink alcohol.     FAMILY HISTORY     Reviewed, and non-contributory.      ALLERGIES     Allergies   Allergen Reactions     Oxycodone      Hydrocodone Swelling and Rash        REVIEW OF SYSTEMS     12 system ROS was done and was negative other than HPI - except patient feeling tired.     HOME & HOSPITAL MEDICATIONS     Prior to Admission Medications  Medications Prior to Admission   Medication Sig Dispense Refill Last Dose     ascorbic acid, vitamin C, 500 mg Chew chew tab Chew 500 mg daily.   Unknown at Unknown time       Hospital Medications    albumin human  12.5 g Intravenous Once     amino acids-protein hydrolys  1 packet Enteral Tube BID     atorvastatin  20 mg Enteral Tube DAILY     cefTRIAXone (ROCEPHIN)  IV  2 g Intravenous Q24H     chlorhexidine  15 mL Topical Q12H     lacosamide (VIMPAT) IVPB  100 mg Intravenous Q12H 09-21     levETIRAcetam (KEPPRA) IVPB  1,000 mg Intravenous Q12H     niMODipine  60 mg Enteral Tube Q4H     omeprazole  20 mg Enteral Tube QAM AC    Or     pantoprazole  40 mg Intravenous QAM AC     potassium chloride liquid/packet  20 mEq Enteral Tube BID     sodium chloride  10-30 mL Intravenous Q8H FIXED TIMES     sodium chloride  2 g Oral BID    Or     sodium chloride  2 g Enteral Tube BID        PHYSICAL  EXAM     Vital signs  Temp:  [99.5  F (37.5  C)-103.1  F (39.5  C)] 100.5  F (38.1  C)  Heart Rate:  [] 77  Resp:  [10-26] 16  BP: (108-160)/(55-96) 151/93  FiO2 (%):  [30 %-100 %] 30 %    Weight:   163 lb 11.2 oz (74.3 kg)    GENERAL: Healthy appearing, alert, no acute distress, normal habitus.  CARDIOVASCULAR: Ext warm and well perfused. Pulses present.   NEUROLOGICAL:  Patient is intubated and on sedation. Does not respond to voice, opens eyes to sternal rub. EOM absent. Pupils symmetric. VF absent. Corneals present. NO movement in arms or legs to painful sedation. Tone is symmetric.      DIAGNOSTIC STUDIES     Pertinent Radiology   MRI brain - images reviewed stroke seen  IMPRESSION:   CONCLUSION:  1.  Multiple foci of developing acute/subacute infarction throughout the right parietal occipital lobes, with additional single small foci of acute/subacute infarction in the right frontal lobe and left parietal lobe.  2.  Status post endovascular coiling of large dissecting aneurysm of the ventromedial right supraclinoid ICA with redemonstration of mild persistent flow-related enhancement along the neck of the aneurysm. There is also faint flow-related enhancement   centrally within the coil mass.  3.  Mildly decreased caliber of the M1 and proximal M2 segments of the right middle cerebral artery compared to the left, which may represent mild vasospasm. There is more normal caliber of the distal M2 and M3 branches.    ECHO    1.Left ventricle ejection fraction is normal. The calculated left ventricular ejection fraction is 65%.    2.TAPSE is normal, which is consistent with normal right ventricular systolic function.    3.No hemodynamically significant valvular heart abnormalities.    4.No obvious embolic source seen, if highly suspect consider MICHAEL.    5.No previous study for comparison.     TCD - images reviewed.   IMPRESSION:   CONCLUSION:  New moderate right middle cerebral artery vasospasm.    EEG negative  for seizures.    Angiogram  Preliminary findings: (see dictation for full detail)  - Stable right ICA aneurysm coil mass with residual interstitial neck filling.  - Possible mild vasospasm superimposed on right ICA dissection, verapamil 10 mg infused in right ICA.    TCD reviewed and velocities are elevated in the RMCA.    Angiogram shows MARIA L, RMCA, RACA vasospasm. Dissection in MARIA L.    Angiogram on 8/1 continues to show vasospasm.     MRI and CT reviewed - RMCA evolving stroke seen.   Recent Results (from the past 24 hour(s))   Sodium lab - every 6 hours    Collection Time: 08/04/19  5:55 PM   Result Value Ref Range    Sodium 141 136 - 145 mmol/L   Potassium    Collection Time: 08/04/19  5:55 PM   Result Value Ref Range    Potassium 4.0 3.5 - 5.0 mmol/L   Sodium lab - every 6 hours    Collection Time: 08/05/19 12:20 AM   Result Value Ref Range    Sodium 141 136 - 145 mmol/L   POCT Glucose    Collection Time: 08/05/19 12:26 AM   Result Value Ref Range    Glucose 125 70 - 139 mg/dL   HM1 (CBC with Diff)    Collection Time: 08/05/19  6:05 AM   Result Value Ref Range    WBC 11.2 (H) 4.0 - 11.0 thou/uL    RBC 3.10 (L) 3.80 - 5.40 mill/uL    Hemoglobin 9.1 (L) 12.0 - 16.0 g/dL    Hematocrit 27.0 (L) 35.0 - 47.0 %    MCV 87 80 - 100 fL    MCH 29.4 27.0 - 34.0 pg    MCHC 33.7 32.0 - 36.0 g/dL    RDW 13.4 11.0 - 14.5 %    Platelets 215 140 - 440 thou/uL    MPV 10.8 8.5 - 12.5 fL    Neutrophils % 81 (H) 50 - 70 %    Lymphocytes % 8 (L) 20 - 40 %    Monocytes % 5 2 - 10 %    Eosinophils % 5 0 - 6 %    Basophils % 0 0 - 2 %    Neutrophils Absolute 9.0 (H) 2.0 - 7.7 thou/uL    Lymphocytes Absolute 0.9 0.8 - 4.4 thou/uL    Monocytes Absolute 0.6 0.0 - 0.9 thou/uL    Eosinophils Absolute 0.6 (H) 0.0 - 0.4 thou/uL    Basophils Absolute 0.1 0.0 - 0.2 thou/uL   Triglycerides    Collection Time: 08/05/19  6:05 AM   Result Value Ref Range    Triglycerides 573 (H) <=149 mg/dL    Patient Fasting > 8hrs? No    Magnesium    Collection  Time: 08/05/19  6:05 AM   Result Value Ref Range    Magnesium 1.8 1.8 - 2.6 mg/dL   Comprehensive Metabolic Panel    Collection Time: 08/05/19  6:05 AM   Result Value Ref Range    Sodium 139 136 - 145 mmol/L    Potassium 3.5 3.5 - 5.0 mmol/L    Chloride 108 (H) 98 - 107 mmol/L    CO2 25 22 - 31 mmol/L    Anion Gap, Calculation 6 5 - 18 mmol/L    Glucose 126 (H) 70 - 125 mg/dL    BUN 6 (L) 8 - 22 mg/dL    Creatinine 0.52 (L) 0.60 - 1.10 mg/dL    GFR MDRD Af Amer >60 >60 mL/min/1.73m2    GFR MDRD Non Af Amer >60 >60 mL/min/1.73m2    Bilirubin, Total 0.3 0.0 - 1.0 mg/dL    Calcium 8.0 (L) 8.5 - 10.5 mg/dL    Protein, Total 5.9 (L) 6.0 - 8.0 g/dL    Albumin 2.7 (L) 3.5 - 5.0 g/dL    Alkaline Phosphatase 80 45 - 120 U/L    AST 66 (H) 0 - 40 U/L    ALT 65 (H) 0 - 45 U/L   CK Total    Collection Time: 08/05/19  6:05 AM   Result Value Ref Range    CK, Total 347 (H) 30 - 190 U/L   POCT Glucose    Collection Time: 08/05/19  6:28 AM   Result Value Ref Range    Glucose 122 70 - 139 mg/dL   POCT Glucose    Collection Time: 08/05/19 11:31 AM   Result Value Ref Range    Glucose 122 70 - 139 mg/dL   Sputum culture    Collection Time: 08/05/19 12:10 PM   Result Value Ref Range    Gram Stain Result 2+ Polymorphonuclear leukocytes     Gram Stain Result No organisms seen    Sodium lab - every 6 hours    Collection Time: 08/05/19 12:13 PM   Result Value Ref Range    Sodium 140 136 - 145 mmol/L   Crossmatch    Collection Time: 08/05/19 12:51 PM   Result Value Ref Range    Crossmatch Compatible     Blood Expiration Date [id number]     Unit Type O Neg     Unit Number [id number]     Status Issued     Component Red Blood Cells     PRODUCT CODE G1189F35     Issue Date and Time [id number]     Blood Type 9500     CODING SYSTEM [id number]        Total time spent for face to face visit, reviewing labs/imaging studies, counseling and coordination of care was: Over 25 min More than 50% of this time was spent on counseling and coordination  of care.  Discussing plan with family and nursing staff.     Justin Vasquez MD  [employer] Secure Messaging: [email or web address]  Tel: [phone]  This note was dictated using voice recognition software.  Any grammatical or context distortions are unintentional and inherent to the software.

## 2021-05-31 NOTE — PROGRESS NOTES
"RESPIRATORY CARE NOTE      Vent Mode: VCV  FiO2 (%):  [30 %-40 %] 30 %  S RR:  [16] 16  S VT:  [375 mL] 375 mL  PEEP/CPAP (cm H2O):  [5 cm H2O] 5 cm H2O  Minute Ventilation (L/min):  [5.8 L/min-8.2 L/min] 5.9 L/min  PIP:  [22 cm H2O-30 cm H2O] 28 cm H2O  MAP (cm H2O):  [8] 8  /63   Pulse 97   Temp 99.1  F (37.3  C) (Oral)   Resp 16   Ht 4' 10\" (1.473 m)   Wt 160 lb 9.6 oz (72.8 kg)   LMP 07/01/2016   SpO2 100%   BMI 33.57 kg/m    Vent Day #8, 7.5 ETT 21cm at the teeth. Vent settings above, 30% FiO2 now.  BS clear and diminished Sx small thin clear yellow secretions.  Will continue to assess for wean and liberation. RT following.     KAE LewisT      "

## 2021-05-31 NOTE — PLAN OF CARE
Problem: Physical Therapy  Goal: PT Goals  Description  Patient will demonstrate the following by 8/19/19, in order to maximize independence with functional mobility to facilitate safe discharge:   -Supine<>sit with head of bed flat, no rail, ModA  -Sit<>stand with LR assistive device, ModA  -Ambulate >50 feet with LR assistive device, ModA     Goals entered on 8/12/2019 by Tyler Melo, PT     Patient will demonstrate the following by 8/26/19, in order to maximize independence with functional mobility to facilitate safe discharge:   -Supine<>sit with head of bed flat, no rail, ModA  -Sit<>stand with LR assistive device, ModA  -Ambulate 10-15 feet with LR assistive device, Mod A  x2    Goals reviewed on 8/19/2019 -Nadya Tse, PT         Outcome: Adequate for Discharge       Physical Therapy Discharge Summary    Date of PT Discharge: 8/20/2019   Recommended Equipment: RW  Discharge Destination: [facility]    Please note that if goals are not fully met the patient is making progress towards established goals, which is documented in flowsheet notes. If further therapy is recommended it is related to documented deficits, and is necessary to maximize functional independence in order for patient to return to previous level of function.      8/20/2019 by Brittany L Dressler, PT

## 2021-05-31 NOTE — PROGRESS NOTES
Respiratory distress is not noted at this moment. Ventilator remains on standby. Trach # 6.0 Bivona. Pt is on 20 lpm/21% FIO2 and is sating high 90s. BS are coarse; small/white secretions are suctioned. Patient is transported to/from CT and is ventilated via transport vent. Pt is returned to ICU without incident. RT will monitor.    JERRY Sanchez

## 2021-05-31 NOTE — PROGRESS NOTES
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   Verapamil administered IA   7/31/19: Cerebral angiogram     Intra-arterial verapamil infusion into both internal carotid arteries   Fever, PNA-antibiotics started  8/01/19: Cerebral angiogram   Intra-arterial verapamil infusion into both internal carotid arteries in order to treat cerebral vasospasm  8/02/19: Cerebral angiogram   Intra-arterial verapamil infusion into both internal carotid arteries in order to treat cerebral vasospasm   see by Palliative care-family decided to proceed with below surgery   Trach and peg placed in OR by Dr Ledezma   LD replaced   8/05/19: UTI  8/08/19: LD replaced    Cerebral suoeegtpo-9-8 mm recurrence at the neck of previously coiled right ICA aneurysm  8/11/19: Phase 1 vent weaning started    Off norepi and  "precedex  8/12/19: head CT stable   LD clamped   On TC most of day, back on vent at HS  8/13/19: head Ct shows hydrocephalus with LD clamped   LD opened   Will need shunt   Angio with no changes   On TC most of after noon    Major events over the last 24 hours: on vent overnight    I just downsized pt's trach from # 8 shiley to # 6 bivona at [facility] w/o difficulty  I tried PMV, pt able to phonate a little saying her name    Subjective: in bed trach dome on, follows me with her eyes  Trying to talk with PMV on  ROS: unable to obtain    Drips: none    Ventilator: Mechanical Ventilation: intubated 7/27/19   trached 8/2/19        Settings: presently on TC    /70   Pulse 85   Temp 99  F (37.2  C) (Oral)   Resp 26   Ht 4' 10\" (1.473 m)   Wt 145 lb 4.8 oz (65.9 kg)   LMP 07/01/2016   SpO2 99%   BMI 30.37 kg/m       I/O last 3 completed shifts:  In: 675 [NG/GT:565; IV Piggyback:110]  Out: 2629 [Urine:2000; Drains:229; Stool:400]  Weight change: -3 lb 11.2 oz (-1.678 kg)  Vent Mode: VCV  FiO2 (%):  [21 %-50 %] 21 %  S RR:  [14] 14  S VT:  [375 mL] 375 mL  PEEP/CPAP (cm H2O):  [5 cm H2O] 5 cm H2O  Minute Ventilation (L/min):  [5.3 L/min-9.6 L/min] 9.6 L/min  PIP:  [11 cm H2O-18 cm H2O] 11 cm H2O  MAP (cm H2O):  [6-7] 6      EXAM:   GEN: awake, on TC Tracks and nods appropriately to questioning  HEENT: PERRL,left  facial droop  # 6 Bivona  PULM: few anterior rhonchi  Strong cough on TC  CV: RRR  ABD: soft nontender  EXT : warm well perfused, no cyanosis, clubbing, trace edema  NEURO: moving right arm and right leg with more strength, left leg moved=s more than arm but both weaker than right  SKIN: no obvious rash, lesion    Labs Personally reviewed: yes  Results from last 7 days   Lab Units 08/14/19  0448 08/13/19  0500 08/12/19  0513   LN-WHITE BLOOD CELL COUNT thou/uL 8.1 8.3 7.5   LN-HEMOGLOBIN g/dL 12.1 12.2 11.4*   LN-HEMATOCRIT % 36.4 36.0 34.6*   LN-PLATELET COUNT thou/uL 435 408 354   LN-NEUTROPHILS " RELATIVE PERCENT % 79* 75* 77*   LN-MONOCYTES RELATIVE PERCENT % 6 7 5     Results from last 7 days   Lab Units 08/14/19  0448 08/13/19  0500 08/12/19  0513   LN-SODIUM mmol/L 136 139 138   LN-POTASSIUM mmol/L 3.9 3.6 3.6   LN-CHLORIDE mmol/L 104 105 106   LN-CO2 mmol/L 22 22 25   LN-BLOOD UREA NITROGEN mg/dL 13 10 9   LN-CREATININE mg/dL 0.59* 0.59* 0.54*   LN-CALCIUM mg/dL 9.8 9.8 9.8   LN-PROTEIN TOTAL g/dL 7.8 7.6 6.6   LN-BILIRUBIN TOTAL mg/dL 0.6 0.6 0.4   LN-ALKALINE PHOSPHATASE U/L 111 108 100   LN-ALT (SGPT) U/L 86* 113* 121*   LN-AST (SGOT) U/L 40 56* 49*           Last VBG 8/11/19:  PH 7.47  PCO2 34    Microbiology: reviewed   Imaging (all imaging is personally reviewed): yes    8/5/19: Appliances remain in stable and satisfactory position. Some new atelectasis in the right lower lobe. New Infiltrate or atelectasis left lower lobe behind the heart    Impression:  1. Aneurysmal SAH  2. MARIA L supraclinoid aneurysm    s/p endovascular coil embolization  3. Vasospasm  4. Recurrent Seizures   likely secondary to acute intracranial process   5. RMCA stroke related to vasospasm.   6. Acute hypoxic respiratory failure due to # 1-5  7. Anxiety  8. UTI   Ecoli  9. Fever       PLAN:   1. On phase  2 since 8/12, put back on vent at noc   2. I downsized to # 6 Bivona today  3. PMV trials only with SLP or me for now  4. Per Neurosurgery-lumbar drain unclamped, pt to get shunt 8/15/19  5. OOB up ion chair  6. AED  7. Nimodipine X 21 days  8. Goal -160  9. Changed Cefepime to ceftriaxone on 8/4, procalcitonin low; completed antibiotics on 8/10       ICU DAILY CHECKLIST                           Can patient transfer out of ICU? no    FAST HUG:    Feeding:  Feeding: Yes.  Patient is receiving NPO and TUBE FEEDS    Sharpe:Yes  Analgesia/Sedation:Yes precedex  Thromboembolic prophylaxis: yes; Mode:  SCDs  HOB>30:  Yes  Stress Ulcer Protocol Active: yes; Mode: PPI  Glycemic Control: Any glucose > 180 no; Mode of Insulin  Therapy: Sliding Scale Insulin    INTUBATED:  Can patient have daily waking:  yes  Can patient have spontaneous breathing trial:  Yes  On Phase 2 now    Restraints? no    PHYSICAL THERAPY AND MOBILITY:  Can patient have PT and mobility trial: yes  Activity: OOB in Chair    transfer/discharge plans: will need [facility] once shunt placed  with neurosurgery service    The patient is critically ill with impairment in organ system and high risk of life threatening deterioration.     Total CCT spent 40 minutes thus far today.       Rosy RHOADES, [phone]  [facility] Pulmonary & Critical Care

## 2021-05-31 NOTE — PROGRESS NOTES
"Resp Care: Patient remained on full vent support, tolerated well. BS are coarse throughout, suctioned for moderate amount thick secretion. No change made with the vent setting in this shift. No PS trials yet due to patient's hemodynamics status. Recent CXR result showed \" Some new atelectasis in the right lower lobe. New Infiltrate or atelectasis left lower lobe behind the heart\". Recent VBG in acceptable level. Patient has normal lung mechanics. Will continue to monitor SpO2/ABG, CXR, suctioned PRN, and  initiate PS trials when appropriates.      Ezio Johnson, LRT        "

## 2021-05-31 NOTE — PROGRESS NOTES
"  Clinical Nutrition Therapy Nutrition Support Note        Subjective:  Pt intubated 7/27.  Pt with temp today of 102.5 degrees.  Noted pt with swollen hands/body today.  Chart reviewed.    Nutrition Prescription:   Diet: TF on hold for IR procedure.(started Isosource 1.5 at 15 ml/hr continuous on 7/30/19.  Water flush 30 ml q 4 hrs)  IV dextrose or Fluids:    fentaNYL 2500 mcg/250 mL in NS (10 mcg/mL) Last Rate: 75 mcg/hr (08/01/19 0906)   lactated Ringers    lactated ringers    niCARdipine    norepinephrine Last Rate: 0.1 mcg/kg/min (08/01/19 1130)   propofol Last Rate: 40 mcg/kg/min (08/01/19 0900)   sodium chloride/acetate 3%        Nutrition Intake:  FT is an OG placed 7/27.  X-ray of 7/27 confirms gastric.  Pt was NPO yesterday morning and this morning for IR procedure.  TF if running continuous to provide:   540 calories, 24 g protein, 63 g carb, 455 ml free water.  475.8 ml propofol in past 24 hrs.  This provides 523 calories/day.    Anthropometrics:  Height: 4' 10\" (147.3 cm)  Admission weight: 155#  Weight: 155 lb 6.4 oz (70.5 kg)    Physical Findings:  Edema noted    GI Status/Output:   GI symptoms per nursing:   Last BM recorded: pt with rectal tube 1000 ml out.    Lumbar drain 307 ml out.    Skin/Wound:   Clifford Scale Score: 11     Medications:  IV-cerebyx, 3% saline, propofol, NaCl tabs  K replacement protocol  Medications reviewed.    Labs:  Na 147    Labs reviewed    Estimated Nutrition Needs:  Using ideal weight of 43 kg.    Energy Needs: 9834-4808 kcals daily per 25-30 kcal/kg   Protein Needs: 52+ g daily, 1.2+ g/kg.  Fluid Needs: 1100+ mls daily, 25+ mls/kg    Malnutrition: Not noted    Nutrition Risk Level: moderate risk    Nutrition DX:  Swallow difficulty r/t intubation evidenced by NPO    Goals: Start nutrition in 24-48 hrs-met  Tolerate TF    Plan:  Resume TF when back from IR.  Increase protein provided:  1 pkt No carb prosource bid.  With continuous TF would provide:  660 calories, 54 " g protein.  With propofol this would meet pt's estimated nutrition needs.      Monitor:  TF tolerance, wt, labs.    See Care Plan for Problems, Goals, and Interventions.

## 2021-05-31 NOTE — PROGRESS NOTES
[facility] Daily Progress Note      Date of Service: 8/18/2019     Brief History: Darion Herrera is 43 y.o. 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 CT head showed an extra-axial brain mass, and was admitted to neuro ICU for further management. MRI showed right suprasellar mass and intraventricular hemorrhage and small subarachnoid hemorrhage in left sylvian fissure. 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 She was monitored with daily transcranial doppler. She had follow up angiogram on 07/24, which showed stable findings. MRA on 07/26 was suggestive of vasospasms and was started on triple H therapy (hypertension, hemodilution, and hypervolemia). The patient became unresponsive on 07/27 and was endotracheally intubated. A lumbar drain was placed, and she also had cerebral angiogram with endovascular coil embolization of re-ruptured previously coil embolized dissecting aneurysm of ventral medial supraclinoid right internal carotid artery on 07/27. The patient had cerebral angiogram with intraarterial verapamil infusion on 07/28, 07/29, 07/30, 07/31, 08/01, and 08/02. The lumbar drain was replaced on 08/02. The patient also underwent percutaneous tracheostomy and G tube placement by Dr. Ledezma on 08/02. The lumbar drain was replaced on 08/08, and she had another cerebral angiogram on 08/08. Cerebral angiogram on 08/13 seemed stable. Lumbar drain was clamped on 08/12, but was noted to have increasing ventricular dilatation, so the drain clamp was released. Planned for  shunt placement on 08/15, but patient refused. Now she is agreeing. Lumber drain was replaced on 08/16. Plan to have  shunt placement next week.    Assessment:     Acute respiratory  failure with hypoxia  -Secondary to poor airway protection, in the setting of intracranial hemorrhage  -S/p percutaneous tracheostomy on 08/02. Trach changed to bivona #6 on 08/14 and is tolerating trach mask with PMV. She has not required ventilator support. Tolerating trach mask. Has good cough.  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 Re clamped on 08/14, and again the follow up CT showed increased ventricular dilatation.   -Lumbar drain was change on 08/16. Plan to have a  shunt placed. Patient refused on 08/15. Now agreeing to have it placed.   -Neurosurgery following  -Keppra for seizure prophylaxis.  -Discontinued Nimodipine on 07/13    Oropharyngeal dysphagia  At risk of malnutrition  -S/p G tube placement on 08/02  -Currently on tube feeding. NPO by mouth.    Electrolyte imbalance  -Hypokalemia, replaced  -Hypomagnesemia, replaced  -Monitor, and replace as needed    Urinary tract infection, unspecified  -Culture grew E. Coli on 07/30, and was treated with antibiotic (cephalosporin)  -Urine culture from 08/05 grew candida. She was treated with fluconazole.    Transaminitis   -Unclear etiology, likely medication  -Stable liver enzymes  -Monitor intermitently      Subjective:     Chart reviewed, events noted. Pt seen  "and examined. She has not required ventilator support. She is tolerating trach mask with PMV. She denies any shortness of breath. No chest pain. Denies any headache or double vision.    Objective     Vital signs in last 24 hours:  Temp:  [98.6  F (37  C)-99.7  F (37.6  C)] 98.8  F (37.1  C)  Heart Rate:  [] 93  Resp:  [14-34] 24  BP: ()/(56-75) 105/73  FiO2 (%):  [21 %] 21 %  Weight:   139 lb 8 oz (63.3 kg)  Weight change:   Body mass index is 29.16 kg/m .    Intake/Output last 3 shifts:  I/O last 3 completed shifts:  In: 1965 [I.V.:500; NG/GT:1465]  Out: 1591 [Urine:1475; Drains:116]  Intake/Output this shift:  I/O this shift:  In: -   Out: 660 [Urine:650; Drains:10]      Physical Exam:  /73   Pulse 93   Temp 98.8  F (37.1  C)   Resp 24   Ht 4' 10\" (1.473 m)   Wt 139 lb 8 oz (63.3 kg)   LMP 07/01/2016   SpO2 97%   BMI 29.16 kg/m      FiO2 (%): 21 % O2 Device: Trach mask O2 Flow Rate (L/min): 10 L/min    General Appearance: Alert, not in distress.  HEENT: Normocephalic. No scleral icterus. Trach in place with no sign of bleeding.  Heart: S1 S2 heard. Regular rate and rhythm.  Lungs: Clear to auscultation bilaterally.  Abdomen: Soft, non tender, bowel sounds present. G tube in place.  Extremity: No deformity. No joint swelling. No edema.  Neurologic: Left leg and left arm weakness. Patient is alert and responding to questions  Skin: No skin rashes      Imaging:  personally reviewed.    Ct Head Without Contrast    Result Date: 8/15/2019  EXAM: CT HEAD WO CONTRAST LOCATION: [facility] DATE/TIME: 8/15/2019 5:06 AM INDICATION: Hydrocephalus Preop planning.  AxiEM CT; NO fiducials, include nose and face (as guide), STEALTH PROTOCOL. COMPARISON: 8/13/2019. TECHNIQUE: Stealth without IV contrast. Multiplanar reformats. Dose reduction techniques were used. FINDINGS: INTRACRANIAL CONTENTS: Again demonstrated is a large endovascular coil mass in the right paraclinoid region from aneurysm " coiling. This results in regional artifact obscuring portions of the intracranial compartment. There is has been a mild interval enlargement of the lateral and third ventricles which are mild to moderately dilated. No acute intracranial hemorrhage. Ventricles and sulci are within normal limits. No intracranial mass. VISUALIZED ORBITS/SINUSES/MASTOIDS: No significant orbital abnormality. No significant paranasal sinus mucosal disease. No significant middle ear or mastoid effusion. BONES/SOFT TISSUES: No significant abnormality.     1.  Stealth noncontrast head CT for preoperative planning. 2.  Mild interval enlargement of the lateral and third ventricles which are mild to moderately dilated. This suggests a worsening hydrocephalus. No definite transependymal CSF noted. 3.  Status post endovascular coiling of giant right paraclinoid aneurysm.          Lab Results:  personally reviewed.   Lab Results   Component Value Date     (L) 08/18/2019    K 3.8 08/18/2019     08/18/2019    CO2 25 08/18/2019    BUN 15 08/18/2019    CREATININE 0.58 (L) 08/18/2019    CALCIUM 9.9 08/18/2019     Lab Results   Component Value Date    WBC 7.8 08/18/2019    HGB 12.3 08/18/2019    HCT 37.6 08/18/2019    MCV 90 08/18/2019     08/18/2019     Results from last 7 days   Lab Units 08/18/19  0547 08/17/19  0423 08/16/19  0453   LN-MAGNESIUM mg/dL 1.9 2.0 2.1        Results from last 7 days   Lab Units 08/13/19  2101   LN-POC GLUCOSE FINGERSTICK- HE mg/dL 103           Advance Care Planning:   Barriers to discharge:  shunt placement  Anticipated discharge day: to be determined  Disposition: JEFF Campoverde MD  [facility]  Hospitalist  Paging: [employer] Paging

## 2021-05-31 NOTE — PROGRESS NOTES
NEUROSURGERY PROGRESS NOTE:    NEUROSURGERY ATTENDING: The patient's attending neurosurgeon is Dr. Sal. Plan of care discussed with Dr Sal.     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 Complicated hospital course with re-bleed, concern for hydrocephalus, daily cerebral angiograms for severe vasospasm.     Diffusion MRI confirmed R MCA infarction, CT with expected evolution of the infarct. No dramatic worsening of cerebral edema. MRA with concern for recurrent aneurysm. No additional coiling in IR 8/8/2019. Plan to return to IR Tuesday the 13th. Dr Sal spoke with Dr Aguero 8/9/2019. There is slight change in patient's aneurysm appearance in the neck. If there is further change Tuesday, stent may be indicated.     Velocities improving. Continue current BP goals 100-160. Aware pressors are being utilitzed. CVP 14. Sodium stable. Wean off sedation. Increase activity as long as patient tolerates. ROM every shift and OK for PT to work with patient. If bed can be repositioned to chair like position, and patient tolerates that is acceptable. Lumbar drain position to drain 8 ml/hr and should be clamped for re-positioning but re-opened once patient settled.     PLAN:   Patient Active Problem List   Diagnosis     Carotid artery aneurysm (H)     Aneurysmal subarachnoid hemorrhage (H)     Intraventricular hemorrhage, nontraumatic (H)     Compression of brain (H)     S/P coil embolization of cerebral aneurysm     Aneurysm of right internal carotid artery     Cerebrovascular accident (CVA) due to occlusion of right middle cerebral artery (H)     Vasospasm (H)  --RASS 0, can try to wean off Precedex  --IV fluid goal 100 mL/hr, goal to be euvolemic or slightly hypervolemic to help mitigate vasospasm, appreciate intensivist assistance  --Target CVP > 10  --Daily sodium - stable today  ---160, wean  pressors  --TCD's can be stopped  --Keppra 500 mg two times a day, indefinite with seizure  --Vimpat continues per neurology    --Nimodipine x21 days. Complete 8/13/2019. No benefit to extend dosing.  --Sharpe in place, strict I/O  --Mechanical DVT prophylaxis  --Must have accurate Is and Os  --Continue Lumbar drain at 8 ml/hour.   --ROM every shift  --OK for PT to see patient  --OK to trial wean from the vent     Fever  --On Rocephin, pseudomonas in sputum  --Likely also neurogenic component        Discharge Recommendations/Plan:  Barriers to Discharge: yes, requiring acute medical care.     Follow Up Plan: Pending       HPI: Darion Herrera is a 42 yo female who was admitted on 07/22/19 with SAH and IVH secondary to ruptured aneurysm of the right ICA. Aneurysm coiling on 7/22 that was complicated by vasospasm. On 07/27, found to be unresponsive and possibly seizing, was intubated and taken to IR where it was revealed that her right ICU aneurysm had dissected and re-bled.  In IR on 07/27, the vessel was recoiled and a lumbar drain was placed for ICP management.  On 07/29 underwent intraarterial verapamil infusion with angiogram for acute vasospasm of right ICA. Due to increased TCD values, back to IR for another cerebral angiogram 7/30 for acute vasospasm and intraarterial verapamil infusion.     BRIEF HOSPITAL COURSE:  7/21/2019  -Admit with nausea, vomiting, headache. CT with question of extra axial mass over the planum sphenoidale region measuring 2.2 x 2.3 cm    7/22/2019  -MRI indicates mass on CT actually a giant right supraclinoid internal carotid artery aneurysm. + intraventricular hemorrhage, small right subarachnoid hemorrhage.    -IR for angiogram, coil embolization of ruptured, dissecting aneurysm. Mild to moderate interstitial filling within the coil mass    7/24/2019  -Return to IR due to high likelihood of instability of aneurysm. Aneurysm was stable in appearance    7/27/2019  -Possible seizure, became  unresponsive, intubated. CT + for increased amount of hemorrhage, cerebral edema, increasing size of ventricles.     -Return to IR for angio and placement of lumbar drain    -Aneurysm re-ruptured, additional coils placed with 95% occlusion    -Moderate to severe narrowing of R ICA likely secondary tot he dissecting aneurysm and not necessarily true spasm. Verapamil given.    -Mild spasm R MCA    7/28/2019  -Stable appearance of aneurysm, verapamil given    7/29/2019  -Stable to mildly increased filling of aneurysm.     -mildly increased spasm of R MCA, verapamil given. Plan for repeat angio 8/2/2019 7/30/2019  -TCD worsening. Patient sent for angio    -worsening spasm in multiple territories. Verapamil given to R ICA, L ICA, R vert    - Stable filling of aneurysm    -Head CT with some hypodensity concerning for infarct of R MCA territory    -Temp 102.9. Standard fever workup    7/31/2019  -Return to IR for treatment of vasospasm, more verapamil    -CT head unchanged    -UA with e coli. Gram neg rods blood culture, may be contaminant    -Febrile, temp max 102.9    -Antibiotics started per ICU team    8/1/2019  -Return to IR for treatment of vasospasm, more verapamil    -CT head unchanged    -CSF sent from lumbar drain    -Fever persisted    8/2/2019          -Return to IR for treatment of vasospasm, higher dose verapamil given                          -TCDs worsening as compared to yesterday                          - Family conference to discuss G/J and Trach    8/7/2019           - Trach and Peg completed                          - MRI/MRA    8/8/2019           - IR (no additional coiling)                          - New lumbar drain         SUBJECTIVE:  Nods head seemingly appropriate to questions. Denies pain. Denies vision disturbance. Tearful during exam and conversation.     OBJECTIVE:  Vital signs in last 24 hours  Temp:  [97.5  F (36.4  C)-99.8  F (37.7  C)] 99.2  F (37.3  C)  Heart Rate:  [57-86]  73  Resp:  [0-30] 22  BP: ()/(50-92) 145/87  SpO2:  [88 %-100 %] 97 %  ETCO2 (mmHg):  [22 mmHg-31 mmHg] 28 mmHg  FiO2 (%):  [21 %-100 %] 21 %  Body mass index is 34.09 kg/m .    Mental Status: Trach, Vent, precedex minimal amount. Patient with eyes open and tracks writer in the room. Squeezes eyes shut to command. Nods head to questions, seems appropriate.     Cranial Nerves: Exam limited.  PERRL. + cough/gag. Does appear to have right sided gaze intermittently. But does have full EOM.     Motor Strength:  No movement on left. RN reports withdrawal to painful stimuli left UE. No movement bilateral lower extremity. Some antigravity right forearm. Can weakly squeeze to command on the right hand. Gives thumbs up.     Lumbar Drain: Lumbar drain patent, open and titrated to 10-15 mL output/hour.  Output yellow. Will adjust to drain 8 ml/hr.     Last 3 TCD Values     8/10/2019   8/9/2019 8/8/2019 8/7/2019   LMCA          69    90 89 97   LACA          64   73 72 76   RMCA          129   156 150 176   RACA         76    99 87 72   BA        36   41 40 45         Pertinent Labs:     8/10/2019 04:18   Sodium 139   Potassium 3.7   Chloride 109 (H)   CO2 23   Anion Gap, Calculation 7   BUN 9   Creatinine 0.51 (L)   GFR MDRD Af Amer >60   GFR MDRD Non Af Amer >60   Calcium 8.7   AST 57 (H)    (H)   ALBUMIN 3.0 (L)   Protein, Total 6.2   Alkaline Phosphatase 105   Bilirubin, Total 0.3   Magnesium 1.9   Glucose 138 (H)   Procalcitonin 0.05   WBC 9.8   RBC 3.77 (L)   Hemoglobin 11.2 (L)   Hematocrit 33.6 (L)   MCV 89   MCH 29.7   MCHC 33.3   RDW 14.7 (H)   Platelets 349   MPV 10.8   Neutrophils % 75 (H)   Lymphocytes % 12 (L)   Monocytes % 6   Eosinophils % 6   Basophils % 1   Neutrophils Absolute 7.4   Lymphocytes Absolute 1.2   Monocytes Absolute 0.6   Eosinophils Absolute 0.6 (H)   Basophils Absolute 0.1       Pertinent Radiology   Radiology Results: personally reviewed.    Head CT 8/8/2019  INTRACRANIAL CONTENTS:  Stable large right MCA distribution hypodensity; consistent with evolving acute/subacute ischemia. 6 stable small amount of subtle hyperdensity at the right temporal operculum likely representing mild amount of petechial hemorrhage. No significant global mass effect. Redemonstration of coiling at the anterior right aspect of [place] with prominent streaking artifact. The ventricles and sulci are normal for age.     VISUALIZED ORBITS/SINUSES/MASTOIDS: No significant orbital abnormality. No significant paranasal sinus mucosal disease. Moderate opacification mastoid air cells bilaterally.     BONES/SOFT TISSUES: No significant abnormality.     CONCLUSION:  No significant change.    8/7/2019  HEAD MRI:   1.  New T2 FLAIR sulcal nonsuppression in the dependent temporal and occipital sulci compatible with subarachnoid hemorrhage although age indeterminant and a small amount of new subarachnoid hemorrhage is difficult to exclude.  2.  Hyperattenuation on the prior CT corresponds to faint susceptibility, favored to represent mild petechial hemorrhage.  3.  A few small scattered foci of new restricted diffusion in the right posterior limb internal capsule and right parietal cortex.  4.  No midline shift or herniation. No hydrocephalus.     HEAD MRA:   1.  New flow related signal near circumferential around the periphery of the coiled right internal carotid artery aneurysm, raising concern for recurrence.  2.  Mild to moderate vasospasm of the right M1, slightly improved compared to 7/26/2019. Mild vasospasm of the proximal right M2 middle cerebral artery branches, not significant change.  3.  No new significant stenosis or occlusion.    MANDY [facility]  O: [phone]

## 2021-05-31 NOTE — PROGRESS NOTES
NEUROLOGY PROGRESS NOTE     Darion Herrera,  1975, MRN [id number] Date: 2019     [facility]   Code status:  Full Code   PCP: Monroe Cassidy MD, [phone]      ASSESSMENT & PLAN   Hospital Day#: 21  Diagnosis code: SAH    Aneurysmal SAH  MARIA L supraclinoid aneurysm s/p endovascular coil embolization  Vasospasm  Seizures  Twitching right shoulder.   RMCA stroke related to vasospasm.       Initial head CT showed MARIA L giant aneurysm (sentinel headache)    S/p endovascular coil embolization    MRI/CT shows large hypodensity/evolving ischemic stroke (from vasospasm)    Imaging showed improving vasospasm at this point.     On [facility] therapy; hydration and -160    Nimodipine X 21 days (stop tomorrow)    Keppra 1000 mg two times a day - Level therapeutic.     Vimpat stopped - possibly making her sedated.      Palliative care involved. Trach and PEG done.     Could start in bed PT in 1-2 days.    Head CT stable.    Angiogram tomorrow to better assess aneurysm.     Thank you again for this referral, please feel free to contact me if you have any questions.     CHIEF COMPLAINT Aneurysmal subarachnoid hemorrhage (H)     HISTORY OF PRESENT ILLNESS     We have been requested by Dr. Kwan to evaluate Darion Herrera who is a 43 y.o.  female for SAH.    This a 43-year-old female on whom neurology is been consulted to evaluate for subarachnoid hemorrhage.  Patient initially presented with 6 days ago with a headache.  She was trying to lift some fruits and felt something blowing up in her head.  She continued to have the headache.  She left the staff she was lifting on the table.  She was then brought to the emergency room where imaging showed a large frontal mass.  This was later on MRI identified to be a giant aneurysm.  Patient underwent angiogram and endovascular coil embolization of the aneurysm.  The patient had a repeat angiogram.  MRI done today showed multiple foci of acute/subacute infarction in the  right parietal-occipital region.  There is also small area of infarction in the right frontal lobe.  TCD's show vasospasm in the right MCA.  There was some narrowing of the M1 and M2 segments.  Neurology was consulted to further help with further cares.  Patient denies any ongoing symptoms he does complain of some headache.     7/29  Patient had 2 seizures over the weekend. Was started on fosphenytoin in addition to Keppra.   No seizures today.  Intubated, but waking up as the sedation has been withdrawn.     7/30  No new seizures. Patient remains on sedation and intubated. RMCA velocities remain elevated on the TCD.     7/31  No new seizures. Patient being kept sedation. Angiogram still shows vasospasm. Patient getting IA verapamil.     8/1  No seizures. Patient continues to remain on sedation. Angiogram continues to show vasospasm. Getting IA verapamil. Family conference tomorrow.     8/2  Patient remains on sedation. Angiogram shows stable vasospasm. Some twitching of right shoulder per nursing staff.     8/5  Weekend events - patient was found to have a RMCA evolving infarct over the weekend.   Family wanting to proceed with Trach and PEG.     8/6  TCD still shows vasospasm. Patient is being kept sedated till vasospasm resolves. Family interested in Trach and PEG.     8/7  MRI/MRA brain today to assess for vasospasm. Trach and PEG today.     8/8  Patient went for angiogram today. Exam improved off sedation. Angiogram showed aneurysm but no coiling done. Coiling planned for next week.     8/9  TCD look improved. Sedation has been reduced.     8/10  More awake and able to move right arm and leg more. Following more commands.     8/11  Vimpat was decreased with no seizures. More interactive today. Still not able to lift left arm.     8/12  Exam has improved. Starting to move left side today. Head CT stable.  No seizures.     PAST MEDICAL & SURGICAL HISTORY     Medical History  Patient Active Problem List     Diagnosis Date Noted     Pneumonia due to group B Streptococcus, unspecified laterality, unspecified part of lung (H)      Vasospasm of cerebral artery      E-coli UTI      Gram-positive bacteremia      Fever in other diseases      Acute respiratory failure, unspecified whether with hypoxia or hypercapnia (H)      Cerebrovascular accident (CVA) due to occlusion of right middle cerebral artery (H)      Vasospasm (H)      S/P coil embolization of cerebral aneurysm 07/23/2019     Aneurysm of right internal carotid artery 07/23/2019     Carotid artery aneurysm (H) 07/22/2019     Nausea vomiting and diarrhea 07/22/2019     Aneurysmal subarachnoid hemorrhage (H) 07/22/2019     Intraventricular hemorrhage, nontraumatic (H) 07/22/2019     CVA (cerebral vascular accident) (H) 07/22/2019     Compression of brain (H)      Leiomyoma of uterus 07/18/2016     Past Medical History: No previous history.    Surgical History  She  has a past surgical history that includes IR Cerebral Angiogram (7/22/2019); IR Cerebral Angiogram (7/24/2019); PICC Team Line Insertion (7/26/2019); IR Cerebral Angiogram (7/27/2019); IR Lumbar Drain Insertion (7/27/2019); IR Cerebral Angiogram (7/29/2019); IR Cerebral Angiogram (7/30/2019); IR Cerebral Angiogram (7/31/2019); IR Cerebral Angiogram (8/1/2019); IR Lumbar Drain Insertion (8/2/2019); IR Cerebral Angiogram (8/2/2019); IR Cerebral Angiogram (7/28/2019); pr egd percutaneous placement gastrostomy tube (N/A, 8/7/2019); Tracheostomy (N/A, 8/7/2019); and IR Lumbar Drain Insertion (8/8/2019).     SOCIAL HISTORY     Reviewed, and she  reports that she has never smoked. She has never used smokeless tobacco. She reports that she has current or past drug history. She reports that she does not drink alcohol.     FAMILY HISTORY     Reviewed, and non-contributory.      ALLERGIES     Allergies   Allergen Reactions     Oxycodone      Hydrocodone Swelling and Rash        REVIEW OF SYSTEMS     12 system ROS was  done and was negative other than HPI - except patient feeling tired.     HOME & HOSPITAL MEDICATIONS     Prior to Admission Medications  Medications Prior to Admission   Medication Sig Dispense Refill Last Dose     ascorbic acid, vitamin C, 500 mg Chew chew tab Chew 500 mg daily.   Unknown at Unknown time       Hospital Medications    atorvastatin  20 mg Enteral Tube DAILY     chlorhexidine  15 mL Topical Q12H     guar gum  1 packet Enteral Tube BID     levETIRAcetam (KEPPRA) IVPB  1,000 mg Intravenous Q12H     magnesium sulfate IVPB  4 g Intravenous Once     niMODipine  60 mg Enteral Tube Q4H     nystatin  5 mL Swish & Swallow QID     omeprazole  20 mg Enteral Tube QAM AC    Or     pantoprazole  40 mg Intravenous QAM AC     potassium chloride liquid/packet  20 mEq Enteral Tube BID     sodium chloride  10-30 mL Intravenous Q8H FIXED TIMES        PHYSICAL EXAM     Vital signs  Temp:  [98  F (36.7  C)-99.7  F (37.6  C)] 98  F (36.7  C)  Heart Rate:  [] 107  Resp:  [15-30] 22  BP: ()/(56-99) 115/79  FiO2 (%):  [21 %-30 %] 21 %    Weight:   162 lb 6.4 oz (73.7 kg)    GENERAL: Healthy appearing, alert, no acute distress, normal habitus.  CARDIOVASCULAR: Ext warm and well perfused. Pulses present.   NEUROLOGICAL:  Patient is trached. Does not speak but does follow commands. EOMI. Pupils symmetric. VFI on present on both sides. Left sided facial droop.  Right side antigravity. Left leg 3+/5. Left arm 2+/5     DIAGNOSTIC STUDIES     Pertinent Radiology   MRI brain - images reviewed stroke seen  IMPRESSION:   CONCLUSION:  1.  Multiple foci of developing acute/subacute infarction throughout the right parietal occipital lobes, with additional single small foci of acute/subacute infarction in the right frontal lobe and left parietal lobe.  2.  Status post endovascular coiling of large dissecting aneurysm of the ventromedial right supraclinoid ICA with redemonstration of mild persistent flow-related enhancement along  the neck of the aneurysm. There is also faint flow-related enhancement   centrally within the coil mass.  3.  Mildly decreased caliber of the M1 and proximal M2 segments of the right middle cerebral artery compared to the left, which may represent mild vasospasm. There is more normal caliber of the distal M2 and M3 branches.    ECHO    1.Left ventricle ejection fraction is normal. The calculated left ventricular ejection fraction is 65%.    2.TAPSE is normal, which is consistent with normal right ventricular systolic function.    3.No hemodynamically significant valvular heart abnormalities.    4.No obvious embolic source seen, if highly suspect consider MICHAEL.    5.No previous study for comparison.     TCD - images reviewed.   IMPRESSION:   CONCLUSION:  New moderate right middle cerebral artery vasospasm.    EEG negative for seizures.    Angiogram  Preliminary findings: (see dictation for full detail)  - Stable right ICA aneurysm coil mass with residual interstitial neck filling.  - Possible mild vasospasm superimposed on right ICA dissection, verapamil 10 mg infused in right ICA.    TCD reviewed and velocities are elevated in the RMCA.    Angiogram shows MARIA L, RMCA, RACA vasospasm. Dissection in MARIA L.    Angiogram on 8/1 continues to show vasospasm.     MRI and CT reviewed - RMCA evolving stroke seen.     8/6 TCD - RMCA velocity 192 - still elevated.     MRI brain  IMPRESSION:   CONCLUSION:  HEAD MRI:   1.  New T2 FLAIR sulcal nonsuppression in the dependent temporal and occipital sulci compatible with subarachnoid hemorrhage although age indeterminant and a small amount of new subarachnoid hemorrhage is difficult to exclude.  2.  Hyperattenuation on the prior CT corresponds to faint susceptibility, favored to represent mild petechial hemorrhage.  3.  A few small scattered foci of new restricted diffusion in the right posterior limb internal capsule and right parietal cortex.  4.  No midline shift or herniation. No  hydrocephalus.     HEAD MRA:   1.  New flow related signal near circumferential around the periphery of the coiled right internal carotid artery aneurysm, raising concern for recurrence.  2.  Mild to moderate vasospasm of the right M1, slightly improved compared to 7/26/2019. Mild vasospasm of the proximal right M2 middle cerebral artery branches, not significant change.  3.  No new significant stenosis or occlusion.    Angiogram  6-7 mm recurrence at the neck of previously coiled right ICA aneurysm.     TCD shows improving RMCA velocities (images of today's test reviewed).     Recent Results (from the past 24 hour(s))   Magnesium    Collection Time: 08/12/19  5:13 AM   Result Value Ref Range    Magnesium 1.9 1.8 - 2.6 mg/dL   Comprehensive Metabolic Panel    Collection Time: 08/12/19  5:13 AM   Result Value Ref Range    Sodium 138 136 - 145 mmol/L    Potassium 3.6 3.5 - 5.0 mmol/L    Chloride 106 98 - 107 mmol/L    CO2 25 22 - 31 mmol/L    Anion Gap, Calculation 7 5 - 18 mmol/L    Glucose 129 (H) 70 - 125 mg/dL    BUN 9 8 - 22 mg/dL    Creatinine 0.54 (L) 0.60 - 1.10 mg/dL    GFR MDRD Af Amer >60 >60 mL/min/1.73m2    GFR MDRD Non Af Amer >60 >60 mL/min/1.73m2    Bilirubin, Total 0.4 0.0 - 1.0 mg/dL    Calcium 9.8 8.5 - 10.5 mg/dL    Protein, Total 6.6 6.0 - 8.0 g/dL    Albumin 3.4 (L) 3.5 - 5.0 g/dL    Alkaline Phosphatase 100 45 - 120 U/L    AST 49 (H) 0 - 40 U/L     (H) 0 - 45 U/L   HM1 (CBC with Diff)    Collection Time: 08/12/19  5:13 AM   Result Value Ref Range    WBC 7.5 4.0 - 11.0 thou/uL    RBC 3.84 3.80 - 5.40 mill/uL    Hemoglobin 11.4 (L) 12.0 - 16.0 g/dL    Hematocrit 34.6 (L) 35.0 - 47.0 %    MCV 90 80 - 100 fL    MCH 29.7 27.0 - 34.0 pg    MCHC 32.9 32.0 - 36.0 g/dL    RDW 14.3 11.0 - 14.5 %    Platelets 354 140 - 440 thou/uL    MPV 10.5 8.5 - 12.5 fL    Neutrophils % 77 (H) 50 - 70 %    Lymphocytes % 13 (L) 20 - 40 %    Monocytes % 5 2 - 10 %    Eosinophils % 4 0 - 6 %    Basophils % 1 0 - 2 %     Neutrophils Absolute 5.7 2.0 - 7.7 thou/uL    Lymphocytes Absolute 1.0 0.8 - 4.4 thou/uL    Monocytes Absolute 0.4 0.0 - 0.9 thou/uL    Eosinophils Absolute 0.3 0.0 - 0.4 thou/uL    Basophils Absolute 0.1 0.0 - 0.2 thou/uL   Osmolality    Collection Time: 08/12/19  4:26 PM   Result Value Ref Range    Osmolality, Blood 288 270 - 300 mOsm/kg       Total time spent for face to face visit, reviewing labs/imaging studies, counseling and coordination of care was: Over 25 min More than 50% of this time was spent on counseling and coordination of care.  Stroke prognosis discussion with family and nursing staff.     Justin Vasquez MD  [employer] Secure Messaging: [email or web address]  Tel: [phone]  This note was dictated using voice recognition software.  Any grammatical or context distortions are unintentional and inherent to the software.

## 2021-05-31 NOTE — PLAN OF CARE
Neuros-Pt able to occasionally open eyes, unable to assess remaining neuros. Pt on vent/sedation. Tolerated well. Repo Q2. Family at bedside c intupurter. Tele Sr. >300 blackmon output. Pt continues on sedation. Continuing to monitor.

## 2021-05-31 NOTE — PROGRESS NOTES
[facility] Daily Progress Note    Assessment/Plan:  Darion Herrera is a 43-year-old female who presented 7/21/2019 with headache that started in the neck with radiation to the forehead ruptured aneurysm of the right ICA resulting in subarachnoid hemorrhage with extension intraventricular hemorrhage.  She is status post aneurysm coiling on 7/22 which was complicated by vasospasm.  There is also a complication of the right ICA aneurysm with dissection and rebleeding leading to seizure and unresponsiveness on 7/27.  This required intubation and recoiling and placement of lumbar drain for ICP management. She is currently requiring daily intraarterial verapamil infusions w/angiogram for acute vasospasms of the R ICA, on since 8/3. She is currently intubated in ICU. Intensivist, neurology and neurosurgery managing.      Assessment:  Right ICA Aneurysm complicated with SAH, IVH  S/p Dissection and Coiling x 2 with hydrocephalus s/p Lumbar drainage   Daily intraarterial verapamil infusions w/angiogram, currently on hold since 8/3  On nimodipine Q4H for total of 21 days  Maintain Na 145-150, hypertonic saline per [facility] protocol  MRI and CT done 8/3, showing new stroke. Planned for repeat CT on 8/5  NSG and IR managing.     Recurrent Seizures  On  vimpat and keppra.  EEG done 8/3, no new seizure activity.    Metabolic Encephalopathy  Management as per above    E. Coli UTI  On Ceftriaxone    Straph epidermidis bacteremia  Blood cx from 7/30+, subsequent blood cx shows no growth.  On ceftriaxone.     Respiratory failure  Intubated, sedated. On pressors to maintain -140.  Family aggreeable for Trach/Peg, likely to be planned soon.  Sputum cx + for strep pneumoniae    Anemia  Hb dropped from 11.2-->10.7-->9.7-->8-->10.1-->9.1  [facility] wants target HCT to be >30. S/p 1 U PRBC on 8/3 and will transfuse 1 U today since HCT 27.    Fevers likely central.  Monitor.  On vasopressors, ceftriaxone.       DVT ppx: SCD  GI ppx: Protonix    Diet: Tube  feeds    Code: Full code      Subjective:  Patient is intubated, sedated. D/w RN, no acute events overnight. Family in room and updated.        Current Medications Reviewed via EHR List    Objective:  Vital signs in last 24 hours:  Temp:  [99.5  F (37.5  C)-103.1  F (39.5  C)] 101.8  F (38.8  C)  Heart Rate:  [] 80  Resp:  [10-26] 10  BP: (108-159)/(55-95) 144/88  SpO2:  [78 %-100 %] 99 %  ETCO2 (mmHg):  [0 mmHg-42 mmHg] 35 mmHg  FiO2 (%):  [30 %-100 %] 30 %    Intake/Output last 3 shifts:  I/O last 3 completed shifts:  In: 5510.4 [I.V.:4446.4; NG/GT:604; IV Piggyback:460]  Out: 5608 [Urine:4325; Drains:283; Stool:1000]      Physical Exam:  General: Intubated, sedated.  HEENT: Intubated, PERRL+  HEART : S1 S2, normal rate/rhythm  LUNGS: Clear to auscultation, equal air entry  ABDOMEN:   Soft, non distended  CNS Sedated               Lab Results:  Personally Reviewed.   Fingerstick Blood Glucose:   Recent Labs     08/04/19  0033 08/04/19  0545 08/05/19  0026 08/05/19  0628 08/05/19  1131   POCGLUFGR 119 163* 125 122 122       Recent Results (from the past 24 hour(s))   Sodium lab - every 6 hours    Collection Time: 08/04/19  1:37 PM   Result Value Ref Range    Sodium 142 136 - 145 mmol/L   Sodium lab - every 6 hours    Collection Time: 08/04/19  5:55 PM   Result Value Ref Range    Sodium 141 136 - 145 mmol/L   Potassium    Collection Time: 08/04/19  5:55 PM   Result Value Ref Range    Potassium 4.0 3.5 - 5.0 mmol/L   Sodium lab - every 6 hours    Collection Time: 08/05/19 12:20 AM   Result Value Ref Range    Sodium 141 136 - 145 mmol/L   POCT Glucose    Collection Time: 08/05/19 12:26 AM   Result Value Ref Range    Glucose 125 70 - 139 mg/dL   HM1 (CBC with Diff)    Collection Time: 08/05/19  6:05 AM   Result Value Ref Range    WBC 11.2 (H) 4.0 - 11.0 thou/uL    RBC 3.10 (L) 3.80 - 5.40 mill/uL    Hemoglobin 9.1 (L) 12.0 - 16.0 g/dL    Hematocrit 27.0 (L) 35.0 - 47.0 %    MCV 87 80 - 100 fL    MCH 29.4 27.0 -  34.0 pg    MCHC 33.7 32.0 - 36.0 g/dL    RDW 13.4 11.0 - 14.5 %    Platelets 215 140 - 440 thou/uL    MPV 10.8 8.5 - 12.5 fL    Neutrophils % 81 (H) 50 - 70 %    Lymphocytes % 8 (L) 20 - 40 %    Monocytes % 5 2 - 10 %    Eosinophils % 5 0 - 6 %    Basophils % 0 0 - 2 %    Neutrophils Absolute 9.0 (H) 2.0 - 7.7 thou/uL    Lymphocytes Absolute 0.9 0.8 - 4.4 thou/uL    Monocytes Absolute 0.6 0.0 - 0.9 thou/uL    Eosinophils Absolute 0.6 (H) 0.0 - 0.4 thou/uL    Basophils Absolute 0.1 0.0 - 0.2 thou/uL   Triglycerides    Collection Time: 08/05/19  6:05 AM   Result Value Ref Range    Triglycerides 573 (H) <=149 mg/dL    Patient Fasting > 8hrs? No    Magnesium    Collection Time: 08/05/19  6:05 AM   Result Value Ref Range    Magnesium 1.8 1.8 - 2.6 mg/dL   Comprehensive Metabolic Panel    Collection Time: 08/05/19  6:05 AM   Result Value Ref Range    Sodium 139 136 - 145 mmol/L    Potassium 3.5 3.5 - 5.0 mmol/L    Chloride 108 (H) 98 - 107 mmol/L    CO2 25 22 - 31 mmol/L    Anion Gap, Calculation 6 5 - 18 mmol/L    Glucose 126 (H) 70 - 125 mg/dL    BUN 6 (L) 8 - 22 mg/dL    Creatinine 0.52 (L) 0.60 - 1.10 mg/dL    GFR MDRD Af Amer >60 >60 mL/min/1.73m2    GFR MDRD Non Af Amer >60 >60 mL/min/1.73m2    Bilirubin, Total 0.3 0.0 - 1.0 mg/dL    Calcium 8.0 (L) 8.5 - 10.5 mg/dL    Protein, Total 5.9 (L) 6.0 - 8.0 g/dL    Albumin 2.7 (L) 3.5 - 5.0 g/dL    Alkaline Phosphatase 80 45 - 120 U/L    AST 66 (H) 0 - 40 U/L    ALT 65 (H) 0 - 45 U/L   CK Total    Collection Time: 08/05/19  6:05 AM   Result Value Ref Range    CK, Total 347 (H) 30 - 190 U/L   POCT Glucose    Collection Time: 08/05/19  6:28 AM   Result Value Ref Range    Glucose 122 70 - 139 mg/dL   POCT Glucose    Collection Time: 08/05/19 11:31 AM   Result Value Ref Range    Glucose 122 70 - 139 mg/dL   Sodium lab - every 6 hours    Collection Time: 08/05/19 12:13 PM   Result Value Ref Range    Sodium 140 136 - 145 mmol/L   Crossmatch    Collection Time: 08/05/19 12:33 PM    Result Value Ref Range    Crossmatch Compatible     Blood Expiration Date [id number]     Unit Type O Neg     Unit Number [id number]     Status Ready     Component Red Blood Cells     PRODUCT CODE [id number]     Blood Type 9500     CODING SYSTEM [id number]            Advanced Care Planning  Discharge plan: Unknown      Total time spent was >35 mins, discussing with RN,  and reviewing the chart    Francy Bravo  Date: 8/5/2019    Hospitalist Service      Part of this chart was created using a dictation software. Typographic errors, word substitutions, and Grammatical errors may unintentionally occur.

## 2021-05-31 NOTE — PROGRESS NOTES
[facility] Daily Progress Note      Date of Service: 8/16/2019     Brief History: Darion Herrera is 43 y.o. 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 CT head showed an extra-axial brain mass, and was admitted to neuro ICU for further management. MRI showed right suprasellar mass and intraventricular hemorrhage and small subarachnoid hemorrhage in left sylvian fissure. 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 She was monitored with daily transcranial doppler. She had follow up angiogram on 07/24, which showed stable findings. MRA on 07/26 was suggestive of vasospasms and was started on triple H therapy (hypertension, hemodilution, and hypervolemia). The patient became unresponsive on 07/27 and was endotracheally intubated. A lumbar drain was placed, and she also had cerebral angiogram with endovascular coil embolization of re-ruptured previously coil embolized dissecting aneurysm of ventral medial supraclinoid right internal carotid artery on 07/27. The patient had cerebral angiogram with intraarterial verapamil infusion on 07/28, 07/29, 07/30, 07/31, 08/01, and 08/02. The lumbar drain was replaced on 08/02. The patient also underwent percutaneous tracheostomy and G tube placement by Dr. Ledezma on 08/02. The lumbar drain was replaced on 08/08, and she had another cerebral angiogram on 08/08. Cerebral angiogram on 08/13 seemed stable. Lumbar drain was clamped on 08/12, but was noted to have increasing ventricular dilatation, so the drain clamp was released. Planned for  shunt placement on 08/15, but patient refused. Now she is agreeing.    Assessment:     Acute respiratory failure with hypoxia  -Secondary to poor airway protection, in the setting of  intracranial hemorrhage  -S/p percutaneous tracheostomy on 08/02. Trach changed to bivona #6 on 08/14 and is tolerating trach mask with PMV. Vent as needed.  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 Re clamped on 08/14, and again the follow up CT showed increased ventricular dilatation. Plan to have a  shunt placed. Patient refused on 08/15. Now agreeing to have it placed.   -Neurosurgery following  -Keppra for seizure prophylaxis.  -Discontinued Nimodipine on 07/13    Oropharyngeal dysphagia  At risk of malnutrition  -S/p G tube placement on 08/02  -Currently NPO. Otherwise on tube feeding    Electrolyte imbalance  -Hypokalemia, replaced  -Hypomagnesemia, replaced  -Monitor, and replace as needed    Urinary tract infection, unspecified  -Culture grew E. Coli on 07/30, and was treated with antibiotic (cephalosporin)  -Urine culture from 08/05 grew candida. She was treated with fluconazole.    Transaminitis   -Unclear etiology, likely medication  -Stable liver enzymes  -Monitor intermitently      Subjective:     Chart reviewed, events noted. Pt seen and examined. A professional [email or web address] language interpretor was available during my evaluation. She is tolerating vent with TM intermittently. She refused  shunt placement  "yesterday. No overnight issues.    Objective     Vital signs in last 24 hours:  Temp:  [98.5  F (36.9  C)-99.5  F (37.5  C)] 99  F (37.2  C)  Heart Rate:  [] 103  Resp:  [14-32] 17  BP: ()/(65-94) 135/85  FiO2 (%):  [21 %] 21 %  Weight:   152 lb 4.8 oz (69.1 kg)  Weight change: 10 lb (4.536 kg)  Body mass index is 31.83 kg/m .    Intake/Output last 3 shifts:  I/O last 3 completed shifts:  In: 230 [NG/GT:230]  Out: 1207 [Urine:1110; Drains:97]  Intake/Output this shift:  No intake/output data recorded.      Physical Exam:  /85   Pulse (!) 103   Temp 99  F (37.2  C)   Resp 17   Ht 4' 10\" (1.473 m)   Wt 152 lb 4.8 oz (69.1 kg)   LMP 07/01/2016   SpO2 99%   BMI 31.83 kg/m      FiO2 (%): 21 % O2 Device: Trach mask O2 Flow Rate (L/min): 6 L/min    General Appearance: Alert, not in distress.  HEENT: Normocephalic. No scleral icterus. Trach in place with no sign of bleeding.  Heart: S1 S2 heard. Regular rate and rhythm.  Lungs: Clear to auscultation bilaterally.  Abdomen: Soft, non tender, bowel sounds present. G tube in place.  Extremity: No deformity. No joint swelling. No edema.  Neurologic: Left leg and left arm weakness. Patient is alert and responding to questions  Skin: No skin rashes      Imaging:  personally reviewed.    Ct Head Without Contrast    Result Date: 8/15/2019  EXAM: CT HEAD WO CONTRAST LOCATION: [facility] DATE/TIME: 8/15/2019 5:06 AM INDICATION: Hydrocephalus Preop planning.  AxiEM CT; NO fiducials, include nose and face (as guide), STEALTH PROTOCOL. COMPARISON: 8/13/2019. TECHNIQUE: Stealth without IV contrast. Multiplanar reformats. Dose reduction techniques were used. FINDINGS: INTRACRANIAL CONTENTS: Again demonstrated is a large endovascular coil mass in the right paraclinoid region from aneurysm coiling. This results in regional artifact obscuring portions of the intracranial compartment. There is has been a mild interval enlargement of the lateral and third " ventricles which are mild to moderately dilated. No acute intracranial hemorrhage. Ventricles and sulci are within normal limits. No intracranial mass. VISUALIZED ORBITS/SINUSES/MASTOIDS: No significant orbital abnormality. No significant paranasal sinus mucosal disease. No significant middle ear or mastoid effusion. BONES/SOFT TISSUES: No significant abnormality.     1.  Stealth noncontrast head CT for preoperative planning. 2.  Mild interval enlargement of the lateral and third ventricles which are mild to moderately dilated. This suggests a worsening hydrocephalus. No definite transependymal CSF noted. 3.  Status post endovascular coiling of giant right paraclinoid aneurysm.          Lab Results:  personally reviewed.   Lab Results   Component Value Date     08/16/2019    K 4.1 08/16/2019     08/16/2019    CO2 26 08/16/2019    BUN 19 08/16/2019    CREATININE 0.61 08/16/2019    CALCIUM 10.3 08/16/2019     Lab Results   Component Value Date    WBC 8.4 08/16/2019    HGB 12.9 08/16/2019    HCT 39.6 08/16/2019    MCV 90 08/16/2019     (H) 08/16/2019     Results from last 7 days   Lab Units 08/16/19  0453 08/15/19  0424 08/14/19  0448   LN-MAGNESIUM mg/dL 2.1 2.2 2.1        Results from last 7 days   Lab Units 08/13/19  2101 08/10/19  0032 08/09/19  1800   LN-POC GLUCOSE FINGERSTICK- HE mg/dL 103 141* 148*           Advance Care Planning:   Barriers to discharge:  shunt placement  Anticipated discharge day: to be determined  Disposition: LTKRIS Campoverde MD  [facility]  Hospitalist  Paging: [employer] Paging

## 2021-05-31 NOTE — PROGRESS NOTES
Critical Care Progress Note  8/11/2019      Admit Date: 7/22/2019  ICU Day: 20   CODE: Full Code    Critical Care Chief Complaint: acute resp failure      Problem List/Hospital Course:   Principal Problem:    Aneurysmal subarachnoid hemorrhage (H)  Active Problems:    Carotid artery aneurysm (H)    Intraventricular hemorrhage, nontraumatic (H)    Compression of brain (H)    S/P coil embolization of cerebral aneurysm    Aneurysm of right internal carotid artery    Cerebrovascular accident (CVA) due to occlusion of right middle cerebral artery (H)    Vasospasm (H)    Acute respiratory failure, unspecified whether with hypoxia or hypercapnia (H)    Fever in other diseases    E-coli UTI    Gram-positive bacteremia    CVA (cerebral vascular accident) (H)    Pneumonia due to group B Streptococcus, unspecified laterality, unspecified part of lung (H)    Vasospasm of cerebral artery      Interim Events:   Anxious, wants to walk to bathroom, shower, eat. Off norepi with 1000 mL NS yesterday but SBP dropping again, brisk UOP.    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     Neuro/Psych:   #. Analgesia/Sedation: Dexmedetomidine as needed for anxiety/agitation. Can go to [place] on dexmedetomidine. Avoid benzos if possible though if anxiety persists/worsens, may need to consider low-dose lorazepam but would consult with neurosurgery first.  #.  Right ICA aneurysm complicated by subarachnoid hemorrhage with IVH, status post dissection and coiling x2 with hydrocephalus requiring lumbar drain placement 7/27/2019, replaced on 8/8. Ongoing vasospasm complicated the clinical presentation and has had  verapamil infusion on multiple days. Cerebral angiogram on 8/8 with 6-7 mm recurrent at the neck of previously coiled right ICA aneurysm. TCD on 8/10 with mild right MCA vasospasm, improved from prior.    Appreciate neurosurgery and neurology    No further routine TCD unless clinically indicated    Continue nimodipine 60 mg every 4 hrs x21 days (started 7/28) unless course extended by neurosurgery    Goal -160    Monitor sodium and avoid hyponatremia    Goal HCT level 30-32    Goal CVP >10 but measuring by PICC and quite variable    Appears a bit hypovolemic;  mL given and now off norepi, will given additional 500 mL bolus for total of 1000 mL NS today    Strict I/O    Lumbar drain 8 mL/hr    Responded to volume yesterday; will give another  mL bolus and repeat if needed; continues on tube feeding  #.  Recurrent seizures, likely secondary to acute intracranial process as detailed above. Currently on IV levetiracetam and IV lacosamide  #.  Moderate Sedation with goal RASS 0 as goal currently with dexmedetomidine, as-needed fentanyl  #.  Fever - possible central fever contributing, also with pneumococcal pneumonia identified on sputum culture 7/31. Elevated CSF protein, PMNs, and WBC, but likely due to blood. Changed Cefepime to ceftriaxone on 8/4, procalcitonin low; completed antibiotics on 8/10     Pulmonary: Mechanical ventilation instituted for airway protection in setting of seizure and encephalopathy secondary to SAH.  The patient is on minimal ventilator settings. Underwent tracheostomy with 8 shiley on 8/7.  - goal SpO2 > 92%  - starting phase 1 and tolerating PS 5/5; can consider short periods of trach mask (LTACH phase 2) if tolerating PS 5/5 throughout the day; will check VBG at noon  - ceftriaxone for pneumococcal pneumonia completed  - remove trach flange sutures 8/12     Cardiovascular: goal -160 but will try to aim for higher end of this (note when goal SBP was higher 150-180  then patient had further bleeding). Avoid medications that would cause bradycardia.  - continue NE as needed to maintain SBP goal  - Goal CVP 10-12 but measuring by PICC and quite variable  - Appears a bit hypovolemic;  mL given and now off norepi, will given additional 500 mL bolus for total of 1000 mL NS today     Renal: No acute issues. Brisk UOP with 4600 mL UOP last 24 hrs. May be compensating for prior volume but need to keep up and avoid hypotension  - E.coli UTI (CAUTI) on culture 7/30  - potassium/magnesium replacement per protocol  - per NSG avoid LR  - NS bolus as needed     GI: NPO for now.  - PEG placed 8/7; continue tube feeding  - PPI     Heme: No acute issues, monitor.  - prior Hct goal 30-32; may no longer require strict transfusion threshold as she improves  - SCDs     Endo: No acute issues. Continue close monitoring.  - ICU q 4 hour SSI     ID: E. Coli UTI (7/30) and pneumococcal PNA (7/31)  - blood cultures Strep epidermidis - likely contaminant  - Cefepime 7/31-8/4, then changed to ceftriaxone through 8/10; PCT low, ceftriaxone end date 8/10  - vancomycin - discontinued after 1 day as coag-negative staph grew from culture, follow up cultures negative     Access/Lines/Tubes: required and necessary for continued patient cares  Trach (8 shiley) placed 8/7/19, two flange sutures in place  PEG placed 8/7/19  PICC, RUE placed 7/26/19  Arterial sheath, right femoral artery - removed on 8/3  [place]arpe catheter replaced 8/7/19  Lumbar drain placed 7/27/19, changed 8/8/19     ICU prophylaxis:   #. VAP ppx: HOB 30 degrees, chlorhexidine rinses  #. Stress ulcer: PPI  #. Diet: NPO, tube feeding well tolerated  #. VTE: SCD, chemical prophylaxis contraindicated due to SAH/IVH  #. Restraints: not indicated     CODE: FULL     Dispo: ICU for mechanical ventilation, hemodynamic support, neurological monitoring, remains critically ill with constant threat to life. If remains off norepi can discharge to [place]  "as early as tomorrow.    Family communication: Communicated with family at the bedside.    Medications:       dexmedetomidine 400 mcg/100 mL in NS (PRECEDEX) (4mcg/mL) 0.5 mcg/kg/hr (08/11/19 0347)     niCARdipine       norepinephrine Stopped (08/10/19 1600)     vasopressin Stopped (08/09/19 1300)       atorvastatin  20 mg Enteral Tube DAILY     chlorhexidine  15 mL Topical Q12H     lacosamide (VIMPAT) IVPB  50 mg Intravenous Q12H 09-21     levETIRAcetam (KEPPRA) IVPB  1,000 mg Intravenous Q12H     magnesium sulfate IVPB  4 g Intravenous Once     NaCl 0.9% IR  1,000 mL Irrigation Once     niMODipine  60 mg Enteral Tube Q4H     omeprazole  20 mg Enteral Tube QAM AC    Or     pantoprazole  40 mg Intravenous QAM AC     potassium chloride  10 mEq Intravenous Q1H     potassium chloride liquid/packet  20 mEq Enteral Tube BID     sodium chloride 0.9%  500 mL Intravenous Once     sodium chloride  10-30 mL Intravenous Q8H FIXED TIMES         Exam/Data:   Vitals  /88   Pulse 79   Temp 99.5  F (37.5  C) (Oral)   Resp (!) 30   Ht 4' 10\" (1.473 m)   Wt 164 lb 11.2 oz (74.7 kg)   LMP 07/01/2016   SpO2 97%   BMI 34.42 kg/m    CVP:  [1 mmHg-15 mmHg] 12 mmHg  I/O last 3 completed shifts:  In: 2906.4 [I.V.:1207.4; NG/GT:1199; IV Piggyback:500]  Out: 4835 [Urine:4625; Drains:210]  Weight change: 1 lb 9.6 oz (0.726 kg)  Wt Readings from Last 3 Encounters:   08/11/19 164 lb 11.2 oz (74.7 kg)   07/21/19 157 lb (71.2 kg)   08/01/17 140 lb (63.5 kg)     Vent Mode: VCV  FiO2 (%):  [21 %] 21 %  S RR:  [14] 14  S VT:  [375 mL] 375 mL  PEEP/CPAP (cm H2O):  [5 cm H2O] 5 cm H2O  Minute Ventilation (L/min):  [7.2 L/min-10.7 L/min] 10.7 L/min  PIP:  [11 cm H2O-18 cm H2O] 11 cm H2O  MAP (cm H2O):  [6-10] 6    EXAM:  GEN: on mechanical vent via trach. Tracks and nods appropriately to questioning  HEENT: PERRL, EOMS, OP patent, trachea midline  PULM: unlabored respirations, moving air adequately on PS 5/5  CV: RRR, S1, S2, no murmurs, " rubs, gallops, bilateral symmetric pulse  ABD: soft nontender, non distended, normal active bowel sound, no HSM, PEG site nonerythematous  EXT : warm well perfused, no cyanosis, clubbing, trace edema  NEURO: PERRL, more alert and responding to yes/no questions and commands, moving right arm and right leg with more strength, no movement on left but withdraws to pain on left  SKIN: no obvious rash, lesion    DATA  All laboratory and radiology has been personally reviewed by myself today.  Results from last 7 days   Lab Units 08/11/19  0426   LN-WHITE BLOOD CELL COUNT thou/uL 7.5   LN-HEMOGLOBIN g/dL 10.5*   LN-HEMATOCRIT % 31.8*   LN-PLATELET COUNT thou/uL 307     Results from last 7 days   Lab Units 08/11/19  0426 08/10/19  0418 08/09/19  1739 08/09/19  0921 08/09/19  0509   LN-SODIUM mmol/L 138 139  --  138 139   LN-POTASSIUM mmol/L 3.5 3.7 4.0  --  2.9*   LN-CHLORIDE mmol/L 108* 109*  --   --  108*   LN-CO2 mmol/L 23 23  --   --  24   LN-BLOOD UREA NITROGEN mg/dL 9 9  --   --  12   LN-CREATININE mg/dL 0.53* 0.51*  --   --  0.54*   LN-CALCIUM mg/dL 9.0 8.7  --   --  8.3*   LN-PROTEIN TOTAL g/dL 6.1 6.2  --   --  6.2   LN-BILIRUBIN TOTAL mg/dL 0.3 0.3  --   --  0.3   LN-ALKALINE PHOSPHATASE U/L 102 105  --   --  119   LN-ALT (SGPT) U/L 132* 132*  --   --  159*   LN-AST (SGOT) U/L 61* 57*  --   --  81*     Critical care time: 35 minutes    Rikki Castro MD  Pulmonary and Critical Care Medicine  Critical access hospital  Cell [phone]  Office [phone]  Pager [phone]

## 2021-05-31 NOTE — PROGRESS NOTES
"[facility]:    S:  Intubated / sedated    O: Visit Vital Signs   /79   Pulse 97   Temp 100.1  F (37.8  C)   Resp 16   Ht 4' 10\" (1.473 m)   Wt 163 lb 11.2 oz (74.3 kg)   LMP 07/01/2016   SpO2 99%   BMI 34.21 kg/m        Intake/Output Summary (Last 24 hours) at 8/5/2019 0746  Last data filed at 8/5/2019 0700  Gross per 24 hour   Intake 5488.38 ml   Output 5608 ml   Net -119.62 ml     Imaging:  TCD's pending today    MRI brain 8/3 ~  1.  Restricted effusion and suggestion of acute/early subacute ischemic change involving the right frontal, parietal, and temporal lobes and right insula and a right MCA vascular distribution.  2.  Foci of diffusion signal hyperintensity without definite diffusion restriction involving the right caudate nucleus as well as the parasagittal bilateral frontal and right parietal lobes in a bilateral CATHI distribution.     Labs:  Sodium 139. Magnesium 1.8     Exam:  Orally intubated on mechanical ventilator.  Sedated on continuous infusion of Fentanyl and Propofol.  Pupils reactive.  Limited exam secondary to sedation.    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 This aneurysm was treated with endovascular coil embolization on 7/22/19. Interval growth of aneurysm resulting in re-rupture on 7/27/19 and subsequent coil embolization re-treatment on 7/27/19. Secondary hydrocephalus is managed with lumbar drain catheter (placed 8/2).  Persistent cerebral vasospasm treated with IA Verapamil on 7/27,7/28,7/29,7/30,7/31 8/1, and 8/2. Cerebral salt wasting with stable Sodium levels on 3% saline replacement protocol.  Respiratory failure on mechanical ventilator. Sedated with limited neurological exam.  Seizures.  Right MCA and CATHI infarcts.  Fevers    P:  Appreciate ICU care and management per Critical Care team.  Neurosurgery following. Managing lumbar " drain.  TCD's to monitor cerebral vasospasm.    HHH therapy as tolerated. Nimodipine for full 21 days.  We will follow along.  Please call us with questions or concerns.  [phone]    Yoli Reyes CNP     For billing:  Rounding note # [id number]

## 2021-05-31 NOTE — PROGRESS NOTES
"[place] [facility]:  Pre Sedation assessment ~    Imaging: Cerebral angiogram on 8/8/2019 ~  6-7 mm recurrence at the neck of previously coiled right ICA aneurysm.      Allergies:  Allergies   Allergen Reactions     Oxycodone      Hydrocodone Swelling and Rash     Labs:   Hgb 11.4, Plt 354, creat 0.54    Exam:   VS:  /77   Pulse 62   Temp 99.7  F (37.6  C) (Oral)   Resp 19   Ht 4' 10\" (1.473 m)   Wt 162 lb 6.4 oz (73.7 kg)   LMP 07/01/2016   SpO2 96%   BMI 33.94 kg/m    Gen:   Resting quietly. Appears comfortable.  Neuro:  Will awaken to voice. PEARRL.  Moving right side to command.  Cardiac:   S1 S2, no murmur  Lungs:  Clear to ascultation  Mallampati:   Trached on mechanical ventilator    IMPRESSION/PLAN:  This is a 43 year old female with IVH/SAH  secondary to ruptured, dissecting, 2.3 cm ventral medial supraclinoid right internal carotid artery aneurysm. This aneurysm was treated with endovascular coil embolization 7/22, and additional recoil embolization on 7/27. Follow up cerebral angiography last week demonstrated a 6-7 mm neck recurrence.  We are planning for a short term follow up cerebral angiogram tomorrow for aneurysm surveillance/monitoring.  The patient is currently medically stable and will be NPO after midnight for procedure as planned tomorrow with IV sedation.  Procedure is scheduled in IR at 10 am.     The procedure its risks, benefits, and alternatives were discussed with the patient's .  He expresses an understanding for the plan of the repeat angiogram tomorrow and does not have any questions about the procedure. His ability to communicate in English is limited but he denies need for an  expressing that we have done this procedure several times.  No questions for me today.  He provides permission for us to proceed. Informed consent obtained and signed.    Yoli Reyes CNP        "

## 2021-05-31 NOTE — PROGRESS NOTES
"Vent Mode: VCV  FiO2 (%):  [30 %-50 %] 30 %  S RR:  [14] 14  S VT:  [375 mL] 375 mL  PEEP/CPAP (cm H2O):  [5 cm H2O] 5 cm H2O  Minute Ventilation (L/min):  [6.4 L/min-10.7 L/min] 7.4 L/min  PIP:  [14 cm H2O-27 cm H2O] 15 cm H2O  MAP (cm H2O):  [7-9] 7    Patient remained trached and ventilated throughout shift. Transported to and from  without complication. Breath sounds remain diminished and coarse. Suctioning out small amounts of thick white secretions. RT will continue to follow.    BP (!) 144/91   Pulse 73   Temp 98.5  F (36.9  C) (Oral)   Resp 21   Ht 4' 10\" (1.473 m)   Wt 165 lb 3.2 oz (74.9 kg)   LMP 07/01/2016   SpO2 99%   BMI 34.53 kg/m      "

## 2021-05-31 NOTE — PROGRESS NOTES
Pt weaned on trach mask, 25L 30% for Approx 4 hours.  sarah well, VVS.  Sx for small amt thick clear  Pt back on full support and will wean again this evening.  Will continue to follow

## 2021-05-31 NOTE — PROGRESS NOTES
Critical Care Progress Note  8/8/2019      Admit Date: 7/22/2019  ICU Day: 17   CODE: Full Code    Critical Care Chief Complaint: acute resp failure      Problem List/Hospital Course:   Principal Problem:    Aneurysmal subarachnoid hemorrhage (H)  Active Problems:    Carotid artery aneurysm (H)    Intraventricular hemorrhage, nontraumatic (H)    Compression of brain (H)    S/P coil embolization of cerebral aneurysm    Aneurysm of right internal carotid artery    Cerebrovascular accident (CVA) due to occlusion of right middle cerebral artery (H)    Vasospasm (H)    Acute respiratory failure, unspecified whether with hypoxia or hypercapnia (H)    Fever in other diseases    E-coli UTI    Gram-positive bacteremia    CVA (cerebral vascular accident) (H)    Pneumonia due to group B Streptococcus, unspecified laterality, unspecified part of lung (H)    Vasospasm of cerebral artery          Interim Events:                 Assessment/Plan by System:   43 y.o. woman who was admitted on 7/22/2019 with SAH & IVH secondary to ruptured aneurysm of the right ICA, aneurysm coiling 7/22 that was complicated by vasospasm.  Hospitalization course was complicated by right ICA aneurysm dissection with rebleeding that presented with acute onset seizures and unresponsiveness on 7/27.  Patient was intubated on 7/27, underwent a recoiling of the culprit vessel, and placement of lumbar drain for ICP management.  Ongoing issues with ICA/MCA vasospasm.     Neuro/Psych:   #. Analgesia/Sedation: On propofol and fentanyl for IR procedure, prn versed available mostly for agitation with cares. Sedation held overnight - patient responding purposefully with head and RUE this AM  #.  Right ICA aneurysm complicated by subarachnoid hemorrhage with IVH, status post dissection and coiling x2 with hydrocephalus requiring lumbar drain placement 7/27/2019.  Ongoing vasospasm complicated the clinical presentation and has had verapamil infusion on multiple  days.    Neurosurgery and IR closely following    Cerebral angiography per IR, follow TCD to determine further angiogram    Continue with every 4 hours nimodipine x21 days    Goal -150 by arterial line, currently on NE to maintain  ? If BP dips, and CVP less than 10, will use albumin but otherwise should be titrating NE  ? Trying to avoid complications of hypervolemia     Goal sodium 145-150, hypertonic saline infusion per NSG     Goal HCT level 30-32.     Goal CVP 10-12    Strict I/O, strict lumbar drain output monitoring (if increased output may be sign of further edema/swelling and consider stat HCT) has been stable    8/7 MRI concerning for possible small new bleed - in IR for likely coiling    Monitor for volume overload  #.  Recurrent seizures, likely secondary to acute intracranial process as detailed above.  Currently on propofol, IV levetiracetam, and IV fosphenytoin.  #.  Moderate Sedation with lower RASS -2 as goal                Due to ongoing issues will pursue deeper RASS goal of -2                fentanyl infusion - titrate to sedation                Prn versed especially with turning/routine cares  #.  Fever - possible central fever contributing.  Elevated protein, PMNs, and WBC, but likely due to blood.  Changed Cefepime to ceftraixone on 8/4, plan for 10 total days cover for Strep pneumo HCAP/VAP, other cultures contaminates.     Pulmonary: Mechanical ventilation instituted for airway protection in setting of seizure and encephalopathy secondary to SAH.  The patient is on minimal ventilator settings.  Not clinically appropriate for ventilator weaning, and due to ongoing issues no further sedation holidays.  Monitor.  - goal SpO2 > 92%, on 30% FiO2  - no weaning for now  - secretions mostly small  - Abx per ID section below  - Dr. Ledezma placed tracheostomy 8/7     Cardiovascular: goal -150 but will try to aim for higher end of this (note when goal SBP was higher 150-180 then patient  had further bleeding). Avoid medications that would cause bradycardia.  - continue NE as needed to maintain SBP goal                - intermittent albumin for low CVP   - CVP to be maintained 10-12     Renal: No acute issues.  Good urine output.  - E.coli UTI (CAUTI)  - potassium/magnesium replacement per protocol  - per NSG avoid LR     GI: NPO for now.  - PEG placed 8/7 by Dr Ledezma - OK by him to start TFs today  - PPI     Heme: No acute issues, monitor.  - IVF/Tx to maintain HCT 30-32  - SCDs     Endo: No acute issues.  Continue close monitoring.  - ICU q 4 hour SSI     ID: E. Coli UTI and GPC in clusters in blood stream (1/4 positive, but less than 24 hours), fevers & leukocytosis improved  - blood cultures Strep epidermidis - likely contaminate  - Changed Cefepime to ceftraixone on 8/4, plan for 10 total days cover for Strep pneumo HCAP/VAP, other cultures contaminates  - vancomycin - discontinued after 1 day as CoNS grew from culture, follow up cultures negative      Access/Lines/Tubes: required and necessary for continued patient cares  Trach & PEG placed 8/7  PICC, RUE  PIV  Arterial sheath, right femoral artery - removed on 8/3  Sharpe catheter  Lumbar drain - plan to change today (8/8)  OGT     ICU prophylaxis:   #. VAP ppx: HOB 30 degrees, chlorhexidine rinses  #. Stress ulcer: PPI  #. Diet: NPO, trophic feedings now  #. VTE: SCD, chemical prophylaxis contraindicated due to SAH/bleeding  #. Restraints: required and necessary for continued patient cares     CODE: FULL     Dispo: ICU for mechanical ventilation, hemodynamic support, neurological monitoring, remains critically ill with constant threat to life.    Medications:       dexmedetomidine 400 mcg/100 mL in NS (PRECEDEX) (4mcg/mL) 1.2 mcg/kg/hr (08/08/19 0915)     fentaNYL 2500 mcg/250 mL in NS (10 mcg/mL) 50 mcg/hr (08/08/19 0819)     niCARdipine       norepinephrine 0.06 mcg/kg/min (08/08/19 0115)     sodium chloride/acetate 3% 25 mL/hr (08/07/19  "1200)       albumin human  12.5 g Intravenous Once     atorvastatin  20 mg Enteral Tube DAILY     cefTRIAXone  2 g Intravenous Q24H     chlorhexidine  15 mL Topical Q12H     lacosamide (VIMPAT) IVPB  100 mg Intravenous Q12H 09-21     levETIRAcetam (KEPPRA) IVPB  1,000 mg Intravenous Q12H     magnesium sulfate IVPB  4 g Intravenous Once     NaCl 0.9% IR  1,000 mL Irrigation Once     niMODipine  60 mg Enteral Tube Q4H     omeprazole  20 mg Enteral Tube QAM AC    Or     pantoprazole  40 mg Intravenous QAM AC     potassium chloride liquid/packet  20 mEq Enteral Tube BID     sodium chloride  10-30 mL Intravenous Q8H FIXED TIMES         Exam/Data:   Vitals  BP (!) 161/95   Pulse 60   Temp 98.5  F (36.9  C) (Oral)   Resp 18   Ht 4' 10\" (1.473 m)   Wt 165 lb 3.2 oz (74.9 kg)   LMP 07/01/2016   SpO2 95%   BMI 34.53 kg/m    CVP:  [8 mmHg-319 mmHg] 305 mmHg  I/O last 3 completed shifts:  In: 2192.2 [I.V.:1850.2; IV Piggyback:342]  Out: 2680 [Urine:2300; Emesis/NG output:100; Drains:280]  Weight change: -3 lb 4.8 oz (-1.497 kg)  Wt Readings from Last 3 Encounters:   08/08/19 165 lb 3.2 oz (74.9 kg)   07/21/19 157 lb (71.2 kg)   08/01/17 140 lb (63.5 kg)     Vent Mode: VCV  FiO2 (%):  [30 %-50 %] 50 %  S RR:  [14-16] 14  S VT:  [375 mL] 375 mL  PEEP/CPAP (cm H2O):  [5 cm H2O] 5 cm H2O  Minute Ventilation (L/min):  [6.8 L/min-10.7 L/min] 9.1 L/min  PIP:  [14 cm H2O-27 cm H2O] 14 cm H2O  MAP (cm H2O):  [7-9] 7    EXAM:  GEN: on mechanical vent per trach. Tracks and nods appropriately to questioning  HEENT: PERRL, EOMS, OP patent, trachea midline  PULM: unlabored respirations, moving air adequately  CV: RRR, S1, S2, no murmurs, rubs, gallops, bilateral symmetric pulse  ABD: soft nontender, non distended, normal active bowel sound, no HSM  EXT : warm well perfused, no cyanosis, clubbing, trace edema  NEURO: weakly moves RUE to command, no LUE or LE movement  SKIN: no obvious rash, lesion    DATA  All laboratory and radiology " has been personally reviewed by myself today.  Results from last 7 days   Lab Units 08/08/19  0402   LN-WHITE BLOOD CELL COUNT thou/uL 13.0*   LN-HEMOGLOBIN g/dL 11.6*   LN-HEMATOCRIT % 34.1*   LN-PLATELET COUNT thou/uL 253     Results from last 7 days   Lab Units 08/08/19  0402 08/07/19  1819 08/07/19  0537  08/06/19  0535   LN-SODIUM mmol/L 138 141 142  142   < > 142   LN-POTASSIUM mmol/L 3.7 3.8 3.1*   < > 3.3*   LN-CHLORIDE mmol/L 107  --  116*  --  112*   LN-CO2 mmol/L 24  --  21*  --  24   LN-BLOOD UREA NITROGEN mg/dL 9  --  8  --  10   LN-CREATININE mg/dL 0.54*  --  0.43*  --  0.58*   LN-CALCIUM mg/dL 8.3*  --  7.0*  --  8.4*   LN-PROTEIN TOTAL g/dL 6.1  --  4.9*  --  5.6*   LN-BILIRUBIN TOTAL mg/dL 0.6  --  0.3  --  0.3   LN-ALKALINE PHOSPHATASE U/L 95  --  81  --  76   LN-ALT (SGPT) U/L 203*  --  142*  --  86*   LN-AST (SGOT) U/L 146*  --  143*  --  87*    < > = values in this interval not displayed.             Patient is critically ill with total CCT spent 45 minutes thus far today.     Mega Perla MD  [facility]

## 2021-05-31 NOTE — PLAN OF CARE
Problem: Mechanical Ventilation  Goal: Tracheostomy will be managed safely  Outcome: Progressing     Problem: Mechanical Ventilation  Goal: Respiratory status - ventilation  Description  Movement of air in and out of the lungs.    Liberate from ventilator  Outcome: Progressing    JERRY Sanchez

## 2021-05-31 NOTE — PLAN OF CARE
Problem: Mechanical Ventilation  Goal: Patient will maintain patent airway  Outcome: Progressing  Goal: Tracheostomy will be managed safely  Outcome: Progressing  Goal: Respiratory status - ventilation  Description  Movement of air in and out of the lungs.    Liberate from ventilator  Outcome: Progressing     Problem: Inadequate Gas Exchange  Goal: Patient will achieve/maintain normal respiratory rate/effort  Outcome: Progressing     JERRY Rosales

## 2021-05-31 NOTE — PROGRESS NOTES
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   Verapamil administered IA   7/31/19: Cerebral angiogram     Intra-arterial verapamil infusion into both internal carotid arteries   Fever, PNA-antibiotics started  8/01/19: Cerebral angiogram   Intra-arterial verapamil infusion into both internal carotid arteries in order to treat cerebral vasospasm  8/02/19: Cerebral angiogram   Intra-arterial verapamil infusion into both internal carotid arteries in order to treat cerebral vasospasm   see by Palliative care-family decided to proceed with below surgery   Trach and peg placed in OR by Dr Ledezma   LD replaced   8/05/19: UTI  8/08/19: LD replaced    Cerebral [id number]-9-2 mm recurrence at the neck of previously coiled right ICA aneurysm  8/11/19: Phase 1 vent weaning started    Off norepi and  "precedex  8/12/19: head CT stable   LD clamped   On TC most of day, back on vent at HS  8/13/19: head Ct shows hydrocephalus with LD clamped   LD opened   Will need shunt   Angio with no changes   On TC most of after noon  8/14/19: trach downsized to # 6 bivona   Working with SLP and PMV   Will need  shunt per neurosurgery    Major events over the last 24 hours: LD clamped for  shunt today        Subjective: in bed, I placed PMV pt talking clearly with  and neurosurgery about today's procedure  ROS: unable to obtain    Drips: none    Ventilator: Mechanical Ventilation: intubated 7/27/19   trached 8/2/19        Settings: presently on TC   Should be placed back on vent at night    /74   Pulse 92   Temp 99.1  F (37.3  C)   Resp (!) 37   Ht 4' 10\" (1.473 m)   Wt 142 lb 4.8 oz (64.5 kg)   LMP 07/01/2016   SpO2 99%   BMI 29.74 kg/m       I/O last 3 completed shifts:  In: 160 [I.V.:10; NG/GT:150]  Out: 1687 [Urine:1550; Drains:87; Stool:50]  Weight change: -3 lb (-1.361 kg)  Vent Mode: VCV  FiO2 (%):  [21 %] 21 %  S RR:  [14] 14  S VT:  [375 mL] 375 mL  PEEP/CPAP (cm H2O):  [5 cm H2O] 5 cm H2O      EXAM:   GEN: awake, on TC Tracks and nods appropriately to questioning  HEENT: PERRL,left  facial droop  # 6 Bivona  PULM: few anterior rhonchi  Strong cough on TC  CV: RRR  ABD: soft nontender  EXT : warm well perfused, no cyanosis, clubbing, trace edema  NEURO: moving right arm and right leg with more strength, left leg moved=s more than arm but both weaker than right  SKIN: no obvious rash, lesion    Labs Personally reviewed: yes  Results from last 7 days   Lab Units 08/15/19  0424 08/14/19  0448 08/13/19  0500   LN-WHITE BLOOD CELL COUNT thou/uL 7.2 8.1 8.3   LN-HEMOGLOBIN g/dL 12.8 12.1 12.2   LN-HEMATOCRIT % 39.4 36.4 36.0   LN-PLATELET COUNT thou/uL 486* 435 408   LN-NEUTROPHILS RELATIVE PERCENT % 64 79* 75*   LN-MONOCYTES RELATIVE PERCENT % 10 6 7     Results from last 7 days   Lab Units " 08/15/19  0424 08/14/19  0448 08/13/19  0500   LN-SODIUM mmol/L 136 136 139   LN-POTASSIUM mmol/L 4.2 3.9 3.6   LN-CHLORIDE mmol/L 103 104 105   LN-CO2 mmol/L 24 22 22   LN-BLOOD UREA NITROGEN mg/dL 16 13 10   LN-CREATININE mg/dL 0.60 0.59* 0.59*   LN-CALCIUM mg/dL 10.2 9.8 9.8   LN-PROTEIN TOTAL g/dL 8.3* 7.8 7.6   LN-BILIRUBIN TOTAL mg/dL 0.5 0.6 0.6   LN-ALKALINE PHOSPHATASE U/L 110 111 108   LN-ALT (SGPT) U/L 72* 86* 113*   LN-AST (SGOT) U/L 44* 40 56*     Results from last 7 days   Lab Units 08/14/19  1847   LN-INR  1.01   LN-PARTIAL THROMBOPLASTIN TIME seconds 31       Last VBG 8/11/19:  PH 7.47  PCO2 34    Microbiology: reviewed   Imaging (all imaging is personally reviewed): yes    8/5/19: Appliances remain in stable and satisfactory position. Some new atelectasis in the right lower lobe. New Infiltrate or atelectasis left lower lobe behind the heart    Impression:  1. Aneurysmal SAH  2. MARIA L supraclinoid aneurysm    s/p endovascular coil embolization  3. Vasospasm  4. Recurrent Seizures   likely secondary to acute intracranial process   5. RMCA stroke related to vasospasm.   6. Acute hypoxic respiratory failure due to # 1-5  7. Anxiety  8. UTI   Ecoli  9. Hydrocephalous        PLAN:   1. On phase  2 since 8/12, RT needs to put pt back on vent at noc   2. PMV trials only with SLP or me for now  3. Per Neurosurgery-lumbar drain unclamped, pt to get shunt today  4. OOB up ion chair  5. AED  6. Nimodipine completed   7. Goal -160  8. Changed Cefepime to ceftriaxone on 8/4, procalcitonin low; completed antibiotics on 8/10       ICU DAILY CHECKLIST                           Can patient transfer out of ICU? no    FAST HUG:    Feeding:  Feeding: Yes.  Patient is receiving NPO and TUBE FEEDS    Sharpe:Yes  Analgesia/Sedation:Yes precedex  Thromboembolic prophylaxis: yes; Mode:  SCDs  HOB>30:  Yes  Stress Ulcer Protocol Active: yes; Mode: PPI  Glycemic Control: Any glucose > 180 no; Mode of Insulin Therapy:  Sliding Scale Insulin    INTUBATED:  Can patient have daily waking:  yes  Can patient have spontaneous breathing trial:  Yes  On Phase 2 now    Restraints? no    PHYSICAL THERAPY AND MOBILITY:  Can patient have PT and mobility trial: yes  Activity: OOB in Chair    transfer/discharge plans: will need [facility] once shunt placed  with neurosurgery service    The patient is critically ill with impairment in organ system and high risk of life threatening deterioration.     Total CCT spent 40 minutes thus far today.       Rosy RHOADES, [phone]  [facility]

## 2021-05-31 NOTE — PROGRESS NOTES
"  Clinical Nutrition Therapy Nutrition Support Note      Subjective:  Pt with trache and PEG placed 8/7/19.  Per RN who spoke to surgeon this am, Ok to start TF.  Chart reviewed.    Nutrition Prescription:   Diet: NPO    IV dextrose or Fluids:    dexmedetomidine 400 mcg/100 mL in NS (PRECEDEX) (4mcg/mL) Last Rate: 0.5 mcg/kg/hr (08/08/19 1117)   fentaNYL 2500 mcg/250 mL in NS (10 mcg/mL) Last Rate: Stopped (08/08/19 1117)   niCARdipine    norepinephrine Last Rate: 0.06 mcg/kg/min (08/08/19 1100)   sodium chloride/acetate 3% Last Rate: 25 mL/hr (08/07/19 1200)       Nutrition Intake:  FT is an PEG placed 8/7/19.    NPO since 8/7 for FT and trache placement.  NPO this am for IR procedure.    Anthropometrics:  Height: 4' 10\" (147.3 cm)  Admission weight: 155#  Weight: 165 lb 3.2 oz (74.9 kg) fluid wt up.    Physical Findings:  Edema noted    GI Status/Output:   GI symptoms per nursing:   Last BM recorded: 1000 ml out on 8/6.   Lumbar drain 280 ml out.    Skin/Wound:   Clifford Scale Score: 10     Medications:  3% saline,   K & Mg replacement protocol  Medications reviewed.    Labs:  Na 138  K 3.7  Glucose 133  Labs reviewed    Estimated Nutrition Needs:  Using ideal weight of 43 kg.    Energy Needs: 9521-5804 kcals daily per 25-30 kcal/kg   Protein Needs: 52+ g daily, 1.2+ g/kg.  Fluid Needs: 1100+ mls daily, 25+ mls/kg    Malnutrition: Not noted    Nutrition Risk Level: moderate risk    Nutrition DX:  Swallow difficulty r/t intubation evidenced by NPO    Goals: Tolerate TF-met    Plan:  Resume TF today:  .  Isosource 1.5 at 35 ml/hr continuous.  Water flush of 30 ml q 4 hrs.   At goal provides:  1260 calories, 57 g protein, 148 g carb, 13 g fiber, 822 ml free water.  Above provides estimated nutrition needs.    Monitor:  TF tolerance, wt, labs, hydration needs.    See Care Plan for Problems, Goals, and Interventions.        "

## 2021-05-31 NOTE — PLAN OF CARE
Care Plan  Care Management      Care Management Goals of the Day: Care Progression    Care Progression Reviewed With: Charge RN, MD, HUC, CMSW    Barriers to Discharge:  Shunt Placement    Discharge Disposition: [facility]    Expected Discharge Date: 8/20/19    Transportation: [facility]      Care Coordination Narrative: From home with family.   shunt to be placed on 8/19 with potential transfer to [place] on 8/20. Anupam will set up transport.

## 2021-05-31 NOTE — PROGRESS NOTES
Progress Note    Assessment/Plan  Patient clinically stable.  Reviewed with patient and  through  to the principal of laparoscopy with placement of peritoneal side of ventriculoperitoneal shunt.  Surgery planned for tomorrow.    Principal Problem:    Aneurysmal subarachnoid hemorrhage (H)  Active Problems:    Carotid artery aneurysm (H)    Intraventricular hemorrhage, nontraumatic (H)    Compression of brain (H)    S/P coil embolization of cerebral aneurysm    Aneurysm of right internal carotid artery    Cerebrovascular accident (CVA) due to occlusion of right middle cerebral artery (H)    Vasospasm (H)    Acute respiratory failure, unspecified whether with hypoxia or hypercapnia (H)    Fever in other diseases    E-coli UTI    Gram-positive bacteremia    CVA (cerebral vascular accident) (H)    Pneumonia due to group B Streptococcus, unspecified laterality, unspecified part of lung (H)    Vasospasm of cerebral artery      Subjective  Patient is responding to verbal and tactile  Objective    Vital signs in last 24 hours  Temp:  [99  F (37.2  C)-99.7  F (37.6  C)] 99  F (37.2  C)  Heart Rate:  [] 99  Resp:  [14-32] 32  BP: ()/(63-83) 114/73  FiO2 (%):  [21 %-30 %] 21 %  Weight:   145 lb 4.8 oz (65.9 kg)    Intake/Output last 3 shifts  I/O last 3 completed shifts:  In: 675 [NG/GT:565; IV Piggyback:110]  Out: 2629 [Urine:2000; Drains:229; Stool:400]  Intake/Output this shift:  I/O this shift:  In: 50 [NG/GT:50]  Out: 275 [Urine:250; Drains:25]      Physical Exam  Abdomen benign gastrostomy site okay    Pertinent Labs   Lab Results   Component Value Date    WBC 8.1 08/14/2019    HGB 12.1 08/14/2019    HCT 36.4 08/14/2019    MCV 88 08/14/2019     08/14/2019             Pertinent Radiology     No results found.        Fer Ledezma

## 2021-05-31 NOTE — PROGRESS NOTES
"[facility]    Assessment and Plan:    Right ICA aneurysmal subarachnoid hemorrhage  Course complicated by rebleed from the giant right ICA aneurysm, vasospasm and now right MCA infarct.      With this sizable stroke, will need to watch for malignant edema.  CT last PM showed no mass effect.  I'll place order to repeat CT in AM 8/5/19. If edema becoming apparent, would recommend starting 3% sodium protocol.      MRA 7-26 showed no blockage in right MCA (mild stenosis likely vasospasm)    TCD's for today just completed, results pending  Intra-arterial verapamil given twice so far for vasospasm    Patient remains intubated and sedated on fentanyl and propofol.  Norepinephrine also going to maintain blood pressure (HHH therapy)  Last EEG showed no further seizure.    Subjective:     43-year-old woman admitted to [facility] with subarachnoid hemorrhage due to giant right ICA aneurysm rupture.  She has had coiling procedure done twice.  She remains intubated and sedated in the ICU.  MRI done yesterday shows large right MCA stroke.     Objective:  /75   Pulse 87   Temp 100.4  F (38  C) (Oral)   Resp 16   Ht 4' 10\" (1.473 m)   Wt 161 lb 1.6 oz (73.1 kg)   LMP 07/01/2016   SpO2 99%   BMI 33.67 kg/m       Intubated, sedated in the ICU  No spontaneous movement  Pupils okay, gaze conjugate, some scleral edema  Tone decreased throughout  No response to painful stimulus, but she opens her eyes when I lift her right hand  Toes are silent on both sides    MRI and CT mages personally reviewed    Scheduled Meds:    albumin human  12.5 g Intravenous Once     amino acids-protein hydrolys  1 packet Enteral Tube BID     atorvastatin  20 mg Enteral Tube DAILY     cefepime (MAXIPIME) IV  2 g Intravenous Q12H     chlorhexidine  15 mL Topical Q12H     lacosamide (VIMPAT) IVPB  100 mg Intravenous Q12H 09-21     levETIRAcetam (KEPPRA) IVPB  1,000 mg Intravenous Q12H     magnesium sulfate IVPB  4 " g Intravenous Once     niMODipine  60 mg Enteral Tube Q4H     omeprazole  20 mg Enteral Tube QAM AC    Or     pantoprazole  40 mg Intravenous QAM AC     potassium chloride liquid/packet  20 mEq Enteral Tube BID     potassium chloride liquid/packet  40 mEq Enteral Tube Q4H     sodium chloride  10-30 mL Intravenous Q8H FIXED TIMES     sodium chloride  2 g Oral BID    Or     sodium chloride  2 g Enteral Tube BID     Continuous Infusions:    fentaNYL 2500 mcg/250 mL in NS (10 mcg/mL) 75 mcg/hr (08/04/19 0047)     lactated Ringers Stopped (08/03/19 1335)     lactated ringers 100 mL/hr (08/04/19 0221)     niCARdipine       norepinephrine 0.2 mcg/kg/min (08/04/19 0650)     propofol 40 mcg/kg/min (08/04/19 0704)     sodium chloride/acetate 3% 50 mL/hr (08/04/19 0650)     PRN Meds:.acetaminophen, albumin human, bisacodyl, HYDROmorphone, lactated Ringers, lipase-protease-amylase **AND** sodium bicarbonate, midazolam, IV/IM syringe builder, naloxone **OR** naloxone, ondansetron, polyethylene glycol, senna **OR** senna (SENOKOT) syrup, sodium chloride bacteriostatic, sodium chloride, sodium chloride, sodium chloride, white barbra-mineral oil (LUBRIFRESH PM) ophthalmic ointment     Luis Meza MD

## 2021-05-31 NOTE — PROGRESS NOTES
08/11/19 1403   Vent Information   Vent Mode CPAP/PSV   Vent Settings   FiO2 (%) 21 %   PEEP/CPAP (cm H2O) 5 cm H2O   Patient Data   ETCO2 (mmHg) 22 mmHg   Vt Exp (mL) 924 mL   Minute Ventilation (L/min) 9.7 L/min   Total Resp Rate  21 BPM   PIP Observed (cm H2O) 16 cm H2O   MAP (cm H2O) 8   Alarms   High Resp Rate 35   Low Resp Rate 10   Low PEEP 3 cm H2O   Insp Pressure High (cm H2O) 45 cm H2O   Insp Pressure Low (cm H2O) 7 cm H2O   MV High (L/min) 16 L/min   MV Low (L/min) 3 L/min   Vt High (mL) 1600 mL   Vt Low (mL) 250 mL   Pt has been wenaing 5/5 since morning .  Pt awake and ventilating well, VVS.  Per ALYCIA Castro, will continue to wean today as sarah, and back to full support this evening.  Sx x 2 for moderate secretions, go cough reflex, sats 98%

## 2021-05-31 NOTE — PROGRESS NOTES
NEUROLOGY PROGRESS NOTE     Darion Herrera,  1975, MRN [id number] Date: 2019     [facility]   Code status:  Full Code   PCP: Monroe Cassidy MD, [phone]      ASSESSMENT & PLAN   Hospital Day#: 18  Diagnosis code: SAH    Aneurysmal SAH  MARIA L supraclinoid aneurysm s/p endovascular coil embolization  Vasospasm  Seizures  Twitching right shoulder.   RMCA stroke related to vasospasm.       Initial head CT showed MARIA L giant aneurysm (sentinel headache)    S/p endovascular coil embolization    MRI/CT now shows large hypodensity/evolving ischemic stroke (from vasospasm)    TCD shows RMCA elevated velocities, improved from before.    Continue Daily TCD.    MRA shows mild to moderate vasospasm.     Angiogram shows recurrence of MARIA L aneurysm.     On HHH therapy; hydration and -180    Lumbar drain    Nimodipine X 21 days    Keppra 1000 mg two times a day - Level therapeutic.     On Vimpat 100 mg two times a day. Question of OR - interval prolonging. Can switch to dilantin or monotherapy in 1-2 days as the vasospasm improves.    Monitor sodium.     Palliative care involved. Trach and PEG done.     Thank you again for this referral, please feel free to contact me if you have any questions.     CHIEF COMPLAINT Aneurysmal subarachnoid hemorrhage (H)     HISTORY OF PRESENT ILLNESS     We have been requested by Dr. Kwan to evaluate Darion Herrera who is a 43 y.o.  female for SAH.    This a 43-year-old female on whom neurology is been consulted to evaluate for subarachnoid hemorrhage.  Patient initially presented with 6 days ago with a headache.  She was trying to lift some fruits and felt something blowing up in her head.  She continued to have the headache.  She left the staff she was lifting on the table.  She was then brought to the emergency room where imaging showed a large frontal mass.  This was later on MRI identified to be a giant aneurysm.  Patient underwent angiogram and endovascular coil  embolization of the aneurysm.  The patient had a repeat angiogram.  MRI done today showed multiple foci of acute/subacute infarction in the right parietal-occipital region.  There is also small area of infarction in the right frontal lobe.  TCD's show vasospasm in the right MCA.  There was some narrowing of the M1 and M2 segments.  Neurology was consulted to further help with further cares.  Patient denies any ongoing symptoms he does complain of some headache.     7/29  Patient had 2 seizures over the weekend. Was started on fosphenytoin in addition to Keppra.   No seizures today.  Intubated, but waking up as the sedation has been withdrawn.     7/30  No new seizures. Patient remains on sedation and intubated. RMCA velocities remain elevated on the TCD.     7/31  No new seizures. Patient being kept sedation. Angiogram still shows vasospasm. Patient getting IA verapamil.     8/1  No seizures. Patient continues to remain on sedation. Angiogram continues to show vasospasm. Getting IA verapamil. Family conference tomorrow.     8/2  Patient remains on sedation. Angiogram shows stable vasospasm. Some twitching of right shoulder per nursing staff.     8/5  Weekend events - patient was found to have a RMCA evolving infarct over the weekend.   Family wanting to proceed with Trach and PEG.     8/6  TCD still shows vasospasm. Patient is being kept sedated till vasospasm resolves. Family interested in Trach and PEG.     8/7  MRI/MRA brain today to assess for vasospasm. Trach and PEG today.     8/8  Patient went for angiogram today. Exam improved off sedation. Angiogram showed aneurysm but no coiling done. Coiling planned for next week.     8/9  TCD look improved. Sedation has been reduced.      PAST MEDICAL & SURGICAL HISTORY     Medical History  Patient Active Problem List    Diagnosis Date Noted     Pneumonia due to group B Streptococcus, unspecified laterality, unspecified part of lung (H)      Vasospasm of cerebral artery       E-coli UTI      Gram-positive bacteremia      Fever in other diseases      Acute respiratory failure, unspecified whether with hypoxia or hypercapnia (H)      Cerebrovascular accident (CVA) due to occlusion of right middle cerebral artery (H)      Vasospasm (H)      S/P coil embolization of cerebral aneurysm 07/23/2019     Aneurysm of right internal carotid artery 07/23/2019     Carotid artery aneurysm (H) 07/22/2019     Nausea vomiting and diarrhea 07/22/2019     Aneurysmal subarachnoid hemorrhage (H) 07/22/2019     Intraventricular hemorrhage, nontraumatic (H) 07/22/2019     CVA (cerebral vascular accident) (H) 07/22/2019     Compression of brain (H)      Leiomyoma of uterus 07/18/2016     Past Medical History: No previous history.    Surgical History  She  has a past surgical history that includes IR Cerebral Angiogram (7/22/2019); IR Cerebral Angiogram (7/24/2019); PICC Team Line Insertion (7/26/2019); IR Cerebral Angiogram (7/27/2019); IR Lumbar Drain Insertion (7/27/2019); IR Cerebral Angiogram (7/29/2019); IR Cerebral Angiogram (7/30/2019); IR Cerebral Angiogram (7/31/2019); IR Cerebral Angiogram (8/1/2019); IR Lumbar Drain Insertion (8/2/2019); IR Cerebral Angiogram (8/2/2019); IR Cerebral Angiogram (7/28/2019); pr egd percutaneous placement gastrostomy tube (N/A, 8/7/2019); Tracheostomy (N/A, 8/7/2019); and IR Lumbar Drain Insertion (8/8/2019).     SOCIAL HISTORY     Reviewed, and she  reports that she has never smoked. She has never used smokeless tobacco. She reports that she has current or past drug history. She reports that she does not drink alcohol.     FAMILY HISTORY     Reviewed, and non-contributory.      ALLERGIES     Allergies   Allergen Reactions     Oxycodone      Hydrocodone Swelling and Rash        REVIEW OF SYSTEMS     12 system ROS was done and was negative other than HPI - except patient feeling tired.     HOME & HOSPITAL MEDICATIONS     Prior to Admission Medications  Medications  Prior to Admission   Medication Sig Dispense Refill Last Dose     ascorbic acid, vitamin C, 500 mg Chew chew tab Chew 500 mg daily.   Unknown at Unknown time       Hospital Medications    albumin human  12.5 g Intravenous Once     atorvastatin  20 mg Enteral Tube DAILY     cefTRIAXone  2 g Intravenous Q24H     chlorhexidine  15 mL Topical Q12H     lacosamide (VIMPAT) IVPB  100 mg Intravenous Q12H 09-21     levETIRAcetam (KEPPRA) IVPB  1,000 mg Intravenous Q12H     magnesium sulfate IVPB  4 g Intravenous Once     NaCl 0.9% IR  1,000 mL Irrigation Once     niMODipine  60 mg Enteral Tube Q4H     omeprazole  20 mg Enteral Tube QAM AC    Or     pantoprazole  40 mg Intravenous QAM AC     potassium chloride liquid/packet  20 mEq Enteral Tube BID     sodium chloride  10-30 mL Intravenous Q8H FIXED TIMES        PHYSICAL EXAM     Vital signs  Temp:  [98.5  F (36.9  C)-100.4  F (38  C)] 98.5  F (36.9  C)  Heart Rate:  [54-94] 68  Resp:  [0-30] 20  BP: ()/(62-97) 141/86  FiO2 (%):  [25 %-30 %] 25 %    Weight:   163 lb 8 oz (74.2 kg)    GENERAL: Healthy appearing, alert, no acute distress, normal habitus.  CARDIOVASCULAR: Ext warm and well perfused. Pulses present.   NEUROLOGICAL:  Patient is trached with reduced sedation. Does not speak but does follow commands. EOMI. Pupils symmetric. VFI on right and absent on left. Left sided facial droop.  Does not move arms of legs but nods to feeling pain in all 4 ext.  Minimal movement on right side to painful stimulation.      DIAGNOSTIC STUDIES     Pertinent Radiology   MRI brain - images reviewed stroke seen  IMPRESSION:   CONCLUSION:  1.  Multiple foci of developing acute/subacute infarction throughout the right parietal occipital lobes, with additional single small foci of acute/subacute infarction in the right frontal lobe and left parietal lobe.  2.  Status post endovascular coiling of large dissecting aneurysm of the ventromedial right supraclinoid ICA with redemonstration  of mild persistent flow-related enhancement along the neck of the aneurysm. There is also faint flow-related enhancement   centrally within the coil mass.  3.  Mildly decreased caliber of the M1 and proximal M2 segments of the right middle cerebral artery compared to the left, which may represent mild vasospasm. There is more normal caliber of the distal M2 and M3 branches.    ECHO    1.Left ventricle ejection fraction is normal. The calculated left ventricular ejection fraction is 65%.    2.TAPSE is normal, which is consistent with normal right ventricular systolic function.    3.No hemodynamically significant valvular heart abnormalities.    4.No obvious embolic source seen, if highly suspect consider MICHAEL.    5.No previous study for comparison.     TCD - images reviewed.   IMPRESSION:   CONCLUSION:  New moderate right middle cerebral artery vasospasm.    EEG negative for seizures.    Angiogram  Preliminary findings: (see dictation for full detail)  - Stable right ICA aneurysm coil mass with residual interstitial neck filling.  - Possible mild vasospasm superimposed on right ICA dissection, verapamil 10 mg infused in right ICA.    TCD reviewed and velocities are elevated in the RMCA.    Angiogram shows MARIA L, RMCA, RACA vasospasm. Dissection in MARIA L.    Angiogram on 8/1 continues to show vasospasm.     MRI and CT reviewed - RMCA evolving stroke seen.     8/6 TCD - RMCA velocity 192 - still elevated.     MRI brain  IMPRESSION:   CONCLUSION:  HEAD MRI:   1.  New T2 FLAIR sulcal nonsuppression in the dependent temporal and occipital sulci compatible with subarachnoid hemorrhage although age indeterminant and a small amount of new subarachnoid hemorrhage is difficult to exclude.  2.  Hyperattenuation on the prior CT corresponds to faint susceptibility, favored to represent mild petechial hemorrhage.  3.  A few small scattered foci of new restricted diffusion in the right posterior limb internal capsule and right  parietal cortex.  4.  No midline shift or herniation. No hydrocephalus.     HEAD MRA:   1.  New flow related signal near circumferential around the periphery of the coiled right internal carotid artery aneurysm, raising concern for recurrence.  2.  Mild to moderate vasospasm of the right M1, slightly improved compared to 7/26/2019. Mild vasospasm of the proximal right M2 middle cerebral artery branches, not significant change.  3.  No new significant stenosis or occlusion.    Angiogram  6-7 mm recurrence at the neck of previously coiled right ICA aneurysm.     TCD shows improving RMCA velocities.     Recent Results (from the past 24 hour(s))   Sodium    Collection Time: 08/08/19  7:56 PM   Result Value Ref Range    Sodium 141 136 - 145 mmol/L   POCT Glucose    Collection Time: 08/08/19  8:22 PM   Result Value Ref Range    Glucose 145 (H) 70 - 139 mg/dL   POCT Glucose    Collection Time: 08/08/19 11:46 PM   Result Value Ref Range    Glucose 151 (H) 70 - 139 mg/dL   Magnesium    Collection Time: 08/09/19  5:09 AM   Result Value Ref Range    Magnesium 1.8 1.8 - 2.6 mg/dL   Comprehensive Metabolic Panel    Collection Time: 08/09/19  5:09 AM   Result Value Ref Range    Sodium 139 136 - 145 mmol/L    Potassium 2.9 (L) 3.5 - 5.0 mmol/L    Chloride 108 (H) 98 - 107 mmol/L    CO2 24 22 - 31 mmol/L    Anion Gap, Calculation 7 5 - 18 mmol/L    Glucose 184 (H) 70 - 125 mg/dL    BUN 12 8 - 22 mg/dL    Creatinine 0.54 (L) 0.60 - 1.10 mg/dL    GFR MDRD Af Amer >60 >60 mL/min/1.73m2    GFR MDRD Non Af Amer >60 >60 mL/min/1.73m2    Bilirubin, Total 0.3 0.0 - 1.0 mg/dL    Calcium 8.3 (L) 8.5 - 10.5 mg/dL    Protein, Total 6.2 6.0 - 8.0 g/dL    Albumin 2.9 (L) 3.5 - 5.0 g/dL    Alkaline Phosphatase 119 45 - 120 U/L    AST 81 (H) 0 - 40 U/L     (H) 0 - 45 U/L   Phosphorus    Collection Time: 08/09/19  5:09 AM   Result Value Ref Range    Phosphorus 2.7 2.5 - 4.5 mg/dL   HM1 (CBC with Diff)    Collection Time: 08/09/19  5:09 AM    Result Value Ref Range    WBC 10.6 4.0 - 11.0 thou/uL    RBC 3.67 (L) 3.80 - 5.40 mill/uL    Hemoglobin 10.8 (L) 12.0 - 16.0 g/dL    Hematocrit 32.0 (L) 35.0 - 47.0 %    MCV 87 80 - 100 fL    MCH 29.4 27.0 - 34.0 pg    MCHC 33.8 32.0 - 36.0 g/dL    RDW 14.1 11.0 - 14.5 %    Platelets 292 140 - 440 thou/uL    MPV 10.9 8.5 - 12.5 fL    Neutrophils % 82 (H) 50 - 70 %    Lymphocytes % 8 (L) 20 - 40 %    Monocytes % 5 2 - 10 %    Eosinophils % 4 0 - 6 %    Basophils % 0 0 - 2 %    Neutrophils Absolute 8.7 (H) 2.0 - 7.7 thou/uL    Lymphocytes Absolute 0.8 0.8 - 4.4 thou/uL    Monocytes Absolute 0.5 0.0 - 0.9 thou/uL    Eosinophils Absolute 0.5 (H) 0.0 - 0.4 thou/uL    Basophils Absolute 0.0 0.0 - 0.2 thou/uL   POCT Glucose    Collection Time: 08/09/19  5:51 AM   Result Value Ref Range    Glucose 155 (H) 70 - 139 mg/dL   Sodium    Collection Time: 08/09/19  9:21 AM   Result Value Ref Range    Sodium 138 136 - 145 mmol/L   Potassium    Collection Time: 08/09/19  5:39 PM   Result Value Ref Range    Potassium 4.0 3.5 - 5.0 mmol/L   POCT Glucose    Collection Time: 08/09/19  6:00 PM   Result Value Ref Range    Glucose 148 (H) 70 - 139 mg/dL       Total time spent for face to face visit, reviewing labs/imaging studies, counseling and coordination of care was: Over 25 min More than 50% of this time was spent on counseling and coordination of care.   Discussion of test results with family.     Justin Vasquez MD  Direct Secure Messaging: [email or web address]  Tel: [phone]  This note was dictated using voice recognition software.  Any grammatical or context distortions are unintentional and inherent to the software.

## 2021-05-31 NOTE — PROGRESS NOTES
Spiritual Care Note    Spiritual Assessment:   made a brief follow up visit with patient this morning; family is present. Patient up in her chair for the first time when  arrives. Patient seems aware of  present and did smile at her. Family seems happy to see finally up in a chair and seems hopeful she is heading in the right direction. No concerns noted.     Care Provided: Listening and support provided.     Plan of Care: Spiritual care will continue to follow as part of patient's care team.    FABIANO Umanzor, BCC

## 2021-05-31 NOTE — PLAN OF CARE
Problem: Mechanical Ventilation  Goal: Patient will maintain patent airway  Outcome: Progressing  Goal: Tracheostomy will be managed safely  Outcome: Progressing  Goal: Respiratory status - ventilation  Description  Movement of air in and out of the lungs.    Liberate from ventilator  Outcome: Progressing     JERRY Dunaway

## 2021-05-31 NOTE — PROGRESS NOTES
"[facility]:    S:  Intubated / sedated    O: Visit Vital Signs:   /67   Pulse 82   Temp (!) 102.9  F (39.4  C) (Oral)   Resp 16   Ht 4' 10\" (1.473 m)   Wt 160 lb 9.6 oz (72.8 kg)   LMP 07/01/2016   SpO2 99%   BMI 33.57 kg/m        Intake/Output Summary (Last 24 hours) at 8/2/2019 0750  Last data filed at 8/2/2019 0700  Gross per 24 hour   Intake 5346.35 ml   Output 4097 ml   Net 1249.35 ml     Imaging:    CT head today ~  1.  No significant change when compared to prior.  2.  Given the lack of significant parenchymal edema or mass effect, no convincing evidence for large territory ischemic infarct at this time.  3.  Continued follow-up recommended.    **TCD's in progress**  **Cerebral angiogram today at 1030*    Labs:  Sodium 145, Magnesium 1.8  Creatinine 0.63    Exam:  Orally intubated on mechanical ventilator.  Sedated on continuous infusion of Propofol and Fentanyl.  Limited exam secondary to sedation.  Pupils reactive.  Right femoral arterial sheath site is dry and intact, no hematoma.  Distal pulses present. Lumbar drain cathter is patent with 272 ml out over last 24 hours.    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 This aneurysm was treated with endovascular coil embolization 7/22/19. Interval growth of aneurysm resulting in re-rupture on 7/27/19 and subsequent coil embolization re-treatment on 7/27/19. This aneurysm has remained stable over last 5 days.  Secondary hydrocephalus is managed with lumbar drain catheter (placed 6 days ago).  Persistent cerebral vasospasm treated with IA Verapamil on 7/27,7/28,7/29,7/30,7/31 and 8/1. Cerebral salt wasting with stable Sodium levels on 3% saline replacement protocol.  Respiratory failure on mechanical ventilator. Sedated with limited neurological exam. Fevers    P:  Appreciate ICU care and management per Critical Care " team.  Neurosurgery following. Lumbar drain replacement this morning - will send tip for culture.  Cerebral angiogram this morning with IA Verapamil at 1030.  We will follow along.  Please call us with questions or concerns.  [phone]     Yoli Reyes CNP    For billing:  Rounding note # [id number]

## 2021-05-31 NOTE — PROGRESS NOTES
Patient taken off vent and placed to 25lpm Trach dome.  HR 77 Sao2 100% RR 18.  No distress.  Luis F Urias, LRT

## 2021-05-31 NOTE — PROGRESS NOTES
Critical Care Progress Note  8/9/2019      Admit Date: 7/22/2019  ICU Day: 18   CODE: Full Code    Critical Care Chief Complaint: acute resp failure      Problem List/Hospital Course:   Principal Problem:    Aneurysmal subarachnoid hemorrhage (H)  Active Problems:    Carotid artery aneurysm (H)    Intraventricular hemorrhage, nontraumatic (H)    Compression of brain (H)    S/P coil embolization of cerebral aneurysm    Aneurysm of right internal carotid artery    Cerebrovascular accident (CVA) due to occlusion of right middle cerebral artery (H)    Vasospasm (H)    Acute respiratory failure, unspecified whether with hypoxia or hypercapnia (H)    Fever in other diseases    E-coli UTI    Gram-positive bacteremia    CVA (cerebral vascular accident) (H)    Pneumonia due to group B Streptococcus, unspecified laterality, unspecified part of lung (H)    Vasospasm of cerebral artery          Interim Events:   Ongoing vasopressin, started on norepinephrine drip.    Assessment/Plan by System:   43 y.o. woman who was admitted on 7/22/2019 with SAH & IVH secondary to ruptured aneurysm of the right ICA, aneurysm coiling 7/22 that was complicated by vasospasm.  Hospitalization course was complicated by right ICA aneurysm dissection with rebleeding that presented with acute onset seizures and unresponsiveness on 7/27. Patient was intubated on 7/27, underwent a recoiling of the culprit vessel, and placement of lumbar drain for ICP management. Ongoing issues with ICA/MCA vasospasm.     Neuro/Psych:   #. Analgesia/Sedation: Off sedation, patient responds to questions/commands, able to move RUE last night, today able to move right foot but could not elicit right hand squeeze. Continue as-needed analgesia and sedation, precedex.  #.  Right ICA aneurysm complicated by subarachnoid hemorrhage with IVH, status post dissection and coiling x2 with hydrocephalus requiring lumbar drain placement 7/27/2019, replaced on 8/8. Ongoing vasospasm  complicated the clinical presentation and has had verapamil infusion on multiple days. Cerebral angiogram on 8/8 with 6-7 mm recurrent at the neck of previously coiled right ICA aneurysm.    Neurosurgery and IR closely following    Follow TCDs    Continue with every 4 hours nimodipine x21 days    Goal -150  ? norepi and vasopressin to maintain goal SBP; trial stopping vasopressin if norepi 0.07 mcg/kg/min or less  ? If BP dips, and CVP less than 10, will use albumin but otherwise titrate NE  ? Trying to avoid complications of hypervolemia     Goal sodium 145-150, hypertonic saline infusion per NSG     Goal HCT level 30-32.     Goal CVP 10-12    Strict I/O, strict lumbar drain output monitoring (if increased output may be sign of further edema/swelling and consider stat HCT) has been stable    Monitor for volume overload  #.  Recurrent seizures, likely secondary to acute intracranial process as detailed above. Currently on IV levetiracetam and IV lacosamide  #.  Moderate Sedation with lower RASS -2 as goal currently with dexmedetomidine, fentanyl, as-needed midazolam and hydromorphone  #.  Fever - possible central fever contributing, also with pneumococcal pneumonia identified on sputum culture 7/31. Elevated CSF protein, PMNs, and WBC, but likely due to blood.  Changed Cefepime to ceftraixone on 8/4, check procalcitonin in AM and consider abridging course of antibiotics depending on result     Pulmonary: Mechanical ventilation instituted for airway protection in setting of seizure and encephalopathy secondary to SAH.  The patient is on minimal ventilator settings.  Not clinically appropriate for ventilator weaning, and due to ongoing issues no further sedation holidays. Underwent tracheostomy with Chito phelps on 8/7.  - goal SpO2 > 92%, on 30% FiO2  - no weaning for now  - secretions mostly small  - ceftriaxone for pneumococcal pneumonia  - remove sutures 8/10-8/12     Cardiovascular: goal -150 but will  try to aim for higher end of this (note when goal SBP was higher 150-180 then patient had further bleeding). Avoid medications that would cause bradycardia.  - continue NE as needed to maintain SBP goal                - intermittent albumin for low CVP   - CVP to be maintained 10-12     Renal: No acute issues. Good urine output.  - E.coli UTI (CAUTI) on culture 7/30  - potassium/magnesium replacement per protocol  - per NSG avoid LR     GI: NPO for now.  - PEG placed 8/7; continue tube feeding  - PPI     Heme: No acute issues, monitor.  - IVF/Tx to maintain HCT 30-32  - SCDs     Endo: No acute issues. Continue close monitoring.  - ICU q 4 hour SSI     ID: E. Coli UTI (7/30) and pneumococcal PNA (7/31)  - blood cultures Strep epidermidis - likely contaminant  - Cefepime 7/31-8/4, then changed to ceftriaxone; check PCT in AM, consider abridging antibiotic course depending on result  - vancomycin - discontinued after 1 day as coag-negative staph grew from culture, follow up cultures negative      Access/Lines/Tubes: required and necessary for continued patient cares  Trach (8 shiley) placed 8/7/19, two flange sutures in place  PEG placed 8/7/19  PICC, RUE placed 7/26/19  Arterial sheath, right femoral artery - removed on 8/3  Sharpe catheter replaced 8/7/19  Lumbar drain placed 7/27/19, changed 8/8/19     ICU prophylaxis:   #. VAP ppx: HOB 30 degrees, chlorhexidine rinses  #. Stress ulcer: PPI  #. Diet: NPO, trophic feedings now  #. VTE: SCD, chemical prophylaxis contraindicated due to SAH/bleeding  #. Restraints: not indicated     CODE: FULL     Dispo: ICU for mechanical ventilation, hemodynamic support, neurological monitoring, remains critically ill with constant threat to life.    Family communication: Communicated with family at the bedside.    Medications:       dexmedetomidine 400 mcg/100 mL in NS (PRECEDEX) (4mcg/mL) 0.5 mcg/kg/hr (08/09/19 0515)     fentaNYL 2500 mcg/250 mL in NS (10 mcg/mL) Stopped (08/08/19  "1117)     niCARdipine       norepinephrine 0.07 mcg/kg/min (08/09/19 0929)     sodium chloride/acetate 3% 25 mL/hr (08/09/19 0924)     vasopressin 2.4 Units/hr (08/09/19 1000)       albumin human  12.5 g Intravenous Once     atorvastatin  20 mg Enteral Tube DAILY     cefTRIAXone  2 g Intravenous Q24H     chlorhexidine  15 mL Topical Q12H     lacosamide (VIMPAT) IVPB  100 mg Intravenous Q12H 09-21     levETIRAcetam (KEPPRA) IVPB  1,000 mg Intravenous Q12H     magnesium sulfate IVPB  4 g Intravenous Once     NaCl 0.9% IR  1,000 mL Irrigation Once     niMODipine  60 mg Enteral Tube Q4H     omeprazole  20 mg Enteral Tube QAM AC    Or     pantoprazole  40 mg Intravenous QAM AC     potassium chloride liquid/packet  20 mEq Enteral Tube BID     potassium chloride liquid/packet  40 mEq Oral Q4H     sodium chloride  10-30 mL Intravenous Q8H FIXED TIMES         Exam/Data:   Vitals  /81   Pulse (!) 59   Temp 100  F (37.8  C) (Oral)   Resp 23   Ht 4' 10\" (1.473 m)   Wt 163 lb 8 oz (74.2 kg)   LMP 07/01/2016   SpO2 98%   BMI 34.17 kg/m    CVP:  [17 mmHg] 17 mmHg  I/O last 3 completed shifts:  In: 2445.5 [I.V.:2015.5; NG/GT:150; IV Piggyback:280]  Out: 2413 [Urine:2225; Drains:188]  Weight change: -1 lb 11.2 oz (-0.771 kg)  Wt Readings from Last 3 Encounters:   08/09/19 163 lb 8 oz (74.2 kg)   07/21/19 157 lb (71.2 kg)   08/01/17 140 lb (63.5 kg)     Vent Mode: VCV  FiO2 (%):  [25 %-30 %] 25 %  S RR:  [14] 14  S VT:  [375 mL] 375 mL  PEEP/CPAP (cm H2O):  [5 cm H2O] 5 cm H2O  Minute Ventilation (L/min):  [7.3 L/min-11.3 L/min] 8.7 L/min  PIP:  [15 cm H2O-29 cm H2O] 23 cm H2O  MAP (cm H2O):  [6-8] 7    EXAM:  GEN: on mechanical vent via trach. Tracks and nods appropriately to questioning  HEENT: PERRL, EOMS, OP patent, trachea midline  PULM: unlabored respirations, moving air adequately  CV: RRR, S1, S2, no murmurs, rubs, gallops, bilateral symmetric pulse  ABD: soft nontender, non distended, normal active bowel sound, " no HSM  EXT : warm well perfused, no cyanosis, clubbing, trace edema  NEURO: PERRL, will track and nod/shake to questions, today not squeezing right hand, will weakly move right foot, no movement on left  SKIN: no obvious rash, lesion    DATA  All laboratory and radiology has been personally reviewed by myself today.  Results from last 7 days   Lab Units 08/09/19  0509   LN-WHITE BLOOD CELL COUNT thou/uL 10.6   LN-HEMOGLOBIN g/dL 10.8*   LN-HEMATOCRIT % 32.0*   LN-PLATELET COUNT thou/uL 292     Results from last 7 days   Lab Units 08/09/19  0921 08/09/19  0509 08/08/19  1956 08/08/19  0402 08/07/19  1819 08/07/19  0537   LN-SODIUM mmol/L 138 139 141 138 141 142  142   LN-POTASSIUM mmol/L  --  2.9*  --  3.7 3.8 3.1*   LN-CHLORIDE mmol/L  --  108*  --  107  --  116*   LN-CO2 mmol/L  --  24  --  24  --  21*   LN-BLOOD UREA NITROGEN mg/dL  --  12  --  9  --  8   LN-CREATININE mg/dL  --  0.54*  --  0.54*  --  0.43*   LN-CALCIUM mg/dL  --  8.3*  --  8.3*  --  7.0*   LN-PROTEIN TOTAL g/dL  --  6.2  --  6.1  --  4.9*   LN-BILIRUBIN TOTAL mg/dL  --  0.3  --  0.6  --  0.3   LN-ALKALINE PHOSPHATASE U/L  --  119  --  95  --  81   LN-ALT (SGPT) U/L  --  159*  --  203*  --  142*   LN-AST (SGOT) U/L  --  81*  --  146*  --  143*     Critical care time: 40 minutes    Rikki Castro MD  Pulmonary and Critical Care Medicine  Critical [facility] hospital  Cell [phone]  Office [phone]  Pager [phone]

## 2021-05-31 NOTE — PROGRESS NOTES
Patient is transported vented via transport ventilator. Pt is back in the ICU without incident. RT will monitor.    JERRY Sanchez

## 2021-05-31 NOTE — PLAN OF CARE
Care Management Goals of the Day: Follow progression of care, assist with DC planning     Care Progression Reviewed With: Charge RN, MD, HUC, RNCM     Barriers to Discharge: ICU medical cares, repeat angio, [facility], [place] care conference to discuss ongoing plan    Discharge Disposition: [facility] ref sent     Expected Discharge Date: TBD     Transportation: likely [facility] stretcher    Care Coordination Narrative: Pt remains intubated, febrile and sedated. Family meeting with providers to discuss GOC as she is critically ill.  CM to follow.

## 2021-05-31 NOTE — PROGRESS NOTES
[place] Note: We are continuing to follow as plan of care progresses. We anticipate admission when neurosurgery deems patient medically ready for transfer.     Anupam Zuniga RT  Referral Specialist  [phone]

## 2021-05-31 NOTE — PROGRESS NOTES
Patient was transported to IR, on transport vent.  Patient tolerated well.  Transport was done with no complications.     KAE CarterT

## 2021-05-31 NOTE — PROGRESS NOTES
Critical Care Medicine Progress Note  8/1/2019    ASSESSMENT/PLAN:  43 y.o. woman who was admitted on 7/22/2019 with SAH & IVH secondary to ruptured aneurysm of the right ICA, aneurysm coiling 7/22 that was complicated by vasospasm.  Hospitalization course was complicated by right ICA aneurysm dissection with rebleeding that presented with acute onset seizures and unresponsiveness on 7/27.  Patient was intubated on 7/27, underwent a recoiling of the culprit vessel, and placement of lumbar drain for ICP management.  Ongoing issues with ICA/MCA vasospasm.    New events:   - fever persists, on abx starting 7/31   - less agitation with nursing cares with fentanyl infusion   - remains on NE, but less   - continued vasospasm   - receiving prn 25% albumin    Neuro/Psych:   #. Analgesia/Sedation: Currently managed with propofol and fentanyl, prn versed available mostly for agitation with cares.  #.  Right ICA aneurysm complicated by subarachnoid hemorrhage with IVH, status post dissection and coiling x2 with hydrocephalus requiring lumbar drain placement 7/27/2019.  Ongoing vasospasm complicated the clinical presentation and has had verapamil infusion on multiple days.    Neurosurgery and IR closely following    Cerebral angiography per IR, follow TCD to determine further angiogram    Continue with every 4 hours nimodipine x21 days    Goal -150 by arterial line, currently on NE to maintain    If BP dips, and CVP less than 10, will use albumin but otherwise should be titrating NE    Trying to avoid complications of hypervolemia     Goal sodium 145-150, hypertonic saline infusion per NSG protocol    Goal HCT level 30-32    Goal CVP 10-12    Strict I/O, strict lumbar drain output monitoring (if increased output may be sign of further edema/swelling and consider stat HCT) has been stable    Repeat TCDs to determine need for angiogram    Monitor for volume overload  #.  Recurrent seizures, likely secondary to acute  intracranial process as detailed above.  Currently on propofol, IV levetiracetam, and IV fosphenytoin.  #.  Moderate Sedation with lower RASS -3 as goal   Due to ongoing issues will pursue deeper RASS goal of -3   Start fentanyl infusion - titrate to sedation   Prn versed especially with turning/routine cares    Pulmonary: Mechanical ventilation instituted for airway protection in setting of seizure and encephalopathy secondary to SAH.  The patient is on minimal ventilator settings.  Not clinically appropriate for ventilator weaning, and due to ongoing issues no further sedation holidays.  Monitor.  - goal SpO2 > 92%, on 30% FiO2  - no weaning today  - secretions mostly small  - sputum culture sent, culture pending  - Abx per ID section below  - recheck CXR     Cardiovascular: goal -150 but will try to aim for higher end of this (note when goal SBP was higher 150-180 then patient had further bleeding). Avoid medications that would cause bradycardia.  - continue NE as needed to maintain SBP goal   - intermittent albumin for low CVP   - CVP to be maintained 10-12    Renal: No acute issues.  Good urine output.  - E.coli UTI (CAUTI)  - potassium/magnesium replacement per protocol  - add KCL to saline infusion    GI: NPO for now.  - om trophic feeding  - PPI    Heme: No acute issues, monitor.  - IVF to maintain HCT 30-32  - SCDs    Endo: No acute issues.  Continue close monitoring.  - ICU q 4 hour SSI    ID: E. Coli UTI and GPC in clusters in blood stream (1/4 positive, but less than 24 hours) with ongoing fevers, leukocytosis improved  - cefepime (will cover E.coli and has better CNS penetration)  - vancomycin - waiting for blood cultures to return   - blood cultures x2 sets today for ongoing fevers  - check CXR as breath sounds diminished on left    Access/Lines/Tubes: required and necessary for continued patient cares  ETT, 7.5  PICC, RUE  PIV  Arterial sheath, right femoral artery  Sharpe catheter  Lumbar  drain  OGT    ICU prophylaxis:   #. VAP ppx: HOB 30 degrees, chlorhexidine rinses  #. Stress ulcer: PPI  #. Diet: NPO, trophic feedings now  #. VTE: SCD, chemical prophylaxis contraindicated due to SAH/bleeding  #. Restraints: required and necessary for continued patient cares    CODE: FULL    Family was updated at bedside.  Discussed overall condition.  Plan for formal family conference tomorrow am at 1030.    Dispo: ICU for mechanical ventilation, hemodynamic support, neurological monitoring, remains critically ill with constant threat to life.    Patient is critically ill with total CCT spent 50 minutes thus far today.     Fer Reyes, DO  Pulmonary and Critical Care     =================================================================    Interval history: remains sedated and intubated.  No further seizure activity reported.  Fever again today.  Less agitation with fentanyl being started.  No significant changes overnight.  Had more diarrhea overnight, dignacare placed     Vitals: Temp:  [98.8  F (37.1  C)-102.8  F (39.3  C)] 101.3  F (38.5  C)  Heart Rate:  [] 93  Resp:  [15-38] 22  BP: ()/(52-79) 152/77  Arterial Line BP: (117-158)/(14-82) 152/77  FiO2 (%):  [30 %-100 %] 30 %  Vent:   Vent Mode: VCV  FiO2 (%):  [30 %-100 %] 30 %  S RR:  [16] 16  S VT:  [375 mL] 375 mL  PEEP/CPAP (cm H2O):  [5 cm H2O] 5 cm H2O  Minute Ventilation (L/min):  [2.8 L/min-9.1 L/min] 6.7 L/min  PIP:  [17 cm H2O-27 cm H2O] 19 cm H2O  MAP (cm H2O):  [7-11] 7    Physical Exam:   General: obese woman, lying in bed, NAD  HEENT: orally intubated, MMM, PER  CV: RRR, no M/R/G; extremities well perfused, 1+ edema  Pulm: mechanical breath sounds that are course, more diminished on the left, no wheezing, no crackles  Abd: soft, ND, NT, hypoactive bowel sounds  Msk: warm to touch  Derm: no acute lesions or rashes on limited exam  Neuro: sedated, no evidence of clonus  Psych: sedated    Labs: personally reviewed in EMR.     Imaging:  personally reviewed in EMR. Formal Radiology interpretation listed below.  #. HCT, 7/27:   INTRACRANIAL CONTENTS: Beam hardening and streak artifacts related to a large right paraclinoid coil mass as seen previously. This locally degrades evaluation. Small volume subarachnoid hemorrhage within sulci of both cerebral hemispheres. No definite increasing intraventricular hemorrhage layering within the occipital horns of the lateral ventricles favored to reflect redistribution of subarachnoid blood products. No large territorial infarct is identified. Small frontoparietal infarcts seen on the 7/26/2019 MRI are poorly appreciated on CT. Minor periventricular low-attenuation. Mild to moderate lateral and third ventriculomegaly similar to findings on the prior exam.   VISUALIZED ORBITS/SINUSES/MASTOIDS: No significant orbital abnormality. No significant paranasal sinus mucosal disease. No significant middle ear or mastoid effusion.  BONES/SOFT TISSUES: No significant abnormality.    #. HCT: 7/28:   Again demonstrated is endovascular coiling of a giant right supraclinoid aneurysm. This results in extensive streak artifact and compromises a portion of the intracranial compartment. Within this limitation, the amount of intraventricular hemorrhage and   subarachnoid hemorrhage within the basal cisterns and scattered over the convexities appears grossly unchanged. There is stable mild to moderate ventriculomegaly of the lateral and third ventricles. No evidence of interval acute intracranial hemorrhage,   infarct or hydrocephalus.

## 2021-06-01 NOTE — TELEPHONE ENCOUNTER
PATIENT NAME:  Darion Herrera  YOB: 1975  MRN: [id number]  SURGEON: Dr. Sal  DATE of SURGERY: 8-19-19  PROCEDURE: ADMIT 7-21-19 WITH  GIANT ANEURYSM, SAH.  Now s/p  shunt placement 8-19-19, CERTAS set to 4.  went to [place] and was discharged from [place] 9-6-19 to home with home care    FOLLOW-UP:    Last CT scan 9-5-19  Follow up:  2 weeks   Post-op Provider: NP  DIAGNOSTICS:  radiology: CT scan: head, without  (ordered 9-16-19)  DISPOSITION:  Home from [place] with family support and  home care 9-6-19    ADDITIONAL INSTRUCTIONS FOR MEDICAL STAFF:

## 2021-06-01 NOTE — PATIENT INSTRUCTIONS - HE
1. Return to Neurosurgery in 6 months with new Head CT to assess shunt function [phone]    2. Return to Neurosurgery with any concern or questions regarding neurological function and potential shunt malfunction      Headaches, drowsiness, dizzy, nausea, vision disturbance, discoordination    3. Call neurosurgery with any concerns of infection at shunt site if wound looks abnormal or develops any drainage.

## 2021-06-01 NOTE — ANESTHESIA POSTPROCEDURE EVALUATION
Patient: Darion Herrera  * No procedures listed *  Anesthesia type: general    Patient location: PACU  Last vitals:   Vitals Value Taken Time   BP  9/16/2019  5:00 PM   Temp  9/16/2019  5:00 PM   Pulse 113 9/16/2019  4:59 PM   Resp 14 9/16/2019  4:59 PM   SpO2 95 % 9/16/2019  4:59 PM   Vitals shown include unvalidated device data.  Post vital signs: stable  Level of consciousness: lethargic  Post-anesthesia pain: pain controlled  Post-anesthesia nausea and vomiting: no  Pulmonary: unassisted, return to baseline  Cardiovascular: stable and blood pressure at baseline  Hydration: adequate  Anesthetic events: no    QCDR Measures:  ASA# 11 - Nina-op Cardiac Arrest: ASA11B - Patient did NOT experience unanticipated cardiac arrest  ASA# 12 - Nina-op Mortality Rate: ASA12B - Patient did NOT die  ASA# 13 - PACU Re-Intubation Rate: ASA13B - Patient did NOT require a new airway mgmt  ASA# 10 - Composite Anes Safety: ASA10A - No serious adverse event    Additional Notes:

## 2021-06-01 NOTE — ANESTHESIA PREPROCEDURE EVALUATION
Anesthesia Evaluation      Patient summary reviewed   No history of anesthetic complications     Airway   Mallampati: III  Neck ROM: full  Comment: Trach in place   Pulmonary - normal exam    breath sounds clear to auscultation  (-) sleep apnea, not a smoker    ROS comment: Trach                         Cardiovascular - negative ROS  (-) valvular problems/murmurs, CABG/stent, dysrhythmias  ECG reviewed  Rhythm: regular  Rate: normal,      ROS comment: Echo 7/26/2019    1.Left ventricle ejection fraction is normal. The calculated left ventricular ejection fraction is 65%.    2.TAPSE is normal, which is consistent with normal right ventricular systolic function.    3.No hemodynamically significant valvular heart abnormalities.     Neuro/Psych    (+) seizures well controlled, CVA (Aneurysmal SAH s/p coiling, rebleed & vasospasm, s/p multiple angios ) ,     Comments: Hydrocephalus s/p  shunt  Bilateral upper and lower extremity weakness, left greater than right       Endo/Other    (+) obesity,   (-) not pregnant     GI/Hepatic/Renal    (-) GERD    Comments: Hx of UTI  PEG tube in place      Other findings: Opioid tolerance  Took home Plavix, Keppra, and hydromorphone this morning   Hbg 13.7  Plt 253,000  INR 1.06  Cr 0.59      Dental - normal exam                          Anesthesia Plan  Planned anesthetic: general endotracheal  GETA, plan to Glidescope, have numerous size ETTs readily available 5.0-6.5, will plan to have 6.5 ETT w/ Glidescope stylet to start  1 PIV (will place second peripheral IV if arms not accessible/tucked)  Will transduce arterial waveform off proceduralist line  Phenylephrine infusion available  Nitroglycerin available  Pain mgmt: acetaminophen, fentanyl, hydromorphone prn  Anti-emetic mgmt: dexamethasone 10 mg, ondansetron 4 mg  ASA 4   Induction: intravenous   Anesthetic plan and risks discussed with: patient, spouse, child/children and  services used  Anesthesia plan special  considerations: antiemetics, IV therapy two IVs,   Post-op plan: routine recovery

## 2021-06-01 NOTE — TELEPHONE ENCOUNTER
Tonya from the [facility] called. Patient was in clinic for a f/u and stated we told her to have labs drawn at her PCP. Pt didn't know what labs or why. I don't see an order in Epic and notes from n/s didn't indicate bloodwork was needed.    IF labs are needed, pls call Armando at [facility] at [phone].

## 2021-06-01 NOTE — PROGRESS NOTES
"NEUROSURGERY FOLLOW UP EVALUATION:    ASSESSMENT  Darion Herrera is a 43 y.o. female, who presents today status post adjustment of her shunt setting to assess tolerance, Certas set at 2. Head CT stable, improved. No headaches although language barrier is an issue even with professional . Patient describes pain on the right side of her head behind her ear and back of her head that she states is due to the \"equipment.\" Discussed this will likely take time to get used to the shunt and within a short period of time she should wean herself off her oral dilaudid. She also reports bilateral foot numbness, present since her initial hospitalization. She is walking daily, gets fatigued within 2 blocks. Overall, she looks great and fells good. She is anxious to disrupt anything in her head and is cautious to even scratch her head. Discussed that while it is good to be cautious she does not need to be concerned that scratching her scalp will disrupt anything. Daughter is present today. Patient followed by Dr Sal and Dr Hoffmann who placed her  shunt and finished her care at the end of her hospital stay.    PLAN:   1. Return to Neurosurgery in 6 months with new Head CT to assess shunt function [phone]    2. Return to Neurosurgery with any concern or questions regarding neurological function and potential shunt malfunction      Headaches, drowsiness, dizzy, nausea, vision disturbance, discoordination    3. Call neurosurgery with any concerns of infection at shunt site if wound looks abnormal or develops any drainage.     HPI: Darion Herrera is a 42 yo female who was admitted on 07/22/19 with SAH and IVH secondary to ruptured aneurysm of the right ICA. Aneurysm coiling on 7/22 that was complicated by vasospasm. On 07/27, found to be unresponsive and possibly seizing, was intubated and taken to IR where it was revealed that her right ICU aneurysm had dissected and re-bled.  In IR on 07/27, the vessel was recoiled and a " lumbar drain was placed for ICP management.  On 07/29 underwent intraarterial verapamil infusion with angiogram for acute vasospasm of right ICA. Due to increased TCD values, back to IR for another cerebral angiogram 7/30 for acute vasospasm and intraarterial verapamil infusion. 8/7 trach and peg 8/19 Certas  shunt 8/20 discharge to [place] 8/30 Certas setting changed to 2. hospital course outlined in detail in hospital charts.     Past Medical History:   Diagnosis Date     Acute respiratory failure with hypoxia (H)      Adjustment disorder with mixed anxiety and depressed mood      Anemia      Aneurysmal subarachnoid hemorrhage (H)      Brain mass      Carotid artery aneurysm (H)      Cerebrovascular accident (CVA) due to occlusion of right middle cerebral artery (H)      Compression of brain (H)      Constipation      E. 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)      Fever in other diseases      Gram-positive bacteremia      Headache      HLD (hyperlipidemia)      Hydrocephalus      Hypokalemia      Hypomagnesemia      Intracranial hemorrhage (H)     due to ruptured dissecting R ICA aneurysm     Intraventricular hemorrhage, nontraumatic (H)      Leiomyoma      Mild malnutrition (H)      Nausea vomiting and diarrhea      Neck pain      Oropharyngeal dysphagia      Pneumonia due to group B Streptococcus (H)      Sleep difficulties      Thrush      Tracheostomy care (H)      Transaminitis      Vaginal itching     & discharge     Vasospasm of cerebral artery       Past Surgical History:   Procedure Laterality Date     COIL EMBOLIZATION       HYSTERECTOMY       IR CEREBRAL ANGIOGRAM  7/22/2019     IR CEREBRAL ANGIOGRAM  7/24/2019     IR CEREBRAL ANGIOGRAM  7/27/2019     IR CEREBRAL ANGIOGRAM  7/29/2019     IR CEREBRAL ANGIOGRAM  7/30/2019     IR CEREBRAL ANGIOGRAM  7/31/2019     IR CEREBRAL ANGIOGRAM  8/1/2019     IR CEREBRAL ANGIOGRAM  8/2/2019     IR CEREBRAL  ANGIOGRAM  7/28/2019     IR CEREBRAL ANGIOGRAM  8/13/2019     IR CEREBRAL ANGIOGRAM  8/8/2019     IR EMBOLIZATION  9/16/2019     IR LUMBAR DRAIN INSERTION  7/27/2019     IR LUMBAR DRAIN INSERTION  8/2/2019     IR LUMBAR DRAIN INSERTION  8/8/2019     IR LUMBAR DRAIN INSERTION  8/16/2019     PICC AND MIDLINE TEAM LINE INSERTION  7/26/2019          NV CREATE SHUNT:VENTRIC-PERITONEAL Right 8/19/2019    Procedure: INSERTION, SHUNT, VENTRICULOPERITONEAL, USING FRAMELESS STEREOTAXY ;  Surgeon: Gale Hoffmann MD;  Location: [facility] OR;  Service: Neurosurgery     NV EGD PERCUTANEOUS PLACEMENT GASTROSTOMY TUBE N/A 8/7/2019    Procedure: CREATION, GASTROSTOMY, PERCUTANEOUS, ENDOSCOPIC;  Surgeon: Fer Ledezma MD;  Location: [facility] OR;  Service: General     NV LAP INSERTION TUNNELED INTRAPERITONEAL CATHETER Right 8/19/2019    Procedure: INSERTION, CATHETER, PERITONEAL, LAPAROSCOPIC;  Surgeon: Fer Ledezma MD;  Location: [facility] OR;  Service: General     TRACHEOSTOMY N/A 8/7/2019    Procedure: CREATION, TRACHEOSTOMY;  Surgeon: Fer Ledezma MD;  Location: [facility] OR;  Service: General       Current Outpatient Medications   Medication Sig     acetaminophen (TYLENOL) 500 MG tablet Take 1,000 mg by mouth every 8 (eight) hours.     aspirin 81 mg chewable tablet Chew 81 mg daily.     atorvastatin (LIPITOR) 20 MG tablet Take 20 mg by mouth daily.     clopidogrel (PLAVIX) 75 mg tablet Take 2 tablets (150 mg total) by mouth every other day.     clopidogrel (PLAVIX) 75 mg tablet Take 1 tablet (75 mg total) by mouth every other day.     HYDROmorphone (DILAUDID) 2 MG tablet Take 1 mg by mouth every 6 (six) hours as needed for pain.     HYDROmorphone (DILAUDID) 2 MG tablet Take 0.5 tablets (1 mg total) by mouth every 4 (four) hours.     levETIRAcetam (KEPPRA) 1000 MG tablet Take 1 tablet (1,000 mg total) by mouth 2 (two) times a day.     ondansetron (ZOFRAN) 4 MG tablet Take 2  tablets (8 mg total) by mouth every 8 (eight) hours as needed for nausea.     traMADol (ULTRAM) 50 mg tablet Take 50 mg by mouth every 8 (eight) hours as needed for pain. Indications: pain       Allergies   Allergen Reactions     Hydrocodone Swelling and Rash     Oxycodone Swelling and Rash       PHYSICAL EXAM:  Visit Vitals  /70   Pulse 72   Resp 18   LMP 07/01/2016       Alert and oriented x3, speech normal. Family states she has some memory loss time to time.   PERRL, EOMI, face symmetric, tongue midline, shoulder shrug equal  No pronator drift, finger to nose smooth and accurate bilaterally  Strength:   biceps 5/5 right, 4+/5 left   Triceps 5/5 right, 4+/5 left   Deltoid 5/5 right, 4+/5 left  Hand grasp 5/5 right, 4+/5 left   Hip flexors 5/5 right, 5/5 left   Quadriceps: 5/5 right, 5/5 left   Ankle dorsiflexion: 5/5 right, 5/5 left   Extensor hallicus longus: 5/5 right, 5/5 left   Ankle plantar flexion : 5/5 right, 5/5 left     Bulk and tone: normal  Reflexes:   No pathological reflexes   Coordination/Gait: not tested, in wheelchair but does walk with a cane  Incision: scalp healing, scabs present but looks WNL     IMAGING:  The imaging was personally reviewed by me along with Dr Hoffmann and Dr Sal    HEAD CT:  Again seen is a large coil mass in the region of the anterior communicating artery which results in extensive streak artifact limiting the evaluation of the parenchyma. Within this limitation there is no evidence of residual subarachnoid hemorrhage.   There is mild encephalomalacia around the right approach ventriculostomy catheter, otherwise gray-white matter differentiation is preserved within the visualized brain parenchyma.     Redemonstrated right frontal approach ventriculostomy catheter without significant change in size and appearance of the ventricular system without shift of midline structures. No extra-axial fluid collections. Patent basal cisterns.     SINUSES/MASTOIDS/ORBITS: The  visualized paranasal sinuses and temporal bone structures are well-aerated. The orbits are unremarkable.     BONES/SOFT TISSUES: The calvarium and skull base are unremarkable.      IMPRESSION:      1. Redemonstrated large coil mass in the region of the anterior commuting artery without evidence of intracranial hemorrhage.     2. Redemonstrated right frontal approach ventriculostomy catheter without significant change in the size and morphology of the ventricular system    MANDY [facility]  O: [phone]

## 2021-06-01 NOTE — TELEPHONE ENCOUNTER
Called and let Oua know that there is no need for labs from our standpoint.  Ivy Farfan RN, CNRN

## 2021-06-01 NOTE — ANESTHESIA CARE TRANSFER NOTE
Last vitals:   Vitals:    09/16/19 1553   BP: 127/70   Pulse: (!) 111   Resp: 22   Temp:    SpO2: 99%     Patient's level of consciousness is drowsy  Spontaneous respirations: yes  Maintains airway independently: yes  Dentition unchanged: yes  Oropharynx: oropharynx clear of all foreign objects    QCDR Measures:  ASA# 20 - Surgical Safety Checklist: WHO surgical safety checklist completed prior to induction    PQRS# 430 - Adult PONV Prevention: 4558F - Pt received => 2 anti-emetic agents (different classes) preop & intraop  ASA# 8 - Peds PONV Prevention: NA - Not pediatric patient, not GA or 2 or more risk factors NOT present  PQRS# 424 - Nina-op Temp Management: 4559F - At least one body temp DOCUMENTED => 35.5C or 95.9F within required timeframe  PQRS# 426 - PACU Transfer Protocol: - Transfer of care checklist used  ASA# 14 - Acute Post-op Pain: ASA14B - Patient did NOT experience pain >= 7 out of 10

## 2021-06-02 NOTE — ANESTHESIA PREPROCEDURE EVALUATION
Anesthesia Evaluation      Patient summary reviewed   No history of anesthetic complications     Airway   Mallampati: III  Neck ROM: full   Pulmonary - normal exam   (+) pneumonia,                          Cardiovascular - normal exam  (+) hypertension, ,      Neuro/Psych    (+) neuromuscular disease,  CVA ,     Endo/Other       GI/Hepatic/Renal            Dental - normal exam                        Anesthesia Plan  Planned anesthetic: general endotracheal    ASA 2   Induction: intravenous   Anesthetic plan and risks discussed with: patient  Anesthesia plan special considerations: antiemetics,   Post-op plan: routine recovery

## 2021-06-02 NOTE — ANESTHESIA CARE TRANSFER NOTE
Last vitals:   Vitals:    10/30/19 1252   BP: 122/88   Pulse: (!) 102   Resp: 16   Temp:    SpO2: 99%     Patient's level of consciousness is drowsy  Spontaneous respirations: yes  Maintains airway independently: yes  Dentition unchanged: yes  Oropharynx: oropharynx clear of all foreign objects    QCDR Measures:  ASA# 20 - Surgical Safety Checklist: WHO surgical safety checklist completed prior to induction    PQRS# 430 - Adult PONV Prevention: 4558F - Pt received => 2 anti-emetic agents (different classes) preop & intraop  ASA# 8 - Peds PONV Prevention: NA - Not pediatric patient, not GA or 2 or more risk factors NOT present  PQRS# 424 - Nina-op Temp Management: 4559F - At least one body temp DOCUMENTED => 35.5C or 95.9F within required timeframe  PQRS# 426 - PACU Transfer Protocol: - Transfer of care checklist used  ASA# 14 - Acute Post-op Pain: ASA14B - Patient did NOT experience pain >= 7 out of 10

## 2021-06-02 NOTE — ANESTHESIA POSTPROCEDURE EVALUATION
Patient: Darion Herrera  Replacement of distal catheter for ventriculoperitoneal shunt with general surgery assistance for laparoscopic approach, NEUROSURGICAL PROCEDURE, WITH LAPAROSCOPIC EXPOSURE BY GENERAL SURGEON  Anesthesia type: general    Patient location: PACU  Last vitals:   Vitals Value Taken Time   BP 84/67 10/30/2019  3:45 PM   Temp 36.8  C (98.3  F) 10/30/2019  3:45 PM   Pulse 133 10/30/2019  4:47 PM   Resp 14 10/30/2019  3:45 PM   SpO2 96 % 10/30/2019  3:45 PM   Vitals shown include unvalidated device data.  Post vital signs: stable  Level of consciousness: awake and responds to simple questions  Post-anesthesia pain: pain controlled  Post-anesthesia nausea and vomiting: no  Pulmonary: unassisted, return to baseline  Cardiovascular: stable and blood pressure at baseline  Hydration: adequate  Anesthetic events: no    QCDR Measures:  ASA# 11 - Nina-op Cardiac Arrest: ASA11B - Patient did NOT experience unanticipated cardiac arrest  ASA# 12 - Nina-op Mortality Rate: ASA12B - Patient did NOT die  ASA# 13 - PACU Re-Intubation Rate: ASA13B - Patient did NOT require a new airway mgmt  ASA# 10 - Composite Anes Safety: ASA10A - No serious adverse event    Additional Notes:

## 2021-06-03 VITALS
RESPIRATION RATE: 16 BRPM | HEIGHT: 59 IN | TEMPERATURE: 97.5 F | HEART RATE: 96 BPM | SYSTOLIC BLOOD PRESSURE: 100 MMHG | DIASTOLIC BLOOD PRESSURE: 70 MMHG | WEIGHT: 120 LBS | OXYGEN SATURATION: 98 % | BODY MASS INDEX: 24.19 KG/M2

## 2021-06-03 VITALS
BODY MASS INDEX: 27.42 KG/M2 | SYSTOLIC BLOOD PRESSURE: 104 MMHG | HEIGHT: 59 IN | HEART RATE: 88 BPM | OXYGEN SATURATION: 98 % | DIASTOLIC BLOOD PRESSURE: 82 MMHG | WEIGHT: 136 LBS

## 2021-06-03 VITALS
HEART RATE: 83 BPM | SYSTOLIC BLOOD PRESSURE: 113 MMHG | DIASTOLIC BLOOD PRESSURE: 79 MMHG | OXYGEN SATURATION: 98 % | BODY MASS INDEX: 27.42 KG/M2 | HEIGHT: 59 IN | WEIGHT: 136 LBS

## 2021-06-03 VITALS — WEIGHT: 163.7 LBS | BODY MASS INDEX: 34.21 KG/M2

## 2021-06-03 VITALS — WEIGHT: 155.9 LBS | BODY MASS INDEX: 32.58 KG/M2

## 2021-06-03 VITALS
OXYGEN SATURATION: 98 % | BODY MASS INDEX: 27.01 KG/M2 | DIASTOLIC BLOOD PRESSURE: 68 MMHG | HEIGHT: 59 IN | HEART RATE: 79 BPM | SYSTOLIC BLOOD PRESSURE: 106 MMHG | WEIGHT: 134 LBS

## 2021-06-03 VITALS
BODY MASS INDEX: 32.81 KG/M2 | BODY MASS INDEX: 32.81 KG/M2 | HEIGHT: 58 IN | BODY MASS INDEX: 32.44 KG/M2 | HEIGHT: 58 IN | WEIGHT: 155.2 LBS

## 2021-06-03 VITALS
OXYGEN SATURATION: 98 % | BODY MASS INDEX: 27.42 KG/M2 | HEART RATE: 92 BPM | HEIGHT: 59 IN | WEIGHT: 136 LBS | DIASTOLIC BLOOD PRESSURE: 65 MMHG | SYSTOLIC BLOOD PRESSURE: 108 MMHG

## 2021-06-03 VITALS — BODY MASS INDEX: 34.38 KG/M2 | WEIGHT: 164.5 LBS

## 2021-06-03 VITALS — BODY MASS INDEX: 32.42 KG/M2 | WEIGHT: 155.1 LBS

## 2021-06-03 VITALS — WEIGHT: 163.5 LBS | BODY MASS INDEX: 34.17 KG/M2

## 2021-06-03 VITALS — WEIGHT: 164.7 LBS | BODY MASS INDEX: 34.42 KG/M2

## 2021-06-03 VITALS
HEIGHT: 58 IN | BODY MASS INDEX: 29.64 KG/M2 | WEIGHT: 141.2 LBS | WEIGHT: 141.2 LBS | HEIGHT: 58 IN | BODY MASS INDEX: 29.64 KG/M2 | HEIGHT: 58 IN | BODY MASS INDEX: 29.64 KG/M2 | WEIGHT: 141.2 LBS | BODY MASS INDEX: 29.51 KG/M2 | WEIGHT: 141.2 LBS

## 2021-06-03 VITALS
SYSTOLIC BLOOD PRESSURE: 109 MMHG | HEART RATE: 80 BPM | DIASTOLIC BLOOD PRESSURE: 70 MMHG | HEIGHT: 59 IN | WEIGHT: 136 LBS | BODY MASS INDEX: 27.42 KG/M2 | OXYGEN SATURATION: 98 %

## 2021-06-03 VITALS — BODY MASS INDEX: 34.53 KG/M2 | WEIGHT: 165.2 LBS

## 2021-06-03 VITALS — WEIGHT: 149 LBS | BODY MASS INDEX: 31.14 KG/M2

## 2021-06-03 VITALS — BODY MASS INDEX: 33.67 KG/M2 | WEIGHT: 161.1 LBS

## 2021-06-03 VITALS — WEIGHT: 155.4 LBS | WEIGHT: 160.6 LBS | BODY MASS INDEX: 32.48 KG/M2 | BODY MASS INDEX: 33.57 KG/M2

## 2021-06-03 VITALS — BODY MASS INDEX: 33.42 KG/M2 | WEIGHT: 154.8 LBS | BODY MASS INDEX: 32.35 KG/M2 | WEIGHT: 159.9 LBS

## 2021-06-03 VITALS — BODY MASS INDEX: 33.57 KG/M2 | WEIGHT: 160.6 LBS

## 2021-06-03 VITALS — BODY MASS INDEX: 29.74 KG/M2 | WEIGHT: 142.3 LBS

## 2021-06-03 VITALS — WEIGHT: 154.3 LBS | BODY MASS INDEX: 32.25 KG/M2

## 2021-06-03 VITALS — BODY MASS INDEX: 29.16 KG/M2 | WEIGHT: 139.5 LBS

## 2021-06-03 VITALS — WEIGHT: 168.5 LBS | BODY MASS INDEX: 35.22 KG/M2

## 2021-06-03 VITALS — WEIGHT: 141.1 LBS | BODY MASS INDEX: 29.49 KG/M2

## 2021-06-03 VITALS — WEIGHT: 150.1 LBS | BODY MASS INDEX: 31.37 KG/M2

## 2021-06-03 VITALS — BODY MASS INDEX: 32.29 KG/M2 | WEIGHT: 154.5 LBS

## 2021-06-03 VITALS — WEIGHT: 155 LBS | BODY MASS INDEX: 32.4 KG/M2

## 2021-06-03 VITALS — WEIGHT: 163.1 LBS | BODY MASS INDEX: 34.09 KG/M2

## 2021-06-03 VITALS — WEIGHT: 162.4 LBS | BODY MASS INDEX: 33.94 KG/M2

## 2021-06-03 VITALS — BODY MASS INDEX: 31.83 KG/M2 | WEIGHT: 152.3 LBS

## 2021-06-03 VITALS — BODY MASS INDEX: 30.37 KG/M2 | WEIGHT: 145.3 LBS

## 2021-06-03 NOTE — PROGRESS NOTES
CHART NOTE     1975     DATE OF SERVICE: 11/27/2019     FOLLOW UP EVALUATION:   Seen w help of --her daughter w her.   ASSESSMENT :Darion Herrera is a 44 yo female that is known to our service having had a complicated recent hospitalization after a ruptured and dissecting right supraclinoid aneurysm in July. She underwent coil emobolization on 7/22/2019. She then experienced re-rupture 5 days later from recurrence and was treated with additional coils. She had a prolonged hospital course with multiple angiograms with intraarterial verapamil for severe vasospasm. She eventually req'd placement of Certas shunt for hydrocephalus.  She returned on 10/31--her abdominal catheter was in the pleural space--it was revised.  She comes in today for follow up.  She reports headaches almost daily, w photophobia and fatigue--terrible pressure inside her head.  She denies nausea or vomiting--no vision changes. The Certas setting was at 2 today. She takes Plavix and low dose ASA.     RADIOGRAPHIC IMAGING: On imaging her ventricles are larger compared 10/31.  The thin right SDH is stable w/o evidence of new hemorrhage.    PLAN: Her ventricles are larger and she's having symptoms--I discussed risk of making SDH worse.  I do have concerns about making the SDH worse--I turned her shunt to 1--Sapna Mayes RN confirmed setting w me.  I will plan close follow up --bring her back in 1 week w repeat head CT-- I dicussed symptoms indicating need to come to ER       PAST MEDICAL HISTORY, SURGICAL HISTORY, REVIEW OF SYMPTOMS, MEDICATIONS AND ALLERGIES:  Past medical history, surgical history, review of symptoms, medications and allergies remain unchanged.    Vitals:    11/27/19 1322   BP: 109/70   Pulse: 80   SpO2: 98%        PHYSICAL EXAM:  Neurological exam reveals:  Alert and oriented x3, speech fluent and appropriate.   PERRL, EOMI, face symmetric, tongue midline, shoulder shrug equal  No pronator drift, finger to nose smooth  and accurate bilaterally  Arm strength bilateral grasp, biceps, triceps, and deltoids-4/5   Leg strength bilateral dorsiflexion, plantar flexion, and hip flexion 4/5  Muscle Bulk and tone wnl.   Reflexes:No pathological reflexes   Gait and station:Limping gait --walks unassisted

## 2021-06-03 NOTE — PATIENT INSTRUCTIONS - HE
Call [phone] with the following symptoms:    *Temperature 101(fever) or greater,  or chills  *Redness, swelling or increased drainage from the wound  *Worsening pain not relieved by the  prescription given  *Worsening headache not relieved by the prescription given  *A headache that gets better or worse when you stand up or lie down  *Seizures  *Persistent nausea and vomiting  *Increased sleepiness or difficulty being awakened  *Change in ability to see, think, or talk  *Worsening or new onset of weakness, or numbness and tingling  *Loss or change in your ability to control bowel or bladder function  *Change in your ability to walk    !!!! IF YOU HAVE A SERIOUS OR THREATENING EMERGENCY, CALL 911 OR COME TO THE EMERGENCY ROOM.  IF YOUR CONDITIONS ALLOWS COME TO THE HOSPITAL WHERE YOUR SURGERY WAS PERFORMED.    QUESTIONS OR CONCERNS:   OUR OFFICE HOURS ARE FROM 9:00 A.M -4:30 P.M. MONDAY-FRIDAY.  AFTER OFFICE HOURS, CALLS ARE ANSWERED BY THE ON-CALL PROVIDER. PLEASE CALL WITH ROUTINE QUESTIONS DURING OFFICE HOURS. THE ON-CALL PROVIDER WILL NOT REFILL PRESCRIPTIONS.    *No lifting, pushing or pulling greater than 5-10 pound (this is about a gallon of milk).  *No repetitive bending, twisting, or jarring activities  *No overhead work  *No aerobic or strenuous activity  *No activities with increased risk of falls  *You may move about your home as tolerated  *You may walk up and down stairs as tolerated  *You may increase your activity slowly over the next 4-6 weeks    * WALKING PROGRAM: As you can tolerate, walk daily-start with 5-10 minutes of continuous walking. This is in addition to the walking that you do as part of your daily activities. Increase the time that you walk by 5 minutes every couple of days. Do not exceed 30-45 minutes of continuous walking until seen in follow-up. Walking is the best exercise after surgery.  **Listen to your body, if you find that you are more painful or fatigued, you may need to  proceed more slowly.    **Do not smoke or expose yourself to second hand smoke. Cigarette smoke can delay healing and cause complications.     DRIVING: Do not drive. Plan to discuss your return to driving at your 4-6 weeks follow-up appointment.    WORK: If you plan to return to work before you 4-6 weeks appointment, call and discuss with one of the nurses in the neurosurgery office.

## 2021-06-03 NOTE — TELEPHONE ENCOUNTER
PATIENT NAME:  Darion Herrera  YOB: 1975  MRN: [id number]  SURGEON: Dr. Sal  DATE of SURGERY: 10/30/2019  PROCEDURE: Replacement of distal catheter for ventriculoperitoneal shunt with general surgery assistance for laparoscopic approach    FOLLOW-UP:  Wound Check:  2 weekS  POST OP VISIT: 4 weeks   Post-op Provider: NP  DIAGNOSTICS:  radiology: CT scan: HEAD WO  (ORDERED ON 11/04/2019)  DISPOSITION:  HOME, LEFT WITH DAUGHTER    ADDITIONAL INSTRUCTIONS FOR MEDICAL STAFF:    Okay to resume plavix at home dosing  Monitor for bleeding/bruising with recent surgery

## 2021-06-03 NOTE — PROGRESS NOTES
Darion Herrera     1975     DATE OF SERVICE: 11/13/2019, 2 WEEK FOLLOW UP :      Darion Herrera is status post  shunt replacement by Dr. Sal on 10/30/19. Today, Darion Herrera reports no headaches, memory loss. She states due to multiple surgeries she feels her vision has changed.  Surgical wound within normal limits - Clean, dry, intact with no signs of infection or skin breakdown.  Incision well-healed: good skin approximation, no redness or visible/palpable edema, no tenderness to palpation.  Pt. afebrile, denies fever, chills or sweats.    Suture removal - sutures intact removed without difficulty. Wound prepped with Betadine before and after removal.  Surrounding skin has no signs of breakdown. Incision covered. Verbal instructions regarding incision care given.  Pt. advised to call us if any s/s of infection noted - all discussed in details.  Shweta Orosco, cma

## 2021-06-04 VITALS
BODY MASS INDEX: 27.42 KG/M2 | OXYGEN SATURATION: 98 % | HEIGHT: 59 IN | DIASTOLIC BLOOD PRESSURE: 57 MMHG | SYSTOLIC BLOOD PRESSURE: 102 MMHG | WEIGHT: 136 LBS | HEART RATE: 74 BPM

## 2021-06-04 NOTE — PROGRESS NOTES
NEUROSURGERY FOLLOW-UP NOTE  1/2/2020    History obtained with use of interpretor.    AP:Darion Herrera is a 44 y.o. female comes today in f/u after prior apt on 12/19/19 with a history of complicated recent hospitalization after a ruptured and dissecting right supraclinoid carotid artery aneurysm in July 2019. She underwent coil emobolization on 7/22/2019. She then experienced re-rupture 5 days later from recurrence and was treated with additional coils. She had a prolonged hospital course with multiple angiograms with intraarterial verapamil for severe vasospasm which resulted in ischemic infarcts. She eventually req'd placement of Certas shunt for hydrocephalus. Most recently, for treatment of her aneurysm she had undergone stent coiling which has required treatment with ASA and plavix. On her last follow-up head CT she was noted to be developing a subdural hematoma after her shunt setting had been turned from to 1 from 2 for treatment of persistent headaches. On 12/19/19, patient's shunt setting was rechecked and was found to be still set at 1. I reprogrammed her shunt setting to 3. I ordered a hold and call shunt xray. Patient returns today with the results of that workup and plan.    Subdural hematoma decreasing in size following shunt adjustment.  Repeat head CT in 1 month to ensure stability or ongoing resolution.   Headaches are unilateral and isolated around the shunt itself- Darion's shunt is NOT to be adjusted without my explicit instruction or without signs of over worsening hydrocephalus (diffuse headache, nausea, vomiting, somnolence).    S: Since our last visit, patient reports her headache and right sided head pain has been constant. She states that she has been ambulating independently, without the use of a cane.       The pts PMH, PSH, ROS, Meds, Allergies, SH, FH are all unchanged and summarized in the pts health history from last visit      PHYSICAL EXAM:   Constitutional: /79   Pulse 83   Ht  "4' 11\" (1.499 m)   Wt 136 lb (61.7 kg)   LMP 07/01/2016   SpO2 98%   BMI 27.47 kg/m    General: Awake, alert, NAD  HEENT: AT/NC, EOMI, face symmetric, oral mucosa moist and pink  Heart: RRR  Lungs: Nonlabored respirations without accessory muscle use.  Neuro: Awake, alert, speech is clear and content is appropriate. MAEW x 4. Left hand intrinsics 4/5. Remainder UE strength full. Full strength to LE.   Coordination: No drift. Able to heel toe walk without difficulty. Gait with ongoing improvement. No longer requiring a cane.   Reflexes: 3+ patellar and achilles reflexes bilaterally. 2 beats of clonus on left. Toes downgoing bilaterally.  Musculoskeletal: Negative straight leg raise bl  Psych: Appropriate mood and affect, pleasant, cooperative, alert and oriented x 3  Skin: Incisions C/D/I, pt w/ tenderness around her right shunt tract in its cranial and posterior auricular location.-No erythema, induration or underlying fluctuance    IMAGING:   I personally reviewed and visualized the following radiographic images:    Head CT 12/31/2019: Decreased size of right subdural hematoma now measuring 5 mm, previously 1 cm.  No other evidence of intracranial hemorrhage.    Malou Sal MD      CC:     Monroe Cassidy MD  [address] [place], [place] [id number]    I, Lane Restrepo, am serving as a scribe to document services personally performed by Dr. Malou Sal MD, based on my observation and the provider's statements to me. I, Malou Sal MD attest that Lane Restrepo is acting in a scribe capacity, has observed my performance of the services and has documented them in accordance with my direction.      "

## 2021-06-04 NOTE — PATIENT INSTRUCTIONS - HE
Keep shunt setting where it is at, and can not adjust without Dr. Sal's say.     Head CT w/o contrast in 1 month with f/u appt with Dr. Sal in trauma clinic.

## 2021-06-04 NOTE — PROGRESS NOTES
NEUROSURGERY FOLLOW-UP NOTE  12/19/2019    History obtained with the use of an interpretor.     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 She underwent coil emobolization on 7/22/2019. She then experienced re-rupture 5 days later from recurrence and was treated with additional coils. She had a prolonged hospital course with multiple angiograms with intraarterial verapamil for severe vasospasm which resulted in ischemic infarcts. She eventually req'd placement of Certas shunt for hydrocephalus. Most recently, for treatment of her aneurysm she had undergone stent coiling which has required treatment with ASA and plavix. On her last follow-up head CT she was noted to be developing a subdural hematoma after her shunt setting had been turned from to 1 from 2 for treatment of persistent headaches.     Shunt setting was rechecked today in the office and was found to still be set at 1, unfortunately. I reprogrammed her shunt setting to 3. I ordered a hold and call shunt xray which unfortunately the patient is unable to obtain until tomorrow. I reviewed the signs and symptoms of hydrocephalus which the patient should call us with if she is experiencing any worsening headaches, N/V, daytime sleepiness, worsening left sided weakness.    Will plan on follow-up in 2 weeks with repeat head CT if xray is stable    S: Since our last visit, patient notes that she has experienced a return of her pressure-like headache. She states that her pain begins near her right upper neck and radiates superiorly to her right parietal scalp, near her shunt placement. Her pain is exacerbated with laying her head on the pillow at night and with palpation. She adds that her pain typically starts around 4-5 PM and resolves by 10 PM. She feels her pain is at baseline in the mornings. Patient  "notes that her scalp will intermittently become itchy and tingling, prompting her to apply an alcohol ointment without relief. Denies pain located deeper in her head, abdominal pain, or other complaints at this time.    Patient reports that she has been able to ambulate without her cane for the past 2 weeks. She notes that she has experienced arm sensitivity and pain, left greater than right. This pain is exacerbated with laying on her arm during sleeping and bending of the arm in tight clothing. Denies involvement of the feet.     The pts PMH, PSH, ROS, Meds, Allergies, SH, FH are all unchanged and summarized in the pts health history from last visit      PHYSICAL EXAM:   Constitutional: /82   Pulse 88   Ht 4' 11\" (1.499 m)   Wt 136 lb (61.7 kg)   LMP 07/01/2016   SpO2 98%   BMI 27.47 kg/m    General: Awake, alert, NAD  HEENT: AT/NC, EOMI, face symmetric, oral mucosa moist and pink  Heart: RRR  Lungs: Nonlabored respirations without accessory muscle use.  Neuro: Awake, alert, speech is clear and content is appropriate. MAEW x 4, Left hand intrinsics 4/5, remainder UE strength full, full strength to LE. Sensation is intact to temperature and LT. Vibratory sense is intact in the bl great toe  Coordination: mild left pronator drift.  Reflexes: 3+ patellar and achilles reflexes bilaterally. 2 beats of clonus on left. Toes downgoing bilaterally. 2+ DTR to right upper extremity. 3+ DTR to left upper extremity.   Musculoskeletal: Negative straight leg raise bl  Psych: Appropriate mood and affect, pleasant, cooperative, alert and oriented x 3  Skin: Incision C/D/I    IMAGING:   I personally reviewed and visualized the following radiographic images:    Head CT 12/18/2019: There is been interval expansion of the ventricular size there is also been interval expansion of the right convexity subdural hematoma which now measures 9.5 mm in greatest dimension, this is over doubled in size since her last imaging on " December 4.  Where her subdural previously measured approximately 3 to 4 mm.    Malou Sal MD      CC:     Monroe Cassidy MD  [address] [place], [place] [id number]    I, Lane Restrepo, am serving as a scribe to document services personally performed by Dr. Malou Sal MD, based on my observation and the provider's statements to me. I, Malou Sal MD attest that Lane Restrepo is acting in a scribe capacity, has observed my performance of the services and has documented them in accordance with my direction.

## 2021-06-04 NOTE — PROGRESS NOTES
CHART NOTE     1975     DATE OF SERVICE: 12/5/2019     FOLLOW UP EVALUATION:    Seen w help of --her  w her.   ASSESSMENT :Darion Herrera is a 44 yo female that is known to our service having had a complicated recent hospitalization after a ruptured and dissecting right supraclinoid aneurysm in July. She underwent coil emobolization on 7/22/2019. She then experienced re-rupture 5 days later from recurrence and was treated with additional coils. She had a prolonged hospital course with multiple angiograms with intraarterial verapamil for severe vasospasm. She eventually req'd placement of Certas shunt for hydrocephalus.  She returned on 10/31--her abdominal catheter was in the pleural space--it was revised.  She returned last week for follow up.  She reported headaches almost daily, w photophobia and fatigue--terrible pressure inside her head.  The Certas setting was at 2 and I turned her down to 1, with close follow up as she had a thin SDH and  takes Plavix and low dose ASA.   Today, she reports feeling much better headache resolved, walking better, less fatigue.  On follow up imaging the thin SDH is slightly larger and ventricles are smaller       RADIOGRAPHIC IMAGING: On imaging her ventricles are smaller,but SDH a little larges was 2 mm now 4 mm.        PLAN: I discussed w Dr Sal--plan to turn shunt back to 2.  Using Certas  shunt reprogrammed to 2.  Plan short suspense follow up of 2 week w repeat head CT      PAST MEDICAL HISTORY, SURGICAL HISTORY, REVIEW OF SYMPTOMS, MEDICATIONS AND ALLERGIES:  Past medical history, surgical history, review of symptoms, medications and allergies remain unchanged.    Vitals:    12/05/19 1130   BP: 108/65   Pulse: 92   SpO2: 98%        PHYSICAL EXAM:  Neurological exam reveals:  Alert and oriented x3, speech fluent and appropriate.   PERRL, EOMI, face symmetric, tongue midline, shoulder shrug equa  Arm and leg  strength bilateral grasp, biceps,  triceps, and deltoids 4/5 --slightly weaker on left  Muscle Bulk and tone wnl.   Reflexes:No pathological reflexes   Gait and station:Limping --cane for stability

## 2021-06-04 NOTE — TELEPHONE ENCOUNTER
Dr. Cerda from [facility] called and spoke with HEATHER Pandey  and confirmed that shunt setting is at 3. Per Dr. Sal request. If shunt setting is at 3 pt should f/u with her in 2 weeks in trauma clinic with new Head CT w/o contrast. Order put in and  will call pt.   YADIRA Bartholomew

## 2021-06-05 NOTE — PROGRESS NOTES
"NEUROSURGERY FOLLOW-UP NOTE  1/30/2020      A/P: :Darion Herrera is a 44 y.o. female who comes today in f/u after prior apt on 01/02/2020. 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 She underwent coil emobolization on 7/22/2019. She then experienced re-rupture 5 days later from recurrence and was treated with additional coils. She had a prolonged hospital course with multiple angiograms with intraarterial verapamil for severe vasospasm which resulted in ischemic infarcts. She eventually req'd placement of Certas shunt for hydrocephalus. Most recently, for treatment of her aneurysm she had undergone stent coiling which has required treatment with ASA and plavix. On a recent follow-up head CT she was noted to be developing a subdural hematoma after her shunt setting had been turned from to 2 to 1 for treatment of persistent headaches. Her shunt has since been reset to 3 and she has been demonstrating gradual resolution of the right convexity SDH. We recommended repeat head CT in 1 month to ensure stability or ongoing resolution. Patient returns today with the results of that workup and plan.        Stable head CT; fu in 2-3 mo with repeat head CT  This patient's shunt setting should not be adjusted without explicit ok from Dr. Sal or neurologic emergency requiring intervention      S: Since our last visit, patient notes that her incision site at her right parietal scalp is still sensitive and painful. She notes that her pain is a soreness and \"cold\" sensation in nature. She denies radiation of her pain to other parts of her scalp, or headaches that are deep in nature. Reports mild itching to her RUQ abdomen near a previous incision site. Patient denies chest or abdominal pain, or other complaints at this time.     Patient reports experiencing chronic bilateral foot numbness to her plantar aspect, left greater than " "right. This is not a new issue.     The pts PMH, PSH, ROS, Meds, Allergies, SH, FH are all unchanged and summarized in the pts health history from last visit      PHYSICAL EXAM:   Constitutional: /57   Pulse 74   Ht 4' 11\" (1.499 m)   Wt 136 lb (61.7 kg)   LMP 07/01/2016   SpO2 98%   BMI 27.47 kg/m    General: Awake, alert, NAD  HEENT: AT/NC, EOMI, face symmetric, oral mucosa moist and pink  Heart: RRR  Lungs: Nonlabored respirations without accessory muscle use.  Neuro: Awake, alert, speech is clear and content is appropriate. MAEW x 4 full strength in b/l UE and LE. Sensation is intact to temperature and LT. Vibratory sense is intact in the bl great toe. No drift. CN II-XII grossly intact.  Coordination: KEVYN wnl bilateral hands-- fairly symmetric however mildly slowing on the left but much improved.   Reflexes: 3 bicep, brachiorad, tricep reflexes bilaterally. 3 patellar reflex left, 2+ patellar reflex right, 2+ achilles reflexes bilaterally. 1 beat clonus right, 1-2 beats clonus left. Toes downgoing bilaterally.  Musculoskeletal: Negative straight leg raise bl  Psych: Appropriate mood and affect, pleasant, cooperative, alert and oriented x 3  Skin: Incisions C/D/I    IMAGING: I personally reviewed and visualized the following radiographic images:     Head CT 1/30/2020: Ongoing interval resolution of right convexity SDH, small area of hyperdense SDH    Malou Sal MD    CC:     Monroe Cassidy MD  [address] [place], [place] [id number]    I, Lane Restrepo, am serving as a scribe to document services personally performed by Dr. Malou Sal MD, based on my observation and the provider's statements to me. I, Malou Sal MD attest that Lane Restrepo is acting in a scribe capacity, has observed my performance of the services and has documented them in accordance with my direction.  "

## 2021-06-08 NOTE — PROGRESS NOTES
"Darion Herrera is a 44 y.o. female who is being evaluated via a billable telephone visit.      The patient has been notified of following:     \"This telephone visit will be conducted via a call between you and your physician/provider. We have found that certain health care needs can be provided without the need for a physical exam.  This service lets us provide the care you need with a short phone conversation.  If a prescription is necessary we can send it directly to your pharmacy.  If lab work is needed we can place an order for that and you can then stop by our lab to have the test done at a later time.    Telephone visits are billed at different rates depending on your insurance coverage. During this emergency period, for some insurers they may be billed the same as an in-person visit.  Please reach out to your insurance provider with any questions.    If during the course of the call the physician/provider feels a telephone visit is not appropriate, you will not be charged for this service.\"    Patient has given verbal consent to a Telephone visit? Yes      Reason for visit:  1 month follow-up with new head CT for subdural hematoma.     Hx ruptured and dissecting right supraclinoid carotid artery aneurysm July 2019. S/p coil embo on 7/22/19 with re-reupture 5 days later with additional coiling. She had a prolonged hospital course with multiple angiograms with IA verapamil for severe vasospasm which resulted in ischemic infarct. She eventually required placement of certas shunt for hydrocephalus. For treatment of her aneurysm, she had undergone stent coiling which required asa and plavix treatment. She was noted to be developing a subdural after shunt setting was turned from 2 to 1 for persistent headaches. Her shunt was reset to 3 and she has since been demonstrating gradual resolution of the right convexity SDH.     Visit performed with Anyi (pt's son) translating over the phone.      Subjective:  Patient feels " well. Reports feeling 'pressure' on the right side of her head when lying down or sleeping. This is apparently not bothersome nor does it interfere with sleep, but is notable. Denies overt headache, dizziness, double vision, areas of visual loss, nausea or vomiting. Has noticed her vision up close is not as good as it used to be. She has continued taking asa and plavix daily. Does report tingling in both hands and both feet, wonders if this could be due to any of her meds.    Imaging:  Personally reviewed imaging and impressions    EXAM: CT HEAD WO CONTRAST  LOCATION: [facility]  DATE/TIME: 4/24/2020 12:31 PM     INDICATION: Subdural hemorrhage, follow-up fu subdural hematoma  COMPARISON: 03/06/2020 MRI. 01/30/2020 head CT.  TECHNIQUE: Routine without IV contrast. Multiplanar reformats. Dose reduction techniques were used.     FINDINGS:  INTRACRANIAL CONTENTS: No residual subdural hematoma.     Streak artifact from the stent assisted right supraclinoid aneurysm coiling somewhat compromises evaluation intracranial contents appear within the confines, no new intracranial hemorrhage or abnormal extra-axial fluid collection. No acute large artery   territory infarction. Unchanged sequela of old right MCA territory infarction. Unchanged positioning of right frontal approach ventricular catheter. No significant change in ventricular caliber.     VISUALIZED ORBITS/SINUSES/MASTOIDS: No intraorbital abnormality. No paranasal sinus mucosal disease. No middle ear or mastoid effusion.     BONES/SOFT TISSUES: No acute abnormality.     IMPRESSION:   1.  Resolution of the previously seen right cerebral convexity subdural hematoma.  2.  No new acute intracranial abnormality.    Diagnosis:  Subdural hematoma    Assessment/Plan:  Subdural hematoma has resolved, patient feels well overall. Head pressure seems improved and tolerable, unlike prior documented headaches.  Continue bloodthinners   Resume normal activity  Pt's son  to ask pt's daughter about whether Darion has had follow up with neurology regarding keppra mgmt. If not, we will refer her for an appt.   Follow up with IR prn - their dept manages need for follow ups/imaging.  Reviewed most common SE of her bloodthinners and keppra with pt and son - I dont expect her meds are causing tingling in her hands and feet. Advised call PCP for appt as this could be due to many things.    Advise a follow-up phone visit with NP to continue to touch base about symptoms in 8 weeks. No new imaging needed since subdural has resolved.  Per Dr Sal's note, shunt setting must not be changed unless explicitly ordered and reviewed by her.     No need for scheduled follow up visit unless questions or concerns arise    Amparo ALMANZARP-C  [facility] Neurosurgery  O. [phone]      I have reviewed and updated the patient's Past Medical History, Social History, Family History and Medication List.  Patient's allergies were reviewed.      Phone call duration: 15 minutes

## 2021-06-09 NOTE — TELEPHONE ENCOUNTER
Dr. Darby is working on patient's disability claim and has questions for either Dr. Sal or Dr. Hoffmann. He would like a callback at [phone].

## 2021-06-12 NOTE — TELEPHONE ENCOUNTER
Elisa at  MRI needs more information on this patient. First, she needs to know if patient has a certas shunt or a distal catheter. She also is questioning if patient has a clip or coil.    Please call sooner rather than later as patient is being seen at 4:00pm today.

## 2021-06-16 PROBLEM — Z98.890 S/P COIL EMBOLIZATION OF CEREBRAL ANEURYSM: Chronic | Status: ACTIVE | Noted: 2019-07-23

## 2021-06-16 PROBLEM — I60.8: Chronic | Status: ACTIVE | Noted: 2019-07-22

## 2021-06-16 PROBLEM — I60.9 SAH (SUBARACHNOID HEMORRHAGE) (H): Status: ACTIVE | Noted: 2019-08-21

## 2021-06-16 PROBLEM — G40.909: Status: ACTIVE | Noted: 2019-08-21

## 2021-06-16 PROBLEM — G91.9 HYDROCEPHALUS WITH OPERATING SHUNT (H): Status: ACTIVE | Noted: 2019-08-21

## 2021-06-16 PROBLEM — G91.9 HYDROCEPHALUS (H): Status: ACTIVE | Noted: 2019-07-22

## 2021-06-16 PROBLEM — J90 PLEURAL EFFUSION: Status: ACTIVE | Noted: 2019-10-26

## 2021-06-16 PROBLEM — J96.90 RESPIRATORY FAILURE (H): Status: ACTIVE | Noted: 2019-08-20

## 2021-06-16 PROBLEM — I69.398: Status: ACTIVE | Noted: 2019-08-21

## 2021-06-16 PROBLEM — I67.1 ANEURYSM OF RIGHT INTERNAL CAROTID ARTERY: Status: ACTIVE | Noted: 2019-07-23

## 2021-06-16 PROBLEM — S06.5XAA SUBDURAL HEMATOMA (H): Status: ACTIVE | Noted: 2020-05-07

## 2021-06-16 PROBLEM — J18.9 HCAP (HEALTHCARE-ASSOCIATED PNEUMONIA): Status: ACTIVE | Noted: 2019-10-26

## 2021-06-16 PROBLEM — I63.9 CVA (CEREBRAL VASCULAR ACCIDENT) (H): Status: ACTIVE | Noted: 2019-07-22

## 2021-06-16 PROBLEM — I67.1 CEREBRAL ANEURYSM: Status: ACTIVE | Noted: 2019-09-16

## 2021-06-27 NOTE — PROGRESS NOTES
"Progress Notes by Seema Jama CNP at 8/3/2019  9:42 AM     Author: Seema Jama CNP Service: Interventional Radiology Author Type: Nurse Practitioner    Filed: 8/3/2019  9:52 AM Date of Service: 8/3/2019  9:42 AM Status: Signed    : Seema Jama CNP (Nurse Practitioner)         Neurointerventional Rounding and Pre-Sedation Assessment    Reason for procedure: Vasospasm     HPI: Darion Herrera is a 43 year old female with IVH/SAH, secondary to 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     History and Physical Reviewed: yes    History reviewed. No pertinent past medical history.    Past Surgical History:   Procedure Laterality Date   ? IR CEREBRAL ANGIOGRAM  7/22/2019   ? IR CEREBRAL ANGIOGRAM  7/24/2019   ? IR CEREBRAL ANGIOGRAM  7/27/2019   ? IR CEREBRAL ANGIOGRAM  7/29/2019   ? IR CEREBRAL ANGIOGRAM  7/30/2019   ? IR CEREBRAL ANGIOGRAM  7/31/2019   ? IR CEREBRAL ANGIOGRAM  8/1/2019   ? IR CEREBRAL ANGIOGRAM  8/2/2019   ? IR LUMBAR DRAIN INSERTION  7/27/2019   ? IR LUMBAR DRAIN INSERTION  8/2/2019   ? PICC AND MIDLINE TEAM LINE INSERTION  7/26/2019            Allergies:  Allergies   Allergen Reactions   ? Oxycodone    ? Hydrocodone Swelling and Rash       Exam:   /63   Pulse 96   Temp 99.1  F (37.3  C) (Oral)   Resp 16   Ht 4' 10\" (1.473 m)   Wt 160 lb 9.6 oz (72.8 kg)   LMP 07/01/2016   SpO2 96%   BMI 33.57 kg/m      General: intubated and sedated  Neuro: limited exam now, no spontaneous movement, no response to painful stimuli  Cardiac: regular rate and rhythm   Lungs: clear   Mallampati score: orally intubated  ASA: ASA IV A patient with severe systemic disease that is a constant threat to life   Previous reaction to sedation/anesthesia: no  Sedation based on assessment: moderate  NPO since: this am       Labs:   Lab Results   Component Value Date    HGB 8.0 (L) 08/01/2019     Lab Results   Component Value Date     " 08/01/2019     Lab Results   Component Value Date    INR 1.10 08/03/2019     Results for orders placed or performed during the hospital encounter of 07/22/19   Basic Metabolic Panel   Result Value Ref Range    Sodium 145 136 - 145 mmol/L    Potassium 3.6 3.5 - 5.0 mmol/L    Chloride 113 (H) 98 - 107 mmol/L    CO2 23 22 - 31 mmol/L    Anion Gap, Calculation 9 5 - 18 mmol/L    Glucose 137 (H) 70 - 125 mg/dL    Calcium 8.5 8.5 - 10.5 mg/dL    BUN 8 8 - 22 mg/dL    Creatinine 0.63 0.60 - 1.10 mg/dL    GFR MDRD Af Amer >60 >60 mL/min/1.73m2    GFR MDRD Non Af Amer >60 >60 mL/min/1.73m2         Imaging:   MRI HEAD  8/3/2019   Pending     TRANSCRANIAL DOPPLER EXAM  8/3/2019   Report pending    CEREBRAL ANGIOGRAM AND INTRAARTERIAL VERAPAMIL  8/2/2019  CONCLUSION:  1. Stable vasospasm of the right anterior circulation. Moderate to marked narrowing of the right internal carotid artery supraclinoid segment, compatible with dissection and a superimposed component of vasospasm. Stable moderate vasospasm right MCA M1.   Stable mild vasospasm proximal M2 segment branches. Similar degree of delayed antegrade flow with corresponding leptomeningeal collateralization evident on angiography of the left anterior and posterior circulations. Distal truncation of MCA branches,   more pronounced than on prior comparison examinations. Although microembolic disease could have a similar appearance, this is present on multiple prior examinations to a lesser degree and more likely reflects balanced flow with leptomeningeal collaterals   in the context of hemodynamic compromise of the dissected internal carotid artery supraclinoid segment. No appreciable filling defect.  2. Improved vasospasm of the left anterior circulation. Mild vasospasm of the left internal carotid artery distal supraclinoid segment. Improved vasospasm of the right anterior cerebral artery A1-A2 segment junction, now of mild to moderate degree.    3. Verapamil was  administered in the following locations/amounts: Right internal carotid artery 20 mg, left internal carotid artery 20 mg, left vertebral artery 10 mg (no notable vasospasm in this circulation, administered to attempt augmentation of   collateral flow).  4. Stable interstitial filling at the base of the right internal carotid artery supraclinoid segment dissecting aneurysm coil mass.       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 Likely post bleed day # 16 (delayed presentation). S/p endovascular coil embolization 7/22, and recoil embolization 7/27.      -Headache, secondary to SAH  -Seizures 7/27  -Respiratory failure - intubated 7/27  -Hyponatremia - started on 3% per Neurosurgery  -Hydrocephalus - lumbar drain replaced 8/2  -Cerebral vasospasm - Verapamil 7/27, 7/28, 7/29, 7/30, 7/31, 8/1, 8/2  -Re-bleed 7/27 - recoil embolization 7/27  -Fevers     Plan:   Dr. Bethea and Dr. Hoffmann spoke this am and the plan is to hold off on cerebral angiogram and possible verapamil today given her exam and not much improvement with verapamil. MRI this am. Remove femoral sheath today. Reassess tomorrow. Patient spouse consented for angiogram/verapamil today and for tomorrow if plan changes.   Keep sBP upper limit to 160  Continue ICU cares and SAH protocols   Monitor and replace electrolytes  Nimodipine x 21 days   TCD's to monitor for vasospasm  We will follow along  Please call our office with any questions - [phone]    The procedure its risks, benefits, and alternatives were discussed. Risks including, but not limited to pain, hemorrhage, groin hematoma, blood vessel damage, infection, renal failure, contrast dye or medication reaction, or stroke were discussed with the patient's . Questions answered and the patient's  gives consent to proceed.  25 minutes were spent of which more than 50% of the  time was spent face to face with the patient, in reviewing medical record and images, and in counseling and coordinating patient's care.  Profession Hmong  used for discussion and consent.     Seema Jama, HEATHER  [phone]    Reference code for billing purposes only:  [id number]

## 2021-06-27 NOTE — PROGRESS NOTES
Progress Notes by Riky Lyles at 8/3/2019  3:31 PM     Author: Riky Lyles Service: -- Author Type: Technician    Filed: 8/3/2019  3:32 PM Date of Service: 8/3/2019  3:31 PM Status: Signed    : Riky Lyles (Technician)         Electroencephalography (EEG) Completed     Patient Name Darion Herrera   Gender female    1975   Age 43 y.o.   Date of testing 8/3/19       EEG number  CSN  N   [id number]  [id number]       Total Recording Time 24:00 minutes       A routine EEG was completed for this 43 y.o. patient.  Coma state(s) was/were recorded.  Interpretation to follow.      The International 10-20 system is used in this recording.               Riky Lyles

## 2021-06-27 NOTE — PROGRESS NOTES
Progress Notes by Seema Jama CNP at 7/27/2019  7:23 AM     Author: Seema Jama CNP Service: Interventional Radiology Author Type: Nurse Practitioner    Filed: 7/27/2019  9:40 AM Date of Service: 7/27/2019  7:23 AM Status: Signed    : Seema Jama CNP (Nurse Practitioner)           Subjective: seizures, intubated overnight, increasing hydrocephalus     Objective:   Intake/Output Summary (Last 24 hours) at 7/27/2019 0723  Last data filed at 7/27/2019 0600  Gross per 24 hour   Intake 3130.61 ml   Output 3125 ml   Net 5.61 ml       Imaging:   EXAM: CTA HEAD  LOCATION: [facility]  DATE/TIME: 07/27/2019, 4:50 AM  CONCLUSION:   HEAD CT:  1.  Technically suboptimal secondary to significant streak artifact relating to endovascular coil mass associated with treated right paraclinoid giant aneurysm.  2.  There is evidence of interval acute subarachnoid hemorrhage within the basal cisterns and a small amount within the ventricular system.  3.  Interval enlargement of the lateral and third ventricles and some sulcal effacement over the convexities suggests developing hydrocephalus and increased intracranial pressure.  4.  No CT evidence of large territory infarct at this time. Small bilateral infarcts demonstrated recently by MRI are not evident on this study.     HEAD CTA:   1.  The central intracranial circulation is largely obscured by significant streak artifact relating to endovascular coils.  2.  Significant hypoperfusion in the right MCA territory compared with the left, particularly in the anterior division. Presumably, this relates to vasospasm.      Labs:   Lab Results   Component Value Date    HGB 11.2 (L) 07/24/2019     Lab Results   Component Value Date     07/24/2019     Results for orders placed or performed during the hospital encounter of 07/22/19   Basic Metabolic Panel   Result Value Ref Range    Sodium 135 (L) 136 - 145 mmol/L    Potassium 3.2 (L) 3.5 - 5.0 mmol/L  "   Chloride 107 98 - 107 mmol/L    CO2 19 (L) 22 - 31 mmol/L    Anion Gap, Calculation 9 5 - 18 mmol/L    Glucose 124 70 - 125 mg/dL    Calcium 7.1 (L) 8.5 - 10.5 mg/dL    BUN 9 8 - 22 mg/dL    Creatinine 0.61 0.60 - 1.10 mg/dL    GFR MDRD Af Amer >60 >60 mL/min/1.73m2    GFR MDRD Non Af Amer >60 >60 mL/min/1.73m2       Allergies:  Allergies   Allergen Reactions   ? Oxycodone    ? 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 Likely post bleed day # 9 (delayed presentation). S/p endovascular coil embolization 7/22.   -Probable cerebral vasospasm - HHH therapy increased yesterday    -Headache, secondary to SAH  -Seizures  -Respiratory failure - intubated 7/27  -Hyponatremia - started on 3% per Neurosurgery     Plan:   Decrease sBP upper limit to 150  Lumbar drain placement, cerebral angiogram, possible recoil embolization, and possible verapamil infusion today  Continue ICU cares and SAH protocols   Monitor and replace electrolytes  Nimodipine x 21 days   TCD's to monitor for vasospasm  We will follow along  Please call our office with any questions - [phone]    30 minutes were spent of which more than 50% of the time was spent face to face with the patient/family, in reviewing medical record and images, and in counseling and coordinating patient's care.  The procedures it " risks, benefits, and alternatives were discussed. Risks including, but not limited to pain, hemorrhage, groin hematoma, blood vessel damage, infection, renal failure, contrast dye or medication reaction, spinal injury, or stroke were discussed with ~20 family members. Questions answered and the patient's spouse gives consent to proceed.    Professional [facility]  used for discussion and consent.    Reference code for billing purposes only: [id number]    Seema Jama, CNP  [phone]

## 2021-06-27 NOTE — PROGRESS NOTES
"Progress Notes by Seema Jama CNP at 8/6/2019  7:46 AM     Author: Seema Jama CNP Service: Interventional Radiology Author Type: Nurse Practitioner    Filed: 8/6/2019  2:55 PM Date of Service: 8/6/2019  7:46 AM Status: Signed    : Seema Jama CNP (Nurse Practitioner)         Neurointerventional Inpatient Rounding    Subjective: intubated and sedated      Objective:  Intake/Output Summary (Last 24 hours) at 8/6/2019 0746  Last data filed at 8/6/2019 0700  Gross per 24 hour   Intake 4723.8 ml   Output 4588 ml   Net 135.8 ml       Exam:   /72   Pulse 83   Temp 99.9  F (37.7  C)   Resp 19   Ht 4' 10\" (1.473 m)   Wt 164 lb 8 oz (74.6 kg)   LMP 07/01/2016   SpO2 98%   BMI 34.38 kg/m      General: intubated and sedated   Neuro: limited exam, pupils equal, no response to painful stimuli  Drain: 288 ml out over past 24 hours       Labs:   Lab Results   Component Value Date     08/06/2019    K 3.3 (L) 08/06/2019     (H) 08/06/2019    CO2 24 08/06/2019     Lab Results   Component Value Date    CREATININE 0.58 (L) 08/06/2019         Imaging:   TRANSCRANIAL DOPPLER EXAM  8/6/2019 12:11 PM  CONCLUSION:   1. Moderate vasospasm right middle cerebral artery.  2. No evidence of cerebral vasospasm in the remaining vascular territories.      EXAM: CT HEAD WO CONTRAST  LOCATION: [facility]  DATE/TIME: 8/5/2019 11:53 AM  IMPRESSION:   Increasing hypodensity involving the right MCA distribution worrisome for evolving sequelae of right MCA infarct without hemorrhagic conversion.        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 Likely post bleed day # 19 (delayed presentation). S/p endovascular coil embolization 7/22, and recoil embolization 7/27.      -Seizures 7/27  -Respiratory failure - intubated 7/27  -Hyponatremia - started on 3% per " Neurosurgery  -Hydrocephalus - lumbar drain replaced 8/2 (replace 8/9 if still needed)  -Cerebral vasospasm - Verapamil 7/27, 7/28, 7/29, 7/30, 7/31, 8/1, 8/2  -Re-bleed 7/27 - recoil embolization 7/27  -Fevers   -Right MCA and bilateral CATHI infarcts   -E. Coli     Plan:   Appreciate CC and neurosurgery management   LD management per Neurosurgery   Keep sBP upper limit to 160  Continue ICU cares and SAH protocols   Monitor and replace electrolytes  Nimodipine x 21 days   TCD's to monitor for vasospasm  We will sign off today. Please let us know if lumbar drain needs to be replaced - due for replacement 8/9  Please call our office with any questions - [phone]    *We will plan for follow up cerebral angiogram in 3 months pending recovery.      15 minutes were spent of which more than 50% of the time was spent face to face with the patient, in reviewing medical record and images, and in counseling and coordinating patient's care.     Seema Jama, CNP  [phone]    Reference code for billing purposes only:  [id number]

## 2021-06-27 NOTE — PROGRESS NOTES
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     History and Physical Reviewed: yes    History reviewed. No pertinent past medical history.    Past Surgical History:   Procedure Laterality Date   ? IR CEREBRAL ANGIOGRAM  7/22/2019   ? IR CEREBRAL ANGIOGRAM  7/24/2019   ? IR CEREBRAL ANGIOGRAM  7/27/2019   ? IR CEREBRAL ANGIOGRAM  7/29/2019   ? IR CEREBRAL ANGIOGRAM  7/30/2019   ? IR LUMBAR DRAIN INSERTION  7/27/2019   ? PICC AND MIDLINE TEAM LINE INSERTION  7/26/2019            Allergies:  Allergies   Allergen Reactions   ? Oxycodone    ? Hydrocodone Swelling and Rash       Exam:   /75   Pulse 92   Temp 100.1  F (37.8  C) (Axillary)   Resp 21   Ht 4' 10\" (1.473 m)   Wt 155 lb 14.4 oz (70.7 kg)   LMP 07/01/2016   SpO2 99%   BMI 32.58 kg/m      General: intubated and sedated  Neuro: limited exam now   Cardiac: regular rate and rhythm   Lungs: clear   Mallampati score: orally intubated  ASA: ASA IV A patient with severe systemic disease that is a constant threat to life   Previous reaction to sedation/anesthesia: no  Sedation based on assessment: moderate  NPO since: midnight       Labs:   Lab Results   Component Value Date    HGB 9.7 (L) 07/30/2019     Lab Results   Component Value Date     07/30/2019     Lab Results   Component Value Date    CREATININE 0.57 (L) 07/30/2019     Lab Results   Component Value Date    INR 1.12 (H) 07/27/2019 "     Results for orders placed or performed during the hospital encounter of 07/22/19   Basic Metabolic Panel   Result Value Ref Range    Sodium 148 (H) 136 - 145 mmol/L    Potassium 3.6 3.5 - 5.0 mmol/L    Chloride 122 (H) 98 - 107 mmol/L    CO2 19 (L) 22 - 31 mmol/L    Anion Gap, Calculation 7 5 - 18 mmol/L    Glucose 131 (H) 70 - 125 mg/dL    Calcium 7.7 (L) 8.5 - 10.5 mg/dL    BUN 3 (L) 8 - 22 mg/dL    Creatinine 0.57 (L) 0.60 - 1.10 mg/dL    GFR MDRD Af Amer >60 >60 mL/min/1.73m2    GFR MDRD Non Af Amer >60 >60 mL/min/1.73m2           Imaging:   TRANSCRANIAL DOPPLER EXAM  7/31/2019   Report pending    EXAM: CT HEAD WITHOUT CONTRAST  LOCATION: [facility]  DATE/TIME: 07/31/2019, 6:21 AM  CONCLUSION:  1.  Again seen is the suggestion of cytotoxic edema in the right MCA territory. Evaluation is limited due to extensive beam hardening artifact. Correlate for symptoms of right MCA infarct.  2.  Coiled supraclinoid aneurysm on the right.      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 Likely post bleed day # 13 (delayed presentation). S/p endovascular coil embolization 7/22, and recoil embolization 7/27.      -Headache, secondary to SAH  -Seizures 7/27  -Respiratory failure - intubated 7/27  -Hyponatremia - started on 3% per Neurosurgery  -Hydrocephalus - lumbar drain placed 7/27  -Cerebral vasospasm - Verapamil 7/27, 7/28, 7/29, 7/30  -Re-bleed 7/27 - recoil embolization 7/27  -Fevers     Plan:   Cerebral angiogram and possible IA verapamil today   Keep sBP upper limit to 160  Continue ICU cares and SAH protocols   Monitor and replace electrolytes  Nimodipine x 21 days   TCD's to monitor for vasospasm  We will follow along  Please call our office with any questions - [phone]    The procedure its risks, benefits, and alternatives were discussed. Risks including, but not limited to pain,  hemorrhage, groin hematoma, blood vessel damage, infection, renal failure, contrast dye or medication reaction, or stroke were discussed with the patient's . Questions answered and the patient's  gives consent to proceed.  25 minutes were spent of which more than 50% of the time was spent face to face with the patient, in reviewing medical record and images, and in counseling and coordinating patient's care.  Profession [facility]  used for discussion and consent.     Seema Jama, [facility]  [phone]    Reference code for billing purposes only:  [id number]

## 2021-06-27 NOTE — PROGRESS NOTES
"Progress Notes by Seema Jama CNP at 7/30/2019  8:22 AM     Author: Seema Jama CNP Service: Interventional Radiology Author Type: Nurse Practitioner    Filed: 7/30/2019  8:29 AM Date of Service: 7/30/2019  8:22 AM Status: Signed    : Seema Jama CNP (Nurse Practitioner)           Subjective: Intubated (off sedation for RN exam)    Objective:   Intake/Output Summary (Last 24 hours) at 7/30/2019 0822  Last data filed at 7/30/2019 0700  Gross per 24 hour   Intake 6617.08 ml   Output 6009 ml   Net 608.08 ml       Imaging:   INTRA-ARTERIAL VERAPAMIL INFUSION INTO THE RIGHT INTERNAL CAROTID ARTERY IN ORDER TO TREAT CEREBRAL VASOSPASM   7/29/2019 11:41 AM  CONCLUSION:  1. Stable to mildly increased interstitial filling of the right internal carotid artery aneurysm coil mass. No rachelle recurrence necessitating further coil embolization at this time.  2. Moderate narrowing of the internal carotid artery supraclinoid segment, compatible with dissection. There is mildly increased vasospasm of the internal carotid artery supraclinoid segment and right MCA M1 and proximal M2 segments. Verapamil 10 mg was   administered intra-arterially.  3. Interval recruitment of right CATHI and PCA to MCA leptomeningeal collaterals. This reflects some hemodynamic significance of the mild vasospasm superimposed on the dissected supraclinoid segment, as well as involving the MCA.    4. Follow-up cerebral angiogram 8/2/2019 to confirm aneurysm stability. Earlier angiography could be considered for treatment of vasospasm based on TCDs and clinical status.      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 Likely post bleed day # 12 (delayed presentation). S/p endovascular coil embolization 7/22, and recoil embolization 7/27.      -Headache, secondary to SAH  -Seizures 7/27  -Respiratory failure - intubated 7/27  -Hyponatremia - started on 3% per Neurosurgery  -Hydrocephalus - lumbar drain placed 7/27  -Cerebral vasospasm - Verapamil 7/27, 7/28, 7/29  -Re-bleed 7/27 - recoil embolization 7/27      Plan:   Keep sBP upper limit to 150  Cerebral angiogram follow up on Friday 8/2  Continue ICU cares and SAH protocols   Monitor and replace electrolytes  Nimodipine x 21 days   TCD's to monitor for vasospasm  We will follow along  Please call our office with any questions - [phone]    15 minutes were spent of which more than 50% of the time was spent face to face with the patient, in reviewing medical record and images, and in counseling and coordinating patient's care.    Seema Jama, HEATHER  [phone]    Reference code for billing purposes only:  [id number]

## 2021-06-27 NOTE — PROGRESS NOTES
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     History and Physical Reviewed: yes    History reviewed. No pertinent past medical history.    Past Surgical History:   Procedure Laterality Date   ? IR CEREBRAL ANGIOGRAM  7/22/2019   ? IR CEREBRAL ANGIOGRAM  7/24/2019   ? IR CEREBRAL ANGIOGRAM  7/27/2019   ? IR CEREBRAL ANGIOGRAM  7/29/2019   ? IR LUMBAR DRAIN INSERTION  7/27/2019   ? PICC AND MIDLINE TEAM LINE INSERTION  7/26/2019            Allergies:  Allergies   Allergen Reactions   ? Oxycodone    ? Hydrocodone Swelling and Rash       Exam:   /74   Pulse 84   Temp 99.4  F (37.4  C) (Oral)   Resp 19   Ht 4' 10\" (1.473 m)   Wt 154 lb 12.8 oz (70.2 kg)   LMP 07/01/2016   SpO2 100%   BMI 32.35 kg/m      General: intubated and sedated  Neuro: limited exam now (see note from earlier today)  Cardiac: regular rate and rhythm   Lungs: clear   Mallampati score: orally intubated  ASA: ASA IV A patient with severe systemic disease that is a constant threat to life   Previous reaction to sedation/anesthesia: no  Sedation based on assessment: moderate  NPO since: midnight       Labs:   Lab Results   Component Value Date    HGB 9.7 (L) 07/30/2019     Lab Results   Component Value Date     07/30/2019     Lab Results   Component Value Date    CREATININE 0.57 (L) 07/30/2019     Lab Results   Component Value Date    INR 1.12 (H) 07/27/2019         Impression: 43 year old female with " IVH/SAH, secondary to 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 Likely post bleed day # 12 (delayed presentation). S/p endovascular coil embolization 7/22, and recoil embolization 7/27. Vasospasm treated with Verapamil 7/27, 7/28, 7/29. TCD velocities elevated again today; neurosurgery requesting cerebral angiogram and possible verapamil infusion today.     Plan: Cerebral angiogram and possible IA verapamil     The procedure its risks, benefits, and alternatives were discussed. Risks including, but not limited to pain, hemorrhage, groin hematoma, blood vessel damage, infection, renal failure, contrast dye or medication reaction, or stroke were discussed with the patient's  and family. Questions answered and the patient's  gives telephone consent to proceed.  Profession [facility]  used for discussion and consent.     Seema Jama, CNP  [phone]

## 2021-06-27 NOTE — PROGRESS NOTES
"Progress Notes by Seema Jama CNP at 7/24/2019  7:23 AM     Author: Seema Jama CNP Service: Interventional Radiology Author Type: Nurse Practitioner    Filed: 7/24/2019  8:05 AM Date of Service: 7/24/2019  7:23 AM Status: Signed    : Seema Jama CNP (Nurse Practitioner)           Subjective: c/o headache and neck pain - currently 2/10 after receiving pain meds, continues to have mild blurriness in right eye     Objective:   Intake/Output Summary (Last 24 hours) at 7/24/2019 0753  Last data filed at 7/24/2019 0525  Gross per 24 hour   Intake 2381 ml   Output 2975 ml   Net -594 ml       Imaging:   TRANSCRANIAL DOPPLER EXAM  7/24/2019   Results pending    Exam:   /63   Pulse 68   Temp 98.3  F (36.8  C) (Oral)   Resp 18   Ht 4' 10\" (1.473 m)   Wt 150 lb 1.6 oz (68.1 kg)   LMP 07/01/2016   SpO2 95%   BMI 31.37 kg/m       General: resting in bed, appears comfortable  Neuro: alert and oriented x 4, speech is clear, follows commands, moves all extremities     Assessment:   43 year old female with IVH/SAH, secondary to 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 Likely post bleed day # 6 (delayed presentation). S/p endovascular coil embolization 7/22.   -Neurologically intact  -No evidence of cerebral vasospasm      Plan:   Follow up angiogram today to monitor stability of aneurysm   Keep NPO   Continue ICU cares and SAH protocols   Monitor and replace electrolytes  Nimodipine x 21 days   TCD's to monitor for vasospasm  We will follow along  Please call our office with any questions - [phone]    15 minutes were spent of which more than 50% of the time was spent face to face with the patient, in reviewing medical record and images, and in counseling and coordinating patient's care.  Reference code for billing purposes only: [id number]     Seema Jama CNP  [phone]         "

## 2021-06-27 NOTE — PROGRESS NOTES
Progress Notes by Seema Jama CNP at 7/26/2019 12:01 PM     Author: Seema Jama CNP Service: Interventional Radiology Author Type: Nurse Practitioner    Filed: 7/26/2019 12:08 PM Date of Service: 7/26/2019 12:01 PM Status: Signed    : Seema Jama CNP (Nurse Practitioner)           Subjective: c/o headache      Objective:   Intake/Output Summary (Last 24 hours) at 7/26/2019 1201  Last data filed at 7/26/2019 0730  Gross per 24 hour   Intake 695 ml   Output 2525 ml   Net -1830 ml       Imaging:   EXAM: MRA HEAD WO CONTRAST  LOCATION:   DATE/TIME: 7/26/2019 8:40 AM  CONCLUSION:  1.  Multiple foci of developing acute/subacute infarction throughout the right parietal occipital lobes, with additional single small foci of acute/subacute infarction in the right frontal lobe and left parietal lobe.  2.  Status post endovascular coiling of large dissecting aneurysm of the ventromedial right supraclinoid ICA with redemonstration of mild persistent flow-related enhancement along the neck of the aneurysm. There is also faint flow-related enhancement centrally within the coil mass.  3.  Mildly decreased caliber of the M1 and proximal M2 segments of the right middle cerebral artery compared to the left, which may represent mild vasospasm. There is more normal caliber of the distal M2 and M3 branches.    TRANSCRANIAL DOPPLER EXAM  7/26/2019  Report pending   Right MCA elevated 167 (previously 71)      Labs:   Results for orders placed or performed during the hospital encounter of 07/22/19   Basic Metabolic Panel   Result Value Ref Range    Sodium 136 136 - 145 mmol/L    Potassium 3.9 3.5 - 5.0 mmol/L    Chloride 103 98 - 107 mmol/L    CO2 23 22 - 31 mmol/L    Anion Gap, Calculation 10 5 - 18 mmol/L    Glucose 113 70 - 125 mg/dL    Calcium 9.1 8.5 - 10.5 mg/dL    BUN 15 8 - 22 mg/dL    Creatinine 0.73 0.60 - 1.10 mg/dL    GFR MDRD Af Amer >60 >60 mL/min/1.73m2    GFR MDRD Non Af Amer  ">60 >60 mL/min/1.73m2           Exam:   /69   Pulse 62   Temp 98.3  F (36.8  C)   Resp 19   Ht 4' 10\" (1.473 m)   Wt 154 lb 4.8 oz (70 kg)   LMP 07/01/2016   SpO2 98%   BMI 32.25 kg/m       General: resting in bed, appears comfortable  Neuro: alert and oriented x 4, speech is clear, follows commands, moves all extremities     Assessment:   43 year old female with IVH/SAH, secondary to 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 Likely post bleed day # 8 (delayed presentation). S/p endovascular coil embolization 7/22.   -Neurologically intact  -Possible right MCA vasospasm - reviewed with Neurosurgery, Dr. Duarte and Dr. Snider. No angiogram/verapamil today. HHH therapy.   -Headache, secondary to SAH      Plan:   HHH therapy per Neurosurgery  Ok to push sBP to 180 per Dr. Snider   Continue ICU cares and SAH protocols   Monitor and replace electrolytes  Nimodipine x 21 days   TCD's to monitor for vasospasm  We will follow along  Please call our office with any questions - [phone]    15 minutes were spent of which more than 50% of the time was spent face to face with the patient, in reviewing medical record and images, and in counseling and coordinating patient's care.  Reference code for billing purposes only: [id number]     Seema Jama, CNP  [phone]         "

## 2021-06-27 NOTE — PROGRESS NOTES
"Progress Notes by Seema Jama CNP at 8/8/2019  7:39 AM     Author: Seema Jama CNP Service: Interventional Radiology Author Type: Nurse Practitioner    Filed: 8/8/2019  8:41 AM Date of Service: 8/8/2019  7:39 AM Status: Signed    : Seema Jama CNP (Nurse Practitioner)         Neurointerventional Pre-Sedation Assessment    Reason for procedure: SAH, recurrent aneurysm on MRA      HPI: Darion Herrera is a 43 year old female admitted 7/21/2019 after 3 days of headache, dizziness, nausea, and vomiting. Denies vision changes. Denies recent falls or head trauma. In the ED she was afebrile and hemodynamically stable. CT scan abdomen and pelvis was unremarkable. CT head showed an extra-axial brain mass. Neurosurgery recommended Keppra for seizure prophylaxis and transferred to [facility] neuro ICU. Patient denies tobacco, alcohol or illicit drugs.    History and Physical Reviewed: yes     History reviewed. No pertinent past medical history.    Past Surgical History:   Procedure Laterality Date   ? IR CEREBRAL ANGIOGRAM  7/22/2019   ? IR CEREBRAL ANGIOGRAM  7/24/2019   ? IR CEREBRAL ANGIOGRAM  7/27/2019   ? IR CEREBRAL ANGIOGRAM  7/29/2019   ? IR CEREBRAL ANGIOGRAM  7/30/2019   ? IR CEREBRAL ANGIOGRAM  7/31/2019   ? IR CEREBRAL ANGIOGRAM  8/1/2019   ? IR CEREBRAL ANGIOGRAM  8/2/2019   ? IR CEREBRAL ANGIOGRAM  7/28/2019   ? IR LUMBAR DRAIN INSERTION  7/27/2019   ? IR LUMBAR DRAIN INSERTION  8/2/2019   ? PICC AND MIDLINE TEAM LINE INSERTION  7/26/2019            Allergies:  Allergies   Allergen Reactions   ? Oxycodone    ? Hydrocodone Swelling and Rash       Exam:   /88   Pulse 73   Temp 98.5  F (36.9  C) (Oral)   Resp 20   Ht 4' 10\" (1.473 m)   Wt 165 lb 3.2 oz (74.9 kg)   LMP 07/01/2016   SpO2 97%   BMI 34.53 kg/m      General: intubated and sedated with fentanyl and Precedex   Neuro: withdraw x 4 overnight  Cardiac: regular   Lungs: on mechanical vent   Mallampati score: orally " intubated  ASA: ASA IV A patient with severe systemic disease that is a constant threat to life   Previous reaction to sedation/anesthesia: no  Sedation based on assessment: moderate   NPO since: midnight       Labs:   Lab Results   Component Value Date    HGB 11.6 (L) 08/08/2019     Lab Results   Component Value Date     08/08/2019     Lab Results   Component Value Date    CREATININE 0.54 (L) 08/08/2019     Lab Results   Component Value Date    INR 1.10 08/03/2019         Impression:  Darion Herrera is a 43 year old female admitted 7/21/2019 with subarachnoid hemorrhage secondary to 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 Hospital course complicated by re-bleed, hydrocephalus, and persistent cerebral vasospasm.     MRA last evening concerning for recurrent aneurysm. Planning for cerebral angiogram this am and possible recoil embolization or vessel sacrifice. Patient due for lumbar drain replacement tomorrow for infection prevention. Neurosurgery ok with replacing drain today as patient is coming to IR. Patient is adequately NPO and medically stable for procedure using moderate intravenous sedation.    Plan: Lumbar drain replacement under moderate intravenous sedation   Cerebral angiogram under moderate intravenous sedation   Possible endovascular recoil embolization and possible vessel sacrifice under general anesthesia    The procedures risks, benefits, and alternatives were discussed. Risks including, but not limited to pain, hemorrhage, groin hematoma, blood vessel damage, infection, renal failure, contrast dye or medication reaction, or stroke were discussed with the patient's spouse (via phone) and daughter in room. Questions answered and the patient's  gives consent to proceed.  Professional language line  utilized for discussion and consent with patient's spouse.     Seema Jama, CNP  [phone]

## 2021-06-27 NOTE — PROGRESS NOTES
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 Complicated hospital course with re-bleed, concern for hydrocephalus, daily cerebral angiograms for severe vasospasm.       Interval Events: MRA raises concern for recurrent aneurysm.  Discussed with Dr. Monterroso. He has patient on schedule in AM for angio and possible more coil if needed or able. Head CT stable. She was off sedation overnight.  She gives a weak thumbs up on the left and shakes her head appropriately to question. TCD delayed due to IR     PLAN:       Patient Active Problem List   Diagnosis   ? Carotid artery aneurysm (H)   ? Aneurysmal subarachnoid hemorrhage (H)   ? Intraventricular hemorrhage, nontraumatic (H)   ? Compression of brain (H)   ? S/P coil embolization of cerebral aneurysm   ? Aneurysm of right internal carotid artery   ? Cerebrovascular accident (CVA) due to occlusion of right middle cerebral artery (H)   ? Vasospasm (H)  --Keep sedated, RASS -3 until velocities improving  --IV fluid goal 100 mL/hr, goal to be euvolemic or slightly hypervolemic to help mitigate vasospasm, appreciate intensivist assistance  --Target CVP > 10--(13 @ 0600)  --Run hypertonic sodium at set rate of 25 ml/hr  ---160  --TCD's daily. Continue these  --Keppra 500 mg two times a day x30 days, indefinite with seizure  --Nimodipine x21 days. Complete 8/13/2019  --Sharpe in place, strict I/O  --Mechanical DVT prophylaxis  --Must have accurate Is  and Os  --Continue Lumbar drain at 10-15 ml/hour. Would not wean until velocities stabilize        ? Fever  --On Rocephin, pseudomonas in sputum  --Likely also neurogenic component               Discharge Recommendations/Plan:  Barriers to Discharge: yes, requiring acute medical care.     Follow Up Plan: Pending         HPI: Darion Herrera is a 42 yo female who was admitted on 07/22/19 with SAH and IVH secondary to ruptured aneurysm of the right ICA.  Aneurysm coiling on 7/22 that was complicated by vasospasm. On 07/27, found to be unresponsive and possibly seizing, was intubated and taken to IR where it was revealed that her right ICU aneurysm had dissected and re-bled.  In IR on 07/27, the vessel was recoiled and a lumbar drain was placed for ICP management.  On 07/29 underwent intraarterial verapamil infusion with angiogram for acute vasospasm of right ICA. Due to increased TCD values, back to IR for another cerebral angiogram 7/30 for acute vasospasm and intraarterial verapamil infusion.      BRIEF HOSPITAL COURSE:  7/21/2019          -Admit with nausea, vomiting, headache. CT with question of extra axial mass over the planum sphenoidale region measuring 2.2 x 2.3 cm     7/22/2019          -MRI indicates mass on CT actually a giant right supraclinoid internal carotid artery aneurysm. + intraventricular hemorrhage, small right subarachnoid hemorrhage.                              -IR for angiogram, coil embolization of ruptured, dissecting aneurysm. Mild to moderate interstitial filling within the coil mass     7/24/2019          -Return to IR due to high likelihood of instability of aneurysm. Aneurysm was stable in appearance     7/27/2019          -Possible seizure, became unresponsive, intubated. CT + for increased amount of hemorrhage, cerebral edema, increasing size of ventricles.                               -Return to IR for angio and placement of lumbar drain                              -Aneurysm  re-ruptured, additional coils placed with 95% occlusion                              -Moderate to severe narrowing of R ICA likely secondary tot he dissecting aneurysm and not necessarily true spasm. Verapamil given.                              -Mild spasm R MCA     7/28/2019          -Stable appearance of aneurysm, verapamil given     7/29/2019          -Stable to mildly increased filling of aneurysm.                               -mildly increased spasm of R MCA, verapamil given. Plan for repeat angio 8/2/2019 7/30/2019          -TCD worsening. Patient sent for angio                              -worsening spasm in multiple territories. Verapamil given to R ICA, L ICA, R vert                              - Stable filling of aneurysm                              -Head CT with some hypodensity concerning for infarct of R MCA territory                              -Temp 102.9. Standard fever workup     7/31/2019          -Return to IR for treatment of vasospasm, more verapamil                              -CT head unchanged                              -UA with e coli. Gram neg rods blood culture, may be contaminant                              -Febrile, temp max 102.9                              -Antibiotics started per ICU team     8/1/2019            -Return to IR for treatment of vasospasm, more verapamil                              -CT head unchanged                              -CSF sent from lumbar drain                              -Fever persisted     8/2/2019          -Return to IR for treatment of vasospasm, higher dose verapamil given                          -TCDs worsening as compared to yesterday                          - Family conference to discuss G/J and Trach     8/5/2019          -Receiving blood products today and over weekend to keep HCT >30                          -Begin backing off on sedation, Goal RASS -1 to -2     8/7/2019          -To OR for trach and feeding tube                           -MRI/MRA brain                                   SUBJECTIVE:  Opens her eyes, not following commands per report of nursing     OBJECTIVE:  Vital signs in last 24 hours  Temp:  [100  F (37.8  C)-102.2  F (39  C)] 100.4  F (38  C)  Heart Rate:  [] 79  Resp:  [0-26] 18  BP: (101-155)/(61-95) 116/69  FiO2 (%):  [30 %-100 %] 30 %  Body mass index is 35.22 kg/m .     Mental Status: sedated, speech  intubated. Opens eyes spontaneously. Not following commands or attempting to communicate     Cranial Nerves: Exam limited to sedation.  PERRL. + cough/gag. Resists exam of R pupil, does not resist on the L. Brisk corneal on R, none on L may be due to neglect       Motor Strength: Does not withdraw to painful stim. Sedated.     Lumbar Drain: Lumbar drain patent, open and titrated to 10-15 mL output/hour.      Last 3 TCD Values          8/5/2019 8/6/2019 8/7/2019   LMCA     100 99 97   LACA     139 93 76   RMCA     211 194 176   RACA     84 87 72   BA     71 49 45                       SUBJECTIVE:  Intubated--nods head to yes and no questions       OBJECTIVE:  Vital signs in last 24 hours  Temp:  [98.5  F (36.9  C)-101  F (38.3  C)] 98.5  F (36.9  C)  Heart Rate:  [] 73  Resp:  [0-29] 20  BP: (102-161)/(62-92) 143/88  FiO2 (%):  [30 %] 30 %  Body mass index is 34.53 kg/m .    Neuro Exam : Intubated PERRL  + cough she opens eyes and tracks--shakes head appropriately --she squeezes and releases my hand on the right and gives a weak thumbs up on right-- flicker of movement when ask to wiggle toes on right--withdraws on left     Drain:Lumbar drain patency verified --draining 10-15 cc.hr        Pertinent Labs   Lab Results:   Lab Results   Component Value Date     08/08/2019    K 3.7 08/08/2019     08/08/2019    CO2 24 08/08/2019    BUN 9 08/08/2019    CREATININE 0.54 (L) 08/08/2019    CALCIUM 8.3 (L) 08/08/2019     Lab Results   Component Value Date    WBC 13.0 (H) 08/08/2019    HGB 11.6 (L)  08/08/2019    HCT 34.1 (L) 08/08/2019    MCV 86 08/08/2019     08/08/2019       Pertinent Radiology   Radiology Results: .   Stable large right MCA distribution hypodensity; consistent with evolving acute/subacute ischemia. 6 stable small amount of subtle hyperdensity at the right temporal operculum likely representing mild amount of petechial   hemorrhage. No significant global mass effect. Redemonstration of coiling at the anterior right aspect of [place] with prominent streaking artifact. The ventricles and sulci are normal for age.     personally reviewed.

## 2021-06-27 NOTE — PROGRESS NOTES
Progress Notes by Seema Jama CNP at 7/28/2019 10:19 AM     Author: Seema Jama CNP Service: Interventional Radiology Author Type: Nurse Practitioner    Filed: 7/28/2019 10:37 AM Date of Service: 7/28/2019 10:19 AM Status: Addendum    : Seema Jama CNP (Nurse Practitioner)    Related Notes: Original Note by Seema Jama CNP (Nurse Practitioner) filed at 7/28/2019 10:37 AM       Addendum: Patient's spouse signed consent for angiogram procedure for today and a separate consent form for repeat angiogram tomorrow, 7/29/2019          Rounding and Pre-procedure note    Subjective: Intubated and sedated      Objective:   Intake/Output Summary (Last 24 hours) at 7/28/2019 1019  Last data filed at 7/28/2019 0822  Gross per 24 hour   Intake 8346.1 ml   Output 6864 ml   Net 1482.1 ml       Imaging:   TRANSCRANIAL DOPPLER EXAM  7/28/2019  Results pending     EXAM: CT HEAD WITHOUT CONTRAST  LOCATION: [facility]  DATE/TIME: 07/28/2019, 4:52 AM  CONCLUSION:  1.  Overall, no significant change compared with 07/27/2019 at 1711.  2.  Redemonstration of changes of endovascular coiling of giant right supraclinoid aneurysm.  3.  Stable mild to moderate ventriculomegaly of the lateral and third ventricles. Continued close follow-up is advised.  4.  Overall, stable amount of recent intraventricular and subarachnoid hemorrhage. No definite evidence of interval acute intracranial hemorrhage.       ENDOVASCULAR COIL EMBOLIZATION OF A RE-RUPTURED PREVIOUSLY COIL EMBOLIZED DISSECTING ANEURYSM OF THE VENTRAL MEDIAL SUPRACLINOID RIGHT INTERNAL CAROTID ARTERY   07/27/2019  PERFORMING PHYSICIANS: Ric Snider M.D.  CONCLUSION:  1. The patient underwent endovascular coil embolization retreatment of a re-ruptured large dissecting aneurysm of the ventral medial supraclinoid right internal carotid artery. There was new growth in the region of the aneurysm neck. At the conclusion of  the procedure, the  "aneurysm is occluded by approximately 95%.  2. There is moderate to severe narrowing of the supraclinoid right internal carotid artery at the level of the aneurysm. This is most likely due to dissection and mass effect from the aneurysm. A component of vasospasm cannot be excluded. Intra-arterial verapamil was infused to treat any spasm component.  3. Mild vasospasm of the proximal right middle cerebral artery. Trace vasospasm basilar trunk.       Labs:   Lab Results   Component Value Date    HGB 11.2 (L) 07/24/2019     Lab Results   Component Value Date     07/24/2019     Results for orders placed or performed during the hospital encounter of 07/22/19   Basic Metabolic Panel   Result Value Ref Range    Sodium 143 136 - 145 mmol/L    Potassium 3.8 3.5 - 5.0 mmol/L    Chloride 117 (H) 98 - 107 mmol/L    CO2 17 (L) 22 - 31 mmol/L    Anion Gap, Calculation 9 5 - 18 mmol/L    Glucose 136 (H) 70 - 125 mg/dL    Calcium 8.3 (L) 8.5 - 10.5 mg/dL    BUN 6 (L) 8 - 22 mg/dL    Creatinine 0.63 0.60 - 1.10 mg/dL    GFR MDRD Af Amer >60 >60 mL/min/1.73m2    GFR MDRD Non Af Amer >60 >60 mL/min/1.73m2       Allergies:  Allergies   Allergen Reactions   ? Oxycodone    ? Hydrocodone Swelling and Rash      Exam:   /56   Pulse 82   Temp 99.8  F (37.7  C) (Oral)   Resp 20   Ht 4' 10\" (1.473 m)   Wt 155 lb 1.6 oz (70.4 kg)   LMP 07/01/2016   SpO2 97%   BMI 32.42 kg/m       General: intubated and sedated   Cardiac: regular rate  Lungs: intubated   Mallampati score: orally intubated   ASA: ASA IV A patient with severe systemic disease that is a constant threat to life   Previous reaction to sedation/anesthesia: no  Sedation based on assessment: general anesthesia  NPO since: midnight   Lumbar drain: 229 ml out yesterday    Assessment:   43 year old female with IVH/SAH, secondary to 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 Likely post bleed day # 10 (delayed " presentation). S/p endovascular coil embolization 7/22, and recoil embolization 7/27.     -Headache, secondary to SAH  -Seizures 7/27  -Respiratory failure - intubated 7/27  -Hyponatremia - started on 3% per Neurosurgery  -Hydrocephalus - lumbar drain placed 7/27  -Cerebral vasospasm - Verapamil 7/27  -Re-bleed 7/27 - recoil embolization     Plan:   sBP upper limit to 150  Cerebral angiogram and possible verapamil infusion today and likely repeat tomorrow  NPO after midnight   Continue ICU cares and SAH protocols   Monitor and replace electrolytes  Nimodipine x 21 days   TCD's to monitor for vasospasm  We will follow along  Please call our office with any questions - [phone]    35 minutes were spent of which more than 50% of the time was spent face to face with the patient/family, in reviewing medical record and images, and in counseling and coordinating patient's care.  The procedures it risks, benefits, and alternatives were discussed. Risks including, but not limited to pain, hemorrhage, groin hematoma, blood vessel damage, infection, renal failure, contrast dye or medication reaction or stroke were discussed with the patients family. Questions answered and the patient's spouse gives consent to proceed.    Professional [place]  used for discussion and consent.    Reference code for billing purposes only: 10807    Seema Jama, CNP  [phone]

## 2021-06-27 NOTE — PROGRESS NOTES
"Progress Notes by Indu Gamble CNP at 8/4/2019 10:32 AM     Author: Indu Gamble CNP Service: -- Author Type: Nurse Practitioner    Filed: 8/4/2019 10:56 AM Date of Service: 8/4/2019 10:32 AM Status: Signed    : Indu Gamble CNP (Nurse Practitioner)       Interventional Neuroradiology [facility] In[facility]    S: Intubated    O: /77   Pulse 93   Temp 100.4  F (38  C) (Oral)   Resp 16   Ht 4' 10\" (1.473 m)   Wt 161 lb 1.6 oz (73.1 kg)   LMP 07/01/2016   SpO2 99%   BMI 33.67 kg/m       Labs: Na 142, Mg 1.7    TCD:      Head CT 8/3/19:  CONCLUSION:  1.  No new acute intracranial abnormality.  2.  Continued evolution of right MCA bilateral CATHI territory infarction, more conspicuous on the recent MRI. No significant mass effect or hemorrhagic transformation.  3.  Continued decrease conspicuity of intraventricular subarachnoid blood products.    MRI 8/3/19:  CONCLUSION:  1.  Restricted effusion and suggestion of acute/early subacute ischemic change involving the right frontal, parietal, and temporal lobes and right insula and a right MCA vascular distribution.  2.  Foci of diffusion signal hyperintensity without definite diffusion restriction involving the right caudate nucleus as well as the parasagittal bilateral frontal and right parietal lobes in a bilateral CATHI distribution.    Neuro: intubated and sedated with fentanyl and propofol    A: 43 year old female with IVH/SAH, secondary to 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 Likely post bleed day # 17 (delayed presentation). S/p endovascular coil embolization 7/22, and recoil embolization 7/27.       -Seizures 7/27  -Respiratory failure - intubated 7/27  -Hyponatremia - started on 3% per Neurosurgery  -Hydrocephalus - lumbar drain replaced 8/2  -Cerebral vasospasm -IA verapamil given 7/27, 7/28, 7/29, 7/30, 7/31, 8/1, 8/2  -Re-bleed 7/27 - recoil embolization 7/27  -Fevers  -Rt MCA " and bilateral CATHI infarcts  -E coli    P: Appreciate CC and neurosurgery management  Lumbar drain management per neurosurgery (placed 8/2)  Keep SBP upper limit to 160  Continue ICU cares and SAH protocols   Monitor and replace electrolytes  Nimodipine x 21 days   TCD's to monitor for vasospasm  We will follow along  Dr Monterroso is on call today [phone]    20 minutes spent in image review, face to face, coordination of care    JF Inman, CNP    Code for reference only [id number]

## 2021-07-01 NOTE — PROCEDURES
"Procedures by Jodie Baker RN at 7/26/2019  4:19 PM     Author: oJdie Baker RN Service: -- Author Type: Registered Nurse    Filed: 7/26/2019  4:29 PM Date of Service: 7/26/2019  4:19 PM Status: Signed    : Jodie Baker RN (Registered Nurse)     Procedure Orders    1. Insert PICC [[id number]] ordered by Barbara Youssef CNP at 07/26/19 1359           Procedures    1. PICC AND MIDLINE TEAM LINE INSERTION [IVT34 (Custom)]             PICC Line Insertion Procedure Note  Pt. Name: Darion Herrera  MRN:        [id number]    Procedure: Insertion of a  Triple  Lumen  5 fr  Bard SOLO (valved) Power PICC, Lot number [id number]    Indications: Vasopressor    Contraindications : none    Procedure Details   Patient identified with 2 identifiers and \"Time Out\" conducted.  .     Central line insertion bundle followed: hand hygeine performed prior to procedure, site cleansed with cholraprep, hat, mask, sterile gloves,sterile gown worn, patient draped with maximum barrier head to toe drape, sterile field maintained.    The vein was assessed and found to be compressible and of adequate size. 1 ml 1% Lidocaine administered sq to the insertion site. A 5 Fr PICC was inserted into the basilic vein of the right arm with ultrasound guidance. 1 attempt(s) required to access vein.   Catheter threaded without difficulty. Good blood return noted.    Modified Seldinger Technique used for insertion.    The 8 sharps that are included in the PICC insertion kit were accounted for and disposed of in the sharps container prior to breakdown of the sterile field.    Catheter secured with Statlock, biopatch and Tegaderm dressing applied.    Findings:  Total catheter length  40 cm, with 5 cm exposed. Mid upper arm circumference is 30- cm. Catheter was flushed with 20 cc NS. Patient  tolerated procedure well.    Tip placement verified by 3CG technology . Tip placement in the SVC.    CLABSI prevention brochure left at bedside.    Patient's primary " RN notified PICC is ready for use.    Comments:          Jodie Baker RN,BSN,[facility]

## 2021-07-03 NOTE — ADDENDUM NOTE
Addendum Note by Tressa Kim CNP at 11/27/2019  1:30 PM     Author: Tressa Kim CNP Service: -- Author Type: Nurse Practitioner    Filed: 11/30/2019  4:54 PM Encounter Date: 11/27/2019 Status: Signed    : Tressa Kim CNP (Nurse Practitioner)    Addended by: TRESSA KIM on: 11/30/2019 04:54 PM        Modules accepted: Orders

## 2021-07-03 NOTE — ADDENDUM NOTE
Addendum Note by Tressa Kim CNP at 11/27/2019  1:30 PM     Author: Tressa Kim CNP Service: -- Author Type: Nurse Practitioner    Filed: 11/30/2019  4:58 PM Encounter Date: 11/27/2019 Status: Signed    : Tressa Kim CNP (Nurse Practitioner)    Addended by: TRESSA KIM on: 11/30/2019 04:58 PM        Modules accepted: Orders

## 2021-07-14 PROBLEM — I61.5: Chronic | Status: RESOLVED | Noted: 2019-07-22 | Resolved: 2019-10-27

## 2021-07-16 ENCOUNTER — TELEPHONE (OUTPATIENT)
Dept: NEUROSURGERY | Facility: CLINIC | Age: 46
End: 2021-07-16

## 2021-07-16 NOTE — TELEPHONE ENCOUNTER
Called pt and spoke with her daughter Corina who enquired about traveling for Darion. Okay was given for Darion to travel, reminded about safety and precautions.  Ivy Farfan RN, CNRN

## 2021-07-16 NOTE — TELEPHONE ENCOUNTER
Patient would like a call back to discuss a couple of questions she has regarding her shunt and traveling.    Please return call through [facility] [place] Best number to reach her: [phone]

## 2021-11-11 ENCOUNTER — TELEPHONE (OUTPATIENT)
Dept: PHYSICAL MEDICINE AND REHAB | Facility: CLINIC | Age: 46
End: 2021-11-11
Payer: COMMERCIAL

## 2021-11-11 NOTE — TELEPHONE ENCOUNTER
Darion's daughter called to get her scheduled for follow up with Dr. Sal. She had shunt put in on 10/30/2019 and was last seen in January of 2020. Patient would like to discuss resuming driving privilages and is not sure if that comes from Dr. Sal or her neurologist? Please order any imaging needed for appointment. Please call Darion with any questions at [phone] with an .

## 2021-11-11 NOTE — TELEPHONE ENCOUNTER
Returned call to patient with assistance of a [employer] . Pt is s/p shunt placement in 2019 with Dr. Sal.     Pt called to inquire about how to get her 's license back. Instructed her to reach out to Dr. Vasquez with neurology and gave the pt their clinic phone number.     Pt reports she is doing well over all. She has generalized head aches, denies visual changes or changes in bowel or bladder.     Darion verbalized understanding of the instructions for next steps.     Duration of call 10 minutes/.    Lian Hernandez RN

## 2021-11-11 NOTE — TELEPHONE ENCOUNTER
Left message for pt's daughter to return call. Pt needs to be seen by Neurology to discuss driving restrictions.     Lian Hernandez RN

## 2022-01-01 NOTE — PROGRESS NOTES
"Progress Notes by Seema Jama CNP at 7/29/2019 10:58 AM     Author: Seema Jama CNP Service: Interventional Radiology Author Type: Nurse Practitioner    Filed: 7/29/2019 11:07 AM Date of Service: 7/29/2019 10:58 AM Status: Signed    : Seema Jama CNP (Nurse Practitioner)           Subjective: Intubated and sedated      Objective:   Intake/Output Summary (Last 24 hours) at 7/29/2019 1058  Last data filed at 7/29/2019 1030  Gross per 24 hour   Intake 6993.3 ml   Output 5425 ml   Net 1568.3 ml       Imaging:   TRANSCRANIAL DOPPLER EXAM  7/29/2019  Results pending     CEREBRAL ANGIOGRAM AND VERAPAMIL  7/28/2019  Preliminary findings: (see dictation for full detail)  Stable Near Occlusion Right ICA Aneurysm  Verapamil 20 mg     Labs:   Lab Results   Component Value Date    HGB 10.7 (L) 07/29/2019     Lab Results   Component Value Date     07/29/2019     Results for orders placed or performed during the hospital encounter of 07/22/19   Basic Metabolic Panel   Result Value Ref Range    Sodium 143 136 - 145 mmol/L    Potassium 3.8 3.5 - 5.0 mmol/L    Chloride 117 (H) 98 - 107 mmol/L    CO2 17 (L) 22 - 31 mmol/L    Anion Gap, Calculation 9 5 - 18 mmol/L    Glucose 136 (H) 70 - 125 mg/dL    Calcium 8.3 (L) 8.5 - 10.5 mg/dL    BUN 6 (L) 8 - 22 mg/dL    Creatinine 0.63 0.60 - 1.10 mg/dL    GFR MDRD Af Amer >60 >60 mL/min/1.73m2    GFR MDRD Non Af Amer >60 >60 mL/min/1.73m2       Allergies   Allergen Reactions   ? Oxycodone    ? Hydrocodone Swelling and Rash      Exam:   /62   Pulse 90   Temp 100.3  F (37.9  C)   Resp 20   Ht 4' 10\" (1.473 m)   Wt 155 lb (70.3 kg)   LMP 07/01/2016   SpO2 100%   BMI 32.40 kg/m       General: intubated and sedated   Cardiac: regular rate  Lungs: intubated   Mallampati score: orally intubated   ASA: ASA IV A patient with severe systemic disease that is a constant threat to life   Previous reaction to sedation/anesthesia: no  Sedation based on " Preventive Care Visit  [facility]  LORA De Luna CNP, Family Medicine  Sep 19, 2022  Assessment & Plan   2 month old, here for preventive care.    (Z00.129) Encounter for routine child health examination without abnormal findings  (primary encounter diagnosis)  Comment: normal growth, development.   Plan: Maternal Health Risk Assessment (69936) - EPDS,        DTAP - HIB - IPV (PENTACEL), IM USE, HEPATITIS         B VACCINE,PED/ADOL,IM, PNEUMOCOC CONJ VAC 13         EDGAR, ROTAVIRUS VACC PENTAV 3 DOSE SCHED LIVE         ORAL            (M43.6) Torticollis  Comment: reviewed/demonstrated stretching techniques.  Discussed frquent position changes, allowing for head/neck movement.  Physical therapy referral ordered for additional assistance in stretching.     Plan: Physical Therapy Referral      (L70.4)  acne  Comment: skin care reviewed. No intervention needed at present.  Wipe face gently with warm washcloth.  if dry/may apply moisturizing cream reviewed.   Discussed symptoms that would indicate need for re-evaluation.         Growth      Weight change since birth: 68%  Normal OFC, length and weight    Immunizations   Appropriate vaccinations were ordered.  Immunizations Administered     Name Date Dose VIS Date Route    DTAP-IPV/HIB (PENTACEL) 22  5:27 PM 0.5 mL 21, Multi, Given Today Intramuscular    HepB-Peds 22  5:29 PM 0.5 mL 08/15/2019, Given Today Intramuscular    Pneumo Conj 13-V (2010&after) 22  5:29 PM 0.5 mL 2021, Given Today Intramuscular    Rotavirus, pentavalent 22  5:28 PM 2 mL 10/30/2019, Given Today Oral        Anticipatory Guidance    Reviewed age appropriate anticipatory guidance.   SOCIAL/ FAMILY    return to work    crying/ fussiness    calming techniques    talk or sing to baby/ music  NUTRITION:    delay solid food    no honey before one year  HEALTH/ SAFETY:    fevers    skin care    spitting up    temperature taking    " sleep patterns    Referrals/Ongoing Specialty Care  Referrals made, see above    Follow Up      Return in about 2 months (around 2022) for Preventive Care visit.    Subjective     Additional Questions 2022   Accompanied by Parents   Questions for today's visit No   Surgery, major illness, or injury since last physical No     Birth History    Birth History     Birth     Length: 48.5 cm (1' 7.09\")     Weight: 3.11 kg (6 lb 13.7 oz)     HC 31.5 cm (12.4\")     Apgar     One: 8     Five: 9     Gestation Age: 39 4/7 wks     Term male born to 36yo  mother.      Received Hep B, Vit K, EEO.   Passed hearing bilaterally.   Bili 6.0 at 24 hours, LIR.        Immunization History   Administered Date(s) Administered     DTAP-IPV/HIB (PENTACEL) 2022     Hep B, Peds or Adolescent 2022, 2022     Pneumo Conj 13-V (2010&after) 2022     Rotavirus, pentavalent 2022     Hepatitis B # 1 given in nursery: yes   metabolic screening: Results not know at this time--will retrieve from [facility] online portal   hearing screen: Passed--data reviewed   [place]  Depression Scale (EPDS) Risk Assessment: Completed [place]    Social 2022   Lives with Parent(s)   Who takes care of your child? Parent(s)   Recent potential stressors None   Lack of transportation has limited access to appts/meds No   Difficulty paying mortgage/rent on time No   Lack of steady place to sleep/has slept in a shelter No     Health Risks/Safety 2022   What type of car seat does your child use?  Infant car seat   Is your child's car seat forward or rear facing? Rear facing   Where does your child sit in the car?  Back seat        TB Screening: Consider immunosuppression as a risk factor for TB 2022   Recent TB infection or positive TB test in family/close contacts No      Diet 2022   Questions about feeding? No   What does your baby eat?  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 Likely post bleed day # 11 (delayed presentation). S/p endovascular coil embolization 7/22, and recoil embolization 7/27.     -Headache, secondary to SAH  -Seizures 7/27  -Respiratory failure - intubated 7/27  -Hyponatremia - started on 3% per Neurosurgery  -Hydrocephalus - lumbar drain placed 7/27  -Cerebral vasospasm - Verapamil 7/27, 7/28  -Re-bleed 7/27 - recoil embolization 7/27    Plan:   Keep sBP upper limit to 150  Cerebral angiogram and possible verapamil infusion today     Continue ICU cares and SAH protocols   Monitor and replace electrolytes  Nimodipine x 21 days   TCD's to monitor for vasospasm  We will follow along  Please call our office with any questions - [phone]    15 minutes were spent of which more than 50% of the time was spent face to face with the patient/family, in reviewing medical record and images, and in counseling and coordinating patient's care.     [employer]  used for discussion with spouse today. Spouse signed consent for procedure yesterday (see 7/28 note)    Reference code for billing purposes only: [id number]    Seema Jama, HEATHER  [phone]                       "does your baby eat? Bottle   How often does your baby eat? (From the start of one feed to start of the next feed) Every two hours   Vitamin or supplement use None   In past 12 months, concerned food might run out Never true   In past 12 months, food has run out/couldn't afford more Never true     Elimination 2022   Bowel or bladder concerns? No concerns     Sleep 2022   Where does your baby sleep? Crib   In what position does your baby sleep? Back   How many times does your child wake in the night?  Two times     Vision/Hearing 2022   Vision or hearing concerns No concerns     Development/ Social-Emotional Screen 2022   Does your child receive any special services? No     Development  Screening too used, reviewed with parent or guardian:   ASQ 2 M Communication Gross Motor Fine Motor Problem Solving Personal-social   Score 60 60 60 60 60   Cutoff 22.70 41.84 30.16 24.62 33.17   Result Passed Passed Passed Passed Passed     Milestones (by observation/ exam/ report) 75-90% ile  PERSONAL/ SOCIAL/COGNITIVE:    Regards face    Smiles responsively  LANGUAGE:    Vocalizes    Responds to sound  GROSS MOTOR:    Lift head when prone    Kicks / equal movements  FINE MOTOR/ ADAPTIVE:    Eyes follow past midline    Reflexive grasp         Objective     Exam  Pulse 150   Temp 99.7  F (37.6  C) (Axillary)   Ht 0.568 m (1' 10.36\")   Wt 5.216 kg (11 lb 8 oz)   HC 38.9 cm (15.32\")   SpO2 100%   BMI 16.17 kg/m    39 %ile (Z= -0.28) based on WHO (Boys, 0-2 years) head circumference-for-age based on Head Circumference recorded on 2022.  27 %ile (Z= -0.61) based on WHO (Boys, 0-2 years) weight-for-age data using vitals from 2022.  18 %ile (Z= -0.92) based on WHO (Boys, 0-2 years) Length-for-age data based on Length recorded on 2022.  63 %ile (Z= 0.34) based on WHO (Boys, 0-2 years) weight-for-recumbent length data based on body measurements available as of 2022.    Physical Exam  GENERAL: " Active, alert, in no acute distress.  SKIN: pink papular rash to forehead, cheeks, chin. No breakdown, no pustules noted.   HEAD: Normocephalic. Normal fontanels and sutures.  EYES: Conjunctivae and cornea normal. Red reflexes present bilaterally.  EARS: Normal canals. Tympanic membranes are normal; gray and translucent.  NOSE: Normal without discharge.  MOUTH/THROAT: Clear. No oral lesions.  NECK: Supple, no masses.  LYMPH NODES: No adenopathy  LUNGS: Clear. No rales, rhonchi, wheezing or retractions  HEART: Regular rhythm. Normal S1/S2. No murmurs. Normal femoral pulses.  ABDOMEN: Soft, not distended, no masses or hepatosplenomegaly. Normal umbilicus and bowel sounds.   GENITALIA: Normal male external genitalia. Peyman stage I,  Testes descended bilaterally, no hernia or hydrocele.    EXTREMITIES: Hips normal with negative Ortolani and Huang. Symmetric creases and  no deformities  NEUROLOGIC: Normal tone throughout. Normal reflexes for age    LORA De Luna CNP  [facility]

## 2022-02-10 ENCOUNTER — LAB (OUTPATIENT)
Dept: LAB | Facility: CLINIC | Age: 47
End: 2022-02-10
Payer: COMMERCIAL

## 2022-02-10 ENCOUNTER — OFFICE VISIT (OUTPATIENT)
Dept: NEUROLOGY | Facility: CLINIC | Age: 47
End: 2022-02-10
Payer: COMMERCIAL

## 2022-02-10 VITALS
HEIGHT: 57 IN | WEIGHT: 154 LBS | SYSTOLIC BLOOD PRESSURE: 104 MMHG | HEART RATE: 80 BPM | DIASTOLIC BLOOD PRESSURE: 68 MMHG | BODY MASS INDEX: 33.22 KG/M2

## 2022-02-10 DIAGNOSIS — Z87.898 HISTORY OF SEIZURES: ICD-10-CM

## 2022-02-10 DIAGNOSIS — R41.89 SUBJECTIVE MEMORY COMPLAINTS: ICD-10-CM

## 2022-02-10 DIAGNOSIS — Z86.79 HISTORY OF ANEURYSM OF ARTERY: ICD-10-CM

## 2022-02-10 DIAGNOSIS — R51.9 NONINTRACTABLE HEADACHE, UNSPECIFIED CHRONICITY PATTERN, UNSPECIFIED HEADACHE TYPE: Primary | ICD-10-CM

## 2022-02-10 LAB — LEVETIRACETAM (KEPPRA): 34.1 UG/ML (ref 6–46)

## 2022-02-10 PROCEDURE — 99215 OFFICE O/P EST HI 40 MIN: CPT | Performed by: PSYCHIATRY & NEUROLOGY

## 2022-02-10 PROCEDURE — 36415 COLL VENOUS BLD VENIPUNCTURE: CPT

## 2022-02-10 PROCEDURE — 80177 DRUG SCRN QUAN LEVETIRACETAM: CPT

## 2022-02-10 RX ORDER — LEVETIRACETAM 1000 MG/1
1000 TABLET ORAL 2 TIMES DAILY
Qty: 180 TABLET | Refills: 1 | Status: SHIPPED | OUTPATIENT
Start: 2022-02-10 | End: 2022-04-13

## 2022-02-10 ASSESSMENT — MIFFLIN-ST. JEOR: SCORE: 1212.42

## 2022-02-10 NOTE — LETTER
2/10/2022         RE: Darion Herrera  [address]  [place] [address]        Dear Colleague,    Thank you for referring your patient, Darion Herrera, to the [facility] [place]. Please see a copy of my visit note below.    NEUROLOGY OUTPATIENT PROGRESS NOTE   Feb 10, 2022     CHIEF COMPLAINT/REASON FOR VISIT/REASON FOR CONSULT  Patient presents with:  RECHECK: muscle pain/heasache    REASON FOR CONSULTATION-right headache      HISTORY OF PRESENT ILLNESS  Darion Herrera is a 46 year old female seen today for evaluation of head pain.  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  Patient also developed hydrocephalus and needed a shunt.  Patient continues to have the shunt.  She at that time developed seizures and has been on Keppra since then.  Patient reports no side effects to the Keppra.    Patient over the last few months has been having right occipital region pain.  Patient's shunt is more in the frontal and the temporal region.  Patient reports that this pain is worse at nighttime.  Is barely there during the daytime.  It is more of a pulling pain she reports.  Denies any photophobia phonophobia or auras with this.  Has not tried any medications for this.  This started 4 months ago and is present every day.    Patient also complains of hand weakness.  She reports that if she bends her head she is unable to stay steady.  Complains of some ongoing neck stiffness.  Patient does not feel she is ready to go back to work.She does also complain of cognitive difficulty since the initial aneurysm rupture.    Patient also reports that she recently had a fall.  This was more of a mechanical fall that she had gone hunting with her  and then fell down.  Headache was present even before the fall.    12/11/20  Patient returns today.  She reports that she did try the gabapentin which has not helped her pain at all.  Reports no  side effects.  She tries to clarify the pain and reports that she went hunting with her .  She was not hunting but she tripped and fell.  She landed on her left side.  She has pain from her left elbow that radiates all the way to her left neck and then to her right occipital region.  This is more of a muscle pain and a headache.  The pain is outside the head.  In terms of her balance she has gone to physical therapy and it is a bit better but nothing significant.  She is using a cane.  Denies any other new symptoms.  Does complain of muscle pain than head pain.    Patient is a lot of questions about lifestyle restrictions, and questions about the shunt and primary care questions.  Reports no other new symptoms.  Patient remains seizure-free.    2/10/22  Patient returns today  1.  Reports ongoing head pain in the right occipital region.  Is not using any medications for this.  Reports no worsening  2.  Reports no seizures.  Taking Keppra regularly.  Has not missed doses.  No side effects to the Keppra  3.  Has lot of financial issues going on and is under a lot of stress.  Cannot go anywhere.  Is interested in driving.  Further reports that she is applied for disability x2 and things do not get approved for her.  Cannot do fine things are walk long distances.  4.  Reports ongoing balance issues.  Discussed with her that this is related to her previous head injury and she will need to exercise and give it time to see if they would improve.  5.  Patient is no longer following up with neurosurgery for shunt.    Previous history is reviewed and this is unchanged.    PAST MEDICAL/SURGICAL HISTORY  Past Medical History:   Diagnosis Date     Acute respiratory failure with hypoxia (H)      Adjustment disorder with mixed anxiety and depressed mood      Anemia      Aneurysm (H)      Aneurysmal subarachnoid hemorrhage (H)      Brain mass      Carotid artery aneurysm (H)      Cerebrovascular accident (CVA) due to  occlusion of right middle cerebral artery (H)      Compression of brain (H)      Constipation      Depressive disorder      E. 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     Intraventricular hemorrhage, nontraumatic (H)      Leiomyoma      Migraines      Mild malnutrition (H)      Nausea vomiting and diarrhea      Neck pain      Oropharyngeal dysphagia      Pneumonia due to group B Streptococcus (H)      Seizures (H)      Sleep difficulties      Thrush      Tracheostomy care (H)      Transaminitis      Vaginal itching     & discharge     Vasospasm of cerebral artery      Patient Active Problem List   Diagnosis     Leiomyoma of uterus     Aneurysmal subarachnoid hemorrhage (H)     S/P coil embolization of cerebral aneurysm     Aneurysm of right internal carotid artery     Cerebrovascular accident (CVA) due to occlusion of right middle cerebral artery (H)     CVA (cerebral vascular accident) (H)     Pneumonia due to group B Streptococcus, unspecified laterality, unspecified part of lung (H)     Vasospasm of cerebral artery     Hydrocephalus (H)     Adjustment disorder with mixed anxiety and depressed mood     Mood disorder (H)     Sleep difficulties     Respiratory failure (H)     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 (H)     SAH (subarachnoid hemorrhage) (H)     Hydrocephalus with operating shunt (H)     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)     Cerebral aneurysm     HCAP (healthcare-associated pneumonia)     Pleural effusion     RUQ abdominal pain     Shunt malfunction, subsequent encounter     Subdural hematoma (H)   Significant for previous  "aneurysm/rupture/stroke/migraine/seizure    FAMILY HISTORY  Family History   Problem Relation Age of Onset     No Known Problems Mother      No Known Problems Father      No Known Problems Sister      No Known Problems Brother      No Known Problems Maternal Aunt      No Known Problems Maternal Uncle      No Known Problems Paternal Aunt      No Known Problems Paternal Uncle      No Known Problems Maternal Grandmother      No Known Problems Maternal Grandfather      No Known Problems Paternal Grandmother      No Known Problems Paternal Grandfather      No Known Problems Other     Negative for aneurysms.  Positive for stroke and migraines    SOCIAL HISTORY  Social History     Tobacco Use     Smoking status: Never Smoker     Smokeless tobacco: Never Used   Substance Use Topics     Alcohol use: Never     Drug use: Never       SYSTEMS REVIEW  Twelve-system ROS was done and other than the HPI this was negative except for neck pain, arm and leg pain, weakness paralysis, falling, balance coordination problems, dizziness, headaches  Reports no other new issues/symptoms today.    MEDICATIONS  acetaminophen (TYLENOL) 500 MG tablet, Take 1,000 mg by mouth  aspirin (ASA) 81 MG chewable tablet, Take 81 mg by mouth  atorvastatin (LIPITOR) 20 MG tablet, Take 20 mg by mouth  clopidogrel (PLAVIX) 75 MG tablet, Take 75 mg by mouth    No current facility-administered medications on file prior to visit.       PHYSICAL EXAMINATION  VITALS: /68 (BP Location: Left arm, Patient Position: Sitting)   Pulse 80   Ht 1.448 m (4' 9\")   Wt 69.9 kg (154 lb)   BMI 33.33 kg/m    GENERAL: Healthy appearing, alert, no acute distress, normal habitus.  CARDIOVASCULAR: Extremities warm and well perfused. Pulses present.   NEUROLOGICAL:  Patient is awake and oriented to self, place and time.  Attention span is normal.  Memory is grossly intact.  [employer] is fluent and follows commands appropriately.  Appropriate fund of knowledge. Cranial nerves " 2-12 are intact. There is no pronator drift.  Motor exam shows 5/5 strength in all extremities.  Tone is symmetric bilaterally in upper and lower extremities.  (Previously reflexes are symmetric and 2+ in upper extremities and lower extremities. Sensory exam is grossly intact to light touch, pin prick and vibration.)  Finger to nose and heel to shin is without dysmetria.  Romberg is negative.  Gait is normal and the patient is able to do tandem walk with mild difficulty.      DIAGNOSTICS  CT head  IMPRESSION:   1.  Considerable streak artifact related to prior coil embolization. No hemorrhage, infarct or edema is visible.  2.  Unchanged position of a right frontal approach ventricular catheter. Unchanged ventricular caliber.        MRI  IMPRESSION:   1.  Redemonstrated stent assisted coiling of the previously described right supraclinoid internal carotid artery aneurysm with extensive susceptibility artifact relating to the coils. Tiny focus of flow related enhancement along the anterior margin of   the coil mass likely relates to the very small amount of opacification seen on prior conventional angiogram. No findings to suggest recurrence elsewhere.     2.  No new aneurysm and no high-grade intracranial stenosis.     3.   Right-sided subdural fluid collection measuring up to 2 mm in thickness is similar to what was demonstrated on prior head CT.     4.  Right frontal approach  shunt with stable ventricular size and configuration.    EEG (Dr. Meza)  Conclusion: This is an abnormal EEG due to moderate diffuse slowing consistent with sedation and also some asymmetry with mild attenuation on the right hemisphere. Clinical correlation is advised regarding any structural lesion on the right side.  There are no epileptiform findings on today's study.        OUTSIDE RECORDS  Outside referral notes were reviewed and pertinent information has been summarized:-Patient was recently seen in the emergency room.  Patient  was actually diagnosed with acute headache and fall.  Patient was thought to have the headache related to her fall and not before that.  Patient received a head CT that was stable.  Patient was supposed to use Tylenol ibuprofen to see if that would help.  No seizure-like activity was reported.  It was not thought that the  shunt was causing the problems.  Patient was asked to come to neurology.    10/16/20  HEAD MRI:   1.  Redemonstration of right frontal approach ventriculostomy catheter with tip in the anterior horn of the right lateral ventricle, stable compared to head CT 10/05/2020. No significant change in the size of the lateral and third ventricles.  2.  Mild right sided dural thickening. Question thin 2 mm subdural collection in the posterior right frontal and parietal convexities. It is not significantly changed compared to the prior head CT 10/05/2020.  3.  No acute/subacute infarcts, mass lesions, or hydrocephalus.   4.  Several small to moderate-sized areas of encephalomalacia involving the right cerebral hemisphere.     HEAD MRA:   1.  Coil mass involving the right supraclinoid internal carotid aneurysm causes artifact which limits evaluation of the adjacent vessels. Interval decrease in the previously noted small areas of flow related enhancement within the anterior aspect of the   coiled aneurysm compared to MRI 03/06/2020. No new areas of flow related enhancement.  2.  Apparent moderate to severe decrease in flow related enhancement involving the M1 segment of the right middle cerebral artery, new since prior brain MRA. Distal branches of the right middle cerebral artery are patent without hemodynamically   significant stenosis. The flow void of the right middle cerebral artery is present, however, on the T2-weighted images. This can be better evaluated with CT angiogram.    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  At this point patient's headache is not related to the  shunt.  Repeat MRI/MRA are stable in the head pain is not related to the aneurysm.  From her description her symptoms are more consistent with muscular pain than head pain.  No longer is using any medication will stop the gabapentin and Flexeril.    She should continue follow-up with neurosurgery for her shunt.  Balance issues are most likely related to her history of the giant cell aneurysm and hydrocephalus.  We will get a imaging study of the brain to evaluate for ongoing hydrocephalus.    I will further check a EEG to look for underlying epilepsy.  Patient has been on Keppra for 1 year with no seizures.  I believe she could be off Keppra.  She will need to be on a 3-month driving restriction once the Keppra is being weaned off.    I discussed about her disability/driving restrictions.  She is able to drive.  She reports no difficulty with vision or getting in and out of the car or getting lost.  Could consider occupational therapy evaluation for driving.  She does have some memory issues at baseline though reports no difficulty with on the road.  Could consider neuropsychology evaluation.    Patient further reports unable to go back to work.  Disability application has been denied.  Discussed about setting up functional occupational therapy evaluation and document objectively what she can and cannot do.  I did not place a referral at this time though could do it down the road depending on the test results.    I can see her back in 6 weeks    -     MR Brain w/o Contrast; Future  -     Keppra (Levetiracetam) Level;  Future  -     MR Brain w/o Contrast; Future  -     EEG Routine; Future  -     levETIRAcetam (KEPPRA) 1000 MG tablet; Take 1 tablet (1,000 mg) by mouth 2 times daily    Return in about 6 weeks (around 3/24/2022) for In-Clinic Visit (must), After testing.    Over 42 minutes were spent coordinating the care for the patient on the day of the encounter.  This includes previsit, during visit and post visit activities as documented above.  Clinical problems reviewed/addressed.  Prescription management.  Counseling patient on disability application.  Questions answered.  Use of  requires extra time.  (Activities include but not inclusive of reviewing chart, reviewing outside records, reviewing labs and imaging study results as well as the images, patient visit time including getting history and exam,  use if applicable, review of test results with the patient and coming up with a plan in a shared model, counseling patient and family, education and answering patient questions, EMR , EMR diagnosis entry and problem list management, medication reconciliation and prescription management if applicable, paperwork if applicable, printing documents and documentation of the visit activities.)      Justin Vasquez MD  Neurologist  [facility]  Tel:- [phone]    This note was dictated using voice recognition software.  Any grammatical or context distortions are unintentional and inherent to the software.        Again, thank you for allowing me to participate in the care of your patient.        Sincerely,        Justin Vasquez MD

## 2022-02-10 NOTE — PROGRESS NOTES
NEUROLOGY OUTPATIENT PROGRESS NOTE   Feb 10, 2022     CHIEF COMPLAINT/REASON FOR VISIT/REASON FOR CONSULT  Patient presents with:  RECHECK: muscle pain/heasache    REASON FOR CONSULTATION-right headache      HISTORY OF PRESENT ILLNESS  Darion Herrear is a 46 year old female seen today for evaluation of head pain.  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  Patient also developed hydrocephalus and needed a shunt.  Patient continues to have the shunt.  She at that time developed seizures and has been on Keppra since then.  Patient reports no side effects to the Keppra.    Patient over the last few months has been having right occipital region pain.  Patient's shunt is more in the frontal and the temporal region.  Patient reports that this pain is worse at nighttime.  Is barely there during the daytime.  It is more of a pulling pain she reports.  Denies any photophobia phonophobia or auras with this.  Has not tried any medications for this.  This started 4 months ago and is present every day.    Patient also complains of hand weakness.  She reports that if she bends her head she is unable to stay steady.  Complains of some ongoing neck stiffness.  Patient does not feel she is ready to go back to work.She does also complain of cognitive difficulty since the initial aneurysm rupture.    Patient also reports that she recently had a fall.  This was more of a mechanical fall that she had gone hunting with her  and then fell down.  Headache was present even before the fall.    12/11/20  Patient returns today.  She reports that she did try the gabapentin which has not helped her pain at all.  Reports no side effects.  She tries to clarify the pain and reports that she went hunting with her .  She was not hunting but she tripped and fell.  She landed on her left side.  She has pain from her left elbow that radiates all the way to her left neck  and then to her right occipital region.  This is more of a muscle pain and a headache.  The pain is outside the head.  In terms of her balance she has gone to physical therapy and it is a bit better but nothing significant.  She is using a cane.  Denies any other new symptoms.  Does complain of muscle pain than head pain.    Patient is a lot of questions about lifestyle restrictions, and questions about the shunt and primary care questions.  Reports no other new symptoms.  Patient remains seizure-free.    2/10/22  Patient returns today  1.  Reports ongoing head pain in the right occipital region.  Is not using any medications for this.  Reports no worsening  2.  Reports no seizures.  Taking Keppra regularly.  Has not missed doses.  No side effects to the Keppra  3.  Has lot of financial issues going on and is under a lot of stress.  Cannot go anywhere.  Is interested in driving.  Further reports that she is applied for disability x2 and things do not get approved for her.  Cannot do fine things are walk long distances.  4.  Reports ongoing balance issues.  Discussed with her that this is related to her previous head injury and she will need to exercise and give it time to see if they would improve.  5.  Patient is no longer following up with neurosurgery for shunt.    Previous history is reviewed and this is unchanged.    PAST MEDICAL/SURGICAL HISTORY  Past Medical History:   Diagnosis Date     Acute respiratory failure with hypoxia (H)      Adjustment disorder with mixed anxiety and depressed mood      Anemia      Aneurysm (H)      Aneurysmal subarachnoid hemorrhage (H)      Brain mass      Carotid artery aneurysm (H)      Cerebrovascular accident (CVA) due to occlusion of right middle cerebral artery (H)      Compression of brain (H)      Constipation      Depressive disorder      E. 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)      Fever in other diseases       Gram-positive bacteremia      Headache      History of stroke without residual deficits      HLD (hyperlipidemia)      Hydrocephalus (H)      Hypokalemia      Hypomagnesemia      Intracranial hemorrhage (H)     due to ruptured dissecting R ICA aneurysm     Intraventricular hemorrhage, nontraumatic (H)      Leiomyoma      Migraines      Mild malnutrition (H)      Nausea vomiting and diarrhea      Neck pain      Oropharyngeal dysphagia      Pneumonia due to group B Streptococcus (H)      Seizures (H)      Sleep difficulties      Thrush      Tracheostomy care (H)      Transaminitis      Vaginal itching     & discharge     Vasospasm of cerebral artery      Patient Active Problem List   Diagnosis     Leiomyoma of uterus     Aneurysmal subarachnoid hemorrhage (H)     S/P coil embolization of cerebral aneurysm     Aneurysm of right internal carotid artery     Cerebrovascular accident (CVA) due to occlusion of right middle cerebral artery (H)     CVA (cerebral vascular accident) (H)     Pneumonia due to group B Streptococcus, unspecified laterality, unspecified part of lung (H)     Vasospasm of cerebral artery     Hydrocephalus (H)     Adjustment disorder with mixed anxiety and depressed mood     Mood disorder (H)     Sleep difficulties     Respiratory failure (H)     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 (H)     SAH (subarachnoid hemorrhage) (H)     Hydrocephalus with operating shunt (H)     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)     Cerebral aneurysm     HCAP (healthcare-associated pneumonia)     Pleural effusion     RUQ abdominal pain     Shunt malfunction, subsequent encounter     Subdural hematoma (H)   Significant for previous aneurysm/rupture/stroke/migraine/seizure    FAMILY HISTORY  Family History   Problem Relation Age of Onset     No Known Problems Mother      No Known Problems Father      No Known Problems Sister      No Known Problems Brother      No Known Problems Maternal Aunt      No Known  "Problems Maternal Uncle      No Known Problems Paternal Aunt      No Known Problems Paternal Uncle      No Known Problems Maternal Grandmother      No Known Problems Maternal Grandfather      No Known Problems Paternal Grandmother      No Known Problems Paternal Grandfather      No Known Problems Other     Negative for aneurysms.  Positive for stroke and migraines    SOCIAL HISTORY  Social History     Tobacco Use     Smoking status: Never Smoker     Smokeless tobacco: Never Used   Substance Use Topics     Alcohol use: Never     Drug use: Never       SYSTEMS REVIEW  Twelve-system ROS was done and other than the HPI this was negative except for neck pain, arm and leg pain, weakness paralysis, falling, balance coordination problems, dizziness, headaches  Reports no other new issues/symptoms today.    MEDICATIONS  acetaminophen (TYLENOL) 500 MG tablet, Take 1,000 mg by mouth  aspirin (ASA) 81 MG chewable tablet, Take 81 mg by mouth  atorvastatin (LIPITOR) 20 MG tablet, Take 20 mg by mouth  clopidogrel (PLAVIX) 75 MG tablet, Take 75 mg by mouth    No current facility-administered medications on file prior to visit.       PHYSICAL EXAMINATION  VITALS: /68 (BP Location: Left arm, Patient Position: Sitting)   Pulse 80   Ht 1.448 m (4' 9\")   Wt 69.9 kg (154 lb)   BMI 33.33 kg/m    GENERAL: Healthy appearing, alert, no acute distress, normal habitus.  CARDIOVASCULAR: Extremities warm and well perfused. Pulses present.   NEUROLOGICAL:  Patient is awake and oriented to self, place and time.  Attention span is normal.  Memory is grossly intact.  [employer] is fluent and follows commands appropriately.  Appropriate fund of knowledge. Cranial nerves 2-12 are intact. There is no pronator drift.  Motor exam shows 5/5 strength in all extremities.  Tone is symmetric bilaterally in upper and lower extremities.  (Previously reflexes are symmetric and 2+ in upper extremities and lower extremities. Sensory exam is grossly intact " to light touch, pin prick and vibration.)  Finger to nose and heel to shin is without dysmetria.  Romberg is negative.  Gait is normal and the patient is able to do tandem walk with mild difficulty.      DIAGNOSTICS  CT head  IMPRESSION:   1.  Considerable streak artifact related to prior coil embolization. No hemorrhage, infarct or edema is visible.  2.  Unchanged position of a right frontal approach ventricular catheter. Unchanged ventricular caliber.        MRI  IMPRESSION:   1.  Redemonstrated stent assisted coiling of the previously described right supraclinoid internal carotid artery aneurysm with extensive susceptibility artifact relating to the coils. Tiny focus of flow related enhancement along the anterior margin of   the coil mass likely relates to the very small amount of opacification seen on prior conventional angiogram. No findings to suggest recurrence elsewhere.     2.  No new aneurysm and no high-grade intracranial stenosis.     3.   Right-sided subdural fluid collection measuring up to 2 mm in thickness is similar to what was demonstrated on prior head CT.     4.  Right frontal approach  shunt with stable ventricular size and configuration.    EEG (Dr. Meza)  Conclusion: This is an abnormal EEG due to moderate diffuse slowing consistent with sedation and also some asymmetry with mild attenuation on the right hemisphere. Clinical correlation is advised regarding any structural lesion on the right side.  There are no epileptiform findings on today's study.        OUTSIDE RECORDS  Outside referral notes were reviewed and pertinent information has been summarized:-Patient was recently seen in the emergency room.  Patient was actually diagnosed with acute headache and fall.  Patient was thought to have the headache related to her fall and not before that.  Patient received a head CT that was stable.  Patient was supposed to use Tylenol ibuprofen to see if that would help.  No seizure-like  activity was reported.  It was not thought that the  shunt was causing the problems.  Patient was asked to come to neurology.    10/16/20  HEAD MRI:   1.  Redemonstration of right frontal approach ventriculostomy catheter with tip in the anterior horn of the right lateral ventricle, stable compared to head CT 10/05/2020. No significant change in the size of the lateral and third ventricles.  2.  Mild right sided dural thickening. Question thin 2 mm subdural collection in the posterior right frontal and parietal convexities. It is not significantly changed compared to the prior head CT 10/05/2020.  3.  No acute/subacute infarcts, mass lesions, or hydrocephalus.   4.  Several small to moderate-sized areas of encephalomalacia involving the right cerebral hemisphere.     HEAD MRA:   1.  Coil mass involving the right supraclinoid internal carotid aneurysm causes artifact which limits evaluation of the adjacent vessels. Interval decrease in the previously noted small areas of flow related enhancement within the anterior aspect of the   coiled aneurysm compared to MRI 03/06/2020. No new areas of flow related enhancement.  2.  Apparent moderate to severe decrease in flow related enhancement involving the M1 segment of the right middle cerebral artery, new since prior brain MRA. Distal branches of the right middle cerebral artery are patent without hemodynamically   significant stenosis. The flow void of the right middle cerebral artery is present, however, on the T2-weighted images. This can be better evaluated with CT angiogram.    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  At this point patient's headache is not related to the  shunt.  Repeat MRI/MRA are stable in the head pain is not related to the aneurysm.  From her description her symptoms are more consistent with muscular pain than head pain.  No longer is using any medication will stop the gabapentin and Flexeril.    She should continue follow-up with neurosurgery for her shunt.  Balance issues are most likely related to her history of the giant cell aneurysm and hydrocephalus.  We will get a imaging study of the brain to evaluate for ongoing hydrocephalus.    I will further check a EEG to look for underlying epilepsy.  Patient has been on Keppra for 1 year with no seizures.  I believe she could be off Keppra.  She will need to be on a 3-month driving restriction once the Keppra is being weaned off.    I discussed about her disability/driving restrictions.  She is able to drive.  She reports no difficulty with vision or getting in and out of the car or getting lost.  Could consider occupational therapy evaluation for driving.  She does have some memory issues at baseline though reports no difficulty with on the road.  Could consider neuropsychology evaluation.    Patient further reports unable to go back to work.  Disability application has been denied.  Discussed about setting up functional occupational therapy evaluation and document objectively what she can and cannot do.  I did not place a referral at this time though could do it down the road depending on the test results.    I can see her back in 6 weeks    -     MR Brain w/o Contrast; Future  -     Keppra (Levetiracetam) Level; Future  -     MR Brain w/o Contrast; Future  -     EEG Routine; Future  -     levETIRAcetam (KEPPRA) 1000 MG tablet; Take 1 tablet (1,000 mg) by mouth 2 times daily    Return in about 6 weeks (around 3/24/2022) for In-Clinic Visit (must), After testing.    Over 42  minutes were spent coordinating the care for the patient on the day of the encounter.  This includes previsit, during visit and post visit activities as documented above.  Clinical problems reviewed/addressed.  Prescription management.  Counseling patient on disability application.  Questions answered.  Use of  requires extra time.  (Activities include but not inclusive of reviewing chart, reviewing outside records, reviewing labs and imaging study results as well as the images, patient visit time including getting history and exam,  use if applicable, review of test results with the patient and coming up with a plan in a shared model, counseling patient and family, education and answering patient questions, EMR , EMR diagnosis entry and problem list management, medication reconciliation and prescription management if applicable, paperwork if applicable, printing documents and documentation of the visit activities.)      Justin Vasquez MD  Neurologist  [facility]  Tel:- [phone]    This note was dictated using voice recognition software.  Any grammatical or context distortions are unintentional and inherent to the software.

## 2022-02-10 NOTE — NURSING NOTE
Chief Complaint   Patient presents with     RECHECK     muscle pain/heasache     Jones Fong MA on 2/10/2022 at 9:55 AM

## 2022-03-17 DIAGNOSIS — Z98.2 S/P VP SHUNT: Primary | ICD-10-CM

## 2022-03-17 NOTE — PROGRESS NOTES
Shunt XR scheduled prior to MRI tomorrow to confirm setting. Left message for pt's daughter to return call to clinic to inform her of this, no details left in message. Called and spoke with pt, informed her of her new arrival time of 8:25 for the XR, she verbalized understanding.     Lian Hernandez RN

## 2022-03-18 ENCOUNTER — OFFICE VISIT (OUTPATIENT)
Dept: NEUROSURGERY | Facility: CLINIC | Age: 47
End: 2022-03-18
Payer: COMMERCIAL

## 2022-03-18 ENCOUNTER — HOSPITAL ENCOUNTER (OUTPATIENT)
Dept: MRI IMAGING | Facility: CLINIC | Age: 47
Discharge: HOME | End: 2022-03-18
Attending: PSYCHIATRY & NEUROLOGY
Payer: COMMERCIAL

## 2022-03-18 ENCOUNTER — HOSPITAL ENCOUNTER (OUTPATIENT)
Dept: RADIOLOGY | Facility: CLINIC | Age: 47
Discharge: HOME | End: 2022-03-18
Attending: PHYSICIAN ASSISTANT
Payer: COMMERCIAL

## 2022-03-18 VITALS
HEART RATE: 97 BPM | BODY MASS INDEX: 35.68 KG/M2 | SYSTOLIC BLOOD PRESSURE: 107 MMHG | WEIGHT: 170 LBS | DIASTOLIC BLOOD PRESSURE: 72 MMHG | HEIGHT: 58 IN | OXYGEN SATURATION: 98 %

## 2022-03-18 DIAGNOSIS — Z98.2 S/P VP SHUNT: ICD-10-CM

## 2022-03-18 DIAGNOSIS — Z98.2 S/P VP SHUNT: Primary | ICD-10-CM

## 2022-03-18 PROCEDURE — 70551 MRI BRAIN STEM W/O DYE: CPT

## 2022-03-18 PROCEDURE — 99213 OFFICE O/P EST LOW 20 MIN: CPT | Performed by: PHYSICIAN ASSISTANT

## 2022-03-18 PROCEDURE — 74021 RADEX ABDOMEN 3+ VIEWS: CPT

## 2022-03-18 NOTE — PROGRESS NOTES
"Neurosurgery Progress Note: 3/18/2022     A/P:   ruptured and dissecting right supraclinoid carotid artery aneurysm in July 2019  Status post  shunt     Ms. Herrera is a 46 year old right handed female who presents after brain MRI for shunt check and reprogramming. She states that overall she is doing well. She has no complaints of headaches, nausea, emesis, or visual changes. She feels that her right neck is tender along the shunt tubing and is unable to sleep on her right sign secondary to her shunt.     Plan:  Patient has been advised to stretch and work on range of motion to improve pain related to her shunt tubing. Her shunt setting was checked and set at a 3 and verified with shunt re-. No further follow up is presently indicated. Follow up on an as needed basis and she understands to contact the office if needed.     Exam:  /72   Pulse 97   Ht 1.473 m (4' 10\")   Wt 77.1 kg (170 lb)   SpO2 98%   BMI 35.53 kg/m      General: alert and oriented x3     Strength is 5/5 throughout both upper extremities throughout.    Sensation is intact throughout both upper and lower extremities    Gait is smooth and coordinated    Incisions: well healed       Imaging:  Xray reviewed personally noted shunt setting on 3   MRI brain reviewed with no acute abnormalities       Bhumika Rodas PA-C  [facility]surgery  O: [phone]  "

## 2022-03-18 NOTE — PATIENT INSTRUCTIONS
Patient has been advised to stretch and work on range of motion to improve pain related to her shunt tubing. Her shunt setting was checked and set at a 3 and verified with shunt re-. No further follow up is presently indicated. Follow up on an as needed basis and she understands to contact the office if needed.

## 2022-03-18 NOTE — LETTER
"    3/18/2022         RE: Darion Herrera  [address]  [place] [address]        Dear Colleague,    Thank you for referring your patient, Darion Herrera, to the [facility]. Please see a copy of my visit note below.    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 Herrera is a 46 year old right handed female who presents after brain MRI for shunt check and reprogramming. She states that overall she is doing well. She has no complaints of headaches, nausea, emesis, or visual changes. She feels that her right neck is tender along the shunt tubing and is unable to sleep on her right sign secondary to her shunt.     Plan:  Patient has been advised to stretch and work on range of motion to improve pain related to her shunt tubing. Her shunt setting was checked and set at a 3 and verified with shunt re-. No further follow up is presently indicated. Follow up on an as needed basis and she understands to contact the office if needed.     Exam:  /72   Pulse 97   Ht 1.473 m (4' 10\")   Wt 77.1 kg (170 lb)   SpO2 98%   BMI 35.53 kg/m      General: alert and oriented x3     Strength is 5/5 throughout both upper extremities throughout.    Sensation is intact throughout both upper and lower extremities    Gait is smooth and coordinated    Incisions: well healed       Imaging:  Xray reviewed personally noted shunt setting on 3   MRI brain reviewed with no acute abnormalities       Bhumika Rodas PA-C  [facility]surgery  O: [phone]      Again, thank you for allowing me to participate in the care of your patient.        Sincerely,        Bhumika Rodas PA-C    "

## 2022-04-13 ENCOUNTER — ANCILLARY PROCEDURE (OUTPATIENT)
Dept: NEUROLOGY | Facility: CLINIC | Age: 47
End: 2022-04-13
Attending: PSYCHIATRY & NEUROLOGY
Payer: COMMERCIAL

## 2022-04-13 ENCOUNTER — OFFICE VISIT (OUTPATIENT)
Dept: NEUROLOGY | Facility: CLINIC | Age: 47
End: 2022-04-13

## 2022-04-13 VITALS
HEIGHT: 58 IN | DIASTOLIC BLOOD PRESSURE: 77 MMHG | HEART RATE: 68 BPM | BODY MASS INDEX: 37.47 KG/M2 | SYSTOLIC BLOOD PRESSURE: 119 MMHG | WEIGHT: 178.5 LBS

## 2022-04-13 DIAGNOSIS — M79.10 MUSCLE PAIN: ICD-10-CM

## 2022-04-13 DIAGNOSIS — R41.89 SUBJECTIVE MEMORY COMPLAINTS: Primary | ICD-10-CM

## 2022-04-13 DIAGNOSIS — Z87.898 HISTORY OF SEIZURES: ICD-10-CM

## 2022-04-13 DIAGNOSIS — R51.9 NONINTRACTABLE HEADACHE, UNSPECIFIED CHRONICITY PATTERN, UNSPECIFIED HEADACHE TYPE: ICD-10-CM

## 2022-04-13 DIAGNOSIS — Z86.79 HISTORY OF ANEURYSM OF ARTERY: ICD-10-CM

## 2022-04-13 PROCEDURE — 95816 EEG AWAKE AND DROWSY: CPT | Performed by: PSYCHIATRY & NEUROLOGY

## 2022-04-13 PROCEDURE — 99215 OFFICE O/P EST HI 40 MIN: CPT | Performed by: PSYCHIATRY & NEUROLOGY

## 2022-04-13 RX ORDER — LEVETIRACETAM 1000 MG/1
TABLET ORAL
Qty: 180 TABLET | Refills: 1 | Status: SHIPPED | OUTPATIENT
Start: 2022-04-13 | End: 2023-06-13

## 2022-04-13 NOTE — NURSING NOTE
Chief Complaint   Patient presents with     RECHECK     Muscle pain and headaches     Results     EEG, MRI, Lab       Kumar Phillips CMA@ on 4/13/2022 at 1:30 PM

## 2022-04-13 NOTE — PROGRESS NOTES
NEUROLOGY OUTPATIENT PROGRESS NOTE   Apr 13, 2022     CHIEF COMPLAINT/REASON FOR VISIT/REASON FOR CONSULT  Patient presents with:  RECHECK: Muscle pain and headaches  Results: EEG, MRI, Lab      REASON FOR CONSULTATION-right headache      HISTORY OF PRESENT ILLNESS  Darion Herrera is a 46 year old female seen today for evaluation of head pain.  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  Patient also developed hydrocephalus and needed a shunt.  Patient continues to have the shunt.  She at that time developed seizures and has been on Keppra since then.  Patient reports no side effects to the Keppra.    Patient over the last few months has been having right occipital region pain.  Patient's shunt is more in the frontal and the temporal region.  Patient reports that this pain is worse at nighttime.  Is barely there during the daytime.  It is more of a pulling pain she reports.  Denies any photophobia phonophobia or auras with this.  Has not tried any medications for this.  This started 4 months ago and is present every day.    Patient also complains of hand weakness.  She reports that if she bends her head she is unable to stay steady.  Complains of some ongoing neck stiffness.  Patient does not feel she is ready to go back to work.She does also complain of cognitive difficulty since the initial aneurysm rupture.    Patient also reports that she recently had a fall.  This was more of a mechanical fall that she had gone hunting with her  and then fell down.  Headache was present even before the fall.    12/11/20  Patient returns today.  She reports that she did try the gabapentin which has not helped her pain at all.  Reports no side effects.  She tries to clarify the pain and reports that she went hunting with her .  She was not hunting but she tripped and fell.  She landed on her left side.  She has pain from her left elbow that  radiates all the way to her left neck and then to her right occipital region.  This is more of a muscle pain and a headache.  The pain is outside the head.  In terms of her balance she has gone to physical therapy and it is a bit better but nothing significant.  She is using a cane.  Denies any other new symptoms.  Does complain of muscle pain than head pain.    Patient is a lot of questions about lifestyle restrictions, and questions about the shunt and primary care questions.  Reports no other new symptoms.  Patient remains seizure-free.    2/10/22  Patient returns today  1.  Reports ongoing head pain in the right occipital region.  Is not using any medications for this.  Reports no worsening  2.  Reports no seizures.  Taking Keppra regularly.  Has not missed doses.  No side effects to the Keppra  3.  Has lot of financial issues going on and is under a lot of stress.  Cannot go anywhere.  Is interested in driving.  Further reports that she is applied for disability x2 and things do not get approved for her.  Cannot do fine things are walk long distances.  4.  Reports ongoing balance issues.  Discussed with her that this is related to her previous head injury and she will need to exercise and give it time to see if they would improve.  5.  Patient is no longer following up with neurosurgery for shunt.    4/13/22  Patient returns today.  Reports no seizures.  Did complete the EEG.  Discussed about weaning off the Keppra and she is interested in that.  Discussed about not driving for couple of months while she is being weaned off.  She further has questions about safety about driving and discussed about driving evaluation which she is interested in.  Continues to have financial issues and is interested in going back to work.  Disability application has not been approved  Continues to have balance issues.  Headaches are about the same.  Has seen neurosurgery regarding the shunt and that is stable.    Previous history  is reviewed and this is unchanged.    PAST MEDICAL/SURGICAL HISTORY  Past Medical History:   Diagnosis Date     Acute respiratory failure with hypoxia (H)      Adjustment disorder with mixed anxiety and depressed mood      Anemia      Aneurysm (H)      Aneurysmal subarachnoid hemorrhage (H)      Brain mass      Carotid artery aneurysm (H)      Cerebrovascular accident (CVA) due to occlusion of right middle cerebral artery (H)      Compression of brain (H)      Constipation      Depressive disorder      E. 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     Intraventricular hemorrhage, nontraumatic (H)      Leiomyoma      Migraines      Mild malnutrition (H)      Nausea vomiting and diarrhea      Neck pain      Oropharyngeal dysphagia      Pneumonia due to group B Streptococcus (H)      Seizures (H)      Sleep difficulties      Thrush      Tracheostomy care (H)      Transaminitis      Vaginal itching     & discharge     Vasospasm of cerebral artery      Patient Active Problem List   Diagnosis     Leiomyoma of uterus     Aneurysmal subarachnoid hemorrhage (H)     S/P coil embolization of cerebral aneurysm     Aneurysm of right internal carotid artery     Cerebrovascular accident (CVA) due to occlusion of right middle cerebral artery (H)     CVA (cerebral vascular accident) (H)     Pneumonia due to group B Streptococcus, unspecified laterality, unspecified part of lung (H)     Vasospasm of cerebral artery     Hydrocephalus (H)     Adjustment disorder with mixed anxiety and depressed mood     Mood disorder (H)     Sleep difficulties     Respiratory failure (H)     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  "(H)     SAH (subarachnoid hemorrhage) (H)     Hydrocephalus with operating shunt (H)     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)     Cerebral aneurysm     HCAP (healthcare-associated pneumonia)     Pleural effusion     RUQ abdominal pain     Shunt malfunction, subsequent encounter     Subdural hematoma (H)   Significant for previous aneurysm/rupture/stroke/migraine/seizure    FAMILY HISTORY  Family History   Problem Relation Age of Onset     No Known Problems Mother      No Known Problems Father      No Known Problems Sister      No Known Problems Brother      No Known Problems Maternal Aunt      No Known Problems Maternal Uncle      No Known Problems Paternal Aunt      No Known Problems Paternal Uncle      No Known Problems Maternal Grandmother      No Known Problems Maternal Grandfather      No Known Problems Paternal Grandmother      No Known Problems Paternal Grandfather      No Known Problems Other     Negative for aneurysms.  Positive for stroke and migraines    SOCIAL HISTORY  Social History     Tobacco Use     Smoking status: Never Smoker     Smokeless tobacco: Never Used   Substance Use Topics     Alcohol use: Never     Drug use: Never       SYSTEMS REVIEW  Twelve-system ROS was done and other than the HPI this was negative except for neck pain, arm and leg pain, weakness paralysis, falling, balance coordination problems, dizziness, headaches  No new concerns/issues reported today.    MEDICATIONS  aspirin (ASA) 81 MG chewable tablet, Take 81 mg by mouth  atorvastatin (LIPITOR) 20 MG tablet, Take 20 mg by mouth  clopidogrel (PLAVIX) 75 MG tablet, Take 75 mg by mouth    No current facility-administered medications on file prior to visit.       PHYSICAL EXAMINATION  VITALS: /77 (BP Location: Left arm, Patient Position: Sitting)   Pulse 68   Ht 1.473 m (4' 10\")   Wt 81 kg (178 lb 8 oz)   BMI 37.31 kg/m    GENERAL: Healthy appearing, alert, no acute distress, normal " habitus.  CARDIOVASCULAR: Extremities warm and well perfused. Pulses present.   NEUROLOGICAL:  Patient is awake and oriented to self, place and time.  Attention span is normal.  Memory is grossly intact.  [employer] is fluent and follows commands appropriately.  Appropriate fund of knowledge. Cranial nerves 2-12 are intact. There is no pronator drift.  Motor exam shows 5/5 strength in all extremities.  Tone is symmetric bilaterally in upper and lower extremities.  (Previously reflexes are symmetric and 2+ in upper extremities and lower extremities. Sensory exam is grossly intact to light touch, pin prick and vibration.)  Finger to nose and heel to shin is without dysmetria.  Romberg is negative.  Gait is normal and the patient is able to do tandem walk with mild difficulty.  Exam overall unchanged compared to before.    DIAGNOSTICS  CT head  IMPRESSION:   1.  Considerable streak artifact related to prior coil embolization. No hemorrhage, infarct or edema is visible.  2.  Unchanged position of a right frontal approach ventricular catheter. Unchanged ventricular caliber.        MRI  IMPRESSION:   1.  Redemonstrated stent assisted coiling of the previously described right supraclinoid internal carotid artery aneurysm with extensive susceptibility artifact relating to the coils. Tiny focus of flow related enhancement along the anterior margin of   the coil mass likely relates to the very small amount of opacification seen on prior conventional angiogram. No findings to suggest recurrence elsewhere.     2.  No new aneurysm and no high-grade intracranial stenosis.     3.   Right-sided subdural fluid collection measuring up to 2 mm in thickness is similar to what was demonstrated on prior head CT.     4.  Right frontal approach  shunt with stable ventricular size and configuration.    EEG (Dr. Meza)  Conclusion: This is an abnormal EEG due to moderate diffuse slowing consistent with sedation and also some asymmetry  with mild attenuation on the right hemisphere. Clinical correlation is advised regarding any structural lesion on the right side.  There are no epileptiform findings on today's study.        OUTSIDE RECORDS  Outside referral notes were reviewed and pertinent information has been summarized:-Patient was recently seen in the emergency room.  Patient was actually diagnosed with acute headache and fall.  Patient was thought to have the headache related to her fall and not before that.  Patient received a head CT that was stable.  Patient was supposed to use Tylenol ibuprofen to see if that would help.  No seizure-like activity was reported.  It was not thought that the  shunt was causing the problems.  Patient was asked to come to neurology.    10/16/20  HEAD MRI:   1.  Redemonstration of right frontal approach ventriculostomy catheter with tip in the anterior horn of the right lateral ventricle, stable compared to head CT 10/05/2020. No significant change in the size of the lateral and third ventricles.  2.  Mild right sided dural thickening. Question thin 2 mm subdural collection in the posterior right frontal and parietal convexities. It is not significantly changed compared to the prior head CT 10/05/2020.  3.  No acute/subacute infarcts, mass lesions, or hydrocephalus.   4.  Several small to moderate-sized areas of encephalomalacia involving the right cerebral hemisphere.     HEAD MRA:   1.  Coil mass involving the right supraclinoid internal carotid aneurysm causes artifact which limits evaluation of the adjacent vessels. Interval decrease in the previously noted small areas of flow related enhancement within the anterior aspect of the   coiled aneurysm compared to MRI 03/06/2020. No new areas of flow related enhancement.  2.  Apparent moderate to severe decrease in flow related enhancement involving the M1 segment of the right middle cerebral artery, new since prior brain MRA. Distal branches of the right  middle cerebral artery are patent without hemodynamically   significant stenosis. The flow void of the right middle cerebral artery is present, however, on the T2-weighted images. This can be better evaluated with CT angiogram.    LABS  Component      Latest Ref Rng & Units 2/10/2022   Levetiracetam      6.0 - 46.0 ug/mL 34.1     MRI brain-images reviewed with the patient.  No major structural lesions noted.  IMPRESSION:  1.  Stable brain MRI without acute intracranial abnormality.  2.  Unchanged right frontal approach shunt catheter and ventricular caliber.  3.  Redemonstration of multifocal encephalomalacia in the right cerebral hemisphere and punctate chronic lacunar infarction in the right centrum semiovale.    Neurosurgery notes reviewed.  Was recommended to range of motion improved pain related to shunt tubing.  Shunt setting was checked and set at a 3 and verified with shunt reprogramming.  No major issues noted.    EEG  IMPRESSION/REPORT/PLAN  This is a normal EEG during wakefulness and drowsiness except for mild generalized background dysrhythmia. Further clinical correlation is needed.     Please note that the absence of epileptiform abnormalities does not exclude the possibility of epilepsy in any patient.     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  At this point patient's headache is not related to the  shunt.  Repeat MRI/MRA are stable in the head pain is not related to the aneurysm.  From her description her symptoms are more consistent with muscular pain than head pain.  No  longer is using any medication will stop the gabapentin and Flexeril.  Headaches are stable.  Will monitor.    She should continue follow-up with neurosurgery for her shunt.  Balance issues are most likely related to her history of the giant cell aneurysm and hydrocephalus.  MRI brain was negative for any hydrocephalus or any other structural lesions.  Her shunt has been functional per neurosurgery notes.    I will further check a EEG to look for underlying epilepsy.  This did come back negative.  Her Keppra level is therapeutic.  Given that its been over a year with no seizures and her seizure mainly happening initially at the time of the subarachnoid hemorrhage I feel that she could be weaned off the seizure medication with low risk for seizure recurrence.  Discussed extensively how to wean off the seizure medication.  She should not drive for couple of months while off the seizure medication.    I discussed about her disability/driving restrictions.  She is able to drive.  She reports no difficulty with vision or getting in and out of the car or getting lost.  Would get a Occupational Therapy/driving evaluation before she can drive to make sure there is no difficulty. She does have some memory issues at baseline though reports no difficulty with on the road.  Could consider neuropsychology evaluation.  This would be difficult due to the language barrier.    Patient further reports unable to go back to work.  Disability application has been denied.  Discussed about setting up functional occupational therapy evaluation and document objectively what she can and cannot do.  We will hold off on placing the referral and see how she does with the driving evaluation.    I can see her back in 3 months.    -     levETIRAcetam (KEPPRA) 1000 MG tablet; 500 mg BID for 2 weeks and then stop if no seizure.  -     Occupational Therapy Referral; Future    Return in about 3 months (around 7/13/2022) for In-Clinic Visit (must),  After testing.    Over 43 minutes were spent coordinating the care for the patient on the day of the encounter.  This includes previsit, during visit and post visit activities as documented above.  Extra time due to use of .  Discussing driving restriction and how to taper off the Keppra.  Further discussing with the patient about getting a driving evaluation done.  Multiple problems reviewed/addressed.  Multiple testing reviewed.  Images of the MRI showed to the patient.  (Activities include but not inclusive of reviewing chart, reviewing outside records, reviewing labs and imaging study results as well as the images, patient visit time including getting history and exam,  use if applicable, review of test results with the patient and coming up with a plan in a shared model, counseling patient and family, education and answering patient questions, EMR , EMR diagnosis entry and problem list management, medication reconciliation and prescription management if applicable, paperwork if applicable, printing documents and documentation of the visit activities.)      Justin Vasquez MD  Neurologist  [facility]  Tel:- [phone]    This note was dictated using voice recognition software.  Any grammatical or context distortions are unintentional and inherent to the software.

## 2022-04-13 NOTE — LETTER
4/13/2022         RE: Darion Herrera  [address]  [place] [address]        Dear Colleague,    Thank you for referring your patient, Darion Herrera, to the [facility] [place]. Please see a copy of my visit note below.    NEUROLOGY OUTPATIENT PROGRESS NOTE   Apr 13, 2022     CHIEF COMPLAINT/REASON FOR VISIT/REASON FOR CONSULT  Patient presents with:  RECHECK: Muscle pain and headaches  Results: EEG, MRI, Lab      REASON FOR CONSULTATION-right headache      HISTORY OF PRESENT ILLNESS  Darion Herrera is a 46 year old female seen today for evaluation of head pain.  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  Patient also developed hydrocephalus and needed a shunt.  Patient continues to have the shunt.  She at that time developed seizures and has been on Keppra since then.  Patient reports no side effects to the Keppra.    Patient over the last few months has been having right occipital region pain.  Patient's shunt is more in the frontal and the temporal region.  Patient reports that this pain is worse at nighttime.  Is barely there during the daytime.  It is more of a pulling pain she reports.  Denies any photophobia phonophobia or auras with this.  Has not tried any medications for this.  This started 4 months ago and is present every day.    Patient also complains of hand weakness.  She reports that if she bends her head she is unable to stay steady.  Complains of some ongoing neck stiffness.  Patient does not feel she is ready to go back to work.She does also complain of cognitive difficulty since the initial aneurysm rupture.    Patient also reports that she recently had a fall.  This was more of a mechanical fall that she had gone hunting with her  and then fell down.  Headache was present even before the fall.    12/11/20  Patient returns today.  She reports that she did try the gabapentin which has not helped  her pain at all.  Reports no side effects.  She tries to clarify the pain and reports that she went hunting with her .  She was not hunting but she tripped and fell.  She landed on her left side.  She has pain from her left elbow that radiates all the way to her left neck and then to her right occipital region.  This is more of a muscle pain and a headache.  The pain is outside the head.  In terms of her balance she has gone to physical therapy and it is a bit better but nothing significant.  She is using a cane.  Denies any other new symptoms.  Does complain of muscle pain than head pain.    Patient is a lot of questions about lifestyle restrictions, and questions about the shunt and primary care questions.  Reports no other new symptoms.  Patient remains seizure-free.    2/10/22  Patient returns today  1.  Reports ongoing head pain in the right occipital region.  Is not using any medications for this.  Reports no worsening  2.  Reports no seizures.  Taking Keppra regularly.  Has not missed doses.  No side effects to the Keppra  3.  Has lot of financial issues going on and is under a lot of stress.  Cannot go anywhere.  Is interested in driving.  Further reports that she is applied for disability x2 and things do not get approved for her.  Cannot do fine things are walk long distances.  4.  Reports ongoing balance issues.  Discussed with her that this is related to her previous head injury and she will need to exercise and give it time to see if they would improve.  5.  Patient is no longer following up with neurosurgery for shunt.    4/13/22  Patient returns today.  Reports no seizures.  Did complete the EEG.  Discussed about weaning off the Keppra and she is interested in that.  Discussed about not driving for couple of months while she is being weaned off.  She further has questions about safety about driving and discussed about driving evaluation which she is interested in.  Continues to have financial  issues and is interested in going back to work.  Disability application has not been approved  Continues to have balance issues.  Headaches are about the same.  Has seen neurosurgery regarding the shunt and that is stable.    Previous history is reviewed and this is unchanged.    PAST MEDICAL/SURGICAL HISTORY  Past Medical History:   Diagnosis Date     Acute respiratory failure with hypoxia (H)      Adjustment disorder with mixed anxiety and depressed mood      Anemia      Aneurysm (H)      Aneurysmal subarachnoid hemorrhage (H)      Brain mass      Carotid artery aneurysm (H)      Cerebrovascular accident (CVA) due to occlusion of right middle cerebral artery (H)      Compression of brain (H)      Constipation      Depressive disorder      E. 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     Intraventricular hemorrhage, nontraumatic (H)      Leiomyoma      Migraines      Mild malnutrition (H)      Nausea vomiting and diarrhea      Neck pain      Oropharyngeal dysphagia      Pneumonia due to group B Streptococcus (H)      Seizures (H)      Sleep difficulties      Thrush      Tracheostomy care (H)      Transaminitis      Vaginal itching     & discharge     Vasospasm of cerebral artery      Patient Active Problem List   Diagnosis     Leiomyoma of uterus     Aneurysmal subarachnoid hemorrhage (H)     S/P coil embolization of cerebral aneurysm     Aneurysm of right internal carotid artery     Cerebrovascular accident (CVA) due to occlusion of right middle cerebral artery (H)     CVA (cerebral vascular accident) (H)     Pneumonia due to group B Streptococcus, unspecified laterality,  unspecified part of lung (H)     Vasospasm of cerebral artery     Hydrocephalus (H)     Adjustment disorder with mixed anxiety and depressed mood     Mood disorder (H)     Sleep difficulties     Respiratory failure (H)     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 (H)     SAH (subarachnoid hemorrhage) (H)     Hydrocephalus with operating shunt (H)     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)     Cerebral aneurysm     HCAP (healthcare-associated pneumonia)     Pleural effusion     RUQ abdominal pain     Shunt malfunction, subsequent encounter     Subdural hematoma (H)   Significant for previous aneurysm/rupture/stroke/migraine/seizure    FAMILY HISTORY  Family History   Problem Relation Age of Onset     No Known Problems Mother      No Known Problems Father      No Known Problems Sister      No Known Problems Brother      No Known Problems Maternal Aunt      No Known Problems Maternal Uncle      No Known Problems Paternal Aunt      No Known Problems Paternal Uncle      No Known Problems Maternal Grandmother      No Known Problems Maternal Grandfather      No Known Problems Paternal Grandmother      No Known Problems Paternal Grandfather      No Known Problems Other     Negative for aneurysms.  Positive for stroke and migraines    SOCIAL HISTORY  Social History     Tobacco Use     Smoking status: Never Smoker     Smokeless tobacco: Never Used   Substance Use Topics     Alcohol use: Never     Drug use: Never       SYSTEMS REVIEW  Twelve-system ROS was done and other than the HPI this was negative except for neck pain, arm and leg pain, weakness paralysis, falling, balance coordination problems, dizziness, headaches  No new concerns/issues reported today.    MEDICATIONS  aspirin (ASA) 81 MG chewable tablet, Take 81 mg by mouth  atorvastatin (LIPITOR) 20 MG tablet, Take 20 mg by mouth  clopidogrel (PLAVIX) 75 MG tablet, Take 75 mg by mouth    No current facility-administered medications on file prior to visit.    "    PHYSICAL EXAMINATION  VITALS: /77 (BP Location: Left arm, Patient Position: Sitting)   Pulse 68   Ht 1.473 m (4' 10\")   Wt 81 kg (178 lb 8 oz)   BMI 37.31 kg/m    GENERAL: Healthy appearing, alert, no acute distress, normal habitus.  CARDIOVASCULAR: Extremities warm and well perfused. Pulses present.   NEUROLOGICAL:  Patient is awake and oriented to self, place and time.  Attention span is normal.  Memory is grossly intact.  [employer] is fluent and follows commands appropriately.  Appropriate fund of knowledge. Cranial nerves 2-12 are intact. There is no pronator drift.  Motor exam shows 5/5 strength in all extremities.  Tone is symmetric bilaterally in upper and lower extremities.  (Previously reflexes are symmetric and 2+ in upper extremities and lower extremities. Sensory exam is grossly intact to light touch, pin prick and vibration.)  Finger to nose and heel to shin is without dysmetria.  Romberg is negative.  Gait is normal and the patient is able to do tandem walk with mild difficulty.  Exam overall unchanged compared to before.    DIAGNOSTICS  CT head  IMPRESSION:   1.  Considerable streak artifact related to prior coil embolization. No hemorrhage, infarct or edema is visible.  2.  Unchanged position of a right frontal approach ventricular catheter. Unchanged ventricular caliber.        MRI  IMPRESSION:   1.  Redemonstrated stent assisted coiling of the previously described right supraclinoid internal carotid artery aneurysm with extensive susceptibility artifact relating to the coils. Tiny focus of flow related enhancement along the anterior margin of   the coil mass likely relates to the very small amount of opacification seen on prior conventional angiogram. No findings to suggest recurrence elsewhere.     2.  No new aneurysm and no high-grade intracranial stenosis.     3.   Right-sided subdural fluid collection measuring up to 2 mm in thickness is similar to what was demonstrated on prior " head CT.     4.  Right frontal approach  shunt with stable ventricular size and configuration.    EEG (Dr. Meza)  Conclusion: This is an abnormal EEG due to moderate diffuse slowing consistent with sedation and also some asymmetry with mild attenuation on the right hemisphere. Clinical correlation is advised regarding any structural lesion on the right side.  There are no epileptiform findings on today's study.        OUTSIDE RECORDS  Outside referral notes were reviewed and pertinent information has been summarized:-Patient was recently seen in the emergency room.  Patient was actually diagnosed with acute headache and fall.  Patient was thought to have the headache related to her fall and not before that.  Patient received a head CT that was stable.  Patient was supposed to use Tylenol ibuprofen to see if that would help.  No seizure-like activity was reported.  It was not thought that the  shunt was causing the problems.  Patient was asked to come to neurology.    10/16/20  HEAD MRI:   1.  Redemonstration of right frontal approach ventriculostomy catheter with tip in the anterior horn of the right lateral ventricle, stable compared to head CT 10/05/2020. No significant change in the size of the lateral and third ventricles.  2.  Mild right sided dural thickening. Question thin 2 mm subdural collection in the posterior right frontal and parietal convexities. It is not significantly changed compared to the prior head CT 10/05/2020.  3.  No acute/subacute infarcts, mass lesions, or hydrocephalus.   4.  Several small to moderate-sized areas of encephalomalacia involving the right cerebral hemisphere.     HEAD MRA:   1.  Coil mass involving the right supraclinoid internal carotid aneurysm causes artifact which limits evaluation of the adjacent vessels. Interval decrease in the previously noted small areas of flow related enhancement within the anterior aspect of the   coiled aneurysm compared to MRI  03/06/2020. No new areas of flow related enhancement.  2.  Apparent moderate to severe decrease in flow related enhancement involving the M1 segment of the right middle cerebral artery, new since prior brain MRA. Distal branches of the right middle cerebral artery are patent without hemodynamically   significant stenosis. The flow void of the right middle cerebral artery is present, however, on the T2-weighted images. This can be better evaluated with CT angiogram.    LABS  Component      Latest Ref Rng & Units 2/10/2022   Levetiracetam      6.0 - 46.0 ug/mL 34.1     MRI brain-images reviewed with the patient.  No major structural lesions noted.  IMPRESSION:  1.  Stable brain MRI without acute intracranial abnormality.  2.  Unchanged right frontal approach shunt catheter and ventricular caliber.  3.  Redemonstration of multifocal encephalomalacia in the right cerebral hemisphere and punctate chronic lacunar infarction in the right centrum semiovale.    Neurosurgery notes reviewed.  Was recommended to range of motion improved pain related to shunt tubing.  Shunt setting was checked and set at a 3 and verified with shunt reprogramming.  No major issues noted.    EEG  IMPRESSION/REPORT/PLAN  This is a normal EEG during wakefulness and drowsiness except for mild generalized background dysrhythmia. Further clinical correlation is needed.     Please note that the absence of epileptiform abnormalities does not exclude the possibility of epilepsy in any patient.     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  At this point patient's headache is not related to the  shunt.  Repeat MRI/MRA are stable in the head pain is not related to the aneurysm.  From her description her symptoms are more consistent with muscular pain than head pain.  No longer is using any medication will stop the gabapentin and Flexeril.  Headaches are stable.  Will monitor.    She should continue follow-up with neurosurgery for her shunt.  Balance issues are most likely related to her history of the giant cell aneurysm and hydrocephalus.  MRI brain was negative for any hydrocephalus or any other structural lesions.  Her shunt has been functional per neurosurgery notes.    I will further check a EEG to look for underlying epilepsy.  This did come back negative.  Her Keppra level is therapeutic.  Given that its been over a year with no seizures and her seizure mainly happening initially at the time of the subarachnoid hemorrhage I feel that she could be weaned off the seizure medication with low risk for seizure recurrence.  Discussed extensively how to wean off the seizure medication.  She should not drive for couple of months while off the seizure medication.    I discussed about her disability/driving restrictions.  She is able to drive.  She reports no difficulty with vision or getting in and out of the car or getting lost.  Would get a Occupational Therapy/driving evaluation before she can drive to make sure there is no difficulty. She does have some memory issues at baseline though reports no difficulty with on the road.  Could consider neuropsychology evaluation.  This would be difficult due to the language barrier.    Patient further reports unable to go back to work.  Disability application has been denied.  Discussed about setting up functional occupational therapy evaluation and document objectively what she can and cannot do.  We will hold off on placing the referral and see how she does with the driving evaluation.    I can  see her back in 3 months.    -     levETIRAcetam (KEPPRA) 1000 MG tablet; 500 mg BID for 2 weeks and then stop if no seizure.  -     Occupational Therapy Referral; Future    Return in about 3 months (around 7/13/2022) for In-Clinic Visit (must), After testing.    Over 43 minutes were spent coordinating the care for the patient on the day of the encounter.  This includes previsit, during visit and post visit activities as documented above.  Extra time due to use of .  Discussing driving restriction and how to taper off the Keppra.  Further discussing with the patient about getting a driving evaluation done.  Multiple problems reviewed/addressed.  Multiple testing reviewed.  Images of the MRI showed to the patient.  (Activities include but not inclusive of reviewing chart, reviewing outside records, reviewing labs and imaging study results as well as the images, patient visit time including getting history and exam,  use if applicable, review of test results with the patient and coming up with a plan in a shared model, counseling patient and family, education and answering patient questions, EMR , EMR diagnosis entry and problem list management, medication reconciliation and prescription management if applicable, paperwork if applicable, printing documents and documentation of the visit activities.)      Justin Vasquez MD  Neurologist  [facility]  Tel:- [phone]    This note was dictated using voice recognition software.  Any grammatical or context distortions are unintentional and inherent to the software.        Again, thank you for allowing me to participate in the care of your patient.        Sincerely,        Justin Vasquez MD

## 2022-04-29 ENCOUNTER — TELEPHONE (OUTPATIENT)
Dept: NEUROLOGY | Facility: CLINIC | Age: 47
End: 2022-04-29
Payer: COMMERCIAL

## 2022-04-29 NOTE — TELEPHONE ENCOUNTER
Spoke with pt's daughter and she was concerned about when the pt can stop the Keppra. Pt was seen 4/13/22 and at this time Dr. Vasuqez stated that the pt should take Keppra 500mg BID for 2 weeks and if there is no seizure activity during that time she can stop the Keppra. Pt's daughter states there has not been any seizure activity. RN stated that it is fine to stop the Keppra. She verbalized understanding.     Ramonita Giles RN on 4/29/2022 at 12:31 PM

## 2022-04-29 NOTE — TELEPHONE ENCOUNTER
[facility] Call Center    Phone Message    May a detailed message be left on voicemail: yes     Reason for Call: Medication Question or concern regarding medication   Prescription Clarification  Name of Medication: levETIRAcetam (KEPPRA) 1000 MG tablet  Prescribing Provider: Christian     What on the order needs clarification? Daughter has concerns her mother is not taking medication as prescribed.  Please call daughterSandy  [phone].          Action Taken: Message routed to:  [facility]    Travel Screening: Not Applicable

## 2022-05-04 ENCOUNTER — APPOINTMENT (OUTPATIENT)
Dept: INTERPRETER SERVICES | Facility: CLINIC | Age: 47
End: 2022-05-04
Payer: COMMERCIAL

## 2022-05-11 ENCOUNTER — HOSPITAL ENCOUNTER (OUTPATIENT)
Dept: OCCUPATIONAL THERAPY | Facility: CLINIC | Age: 47
Discharge: HOME | End: 2022-05-11
Attending: PSYCHIATRY & NEUROLOGY
Payer: COMMERCIAL

## 2022-05-11 DIAGNOSIS — Z78.9 IMPAIRED INSTRUMENTAL ACTIVITIES OF DAILY LIVING (IADL): Primary | ICD-10-CM

## 2022-05-11 DIAGNOSIS — Z87.898 HISTORY OF SEIZURES: ICD-10-CM

## 2022-05-11 PROCEDURE — 97165 OT EVAL LOW COMPLEX 30 MIN: CPT | Mod: GO | Performed by: OCCUPATIONAL THERAPIST

## 2022-05-13 NOTE — PROGRESS NOTES
05/11/22 1200   Quick Adds   Type of Visit Initial Outpatient Occupational Therapy Evaluation       Present Yes   [employer] Hmong    Comment Per phone   General Information   Start Of Care Date 05/11/22   Referring Physician Justin Vasquez MD   Orders Evaluate and treat as indicated   Orders Date 04/13/22   Medical Diagnosis History of Seizures   Onset of Illness/Injury or Date of Surgery 04/13/22   Surgical/Medical History Reviewed Yes   Additional Occupational Profile Info/Pertinent History of Current Problem Pt is 45 yo female with history of seizure and stroke in 2019.  Pt notes she has not driven since then and has not returned to work but would like to return to her prior level of activity and function.  In 2019 she was dx with r ICA aneurysm status post endovascular coil embolization.  Pt also had a shunt placed.  At that time and since then she has been on Keppra. Pt has a history of falls, headache and decreased cognition.   Comments/Observations Pt was working in [place] prior to her hospialization in 2019.  Pt lives with spouse. She was accompanied to her OT evaluation by her daughter.   Role/Living Environment   Current Community Support Family/friend caregiver   Patient role/Employment history Employed  (Pt would like to return to work, has not been approved by disability)   Current Living Environment House   Prior Level - ADLS Independent   Prior Responsibilities - IADL Meal Preparation;Housekeeping;Laundry;Shopping;Yardwork;Medication management;Finances;Driving;Work  (Prior to incident in 2019)   Prior Level Comments Pt was independent with all ADLs/IADLs   Role/Living Environment Comments Concerns about returning to previous activity due to seizures in 2019   Patient/family Goals Statement Pt would llike to resume work and driving and regular schedule from before her aneurysm and seizures.   Pain   Patient currently in pain No   Fall Risk Screen   Fall screen  completed by OT   Have you fallen 2 or more times in the past year? No   Have you fallen and had an injury in the past year? No   Abuse Screen (yes response referral indicated)   Feels Unsafe at Home or Work/School no   Feels Threatened by Someone no   Does Anyone Try to Keep You From Having Contact with Others or Doing Things Outside Your Home? no   Physical Signs of Abuse Present no   Cognitive Status Examination   Orientation Orientation to person, place and time   Level of Consciousness Alert   Follows Commands and Answers Questions 100% of the time   Cognitive Comment Pt completed short blessed test with no deficits detected. However, pt unable to complete Symbol Digit Modalities Test without severe difficulty, scoring 3 standard deficts below the mean for age and gender based on norms for that test.  SDMT was chosen due to pts language barrier since decoding symbols is not limited by a person's native tongue.   Range of Motion (ROM)   ROM Comments WFL   Strength   Strength Comments WFL   Hand Strength   Hand Dominance Right   Left Hand  (pounds) 55 pounds   Right Hand  (pounds) 58 pounds   Hand Strength Comments WNL compared to norms for age and gender   Bathing   Level of [place] - Bathing independent   Upper Body Dressing   Level of [place]: Dress Upper Body independent   Lower Body Dressing   Level of [place]: Dress Lower Body independent   Toileting   Level of [place]: Toilet independent   Grooming   Level of [place]: Grooming independent   Eating/Self-Feeding   Level of [place]: Eating independent   Clinical Impression   Criteria for Skilled Therapeutic Interventions Met Evaluation only   Education Assessment   Barriers To Learning [employer]   Total Evaluation Time   OT Mukund Low Complexity Minutes (87272) 90

## 2022-06-13 ENCOUNTER — TELEPHONE (OUTPATIENT)
Dept: NEUROLOGY | Facility: CLINIC | Age: 47
End: 2022-06-13
Payer: COMMERCIAL

## 2022-06-13 RX ORDER — CLOPIDOGREL BISULFATE 75 MG/1
75 TABLET ORAL
OUTPATIENT
Start: 2022-06-13

## 2022-06-13 RX ORDER — ATORVASTATIN CALCIUM 20 MG/1
20 TABLET, FILM COATED ORAL
OUTPATIENT
Start: 2022-06-13

## 2022-06-13 NOTE — TELEPHONE ENCOUNTER
[facility] Call Center    Phone Message    May a detailed message be left on voicemail: yes     Reason for Call: Medication Refill Request    Has the patient contacted the pharmacy for the refill? Yes   Name of medication being requested:   clopidogrel (PLAVIX) 75 MG tablet    atorvastatin (LIPITOR) 20 MG tablet  Provider who prescribed the medication: Christina   Pharmacy: [facility] [address] [place] [address]    Date medication is needed: ASAP, patient is out.    Action Taken: Message routed to:  [facility] neurology    Travel Screening: Not Applicable

## 2022-06-13 NOTE — TELEPHONE ENCOUNTER
Refill request for Plavix and Lipitor. Pt last seen 4/13/22 and has follow up scheduled for 7/19/22. It does not appear that Dr. Vasquez was the one to prescribe either medication. Will deny and pt should follow up with PCP.     Ramonita Giles RN on 6/13/2022 at 2:43 PM

## 2022-06-30 ENCOUNTER — TRANSFERRED RECORDS (OUTPATIENT)
Dept: HEALTH INFORMATION MANAGEMENT | Facility: CLINIC | Age: 47
End: 2022-06-30

## 2022-07-01 ENCOUNTER — LAB (OUTPATIENT)
Dept: LAB | Facility: CLINIC | Age: 47
End: 2022-07-01
Payer: COMMERCIAL

## 2022-07-01 DIAGNOSIS — Z01.419 SMEAR, VAGINAL, AS PART OF ROUTINE GYNECOLOGICAL EXAMINATION: Primary | ICD-10-CM

## 2022-07-01 DIAGNOSIS — Z13.29 SCREENING FOR THYROID DISORDER: ICD-10-CM

## 2022-07-01 DIAGNOSIS — Z12.72 SMEAR, VAGINAL, AS PART OF ROUTINE GYNECOLOGICAL EXAMINATION: Primary | ICD-10-CM

## 2022-07-01 DIAGNOSIS — E78.2 MIXED HYPERLIPIDEMIA: ICD-10-CM

## 2022-07-01 DIAGNOSIS — Z13.228 SCREENING FOR PHENYLKETONURIA (PKU): ICD-10-CM

## 2022-07-01 LAB
ALBUMIN SERPL-MCNC: 4.2 G/DL (ref 3.5–5)
ALP SERPL-CCNC: 81 U/L (ref 45–120)
ALT SERPL W P-5'-P-CCNC: 53 U/L (ref 0–45)
ANION GAP SERPL CALCULATED.3IONS-SCNC: 9 MMOL/L (ref 5–18)
AST SERPL W P-5'-P-CCNC: 29 U/L (ref 0–40)
BASOPHILS # BLD AUTO: 0.1 10E3/UL (ref 0–0.2)
BASOPHILS NFR BLD AUTO: 1 %
BILIRUB SERPL-MCNC: 0.6 MG/DL (ref 0–1)
BUN SERPL-MCNC: 8 MG/DL (ref 8–22)
CALCIUM SERPL-MCNC: 9.7 MG/DL (ref 8.5–10.5)
CHLORIDE BLD-SCNC: 106 MMOL/L (ref 98–107)
CHOLEST SERPL-MCNC: 148 MG/DL
CO2 SERPL-SCNC: 26 MMOL/L (ref 22–31)
CREAT SERPL-MCNC: 0.8 MG/DL (ref 0.6–1.1)
EOSINOPHIL # BLD AUTO: 0.1 10E3/UL (ref 0–0.7)
EOSINOPHIL NFR BLD AUTO: 2 %
ERYTHROCYTE [DISTWIDTH] IN BLOOD BY AUTOMATED COUNT: 12.1 % (ref 10–15)
FASTING STATUS PATIENT QL REPORTED: YES
GFR SERPL CREATININE-BSD FRML MDRD: >90 ML/MIN/1.73M2
GLUCOSE BLD-MCNC: 96 MG/DL (ref 70–125)
HBA1C MFR BLD: 5.5 %
HCT VFR BLD AUTO: 41.9 % (ref 35–47)
HDLC SERPL-MCNC: 45 MG/DL
HGB BLD-MCNC: 13.8 G/DL (ref 11.7–15.7)
IMM GRANULOCYTES # BLD: 0 10E3/UL
IMM GRANULOCYTES NFR BLD: 0 %
LDLC SERPL CALC-MCNC: 80 MG/DL
LYMPHOCYTES # BLD AUTO: 2 10E3/UL (ref 0.8–5.3)
LYMPHOCYTES NFR BLD AUTO: 30 %
MCH RBC QN AUTO: 29.1 PG (ref 26.5–33)
MCHC RBC AUTO-ENTMCNC: 32.9 G/DL (ref 31.5–36.5)
MCV RBC AUTO: 88 FL (ref 78–100)
MONOCYTES # BLD AUTO: 0.5 10E3/UL (ref 0–1.3)
MONOCYTES NFR BLD AUTO: 7 %
NEUTROPHILS # BLD AUTO: 3.9 10E3/UL (ref 1.6–8.3)
NEUTROPHILS NFR BLD AUTO: 60 %
NRBC # BLD AUTO: 0 10E3/UL
NRBC BLD AUTO-RTO: 0 /100
PLATELET # BLD AUTO: 277 10E3/UL (ref 150–450)
POTASSIUM BLD-SCNC: 3.7 MMOL/L (ref 3.5–5)
PROT SERPL-MCNC: 7.4 G/DL (ref 6–8)
RBC # BLD AUTO: 4.75 10E6/UL (ref 3.8–5.2)
SODIUM SERPL-SCNC: 141 MMOL/L (ref 136–145)
TRIGL SERPL-MCNC: 114 MG/DL
TSH SERPL DL<=0.005 MIU/L-ACNC: 2.48 UIU/ML (ref 0.3–5)
WBC # BLD AUTO: 6.6 10E3/UL (ref 4–11)

## 2022-07-01 PROCEDURE — 85025 COMPLETE CBC W/AUTO DIFF WBC: CPT

## 2022-07-01 PROCEDURE — 84443 ASSAY THYROID STIM HORMONE: CPT

## 2022-07-01 PROCEDURE — 80061 LIPID PANEL: CPT

## 2022-07-01 PROCEDURE — 83036 HEMOGLOBIN GLYCOSYLATED A1C: CPT

## 2022-07-01 PROCEDURE — 36415 COLL VENOUS BLD VENIPUNCTURE: CPT

## 2022-07-01 PROCEDURE — 80053 COMPREHEN METABOLIC PANEL: CPT

## 2022-07-19 ENCOUNTER — OFFICE VISIT (OUTPATIENT)
Dept: NEUROLOGY | Facility: CLINIC | Age: 47
End: 2022-07-19
Payer: COMMERCIAL

## 2022-07-19 VITALS
BODY MASS INDEX: 37.2 KG/M2 | SYSTOLIC BLOOD PRESSURE: 124 MMHG | HEART RATE: 72 BPM | WEIGHT: 178 LBS | DIASTOLIC BLOOD PRESSURE: 84 MMHG | RESPIRATION RATE: 18 BRPM

## 2022-07-19 DIAGNOSIS — F09 COGNITIVE AND NEUROBEHAVIORAL DYSFUNCTION STAUS POST BRAIN INJURY (H): ICD-10-CM

## 2022-07-19 DIAGNOSIS — G31.89 COGNITIVE AND NEUROBEHAVIORAL DYSFUNCTION STAUS POST BRAIN INJURY (H): ICD-10-CM

## 2022-07-19 DIAGNOSIS — S06.9XAS COGNITIVE AND NEUROBEHAVIORAL DYSFUNCTION STAUS POST BRAIN INJURY (H): ICD-10-CM

## 2022-07-19 DIAGNOSIS — R41.89 SUBJECTIVE MEMORY COMPLAINTS: ICD-10-CM

## 2022-07-19 DIAGNOSIS — R51.9 NONINTRACTABLE HEADACHE, UNSPECIFIED CHRONICITY PATTERN, UNSPECIFIED HEADACHE TYPE: ICD-10-CM

## 2022-07-19 DIAGNOSIS — Z87.898 HISTORY OF SEIZURES: Primary | ICD-10-CM

## 2022-07-19 DIAGNOSIS — Z86.79 HISTORY OF ANEURYSM OF ARTERY: ICD-10-CM

## 2022-07-19 PROCEDURE — 99214 OFFICE O/P EST MOD 30 MIN: CPT | Performed by: PSYCHIATRY & NEUROLOGY

## 2022-07-19 RX ORDER — ASPIRIN 81 MG/1
81 TABLET, COATED ORAL DAILY
COMMUNITY
Start: 2022-06-15 | End: 2022-07-19

## 2022-07-19 NOTE — LETTER
7/19/2022         RE: Darion Herrera  [address]  [place] [address]        Dear Colleague,    Thank you for referring your patient, Darion Herrera, to the [facility] [place]. Please see a copy of my visit note below.    NEUROLOGY OUTPATIENT PROGRESS NOTE   Jul 19, 2022     CHIEF COMPLAINT/REASON FOR VISIT/REASON FOR CONSULT  Patient presents with:  Follow Up    REASON FOR CONSULTATION-right headache      HISTORY OF PRESENT ILLNESS  Darion Herrera is a 46 year old female seen today for evaluation of head pain.  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  Patient also developed hydrocephalus and needed a shunt.  Patient continues to have the shunt.  She at that time developed seizures and has been on Keppra since then.  Patient reports no side effects to the Keppra.    Patient over the last few months has been having right occipital region pain.  Patient's shunt is more in the frontal and the temporal region.  Patient reports that this pain is worse at nighttime.  Is barely there during the daytime.  It is more of a pulling pain she reports.  Denies any photophobia phonophobia or auras with this.  Has not tried any medications for this.  This started 4 months ago and is present every day.    Patient also complains of hand weakness.  She reports that if she bends her head she is unable to stay steady.  Complains of some ongoing neck stiffness.  Patient does not feel she is ready to go back to work.She does also complain of cognitive difficulty since the initial aneurysm rupture.    Patient also reports that she recently had a fall.  This was more of a mechanical fall that she had gone hunting with her  and then fell down.  Headache was present even before the fall.    12/11/20  Patient returns today.  She reports that she did try the gabapentin which has not helped her pain at all.  Reports no side effects.  She  tries to clarify the pain and reports that she went hunting with her .  She was not hunting but she tripped and fell.  She landed on her left side.  She has pain from her left elbow that radiates all the way to her left neck and then to her right occipital region.  This is more of a muscle pain and a headache.  The pain is outside the head.  In terms of her balance she has gone to physical therapy and it is a bit better but nothing significant.  She is using a cane.  Denies any other new symptoms.  Does complain of muscle pain than head pain.    Patient is a lot of questions about lifestyle restrictions, and questions about the shunt and primary care questions.  Reports no other new symptoms.  Patient remains seizure-free.    2/10/22  Patient returns today  1.  Reports ongoing head pain in the right occipital region.  Is not using any medications for this.  Reports no worsening  2.  Reports no seizures.  Taking Keppra regularly.  Has not missed doses.  No side effects to the Keppra  3.  Has lot of financial issues going on and is under a lot of stress.  Cannot go anywhere.  Is interested in driving.  Further reports that she is applied for disability x2 and things do not get approved for her.  Cannot do fine things are walk long distances.  4.  Reports ongoing balance issues.  Discussed with her that this is related to her previous head injury and she will need to exercise and give it time to see if they would improve.  5.  Patient is no longer following up with neurosurgery for shunt.    4/13/22  Patient returns today.  Reports no seizures.  Did complete the EEG.  Discussed about weaning off the Keppra and she is interested in that.  Discussed about not driving for couple of months while she is being weaned off.  She further has questions about safety about driving and discussed about driving evaluation which she is interested in.  Continues to have financial issues and is interested in going back to work.   Disability application has not been approved  Continues to have balance issues.  Headaches are about the same.  Has seen neurosurgery regarding the shunt and that is stable.    7/19/22  Patient returns today.  She did stop the seizure medication without any side effects.  No seizures.  Has done the OT evaluation.  They did not find any significant cognitive difficulty though she did have difficulty on portions of the test.  From what I can understand she did not have the driving evaluation though she tells me she did have a driving evaluation.  Memory problems and headaches are otherwise stable/better.  Continues to follow-up with neurosurgery for the shunt.  Is stressed about finances and is interested in going back to work.    Previous history is reviewed and this is unchanged.    PAST MEDICAL/SURGICAL HISTORY  Past Medical History:   Diagnosis Date     Acute respiratory failure with hypoxia (H)      Adjustment disorder with mixed anxiety and depressed mood      Anemia      Aneurysm (H)      Aneurysmal subarachnoid hemorrhage (H)      Brain mass      Carotid artery aneurysm (H)      Cerebrovascular accident (CVA) due to occlusion of right middle cerebral artery (H)      Compression of brain (H)      Constipation      Depressive disorder      E. 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     Intraventricular hemorrhage, nontraumatic (H)      Leiomyoma      Migraines      Mild malnutrition (H)      Nausea vomiting and diarrhea      Neck pain      Oropharyngeal dysphagia      Pneumonia due to group B Streptococcus (H)      Seizures (H)      Sleep difficulties      Thrush       Tracheostomy care (H)      Transaminitis      Vaginal itching     & discharge     Vasospasm of cerebral artery      Patient Active Problem List   Diagnosis     Leiomyoma of uterus     Aneurysmal subarachnoid hemorrhage (H)     S/P coil embolization of cerebral aneurysm     Aneurysm of right internal carotid artery     Cerebrovascular accident (CVA) due to occlusion of right middle cerebral artery (H)     CVA (cerebral vascular accident) (H)     Pneumonia due to group B Streptococcus, unspecified laterality, unspecified part of lung (H)     Vasospasm of cerebral artery     Hydrocephalus (H)     Adjustment disorder with mixed anxiety and depressed mood     Mood disorder (H)     Sleep difficulties     Respiratory failure (H)     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 (H)     SAH (subarachnoid hemorrhage) (H)     Hydrocephalus with operating shunt (H)     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)     Cerebral aneurysm     HCAP (healthcare-associated pneumonia)     Pleural effusion     RUQ abdominal pain     Shunt malfunction, subsequent encounter     Subdural hematoma (H)   Significant for previous aneurysm/rupture/stroke/migraine/seizure    FAMILY HISTORY  Family History   Problem Relation Age of Onset     No Known Problems Mother      No Known Problems Father      No Known Problems Sister      No Known Problems Brother      No Known Problems Maternal Aunt      No Known Problems Maternal Uncle      No Known Problems Paternal Aunt      No Known Problems Paternal Uncle      No Known Problems Maternal Grandmother      No Known Problems Maternal Grandfather      No Known Problems Paternal Grandmother      No Known Problems Paternal Grandfather      No Known Problems Other     Negative for aneurysms.  Positive for stroke and migraines    SOCIAL HISTORY  Social History     Tobacco Use     Smoking status: Never Smoker     Smokeless tobacco: Never Used   Substance Use Topics     Alcohol use: Never     Drug use: Never        SYSTEMS REVIEW  Twelve-system ROS was done and other than the HPI this was negative except for neck pain, arm and leg pain, weakness paralysis, falling, balance coordination problems, dizziness, headaches  No new issues/concerns reported today.    MEDICATIONS  aspirin (ASA) 81 MG chewable tablet, Take 81 mg by mouth  atorvastatin (LIPITOR) 20 MG tablet, Take 20 mg by mouth  clopidogrel (PLAVIX) 75 MG tablet, Take 75 mg by mouth  levETIRAcetam (KEPPRA) 1000 MG tablet, 500 mg BID for 2 weeks and then stop if no seizure. (Patient not taking: Reported on 7/19/2022)    No current facility-administered medications on file prior to visit.       PHYSICAL EXAMINATION  VITALS: /84   Pulse 72   Resp 18   Wt 80.7 kg (178 lb)   BMI 37.20 kg/m    GENERAL: Healthy appearing, alert, no acute distress, normal habitus.  CARDIOVASCULAR: Extremities warm and well perfused. Pulses present.   NEUROLOGICAL:  Patient is awake and oriented to self, place and time.  Attention span is normal.  Memory is grossly intact.  [employer] is fluent and follows commands appropriately.  Appropriate fund of knowledge. Cranial nerves 2-12 are intact. There is no pronator drift.  Motor exam shows 5/5 strength in all extremities.  Tone is symmetric bilaterally in upper and lower extremities.  (Previously reflexes are symmetric and 2+ in upper extremities and lower extremities. Sensory exam is grossly intact to light touch, pin prick and vibration.)  Finger to nose and heel to shin is without dysmetria.  Romberg is negative.  Gait is normal and the patient is able to do tandem walk with mild difficulty.  Exam similar to before.    DIAGNOSTICS  CT head  IMPRESSION:   1.  Considerable streak artifact related to prior coil embolization. No hemorrhage, infarct or edema is visible.  2.  Unchanged position of a right frontal approach ventricular catheter. Unchanged ventricular caliber.        MRI  IMPRESSION:   1.  Redemonstrated stent assisted  coiling of the previously described right supraclinoid internal carotid artery aneurysm with extensive susceptibility artifact relating to the coils. Tiny focus of flow related enhancement along the anterior margin of   the coil mass likely relates to the very small amount of opacification seen on prior conventional angiogram. No findings to suggest recurrence elsewhere.     2.  No new aneurysm and no high-grade intracranial stenosis.     3.   Right-sided subdural fluid collection measuring up to 2 mm in thickness is similar to what was demonstrated on prior head CT.     4.  Right frontal approach  shunt with stable ventricular size and configuration.    EEG (Dr. Meza)  Conclusion: This is an abnormal EEG due to moderate diffuse slowing consistent with sedation and also some asymmetry with mild attenuation on the right hemisphere. Clinical correlation is advised regarding any structural lesion on the right side.  There are no epileptiform findings on today's study.        OUTSIDE RECORDS  Outside referral notes were reviewed and pertinent information has been summarized:-Patient was recently seen in the emergency room.  Patient was actually diagnosed with acute headache and fall.  Patient was thought to have the headache related to her fall and not before that.  Patient received a head CT that was stable.  Patient was supposed to use Tylenol ibuprofen to see if that would help.  No seizure-like activity was reported.  It was not thought that the  shunt was causing the problems.  Patient was asked to come to neurology.    10/16/20  HEAD MRI:   1.  Redemonstration of right frontal approach ventriculostomy catheter with tip in the anterior horn of the right lateral ventricle, stable compared to head CT 10/05/2020. No significant change in the size of the lateral and third ventricles.  2.  Mild right sided dural thickening. Question thin 2 mm subdural collection in the posterior right frontal and parietal  convexities. It is not significantly changed compared to the prior head CT 10/05/2020.  3.  No acute/subacute infarcts, mass lesions, or hydrocephalus.   4.  Several small to moderate-sized areas of encephalomalacia involving the right cerebral hemisphere.     HEAD MRA:   1.  Coil mass involving the right supraclinoid internal carotid aneurysm causes artifact which limits evaluation of the adjacent vessels. Interval decrease in the previously noted small areas of flow related enhancement within the anterior aspect of the   coiled aneurysm compared to MRI 03/06/2020. No new areas of flow related enhancement.  2.  Apparent moderate to severe decrease in flow related enhancement involving the M1 segment of the right middle cerebral artery, new since prior brain MRA. Distal branches of the right middle cerebral artery are patent without hemodynamically   significant stenosis. The flow void of the right middle cerebral artery is present, however, on the T2-weighted images. This can be better evaluated with CT angiogram.    LABS  Component      Latest Ref Rng & Units 2/10/2022   Levetiracetam      6.0 - 46.0 ug/mL 34.1     MRI brain-images reviewed with the patient.  No major structural lesions noted.  IMPRESSION:  1.  Stable brain MRI without acute intracranial abnormality.  2.  Unchanged right frontal approach shunt catheter and ventricular caliber.  3.  Redemonstration of multifocal encephalomalacia in the right cerebral hemisphere and punctate chronic lacunar infarction in the right centrum semiovale.    Neurosurgery notes reviewed.  Was recommended to range of motion improved pain related to shunt tubing.  Shunt setting was checked and set at a 3 and verified with shunt reprogramming.  No major issues noted.    EEG  IMPRESSION/REPORT/PLAN  This is a normal EEG during wakefulness and drowsiness except for mild generalized background dysrhythmia. Further clinical correlation is needed.     Please note that the absence  of epileptiform abnormalities does not exclude the possibility of epilepsy in any patient.     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  At this point patient's headache is not related to the  shunt.  Repeat MRI/MRA are stable in the head pain is not related to the aneurysm.  From her description her symptoms are more consistent with muscular pain than head pain.  No longer is using any medication will stop the gabapentin and Flexeril.  Headaches are stable.  Will monitor.    She should continue follow-up with neurosurgery for her shunt.  Balance issues are most likely related to her history of the giant cell aneurysm and hydrocephalus.  MRI brain was negative for any hydrocephalus or any other structural lesions.  Her shunt has been functional per neurosurgery notes.    I will further check a EEG to look for underlying epilepsy.  This did come back negative.  Her Keppra level is therapeutic.  Given that its been over a year with no seizures and her seizure mainly happening initially at the time of the subarachnoid hemorrhage I feel that she could be weaned off the seizure medication with low risk for seizure recurrence.  She was weaned off the Keppra with no recurrence of seizures.  Has been off steroids for a few months.    Before she can drive by feel it would be prudent for her to get an Occupational Therapy driving evaluation.  Previously this was ordered but could not be done.    Regular Occupational Therapy evaluation did not show significant cognitive deficits.  I believe  that she could go back to work.    I can see her back on as-needed basis.    -     Occupational Therapy Referral; Future    Return if symptoms worsen or fail to improve, for In-Clinic Visit (must).    Over 33 minutes were spent coordinating the care for the patient on the day of the encounter.  This includes previsit, during visit and post visit activities as documented above.  Counseling patient.  Extra time due to use of .  Trying to review Occupational Therapy notes and order the correct Occupational Therapy evaluation.  (Activities include but not inclusive of reviewing chart, reviewing outside records, reviewing labs and imaging study results as well as the images, patient visit time including getting history and exam,  use if applicable, review of test results with the patient and coming up with a plan in a shared model, counseling patient and family, education and answering patient questions, EMR , EMR diagnosis entry and problem list management, medication reconciliation and prescription management if applicable, paperwork if applicable, printing documents and documentation of the visit activities.)      Justin Vasquez MD  Neurologist  [facility]  Tel:- [phone]    This note was dictated using voice recognition software.  Any grammatical or context distortions are unintentional and inherent to the software.        Again, thank you for allowing me to participate in the care of your patient.        Sincerely,        Justin Vasquez MD

## 2022-07-19 NOTE — PROGRESS NOTES
NEUROLOGY OUTPATIENT PROGRESS NOTE   Jul 19, 2022     CHIEF COMPLAINT/REASON FOR VISIT/REASON FOR CONSULT  Patient presents with:  Follow Up    REASON FOR CONSULTATION-right headache      HISTORY OF PRESENT ILLNESS  Darion Herrera is a 46 year old female seen today for evaluation of head pain.  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  Patient also developed hydrocephalus and needed a shunt.  Patient continues to have the shunt.  She at that time developed seizures and has been on Keppra since then.  Patient reports no side effects to the Keppra.    Patient over the last few months has been having right occipital region pain.  Patient's shunt is more in the frontal and the temporal region.  Patient reports that this pain is worse at nighttime.  Is barely there during the daytime.  It is more of a pulling pain she reports.  Denies any photophobia phonophobia or auras with this.  Has not tried any medications for this.  This started 4 months ago and is present every day.    Patient also complains of hand weakness.  She reports that if she bends her head she is unable to stay steady.  Complains of some ongoing neck stiffness.  Patient does not feel she is ready to go back to work.She does also complain of cognitive difficulty since the initial aneurysm rupture.    Patient also reports that she recently had a fall.  This was more of a mechanical fall that she had gone hunting with her  and then fell down.  Headache was present even before the fall.    12/11/20  Patient returns today.  She reports that she did try the gabapentin which has not helped her pain at all.  Reports no side effects.  She tries to clarify the pain and reports that she went hunting with her .  She was not hunting but she tripped and fell.  She landed on her left side.  She has pain from her left elbow that radiates all the way to her left neck and then to her  right occipital region.  This is more of a muscle pain and a headache.  The pain is outside the head.  In terms of her balance she has gone to physical therapy and it is a bit better but nothing significant.  She is using a cane.  Denies any other new symptoms.  Does complain of muscle pain than head pain.    Patient is a lot of questions about lifestyle restrictions, and questions about the shunt and primary care questions.  Reports no other new symptoms.  Patient remains seizure-free.    2/10/22  Patient returns today  1.  Reports ongoing head pain in the right occipital region.  Is not using any medications for this.  Reports no worsening  2.  Reports no seizures.  Taking Keppra regularly.  Has not missed doses.  No side effects to the Keppra  3.  Has lot of financial issues going on and is under a lot of stress.  Cannot go anywhere.  Is interested in driving.  Further reports that she is applied for disability x2 and things do not get approved for her.  Cannot do fine things are walk long distances.  4.  Reports ongoing balance issues.  Discussed with her that this is related to her previous head injury and she will need to exercise and give it time to see if they would improve.  5.  Patient is no longer following up with neurosurgery for shunt.    4/13/22  Patient returns today.  Reports no seizures.  Did complete the EEG.  Discussed about weaning off the Keppra and she is interested in that.  Discussed about not driving for couple of months while she is being weaned off.  She further has questions about safety about driving and discussed about driving evaluation which she is interested in.  Continues to have financial issues and is interested in going back to work.  Disability application has not been approved  Continues to have balance issues.  Headaches are about the same.  Has seen neurosurgery regarding the shunt and that is stable.    7/19/22  Patient returns today.  She did stop the seizure medication  without any side effects.  No seizures.  Has done the OT evaluation.  They did not find any significant cognitive difficulty though she did have difficulty on portions of the test.  From what I can understand she did not have the driving evaluation though she tells me she did have a driving evaluation.  Memory problems and headaches are otherwise stable/better.  Continues to follow-up with neurosurgery for the shunt.  Is stressed about finances and is interested in going back to work.    Previous history is reviewed and this is unchanged.    PAST MEDICAL/SURGICAL HISTORY  Past Medical History:   Diagnosis Date     Acute respiratory failure with hypoxia (H)      Adjustment disorder with mixed anxiety and depressed mood      Anemia      Aneurysm (H)      Aneurysmal subarachnoid hemorrhage (H)      Brain mass      Carotid artery aneurysm (H)      Cerebrovascular accident (CVA) due to occlusion of right middle cerebral artery (H)      Compression of brain (H)      Constipation      Depressive disorder      E. 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     Intraventricular hemorrhage, nontraumatic (H)      Leiomyoma      Migraines      Mild malnutrition (H)      Nausea vomiting and diarrhea      Neck pain      Oropharyngeal dysphagia      Pneumonia due to group B Streptococcus (H)      Seizures (H)      Sleep difficulties      Thrush      Tracheostomy care (H)      Transaminitis      Vaginal itching     & discharge     Vasospasm of cerebral artery      Patient Active Problem List   Diagnosis     Leiomyoma of uterus     Aneurysmal subarachnoid hemorrhage (H)     S/P coil embolization of  cerebral aneurysm     Aneurysm of right internal carotid artery     Cerebrovascular accident (CVA) due to occlusion of right middle cerebral artery (H)     CVA (cerebral vascular accident) (H)     Pneumonia due to group B Streptococcus, unspecified laterality, unspecified part of lung (H)     Vasospasm of cerebral artery     Hydrocephalus (H)     Adjustment disorder with mixed anxiety and depressed mood     Mood disorder (H)     Sleep difficulties     Respiratory failure (H)     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 (H)     SAH (subarachnoid hemorrhage) (H)     Hydrocephalus with operating shunt (H)     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)     Cerebral aneurysm     HCAP (healthcare-associated pneumonia)     Pleural effusion     RUQ abdominal pain     Shunt malfunction, subsequent encounter     Subdural hematoma (H)   Significant for previous aneurysm/rupture/stroke/migraine/seizure    FAMILY HISTORY  Family History   Problem Relation Age of Onset     No Known Problems Mother      No Known Problems Father      No Known Problems Sister      No Known Problems Brother      No Known Problems Maternal Aunt      No Known Problems Maternal Uncle      No Known Problems Paternal Aunt      No Known Problems Paternal Uncle      No Known Problems Maternal Grandmother      No Known Problems Maternal Grandfather      No Known Problems Paternal Grandmother      No Known Problems Paternal Grandfather      No Known Problems Other     Negative for aneurysms.  Positive for stroke and migraines    SOCIAL HISTORY  Social History     Tobacco Use     Smoking status: Never Smoker     Smokeless tobacco: Never Used   Substance Use Topics     Alcohol use: Never     Drug use: Never       SYSTEMS REVIEW  Twelve-system ROS was done and other than the HPI this was negative except for neck pain, arm and leg pain, weakness paralysis, falling, balance coordination problems, dizziness, headaches  No new issues/concerns reported  today.    MEDICATIONS  aspirin (ASA) 81 MG chewable tablet, Take 81 mg by mouth  atorvastatin (LIPITOR) 20 MG tablet, Take 20 mg by mouth  clopidogrel (PLAVIX) 75 MG tablet, Take 75 mg by mouth  levETIRAcetam (KEPPRA) 1000 MG tablet, 500 mg BID for 2 weeks and then stop if no seizure. (Patient not taking: Reported on 7/19/2022)    No current facility-administered medications on file prior to visit.       PHYSICAL EXAMINATION  VITALS: /84   Pulse 72   Resp 18   Wt 80.7 kg (178 lb)   BMI 37.20 kg/m    GENERAL: Healthy appearing, alert, no acute distress, normal habitus.  CARDIOVASCULAR: Extremities warm and well perfused. Pulses present.   NEUROLOGICAL:  Patient is awake and oriented to self, place and time.  Attention span is normal.  Memory is grossly intact.  [employer] is fluent and follows commands appropriately.  Appropriate fund of knowledge. Cranial nerves 2-12 are intact. There is no pronator drift.  Motor exam shows 5/5 strength in all extremities.  Tone is symmetric bilaterally in upper and lower extremities.  (Previously reflexes are symmetric and 2+ in upper extremities and lower extremities. Sensory exam is grossly intact to light touch, pin prick and vibration.)  Finger to nose and heel to shin is without dysmetria.  Romberg is negative.  Gait is normal and the patient is able to do tandem walk with mild difficulty.  Exam similar to before.    DIAGNOSTICS  CT head  IMPRESSION:   1.  Considerable streak artifact related to prior coil embolization. No hemorrhage, infarct or edema is visible.  2.  Unchanged position of a right frontal approach ventricular catheter. Unchanged ventricular caliber.        MRI  IMPRESSION:   1.  Redemonstrated stent assisted coiling of the previously described right supraclinoid internal carotid artery aneurysm with extensive susceptibility artifact relating to the coils. Tiny focus of flow related enhancement along the anterior margin of   the coil mass likely relates  to the very small amount of opacification seen on prior conventional angiogram. No findings to suggest recurrence elsewhere.     2.  No new aneurysm and no high-grade intracranial stenosis.     3.   Right-sided subdural fluid collection measuring up to 2 mm in thickness is similar to what was demonstrated on prior head CT.     4.  Right frontal approach  shunt with stable ventricular size and configuration.    EEG (Dr. Meza)  Conclusion: This is an abnormal EEG due to moderate diffuse slowing consistent with sedation and also some asymmetry with mild attenuation on the right hemisphere. Clinical correlation is advised regarding any structural lesion on the right side.  There are no epileptiform findings on today's study.        OUTSIDE RECORDS  Outside referral notes were reviewed and pertinent information has been summarized:-Patient was recently seen in the emergency room.  Patient was actually diagnosed with acute headache and fall.  Patient was thought to have the headache related to her fall and not before that.  Patient received a head CT that was stable.  Patient was supposed to use Tylenol ibuprofen to see if that would help.  No seizure-like activity was reported.  It was not thought that the  shunt was causing the problems.  Patient was asked to come to neurology.    10/16/20  HEAD MRI:   1.  Redemonstration of right frontal approach ventriculostomy catheter with tip in the anterior horn of the right lateral ventricle, stable compared to head CT 10/05/2020. No significant change in the size of the lateral and third ventricles.  2.  Mild right sided dural thickening. Question thin 2 mm subdural collection in the posterior right frontal and parietal convexities. It is not significantly changed compared to the prior head CT 10/05/2020.  3.  No acute/subacute infarcts, mass lesions, or hydrocephalus.   4.  Several small to moderate-sized areas of encephalomalacia involving the right cerebral  hemisphere.     HEAD MRA:   1.  Coil mass involving the right supraclinoid internal carotid aneurysm causes artifact which limits evaluation of the adjacent vessels. Interval decrease in the previously noted small areas of flow related enhancement within the anterior aspect of the   coiled aneurysm compared to MRI 03/06/2020. No new areas of flow related enhancement.  2.  Apparent moderate to severe decrease in flow related enhancement involving the M1 segment of the right middle cerebral artery, new since prior brain MRA. Distal branches of the right middle cerebral artery are patent without hemodynamically   significant stenosis. The flow void of the right middle cerebral artery is present, however, on the T2-weighted images. This can be better evaluated with CT angiogram.    LABS  Component      Latest Ref Rng & Units 2/10/2022   Levetiracetam      6.0 - 46.0 ug/mL 34.1     MRI brain-images reviewed with the patient.  No major structural lesions noted.  IMPRESSION:  1.  Stable brain MRI without acute intracranial abnormality.  2.  Unchanged right frontal approach shunt catheter and ventricular caliber.  3.  Redemonstration of multifocal encephalomalacia in the right cerebral hemisphere and punctate chronic lacunar infarction in the right centrum semiovale.    Neurosurgery notes reviewed.  Was recommended to range of motion improved pain related to shunt tubing.  Shunt setting was checked and set at a 3 and verified with shunt reprogramming.  No major issues noted.    EEG  IMPRESSION/REPORT/PLAN  This is a normal EEG during wakefulness and drowsiness except for mild generalized background dysrhythmia. Further clinical correlation is needed.     Please note that the absence of epileptiform abnormalities does not exclude the possibility of epilepsy in any patient.     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  At this point patient's headache is not related to the  shunt.  Repeat MRI/MRA are stable in the head pain is not related to the aneurysm.  From her description her symptoms are more consistent with muscular pain than head pain.  No longer is using any medication will stop the gabapentin and Flexeril.  Headaches are stable.  Will monitor.    She should continue follow-up with neurosurgery for her shunt.  Balance issues are most likely related to her history of the giant cell aneurysm and hydrocephalus.  MRI brain was negative for any hydrocephalus or any other structural lesions.  Her shunt has been functional per neurosurgery notes.    I will further check a EEG to look for underlying epilepsy.  This did come back negative.  Her Keppra level is therapeutic.  Given that its been over a year with no seizures and her seizure mainly happening initially at the time of the subarachnoid hemorrhage I feel that she could be weaned off the seizure medication with low risk for seizure recurrence.  She was weaned off the Keppra with no recurrence of seizures.  Has been off steroids for a few months.    Before she can drive by feel it would be prudent for her to get an Occupational Therapy driving evaluation.  Previously this was ordered but could not be done.    Regular Occupational Therapy evaluation did not show significant cognitive deficits.  I believe that she could go back to work.    I can see her back on as-needed basis.    -     Occupational Therapy Referral; Future    Return if symptoms worsen or fail to improve, for In-Clinic Visit (must).    Over 33 minutes were spent  coordinating the care for the patient on the day of the encounter.  This includes previsit, during visit and post visit activities as documented above.  Counseling patient.  Extra time due to use of .  Trying to review Occupational Therapy notes and order the correct Occupational Therapy evaluation.  (Activities include but not inclusive of reviewing chart, reviewing outside records, reviewing labs and imaging study results as well as the images, patient visit time including getting history and exam,  use if applicable, review of test results with the patient and coming up with a plan in a shared model, counseling patient and family, education and answering patient questions, EMR , EMR diagnosis entry and problem list management, medication reconciliation and prescription management if applicable, paperwork if applicable, printing documents and documentation of the visit activities.)      Justin Vasquez MD  Neurologist  [facility] [place]  Tel:- [phone]    This note was dictated using voice recognition software.  Any grammatical or context distortions are unintentional and inherent to the software.

## 2022-07-22 ENCOUNTER — TELEPHONE (OUTPATIENT)
Dept: NEUROLOGY | Facility: CLINIC | Age: 47
End: 2022-07-22

## 2022-07-22 NOTE — TELEPHONE ENCOUNTER
Routed to wrong pool by mistake (please disregard)      Spoke to pt's daughter, Corina, given phone number to schedule Driving eval with [facility] ([phone])  Faxed order, face sheet, office visit note to Ivy White at [phone].      Jing Sainz LPN on 7/22/2022 at 11:22 AM

## 2022-07-22 NOTE — TELEPHONE ENCOUNTER
"[facility] Call Center    Phone Message    May a detailed message be left on voicemail: yes     Reason for Call:     Corina, patients daughter called to check on status of a referral from . Referral to whom/where Corina is not sure and patient was unable to provide that information to her daughter. Per Corina, at patients appointment on 7/19, pt wanted to reapply for her drivers licence but before  can approve of that she will have to see \"someone\" first. Please review and assist with referral as patient has not heard from anyone to schedule appts with, ok to call Corina aat [phone] or call patient at    [phone] pt's cell- will need an       Action Taken: Other: [facility] neurology    Travel Screening: Not Applicable                                                                        "

## 2022-07-29 NOTE — TELEPHONE ENCOUNTER
Called and spoke with len White.   Patient needs to go to the DMV and apply for the drivers license and then get a limited license through len white and the state to take the test. I then called the daughter and gave her the information and len white will be reaching out to them also.  Maria Elena Hansen MA,CMA,1:20 PM

## 2022-07-29 NOTE — TELEPHONE ENCOUNTER
Patient daughter Corina called to speak to care team regarding mothers  assessment eval, please call daughter back at [phone] to discuss

## 2022-07-29 NOTE — TELEPHONE ENCOUNTER
"\"She states that her mom cant do the assessment at [facility] because she does not have a valid license\" -this is not true.  She does not need a 's license to get a driving assessment done.  "

## 2022-07-29 NOTE — TELEPHONE ENCOUNTER
Called and spoke with patients daughter. She states that her mom cant do the assessment at [facility] because she does not have a valid license. She is unable to go to the [facility] and take a drivers test to get her drivers license without a letter from the doctor saying she can drive. So she is stuck at a standstill and unsure what to do.  Maria Elena Hansen MA,[facility],12:56 PM

## 2022-09-07 LAB
CHOLESTEROL (EXTERNAL): 172 MG/DL
CREATININE (EXTERNAL): 0.85 MG/DL (ref 0.5–0.99)
GFR ESTIMATED (EXTERNAL): 86 ML/MIN/1.73M2
GLUCOSE (EXTERNAL): 90 MG/DL (ref 65–99)
HBA1C MFR BLD: 5.5 %
HDLC SERPL-MCNC: 52 MG/DL
LDL CHOLESTEROL CALCULATED (EXTERNAL): 97 MG/DL
NON HDL CHOLESTEROL (EXTERNAL): 120 MG/DL
POTASSIUM (EXTERNAL): 4 MMOL/L (ref 3.5–5.3)
TRIGLYCERIDES (EXTERNAL): 135 MG/DL
TSH SERPL-ACNC: 2.89 MIU/L (ref 0.4–4.5)

## 2022-10-05 ENCOUNTER — TRANSFERRED RECORDS (OUTPATIENT)
Dept: HEALTH INFORMATION MANAGEMENT | Facility: CLINIC | Age: 47
End: 2022-10-05

## 2022-10-06 LAB — HEP C HIM: NORMAL

## 2022-10-14 ENCOUNTER — TRANSFERRED RECORDS (OUTPATIENT)
Dept: HEALTH INFORMATION MANAGEMENT | Facility: CLINIC | Age: 47
End: 2022-10-14

## 2022-11-25 ENCOUNTER — APPOINTMENT (OUTPATIENT)
Dept: RADIOLOGY | Facility: CLINIC | Age: 47
End: 2022-11-25
Attending: EMERGENCY MEDICINE
Payer: COMMERCIAL

## 2022-11-25 ENCOUNTER — HOSPITAL ENCOUNTER (EMERGENCY)
Facility: CLINIC | Age: 47
Discharge: HOME | End: 2022-11-25
Attending: EMERGENCY MEDICINE | Admitting: EMERGENCY MEDICINE
Payer: COMMERCIAL

## 2022-11-25 ENCOUNTER — APPOINTMENT (OUTPATIENT)
Dept: CT IMAGING | Facility: CLINIC | Age: 47
End: 2022-11-25
Attending: PHYSICIAN ASSISTANT
Payer: COMMERCIAL

## 2022-11-25 VITALS
OXYGEN SATURATION: 98 % | TEMPERATURE: 98 F | BODY MASS INDEX: 33.58 KG/M2 | SYSTOLIC BLOOD PRESSURE: 130 MMHG | HEART RATE: 78 BPM | DIASTOLIC BLOOD PRESSURE: 86 MMHG | HEIGHT: 58 IN | WEIGHT: 160 LBS | RESPIRATION RATE: 18 BRPM
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
## 2022-11-25 PROCEDURE — 70450 CT HEAD/BRAIN W/O DYE: CPT

## 2022-11-25 PROCEDURE — 71120 X-RAY EXAM BREASTBONE 2/>VWS: CPT

## 2022-11-25 PROCEDURE — 99284 EMERGENCY DEPT VISIT MOD MDM: CPT | Mod: 25

## 2022-11-25 RX ORDER — ACETAMINOPHEN 325 MG/1
975 TABLET ORAL ONCE
Status: DISCONTINUED | OUTPATIENT
Start: 2022-11-25 | End: 2022-11-25 | Stop reason: HOSPADM

## 2022-11-25 ASSESSMENT — ENCOUNTER SYMPTOMS
HEADACHES: 0
NECK PAIN: 1
ABDOMINAL PAIN: 0
NUMBNESS: 0
DIZZINESS: 0
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
## 2022-12-12 ENCOUNTER — TELEPHONE (OUTPATIENT)
Dept: NEUROLOGY | Facility: CLINIC | Age: 47
End: 2022-12-12
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
## 2022-12-15 NOTE — TELEPHONE ENCOUNTER
RN returned call to patient and daughter Sandy, encouraged them to discuss further with neurosurgery team per Dr. Vasquez's suggestion.     Also encouraged them to make PCP appt to discuss ongoing right arm and hand symptoms. Sandy verbalizes understanding.     Chris Lozano RN, BSN  [facility]

## 2022-12-16 NOTE — TELEPHONE ENCOUNTER
To clarify:- She needs an apt with me for her right arm not with PCP  She needs to see NSG for her shunt. We do not manage shunts.

## 2022-12-16 NOTE — TELEPHONE ENCOUNTER
Per MD please call pt to schedule follow up to discuss right arm/hand symptoms further. Can call patients daughter Sandy to schedule this- she speaks english and will attend appt.     sam  needed for appt.     Chris Lozano RN, BSN  [facility]

## 2022-12-28 ENCOUNTER — TRANSFERRED RECORDS (OUTPATIENT)
Dept: HEALTH INFORMATION MANAGEMENT | Facility: CLINIC | Age: 47
End: 2022-12-28

## 2022-12-28 LAB
ALT SERPL-CCNC: 45 U/L (ref 6–29)
AST SERPL-CCNC: 26 U/L (ref 10–35)

## 2023-01-05 ENCOUNTER — TRANSFERRED RECORDS (OUTPATIENT)
Dept: HEALTH INFORMATION MANAGEMENT | Facility: CLINIC | Age: 48
End: 2023-01-05

## 2023-02-08 ENCOUNTER — MEDICAL CORRESPONDENCE (OUTPATIENT)
Dept: HEALTH INFORMATION MANAGEMENT | Facility: CLINIC | Age: 48
End: 2023-02-08
Payer: COMMERCIAL

## 2023-02-10 ENCOUNTER — TRANSCRIBE ORDERS (OUTPATIENT)
Dept: OTHER | Age: 48
End: 2023-02-10

## 2023-02-10 DIAGNOSIS — M54.2 NECK PAIN ON RIGHT SIDE: Primary | ICD-10-CM

## 2023-02-14 ENCOUNTER — OFFICE VISIT (OUTPATIENT)
Dept: NEUROSURGERY | Facility: CLINIC | Age: 48
End: 2023-02-14
Payer: COMMERCIAL

## 2023-02-14 VITALS
HEART RATE: 110 BPM | WEIGHT: 170 LBS | OXYGEN SATURATION: 97 % | BODY MASS INDEX: 35.53 KG/M2 | DIASTOLIC BLOOD PRESSURE: 83 MMHG | SYSTOLIC BLOOD PRESSURE: 137 MMHG

## 2023-02-14 DIAGNOSIS — M54.2 NECK PAIN: Primary | ICD-10-CM

## 2023-02-14 DIAGNOSIS — Z98.2 S/P VP SHUNT: ICD-10-CM

## 2023-02-14 PROCEDURE — 99213 OFFICE O/P EST LOW 20 MIN: CPT | Performed by: SURGERY

## 2023-02-14 RX ORDER — AMITRIPTYLINE HYDROCHLORIDE 10 MG/1
TABLET ORAL
COMMUNITY
Start: 2023-01-18 | End: 2023-06-13

## 2023-02-14 RX ORDER — PREDNISOLONE ACETATE 10 MG/ML
SUSPENSION/ DROPS OPHTHALMIC
COMMUNITY
Start: 2022-12-30 | End: 2023-06-13

## 2023-02-14 RX ORDER — CYCLOBENZAPRINE HCL 10 MG
TABLET ORAL
COMMUNITY
Start: 2022-12-19 | End: 2023-06-13

## 2023-02-14 ASSESSMENT — PAIN SCALES - GENERAL: PAINLEVEL: EXTREME PAIN (8)
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
## 2023-02-16 ENCOUNTER — HOSPITAL ENCOUNTER (OUTPATIENT)
Dept: PHYSICAL THERAPY | Facility: REHABILITATION | Age: 48
Discharge: HOME | End: 2023-02-16
Attending: SURGERY
Payer: COMMERCIAL

## 2023-02-16 ENCOUNTER — MEDICAL CORRESPONDENCE (OUTPATIENT)
Dept: NEUROSURGERY | Facility: CLINIC | Age: 48
End: 2023-02-16
Payer: COMMERCIAL

## 2023-02-16 DIAGNOSIS — M54.2 NECK PAIN: ICD-10-CM

## 2023-02-16 DIAGNOSIS — Z98.2 S/P VP SHUNT: ICD-10-CM

## 2023-02-16 PROCEDURE — 97112 NEUROMUSCULAR REEDUCATION: CPT | Mod: GP | Performed by: PHYSICAL THERAPIST

## 2023-02-16 PROCEDURE — 97162 PT EVAL MOD COMPLEX 30 MIN: CPT | Mod: GP | Performed by: PHYSICAL THERAPIST
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
## 2023-02-19 NOTE — PROGRESS NOTES
[facility]    OUTPATIENT PHYSICAL THERAPY ORTHOPEDIC EVALUATION  PLAN OF TREATMENT FOR OUTPATIENT REHABILITATION  (COMPLETE FOR INITIAL CLAIMS ONLY)  Patient's Last Name, First Name, M.I.  YOB: 1975  Darion Herrera    Provider s Name:  [facility]   Medical Record No.  [id number]   Start of Care Date:  02/16/23   Onset Date:  (P) 11/25/22   Type:     _X__PT   ___OT   ___SLP Medical Diagnosis:  (P) s/p  shunt, neck pain     PT Diagnosis:  (P) neck pain, low back pain, bilateral hip pain, sternal pain, headache   Visits from SOC:  1      _________________________________________________________________________________  Plan of Treatment/Functional Goals:  (P) manual therapy, neuromuscular re-education           Goals  Goal Identifier: (P) Pain  Goal Description: (P) Patient will report worst pain of 3/10 in neck, low back or hips.  Target Date: (P) 05/22/23    Goal Identifier: (P) Personal Care  Goal Description: (P) Patient will report ability to provide personal care for herself without provoking neck pain.  Target Date: (P) 05/22/23    Goal Identifier: (P) Lifting  Goal Description: (P) Patient will demonstrate ability to lift 30 pounds from floor and carry 30'.  Target Date: (P) 05/22/23    Goal Identifier: (P) Headaches  Goal Description: (P) Patient will report no headaches for entire 1 week period.  Target Date: (P) 05/22/23    Goal Identifier: (P) Driving  Goal Description: (P) Patient will report ability to drive her car as much as she'd like without experieincing neck pain.  Target Date: (P) 05/22/23                                     Therapy Frequency:  (P) 1 time/week  Predicted Duration of Therapy Intervention:  (P) 90 days    Mark Galindo, PT                 I CERTIFY THE NEED FOR THESE SERVICES FURNISHED UNDER        THIS PLAN OF TREATMENT AND  WHILE UNDER MY CARE     (Physician co-signature of this document indicates review and certification of the therapy plan).                     Certification Date From:  (P) 02/16/23   Certification Date To:  (P) 05/22/23    Referring Provider:  Gale Albarran MD    Initial Assessment        See Epic Evaluation Start of Care Date: 02/16/23

## 2023-02-24 ENCOUNTER — HOSPITAL ENCOUNTER (OUTPATIENT)
Dept: CT IMAGING | Facility: CLINIC | Age: 48
Discharge: HOME | End: 2023-02-24
Attending: SURGERY
Payer: COMMERCIAL

## 2023-02-24 ENCOUNTER — HOSPITAL ENCOUNTER (OUTPATIENT)
Dept: RADIOLOGY | Facility: CLINIC | Age: 48
Discharge: HOME | End: 2023-02-24
Attending: SURGERY
Payer: COMMERCIAL

## 2023-02-24 DIAGNOSIS — M54.2 NECK PAIN: ICD-10-CM

## 2023-02-24 DIAGNOSIS — Z98.2 S/P VP SHUNT: ICD-10-CM

## 2023-02-24 PROCEDURE — 72131 CT LUMBAR SPINE W/O DYE: CPT

## 2023-02-24 PROCEDURE — 72125 CT NECK SPINE W/O DYE: CPT

## 2023-02-24 PROCEDURE — 74018 RADEX ABDOMEN 1 VIEW: CPT

## 2023-03-02 ENCOUNTER — VIRTUAL VISIT (OUTPATIENT)
Dept: NEUROSURGERY | Facility: CLINIC | Age: 48
End: 2023-03-02
Payer: COMMERCIAL

## 2023-03-02 ENCOUNTER — HOSPITAL ENCOUNTER (OUTPATIENT)
Dept: PHYSICAL THERAPY | Facility: REHABILITATION | Age: 48
Discharge: HOME | End: 2023-03-02
Payer: COMMERCIAL

## 2023-03-02 DIAGNOSIS — G89.29 CHRONIC BILATERAL LOW BACK PAIN WITHOUT SCIATICA: ICD-10-CM

## 2023-03-02 DIAGNOSIS — M54.2 NECK PAIN: Primary | ICD-10-CM

## 2023-03-02 DIAGNOSIS — M54.50 CHRONIC BILATERAL LOW BACK PAIN WITHOUT SCIATICA: ICD-10-CM

## 2023-03-02 PROCEDURE — 97140 MANUAL THERAPY 1/> REGIONS: CPT | Mod: GP | Performed by: PHYSICAL THERAPIST

## 2023-03-02 PROCEDURE — 99441 PR PHYSICIAN TELEPHONE EVALUATION 5-10 MIN: CPT | Mod: 93 | Performed by: NURSE PRACTITIONER

## 2023-03-02 PROCEDURE — 97112 NEUROMUSCULAR REEDUCATION: CPT | Mod: GP | Performed by: PHYSICAL THERAPIST
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
## 2023-03-09 ENCOUNTER — HOSPITAL ENCOUNTER (OUTPATIENT)
Dept: PHYSICAL THERAPY | Facility: REHABILITATION | Age: 48
Discharge: HOME | End: 2023-03-09
Payer: COMMERCIAL

## 2023-03-09 DIAGNOSIS — M54.2 NECK PAIN: Primary | ICD-10-CM

## 2023-03-09 PROCEDURE — 97140 MANUAL THERAPY 1/> REGIONS: CPT | Mod: GP | Performed by: PHYSICAL THERAPIST

## 2023-03-16 ENCOUNTER — HOSPITAL ENCOUNTER (OUTPATIENT)
Dept: PHYSICAL THERAPY | Facility: REHABILITATION | Age: 48
Discharge: HOME | End: 2023-03-16
Attending: NURSE PRACTITIONER
Payer: COMMERCIAL

## 2023-03-16 DIAGNOSIS — M54.2 NECK PAIN: ICD-10-CM

## 2023-03-16 DIAGNOSIS — M54.50 CHRONIC BILATERAL LOW BACK PAIN WITHOUT SCIATICA: ICD-10-CM

## 2023-03-16 DIAGNOSIS — G89.29 CHRONIC BILATERAL LOW BACK PAIN WITHOUT SCIATICA: ICD-10-CM

## 2023-03-16 PROCEDURE — 97112 NEUROMUSCULAR REEDUCATION: CPT | Mod: GP | Performed by: PHYSICAL THERAPIST

## 2023-03-16 PROCEDURE — 97140 MANUAL THERAPY 1/> REGIONS: CPT | Mod: GP | Performed by: PHYSICAL THERAPIST

## 2023-03-23 ENCOUNTER — OFFICE VISIT (OUTPATIENT)
Dept: NEUROLOGY | Facility: CLINIC | Age: 48
End: 2023-03-23
Payer: COMMERCIAL

## 2023-03-23 VITALS
SYSTOLIC BLOOD PRESSURE: 130 MMHG | HEART RATE: 80 BPM | DIASTOLIC BLOOD PRESSURE: 88 MMHG | BODY MASS INDEX: 35.53 KG/M2 | WEIGHT: 170 LBS | RESPIRATION RATE: 16 BRPM

## 2023-03-23 DIAGNOSIS — M54.12 CERVICAL RADICULOPATHY: ICD-10-CM

## 2023-03-23 DIAGNOSIS — R51.9 NONINTRACTABLE HEADACHE, UNSPECIFIED CHRONICITY PATTERN, UNSPECIFIED HEADACHE TYPE: ICD-10-CM

## 2023-03-23 DIAGNOSIS — Z87.898 HISTORY OF SEIZURES: Primary | ICD-10-CM

## 2023-03-23 DIAGNOSIS — M79.622 PAIN OF LEFT UPPER ARM: ICD-10-CM

## 2023-03-23 DIAGNOSIS — Z86.79 HISTORY OF ANEURYSM OF ARTERY: ICD-10-CM

## 2023-03-23 PROCEDURE — 99215 OFFICE O/P EST HI 40 MIN: CPT | Performed by: PSYCHIATRY & NEUROLOGY
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
## 2023-03-30 ENCOUNTER — HOSPITAL ENCOUNTER (OUTPATIENT)
Dept: PHYSICAL THERAPY | Facility: REHABILITATION | Age: 48
Discharge: HOME | End: 2023-03-30
Attending: NURSE PRACTITIONER
Payer: COMMERCIAL

## 2023-03-30 DIAGNOSIS — G89.29 CHRONIC BILATERAL LOW BACK PAIN WITHOUT SCIATICA: ICD-10-CM

## 2023-03-30 DIAGNOSIS — M54.2 NECK PAIN: Primary | ICD-10-CM

## 2023-03-30 DIAGNOSIS — M54.50 CHRONIC BILATERAL LOW BACK PAIN WITHOUT SCIATICA: ICD-10-CM

## 2023-03-30 PROCEDURE — 97112 NEUROMUSCULAR REEDUCATION: CPT | Mod: GP | Performed by: PHYSICAL THERAPIST

## 2023-04-04 ENCOUNTER — TELEPHONE (OUTPATIENT)
Dept: NEUROLOGY | Facility: CLINIC | Age: 48
End: 2023-04-04
Payer: COMMERCIAL

## 2023-04-04 NOTE — TELEPHONE ENCOUNTER
[facility] Call Center    Phone Message    May a detailed message be left on voicemail: yes     Reason for Call: Other: Pt's daughter Katey is calling because she is getting calls from [facility] for MRI appt and they are asking about what kind of shunt pt has. Katey does not have that info. Pt says Dr. Hoffmann put in pt's shunt but Dr. Vasquez ordered the MRI. She would like someone to contact imaging to provide that information because pt does not know and Katey does not know either. Maybe Dr. Vasquez can contact imaging and discuss with them about it. Please advise.    Action Taken: Message routed to:  [facility]    Travel Screening: Not Applicable

## 2023-04-05 NOTE — TELEPHONE ENCOUNTER
She had an MRI in 2022 with the shunt.  Please work with neurosurgery to get the MRI scheduled.    Please schedule the MRI through [facility] so neurosurgery can help with the shunt.

## 2023-04-05 NOTE — TELEPHONE ENCOUNTER
Attempted to return call to [facility] not set up and I am unable to leave message.     They should refer to card that pt was given when shunt was placed. If they do not have card with details regarding  shunt, should contact neurosurgery team that placed shunt for more details.     Dr. Vasquez- if they cannot determine if shunt is MRI safe do you have alternative imaging you want of C spine?     Chris Lozano, RN, BSN  [facility] Neurology

## 2023-04-06 NOTE — TELEPHONE ENCOUNTER
Returned call to Katey (ctc on file). We discussed scheduling MRI through [facility] so that shunt safety is addressed with neurosurgery for procedure. She is agreeable.     RN provided scheduling number, she will call today to schedule her MRI.    Chris Lozano RN, BSN  [facility] Neurology

## 2023-04-07 NOTE — ADDENDUM NOTE
Encounter addended by: Mark Galindo, PT on: 4/7/2023 8:34 AM   Actions taken: Clinical Note Signed, Flowsheet accepted, Document created, Document edited

## 2023-04-07 NOTE — PROGRESS NOTES
[facility]      OUTPATIENT PHYSICAL THERAPY EVALUATION  PLAN OF TREATMENT FOR OUTPATIENT REHABILITATION  (COMPLETE FOR INITIAL CLAIMS ONLY)  Patient's Last Name, First Name, M.I.  YOB: 1975  SharonDariondenise WILKINSON                        Provider's Name  [facility] Medical Record No.  [id number]                               Onset Date:      Start of Care Date:    2/16/23     Type:     _X_PT   ___OT   ___SLP Medical Diagnosis:   5/11/23                        PT Diagnosis:      Visits from SOC:  1   _________________________________________________________________________________  Plan of Treatment/Functional Goals    Planned Interventions:       Goals: See Physical Therapy Goals on Care Plan in [facility] electronic health record.    Therapy Frequency:    Predicted Duration of Therapy Intervention:    _________________________________________________________________________________    I CERTIFY THE NEED FOR THESE SERVICES FURNISHED UNDER        THIS PLAN OF TREATMENT AND WHILE UNDER MY CARE     (Physician co-signature of this document indicates review and certification of the therapy plan).               ,                   Initial Assessment        See Physical Therapy evaluation dated   in Epic electronic health record.

## 2023-04-11 ENCOUNTER — NURSE TRIAGE (OUTPATIENT)
Dept: NURSING | Facility: CLINIC | Age: 48
End: 2023-04-11
Payer: COMMERCIAL

## 2023-04-11 ENCOUNTER — TELEPHONE (OUTPATIENT)
Dept: NEUROSURGERY | Facility: CLINIC | Age: 48
End: 2023-04-11
Payer: COMMERCIAL

## 2023-04-11 NOTE — TELEPHONE ENCOUNTER
Called pt's daughter Glorioul and let her know that Darion has a programmable  shunt (certas valve).  Ivy Farfan RN, CNRN

## 2023-04-11 NOTE — TELEPHONE ENCOUNTER
Daughter Fitz  is calling and there is no consent to communicate signed so advised caller to phone Neurology clinic direct and caller agrees.      Reason for Disposition    Information only question and nurse able to answer    Additional Information    Negative: Nursing judgment    Negative: Nursing judgment    Negative: Nursing judgment    Negative: Nursing judgment    Protocols used: INFORMATION ONLY CALL - NO TRIAGE-A-OH

## 2023-04-11 NOTE — TELEPHONE ENCOUNTER
Patient daughter (Glorioul) who has KRIS is calling and is asking what kind of shunt patient has. Please call daughter  at [phone]

## 2023-04-27 NOTE — PROGRESS NOTES
Pulmonary  8/19/2019  3:47 PM       Chart reviewed.   Unable to get in to see patient today; down in OR for much of the day.   Continue with Phase 2 weaning as tolerates.   Anticipate transfer to [place] tomorrow   I will try to see her tomorrow.     Kenia Adams, [facility] Pulmonary/Critical Care    22-Apr-2023 22-Apr-2023 22-Apr-2023 22-Apr-2023 22-Apr-2023 22-Apr-2023 22-Apr-2023

## 2023-05-02 NOTE — ADDENDUM NOTE
Encounter addended by: Minnie Calderón, OT on: 5/1/2023 8:54 PM   Actions taken: Clinical Note Signed, Episode resolved

## 2023-05-02 NOTE — PROGRESS NOTES
[facility]    Outpatient Occupational Therapy Discharge Note  Patient: Darion Herrera  : 1975    Beginning/End Dates of Reporting Period:  2022 to 2022    Referring Provider: Justin Vasquez MD    Therapy Diagnosis: decreased ADLs/IADLs    Client Self Report:      Objective Measurements:     Objective Measure: SDMT   Details: 14  well below normal limts for age   Objective Measure: Dynavision   Details: Mode A 68, B 70 B with divided ATTN: 61 7/10, 47 10/10  WNL   Objective Measure: Visual Scancourse   Details: T 1 15  T2 18/20  WNL   Objective Measure: R foot tap   Details: 5 seconds   Objective Measure: Central Vision Scan   Details: 9 BNL Where WNL is 10 or greater.                  Goals:     Goal Identifier     Goal Description     Target Date     Date Met      Progress (detail required for progress note):       Goal Identifier     Goal Description     Target Date     Date Met      Progress (detail required for progress note):       Goal Identifier         Plan:  Discharge from therapy.    Discharge:    Reason for Discharge: Patient has failed to schedule further appointments.

## 2023-05-05 ENCOUNTER — HOSPITAL ENCOUNTER (OUTPATIENT)
Dept: RADIOLOGY | Facility: CLINIC | Age: 48
Discharge: HOME | End: 2023-05-05
Attending: PSYCHIATRY & NEUROLOGY
Payer: COMMERCIAL

## 2023-05-05 ENCOUNTER — HOSPITAL ENCOUNTER (OUTPATIENT)
Dept: MRI IMAGING | Facility: CLINIC | Age: 48
Discharge: HOME | End: 2023-05-05
Attending: PSYCHIATRY & NEUROLOGY
Payer: COMMERCIAL

## 2023-05-05 DIAGNOSIS — M79.622 PAIN OF LEFT UPPER ARM: ICD-10-CM

## 2023-05-05 DIAGNOSIS — Z98.2 INTRACRANIAL SHUNT: ICD-10-CM

## 2023-05-05 DIAGNOSIS — T85.618A SHUNT MALFUNCTION, INITIAL ENCOUNTER: ICD-10-CM

## 2023-05-05 PROCEDURE — 70250 X-RAY EXAM OF SKULL: CPT | Mod: 76

## 2023-05-05 PROCEDURE — 70250 X-RAY EXAM OF SKULL: CPT

## 2023-05-05 PROCEDURE — 72141 MRI NECK SPINE W/O DYE: CPT

## 2023-06-13 ENCOUNTER — PATIENT OUTREACH (OUTPATIENT)
Dept: CARE COORDINATION | Facility: CLINIC | Age: 48
End: 2023-06-13

## 2023-06-13 ENCOUNTER — OFFICE VISIT (OUTPATIENT)
Dept: FAMILY MEDICINE | Facility: CLINIC | Age: 48
End: 2023-06-13
Payer: COMMERCIAL

## 2023-06-13 VITALS
SYSTOLIC BLOOD PRESSURE: 96 MMHG | OXYGEN SATURATION: 95 % | DIASTOLIC BLOOD PRESSURE: 62 MMHG | HEART RATE: 92 BPM | WEIGHT: 168 LBS | BODY MASS INDEX: 35.26 KG/M2 | TEMPERATURE: 98.1 F | HEIGHT: 58 IN | RESPIRATION RATE: 18 BRPM

## 2023-06-13 DIAGNOSIS — I63.511 CEREBROVASCULAR ACCIDENT (CVA) DUE TO OCCLUSION OF RIGHT MIDDLE CEREBRAL ARTERY (H): ICD-10-CM

## 2023-06-13 DIAGNOSIS — Z98.2 S/P VP SHUNT: ICD-10-CM

## 2023-06-13 DIAGNOSIS — H04.123 DRY EYES: Primary | ICD-10-CM

## 2023-06-13 DIAGNOSIS — I60.8 ANEURYSMAL SUBARACHNOID HEMORRHAGE (H): Chronic | ICD-10-CM

## 2023-06-13 DIAGNOSIS — F39 MOOD DISORDER (H): ICD-10-CM

## 2023-06-13 DIAGNOSIS — G47.9 SLEEP DIFFICULTIES: ICD-10-CM

## 2023-06-13 DIAGNOSIS — E78.2 MIXED HYPERLIPIDEMIA: ICD-10-CM

## 2023-06-13 DIAGNOSIS — G91.9 HYDROCEPHALUS, UNSPECIFIED TYPE (H): ICD-10-CM

## 2023-06-13 DIAGNOSIS — F43.23 ADJUSTMENT DISORDER WITH MIXED ANXIETY AND DEPRESSED MOOD: ICD-10-CM

## 2023-06-13 PROCEDURE — 99203 OFFICE O/P NEW LOW 30 MIN: CPT | Performed by: FAMILY MEDICINE

## 2023-06-13 RX ORDER — MIRTAZAPINE 7.5 MG/1
7.5 TABLET, FILM COATED ORAL AT BEDTIME
Qty: 90 TABLET | Refills: 1 | Status: SHIPPED | OUTPATIENT
Start: 2023-06-13

## 2023-06-13 NOTE — PROGRESS NOTES
OFFICE VISIT    Assessment/Plan:     Patient Instructions:    -See the psychologist/therapist as scheduled.  -Start taking the mirtazapine as prescribed.  This helps the depression, anxiety, and sleep.  -Please continue to stay connected with care children/family as they are a strong support system for you.  -Consider go to an adult day center on a regular basis.    -Continue to follow with the neurologist (brain/seizure specialist).    Please seek immediate medical attention (go to the emergency room or urgent care) for the following reasons: worsening symptoms, or any concerning changes.    Please return to clinic in 1 month for follow-up of medication, or sooner as needed.          Darion was seen today for establish care.  Diagnoses and all orders for this visit:    Dry eyes: Longstanding.  Plan for trial of medication as below.  -     ketotifen (ZADITOR) 0.025 % ophthalmic solution; Place 1 drop into both eyes 2 times daily as needed for itching or dry eyes    Adjustment disorder with mixed anxiety and depressed mood  Mood disorder (H)  Sleep difficulties  Cerebrovascular accident (CVA) due to occlusion of right middle cerebral artery (H)  Aneurysmal subarachnoid hemorrhage (H)  Hydrocephalus, unspecified type (H)  S/P  shunt  Mixed hyperlipidemia stable, though, this patient hardship.  After discussion, patient plans to see the therapist at [place] as scheduled.  Reviewed other treatment options and patient is open to starting mirtazapine as below.  Further recommendations based on response to medication.  -     Primary Care - Care Coordination Referral; Future  -     mirtazapine (REMERON) 7.5 MG tablet; Take 1 tablet (7.5 mg) by mouth At Bedtime      The diagnoses, treatment options, risk, benefits, and recommendations were reviewed with patient/guardian.  Questions were answered to patient's/guardian satisfaction.  Red flag signs were reviewed.  Patient/guardian is in agreement with above  plan.      Subjective: 47 year old female with history of CVA due to occlusion of the right middle cerebral artery on clopidogrel, aneurysmal subarachnoid hemorrhage, hydrocephalus with operating  shunt, epilepsy as late effect of CVA, tracheostomy, mood disorder, headaches, adjustment disorder with mixed anxiety and depressed mood chronic bilateral low back pain without sciatica who presents to clinic for the following complaints:   Patient presents with:  Establish Care: Previous stroke and suicial ideal    Answers for HPI/ROS submitted by the patient on 6/13/2023  What is the reason for your visit today? : previous stroke  How many servings of fruits and vegetables do you eat daily?: 0-1  On average, how many sweetened beverages do you drink each day (Examples: soda, juice, sweet tea, etc.  Do NOT count diet or artificially sweetened beverages)?: 0  How many minutes a day do you exercise enough to make your heart beat faster?: 10 to 19  How many days a week do you exercise enough to make your heart beat faster?: 7  How many days per week do you miss taking your medication?: 0      Previously followed with Dr. Monroe Cassidy. KRIS signed.     Stroke: Patient has been following with neurology.  Most recent neurology appointment was on 3/23/2023.  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  Patient also developed hydrocephalus and needed a shunt.  Patient continues to have the shunt.  She at that time developed seizures and has been on Keppra since then.  At the most recent appointment, patient was seen for right occipital region pain, and weakness, feeling unsteady when she bends her head, recent fall (mechanical fall when patient went hunting).     Patient states that the keppra was discontinued by Dr. Vasquez as she hadn't had seizures for 5 years now. She has been off the medication since 03/23/2023. No seizure issues  noted.       Has dry eyes. The prednisolone acetate eye solution didn't help. She saw the eye specialist and that didn't help. She can't read letters that are close and recommended she get readers OTC. She went to another eye doctor and was given eye glasses and that didn't help her. She did end up getting readers OTC. Her eyes are very dry. No pain noted.         Suicidal ideation:     Patient had been evaluated by psychiatry in 08/16/2019 for situational depression.  She had been diagnosed with adjustment disorder with depression anxiety likely due to medical condition, hospitalization.  Sleep difficulties due to the above.  At that time, recommendation was to initiate melatonin.  Patient had declined antidepression/anxiolytics.  Patient was to have ongoing therapy for coping skills and anxiety management.     Her  also has medical issues. Her youngest son (17 years old) helps take care of her  as a PCA. Patient' hasn't been driving for a long time due to seizures. Her  also doesn't drive. She has younger children who are learning how to drive. The older children have been helping (with some prompting from patient) the younger children.     She states that she has a lot of stress. She can't drive, her  can't see. He does dialysis frequently and some days, he is very tired and patient is not sure if he will wake up after dialysis session some times. Her  is also old.       Patient has an appointment scheduled to see a psychologist soon. She doesn't know who it is.   She had seen a therapist for a year already, though this wasn't helpful for her, so she is making a change.     She has been off sleep aid and muscle relaxer for a while now.  She got bit better and so stopped the medication, though has been having more difficulty with falling asleep.    She has never attempted to kill herself. She just htinks that it is not worth her living, but her children keep her going. She can't  leave them. There are three who hasn't started their lives and got marrid yet. The younger ones just started working and one going to school.     Her right arm is weak. She fell about a month ago and she hit her right elbow and has been painful since then. It felt like she hit a nerve when she fell on her elbow. Arms and legs on both sides just aren't strong like usual. The skin on the hands are numb and she can't feel the pain like she used to.  Further sensation changes are a result of the stroke.    In her life, she has always done laborious jobs and she hasn't been stressed because she can do what she needs. Since she has been ill, she can't seem to find government support services to provide her monetary support. Her children bring her food here and there.     She had been offered to go to an adult day center. She wants to go, but her  can't take care of himself and her children aren't always home. If she goes for an extended period of time, her  calls her and she feels like she can't go. If her  is hungry, he tries to cook, but has burned 2-3 of their pots already.  He cannot take care of himself.  She has been thinking about going with her  to the adult day center, they will her  can't sit or stand too long due to the back pains.     She still has difficulty sleeping. She stays up late and even if she tries to sleep at midnight, she lays there until 2-3 AM and can't sleep. Then her  wakes up at 5 AM and then she gets up, too. If she doesn't sleep enough, she then starts to have headaches, too.     Her children call and check on her frequently and come to visit her, especially when she is stressed. They take her to the store and go out to eat so she is happier.     She does not have plans to harm herself. Patient denies current SI/HI.  She more has passive thoughts about better off not being alive.  Contracts for safety.        The 10 point review of system is negative  "except as stated in the HPI.    Allergies were reviewed and updated.    Objective:   BP 96/62   Pulse 92   Temp 98.1  F (36.7  C) (Oral)   Resp 18   Ht 1.464 m (4' 9.64\")   Wt 76.2 kg (168 lb)   SpO2 95%   BMI 35.55 kg/m    General: Active, alert, nontoxic-appearing.  No acute distress.  HEENT: Normocephalic, atraumatic.  Pupils are equal and round.  Sclera is clear.  Mild dry eyes noted.  Normal external ears. Nares patent.  Moist mucous membranes.    Cardiac: Warm extremities.  Cap refill less than 3 seconds.  Respiratory/chest:  Breathing is not labored.  No accessory muscle usage.  Extremities: Voluntary movements intact.  Integumentary: No concerning rash or skin changes appreciated.        Rahul Bautista MD  [facility] [place] [address]  Phone: [phone]  Fax: [phone]    6/13/2023  3:22 PM              Current Outpatient Medications   Medication     aspirin (ASA) 81 MG chewable tablet     atorvastatin (LIPITOR) 20 MG tablet     clopidogrel (PLAVIX) 75 MG tablet     ketotifen (ZADITOR) 0.025 % ophthalmic solution     mirtazapine (REMERON) 7.5 MG tablet     No current facility-administered medications for this visit.       Allergies   Allergen Reactions     Hydrocodone Rash and Swelling     Oxycodone Rash and Swelling       Patient Active Problem List    Diagnosis Date Noted     Chronic bilateral low back pain without sciatica 03/16/2023     Priority: Medium     S/P  shunt 02/16/2023     Priority: Medium     Neck pain 02/16/2023     Priority: Medium     Subdural hematoma (H) 05/07/2020     Priority: Medium     Shunt malfunction, subsequent encounter      Priority: Medium     RUQ abdominal pain      Priority: Medium     HCAP (healthcare-associated pneumonia) 10/26/2019     Priority: Medium     Pleural effusion 10/26/2019     Priority: Medium     Cerebral aneurysm 09/16/2019     Priority: Medium     SAH (subarachnoid hemorrhage) (H) 08/21/2019     Priority: Medium "     Hydrocephalus with operating shunt (H) 08/21/2019     Priority: Medium     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) 08/21/2019     Priority: Medium     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 (H)      Priority: Medium     Respiratory failure (H) 08/20/2019     Priority: Medium     Adjustment disorder with mixed anxiety and depressed mood      Priority: Medium     Mood disorder (H)      Priority: Medium     Sleep difficulties      Priority: Medium     Pneumonia due to group B Streptococcus, unspecified laterality, unspecified part of lung (H)      Priority: Medium     Vasospasm of cerebral artery      Priority: Medium     Cerebrovascular accident (CVA) due to occlusion of right middle cerebral artery (H)      Priority: Medium     S/P coil embolization of cerebral aneurysm 07/23/2019     Priority: Medium     July 22,2019 @ [facility]  ENDOVASCULAR COIL EMBOLIZATION OF A RUPTURED DISSECTING VENTROMEDIAL   SUPRACLINOID RIGHT INTERNAL CAROTID ARTERY ANEURYSM  2. CEREBRAL ANGIOGRAM: SELECTIVE CATHETERIZATION OF THE LEFT COMMON   CAROTID ARTERY, RIGHT COMMON CAROTID ARTERY, RIGHT INTERNAL CAROTID   ARTERY, LEFT VERTEBRAL ARTERY, AND RIGHT VERTEBRAL ARTERY WITH   ANGIOGRAPHIC IMAGING CENTERED OVER THE HEAD  3. CERVICAL ANGIOGRAM: SELECTIVE CATHETERIZATION OF THE LEFT COMMON   CAROTID ARTERY AND THE RIGHT COMMON CAROTID ARTERY WITH ANGIOGRAPHIC   IMAGING CENTERED OVER THE NECK  4. THREE-DIMENSIONAL ROTATIONAL ANGIOGRAM OF THE HEAD WITH IMAGE   PROCESSING ON A SEPARATE COMPUTER WORKSTATION: INJECTION OF THE RIGHT   COMMON CAROTID ARTERY  5. 5 CEREBRAL ANGIOGRAMS THROUGH EXISTING GUIDE CATHETER TO EVALUATE   ENDOVASCULAR TREATMENT: INJECTIONS OF THE RIGHT INTERNAL CAROTID ARTERY         Aneurysm of right internal carotid artery 07/23/2019     Priority: Medium     Aneurysmal subarachnoid hemorrhage (H) 07/22/2019     Priority: Medium     CVA (cerebral vascular accident) (H) 07/22/2019     Priority: Medium     Added  automatically from request for surgery [id number]         Hydrocephalus (H) 07/22/2019     Priority: Medium     Added automatically from request for surgery [id number]         Leiomyoma of uterus 07/18/2016     Priority: Medium     MD Cally Primary    Procedure Laterality Anesthesia   ROBOTIC HYSTERECTOMY WITH CYSTOSCOPY     2016           Family History   Problem Relation Age of Onset     No Known Problems Mother      No Known Problems Father      No Known Problems Sister      No Known Problems Brother      No Known Problems Maternal Aunt      No Known Problems Maternal Uncle      No Known Problems Paternal Aunt      No Known Problems Paternal Uncle      No Known Problems Maternal Grandmother      No Known Problems Maternal Grandfather      No Known Problems Paternal Grandmother      No Known Problems Paternal Grandfather      No Known Problems Other        Past Surgical History:   Procedure Laterality Date     GYN SURGERY       HC UGI ENDOSCOPY W PLACEMENT GASTROSTOMY TUBE PERCUT N/A 8/7/2019    Procedure: CREATION, GASTROSTOMY, PERCUTANEOUS, ENDOSCOPIC;  Surgeon: Fer Ledezma MD;  Location: [facility];  Service: General     HEAD & NECK SURGERY       HYSTERECTOMY       IR CEREBRAL ANGIOGRAM  7/22/2019     IR CEREBRAL ANGIOGRAM  7/24/2019     IR CEREBRAL ANGIOGRAM  7/27/2019     IR CEREBRAL ANGIOGRAM  7/28/2019     IR CEREBRAL ANGIOGRAM  7/29/2019     IR CEREBRAL ANGIOGRAM  8/2/2019     IR CEREBRAL ANGIOGRAM  7/30/2019     IR CEREBRAL ANGIOGRAM  7/31/2019     IR CEREBRAL ANGIOGRAM  8/1/2019     IR CEREBRAL ANGIOGRAM  8/8/2019     IR CEREBRAL ANGIOGRAM  8/13/2019     IR CEREBRAL ANGIOGRAM  7/22/2019     IR CEREBRAL ANGIOGRAM  7/24/2019     IR CEREBRAL ANGIOGRAM  7/27/2019     IR CEREBRAL ANGIOGRAM  7/29/2019     IR CEREBRAL ANGIOGRAM  7/30/2019     IR CEREBRAL ANGIOGRAM  7/31/2019     IR CEREBRAL ANGIOGRAM  8/1/2019     IR CEREBRAL ANGIOGRAM  8/2/2019     IR CEREBRAL ANGIOGRAM  7/28/2019     IR CEREBRAL  ANGIOGRAM  8/13/2019     IR CEREBRAL ANGIOGRAM  8/8/2019     IR EMBOLIZATION  9/16/2019     IR EMBOLIZATION  9/16/2019     IR LUMBAR DRAIN INSERTION  7/27/2019     IR LUMBAR DRAIN INSERTION  8/2/2019     IR LUMBAR DRAIN INSERTION  8/8/2019     IR LUMBAR DRAIN INSERTION  8/16/2019     IR LUMBAR DRAIN PLACEMENT W FLUORO  7/27/2019     IR LUMBAR DRAIN PLACEMENT W FLUORO  8/2/2019     IR LUMBAR DRAIN PLACEMENT W FLUORO  8/8/2019     IR LUMBAR DRAIN PLACEMENT W FLUORO  8/16/2019     OTHER SURGICAL HISTORY      COIL EMBOLIZATION     PICC AND MIDLINE TEAM LINE INSERTION  7/26/2019          [place] CREATE SHUNT:VENTRIC-PERITONEAL Right 8/19/2019    Procedure: INSERTION, SHUNT, VENTRICULOPERITONEAL, USING FRAMELESS STEREOTAXY ;  Surgeon: Gale Hoffmann MD;  Location: [facility] Main OR;  Service: Neurosurgery     WV CREATE SHUNT:VENTRIC-PERITONEAL Right 10/30/2019    Procedure: Replacement of distal catheter for ventriculoperitoneal shunt with general surgery assistance for laparoscopic approach;  Surgeon: Malou Sal MD;  Location: [facility] Main OR;  Service: Neurosurgery     WV LAP INSERTION TUNNELED INTRAPERITONEAL CATHETER Right 8/19/2019    Procedure: INSERTION, CATHETER, PERITONEAL, LAPAROSCOPIC;  Surgeon: Fer Ledezma MD;  Location: [facility] Main OR;  Service: General     TRACHEOSTOMY N/A 8/7/2019    Procedure: CREATION, TRACHEOSTOMY;  Surgeon: Fer Ledezma MD;  Location: [facility] Main OR;  Service: General        Social History     Socioeconomic History     Marital status:      Spouse name: Not on file     Number of children: Not on file     Years of education: Not on file     Highest education level: Not on file   Occupational History     Not on file   Tobacco Use     Smoking status: Never     Passive exposure: Never     Smokeless tobacco: Never   Vaping Use     Vaping status: Never Used     Passive vaping exposure: Yes   Substance and Sexual Activity     Alcohol use: Never      Drug use: Never     Sexual activity: Not on file   Other Topics Concern     Parent/sibling w/ CABG, MI or angioplasty before 65F 55M? Not Asked   Social History Narrative     Not on file     Social Determinants of Health     Financial Resource Strain: Not on file   Food Insecurity: Not on file   Transportation Needs: Not on file   Physical Activity: Not on file   Stress: Not on file   Social Connections: Not on file   Intimate Partner Violence: Not on file   Housing Stability: Not on file

## 2023-06-13 NOTE — PATIENT INSTRUCTIONS
-Thank you for choosing the [facility].  -It was a pleasure to see you today.  -Please take a look at the information below for more specific details regarding the treatment plan and recommendations.  -In this after visit summary is a list of your medications and specific instructions.  Please review this carefully as there may be changes made to your medication list.  -If there are any particular questions or concerns, please feel free to reach out to Dr. Bautista.  -If any labs have been completed, we will reach out to you about results.  If the results are normal or not concerning, a letter or [employer] message will be sent to you.  If any follow-up is needed, either Dr. Bautista or the nurse will give you a call.  If you have not heard regarding results after 2 weeks, please reach out to the clinic.    Patient Instructions:    -See the psychologist/therapist as scheduled.  -Start taking the mirtazapine as prescribed.  This helps the depression, anxiety, and sleep.  -Please continue to stay connected with care children/family as they are a strong support system for you.  -Consider go to an adult day center on a regular basis.    -Continue to follow with the neurologist (brain/seizure specialist).    Please seek immediate medical attention (go to the emergency room or urgent care) for the following reasons: worsening symptoms, or any concerning changes.    Please return to clinic in 1 month for follow-up of medication, or sooner as needed.      Crisis Information and Contact Numbers:     [facility] Children's Crisis Line: [phone]    [facility] Adult Crisis Line: [phone].     Suicide and Crisis Lifeline: 988 (National, 24/7; Call or text)    National Helpline: 1-557.835.4427 (HELP)  (National, 24/7; Treatment referral and information)    211.org (TakeCharge): 211 (Assistance locating long-term mental health resources, talking through a problem, or exploring mental health treatment  options. 180 languages available).     The Kevin Lifeline: 1-691.502.8060  (National; LGBTQ Youth)  The Trans Lifeline: 1-244.259.7142  (National; Trans mental health)  [facility] Crisis Line: 1-603.655.6544 (TALK) (693836 (text #); National)  Crisis Text Line: 185029   (National)       [facility]  [address] [place] [address]  [facility]   [phone].  Serves [facility] and [facility]  They have faster access to urgent psychiatry and crisis stablization.    Hours   Monday - Friday: 8:00 am - 7:00 pm   Saturday: 11:00 am - 3:00 pm   Sunday and Holidays: Closed  Walk-ins Welcome    Who should visit?   Any adult (age 18 and older) who:   > Needs immediate mental health support   > Is not experiencing a medical emergency    Cost  There is a fee for Urgent Care [facility]. They bill insurance providers when available and offer a variety of payment options including an income-based sliding fee scale.      --------------------------------------------------------------------------------------------------------------------    -We are always looking for ways to improve.  You may be selected to receive a survey regarding your visit today.  We encourage you to complete the survey and provide specific, constructive feedback to help us improve our processes.  Thank you for your time!  -Please review the contact information listed on the after visit summary and in the electronic chart.  Below is the phone number that we have on file.  If there are any changes that are needed to be made, please reach out to the clinic.  [phone] (home)

## 2023-06-13 NOTE — PROGRESS NOTES
"Clinic Care Coordination Contact  Community Health Worker Initial Outreach    CHW Initial Information Gathering:  Referral Source: PCP  Preferred Hospital: [facility]  [phone]  Current living arrangement:: I live in a private home with family (Patient lives with spouse, 2 adult, 1 minor)  Type of residence:: Private home - stairs  Community Resources: [employer] (Medical Assistance)  Equipment Currently Used at Home: none  Informal Support system:: Family  No PCP office visit in Past Year: No  Transportation means:: Family, Medical transport  CHW Additional Questions  If ED/Hospital discharge, follow-up appointment scheduled as recommended?: N/A  Medication changes made following ED/Hospital discharge?: N/A  MyChart active?: No  Patient agreeable to assistance with activating MyChart?: No    Patient accepts CC: Yes. Patient scheduled for assessment with ELEN Griffin on 6/15/23 at 10AM. Patient noted desire to discuss CCC, resrouces for financial support, adult day services, and transportation.     Chart Review: Referral from PCP  Reason for Referral: Financial Support, Resources for Transportation  Financial Support: Food, Housing, Medication Affordability      CHW introduced self and role with CCC. CHW inquired if patient needs any additional support or resources. Patient states, \"I don't know what to ask for.\" Patient shares that she needs lots of support, it will be 5 years since she stopped working. She does not have any [facility] benefits or SSI.     Gale Dean  Clinic Care Coordination  [facility]    Phone: [phone]      "

## 2023-06-13 NOTE — PROGRESS NOTES
The keppra was discontinued by Dr. Vasquez*** as she hadn't had seizures for 5 years now. She has been off the medication since 03/23/2023. No seizure issues noted.     Has dry eyes. The prednisolone acetate eye solution didn't help. SHe saw the eye specialist and that didn't help. She can't read letters that are close and recommended she get readers OTC. She went to another eye doctor and was given eye glasses and that didn't help her. She did end up getting readers OTC. Her eyes are very dry. No pain noted.     Her  also has medical issues. Her youngest son (17 years old) helps take care of her  as a PCA. Patient' hasn't been driving for a long time due to seizures. Her  also doesn't drive. She has younger children who are learning how to drive. The older children have been helping (with some prompting from patient) the younger children.     She states that she has a lot of stress. She can't drive, her  can't see. He does dialysis frequently and some days, he is very tired and patient is not sure if he will wake up after dialysis session some times. Her  is also old.       Patient has an appointment scheduled to see a psychologist soon. She doesn't know who it is.   She had seen a therapist for a year already, though this wasn't helpful for her, so she is making a change.     Off sleep aid and muscle relaxer.     She has never attempted to kill herself. She just htinks that it is not worth her living, but her children keep her going. She can't leave them. There are three who hasn't started their lives and got marrid yet. The younger ones just started working and one going to school.     Her right arm is weak. She fell about a month ago and she hit her right elbow and has been painful since then. It felt like she hit a nerve when she fell on her elbow. Arms and legs on both sides just aren't strong like usual. The skin on the hands are numb and she can't feel the pain  like she used to.     In her life, she has always done laborious jobs and she hasn't been stressed because she can do what she needs. Since she has been ill, she can't seem to find government support services to provide her monetary support. Her children bring her food here and there.     She had been offered to go to an adult day center. She wants to go, but her  can't take care of himself and her children aren't always home. If she goes for an extended period of time, her  calls her and she feels like she can't go. If her  is hungry, he tries to cook, but has burned 2-3 of their pots already. She has been thinking about going with her  to the adult day center. Her  can't sit or stand too long due to the back pains.     She still has difficulty sleeping. She stays up late and even if she tries to sleep at midnight, she lays there until 2-3 AM and can't sleep. Then her  wakes up at 5 AM and then she gets up. If she doesn't sleep enough, she then starts to have headaches, too.     Her children call and check on her frequently and come to visit her, especially when she is stressed. They take her to the store and go out to eat so she is happier.

## 2023-06-15 ENCOUNTER — PATIENT OUTREACH (OUTPATIENT)
Dept: NURSING | Facility: CLINIC | Age: 48
End: 2023-06-15
Payer: COMMERCIAL

## 2023-06-15 ENCOUNTER — PATIENT OUTREACH (OUTPATIENT)
Dept: CARE COORDINATION | Facility: CLINIC | Age: 48
End: 2023-06-15

## 2023-06-15 SDOH — ECONOMIC STABILITY: FOOD INSECURITY: WITHIN THE PAST 12 MONTHS, YOU WORRIED THAT YOUR FOOD WOULD RUN OUT BEFORE YOU GOT MONEY TO BUY MORE.: NEVER TRUE

## 2023-06-15 SDOH — ECONOMIC STABILITY: TRANSPORTATION INSECURITY
IN THE PAST 12 MONTHS, HAS THE LACK OF TRANSPORTATION KEPT YOU FROM MEDICAL APPOINTMENTS OR FROM GETTING MEDICATIONS?: NO

## 2023-06-15 SDOH — ECONOMIC STABILITY: FOOD INSECURITY: WITHIN THE PAST 12 MONTHS, THE FOOD YOU BOUGHT JUST DIDN'T LAST AND YOU DIDN'T HAVE MONEY TO GET MORE.: NEVER TRUE

## 2023-06-15 SDOH — ECONOMIC STABILITY: TRANSPORTATION INSECURITY
IN THE PAST 12 MONTHS, HAS LACK OF TRANSPORTATION KEPT YOU FROM MEETINGS, WORK, OR FROM GETTING THINGS NEEDED FOR DAILY LIVING?: NO

## 2023-06-15 ASSESSMENT — SOCIAL DETERMINANTS OF HEALTH (SDOH)
WITHIN THE LAST YEAR, HAVE YOU BEEN HUMILIATED OR EMOTIONALLY ABUSED IN OTHER WAYS BY YOUR PARTNER OR EX-PARTNER?: NO
WITHIN THE LAST YEAR, HAVE YOU BEEN KICKED, HIT, SLAPPED, OR OTHERWISE PHYSICALLY HURT BY YOUR PARTNER OR EX-PARTNER?: NO
WITHIN THE LAST YEAR, HAVE TO BEEN RAPED OR FORCED TO HAVE ANY KIND OF SEXUAL ACTIVITY BY YOUR PARTNER OR EX-PARTNER?: NO
HOW HARD IS IT FOR YOU TO PAY FOR THE VERY BASICS LIKE FOOD, HOUSING, MEDICAL CARE, AND HEATING?: SOMEWHAT HARD
WITHIN THE LAST YEAR, HAVE YOU BEEN AFRAID OF YOUR PARTNER OR EX-PARTNER?: NO

## 2023-06-15 ASSESSMENT — ACTIVITIES OF DAILY LIVING (ADL): DEPENDENT_IADLS:: INDEPENDENT

## 2023-06-15 NOTE — PROGRESS NOTES
Clinic Care Coordination Contact    Clinic Care Coordination Contact  OUTREACH    Referral Information:  Referral Source: PCP         Chief Complaint   Patient presents with     Clinic Care Coordination - Initial        Universal Utilization: appropriate  Clinic Utilization  Difficulty keeping appointments:: No  Compliance Concerns: No  No-Show Concerns: No  No PCP office visit in Past Year: No  Utilization    Hospital Admissions  0             ED Visits  1             No Show Count (past year)  0                Current as of: 6/13/2023  7:15 PM              Clinical Concerns:  Current Medical Concerns:  PLAN:   Patient Active Problem List   Diagnosis Code     Leiomyoma of uterus D25.9     Aneurysmal subarachnoid hemorrhage (H) I60.8     S/P coil embolization of cerebral aneurysm Z98.890     Aneurysm of right internal carotid artery I67.1     Cerebrovascular accident (CVA) due to occlusion of right middle cerebral artery (H) I63.511     CVA (cerebral vascular accident) (H) I63.9     Pneumonia due to group B Streptococcus, unspecified laterality, unspecified part of lung (H) J15.3     Vasospasm of cerebral artery I67.848     Hydrocephalus (H) G91.9     Adjustment disorder with mixed anxiety and depressed mood F43.23     Mood disorder (H) F39     Sleep difficulties G47.9     Respiratory failure (H) J96.90     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 (H) Z43.0     SAH (subarachnoid hemorrhage) (H) I60.9     Hydrocephalus with operating shunt (H) G91.9     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, 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     Cerebral aneurysm I67.1     HCAP (healthcare-associated pneumonia) J18.9     Pleural effusion J90     RUQ abdominal pain R10.11     Shunt malfunction, subsequent encounter T85.618D     Subdural hematoma (H) S06.5XAA     S/P  shunt Z98.2     Neck pain M54.2     Chronic bilateral low back pain without sciatica M54.50, G89.29       Current Behavioral Concerns: none    Education Provided to patient: CCC  support, education and resources   Pain  Pain (GOAL):: No  Health Maintenance Reviewed: Due/Overdue   Health Maintenance Due   Topic Date Due     YEARLY PREVENTIVE VISIT  Never done     MICROALBUMIN  Never done     DIABETIC FOOT EXAM  Never done     ADVANCE CARE PLANNING  Never done     EYE EXAM  Never done     MAMMO SCREENING  Never done     HEPATITIS B IMMUNIZATION (1 of 3 - 3-dose series) Never done     Pneumococcal Vaccine: Pediatrics (0 to 5 Years) and At-Risk Patients (6 to 64 Years) (1 - PCV) Never done     COLORECTAL CANCER SCREENING  Never done     HIV SCREENING  Never done     HEPATITIS C SCREENING  Never done     PAP  Never done     DTAP/TDAP/TD IMMUNIZATION (1 - Tdap) Never done     COVID-19 Vaccine (4 - Moderna series) 01/25/2022     A1C  10/01/2022     BMP  07/01/2023     LIPID  07/01/2023     Clinical Pathway: None    Medication Management:  Medication review status: Medications reviewed and no changes reported per patient.             Functional Status:  Dependent ADLs:: Independent  Dependent IADLs:: Independent  Bed or wheelchair confined:: No  Mobility Status: Independent  Fallen 2 or more times in the past year?: No  Any fall with injury in the past year?: No    Living Situation:  Current living arrangement:: I live in a private home with family (Patient lives with spouse, 2 adult, 1 minor)  Type of residence:: Private home - [facility]    Lifestyle & Psychosocial Needs:    Social Determinants of Health     Tobacco Use: Low Risk  (6/13/2023)    Patient History      Smoking Tobacco Use: Never      Smokeless Tobacco Use: Never      Passive Exposure: Never   Alcohol Use: Not At Risk (10/16/2020)    AUDIT-C      Frequency of Alcohol Consumption: Never      Average Number of Drinks: Not on file      Frequency of Binge Drinking: Not on file   Financial Resource Strain: Medium Risk (6/15/2023)    Overall Financial Resource Strain (CARDIA)      Difficulty of Paying Living Expenses: Somewhat hard   Food  Insecurity: No Food Insecurity (6/15/2023)    Hunger Vital Sign      Worried About Running Out of Food in the Last Year: Never true      Ran Out of Food in the Last Year: Never true   Transportation Needs: No Transportation Needs (6/15/2023)    PRAPARE - Transportation      Lack of Transportation (Medical): No      Lack of Transportation (Non-Medical): No   Physical Activity: Not on file   Stress: No Stress Concern Present (6/15/2023)    [place] [facility] - Occupational Stress Questionnaire      Feeling of Stress : Only a little   Social Connections: Not on file   Intimate Partner Violence: Not At Risk (6/15/2023)    Humiliation, Afraid, Rape, and Kick questionnaire      Fear of Current or Ex-Partner: No      Emotionally Abused: No      Physically Abused: No      Sexually Abused: No   Depression: Not at risk (2/14/2023)    PHQ-2      PHQ-2 Score: 0   Housing Stability: Not on file     Diet:: Regular  Inadequate nutrition (GOAL):: No  Tube Feeding: No  Inadequate activity/exercise (GOAL):: No  Significant changes in sleep pattern (GOAL): No  Transportation means:: Family, Medical transport     Denominational or spiritual beliefs that impact treatment:: No  Mental health DX:: No  Mental health management concern (GOAL):: No  Chemical Dependency Status: No Current Concerns  Informal Support system:: Family             Resources and Interventions:  Current Resources:      Community Resources: County Programs (Medical Assistance)  Supplies Currently Used at Home: None  Equipment Currently Used at Home: none  Employment Status: disabled         Advance Care Plan/Directive  Advanced Care Plans/Directives on file:: No  Advanced Care Plan/Directive Status: Declined Further Information    Referrals Placed: [employer]         Care Plan:  Care Plan: Financial Wellbeing     Problem: Patient expresses financial resource strain     Goal: Create an action plan to increase financial stability     Start  Date: 6/15/2023    This Visit's Progress: 30%    Priority: High    Note:     Barriers: language  Strengths: motivated to seek support  Patient expressed understanding of goal: yes  Action steps to achieve this goal:  1. I will answer the phone when FRW contacts me   2. I will have all necessary info available for FRW  3. I will follow up with CCC at next outreach.                      Care Plan: Financial Wellbeing     Problem: Patient expresses financial resource strain     Goal: Create an action plan to increase financial stability                 Problem: Patient expresses financial resource strain     Goal: Apply for SNAP     Start Date: 6/15/2023    This Visit's Progress: 20%    Priority: High    Note:     Barriers: language  Strengths:motivated to seek support  Patient expressed understanding of goal: yes  Action steps to achieve this goal:  1. I will answer the phone when disability specialists contacts me  2. I will provide all necessary information  3. I will follow up with CCC at next outreach                         Patient/Caregiver understanding: yes    Outreach Frequency: monthly  Future Appointments              In 1 week [facility] EMG TECH [facility] Neurology [facility] MPL[facility]    In 1 week Justin Vasquez MD [facility] Neurology [facility]    In 4 weeks Rahul Bautista MD [facility] SPRO          Plan: CCSW, along with  [facility] , [place] completed assessment. Pt stated she hasnt been able to work in 5 ears due to leg weakness and shaking in her hands. Pt resides in private home with spouse and 3 children (2 adult children) spouse is retired and receiving social security. Pt recently started accessing medical transportation, but asked for help setting up rides when coming to this clinic. Pt would like to apply for SNAP benefits as well as social security. CCSW placed FRW referral as well as referral to disability  specialists (online). CCSW advised pt to be waiting to hear from both parties. Pt stated she always has her phone on her.     ROSALINO Griffin, [facility]  Social Work Care Coordinator

## 2023-06-15 NOTE — PROGRESS NOTES
Clinic Care Coordination Contact  Program: [facility]   County: [facility] Case #:  [facility] Worker:   Jovanaure #:   Subscriber #:   Renewal:  Date Applied:     FRW Outreach:   6/15/23 - FRW reached out to patient to check eligibility. Patient was unable to verify children's income. Informed patient that their income will be needed since they buy and eat food together and pay utilities together. Patient will check with her children. FRW will follow up in 1 week and check eligibility with patient and help patient apply if she is eligible.     Health Insurance:      Referral/Screening:  SNAP/CASH Application Screenin. Have you had [facility] benefits before? Yes, had SNAP for 2 months and was discontinued   2. How many people in the household, do you eat/buy food together? 5 people in household.   3. What is your monthly income (include all tax members)? Patient - $0. Patient's spouse receives assisted - $700. Patient's children - unknown  4. Do you have a bank account? Yes   5. Do you have any utility bills (electricity, rent, mortgage, phone, insurance, medical bills, etc.)? Mortgage - $1,700. Utility bills - electricity, water, trash   6. Do you have social security cards and/or green cards? Yes     FRW Screening    Row Name 06/15/23 1104       [facility] Benefits   Is patient requesting help applying for [facility] benefits? Yes       Have you recently applied for any [facility] benefits? No       How many people in your household? 5       Do you buy/eat food together? Yes       What is the monthly gross income for the household (wages, social security, workers comp, and pension)?  700       Insurance:   [place]? No       Is this an insurance renewal? No       Is this a new insurance application request? No       OTHER   Is this a [place] care application? No       Any other information for the FRW? Pt has 2 adult children, both working. She is not sure of ther income.

## 2023-06-17 ENCOUNTER — HOSPITAL ENCOUNTER (EMERGENCY)
Facility: CLINIC | Age: 48
Discharge: HOME | End: 2023-06-18
Attending: EMERGENCY MEDICINE | Admitting: EMERGENCY MEDICINE
Payer: COMMERCIAL

## 2023-06-17 VITALS
BODY MASS INDEX: 35.92 KG/M2 | TEMPERATURE: 97 F | DIASTOLIC BLOOD PRESSURE: 89 MMHG | OXYGEN SATURATION: 98 % | HEIGHT: 58 IN | SYSTOLIC BLOOD PRESSURE: 138 MMHG | HEART RATE: 69 BPM | WEIGHT: 171.1 LBS | RESPIRATION RATE: 16 BRPM

## 2023-06-17 DIAGNOSIS — T14.8XXA BRUISE: ICD-10-CM

## 2023-06-17 PROCEDURE — 99282 EMERGENCY DEPT VISIT SF MDM: CPT

## 2023-06-18 NOTE — ED PROVIDER NOTES
EMERGENCY DEPARTMENT ENCOUNTER      NAME: Darion Herrera  AGE: 47 year old female  YOB: 1975  MRN: [id number]  EVALUATION DATE & TIME: 6/17/2023 11:44 PM    PCP: Monroe Cassidy    ED PROVIDER: Polina Pettit M.D.      Chief Complaint   Patient presents with     Hand Swelling         FINAL IMPRESSION:  1. Bruise        MEDICAL DECISION MAKING:    Pertinent Labs & Imaging studies reviewed. (See chart for details)  ED Course as of 06/18/23 0041   Sat Jun 17, 2023   2349 Afebrile.  Vital signs are unremarkable.  Patient is presenting with swelling to her left hand.  She noted it tonight about 730.  Denies injury.  She is not having any pain.  No bug bites or injury noted.  No difficulty with movement of her hands.    Exam for patient here with some swelling noted on the dorsal aspect of her hand in the webspace between her first and second finger.  Appears to be an early bruise.  Full range of motion of her hand without pain or discomfort.  No skin breakdown.  No tenderness with palpation.  No other injury.    Patient is concerned because she says she had a stroke before and was just wondering what was going on.  She is on 2 blood thinner medications, she does take aspirin and Plavix.  Explained to her that this can cause for very easy bruising and that is likely the cause for this.  There is absolutely no signs for DVT, infection, cellulitis.  Instructed her to put ice on the area, she can wrap it as well.  Will discharge home at this time, follow-up with primary care as needed.         Medical Decision Making    History:    Supplemental history from: Documented in chart, if applicable and Family Member/Significant Other    External Record(s) reviewed: Documented in chart, if applicable.    Work Up:    Chart documentation includes differential considered and any EKGs or imaging independently interpreted by provider, where specified.    In additional to work up documented, I considered the following  work up: Documented in chart, if applicable.    External consultation:    Discussion of management with another provider: Documented in chart, if applicable    Complicating factors:    Care impacted by chronic illness: Anticoagulated State, Heart Disease and Hyperlipidemia    Care affected by social determinants of health: N/A    Disposition considerations: Discharge. No recommendations on prescription strength medication(s). See documentation for any additional details.          Critical care: 0 minutes excluding separately billable procedures.  Includes bedside management, time reviewing test results, review of records, discussing the case with staff, documenting the medical record and time spent with family members (or surrogate decision makers) discussing specific treatment issues.          ED COURSE:  11:45 PM I met with the patient for an interview and initial exam. Plans for treatment were discussed.    The importance of close follow up was discussed. We reviewed warning signs and symptoms, and I instructed Ms. Herrera to return to the emergency department immediately if she develops any new or worsening symptoms. I provided additional verbal discharge instructions. Ms. Herrera expressed understanding and agreement with this plan of care, her questions were answered, and she was discharged in stable condition.     MEDICATIONS GIVEN IN THE EMERGENCY:  Medications - No data to display    NEW PRESCRIPTIONS STARTED AT TODAY'S ER VISIT:  Discharge Medication List as of 6/17/2023 11:58 PM             =================================================================    HPI    Patient information was obtained from: The patient    Use of :      Darion Herrera is a 47 year old female with a past medical history of  HLD, Carotid artery aneurism, compression of brain, epilepsy who presents to the ED by walk-in for an evaluation of hand swelling.    The patient developed some swelling to the dorsal aspect of her left  hand without pain around 730 PM tonight. She denied any injuries or recent bug bites. She has no difficulty moving her hands. The patient was concerned given her history of a stroke. She is anticoagulated on aspirin and Plavix.    Otherwise, the patient denied any other medical complaints or concerns at this time.    REVIEW OF SYSTEMS   Review of Systems   Musculoskeletal:        Positive for swelling to dorsal aspect of left hand without pain.         PAST MEDICAL HISTORY:  Past Medical History:   Diagnosis Date     Acute respiratory failure with hypoxia (H)      Adjustment disorder with mixed anxiety and depressed mood      Anemia      Aneurysm (H)      Aneurysmal subarachnoid hemorrhage (H)      Brain mass      Carotid artery aneurysm (H)      Cerebrovascular accident (CVA) due to occlusion of right middle cerebral artery (H)      Compression of brain (H)      Constipation      Depressive disorder      E. 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     Intraventricular hemorrhage, nontraumatic (H)      Leiomyoma      Migraines      Mild malnutrition (H)      Nausea vomiting and diarrhea      Neck pain      Oropharyngeal dysphagia      Pneumonia due to group B Streptococcus (H)      Seizures (H)      Sleep difficulties      Thrush      Tracheostomy care (H)      Transaminitis      Vaginal itching     & discharge     Vasospasm of cerebral artery        PAST SURGICAL HISTORY:  Past Surgical History:   Procedure Laterality Date     GYN SURGERY        UGI ENDOSCOPY W PLACEMENT GASTROSTOMY TUBE PERCUT N/A 8/7/2019    Procedure: CREATION, GASTROSTOMY, PERCUTANEOUS, ENDOSCOPIC;   Surgeon: Fer Ledezma MD;  Location: [facility] OR;  Service: General     HEAD & NECK SURGERY       HYSTERECTOMY       IR CEREBRAL ANGIOGRAM  7/22/2019     IR CEREBRAL ANGIOGRAM  7/24/2019     IR CEREBRAL ANGIOGRAM  7/27/2019     IR CEREBRAL ANGIOGRAM  7/28/2019     IR CEREBRAL ANGIOGRAM  7/29/2019     IR CEREBRAL ANGIOGRAM  8/2/2019     IR CEREBRAL ANGIOGRAM  7/30/2019     IR CEREBRAL ANGIOGRAM  7/31/2019     IR CEREBRAL ANGIOGRAM  8/1/2019     IR CEREBRAL ANGIOGRAM  8/8/2019     IR CEREBRAL ANGIOGRAM  8/13/2019     IR CEREBRAL ANGIOGRAM  7/22/2019     IR CEREBRAL ANGIOGRAM  7/24/2019     IR CEREBRAL ANGIOGRAM  7/27/2019     IR CEREBRAL ANGIOGRAM  7/29/2019     IR CEREBRAL ANGIOGRAM  7/30/2019     IR CEREBRAL ANGIOGRAM  7/31/2019     IR CEREBRAL ANGIOGRAM  8/1/2019     IR CEREBRAL ANGIOGRAM  8/2/2019     IR CEREBRAL ANGIOGRAM  7/28/2019     IR CEREBRAL ANGIOGRAM  8/13/2019     IR CEREBRAL ANGIOGRAM  8/8/2019     IR EMBOLIZATION  9/16/2019     IR EMBOLIZATION  9/16/2019     IR LUMBAR DRAIN INSERTION  7/27/2019     IR LUMBAR DRAIN INSERTION  8/2/2019     IR LUMBAR DRAIN INSERTION  8/8/2019     IR LUMBAR DRAIN INSERTION  8/16/2019     IR LUMBAR DRAIN PLACEMENT W FLUORO  7/27/2019     IR LUMBAR DRAIN PLACEMENT W FLUORO  8/2/2019     IR LUMBAR DRAIN PLACEMENT W FLUORO  8/8/2019     IR LUMBAR DRAIN PLACEMENT W FLUORO  8/16/2019     OTHER SURGICAL HISTORY      COIL EMBOLIZATION     PICC AND MIDLINE TEAM LINE INSERTION  7/26/2019          UT CREATE SHUNT:VENTRIC-PERITONEAL Right 8/19/2019    Procedure: INSERTION, SHUNT, VENTRICULOPERITONEAL, USING FRAMELESS STEREOTAXY ;  Surgeon: Gale Hoffmann MD;  Location: [facility] OR;  Service: Neurosurgery     UT CREATE SHUNT:VENTRIC-PERITONEAL Right 10/30/2019    Procedure: Replacement of distal catheter for ventriculoperitoneal shunt with general surgery assistance for laparoscopic approach;  Surgeon: Malou Sal MD;  Location: [facility] OR;   Service: Neurosurgery     NH LAP INSERTION TUNNELED INTRAPERITONEAL CATHETER Right 8/19/2019    Procedure: INSERTION, CATHETER, PERITONEAL, LAPAROSCOPIC;  Surgeon: Fer Ledezma MD;  Location: [facility] OR;  Service: General     TRACHEOSTOMY N/A 8/7/2019    Procedure: CREATION, TRACHEOSTOMY;  Surgeon: Fer Ledezma MD;  Location: [facility] OR;  Service: General       CURRENT MEDICATIONS:    No current facility-administered medications for this encounter.    Current Outpatient Medications:      aspirin (ASA) 81 MG chewable tablet, Take 81 mg by mouth, Disp: , Rfl:      atorvastatin (LIPITOR) 20 MG tablet, Take 20 mg by mouth, Disp: , Rfl:      clopidogrel (PLAVIX) 75 MG tablet, Take 75 mg by mouth, Disp: , Rfl:      ketotifen (ZADITOR) 0.025 % ophthalmic solution, Place 1 drop into both eyes 2 times daily as needed for itching or dry eyes, Disp: 10 mL, Rfl: 3     mirtazapine (REMERON) 7.5 MG tablet, Take 1 tablet (7.5 mg) by mouth At Bedtime, Disp: 90 tablet, Rfl: 1    ALLERGIES:  Allergies   Allergen Reactions     Hydrocodone Rash and Swelling     Oxycodone Rash and Swelling       FAMILY HISTORY:  Family History   Problem Relation Age of Onset     No Known Problems Mother      No Known Problems Father      No Known Problems Sister      No Known Problems Brother      No Known Problems Maternal Aunt      No Known Problems Maternal Uncle      No Known Problems Paternal Aunt      No Known Problems Paternal Uncle      No Known Problems Maternal Grandmother      No Known Problems Maternal Grandfather      No Known Problems Paternal Grandmother      No Known Problems Paternal Grandfather      No Known Problems Other        SOCIAL HISTORY:   Social History     Socioeconomic History     Marital status:    Tobacco Use     Smoking status: Never     Passive exposure: Never     Smokeless tobacco: Never   Vaping Use     Vaping status: Never Used     Passive vaping exposure: Yes   Substance and Sexual  "Activity     Alcohol use: Never     Drug use: Never     Social Determinants of Health     Financial Resource Strain: Medium Risk (6/15/2023)    Overall Financial Resource Strain (CARDIA)      Difficulty of Paying Living Expenses: Somewhat hard   Food Insecurity: No Food Insecurity (6/15/2023)    Hunger Vital Sign      Worried About Running Out of Food in the Last Year: Never true      Ran Out of Food in the Last Year: Never true   Transportation Needs: No Transportation Needs (6/15/2023)    PRAPARE - Transportation      Lack of Transportation (Medical): No      Lack of Transportation (Non-Medical): No   Stress: No Stress Concern Present (6/15/2023)    [employer] [place] [employer] - Occupational Stress Questionnaire      Feeling of Stress : Only a little   Intimate Partner Violence: Not At Risk (6/15/2023)    Humiliation, Afraid, Rape, and Kick questionnaire      Fear of Current or Ex-Partner: No      Emotionally Abused: No      Physically Abused: No      Sexually Abused: No       PHYSICAL EXAM:    Vitals: /89   Pulse 69   Temp 97  F (36.1  C) (Temporal)   Resp 16   Ht 1.473 m (4' 10\")   Wt 77.6 kg (171 lb 1.6 oz)   SpO2 98%   BMI 35.76 kg/m     General:. Alert and interactive, comfortable appearing.  Eyes: Pupils mid-sized   Neck: Full AROM.    Cardiovascular: Regular rate and rhythm. Peripheral pulses 2+ bilaterally.  Chest/Pulmonary: Normal work of breathing.   Extremities: Normal ROM of all major joints Swelling to dorsal aspect of left hand in webspace between first and second finger. Full ROM without pain or discomfort, no skin breakdown, no tenderness with palpation. Early bruise is apparent.  Skin: Warm and dry. Normal skin color.  Early bruise noted to left hand  Neuro: Speech clear. CNs grossly intact. Moves all extremities appropriately. Strength and sensation grossly intact to all extremities.   Psych: Normal affect/mood, cooperative, memory appropriate.     LAB:  All pertinent " labs reviewed and interpreted.  Labs Ordered and Resulted from Time of ED Arrival to Time of ED Departure - No data to display    RADIOLOGY:  No orders to display           I, Nigel Reveles, am serving as a scribe to document services personally performed by Dr. Polina Pettit  based on my observation and the provider's statements to me. I, Polina Pettit MD attest that Nigel Reveles is acting in a scribe capacity, has observed my performance of the services and has documented them in accordance with my direction.      Polina Pettit M.D.  Emergency Medicine  [facility] EMERGENCY ROOM  [address] [id number] [phone]  Dept: [phone]       Polina Pettit MD  06/18/23 0042

## 2023-06-18 NOTE — ED NOTES
Ice provided. Ace bandage applied. Education provided as well to patient and daughter. See provider's note for full assessment.

## 2023-06-18 NOTE — ED TRIAGE NOTES
Arrives to ED accompanied by daughter with c/o L hand swelling noted at 1930 tonight. Denies injury. Denies pain. No redness or warmth. Skin intact.      Triage Assessment     Row Name 06/17/23 4415       Triage Assessment (Adult)    Airway WDL WDL       Respiratory WDL    Respiratory WDL WDL       Skin Circulation/Temperature WDL    Skin Circulation/Temperature WDL WDL       Cardiac WDL    Cardiac WDL WDL       Peripheral/Neurovascular WDL    Peripheral Neurovascular WDL WDL       Cognitive/Neuro/Behavioral WDL    Cognitive/Neuro/Behavioral WDL WDL

## 2023-06-22 ENCOUNTER — APPOINTMENT (OUTPATIENT)
Dept: INTERPRETER SERVICES | Facility: CLINIC | Age: 48
End: 2023-06-22
Payer: COMMERCIAL

## 2023-06-22 ENCOUNTER — PATIENT OUTREACH (OUTPATIENT)
Dept: CARE COORDINATION | Facility: CLINIC | Age: 48
End: 2023-06-22
Payer: COMMERCIAL

## 2023-06-22 NOTE — PROGRESS NOTES
Clinic Care Coordination Contact  [facility]   County: [facility] Case #:  [facility] Worker:   Mnsure #:   Subscriber #:   Renewal:  Date Applied:     FRW Outreach:   23 - FRW assisted with applying for SNAP benefits for both patient and her spouse. Children do not buy food for them. They buy food for themselves per patient. FRW will follow up in 4 weeks.   6/15/23 - FRW reached out to patient to check eligibility. Patient was unable to verify children's income. Informed patient that their income will be needed since they buy and eat food together and pay utilities together. Patient will check with her children. FRW will follow up in 1 week and check eligibility with patient and help patient apply if she is eligible.     Health Insurance:    Referral/Screening:  SNAP/CASH Application Screenin. Have you had [facility] benefits before? Yes, had SNAP for 2 months and was discontinued   2. How many people in the household, do you eat/buy food together? 5 people in household.   3. What is your monthly income (include all tax members)? Patient - $0. Patient's spouse receives jail - $700. Patient's children - unknown  4. Do you have a bank account? Yes   5. Do you have any utility bills (electricity, rent, mortgage, phone, insurance, medical bills, etc.)? Mortgage - $1,700. Utility bills - electricity, water, trash   6. Do you have social security cards and/or green cards? Yes     FRW Screening    Row Name 06/15/23 1101       County Benefits   Is patient requesting help applying for [facility] benefits? Yes       Have you recently applied for any [facility] benefits? No       How many people in your household? 5       Do you buy/eat food together? Yes       What is the monthly gross income for the household (wages, social security, workers comp, and pension)?  700       Insurance:   [place]? No       Is this an insurance renewal? No       Is this a new insurance  application request? No       OTHER   Is this a [place] care application? No       Any other information for the FRW? Pt has 2 adult children, both working. She is not sure of ther income.

## 2023-06-28 ENCOUNTER — OFFICE VISIT (OUTPATIENT)
Dept: NEUROLOGY | Facility: CLINIC | Age: 48
End: 2023-06-28
Attending: PSYCHIATRY & NEUROLOGY
Payer: COMMERCIAL

## 2023-06-28 ENCOUNTER — MEDICAL CORRESPONDENCE (OUTPATIENT)
Dept: HEALTH INFORMATION MANAGEMENT | Facility: CLINIC | Age: 48
End: 2023-06-28

## 2023-06-28 VITALS
HEIGHT: 58 IN | WEIGHT: 170 LBS | HEART RATE: 67 BPM | SYSTOLIC BLOOD PRESSURE: 113 MMHG | DIASTOLIC BLOOD PRESSURE: 69 MMHG | BODY MASS INDEX: 35.68 KG/M2

## 2023-06-28 DIAGNOSIS — Z86.79 HISTORY OF ANEURYSM OF ARTERY: Primary | ICD-10-CM

## 2023-06-28 DIAGNOSIS — Z87.898 HISTORY OF SEIZURES: ICD-10-CM

## 2023-06-28 DIAGNOSIS — M79.622 PAIN OF LEFT UPPER ARM: ICD-10-CM

## 2023-06-28 DIAGNOSIS — G56.01 CARPAL TUNNEL SYNDROME OF RIGHT WRIST: ICD-10-CM

## 2023-06-28 PROCEDURE — 99215 OFFICE O/P EST HI 40 MIN: CPT | Performed by: PSYCHIATRY & NEUROLOGY

## 2023-06-28 PROCEDURE — 95911 NRV CNDJ TEST 9-10 STUDIES: CPT | Performed by: PSYCHIATRY & NEUROLOGY

## 2023-06-28 PROCEDURE — 95886 MUSC TEST DONE W/N TEST COMP: CPT | Mod: 59 | Performed by: PSYCHIATRY & NEUROLOGY
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
## 2023-06-28 NOTE — NURSING NOTE
Chief Complaint   Patient presents with     Pain of left upper arm     EMG and MRI results      Beckie Vivas CMA on 6/28/2023 at 10:01 AM

## 2023-06-28 NOTE — PROCEDURES
[facility]     Formerly [facility]  [address] [place] [address], [address]  [place] [address]  Tel: [phone]  Fax: [phone]          Full Name: Darion Herrera Gender: Female  Patient ID: [id number] YOB: 1975      Visit Date: 6/28/2023 08:40  Age: 47 Years 7 Months Old  Interpreted By: Justin Vasquez MD   Ref Dr.: Rahul Bautista MD  Tech:    Height: 4 feet 10 inch  Reason for referral: Evaluate bilateral uppers. c/o pain, swelling in both arms/hands/fingers > 5 years. Left = Right. Bruising of skin > 2 weeks.      Motor NCS      Nerve / Sites Lat Amp Dist Ramirez    ms mV cm m/s   L Median - APB      Wrist 3.39 8.4 7       Elbow 6.72 7.4 19 57   R Median - APB      Wrist 3.70 9.5 7       Elbow 7.03 8.9 19 57   L Ulnar - ADM      Wrist 2.40 11.0 7       B.Elbow 4.64 10.4 17 76      A.Elbow 6.35 10.2 13 76       F  Wave      Nerve Fmin    ms   L Ulnar - ADM 23.54       Sensory NCS      Nerve / Sites Onset Lat Pk Lat Amp.2-3 Dist Ramirez Lat Diff    ms ms  V cm m/s ms   L Median - II (Antidr)      Wrist 2.29 2.86 49.2 13 57    R Median - II (Antidr)      Wrist 2.40 3.28 37.1 13 54    L Ulnar - V (Antidr)      Wrist 1.88 2.50 30.2 11 59    L Median, Ulnar - Transcarpal comparison      Median Palm 1.46 1.98 74.8 8 55       Ulnar Palm 1.20 1.56 23.1 8 67          0.42   R Median, Ulnar - Transcarpal comparison      Median Palm 1.88 2.45 71.1 8 43       Ulnar Palm 1.25 1.82 24.6 8 64          0.63       EMG Summary Table     Spontaneous MUAP Rcmt Note   Muscle Fib PSW Fasc IA # Amp Dur PPP Rate Type   L. Brachioradialis None None None N N N N N N N   L. Pronator teres None None None N N N N N N N   L. Biceps brachii None None None N N N N N N N   L. Deltoid None None None N N N N N N N   L. Triceps brachii None None None N N N N N N N   L. Flexor carpi ulnaris None None None N N N N N N N   L. First dorsal interosseous None None None N N N N N N N   L.  Abductor pollicis brevis None None None N N N N N N N   R. Brachioradialis None None None N N N N N N N   R. Pronator teres None None None N N N N N N N   R. Biceps brachii None None None N N N N N N N   R. Deltoid None None None N N N N N N N   R. Triceps brachii None None None N N N N N N N   R. First dorsal interosseous None None None N N N N N N N   R. Abductor pollicis brevis None None None N N N N N N N       SUMMARY  Nerve conduction and EMG study of bilateral upper extremities shows:    Normal right median nerve distal motor latency, amplitude and conduction velocity.  Normal left median nerve distal motor latency, amplitude and conduction velocity.  Normal left ulnar distal motor latency, amplitude and conduction velocity.  Normal bilateral median and left ulnar sensory SNAPs.  Normal left median-ulnar transcarpal peak latency comparison.  Prolonged right median-ulnar transcarpal peak latency comparison.  Monopolar needle exam is normal.      CLINICAL INTERPRETATION:  This is an abnormal nerve conduction and EMG study.  The study is suggestive of a mild right carpal tunnel syndrome.  Further clinical correlation is needed.     Justin Vasquez MD  Neurologist  [facility]  Tel:- [phone]

## 2023-06-28 NOTE — LETTER
6/28/2023         RE: Darion Herrera  [address]  [place] [address]        Dear Colleague,    Thank you for referring your patient, Darion Herrera, to the [facility] [place]. Please see a copy of my visit note below.    See procedure note.       Again, thank you for allowing me to participate in the care of your patient.        Sincerely,        Justin Vasquez MD

## 2023-07-05 ENCOUNTER — TELEPHONE (OUTPATIENT)
Dept: FAMILY MEDICINE | Facility: CLINIC | Age: 48
End: 2023-07-05
Payer: COMMERCIAL

## 2023-07-05 NOTE — TELEPHONE ENCOUNTER
Wa - Psychiatric Nurse practitioner with [facility] - [phone] direct line; , [phone] 9 nurse line if not answering by Wa.    Considering the starting of Sertraline 25 mg startup to 100 mg daily to address symptoms. However, patient is on Plavix and due to interaction, provider is seeking the recommendation from clinic provider.  Patient also report to Psychiatric nursing she is not taking the newly prescribed Mirtazapine as it can cause weight gain as part of the side affect.  Wa has prescribed patient Hydroxyzine.    Per chart review, patient is new to the [facility].  Patient has established care elsewhere with a private provider.  Per Psychiatrist, patient wishes to establish care with Dr. Bautista at the [facility].      Will route encounter to pharmacist and provider for recommendation.     Thank you    Familia Bond, BSN, RN  [facility]

## 2023-07-06 NOTE — TELEPHONE ENCOUNTER
It appeared [place] provider had been reached an notified of the risk if starting on a SSR medication from Rosy Haile.      Familia Bond, YAIRN, RN  [facility]      Reva Hendricks PharmD  You 1 hour ago (9:43 AM)     DB  Can you please notify the [place] provider of the below information?   Reva Hendricks PharmD, [facility]   Medication Therapy Management Pharmacist       Rosy Auguste, [facility]  You; Reva Hendricks, Remberto; Rahul Bautista MD 2 hours ago (8:34 AM)       Good morning -     I was able to reach Wa and let him know the following information:     Based on literature search, it is not recommended to take either SSRIs or SNRIs with Plavix and/or aspirin as this can increase bleed risk. If patient is carefully monitored and counseled, it may be an option if other mood medication classes are not an option. Of note, amitriptyline and nortriptyline do no not have this interaction with Plavix.     Rosy Quinonez, Pharm.D., MPH   [facility] Pharmacist Resident   [facility] [employer]

## 2023-07-13 ENCOUNTER — PATIENT OUTREACH (OUTPATIENT)
Dept: CARE COORDINATION | Facility: CLINIC | Age: 48
End: 2023-07-13
Payer: COMMERCIAL

## 2023-07-13 NOTE — PROGRESS NOTES
Clinic Care Coordination Contact  Care Coordination Clinician Chart Review    Situation: Patient chart reviewed by Care Coordinator.       Background: Care Coordination Program started: 6/13/2023. Initial assessment completed and patient-centered care plan(s) were developed with participation from patient. Lead CC handed patient off to CHW for continued outreaches.       Assessment: Per chart review, patient outreach completed by [facility] CC on 6/15/23. FRW made initial assessment on 6/22/23 for SNAP benefits.  CHW Outreach will occur soon for 30 day follow up outreach.    Patient is actively working to accomplish goal(s). Patient's goal(s) appropriate and relevant at this time. Patient is due for updated Plan of Care.  Assessments will be completed annually or as needed/with change of patient status.      Care Plan: Financial Wellbeing     Problem: Patient expresses financial resource strain     Goal: Create an action plan to increase financial stability     Start Date: 6/15/2023    This Visit's Progress: 30%    Priority: High    Note:     Barriers: language  Strengths: motivated to seek support  Patient expressed understanding of goal: yes  Action steps to achieve this goal:  1. I will answer the phone when FRW contacts me   2. I will have all necessary info available for FRW  3. I will follow up with CCC at next outreach.                      Care Plan: Financial Wellbeing     Problem: Patient expresses financial resource strain     Goal: Create an action plan to increase financial stability                 Problem: Patient expresses financial resource strain     Goal: Apply for SNAP     Start Date: 6/15/2023    This Visit's Progress: 20%    Priority: High    Note:     Barriers: language  Strengths:motivated to seek support  Patient expressed understanding of goal: yes  Action steps to achieve this goal:  1. I will answer the phone when disability specialists contacts me  2. I will provide all necessary information  3.  I will follow up with CCC at next outreach                            Plan/Recommendations: The patient will continue working with Care Coordination to achieve goal(s) as above. CHW will continue outreaches at minimum every 30 days and will involve Lead CC as needed or if patient is ready to move to Maintenance. Lead CC will continue to monitor CHW outreaches and patient's progress to goal(s) every 6 weeks.     Plan of Care updated and sent to patient: No

## 2023-07-13 NOTE — LETTER
[facility] CARE COORDINATION        July 13, 2023    Darion Herrera  [address] [place] [address]      Dear Darion,        I am a  clinic care coordinator who works with Monroe Cassidy MD with the [facility]. I wanted to thank you for spending the time to talk with me on 6/15/23.  Below is a description of clinic care coordination and how I can further assist you.       The clinic care coordination team is made up of a registered nurse, , financial resource worker and community health worker who understand the health care system. The goal of clinic care coordination is to help you manage your health and improve access to the health care system. Our team works alongside your provider to assist you in determining your health and social needs. We can help you obtain health care and community resources, providing you with necessary information and education. We can work with you through any barriers and develop a care plan that helps coordinate and strengthen the communication between you and your care team.  Our services are voluntary and are offered without charge to you personally.    Please feel free to contact me with any questions or concerns regarding care coordination and what we can offer.      We are focused on providing you with the highest-quality healthcare experience possible.    Sincerely,     INGE Griffin    Enclosed: I have enclosed a copy of the Patient Centered Plan of Care. This has helpful information and goals that we have talked about. Please keep this in an easy to access place to use as needed.

## 2023-07-13 NOTE — LETTER
[facility]  Patient Centered Plan of Care  About Me:        Patient Name:  Darion Herrera    YOB: 1975  Age:         47 year old   [facility] MRN:    [id number] Telephone Information:  Home Phone [phone]   Mobile [phone]       Address:  [address] [place] [address] Email address:  [email or web address]      Emergency Contact(s)    Name Relationship Lgl Grd Work Phone Home Phone Mobile Phone   1. DULCE HERRERA Daughter    [phone]   2. RAÚL HERRERA Son    [phone]   3. MATTIE HERRERA Spouse   [phone]            Primary language:  ong     needed? Yes   [facility] [facility]:  [phone] op. 1  Other communication barriers:[employer] barrier    Preferred Method of Communication:     Current living arrangement: I live in a private home with family (Patient lives with spouse, 2 adult, 1 minor)    Mobility Status/ Medical Equipment: Independent        Health Maintenance  Health Maintenance Reviewed: Due/Overdue   Health Maintenance Due   Topic Date Due    YEARLY PREVENTIVE VISIT  Never done    MICROALBUMIN  Never done    DIABETIC FOOT EXAM  Never done    ADVANCE CARE PLANNING  Never done    EYE EXAM  Never done    HEPATITIS B IMMUNIZATION (1 of 3 - 3-dose series) Never done    Pneumococcal Vaccine: Pediatrics (0 to 5 Years) and At-Risk Patients (6 to 64 Years) (1 - PCV) Never done    HIV SCREENING  Never done    HEPATITIS C SCREENING  Never done    PAP  Never done    DTAP/TDAP/TD IMMUNIZATION (1 - Tdap) Never done    COVID-19 Vaccine (4 - Moderna series) 01/25/2022    A1C  10/01/2022    BMP  07/01/2023    LIPID  07/01/2023    MAMMO SCREENING  06/30/2023           My Access Plan  Medical Emergency 911   Primary Clinic Line     24 Hour Appointment Line [phone] or  [phone]) (toll-free)   24 Hour Nurse Line [phone] (toll-free)   Preferred Urgent Care No data recorded   [facility]   [phone]     Preferred Pharmacy [facility] [address] [place] [address] [address]     Behavioral Health Crisis Line The National Suicide Prevention Lifeline at 1-924.297.4801 or Text/Call 988             My Care Team Members  Patient Care Team         Relationship Specialty Notifications Start End    Monroe Cassidy MD PCP - General   11/28/22     Phone: [phone] Fax: [phone]         [facility] [address] [place] [address]    Justin Vasquez MD MD Neurology  10/13/20     Phone: [phone] Fax: [phone]         [address] [facility] [address]    Justin Vasquez MD Assigned Neuroscience Provider   10/23/20     Phone: [phone] Fax: [phone] [address] [facility] [address]    Rahul Bautista MD Assigned PCP   5/17/23     Phone: [phone] Fax: [phone]         [address] [place] [id number]    Gale Dean Community Health Worker Primary Care - CC Admissions 6/13/23     Elisa Colvin LGSW Lead Care Coordinator  Admissions 6/13/23     Nacho Adamson, YADIRA Financial Resource Worker   6/15/23               My Care Plans  Self Management and Treatment Plan  Care Plan  Care Plan: Financial Wellbeing       Problem: Patient expresses financial resource strain       Goal: Create an action plan to increase financial stability       Start Date: 6/15/2023    This Visit's Progress: 30%    Priority: High    Note:     Barriers: language  Strengths: motivated to seek support  Patient expressed understanding of goal: yes  Action steps to achieve this goal:  1. I will answer the phone when FRW contacts me   2. I will have all necessary info available for FRW  3. I will follow up with CCC at next outreach.                              Care Plan: Financial Wellbeing       Problem: Patient expresses financial resource strain       Goal: Create an action plan to increase financial stability                       Problem:  Patient expresses financial resource strain       Goal: Apply for SNAP       Start Date: 6/15/2023    This Visit's Progress: 20%    Priority: High    Note:     Barriers: language  Strengths:motivated to seek support  Patient expressed understanding of goal: yes  Action steps to achieve this goal:  1. I will answer the phone when disability specialists contacts me  2. I will provide all necessary information  3. I will follow up with CCC at next outreach                                Action Plans on File:                       Advance Care Plans/Directives Type:   No data recorded    My Medical and Care Information  Problem List   Patient Active Problem List   Diagnosis    Leiomyoma of uterus    Aneurysmal subarachnoid hemorrhage (H)    S/P coil embolization of cerebral aneurysm    Aneurysm of right internal carotid artery    Cerebrovascular accident (CVA) due to occlusion of right middle cerebral artery (H)    CVA (cerebral vascular accident) (H)    Pneumonia due to group B Streptococcus, unspecified laterality, unspecified part of lung (H)    Vasospasm of cerebral artery    Hydrocephalus (H)    Adjustment disorder with mixed anxiety and depressed mood    Mood disorder (H)    Sleep difficulties    Respiratory failure (H)    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    Care Coordination Start Date: 6/13/2023   Frequency of Care Coordination: monthly     Form Last Updated: 07/13/2023

## 2023-07-13 NOTE — PROGRESS NOTES
Clinic Care Coordination - Chart Review Only    Situation: Ambulatory Care Coordination leader performing chart review related to staff coverage planning.    Assessment: Initial assessment completed with patient on 6/15/23 by  Care Coordinator.  Patient is overdue to receive complex care plan.  Writer completed chart review documentation during review of plan of care and to optimize staff coverage planning.     Plan: Writer will send complex care plan to patient.  Care Coordination will continue to follow with outreach timeframe as planned.    Elisa Larkin, YAIRN, RN   Manager of Ambulatory Care Management  [facility]

## 2023-07-14 ENCOUNTER — OFFICE VISIT (OUTPATIENT)
Dept: FAMILY MEDICINE | Facility: CLINIC | Age: 48
End: 2023-07-14
Payer: COMMERCIAL

## 2023-07-14 VITALS
SYSTOLIC BLOOD PRESSURE: 100 MMHG | RESPIRATION RATE: 16 BRPM | HEIGHT: 58 IN | WEIGHT: 165 LBS | TEMPERATURE: 98.3 F | OXYGEN SATURATION: 96 % | BODY MASS INDEX: 34.63 KG/M2 | DIASTOLIC BLOOD PRESSURE: 78 MMHG | HEART RATE: 80 BPM

## 2023-07-14 DIAGNOSIS — Z12.31 VISIT FOR SCREENING MAMMOGRAM: ICD-10-CM

## 2023-07-14 DIAGNOSIS — I60.9 SAH (SUBARACHNOID HEMORRHAGE) (H): ICD-10-CM

## 2023-07-14 DIAGNOSIS — H04.123 DRY EYES: ICD-10-CM

## 2023-07-14 DIAGNOSIS — I63.9 CEREBROVASCULAR ACCIDENT (CVA), UNSPECIFIED MECHANISM (H): Primary | ICD-10-CM

## 2023-07-14 DIAGNOSIS — Z98.2 S/P VP SHUNT: ICD-10-CM

## 2023-07-14 DIAGNOSIS — E78.2 MIXED HYPERLIPIDEMIA: ICD-10-CM

## 2023-07-14 DIAGNOSIS — E66.811 CLASS 1 OBESITY WITH BODY MASS INDEX (BMI) OF 34.0 TO 34.9 IN ADULT, UNSPECIFIED OBESITY TYPE, UNSPECIFIED WHETHER SERIOUS COMORBIDITY PRESENT: ICD-10-CM

## 2023-07-14 DIAGNOSIS — R73.9 HYPERGLYCEMIA: ICD-10-CM

## 2023-07-14 LAB
ALBUMIN SERPL BCG-MCNC: 4.7 G/DL (ref 3.5–5.2)
ALP SERPL-CCNC: 82 U/L (ref 35–104)
ALT SERPL W P-5'-P-CCNC: NORMAL U/L
ANION GAP SERPL CALCULATED.3IONS-SCNC: 12 MMOL/L (ref 7–15)
AST SERPL W P-5'-P-CCNC: NORMAL U/L
BILIRUB DIRECT SERPL-MCNC: NORMAL MG/DL
BILIRUB SERPL-MCNC: 0.2 MG/DL
BUN SERPL-MCNC: 13.2 MG/DL (ref 6–20)
CALCIUM SERPL-MCNC: 9.9 MG/DL (ref 8.6–10)
CHLORIDE SERPL-SCNC: 103 MMOL/L (ref 98–107)
CHOLEST SERPL-MCNC: 154 MG/DL
CREAT SERPL-MCNC: 0.76 MG/DL (ref 0.51–0.95)
DEPRECATED HCO3 PLAS-SCNC: 25 MMOL/L (ref 22–29)
GFR SERPL CREATININE-BSD FRML MDRD: >90 ML/MIN/1.73M2
GLUCOSE SERPL-MCNC: 111 MG/DL (ref 70–99)
HBA1C MFR BLD: 5.7 % (ref 0–5.6)
HDLC SERPL-MCNC: 29 MG/DL
LDLC SERPL CALC-MCNC: 73 MG/DL
NONHDLC SERPL-MCNC: 125 MG/DL
POTASSIUM SERPL-SCNC: 4 MMOL/L (ref 3.4–5.3)
PROT SERPL-MCNC: 7.8 G/DL (ref 6.4–8.3)
SODIUM SERPL-SCNC: 140 MMOL/L (ref 136–145)
TRIGL SERPL-MCNC: 261 MG/DL

## 2023-07-14 PROCEDURE — 84155 ASSAY OF PROTEIN SERUM: CPT | Performed by: FAMILY MEDICINE

## 2023-07-14 PROCEDURE — 36415 COLL VENOUS BLD VENIPUNCTURE: CPT | Performed by: FAMILY MEDICINE

## 2023-07-14 PROCEDURE — 82247 BILIRUBIN TOTAL: CPT | Performed by: FAMILY MEDICINE

## 2023-07-14 PROCEDURE — 84075 ASSAY ALKALINE PHOSPHATASE: CPT | Performed by: FAMILY MEDICINE

## 2023-07-14 PROCEDURE — 83036 HEMOGLOBIN GLYCOSYLATED A1C: CPT | Performed by: FAMILY MEDICINE

## 2023-07-14 PROCEDURE — 80061 LIPID PANEL: CPT | Performed by: FAMILY MEDICINE

## 2023-07-14 PROCEDURE — 99214 OFFICE O/P EST MOD 30 MIN: CPT | Performed by: FAMILY MEDICINE

## 2023-07-14 PROCEDURE — 82040 ASSAY OF SERUM ALBUMIN: CPT | Performed by: FAMILY MEDICINE

## 2023-07-14 PROCEDURE — 80048 BASIC METABOLIC PNL TOTAL CA: CPT | Performed by: FAMILY MEDICINE

## 2023-07-14 RX ORDER — POLYETHYLENE GLYCOL 400 AND PROPYLENE GLYCOL 4; 3 MG/ML; MG/ML
1 SOLUTION/ DROPS OPHTHALMIC 4 TIMES DAILY PRN
Qty: 15 ML | Refills: 3 | Status: SHIPPED | OUTPATIENT
Start: 2023-07-14

## 2023-07-14 RX ORDER — ASPIRIN 81 MG/1
81 TABLET, CHEWABLE ORAL DAILY
Qty: 90 TABLET | Refills: 3 | Status: SHIPPED | OUTPATIENT
Start: 2023-07-14 | End: 2024-03-11

## 2023-07-14 RX ORDER — ATORVASTATIN CALCIUM 20 MG/1
20 TABLET, FILM COATED ORAL DAILY
Qty: 90 TABLET | Refills: 3 | Status: SHIPPED | OUTPATIENT
Start: 2023-07-14 | End: 2024-03-11

## 2023-07-14 RX ORDER — CLOPIDOGREL BISULFATE 75 MG/1
75 TABLET ORAL DAILY
Qty: 90 TABLET | Refills: 3 | Status: SHIPPED | OUTPATIENT
Start: 2023-07-14 | End: 2024-03-11

## 2023-07-14 NOTE — Clinical Note
Jose E Christensen. I am Dr. Rahul Bautista and I saw the patient recently in clinic.  She may continue to follow with me for primary care services.  Patient reports that she has been taking appropriate overall and on the review of the chart, I am unable to find when the clopidogrel was started specifically, though he did appear to be initially mentioned in 08/14/2019 when being used in proximity for planned stent assisted coil embolization procedure.  She reports that she has been taking the aspirin and Plavix in combination since then.  She has some bruising, though no other bleeding issues.  She currently has a history of hydrocephalus with operating  shunt, epilepsy as late effect of CVA (due to occlusion of right middle artery and aneurysmal subarachnoid hemorrhage).  Patient was seen by Dr. Vasquez on 06/28/2023 and he deferred to neurosurgery for management of the clopidogrel. Would you be able to shed some light on whether or not patient should continue on the clopidogrel? Thank you! Rahul Bautista MD

## 2023-07-14 NOTE — PROGRESS NOTES
OFFICE VISIT    Assessment/Plan:     Patient Instructions:    -Please continue on the medications as prescribed.    -Find ways to stay active.  Try to get 150 minutes of moderate activity (where you are breathing faster and slightly sweating) each week.  -Be sure to eat 5-7 servings of fruits and vegetables each day.  -Try to maintain a body mass index (BMI) of 18.5-25 as this is considered a healthier weight range.  -Brush your teeth twice daily.  See a dentist every 6-12 months.  -Be sure to use sunblock with SPF 15 or greater when going outside for extended periods of time.  Sunblock should be used even when it is a cloudy day.  Do intermittent skin checks for any concerning skin changes.  Wearing a wide brimmed hat and sunglasses can also be helpful to protect your skin from the sun.  -Monitor for any abnormal skin changes (such as new moles/spots, painful moles, changes in your old moles, wounds that will not heal, multiple colors noted in one lesion, lesions that are asymmetric or not circular, or anything that is concerning for you). If any of these are noted, please schedule an appointment to be seen.   -It is generally recommended for you to complete a health care directive or living will. These documents will be able to reflect your wishes and desire in the case that you are unable to express them yourself. Please let Dr. Bautista know if you would like some assistance with this process.    -For the preventive health procedure (such as colonoscopy, mammogram, etc) order from today, if you do not hear within a week's time from the specialist to schedule an appointment, please call the [facility] and speak with the specialty  to help you schedule the appointment. Depending on the specialist availability, it may be a number of weeks prior to your scheduled appointment.      Please seek immediate medical attention (go to the emergency room or urgent care) for the following reasons: worsening  symptoms, medication side effects, or any concerning changes.    Please return to clinic in 1 year for a general physical and medication follow-up, or sooner as needed.      Darion was seen today for follow up and medication request.  Diagnoses and all orders for this visit:    Cerebrovascular accident (CVA), unspecified mechanism (H)  SAH (subarachnoid hemorrhage) (H)  S/P  shunt  Mixed hyperlipidemia: Stable.  Refills given as below.  Continue medications as prescribed.   -     Lipid panel reflex to direct LDL Non-fasting; Future  -     atorvastatin (LIPITOR) 20 MG tablet; Take 1 tablet (20 mg) by mouth daily  -     aspirin (ASA) 81 MG chewable tablet; Take 1 tablet (81 mg) by mouth daily  -     Hepatic function panel; Future  -     clopidogrel (PLAVIX) 75 MG tablet; Take 1 tablet (75 mg) by mouth daily    Visit for screening mammogram  -     MA SCREENING DIGITAL BILAT - Future  (s+30); Future    Class 1 obesity with body mass index (BMI) of 34.0 to 34.9 in adult, unspecified obesity type, unspecified whether serious comorbidity present  Hyperglycemia: check A1c and other labs as below.  Further management pending results.  -     HEMOGLOBIN A1C; Future  -     BASIC METABOLIC PANEL; Future  -     Lipid panel reflex to direct LDL Non-fasting; Future  -     Hepatic function panel; Future    Dry eyes: Discontinue ketotifen..  Trial as below.  -     polyethylene glycol-propylene glycol (SYSTANE ULTRA) 0.4-0.3 % SOLN ophthalmic solution; Place 1 drop into both eyes 4 times daily as needed for dry eyes    Other orders  -     REVIEW OF HEALTH MAINTENANCE PROTOCOL ORDERS          The diagnoses, treatment options, risk, benefits, and recommendations were reviewed with patient/guardian.  Questions were answered to patient's/guardian satisfaction.  Red flag signs were reviewed.  Patient/guardian is in agreement with above plan.      Subjective: 47 year old female with history of CVA due to occlusion of the right middle  cerebral artery and aneurysmal subarachnoid hemorrhage s/p stent coiling on clopidogrel, hydrocephalus with operating  shunt, epilepsy as late effect of CVA, tracheostomy, mood disorder, headaches, adjustment disorder with mixed anxiety and depressed mood chronic bilateral low back pain without sciatica who presents to clinic for the following complaints:   Patient presents with:  Follow Up: Medication   Medication Request: Patient states is 3 medication she wants refill  but only recall 2 of the medication names       Medication refill: Denies any significant issues or side effects on the medications reviewed today in clinic.  -Atorvastatin: She takes this medication in the mornings as she remembers better. Sometimes she forgets the mediations at night time some times.   -Aspirin: Taking at baseline.  Requesting refill.  -Clopidogrel: This provider was unable to find when medication was initiated, though first mention of this medication was in 08/14/2019 when Plavix was going to be used in proximity for planned stent assisted coil embolization procedure.  The patient reports that she has been taking combination aspirin and clopidogrel on a daily basis and needs a refill of the clopidogrel medication.  Patient was seen on 6/20/2023 by neurology.  At that time, it was recommended for patient to follow-up with neurosurgery to see if she needs to be on both aspirin and Plavix as patient bruised easily.  Patient was last seen by neurosurgery on 03/02/2023.  No management of clopidogrel mentioned in the note.  Message sent to JF Thompson CNP (Neurosurgery).    Eye drop: uses the ketotifen, but the eyes continue to be itchy and dry. Any wind that comes causes her to have dry eyes. She would like to try a different medication.     Patient appears to have sufficient amount of refills for the mirtazapine and ketotifen at this point.  Prescription was sent on 6/13/2023.    HM due was reviewed with  "patient/parent.  Recommendations, risk, benefits were reviewed.  Accepted recommendations were ordered.  Otherwise, patient/parent declined.    Health Maintenance Due   Topic Date Due     YEARLY PREVENTIVE VISIT  Never done     MICROALBUMIN  Never done     DIABETIC FOOT EXAM  Never done     ANNUAL REVIEW OF HM ORDERS  Never done     ADVANCE CARE PLANNING  Never done     EYE EXAM  Never done     HEPATITIS B IMMUNIZATION (1 of 3 - 3-dose series) Never done     Pneumococcal Vaccine: Pediatrics (0 to 5 Years) and At-Risk Patients (6 to 64 Years) (1 - PCV) Never done     COLORECTAL CANCER SCREENING  Never done     HIV SCREENING  Never done     HEPATITIS C SCREENING  Never done     PAP  Never done     DTAP/TDAP/TD IMMUNIZATION (1 - Tdap) Never done     COVID-19 Vaccine (4 - Moderna series) 01/25/2022     A1C  10/01/2022     BMP  07/01/2023     LIPID  07/01/2023     MAMMO SCREENING  06/30/2023     Mammogram normal from 06/30/2022.    Completed the Hep C and HIV last year with Dr. Cassidy and both tests were negative.    Colonoscopy: completed in in 01/2023 and noted to have no problems. Follow up in 10 years. Has a history of hemorrhoids, so she has some blood that comes out with BMs at times. The previous time was in 2017.  KRIS signed for [facility] in [place] for colonoscopy results.    Had a hysterectomy in 2017. Had a pap smear last year. Testing was not concerning.     The 10 point review of system is negative except as stated in the HPI.    Allergies were reviewed and updated.    Objective:   /78 (BP Location: Right arm, Patient Position: Sitting, Cuff Size: Adult Regular)   Pulse 80   Temp 98.3  F (36.8  C) (Oral)   Resp 16   Ht 1.473 m (4' 10\")   Wt 74.8 kg (165 lb)   SpO2 96%   BMI 34.49 kg/m    General: Active, alert, nontoxic-appearing.  No acute distress.  HEENT: Normocephalic, atraumatic.  Pupils are equal and round.  Sclera is clear.  Dry is noted.  Normal external ears. Nares patent.  Moist mucous " membranes.    Cardiac: RRR.  S1, S2 present.  No murmurs, rubs, or gallops.  Respiratory/chest: Clear to auscultation bilaterally.  No wheezes, rales, rhonchi.  Breathing is not labored.  No accessory muscle usage.  Extremities: Voluntary movements intact.  Integumentary: No concerning rash or skin changes appreciated.        Rahul Bautista MD  [facility] [place] [address]  Phone: [phone]  Fax: [phone]    7/17/2023  2:54 PM            Current Outpatient Medications   Medication     aspirin (ASA) 81 MG chewable tablet     atorvastatin (LIPITOR) 20 MG tablet     clopidogrel (PLAVIX) 75 MG tablet     mirtazapine (REMERON) 7.5 MG tablet     polyethylene glycol-propylene glycol (SYSTANE ULTRA) 0.4-0.3 % SOLN ophthalmic solution     No current facility-administered medications for this visit.       Allergies   Allergen Reactions     Hydrocodone Rash and Swelling     Oxycodone Rash and Swelling       Patient Active Problem List    Diagnosis Date Noted     Chronic bilateral low back pain without sciatica 03/16/2023     Priority: Medium     S/P  shunt 02/16/2023     Priority: Medium     Neck pain 02/16/2023     Priority: Medium     Subdural hematoma (H) 05/07/2020     Priority: Medium     Shunt malfunction, subsequent encounter      Priority: Medium     RUQ abdominal pain      Priority: Medium     HCAP (healthcare-associated pneumonia) 10/26/2019     Priority: Medium     Pleural effusion 10/26/2019     Priority: Medium     Cerebral aneurysm 09/16/2019     Priority: Medium     SAH (subarachnoid hemorrhage) (H) 08/21/2019     Priority: Medium     Hydrocephalus with operating shunt (H) 08/21/2019     Priority: Medium     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) 08/21/2019     Priority: Medium     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 (H)      Priority: Medium     Respiratory failure (H) 08/20/2019     Priority: Medium     Adjustment disorder with mixed anxiety and depressed  mood      Priority: Medium     Mood disorder (H)      Priority: Medium     Sleep difficulties      Priority: Medium     Pneumonia due to group B Streptococcus, unspecified laterality, unspecified part of lung (H)      Priority: Medium     Vasospasm of cerebral artery      Priority: Medium     Cerebrovascular accident (CVA) due to occlusion of right middle cerebral artery (H)      Priority: Medium     S/P coil embolization of cerebral aneurysm 07/23/2019     Priority: Medium     July 22,2019 @ [facility]  ENDOVASCULAR COIL EMBOLIZATION OF A RUPTURED DISSECTING VENTROMEDIAL   SUPRACLINOID RIGHT INTERNAL CAROTID ARTERY ANEURYSM  2. CEREBRAL ANGIOGRAM: SELECTIVE CATHETERIZATION OF THE LEFT COMMON   CAROTID ARTERY, RIGHT COMMON CAROTID ARTERY, RIGHT INTERNAL CAROTID   ARTERY, LEFT VERTEBRAL ARTERY, AND RIGHT VERTEBRAL ARTERY WITH   ANGIOGRAPHIC IMAGING CENTERED OVER THE HEAD  3. CERVICAL ANGIOGRAM: SELECTIVE CATHETERIZATION OF THE LEFT COMMON   CAROTID ARTERY AND THE RIGHT COMMON CAROTID ARTERY WITH ANGIOGRAPHIC   IMAGING CENTERED OVER THE NECK  4. THREE-DIMENSIONAL ROTATIONAL ANGIOGRAM OF THE HEAD WITH IMAGE   PROCESSING ON A SEPARATE COMPUTER WORKSTATION: INJECTION OF THE RIGHT   COMMON CAROTID ARTERY  5. 5 CEREBRAL ANGIOGRAMS THROUGH EXISTING GUIDE CATHETER TO EVALUATE   ENDOVASCULAR TREATMENT: INJECTIONS OF THE RIGHT INTERNAL CAROTID ARTERY         Aneurysm of right internal carotid artery 07/23/2019     Priority: Medium     Aneurysmal subarachnoid hemorrhage (H) 07/22/2019     Priority: Medium     CVA (cerebral vascular accident) (H) 07/22/2019     Priority: Medium     Added automatically from request for surgery [id number]         Hydrocephalus (H) 07/22/2019     Priority: Medium     Added automatically from request for surgery [id number]         Leiomyoma of uterus 07/18/2016     Priority: Medium     MD Cally Primary    Procedure Laterality Anesthesia   ROBOTIC HYSTERECTOMY WITH CYSTOSCOPY     2016           Family  History   Problem Relation Age of Onset     No Known Problems Mother      No Known Problems Father      No Known Problems Sister      No Known Problems Brother      No Known Problems Maternal Aunt      No Known Problems Maternal Uncle      No Known Problems Paternal Aunt      No Known Problems Paternal Uncle      No Known Problems Maternal Grandmother      No Known Problems Maternal Grandfather      No Known Problems Paternal Grandmother      No Known Problems Paternal Grandfather      No Known Problems Other        Past Surgical History:   Procedure Laterality Date     GYN SURGERY       HC UGI ENDOSCOPY W PLACEMENT GASTROSTOMY TUBE PERCUT N/A 8/7/2019    Procedure: CREATION, GASTROSTOMY, PERCUTANEOUS, ENDOSCOPIC;  Surgeon: Fer Ledezma MD;  Location: [facility];  Service: General     HEAD & NECK SURGERY       HYSTERECTOMY       IR CEREBRAL ANGIOGRAM  7/22/2019     IR CEREBRAL ANGIOGRAM  7/24/2019     IR CEREBRAL ANGIOGRAM  7/27/2019     IR CEREBRAL ANGIOGRAM  7/28/2019     IR CEREBRAL ANGIOGRAM  7/29/2019     IR CEREBRAL ANGIOGRAM  8/2/2019     IR CEREBRAL ANGIOGRAM  7/30/2019     IR CEREBRAL ANGIOGRAM  7/31/2019     IR CEREBRAL ANGIOGRAM  8/1/2019     IR CEREBRAL ANGIOGRAM  8/8/2019     IR CEREBRAL ANGIOGRAM  8/13/2019     IR CEREBRAL ANGIOGRAM  7/22/2019     IR CEREBRAL ANGIOGRAM  7/24/2019     IR CEREBRAL ANGIOGRAM  7/27/2019     IR CEREBRAL ANGIOGRAM  7/29/2019     IR CEREBRAL ANGIOGRAM  7/30/2019     IR CEREBRAL ANGIOGRAM  7/31/2019     IR CEREBRAL ANGIOGRAM  8/1/2019     IR CEREBRAL ANGIOGRAM  8/2/2019     IR CEREBRAL ANGIOGRAM  7/28/2019     IR CEREBRAL ANGIOGRAM  8/13/2019     IR CEREBRAL ANGIOGRAM  8/8/2019     IR EMBOLIZATION  9/16/2019     IR EMBOLIZATION  9/16/2019     IR LUMBAR DRAIN INSERTION  7/27/2019     IR LUMBAR DRAIN INSERTION  8/2/2019     IR LUMBAR DRAIN INSERTION  8/8/2019     IR LUMBAR DRAIN INSERTION  8/16/2019     IR LUMBAR DRAIN PLACEMENT W FLUORO  7/27/2019     IR LUMBAR  DRAIN PLACEMENT W FLUORO  8/2/2019     IR LUMBAR DRAIN PLACEMENT W FLUORO  8/8/2019     IR LUMBAR DRAIN PLACEMENT W FLUORO  8/16/2019     OTHER SURGICAL HISTORY      COIL EMBOLIZATION     PICC AND MIDLINE TEAM LINE INSERTION  7/26/2019          IL CREATE SHUNT:VENTRIC-PERITONEAL Right 8/19/2019    Procedure: INSERTION, SHUNT, VENTRICULOPERITONEAL, USING FRAMELESS STEREOTAXY ;  Surgeon: Gale Hoffmann MD;  Location: [facility] Main OR;  Service: Neurosurgery     IL CREATE SHUNT:VENTRIC-PERITONEAL Right 10/30/2019    Procedure: Replacement of distal catheter for ventriculoperitoneal shunt with general surgery assistance for laparoscopic approach;  Surgeon: Malou Sal MD;  Location: [facility] Main OR;  Service: Neurosurgery     IL LAP INSERTION TUNNELED INTRAPERITONEAL CATHETER Right 8/19/2019    Procedure: INSERTION, CATHETER, PERITONEAL, LAPAROSCOPIC;  Surgeon: Fer Ledezma MD;  Location: [facility] Main OR;  Service: General     TRACHEOSTOMY N/A 8/7/2019    Procedure: CREATION, TRACHEOSTOMY;  Surgeon: Fer Ledezma MD;  Location: [facility] Main OR;  Service: General        Social History     Socioeconomic History     Marital status:      Spouse name: Not on file     Number of children: Not on file     Years of education: Not on file     Highest education level: Not on file   Occupational History     Not on file   Tobacco Use     Smoking status: Never     Passive exposure: Never     Smokeless tobacco: Never   Vaping Use     Vaping Use: Never used   Substance and Sexual Activity     Alcohol use: Never     Drug use: Never     Sexual activity: Not on file   Other Topics Concern     Parent/sibling w/ CABG, MI or angioplasty before 65F 55M? Not Asked   Social History Narrative     Not on file     Social Determinants of Health     Financial Resource Strain: Medium Risk (6/15/2023)    Overall Financial Resource Strain (CARDIA)      Difficulty of Paying Living Expenses: Somewhat hard    Food Insecurity: No Food Insecurity (6/15/2023)    Hunger Vital Sign      Worried About Running Out of Food in the Last Year: Never true      Ran Out of Food in the Last Year: Never true   Transportation Needs: No Transportation Needs (6/15/2023)    PRAPARE - Transportation      Lack of Transportation (Medical): No      Lack of Transportation (Non-Medical): No   Physical Activity: Not on file   Stress: No Stress Concern Present (6/15/2023)    [employer] - Occupational Stress Questionnaire      Feeling of Stress : Only a little   Social Connections: Not on file   Intimate Partner Violence: Not At Risk (6/15/2023)    Humiliation, Afraid, Rape, and Kick questionnaire      Fear of Current or Ex-Partner: No      Emotionally Abused: No      Physically Abused: No      Sexually Abused: No   Housing Stability: Not on file

## 2023-07-14 NOTE — PATIENT INSTRUCTIONS
-Thank you for choosing the [facility].  -It was a pleasure to see you today.  -Please take a look at the information below for more specific details regarding the treatment plan and recommendations.  -In this after visit summary is a list of your medications and specific instructions.  Please review this carefully as there may be changes made to your medication list.  -If there are any particular questions or concerns, please feel free to reach out to Dr. Bautista.  -If any labs have been completed, we will reach out to you about results.  If the results are normal or not concerning, a letter or MyChart message will be sent to you.  If any follow-up is needed, either Dr. Bautista or the nurse will give you a call.  If you have not heard regarding results after 2 weeks, please reach out to the clinic.    Patient Instructions:    -Please continue on the medications as prescribed.    -Find ways to stay active.  Try to get 150 minutes of moderate activity (where you are breathing faster and slightly sweating) each week.  -Be sure to eat 5-7 servings of fruits and vegetables each day.  -Try to maintain a body mass index (BMI) of 18.5-25 as this is considered a healthier weight range.  -Brush your teeth twice daily.  See a dentist every 6-12 months.  -Be sure to use sunblock with SPF 15 or greater when going outside for extended periods of time.  Sunblock should be used even when it is a cloudy day.  Do intermittent skin checks for any concerning skin changes.  Wearing a wide brimmed hat and sunglasses can also be helpful to protect your skin from the sun.  -Monitor for any abnormal skin changes (such as new moles/spots, painful moles, changes in your old moles, wounds that will not heal, multiple colors noted in one lesion, lesions that are asymmetric or not circular, or anything that is concerning for you). If any of these are noted, please schedule an appointment to be seen.   -It is generally  recommended for you to complete a health care directive or living will. These documents will be able to reflect your wishes and desire in the case that you are unable to express them yourself. Please let Dr. Bautista know if you would like some assistance with this process.    -For the preventive health procedure (such as colonoscopy, mammogram, etc) order from today, if you do not hear within a week's time from the specialist to schedule an appointment, please call the [facility] and speak with the specialty  to help you schedule the appointment. Depending on the specialist availability, it may be a number of weeks prior to your scheduled appointment.      Please seek immediate medical attention (go to the emergency room or urgent care) for the following reasons: worsening symptoms, medication side effects, or any concerning changes.    Please return to clinic in 1 year for a general physical and medication follow-up, or sooner as needed.      --------------------------------------------------------------------------------------------------------------------    -We are always looking for ways to improve.  You may be selected to receive a survey regarding your visit today.  We encourage you to complete the survey and provide specific, constructive feedback to help us improve our processes.  Thank you for your time!  -Please review the contact information listed on the after visit summary and in the electronic chart.  Below is the phone number that we have on file.  If there are any changes that are needed to be made, please reach out to the clinic.  [phone] (home)

## 2023-07-17 ENCOUNTER — TELEPHONE (OUTPATIENT)
Dept: FAMILY MEDICINE | Facility: CLINIC | Age: 48
End: 2023-07-17
Payer: COMMERCIAL

## 2023-07-17 PROBLEM — E78.2 MIXED HYPERLIPIDEMIA: Status: ACTIVE | Noted: 2023-07-17

## 2023-07-17 PROBLEM — H04.123 DRY EYES: Status: ACTIVE | Noted: 2023-07-17

## 2023-07-17 NOTE — TELEPHONE ENCOUNTER
Test result below sent to patient per provider recommendation via standard mail to address on file.    MAEGAN Blair, RN  [facility]      Jose E Herrera,     I hope you have been well since out last visit. Below are the results from the testing completed at the visit.     The following results were normal or not concerning: Kidney function, electrolytes, liver function.     The cholesterol overall looks good.  The triglycerides are slightly elevated, though not particularly concerning.  The HDL or good cholesterol is low and can be improved by increasing your physical activity.  Dr. Bautista recommends you improve your physical activity level to help with how you feel overall.     The hemoglobin A1c, which is the average blood sugars over the last 3 months, is 5.7%.  This is in the prediabetes range.  It is recommended that you make significant changes in your dietary and physical activity habits to help reduce your risk of developing diabetes in the near future.  Working on losing weight will also help improve the prediabetes.     The other the labs are in process and we will reach out to you once those are completed.     Dr. Bautista recommends that you continue on the plan as discussed in clinic.     If there are any questions or concerns, please call the clinic or schedule an appointment for follow up.     Best wishes,           Rahul Bautista MD   [facility]   7/17/2023  9:51 AM

## 2023-07-17 NOTE — LETTER
July 17, 2023      Darion Herrera  [address]  [place] [address]      Jose E Darion Herrera,     I hope you have been well since out last visit. Below are the results from the testing completed at the visit.     The following results were normal or not concerning: Kidney function, electrolytes, liver function.     The cholesterol overall looks good.  The triglycerides are slightly elevated, though not particularly concerning.  The HDL or good cholesterol is low and can be improved by increasing your physical activity.  Dr. Bautista recommends you improve your physical activity level to help with how you feel overall.     The hemoglobin A1c, which is the average blood sugars over the last 3 months, is 5.7%.  This is in the prediabetes range.  It is recommended that you make significant changes in your dietary and physical activity habits to help reduce your risk of developing diabetes in the near future.  Working on losing weight will also help improve the prediabetes.     The other the labs are in process and we will reach out to you once those are completed.     Dr. Bautista recommends that you continue on the plan as discussed in clinic.     If there are any questions or concerns, please call the clinic or schedule an appointment for follow up.     Best wishes,           Rahul Bautista MD   [facility]   7/17/2023  9:51 AM

## 2023-07-18 ENCOUNTER — PATIENT OUTREACH (OUTPATIENT)
Dept: CARE COORDINATION | Facility: CLINIC | Age: 48
End: 2023-07-18
Payer: COMMERCIAL

## 2023-07-18 ASSESSMENT — ACTIVITIES OF DAILY LIVING (ADL): DEPENDENT_IADLS:: INDEPENDENT

## 2023-07-18 NOTE — PROGRESS NOTES
Clinic Care Coordination Contact    Community Health Worker Follow Up    Care Gaps:     Health Maintenance Due   Topic Date Due     YEARLY PREVENTIVE VISIT  Never done     MICROALBUMIN  Never done     DIABETIC FOOT EXAM  Never done     ADVANCE CARE PLANNING  Never done     EYE EXAM  Never done     HEPATITIS B IMMUNIZATION (1 of 3 - 3-dose series) Never done     Pneumococcal Vaccine: Pediatrics (0 to 5 Years) and At-Risk Patients (6 to 64 Years) (1 - PCV) Never done     DTAP/TDAP/TD IMMUNIZATION (1 - Tdap) Never done     COVID-19 Vaccine (4 - Moderna series) 01/25/2022     MAMMO SCREENING  06/30/2023     Postponed to next outreach. Patient hung up before CHW was able to review overdue health maintenance     Care Plan:   Care Plan: Financial Wellbeing Completed 7/18/2023    Problem: Apply for SNAP  Resolved 7/18/2023    Goal: Create an action plan to increase financial stability  Completed 7/18/2023    Start Date: 6/15/2023    Recent Progress: 30%    Priority: High    Note:     Barriers: language  Strengths: motivated to seek support  Patient expressed understanding of goal: yes  Action steps to achieve this goal:  1. I will answer the phone when FRW contacts me   2. I will have all necessary info available for FRW  3. I will follow up with CCC at next outreach.                      Care Plan: Financial Wellbeing     Problem: Patient expresses financial resource strain     Goal: Apply for SSI     Start Date: 6/15/2023 Expected End Date: 6/15/2024    This Visit's Progress: 20% Recent Progress: 20%    Priority: High    Note:     Barriers: language  Strengths:motivated to seek support  Patient expressed understanding of goal: yes  Action steps to achieve this goal:  1. I will answer the phone when [employer] contacts me  2. I will provide all necessary information  3. I will follow up with CCC at next outreach                     Intervention and Education during outreach:     CHW inquired if patient received  any follow up from [facility] regarding SNAP. Patient shares that she was approved, already received EBT card, and about 310.00 for month of July. Patient expressed appreciation for the support with application process.       Patient shares that she signed and returned packet of paperwork that  sent her. She is scheduled of phone appointment on 7/20 with representative at , to completed application for disability. Patient will notify CCC Team if she needs any support.     CHW Plan: CHW to follow up in 1 month    Gale Dean  [facility] Care Coordination  [facility]    Phone: [phone]

## 2023-07-20 ENCOUNTER — APPOINTMENT (OUTPATIENT)
Dept: INTERPRETER SERVICES | Facility: CLINIC | Age: 48
End: 2023-07-20
Payer: COMMERCIAL

## 2023-07-20 ENCOUNTER — PATIENT OUTREACH (OUTPATIENT)
Dept: CARE COORDINATION | Facility: CLINIC | Age: 48
End: 2023-07-20
Payer: COMMERCIAL

## 2023-07-20 NOTE — LETTER
July 20, 2023      Darion Herrera  [address] [place] [address]        Dear Darion,     Here is your community health worker's information in case you may need our services in the to reapply for SNAP benefits in the future.     Gale Dean  Clinic Care Coordination  [facility]    Phone: [phone]              Sincerely,        Zofia Adamson, Financial Resource Guide

## 2023-07-20 NOTE — LETTER
July 20, 2023      Darion Herrera  [address] [place] [address]        Dear Darion,     Here is your community health worker's information in case you may need our services in the future to reapply for SNAP benefits.    Gale Dean  Clinic Care Coordination  [facility]    Phone: [phone]              Sincerely,        Zofia Adamson, Financial Resource Guide

## 2023-07-20 NOTE — PROGRESS NOTES
Clinic Care Coordination Contact  Program: [facility]   County: [facility] Case #:   Anjelica Worker:   David #:   Subscriber #:   Renewal:  Date Applied: 23    FRW Outreach:   23 -  FRW reach out to patient. Patient states she was approved for SNAP benefits. Patient just received her card the other day. Does not have form with case # and  for FRW to update in chart. Patient may need services in the future when she needs to renew snap benefits.   Plan: FRW closed the FRW program and remove patient from panel. Patient can be referred back to FRW if needed.     23 - FRW assisted with applying for SNAP benefits for both patient and her spouse. Children do not buy food for them. They buy food for themselves per patient. FRW will follow up in 4 weeks.   6/15/23 - FRW reached out to patient to check eligibility. Patient was unable to verify children's income. Informed patient that their income will be needed since they buy and eat food together and pay utilities together. Patient will check with her children. FRW will follow up in 1 week and check eligibility with patient and help patient apply if she is eligible.     Health Insurance:    Referral/Screening:  SNAP/CASH Application Screenin. Have you had [facility]ending [facility] to [facility] benefits before? Yes, had SNAP for 2 months and was discontinued   2. How many people in the household, do you eat/buy food together? 5 people in household.   3. What is your monthly income (include all tax members)? Patient - $0. Patient's spouse receives prison - $700. Patient's children - unknown  4. Do you have a bank account? Yes   5. Do you have any utility bills (electricity, rent, mortgage, phone, insurance, medical bills, etc.)? Mortgage - $1,700. Utility bills - electricity, water, trash   6. Do you have social security cards and/or green cards? Yes     FRW Screening    Row Name 06/15/23 1107       County Benefits   Is patient requesting help applying for  county benefits? Yes       Have you recently applied for any county benefits? No       How many people in your household? 5       Do you buy/eat food together? Yes       What is the monthly gross income for the household (wages, social security, workers comp, and pension)?  700       Ins[place]:   [place]? No       Is this an insurance renewal? No       Is this a new insurance application request? No       OTHER   Is this a [place] care application? No       Any other information for the FRW? Pt has 2 adult children, both working. She is not sure of ther income.

## 2023-07-24 NOTE — PROGRESS NOTES
These were started by Neurosurgery. I will let them decide.     Sincerely,  Justin Vasquez MD  Neurologist  [facility]  Tel:- [phone]

## 2023-07-25 ENCOUNTER — APPOINTMENT (OUTPATIENT)
Dept: INTERPRETER SERVICES | Facility: CLINIC | Age: 48
End: 2023-07-25
Payer: COMMERCIAL

## 2023-07-27 ENCOUNTER — CARE COORDINATION (OUTPATIENT)
Dept: FAMILY MEDICINE | Facility: CLINIC | Age: 48
End: 2023-07-27
Payer: COMMERCIAL

## 2023-07-27 ENCOUNTER — TELEPHONE (OUTPATIENT)
Dept: FAMILY MEDICINE | Facility: CLINIC | Age: 48
End: 2023-07-27

## 2023-07-27 NOTE — TELEPHONE ENCOUNTER
JF Whitehead CNP (neurosurgery) and Dr. Justin Vasquez (neurology),     I have messaged you both recently regarding clopidogrel and you each have deferred to the other regarding management of the clopidogrel. I am requesting that you both discuss who will manage the clopidogrel medication going forward. If the clopidogrel use is no longer indicated, I would like for the medication to be discontinued.     Best wishes,     Rahul Bautista MD  [facility] [place] [address]  Phone: [phone]  Fax: [phone]    7/27/2023  11:58 AM

## 2023-08-03 ENCOUNTER — HOSPITAL ENCOUNTER (OUTPATIENT)
Dept: MAMMOGRAPHY | Facility: CLINIC | Age: 48
Discharge: HOME | End: 2023-08-03
Attending: FAMILY MEDICINE | Admitting: FAMILY MEDICINE
Payer: COMMERCIAL

## 2023-08-03 DIAGNOSIS — Z12.31 VISIT FOR SCREENING MAMMOGRAM: ICD-10-CM

## 2023-08-03 PROCEDURE — 77067 SCR MAMMO BI INCL CAD: CPT

## 2023-08-17 ENCOUNTER — PATIENT OUTREACH (OUTPATIENT)
Dept: CARE COORDINATION | Facility: CLINIC | Age: 48
End: 2023-08-17
Payer: COMMERCIAL

## 2023-08-17 ASSESSMENT — ACTIVITIES OF DAILY LIVING (ADL): DEPENDENT_IADLS:: INDEPENDENT

## 2023-08-17 NOTE — PROGRESS NOTES
Clinic Care Coordination Contact  Community Health Worker Follow Up    Care Gaps:     Health Maintenance Due   Topic Date Due    YEARLY PREVENTIVE VISIT  Never done    MICROALBUMIN  Never done    DIABETIC FOOT EXAM  Never done    ADVANCE CARE PLANNING  Never done    EYE EXAM  Never done    HEPATITIS B IMMUNIZATION (1 of 3 - 3-dose series) Never done    Pneumococcal Vaccine: Pediatrics (0 to 5 Years) and At-Risk Patients (6 to 64 Years) (1 - PCV) Never done    DTAP/TDAP/TD IMMUNIZATION (1 - Tdap) Never done    COVID-19 Vaccine (4 - Moderna series) 01/25/2022     Postponed to next outreach. Patient states, she thinks annual physical was completed at last visit on 7/14/23. CHW to clarify with PCP.      Care Plan:   Care Plan: Financial Wellbeing       Problem: Patient expresses financial resource strain       Goal: Apply for SSI       Start Date: 6/15/2023 Expected End Date: 6/15/2024    This Visit's Progress: 30% Recent Progress: 20%    Priority: High    Note:     Barriers: language  Strengths:motivated to seek support  Patient expressed understanding of goal: yes  Action steps to achieve this goal:  1. I will answer the phone when [employer] contacts me  2. I will provide all necessary information  3. I will follow up with CCC at next outreach                           Intervention and Education during outreach:   Darion shares that she completed phone appointment on 7/20 with . She received a letter from [facility] confirming application received. She will continue to watch for follow up from [employer] or .     CHW Plan:  CHW to follow up in 1 month. Routing to PCP to clarify overdue health maintenance and to lead clinician CCRN for review.    Gale Dean  Clinic Care Coordination  [facility]    Phone: [phone]

## 2023-08-18 NOTE — TELEPHONE ENCOUNTER
Please let patient know that we did not complete the annual physical at the last visit.  Patient may schedule an appointment when she is ready.    Rahul Bautista MD  [facility] [place] [address]  Phone: [phone]  Fax: [phone]    8/18/2023  9:14 AM

## 2023-08-28 ENCOUNTER — PATIENT OUTREACH (OUTPATIENT)
Dept: CARE COORDINATION | Facility: CLINIC | Age: 48
End: 2023-08-28
Payer: COMMERCIAL

## 2023-08-28 NOTE — PROGRESS NOTES
Clinic Care Coordination Contact  Care Coordination Clinician Chart Review    Situation: Patient chart reviewed by Care Coordinator.       Background: Care Coordination Program started: 6/13/2023. Initial assessment completed and patient-centered care plan(s) were developed with participation from patient. Lead CC handed patient off to CHW for continued outreaches.      Assessment: Per chart review, patient outreach completed by CC CHW on 8/17/2023.  Patient is actively working to accomplish goal(s). Patient's goal(s) appropriate and relevant at this time. Patient is not due for updated Plan of Care.  Assessments will be completed annually or as needed/with change of patient status.     Patient continue to work with disability specialists. CHW to assist with preventative care if patient agrees.       Care Plan: Financial Wellbeing       Problem: Patient expresses financial resource strain       Goal: Apply for SSI       Start Date: 6/15/2023 Expected End Date: 6/15/2024    This Visit's Progress: 30% Recent Progress: 20%    Priority: High    Note:     Barriers: language  Strengths:motivated to seek support  Patient expressed understanding of goal: yes  Action steps to achieve this goal:  1. I will answer the phone when [employer] contacts me  2. I will provide all necessary information  3. I will follow up with CCC at next outreach                                  Plan/Recommendations: The patient will continue working with Care Coordination to achieve goal(s) as above. CHW will continue outreaches at minimum every 30 days and will involve Lead CC as needed or if patient is ready to move to Maintenance. Lead CC will continue to monitor CHW outreaches and patient's progress to goal(s) every 6 weeks.     Plan of Care updated and sent to patient: No

## 2023-09-18 ENCOUNTER — PATIENT OUTREACH (OUTPATIENT)
Dept: CARE COORDINATION | Facility: CLINIC | Age: 48
End: 2023-09-18

## 2023-09-18 ASSESSMENT — ACTIVITIES OF DAILY LIVING (ADL): DEPENDENT_IADLS:: INDEPENDENT

## 2023-09-18 NOTE — PROGRESS NOTES
Clinic Care Coordination Contact  [facility]/Voicemail    Referral Source: PCP  Clinical Data: Care Coordinator Outreach  Outreach attempted x 1.  Unable to leave a message on patient's voicemail with call back information and request return call. Voicemail is full.     Plan: Care Coordinator will try to reach patient again in 10 business days.    Gale Dean  [facility] Care Coordination  [facility]    Phone: [phone]

## 2023-10-02 ENCOUNTER — PATIENT OUTREACH (OUTPATIENT)
Dept: CARE COORDINATION | Facility: CLINIC | Age: 48
End: 2023-10-02
Payer: COMMERCIAL

## 2023-10-02 ASSESSMENT — ACTIVITIES OF DAILY LIVING (ADL): DEPENDENT_IADLS:: INDEPENDENT

## 2023-10-02 NOTE — PROGRESS NOTES
Clinic Care Coordination Contact  Community Health Worker Follow Up    Care Gaps:     Health Maintenance Due   Topic Date Due    YEARLY PREVENTIVE VISIT  Never done    MICROALBUMIN  Never done    DIABETIC FOOT EXAM  Never done    ADVANCE CARE PLANNING  Never done    EYE EXAM  Never done    HEPATITIS B IMMUNIZATION (1 of 3 - 3-dose series) Never done    Pneumococcal Vaccine: Pediatrics (0 to 5 Years) and At-Risk Patients (6 to 64 Years) (1 - PCV) Never done    DTAP/TDAP/TD IMMUNIZATION (1 - Tdap) Never done    INFLUENZA VACCINE (1) 09/01/2023    COVID-19 Vaccine (4 - 2023-24 season) 09/01/2023    A1C  10/14/2023     CHW reviewed, per PCP, she did not have preventive care visit at last visit. Patient prefers to schedule appointment on her own.     Care Plan:   Care Plan: Financial Wellbeing       Problem: Patient expresses financial resource strain       Goal: Apply for SSI       Start Date: 6/15/2023 Expected End Date: 6/15/2024    This Visit's Progress: 30% Recent Progress: 30%    Priority: High    Note:     Barriers: language  Strengths:motivated to seek support  Patient expressed understanding of goal: yes  Action steps to achieve this goal:  1. I will answer the phone when [employer] contacts me  2. I will provide all necessary information  3. I will follow up with CCC at next outreach     [employer] at [email or web address]  Ph. [phone] and Fax: [phone]                          Intervention and Education during outreach:   Patient shares that she has not received any update on her disability case in over a month. CHW offered to call her representative for an update and patient agreed.   Conference called Marcy at , states next step is to return Summary of Application to Social Security. Patient shares that she has not received it yet. Marcy suggested letting patients representative know. Call transferred to Cecilia, left message informing  her patient needs copy of Summary of Application. Please return call to patient or CHW for assistance.     Patient shares that she had someone at [facility] call [facility] 2 or 3 weeks ago to renew her health plan over the phone and hasn't received any follow up. Informed patient, per [place], patient [place] active with [employer] through [employer] as of 10/01/23. Patient would like to know why her health plan changed.   CHW attempted call to [facility] Service Team  [phone], left message requesting return call to patient or CHW for assistance.     Patient shares that she has a Case Manger and give verbal permission for CHW to call her. [facility]  [phone], states she is meeting with patient on Thursday at 11AM and will assist as needed if patient has not herd from medical assistance team.   Patient will reach out to CCC team if she needs further support. CHW will not creat new goal at this time.     CHW Plan:  CHW to follow up in 1 month    Gale Dean  [facility] Care Coordination  [facility]    Phone: [phone]

## 2023-10-12 ENCOUNTER — PATIENT OUTREACH (OUTPATIENT)
Dept: CARE COORDINATION | Facility: CLINIC | Age: 48
End: 2023-10-12
Payer: COMMERCIAL

## 2023-10-12 ASSESSMENT — ACTIVITIES OF DAILY LIVING (ADL): DEPENDENT_IADLS:: INDEPENDENT

## 2023-10-12 NOTE — PROGRESS NOTES
Clinic Care Coordination Contact  Clinic Care Coordination Contact  OUTREACH    Referral Information:  Referral Source: PCP         Chief Complaint   Patient presents with    Chart Review Please        Universal Utilization: appropriate  Clinic Utilization  Difficulty keeping appointments:: No  Compliance Concerns: No  No-Show Concerns: No  No PCP office visit in Past Year: No  Utilization      No Show Count (past year)  0             ED Visits  2             Hospital Admissions  0                    Current as of: 10/6/2023 10:27 PM                Clinical Concerns:  Current Medical Concerns:  none    Current Behavioral Concerns: none     Education Provided to patient: CCC education, support and resources   Pain  Pain (GOAL):: No  Health Maintenance Reviewed: Due/Overdue   Health Maintenance Due   Topic Date Due    YEARLY PREVENTIVE VISIT  Never done    MICROALBUMIN  Never done    DIABETIC FOOT EXAM  Never done    ADVANCE CARE PLANNING  Never done    EYE EXAM  Never done    HEPATITIS B IMMUNIZATION (1 of 3 - 3-dose series) Never done    Pneumococcal Vaccine: Pediatrics (0 to 5 Years) and At-Risk Patients (6 to 64 Years) (1 - PCV) Never done    DTAP/TDAP/TD IMMUNIZATION (1 - Tdap) Never done    INFLUENZA VACCINE (1) 09/01/2023    COVID-19 Vaccine (4 - 2023-24 season) 09/01/2023    A1C  10/14/2023          Medication Management:  Medication review status: Medications reviewed and no changes reported per patient.             Functional Status:  Dependent ADLs:: Independent  Dependent IADLs:: Independent  Bed or wheelchair confined:: No  Mobility Status: Independent    Living Situation:  Current living arrangement:: I live in a private home with family (Patient lives with spouse, 2 adult, 1 minor)  Type of residence:: Private home - stairs    Lifestyle & Psychosocial Needs:    Social Determinants of Health     Food Insecurity: No Food Insecurity (6/15/2023)    Hunger Vital Sign     Worried About Running Out of Food in  the Last Year: Never true     Ran Out of Food in the Last Year: Never true   Depression: Not at risk (2/14/2023)    PHQ-2     PHQ-2 Score: 0   Housing Stability: Not on file   Tobacco Use: Low Risk  (7/17/2023)    Patient History     Smoking Tobacco Use: Never     Smokeless Tobacco Use: Never     Passive Exposure: Never   Financial Resource Strain: Medium Risk (6/15/2023)    Overall Financial Resource Strain (CARDIA)     Difficulty of Paying Living Expenses: Somewhat hard   Alcohol Use: Not At Risk (10/16/2020)    AUDIT-C     Frequency of Alcohol Consumption: Never     Average Number of Drinks: Not on file     Frequency of Binge Drinking: Not on file   Transportation Needs: No Transportation Needs (6/15/2023)    PRAPARE - Transportation     Lack of Transportation (Medical): No     Lack of Transportation (Non-Medical): No   Physical Activity: Not on file   Interpersonal Safety: Not At Risk (6/15/2023)    Humiliation, Afraid, Rape, and Kick questionnaire     Fear of Current or Ex-Partner: No     Emotionally Abused: No     Physically Abused: No     Sexually Abused: No   Stress: No Stress Concern Present (6/15/2023)    [employer] [employer] - Occupational Stress Questionnaire     Feeling of Stress : Only a little   Social Connections: Not on file     Diet:: Regular  Inadequate nutrition (GOAL):: No  Tube Feeding: No  Inadequate activity/exercise (GOAL):: No  Significant changes in sleep pattern (GOAL): No  Transportation means:: Family, Medical transport     Druze or spiritual beliefs that impact treatment:: No  Mental health DX:: No  Mental health management concern (GOAL):: No  Informal Support system:: Family          Resources and Interventions:  Current Resources:      Community Resources: County Programs (Medical Assistance)  Supplies Currently Used at Home: None  Equipment Currently Used at Home: none  Employment Status: disabled         Advance Care Plan/Directive  Advanced Care  Plans/Directives on file:: No  Advanced Care Plan/Directive Status: Declined Further Information    Referrals Placed: [employer]         Care Plan:  Care Plan: Financial Wellbeing       Problem: Patient expresses financial resource strain       Goal: Apply for SSI       Start Date: 6/15/2023 Expected End Date: 6/15/2024    This Visit's Progress: 30% Recent Progress: 30%    Priority: High    Note:     Barriers: language  Strengths:motivated to seek support  Patient expressed understanding of goal: yes  Action steps to achieve this goal:  1. I will answer the phone when [employer] contacts me  2. I will provide all necessary information  3. I will follow up with CCC at next outreach     [employer] at [email or web address]  Ph. [phone] and Fax: [phone]                              Patient/Caregiver understanding: yes    Outreach Frequency: monthly      Plan: CCSW completed annual renewal. Pt is working towards goals and continues to work with CCC.        Elisa Colvin, ROSALINO, LGSW  Social Work Care Coordinator

## 2023-10-12 NOTE — LETTER
[facility]  Patient Centered Plan of Care  About Me:        Patient Name:  Darion Herrera    YOB: 1975  Age:         47 year old   Jerome MRN:    [id number] Telephone Information:  Home Phone [phone]   Mobile [phone]       Address:  [address] [place] [address] Email address:  [email or web address]      Emergency Contact(s)    Name Relationship Lgl Grd Work Phone Home Phone Mobile Phone   1. DULCE HERRERA Daughter    [phone]   2. RAÚL HERRERA Son    [phone]   3. MATTIE HERRERA Spouse   [phone]            Primary language:  sam     needed? Yes   [place] [employer] [facility]:  [phone] op. 1  Other communication barriers:[employer] barrier    Preferred Method of Communication:     Current living arrangement: I live in a private home with family (Patient lives with spouse, 2 adult, 1 minor)    Mobility Status/ Medical Equipment: Independent        Health Maintenance  Health Maintenance Reviewed: Due/Overdue   Health Maintenance Due   Topic Date Due    YEARLY PREVENTIVE VISIT  Never done    MICROALBUMIN  Never done    DIABETIC FOOT EXAM  Never done    ADVANCE CARE PLANNING  Never done    EYE EXAM  Never done    HEPATITIS B IMMUNIZATION (1 of 3 - 3-dose series) Never done    Pneumococcal Vaccine: Pediatrics (0 to 5 Years) and At-Risk Patients (6 to 64 Years) (1 - PCV) Never done    DTAP/TDAP/TD IMMUNIZATION (1 - Tdap) Never done    INFLUENZA VACCINE (1) 09/01/2023    COVID-19 Vaccine (4 - 2023-24 season) 09/01/2023    A1C  10/14/2023          My Access Plan  Medical Emergency 911   Primary Clinic Line [facility] [phone]   24 Hour Appointment Line [phone] or  [phone] (toll-free)   24 Hour Nurse Line [phone] (toll-free)   Preferred Urgent Care No data recorded   [facility]  [phone]     Preferred Pharmacy [address]  [address]     Behavioral Health Crisis Line The National Suicide Prevention Lifeline at 1-250.562.1077 or Text/Call 988             My Care Team Members  Patient Care Team         Relationship Specialty Notifications Start End    Rahul Bautista MD PCP - General Family Medicine All results, Admissions 8/3/23     Phone: [phone] Fax: [phone]         [address] [place] [address]    Justin Vasquez MD MD Neurology  10/13/20     Phone: [phone] Fax: [phone]         [address] [facility] [address]    Justin Vasquez MD Assigned Neuroscience Provider   10/23/20     Phone: [phone] Fax: [phone]         [address] [facility] [address]    Rahul Bautista MD Assigned PCP   5/17/23     Phone: [phone] Fax: [phone]         [address] [place] [address]    Gale Dean Community Health Worker Primary Care - CC Admissions 6/13/23     Elisa Colvin LGSW Lead Care Coordinator  Admissions 6/13/23     Rahul Bautista MD MD Family Medicine All results, Admissions 8/3/23     Phone: [phone] Fax: [phone]         [address] [place] [address]              My Care Plans  Self Management and Treatment Plan  Care Plan  Care Plan: Financial Wellbeing       Problem: Patient expresses financial resource strain       Goal: Apply for SSI       Start Date: 6/15/2023 Expected End Date: 6/15/2024    This Visit's Progress: 30% Recent Progress: 30%    Priority: High    Note:     Barriers: language  Strengths:motivated to seek support  Patient expressed understanding of goal: yes  Action steps to achieve this goal:  1. I will answer the phone when [employer] contacts me  2. I will provide all necessary information  3. I will follow up with CCC at next outreach     [employer] at [email or web address]  Ph. [phone] and Fax: [phone]                               Action Plans on File:                        Advance Care Plans/Directives Type:   No data recorded    My Medical and Care Information  Problem List   Patient Active Problem List   Diagnosis    Leiomyoma of uterus    Aneurysmal subarachnoid hemorrhage (H)    S/P coil embolization of cerebral aneurysm    Aneurysm of right internal carotid artery    Cerebrovascular accident (CVA) due to occlusion of right middle cerebral artery (H)    CVA (cerebral vascular accident) (H)    Pneumonia due to group B Streptococcus, unspecified laterality, unspecified part of lung (H24)    Vasospasm of cerebral artery    Hydrocephalus (H)    Adjustment disorder with mixed anxiety and depressed mood    Mood disorder (H24)    Sleep difficulties    Respiratory failure (H)    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    Care Coordination Start Date: 6/13/2023   Frequency of Care Coordination: monthly     Form Last Updated: 10/12/2023

## 2023-11-02 ENCOUNTER — PATIENT OUTREACH (OUTPATIENT)
Dept: CARE COORDINATION | Facility: CLINIC | Age: 48
End: 2023-11-02
Payer: COMMERCIAL

## 2023-11-02 ASSESSMENT — ACTIVITIES OF DAILY LIVING (ADL): DEPENDENT_IADLS:: INDEPENDENT

## 2023-11-02 NOTE — PROGRESS NOTES
Clinic Care Coordination Contact  Community Health Worker Follow Up    Care Gaps:     Health Maintenance Due   Topic Date Due    YEARLY PREVENTIVE VISIT  Never done    MICROALBUMIN  Never done    DIABETIC FOOT EXAM  Never done    ADVANCE CARE PLANNING  Never done    EYE EXAM  Never done    HEPATITIS B IMMUNIZATION (1 of 3 - 3-dose series) Never done    Pneumococcal Vaccine: Pediatrics (0 to 5 Years) and At-Risk Patients (6 to 64 Years) (1 - PCV) Never done    DTAP/TDAP/TD IMMUNIZATION (1 - Tdap) Never done    INFLUENZA VACCINE (1) 09/01/2023    COVID-19 Vaccine (4 - 2023-24 season) 09/01/2023    A1C  10/14/2023     Patient declined at this time and will make appointment on her own.     Care Plan:   Care Plan: Financial Wellbeing       Problem: Patient expresses financial resource strain       Goal: Apply for SSI       Start Date: 6/15/2023 Expected End Date: 6/15/2024    This Visit's Progress: 50% Recent Progress: 30%    Priority: High    Note:     Barriers: language  Strengths:motivated to seek support  Patient expressed understanding of goal: yes  Action steps to achieve this goal:  1. I will answer the phone when [employer] contacts me  2. I will provide all necessary information  3. I will follow up with CCC at next outreach     Note: Per pt, denied for S SI benefits on 10/30. Per Julissa,  Representative applying for RSDI next.     [employer] at [email or web address]  Ph. [phone] and Fax: [phone]                          Intervention and Education during outreach:   Patient shares that she received denial of S SI case on 10/30, the person at [employer] told her it's possible she can apply for RSDI. She's not sure why she was denied when she has no income. She reported that children help pay for bills and they told her it's considered income. She's not sure what the next step is. CHW offere to call representative at [employer]ability  [employer].     Conference called Julissa at  [phone], informed of above. Julissa agreed that they do use children helping pay for bills as income. Social Security will look into her RSDI eligibility because she has worked. Julissa encouraged patient to watch for Work History and Functional Report from from Social Security office. Feel free to call her if patient needs support. Patient expressed understanding.     Patient shares that she's been assigned a new  Anibal Bond [phone]. Patient is not sure which organizations she's with and what services she provides. Patient gives verbal permission for CHW to call CM. Conference called voice Anibalmail belongs to male at [place], left message requesting return call from assigned CM.   Per chart review, patients previous  was [facility]  [phone].    CHW Plan:  CHW to follow up in 1 month    [facility] Care Coord[facility]  [facility]    Phone: [phone]

## 2023-12-01 ENCOUNTER — PATIENT OUTREACH (OUTPATIENT)
Dept: CARE COORDINATION | Facility: CLINIC | Age: 48
End: 2023-12-01
Payer: COMMERCIAL

## 2023-12-01 NOTE — PROGRESS NOTES
Clinic Care Coordination Contact  Care Coordination Clinician Chart Review    Situation: Patient chart reviewed by Care Coordinator.       Background: Care Coordination Program started: 6/13/2023. Initial assessment completed and patient-centered care plan(s) were developed with participation from patient. Lead CC handed patient off to CHW for continued outreaches.       Assessment: Per chart review, patient outreach completed by CC CHW on 10/12.  Patient is actively working to accomplish goal(s). Patient's goal(s) appropriate and relevant at this time. Patient is not due for updated Plan of Care.  Assessments will be completed annually or as needed/with change of patient status.      Care Plan: Financial Wellbeing       Problem: Patient expresses financial resource strain       Goal: Apply for SSI       Start Date: 6/15/2023 Expected End Date: 6/15/2024    This Visit's Progress: 60% Recent Progress: 50%    Priority: High    Note:     Barriers: language  Strengths:motivated to seek support  Patient expressed understanding of goal: yes  Action steps to achieve this goal:  1. I will answer the phone when [employer] contacts me  2. I will provide all necessary information  3. I will follow up with CCC at next outreach     Note: Per pt, denied for S SI benefits on 10/30. Per Julissa,  Representative applying for RSDI next.     [employer] at [email or web address]  Ph. [phone] and Fax: [phone]                                 Plan/Recommendations: The patient will continue working with Care Coordination to achieve goal(s) as above. CHW will continue outreaches at minimum every 30 days and will involve Lead CC as needed or if patient is ready to move to Maintenance. Lead CC will continue to monitor CHW outreaches and patient's progress to goal(s) every 6 weeks.     Plan of Care updated and sent to patient: Nayla Colvin, MSW, LGSW  Social Work  Care Coordinator

## 2023-12-08 ENCOUNTER — PATIENT OUTREACH (OUTPATIENT)
Dept: CARE COORDINATION | Facility: CLINIC | Age: 48
End: 2023-12-08
Payer: COMMERCIAL

## 2023-12-08 ASSESSMENT — ACTIVITIES OF DAILY LIVING (ADL): DEPENDENT_IADLS:: INDEPENDENT

## 2023-12-08 NOTE — PROGRESS NOTES
Clinic Care Coordination Contact  Community Health Worker Follow Up    Care Gaps:     Health Maintenance Due   Topic Date Due    YEARLY PREVENTIVE VISIT  Never done    ADVANCE CARE PLANNING  Never done    HEPATITIS B IMMUNIZATION (1 of 3 - 3-dose series) Never done    DTAP/TDAP/TD IMMUNIZATION (1 - Tdap) Never done    INFLUENZA VACCINE (1) 09/01/2023    COVID-19 Vaccine (4 - 2023-24 season) 09/01/2023     Patient declined and will schedule on her own.    Care Plan:   Care Plan: Financial Wellbeing       Problem: Patient expresses financial resource strain       Goal: Apply for SSI       Start Date: 6/15/2023 Expected End Date: 6/15/2024    This Visit's Progress: 60% Recent Progress: 60%    Priority: High    Note:     Barriers: language  Strengths:motivated to seek support  Patient expressed understanding of goal: yes  Action steps to achieve this goal:  1. I will answer the phone when [employer] contacts me  2. I will provide all necessary information  3. I will follow up with CCC at next outreach     Note: Per pt, denied for S SI benefits on 10/30. Per Julissa,  Representative applying for RSDI next.     [employer] at [email or web address]  Ph. [phone] and Fax: [phone]                          Intervention and Education during outreach:   Patient shares that she received Work History and Functional Report form. Julissa and  assisted her with completing forms and she mailed them back to Social Security Office already. Patient will reach out to CCC team if any support is needed.     CHW Plan: CHW to follow up in 1 month    Gale Dean  Clinic Care Coordination  [facility]    Phone: [phone]

## 2024-01-15 ENCOUNTER — PATIENT OUTREACH (OUTPATIENT)
Dept: CARE COORDINATION | Facility: CLINIC | Age: 49
End: 2024-01-15
Payer: COMMERCIAL

## 2024-01-15 NOTE — PROGRESS NOTES
Clinic Care Coordination Contact  Care Coordination Clinician Chart Review    Situation: Patient chart reviewed by Care Coordinator.       Background: Care Coordination Program started: 6/13/2023. Initial assessment completed and patient-centered care plan(s) were developed with participation from patient. Lead CC handed patient off to CHW for continued outreaches.       Assessment: Per chart review, patient outreach completed by CC CHW on 12/8.  Patient is actively working to accomplish goal(s). Patient's goal(s) appropriate and relevant at this time. Patient is due for updated Plan of Care.  Assessments will be completed annually or as needed/with change of patient status.      Care Plan: Financial Wellbeing       Problem: Patient expresses financial resource strain       Goal: Apply for SSI       Start Date: 6/15/2023 Expected End Date: 6/15/2024    This Visit's Progress: 80% Recent Progress: 60%    Priority: High    Note:     Barriers: language  Strengths:motivated to seek support  Patient expressed understanding of goal: yes  Action steps to achieve this goal:  1. I will answer the phone when [employer] contacts me  2. I will provide all necessary information  3. I will follow up with CCC at next outreach     Note: Per pt, denied for S SI benefits on 10/30. Per Julissa,  Representative applying for RSDI next.     [employer] at [email or web address]  Ph. [phone] and Fax: [phone]                                 Plan/Recommendations: The patient will continue working with Care Coordination to achieve goal(s) as above. CHW will continue outreaches at minimum every 30 days and will involve Lead CC as needed or if patient is ready to move to Maintenance. Lead CC will continue to monitor CHW outreaches and patient's progress to goal(s) every 6 weeks.     Plan of Care updated and sent to patient: Yes, via mail      ROSALINO Griffin, LGINGE  Social  Work Care Coordinator

## 2024-01-15 NOTE — LETTER
[facility]  Patient Centered Plan of Care  About Me:        Patient Name:  Darion Herrera    YOB: 1975  Age:         48 year old   Jerome MRN:    [id number] Telephone Information:  Home Phone [phone]   Mobile [phone]       Address:  [address] [place] [address] Email address:  [email or web address]      Emergency Contact(s)    Name Relationship Lgl Grd Work Phone Home Phone Mobile Phone   1. DULCE HERRERA Daughter    [phone]   2. RAÚL HERRERA Son    [phone]   3. MATTEI HERRERA Spouse   [phone]            Primary language:  [facility]     needed? Yes   [place] [facility]:  [phone] op. 1  Other communication barriers:[employer] barrier    Preferred Method of Communication:     Current living arrangement: I live in a private home with family (Patient lives with spouse, 2 adult, 1 minor)    Mobility Status/ Medical Equipment: Independent        Health Maintenance  Health Maintenance Reviewed: Due/Overdue   Health Maintenance Due   Topic Date Due    YEARLY PREVENTIVE VISIT  Never done    ADVANCE CARE PLANNING  Never done    HEPATITIS B IMMUNIZATION (1 of 3 - 3-dose series) Never done    DTAP/TDAP/TD IMMUNIZATION (1 - Tdap) Never done    INFLUENZA VACCINE (1) 09/01/2023    COVID-19 Vaccine (4 - 2023-24 season) 09/01/2023    PHQ-2 (once per calendar year)  01/01/2024           My Access Plan  Medical Emergency 911   Primary Clinic Line [facility] [phone]   24 Hour Appointment Line [phone] or  [phone] (toll-free)   24 Hour Nurse Line [phone] (toll-free)   Preferred Urgent Care No data recorded   Preferred [facility]  [phone]     Preferred Pharmacy [facility] [address] [facility] [address]     Behavioral Health Crisis Line The National Suicide Prevention Lifeline at 1-743.219.8801 or Text/Call 788           My Care Team  Members  Patient Care Team         Relationship Specialty Notifications Start End    Rahul Bautista MD PCP - General Family Medicine All results, Admissions 8/3/23     Phone: [phone] Fax: [phone]         [address] [place] [address]    Justin Vasquez MD MD Neurology  10/13/20     Phone: [phone] Fax: [phone]         [address] [facility] [address]    Justin Vasquez MD Assigned Neuroscience Provider   10/23/20     Phone: [phone] Fax: [phone]         [address] [facility] [address]    Rahul Bautista MD Assigned PCP   5/17/23     Phone: [phone] Fax: [phone]         [address] [place] [address]    Gale Dean CHW Community Health Worker Primary Care - CC Admissions 6/13/23     Elisa Colvin [facility] Lead Care Coordinator  Admissions 6/13/23     Rahul Bautista MD MD Family Medicine All results, Admissions 8/3/23     Phone: [phone] Fax: [phone]         [address] [place] [address]                My Care Plans  Self Management and Treatment Plan    Care Plan  Care Plan: Financial Wellbeing       Problem: Patient expresses financial resource strain       Goal: Apply for SSI       Start Date: 6/15/2023 Expected End Date: 6/15/2024    This Visit's Progress: 80% Recent Progress: 80%    Priority: High    Note:     Barriers: language  Strengths:motivated to seek support  Patient expressed understanding of goal: yes  Action steps to achieve this goal:  1. I will answer the phone when [employer] contacts me  2. I will provide all necessary information  3. I will follow up with CCC at next outreach     Note: Per pt, denied for S SI benefits on 10/30. Per Julissa,  Representative applying for RSDI next.     [employer] at [email or web address]  Ph. [phone] and Fax: [phone]                            Care Plan: Affordable transportation       Problem: HP GENERAL PROBLEM        Goal: I would like affordable transportation options       Start Date: 1/18/2024    This Visit's Progress: 10%    Note:     Barriers: language  Strengths: motivated to seek support  Patient expressed understanding of goal: yes  Action steps to achieve this goal:  1. I will see if I am eligible for transport through [facility]  2. I will complete [employer] mobility application  3. I will follow up with CCC for further support.                                 Action Plans on File:                       Advance Care Plans/Directives:   Advanced Care Plan/Directives on file:   No    Discussed with patient/caregiver(s): No data recorded           My Medical and Care Information  Problem List   Patient Active Problem List   Diagnosis    Leiomyoma of uterus    Aneurysmal subarachnoid hemorrhage (H)    S/P coil embolization of cerebral aneurysm    Aneurysm of right internal carotid artery    Cerebrovascular accident (CVA) due to occlusion of right middle cerebral artery (H)    CVA (cerebral vascular accident) (H)    Pneumonia due to group B Streptococcus, unspecified laterality, unspecified part of lung (H24)    Vasospasm of cerebral artery    Hydrocephalus (H)    Adjustment disorder with mixed anxiety and depressed mood    Mood disorder (H24)    Sleep difficulties    Respiratory failure (H)    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    Care Coordination Start Date: 6/13/2023   Frequency of Care Coordination: monthly, more frequently as  needed     Form Last Updated: 01/18/2024

## 2024-01-15 NOTE — LETTER
[facility]  Patient Centered Plan of Care  About Me:        Patient Name:  Darion Herrera    YOB: 1975  Age:         48 year old   Jerome MRN:    [id number] Telephone Information:  Home Phone [phone]   Mobile [phone]       Address:  [address] [place] [address] Email address:  [email or web address]      Emergency Contact(s)    Name Relationship Lgl Grd Work Phone Home Phone Mobile Phone   1. DULCE HERRERA Daughter    [phone]   2. RAÚL HERRERA Son    [phone]   3. MATTIE HERRERA Spouse   [phone]            Primary language:  [facility]     needed? Yes   [facility]:  [phone] op. 1  Other communication barriers:[employer] barrier    Preferred Method of Communication:     Current living arrangement: I live in a private home with family (Patient lives with spouse, 2 adult, 1 minor)    Mobility Status/ Medical Equipment: Independent        Health Maintenance  Health Maintenance Reviewed: Due/Overdue   Health Maintenance Due   Topic Date Due    YEARLY PREVENTIVE VISIT  Never done    ADVANCE CARE PLANNING  Never done    HEPATITIS B IMMUNIZATION (1 of 3 - 3-dose series) Never done    DTAP/TDAP/TD IMMUNIZATION (1 - Tdap) Never done    INFLUENZA VACCINE (1) 09/01/2023    COVID-19 Vaccine (4 - 2023-24 season) 09/01/2023    PHQ-2 (once per calendar year)  01/01/2024          My Access Plan  Medical Emergency 911   Primary Clinic Line [facility] [phone]   24 Hour Appointment Line [phone] or  [phone]) (toll-free)   24 Hour Nurse Line [phone] (toll-free)   Preferred Urgent Care No data recorded   Preferred [facility]  [phone]     Preferred Pharmacy [facility] [address] [facility] [address]     Behavioral Health Crisis Line The National Suicide Prevention Lifeline at 1-604.540.8729 or Text/Call 018           My Care Team  Members  Patient Care Team         Relationship Specialty Notifications Start End    Rahul Bautista MD PCP - General Family Medicine All results, Admissions 8/3/23     Phone: [phone] Fax: [phone]         [address] [place] [address]    Justin Vasquez MD MD Neurology  10/13/20     Phone: [phone] Fax: [phone]         [address] [facility] [address]    Justin Vasquez MD Assigned Neuroscience Provider   10/23/20     Phone: [phone] Fax: [phone]         [address] [facility] [address]    Rahul Bautista MD Assigned PCP   5/17/23     Phone: [phone] Fax: [phone]         [address] [place] [address]    Gale Dean CHW Community Health Worker Primary Care - CC Admissions 6/13/23     Elisa Colvin [facility] Lead Care Coordinator  Admissions 6/13/23     Rahul Bautista MD MD Family Medicine All results, Admissions 8/3/23     Phone: [phone] Fax: [phone]         [address] [place] [address]                My Care Plans  Self Management and Treatment Plan    Care Plan  Care Plan: Financial Wellbeing       Problem: Patient expresses financial resource strain       Goal: Apply for SSI       Start Date: 6/15/2023 Expected End Date: 6/15/2024    This Visit's Progress: 60% Recent Progress: 60%    Priority: High    Note:     Barriers: language  Strengths:motivated to seek support  Patient expressed understanding of goal: yes  Action steps to achieve this goal:  1. I will answer the phone when [employer] contacts me  2. I will provide all necessary information  3. I will follow up with CCC at next outreach     Note: Per pt, denied for S SI benefits on 10/30. Per Julissa,  Representative applying for RSDI next.     [employer] at [email or web address]  Ph. [phone] and Fax: [phone]                              Action Plans on File:                       Advance Care  Plans/Directives:   Advanced Care Plan/Directives on file:   No    Discussed with patient/caregiver(s): No data recorded           My Medical and Care Information  Problem List   Patient Active Problem List   Diagnosis    Leiomyoma of uterus    Aneurysmal subarachnoid hemorrhage (H)    S/P coil embolization of cerebral aneurysm    Aneurysm of right internal carotid artery    Cerebrovascular accident (CVA) due to occlusion of right middle cerebral artery (H)    CVA (cerebral vascular accident) (H)    Pneumonia due to group B Streptococcus, unspecified laterality, unspecified part of lung (H24)    Vasospasm of cerebral artery    Hydrocephalus (H)    Adjustment disorder with mixed anxiety and depressed mood    Mood disorder (H24)    Sleep difficulties    Respiratory failure (H)    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    Care Coordination Start Date: 6/13/2023   Frequency of Care Coordination: monthly, more frequently as needed     Form Last Updated: 01/15/2024

## 2024-01-16 ENCOUNTER — PATIENT OUTREACH (OUTPATIENT)
Dept: CARE COORDINATION | Facility: CLINIC | Age: 49
End: 2024-01-16

## 2024-01-16 NOTE — PROGRESS NOTES
Clinic Care Coordination Contact  Community Health Worker Follow Up    Care Gaps:   Health Maintenance Due   Topic Date Due    YEARLY PREVENTIVE VISIT  Never done    ADVANCE CARE PLANNING  Never done    HEPATITIS B IMMUNIZATION (1 of 3 - 3-dose series) Never done    DTAP/TDAP/TD IMMUNIZATION (1 - Tdap) Never done    INFLUENZA VACCINE (1) 09/01/2023    COVID-19 Vaccine (4 - 2023-24 season) 09/01/2023    PHQ-2 (once per calendar year)  01/01/2024     Declined due to Patient expressing that she would like to have free and or affordable transportation before scheduling additional appointments     Care Plan:   Care Plan: Financial Wellbeing       Problem: Patient expresses financial resource strain       Goal: Apply for SSI       Start Date: 6/15/2023 Expected End Date: 6/15/2024    This Visit's Progress: 80% Recent Progress: 80%    Priority: High    Note:     Barriers: language  Strengths:motivated to seek support  Patient expressed understanding of goal: yes  Action steps to achieve this goal:  1. I will answer the phone when [employer] contacts me  2. I will provide all necessary information  3. I will follow up with CCC at next outreach     Note: Per pt, denied for S SI benefits on 10/30. Per Julissa,  Representative applying for RSDI next.     [employer] at [email or web address]  Ph. [phone] and Fax: [phone]                            Intervention and Education during outreach:     Spoke to Patient (Darion)  CHW Introduced intent of call regarding monthly follow up.   Patients reports that she is doing okay. Further expressing that she is still waiting for additional information regarding Supplemental Security Income Application. Further expressing that she understanding that the processing time will take a long time due to high volume requests/ submissions of Supplemental Security Income application. Patient indicates that she has not received any  phone calls, and or letters in the mail. CHW acknowledged.   Patient further expressed that she is only interested in scheduling additional care cap appointments, if she is has affordable and or free transportation support. Patient expressed that ever since her insurance coverage changed she doesn't have free transportation support for medical appointments. Patient requested for additional support from [facility].      CHW acknowledged and will route message to Lead CC (SW) to inquire if there are resources available ([employer] Application)?   CHW encourages Patient to contact CHW/ CCC Team if additional support is needed. CHW provides Patient with CHW's contact information. Patient acknowledged.    CHW Plan: Next CHW Outreach in One Month     ANGEL Smyth  Clinic Care Coordination   [facility]   Phone: [phone]  [email or web address]

## 2024-01-18 ENCOUNTER — PATIENT OUTREACH (OUTPATIENT)
Dept: CARE COORDINATION | Facility: CLINIC | Age: 49
End: 2024-01-18
Payer: COMMERCIAL

## 2024-01-18 ENCOUNTER — APPOINTMENT (OUTPATIENT)
Dept: INTERPRETER SERVICES | Facility: CLINIC | Age: 49
End: 2024-01-18
Payer: COMMERCIAL

## 2024-01-18 NOTE — PROGRESS NOTES
Clinic Care Coordination Contact  [facility]/Voicemail  CCSW reached out to pt to schedule an in person visit with CCSW to fill out metro mobility application. Marce  Darion left message on pt's voicemail with direct contact info to set up appointment.     Elisa Colvin MSW, [facility]  Social Work Care Coordinator

## 2024-02-15 NOTE — PROGRESS NOTES
February 15, 2024  EMPLOYEE INFORMATION: EMPLOYER INFORMATION:   NAME: David LOVELL ( [employer] SIT[employer])    : 1998 [phone]    DATE OF INJURY/EVENT: 2024            Location: [address]   Treating Provider: MOSES Gonzales  Time In:  10:07 AM Time Out:  11:10 AM      DIAGNOSIS:   1. Strain of right wrist, initial encounter    2. Strain of intrinsic muscle of right thumb      STATUS: This injury is determined to be WORK RELATED.    RETURN TO WORK:Employee may return to work with restrictions.   Return Date: 2/15/2024            RESTRICTIONS: Restrictions are to be followed at work and at home.  Restrictions are in effect until next follow-up visit.    Left handed work  No lifting, pushing, pulling, carrying more than 10 pounds  Wear wrist brace as needed while working     TREATMENT PLAN:   Medications for this injury/condition: Tylenol and/or ibuprofen as needed for pain  Referral/Consult: None  Diagnostic Testing: None       Instructions: Ice, elevate  Hand/wrist exercises as instructed    NEXT RETURN VISIT:  24 @ 3:30PM  Thank you for the privilege of providing medical care for this injury/condition.  If there are any questions, please call the occupational health clinic at Dept: [phone].    Electronically signed on 2/15/2024 at 11:10 AM by:   MOSES Gonzales   [facility]    The physician below agrees with the plan and restrictions placed on the patient by the provider above.  Martin Ulloa MD           Patient on propofol and fentanyl gtt.

## 2024-02-21 NOTE — PROGRESS NOTES
DISCHARGE  Reason for Discharge: Patient chooses to discontinue therapy.  Patient has failed to schedule further appointments.    Equipment Issued: NA    Discharge Plan: Patient to continue home program as appropriate.    Referring Provider:  Gale Hoffmann       03/30/23 7433   Appointment Info   Signing clinician's name / credentials Mark Galindo DPT   Visits Used 5/12       Present Yes    ID or First/Last Name Pt's daughter provided interpretation today   Subjective Report   Subjective Report Darion says that her R shoudler and neck are tight, L side also. She says that she does some exercises for them. She demosntrates single and double leg lifts in supine. She says taht she stopped doing the all fours exercise taught at last session because it made her abdominal muscles sore.   Treatment Interventions (PT)   Interventions Neuromuscular Re-education;Manual Therapy   Neuromuscular Re-education   Neuromuscular re-ed of mvmt, balance, coord, kinesthetic sense, posture, proprioception minutes (73981) 40   Skilled Intervention Re-taught All Four Modified Belly LIft with rationale - including discussion regarding inhibitino of low back muscles and facilitation of anterior and lateral abdominal muscles. Taught supine hooklying bar reach for anterior neck muscle inhibition and diaphragmatic breathing.   Plan   Homework HEP   Home program Supine 90-90 Hip Lift 2/day; All Four Modified Belly LIft; Supine Hooklyng Bar LIft   Medicare Claim Information   Medical Diagnosis s/p  Shunt; Neck Pain   PT Diagnosis neck pain, low back pain, bilateral hip pain, sternal pain, headache   Start of Care Date 02/16/23   Onset date of current episode/exacerbation 11/25/22   Certification date from 02/16/23   Ortho Goal 1   Goal Identifier Pain   Goal Description Patient will report worst pain of 3/10 or less  in neck, low back or hips.   Target Date 05/22/23   Ortho Goal 2   Goal Identifier  Personal Care   Goal Description Patient will report ability to provide personal care for herself without provoking neck pain.   Target Date 05/22/23   Ortho Goal 3   Goal Identifier Lifting   Goal Description Patient will demonstrate ability to lift 30 pounds from floor and carry 30'.   Target Date 05/22/23   Ortho Goal 4   Goal Identifier Headaches   Goal Description Patient will report no headaches for entire 1 week period.   Goal Progress GOAL MET   Target Date 05/22/23   Date Met 03/16/23   Ortho Goal 5   Goal Identifier Driving   Goal Description Patient will report ability to drive her car as much as she'd like without experieincing neck pain.   Target Date 05/22/23

## 2024-02-21 NOTE — ADDENDUM NOTE
Encounter addended by: Kameron Jackson, PT on: 2/21/2024 1:52 PM   Actions taken: Episode resolved, Clinical Note Signed, Flowsheet accepted

## 2024-02-23 ENCOUNTER — PATIENT OUTREACH (OUTPATIENT)
Dept: CARE COORDINATION | Facility: CLINIC | Age: 49
End: 2024-02-23
Payer: COMMERCIAL

## 2024-02-23 NOTE — PROGRESS NOTES
Clinic Care Coordination Contact  Community Health Worker Follow Up    Care Gaps:   Health Maintenance Due   Topic Date Due    YEARLY PREVENTIVE VISIT  Never done    ADVANCE CARE PLANNING  Never done    HEPATITIS B IMMUNIZATION (1 of 3 - 19+ 3-dose series) Never done    DTAP/TDAP/TD IMMUNIZATION (1 - Tdap) Never done    PHQ-2 (once per calendar year)  01/01/2024     Postponed to Next CHW Outreach     Care Plan:   Care Plan: Financial Wellbeing       Problem: Patient expresses financial resource strain       Goal: Apply for SSI       Start Date: 6/15/2023 Expected End Date: 6/15/2024    This Visit's Progress: 80% Recent Progress: 80%    Priority: High    Note:     Barriers: language  Strengths:motivated to seek support  Patient expressed understanding of goal: yes  Action steps to achieve this goal:  1. I will answer the phone when [employer] contacts me  2. I will provide all necessary information  3. I will follow up with CCC at next outreach     Note: Per pt, denied for S SI benefits on 10/30. Per Julissa,  Representative applying for RSDI next.     [employer] at [email or web address]  Ph. [phone] and Fax: [phone]                            Care Plan: Affordable transportation       Problem: HP GENERAL PROBLEM       Goal: I would like affordable transportation options       Start Date: 1/18/2024    This Visit's Progress: 20% Recent Progress: 10%    Note:     Barriers: language  Strengths: motivated to seek support  Patient expressed understanding of goal: yes  Action steps to achieve this goal:  1. I will see if I am eligible for transport through [facility]  2. I will complete [employer] mobility application  3. I will follow up with CCC for further support.                                 Intervention and Education during outreach:     Spoke to Patient (Darion)  CHW Introduced intent of call regarding monthly follow up.   Patients reports that she is  doing okay. Further expressing that she received information that her Supplemental Security Income Application was denied. Further expressing that she is planning to complete an the appeal application. Patient expressed that she has received the appeal packet, further expressing that she will follow up her  on Monday, February 26th. Patient expressed that if additional support is needed regarding appeal.   CHW inquires if Patient would like to schedule an in person visit with CCSW to fill out metro mobility application. Patient acknowledged and CHW scheduled In-person visit on 02/29/2024 @11AM ([facility])  Patient expressed that she is aware of her upcoming appointment.   CHW encourages Patient to contact CHW/ CCC Team if additional support is needed. CHW provides Patient with CHW's contact information. Patient acknowledged.     CHW Plan: Next CHW Outreach in One Month     ANGEL Smyth  Clinic Care Coordination   [facility]   Phone: [phone]  [email or web address]

## 2024-02-29 ENCOUNTER — ALLIED HEALTH/NURSE VISIT (OUTPATIENT)
Dept: NURSING | Facility: CLINIC | Age: 49
End: 2024-02-29
Payer: COMMERCIAL

## 2024-02-29 ENCOUNTER — PATIENT OUTREACH (OUTPATIENT)
Dept: CARE COORDINATION | Facility: CLINIC | Age: 49
End: 2024-02-29

## 2024-02-29 DIAGNOSIS — Z71.89 COMPLEX CARE COORDINATION: Primary | ICD-10-CM

## 2024-02-29 PROCEDURE — 99207 PR NO CHARGE LOS: CPT

## 2024-02-29 NOTE — PROGRESS NOTES
Clinic Care Coordination Contact    Care Team Conversations  Lead CC ([facility]) requested for CHW to support Patient with scheduling transportation through [employer] for upcoming appointment (2024 11:40AM M [facility] [address], [place] [address])    Spoke to Patient (Darion)  CHW Introduced intent of call regarding [facility] request in scheduling transportation for Patient' upcoming appointment (2024 11:40AM M [facility] [address], [place] [address]).   Patient agreed to CHW support in scheduling transportation with [employer].   [employer] Rep. Indicated that Patient coverage does not qualify her for a non-emergency ride. Further indicating that she will follow up with [employer] Team.   [employer] Rep. indicated that since Patient has [place] Care that she doesn't qualify. CHW requested if Patient can be given the contact infromation on the previous rides that were set up with [employer]. Due to CHW explaining that Patient has received transportation from [employer] in the past.  [employer] Ride Rep. Provided information for [place] support for [place] Patients ([place] [employer] - [facility] [phone])   [employer] Ride Rep. Transferred us ([place] [employer] - [facility] [phone]).   [place] [employer]gency [employer] Rep. Pamela Requested Patient's First and Last Name,  & Home Address. Rep. Indicated that Patient needs to be set up in the system before setting up ride.   [facility] Rep. (Pamela) indicated that Patient doesn't qualify for Transportation benefit, further expressing that Patient must follow up with [phone] in order to schedule transportation.   Patient expressed that she can longer wait. Further expressing that she will have one of her children take her to her upcoming appointment.   CHW acknowledged and encourages Patient to  contact CHW/ CCC Team if additional support is needed. CHW provides Patient with CHW's contact information. Patient acknowledged.     Becca Ravi, CHW  Clinic Care Coordination   [facility]   Phone: [phone]  [email or web address]

## 2024-03-01 NOTE — PROGRESS NOTES
Clinic Care Coordination Contact  Follow Up Progress Note      Assessment: pt in asking to apply for metro mobility    Care Gaps:    Health Maintenance Due   Topic Date Due    YEARLY PREVENTIVE VISIT  Never done    ADVANCE CARE PLANNING  Never done    HEPATITIS B IMMUNIZATION (1 of 3 - 19+ 3-dose series) Never done    DTAP/TDAP/TD IMMUNIZATION (1 - Tdap) Never done    PHQ-2 (once per calendar year)  01/01/2024       Scheduled 3/11      Care Plans  Care Plan: Financial Wellbeing       Problem: Patient expresses financial resource strain       Goal: Apply for SSI       Start Date: 6/15/2023 Expected End Date: 6/15/2024    This Visit's Progress: 80% Recent Progress: 80%    Priority: High    Note:     Barriers: language  Strengths:motivated to seek support  Patient expressed understanding of goal: yes  Action steps to achieve this goal:  1. I will answer the phone when [employer] contacts me  2. I will provide all necessary information  3. I will follow up with CCC at next outreach     Note: Per pt, denied for S SI benefits on 10/30. Per Julissa,  Representative applying for RSDI next.     [employer] at [email or web address]  Ph. [phone] and Fax: [phone]                            Care Plan: Affordable transportation       Problem: HP GENERAL PROBLEM       Goal: I would like affordable transportation options       Start Date: 1/18/2024    This Visit's Progress: 20% Recent Progress: 10%    Note:     Barriers: language  Strengths: motivated to seek support  Patient expressed understanding of goal: yes  Action steps to achieve this goal:  1. I will see if I am eligible for transport through [facility]  2. I will complete [employer] mobility application  3. I will follow up with CCC for further support.                                 Intervention/Education provided during outreach: metro mobility application         Plan:   CCSW,  IS #[id number], pt and  pt's daughter started to complete metro mobility application. Pt asked what it was for, CCSW explained. Pt stated she only needs medical transportation. CCSW, pt and  called [facility] and waited on hold to see if pt is eligible for transportation.  Care Coordinator will follow up in 30 days        ** see note from CHW regarding transport    ROSALINO Griffin, SW  Social Work Care Coordinator

## 2024-03-11 ENCOUNTER — ANCILLARY PROCEDURE (OUTPATIENT)
Dept: GENERAL RADIOLOGY | Facility: CLINIC | Age: 49
End: 2024-03-11
Attending: FAMILY MEDICINE
Payer: COMMERCIAL

## 2024-03-11 ENCOUNTER — OFFICE VISIT (OUTPATIENT)
Dept: FAMILY MEDICINE | Facility: CLINIC | Age: 49
End: 2024-03-11
Payer: COMMERCIAL

## 2024-03-11 VITALS
BODY MASS INDEX: 36.33 KG/M2 | HEIGHT: 58 IN | TEMPERATURE: 98 F | DIASTOLIC BLOOD PRESSURE: 79 MMHG | WEIGHT: 173.08 LBS | SYSTOLIC BLOOD PRESSURE: 123 MMHG | OXYGEN SATURATION: 98 % | RESPIRATION RATE: 16 BRPM | HEART RATE: 72 BPM

## 2024-03-11 DIAGNOSIS — E78.2 MIXED HYPERLIPIDEMIA: ICD-10-CM

## 2024-03-11 DIAGNOSIS — Z01.84 IMMUNITY STATUS TESTING: ICD-10-CM

## 2024-03-11 DIAGNOSIS — I60.9 SAH (SUBARACHNOID HEMORRHAGE) (H): ICD-10-CM

## 2024-03-11 DIAGNOSIS — E66.812 CLASS 2 SEVERE OBESITY DUE TO EXCESS CALORIES WITH SERIOUS COMORBIDITY AND BODY MASS INDEX (BMI) OF 35.0 TO 35.9 IN ADULT (H): ICD-10-CM

## 2024-03-11 DIAGNOSIS — M25.511 RIGHT SHOULDER PAIN, UNSPECIFIED CHRONICITY: ICD-10-CM

## 2024-03-11 DIAGNOSIS — M25.552 HIP PAIN, LEFT: ICD-10-CM

## 2024-03-11 DIAGNOSIS — I63.9 CEREBROVASCULAR ACCIDENT (CVA), UNSPECIFIED MECHANISM (H): ICD-10-CM

## 2024-03-11 DIAGNOSIS — Z98.2 S/P VP SHUNT: ICD-10-CM

## 2024-03-11 DIAGNOSIS — Z12.31 ENCOUNTER FOR SCREENING MAMMOGRAM FOR BREAST CANCER: ICD-10-CM

## 2024-03-11 DIAGNOSIS — H04.123 DRY EYES: ICD-10-CM

## 2024-03-11 DIAGNOSIS — E66.01 CLASS 2 SEVERE OBESITY DUE TO EXCESS CALORIES WITH SERIOUS COMORBIDITY AND BODY MASS INDEX (BMI) OF 35.0 TO 35.9 IN ADULT (H): ICD-10-CM

## 2024-03-11 DIAGNOSIS — F43.23 ADJUSTMENT DISORDER WITH MIXED ANXIETY AND DEPRESSED MOOD: ICD-10-CM

## 2024-03-11 DIAGNOSIS — G47.9 SLEEP DIFFICULTIES: ICD-10-CM

## 2024-03-11 DIAGNOSIS — F39 MOOD DISORDER (H): ICD-10-CM

## 2024-03-11 DIAGNOSIS — Z00.00 ENCOUNTER FOR ANNUAL PHYSICAL EXAM: Primary | ICD-10-CM

## 2024-03-11 LAB
BASOPHILS # BLD AUTO: 0 10E3/UL (ref 0–0.2)
BASOPHILS NFR BLD AUTO: 0 %
EOSINOPHIL # BLD AUTO: 0.1 10E3/UL (ref 0–0.7)
EOSINOPHIL NFR BLD AUTO: 2 %
ERYTHROCYTE [DISTWIDTH] IN BLOOD BY AUTOMATED COUNT: 12.2 % (ref 10–15)
HBA1C MFR BLD: 5.9 % (ref 0–5.6)
HCT VFR BLD AUTO: 42.7 % (ref 35–47)
HGB BLD-MCNC: 14.5 G/DL (ref 11.7–15.7)
IMM GRANULOCYTES # BLD: 0 10E3/UL
IMM GRANULOCYTES NFR BLD: 0 %
LYMPHOCYTES # BLD AUTO: 2.2 10E3/UL (ref 0.8–5.3)
LYMPHOCYTES NFR BLD AUTO: 32 %
MCH RBC QN AUTO: 29.4 PG (ref 26.5–33)
MCHC RBC AUTO-ENTMCNC: 34 G/DL (ref 31.5–36.5)
MCV RBC AUTO: 86 FL (ref 78–100)
MONOCYTES # BLD AUTO: 0.5 10E3/UL (ref 0–1.3)
MONOCYTES NFR BLD AUTO: 8 %
NEUTROPHILS # BLD AUTO: 3.9 10E3/UL (ref 1.6–8.3)
NEUTROPHILS NFR BLD AUTO: 59 %
PLATELET # BLD AUTO: 277 10E3/UL (ref 150–450)
RBC # BLD AUTO: 4.94 10E6/UL (ref 3.8–5.2)
WBC # BLD AUTO: 6.7 10E3/UL (ref 4–11)

## 2024-03-11 PROCEDURE — 36415 COLL VENOUS BLD VENIPUNCTURE: CPT | Performed by: FAMILY MEDICINE

## 2024-03-11 PROCEDURE — 86706 HEP B SURFACE ANTIBODY: CPT | Performed by: FAMILY MEDICINE

## 2024-03-11 PROCEDURE — 80053 COMPREHEN METABOLIC PANEL: CPT | Performed by: FAMILY MEDICINE

## 2024-03-11 PROCEDURE — 80061 LIPID PANEL: CPT | Performed by: FAMILY MEDICINE

## 2024-03-11 PROCEDURE — 99214 OFFICE O/P EST MOD 30 MIN: CPT | Mod: 25 | Performed by: FAMILY MEDICINE

## 2024-03-11 PROCEDURE — 73502 X-RAY EXAM HIP UNI 2-3 VIEWS: CPT | Mod: TC | Performed by: RADIOLOGY

## 2024-03-11 PROCEDURE — 83036 HEMOGLOBIN GLYCOSYLATED A1C: CPT | Performed by: FAMILY MEDICINE

## 2024-03-11 PROCEDURE — 99396 PREV VISIT EST AGE 40-64: CPT | Performed by: FAMILY MEDICINE

## 2024-03-11 PROCEDURE — 82248 BILIRUBIN DIRECT: CPT | Performed by: FAMILY MEDICINE

## 2024-03-11 PROCEDURE — 85025 COMPLETE CBC W/AUTO DIFF WBC: CPT | Performed by: FAMILY MEDICINE

## 2024-03-11 RX ORDER — CLOPIDOGREL BISULFATE 75 MG/1
75 TABLET ORAL DAILY
Qty: 90 TABLET | Refills: 3 | Status: SHIPPED | OUTPATIENT
Start: 2024-03-11

## 2024-03-11 RX ORDER — ATORVASTATIN CALCIUM 20 MG/1
20 TABLET, FILM COATED ORAL DAILY
Qty: 90 TABLET | Refills: 3 | Status: SHIPPED | OUTPATIENT
Start: 2024-03-11

## 2024-03-11 RX ORDER — ASPIRIN 81 MG/1
81 TABLET, CHEWABLE ORAL DAILY
Qty: 90 TABLET | Refills: 3 | Status: SHIPPED | OUTPATIENT
Start: 2024-03-11

## 2024-03-11 ASSESSMENT — PATIENT HEALTH QUESTIONNAIRE - PHQ9
SUM OF ALL RESPONSES TO PHQ QUESTIONS 1-9: 21
10. IF YOU CHECKED OFF ANY PROBLEMS, HOW DIFFICULT HAVE THESE PROBLEMS MADE IT FOR YOU TO DO YOUR WORK, TAKE CARE OF THINGS AT HOME, OR GET ALONG WITH OTHER PEOPLE: EXTREMELY DIFFICULT
SUM OF ALL RESPONSES TO PHQ QUESTIONS 1-9: 21

## 2024-03-11 NOTE — Clinical Note
Jose E Pérez. I saw a patient on 3/12/24 and she was having some troubles with insurance ([facility]) and approval for the insurance. She wanted to talk with you further to see if you would be able to help her with this? Thank you in advance! Rahul Bautista MD

## 2024-03-11 NOTE — COMMUNITY RESOURCES LIST (ENGLISH)
03/11/2024   [facility] - Outpatient Clinics  N/A  For additional resource needs, please contact your health insurance member services or your primary care team.  Phone: [phone]   Email: N/A   Address: [facility] [place] [address]   Hours: N/A        Financial Stability       Rent and mortgage payment assistance  1  [employer] - Emergency Assistance [place] - Rent and mortgage payment assistance Distance: 4.12 miles      In-Person, Phone/Virtual   [address] [place] [address]  [employer]: English, Singaporean  Hours: Mon - Fri 9:00 AM - 4:00 PM  Fees: Free   Phone: (128) 276-1453 Email: info@[facility].org Website: http://www.[facility].org     2  [facility]  Office - [facility] - Rent payment assistance Distance: 5.75 miles      In-Person, Phone/Virtual   [address] [place] [address]  [employer]: English  Hours: Mon - Fri 8:00 AM - 12:00 PM , Mon - Fri 1:00 PM - 4:00 PM  Fees: Free   Phone: (469) 525-1653 Email: tres@[facility] Website: https://[facility].org/[facility]/social-services-office-[place]/     Utility payment assistance  3  [place] [employer] - Energy and Utilities Distance: 8.87 miles      In-Person, Phone/Virtual   [address] [place] [address]  [employer]: English  Hours: Mon - Fri 8:30 AM - 4:30 PM  Fees: Free   Phone: (299) 922-4689 Website: [place]://[place]/commerce/energy/consumer-assistance/energy-assistance-program/     4  [place] Public [employer] Commission - [place]'s Telephone Assistance Plan (TAP) and Federal Lifeline and Affordable Connectivity Program (ACP) Distance: 9.04 miles      Phone/Virtual   [address] [place] [address]  [employer]: English  Fees: Free   Phone: (871) 635-6121 Email: consumer.angela@[facility].mn. Website: https://[place].gov/puc/consumers/telephone/          Food and Nutrition       Food pantry  5  [employer] Distance: 4.12 miles      In-Person, Delivery, Pickup   [address] [place] [address]  [employer]: English, Samoan  Hours: Mon - Fri 8:45 AM - 11:30 AM , Mon - Fri 1:00 PM - 3:30 PM  Fees: Free   Phone: (776) 463-8455 Email: info@[facility].org Website: http://www.[facility].org     6  [facility] Food [facility] Distance: 7.37 miles      Pickup   [address] [place] [address]  [employer]: Irish, English, Hmong, Liz, Samoan  Hours: Mon 1:00 PM - 4:00 PM , Mon 5:00 PM - 6:30 PM , Tue - Fri 9:00 AM - 11:30 AM , Tue - Fri 1:00 PM - 3:30 PM  Fees: Free   Phone: (210) 235-8704 Website: https://[facility].org/     SNAP application assistance  7  [employer] [place] Distance: 9.69 miles      Phone/Virtual   [address] [facility] [address] [place] [address]  [employer]: English, Hmong, Zambian, Cape Verdean, Samoan  Hours: Mon - Fri 8:30 AM - 4:30 PM  Fees: Free   Phone: (152) 209-8018 Email: helpline@[employer].org Website: https://www.[employer]ers[employer].org/programs/[place]-food-helpline/     8  [facility] Distance: 4.61 miles      In-Person, Phone/Virtual   [address] [place] [address]  [employer]: English  Hours: Mon - Fri 8:00 AM - 4:30 PM  Fees: Free   Phone: (617) 175-8616 Email: bart@[facility].mn. Website: https://www.[facility].us/Facilities/Facility/Details/[place]-22     Soup kitchen or free meals  9  [facility] Distance: 2.9 miles      In-Person   [address] [place] [address]  [employer]: English  Hours: Fri 6:00 PM - 8:00 PM  Fees: Free   Phone: (875) 602-3525 Email: maude@[employer].[facility] Website: http://www.[facility].org/     10  [facility] Distance: 2.91 miles      In-Person, Pickup   [address] [place] [address]  [employer]: English  Hours: Mon - Thu 5:00 PM - 6:30  PM  Fees: Free   Phone: (335) 742-6874 Email: office@[employer] Website: http://[employer]/          Transportation       Free or low-cost transportation  11  [employer] Distance: 1.95 miles      [address] [place] [address]  [employer]: English, Canadian, Hmong, Guatemalan, Sudanese  Hours: Mon - Sun 5:00 AM - 10:00 PM  Fees: Self Pay   Phone: (971) 801-3312 Email: maximalcare_mobility@[employer] Website: https://www.[facility]/Providers/[employer]_Mob[employer]_LLC/Transportation/1?pos=9     12  [employer] [facility] Circulator Bus Distance: 5.58 miles      In-Person   [address] [place] [address]  [employer]: English  Hours: Tue 9:00 AM - 2:00 PM  Fees: Self Pay   Phone: (908) 769-2175 Email: info@[employer] Website: http://www.[employer].emotion.me/     Transportation to medical appointments  13  [employer] Distance: 1.95 miles      [address] [place] [address]  [employer]: English, Canadian, Hmong, Guatemalan, Sudanese  Hours: Mon - Sun 5:00 AM - 10:00 PM  Fees: Insurance, Self Pay   Phone: (874) 375-8522 Email: maximalcare_mobility@[employer] Website: https://www.[facility].info/Providers/Maximal_Care_Mobility_LLC/Transportation/1?pos=9     14  [facility] Transportation Programs - Non-Emergency Medical Transportation Distance: 8.19 miles      In-Person   [address] [address]  [employer]: English, Guatemalan, Sudanese  Hours: Mon - Fri 7:00 AM - 6:00 PM  Fees: Insurance   Phone: (693) 833-5771 Ext.5540 Email: jeremy@[employer].net Website: http://www.[facility] Hospital[facility].net/[place]/          Important Numbers & Websites       [address].org  Poison Control   (442) 290-1790 [place]  Suicide and Crisis Lifeline   [address] [place], [place] [address]line.org  Childhelp Cut Off Child Abuse Hotline   695.501.5790 Childhelphotline.org  Cut Off Sexual Assault Hotline   (248) 917-5682 ([place])  Rainn.org  National Runaway Safeline   (313) 112-8913 (RUNAWAY) 1800runaway.org  Pregnancy & Postpartum Support [place]   Call/text 637-210-8401 Ppsupportmn.org  Substance Abuse National Helpline ([facility]   941-931-HELP (5995) Findtreatment.gov  Emergency [facility]   914

## 2024-03-11 NOTE — PROGRESS NOTES
Preventive Care Visit  [facility] BRANDO Bautista MD, Family Medicine  Mar 11, 2024    Assessment & Plan       Patient Instructions:    -Do the exercises given to you today.   -Use the voltaren gel as prescribed.    -Check to see which medication you are taking for sleep (amitriptyline or mirtazapine). Please call and inform Dr. Bautista of the medication name and dose.     -You are doing great.  -Continue to eat well.  Try to increase your servings of calcium as this can help your bones stay strong and healthy.  Follow a nutrition plan patient fruits and vegetables and low in fats and cholesterol.    -Be sure to eat 5-7 servings of fruits and vegetables each day.  -Find ways to stay active.  Try to get 150 minutes of moderate activity (where you are breathing faster and slightly sweating) each week.  -Try to maintain a body mass index (BMI) of 18.5-25 as this is considered a healthier weight range.  -Brush your teeth twice daily.  See a dentist every 6-12 months.  -Be sure to use sunblock with SPF 15 or greater when going outside for extended periods of time.  Sunblock should be used even when it is a cloudy day.  Do intermittent skin checks for any concerning skin changes.  Wearing a wide brimmed hat and sunglasses can also be helpful to protect your skin from the sun.  -Monitor for any abnormal skin changes (such as new moles/spots, painful moles, changes in your old moles, wounds that will not heal, multiple colors noted in one lesion, lesions that are asymmetric or not circular, or anything that is concerning for you). If any of these are noted, please schedule an appointment to be seen.     -It is generally recommended for you to complete a health care directive or living will. These documents will be able to reflect your wishes and desire in the case that you are unable to express them yourself. Please let Dr. Bautista know if you would like some assistance with this process.    -For the  preventive health procedure (such as colonoscopy, mammogram, etc) order from today, if you do not hear within a week's time from the specialist to schedule an appointment, please call the [facility] and speak with the specialty  to help you schedule the appointment. Depending on the specialist availability, it may be a number of weeks prior to your scheduled appointment.    Please seek immediate medical attention (go to the emergency room or urgent care) for the following reasons: worsening symptoms, or any concerning changes.    Darion was seen today for annual visit and physical.  Diagnoses and all orders for this visit:    Encounter for annual physical exam  Dry eyes  Class 2 severe obesity due to excess calories with serious comorbidity and body mass index (BMI) of 35.0 to 35.9 in adult (H):  Check labs as below.  -     Hemoglobin A1c; Future  -     Basic metabolic panel; Future  -     CBC with Platelets & Differential; Future  -     Hepatic function panel; Future  -     Lipid panel reflex to direct LDL Non-fasting; Future    Cerebrovascular accident (CVA), unspecified mechanism (H)  SAH (subarachnoid hemorrhage) (H)  S/P  shunt  Mixed hyperlipidemia: Stable.  Refills given as below.  Check labs.  -     clopidogrel (PLAVIX) 75 MG tablet; Take 1 tablet (75 mg) by mouth daily  -     atorvastatin (LIPITOR) 20 MG tablet; Take 1 tablet (20 mg) by mouth daily  -     aspirin (ASA) 81 MG chewable tablet; Take 1 tablet (81 mg) by mouth daily  -     Basic metabolic panel; Future  -     CBC with Platelets & Differential; Future  -     Hepatic function panel; Future  -     Lipid panel reflex to direct LDL Non-fasting; Future    Adjustment disorder with mixed anxiety and depressed mood  Mood disorder (H24)  Sleep difficulties: Depression symptoms are persistent, though stable.  Continue conservative management and monitoring.  Patient is not certain if she had stopped the mirtazapine or the amitriptyline as she  "was overly drowsy when taking both medications.  She will check in with which medication she has at home and inform provider.    Right shoulder pain, unspecified chronicity  Hip pain, left: X-ray does not demonstrate any hip joint abnormalities.  Likely musculoskeletal in nature.  Conservative management was recommended.  Plan for topical medication as below.  Patient does have current usage of clopidogrel.  -     diclofenac (VOLTAREN) 1 % topical gel; Apply 2 g topically 3 times daily as needed (right shoulder pain)  -     XR Hip Left 2-3 Views; Future    Immunity status testing  -     Hepatitis B Surface Antibody; Future    Encounter for screening mammogram for breast cancer  -     *MA Screening Digital Bilateral; Future            Patient has been advised of split billing requirements and indicates understanding: Yes        BMI  Estimated body mass index is 35.86 kg/m  as calculated from the following:    Height as of this encounter: 1.48 m (4' 10.25\").    Weight as of this encounter: 78.5 kg (173 lb 1.3 oz).   See AVS.    Depression Screening Follow Up        3/11/2024    11:52 AM   PHQ   PHQ-9 Total Score 21   Q9: Thoughts of better off dead/self-harm past 2 weeks Not at all     The mirtazapine caused her to be too sleepy, so she didn't even want to get up.  Patient also indicates that she was taking a medication that was helping with sleep, too.  She ended up stopping one of them, though is not sure which one it is that she has stopped.  Discussed indications for mirtazapine as well as the previous prescriptions for amitriptyline.  Patient will check in with the medications she has at home and reach out to provider about which medication she is taking.    Follow Up Actions Taken  Crisis resource information provided in After Visit Summary  See above.    Denies SI/HI.  Contracts for safety.    Counseling  Appropriate preventive services were discussed with this patient, including applicable screening as " appropriate for fall prevention, nutrition, physical activity, Tobacco-use cessation, weight loss and cognition.  Checklist reviewing preventive services available has been given to the patient.  Reviewed patient's diet, addressing concerns and/or questions.   The patient's PHQ-9 score is consistent with severe depression. She was provided with information regarding depression.           Johnathan Orlando is a 48 year old, presenting for the following:  Annual Visit and Physical        3/11/2024    11:52 AM   Additional Questions   Roomed by HUGO HERNANDEZ CMA   Accompanied by NONE        Health Care Directive  Patient does not have a Health Care Directive or Living Will: See AVS.    HPI    Referral request: right shoulder pain after she fell and hit her arm. She took medications and the pain is tolerable. One time, she took a shoulder and raised her right shoulder to shampoo and it hurt. This happened again while she was taking a shower. The right shoulder is painful and difficult to sleep on that side. She had a grandson jump and hit the right temple of the head.  She continues to have some pain on this side.    Left hip: pain at the lateral hip and sometimes at the joint itself. This has been present for 2-3 months and has been persistent. She had insurance problems and now has [place] [employer] now only. They were told that the insurance didn't get enough information and so they are not approved yet. Patient has been connected with the Care Coordination team. The  has also helped them with the application.     Care coordination: She spoke with Elisa SIDHU) already. Patient is stuck with the health insurance application and not getting approved and so she would like to discuss this with the care coordination team and for them to help reach out to [facility] to discuss this further.     Insomnia, depression/mood disorder: had been given a medication that helped with sleeping and was also taking a medication that was helpful  for her mood.  She became very drowsy and was drowsy for a couple of days, so she stopped one of the medications.  She is not sure which when she stopped.    Dry eyes:   She is farsighted. Can't really read closely. There is a material that is extending outwards from the inside of the. She was seen by the eye doctor and told that she doesn't need glasses, though can use OTC glasses, which she has been using.  Over-the-counter reading glasses seem to be helpful, so she was instructed to continue taking this.    -Reviewed healthy eating and exercise.  -Skin cancer screening: No concerning skin changes expressed by patient.  -Smoking status:   Tobacco Use      Smoking status: Never        Passive exposure: Never      Smokeless tobacco: Never      -Family history:  Family History   Problem Relation Age of Onset    No Known Problems Mother     No Known Problems Father     No Known Problems Sister     No Known Problems Brother     No Known Problems Maternal Aunt     No Known Problems Maternal Uncle     No Known Problems Paternal Aunt     No Known Problems Paternal Uncle     No Known Problems Maternal Grandmother     No Known Problems Maternal Grandfather     No Known Problems Paternal Grandmother     No Known Problems Paternal Grandfather     No Known Problems Other        Preventative health recommendations, evaluation options, and risk/benefits of each were discussed with patient. Accepted recommendations were ordered. Otherwise, patient declined.  Health Maintenance Due   Topic Date Due    YEARLY PREVENTIVE VISIT  Never done    ADVANCE CARE PLANNING  Never done    HEPATITIS B IMMUNIZATION (1 of 3 - 19+ 3-dose series) Never done    DTAP/TDAP/TD IMMUNIZATION (1 - Tdap) Never done     Reviewed records from [facility] and no mention of Td shot. Chart forwarded to nursing staff to call patient and schedule a nurse only appointment for shots.       -Immunizations due were reviewed.    Immunization History   Administered  Date(s) Administered    COVID-19 12+ (2023-24) (MODERNA) 10/15/2023    COVID-19 Monovalent 18+ (Moderna) 03/15/2021, 04/14/2021, 11/30/2021    Influenza Vaccine >6 months,quad, PF 10/29/2019       -Labs: Laboratory recommendations reviewed with patient.    -Breast cancer screening: biennial screening mammography for women aged 50 to 74 years. Normal mammogram from 06/30/2022. Due at this time.       -Cervical cancer screening: Last Pap smear: Had a hysterectomy in 2017. Had a pap smear in 2022. Testing was not concerning.        Colonoscopy: completed in in 01/2023 and noted to have no problems. Follow up in 10 years. Has a history of hemorrhoids, so she has some blood that comes out with BMs at times.        The 10 point review of system is negative except as stated in the HPI.               3/11/2024   General Health   How would you rate your overall physical health? (!) DECLINE         3/11/2024   Nutrition   Three or more servings of calcium each day? (!) NO   Diet: Regular (no restrictions)   How many servings of fruit and vegetables per day? (!) 0-1   How many sweetened beverages each day? (!) I DON'T KNOW          No data to display                  3/11/2024   Social Factors   Worry food won't last until get money to buy more Yes   Food not last or not have enough money for food? Yes   Do you have housing?  Yes   Are you worried about losing your housing? Yes   Lack of transportation? Yes   Unable to get utilities (heat,electricity)? Yes   Want help with housing or utility concern? (!) YES   (!) FOOD SECURITY CONCERN PRESENT (!) TRANSPORTATION CONCERN PRESENT(!) HOUSING CONCERN PRESENT(!) FINANCIAL RESOURCE STRAIN CONCERN      3/11/2024   Dental   Dentist two times every year? Yes         3/11/2024   TB Screening   Were you born outside of [place]?  (!) YES         Today's PHQ-9 Score:       3/11/2024    11:52 AM   PHQ-9 SCORE   PHQ-9 Total Score 21         3/11/2024   Substance Use   Alcohol more than 3/day or  more than 7/wk Not Applicable   Do you use any other substances recreationally? No     Social History     Tobacco Use    Smoking status: Never     Passive exposure: Never    Smokeless tobacco: Never   Vaping Use    Vaping Use: Never used   Substance Use Topics    Alcohol use: Never    Drug use: Never           3/11/2024   Breast Cancer Screening   Family history of breast, colon, or ovarian cancer? No / Unknown         8/3/2023   LAST FHS-7 RESULTS   1st degree relative breast or ovarian cancer No   Any relative bilateral breast cancer No   Any male have breast cancer No   Any ONE woman have BOTH breast AND ovarian cancer No   Any woman with breast cancer before 50yrs No   2 or more relatives with breast AND/OR ovarian cancer No   2 or more relatives with breast AND/OR bowel cancer No             3/11/2024   STI Screening   New sexual partner(s) since last STI/HIV test? (!) DECLINE          ASCVD Risk   The ASCVD Risk score (Dionte MICHAEL, et al., 2019) failed to calculate for the following reasons:    The patient has a prior MI or stroke diagnosis       Reviewed and updated as needed this visit by Provider                    Past Medical History:   Diagnosis Date    Acute respiratory failure with hypoxia (H)     Adjustment disorder with mixed anxiety and depressed mood     Anemia     Aneurysm (H24)     Aneurysmal subarachnoid hemorrhage (H)     Brain mass     Carotid artery aneurysm (H24)     Cerebrovascular accident (CVA) due to occlusion of right middle cerebral artery (H)     Compression of brain (H)     Constipation     Depressive disorder     E. 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    Intraventricular hemorrhage, nontraumatic (H)     Leiomyoma     Migraines     Mild malnutrition (H24)     Nausea vomiting and diarrhea     Neck pain     Oropharyngeal dysphagia     Pneumonia due to group B Streptococcus (H24)     Seizures (H)     Sleep difficulties     Thrush     Tracheostomy care (H)     Transaminitis     Vaginal itching     & discharge    Vasospasm of cerebral artery      Past Surgical History:   Procedure Laterality Date    GYN SURGERY      HC UGI ENDOSCOPY W PLACEMENT GASTROSTOMY TUBE PERCUT N/A 08/07/2019    Procedure: CREATION, GASTROSTOMY, PERCUTANEOUS, ENDOSCOPIC;  Surgeon: Fer Ledezma MD;  Location: [facility];  Service: General    HEAD & NECK SURGERY      HYSTERECTOMY  2017    IR CEREBRAL ANGIOGRAM  07/22/2019    IR CEREBRAL ANGIOGRAM  07/24/2019    IR CEREBRAL ANGIOGRAM  07/27/2019    IR CEREBRAL ANGIOGRAM  07/28/2019    IR CEREBRAL ANGIOGRAM  07/29/2019    IR CEREBRAL ANGIOGRAM  08/02/2019    IR CEREBRAL ANGIOGRAM  07/30/2019    IR CEREBRAL ANGIOGRAM  07/31/2019    IR CEREBRAL ANGIOGRAM  08/01/2019    IR CEREBRAL ANGIOGRAM  08/08/2019    IR CEREBRAL ANGIOGRAM  08/13/2019    IR CEREBRAL ANGIOGRAM  07/22/2019    IR CEREBRAL ANGIOGRAM  07/24/2019    IR CEREBRAL ANGIOGRAM  07/27/2019    IR CEREBRAL ANGIOGRAM  07/29/2019    IR CEREBRAL ANGIOGRAM  07/30/2019    IR CEREBRAL ANGIOGRAM  07/31/2019    IR CEREBRAL ANGIOGRAM  08/01/2019    IR CEREBRAL ANGIOGRAM  08/02/2019    IR CEREBRAL ANGIOGRAM  07/28/2019    IR CEREBRAL ANGIOGRAM  08/13/2019    IR CEREBRAL ANGIOGRAM  08/08/2019    IR EMBOLIZATION  09/16/2019    IR EMBOLIZATION  09/16/2019    IR LUMBAR DRAIN INSERTION  07/27/2019    IR LUMBAR DRAIN INSERTION  08/02/2019    IR LUMBAR DRAIN INSERTION  08/08/2019    IR LUMBAR DRAIN INSERTION  08/16/2019    IR LUMBAR DRAIN PLACEMENT W FLUORO  07/27/2019    IR LUMBAR DRAIN PLACEMENT W FLUORO  08/02/2019    IR LUMBAR DRAIN PLACEMENT W FLUORO  08/08/2019    IR LUMBAR DRAIN  "PLACEMENT W FLUORO  08/16/2019    OTHER SURGICAL HISTORY      COIL EMBOLIZATION    PICC AND MIDLINE TEAM LINE INSERTION  07/26/2019         SC CREATE SHUNT:VENTRIC-PERITONEAL Right 08/19/2019    Procedure: INSERTION, SHUNT, VENTRICULOPERITONEAL, USING FRAMELESS STEREOTAXY ;  Surgeon: Gale Hoffmann MD;  Location: [facility] OR;  Service: Neurosurgery    SC CREATE SHUNT:VENTRIC-PERITONEAL Right 10/30/2019    Procedure: Replacement of distal catheter for ventriculoperitoneal shunt with general surgery assistance for laparoscopic approach;  Surgeon: Malou Sal MD;  Location: [facility] OR;  Service: Neurosurgery    SC LAP INSERTION TUNNELED INTRAPERITONEAL CATHETER Right 08/19/2019    Procedure: INSERTION, CATHETER, PERITONEAL, LAPAROSCOPIC;  Surgeon: Fer Ledezma MD;  Location: [facility] OR;  Service: General    TRACHEOSTOMY N/A 08/07/2019    Procedure: CREATION, TRACHEOSTOMY;  Surgeon: Fer Ledezma MD;  Location: [facility] OR;  Service: General       The 10 point review of system was negative unless otherwise stated in the HPI.         Objective    Exam  /79 (BP Location: Left arm, Patient Position: Sitting, Cuff Size: Adult Large)   Pulse 72   Temp 98  F (36.7  C) (Oral)   Resp 16   Ht 1.48 m (4' 10.25\")   Wt 78.5 kg (173 lb 1.3 oz)   SpO2 98%   BMI 35.86 kg/m     Estimated body mass index is 35.86 kg/m  as calculated from the following:    Height as of this encounter: 1.48 m (4' 10.25\").    Weight as of this encounter: 78.5 kg (173 lb 1.3 oz).    Physical Exam  GENERAL: alert and no distress  EYES: Eyes grossly normal to inspection, PERRL and conjunctivae and sclerae normal  HENT: ear canals and TM's normal, nose and mouth without ulcers or lesions  NECK: no adenopathy, no asymmetry, masses, or scars  RESP: lungs clear to auscultation - no rales, rhonchi or wheezes  CV: regular rate and rhythm, normal S1 S2, no S3 or S4, no murmur, click or rub, no " peripheral edema  ABDOMEN: soft, nontender, no hepatosplenomegaly, no masses and bowel sounds normal  MS: Right upper extremity: Discomfort noted palpation along the short head of the origin of the biceps tendon.  Discomfort localizes to this region on special testing and range of motion test.  Positive Neer's sign, negative Neer sign.  Negative empty can test.  Rotator cuff is nontender on examination.  Remainder of the right upper extremity is within normal limits.  Left upper extremity: Within normal limits.  Left lower extremity: Patient endorsing discomforts to palpation along the IT band and also at the ASIS.  No masses or obvious abnormalities noted along this region.  No groin discomfort noted on hip range of motion testing.  Otherwise, no gross musculoskeletal defects noted, no edema  SKIN: no suspicious lesions or rashes  NEURO: Normal strength and tone, mentation intact and speech normal  PSYCH: mentation appears normal, affect normal/bright      Signed Electronically by:    Rahul Bautista MD  [facility] [place] [address]  Phone: [phone]  Fax: [phone]    3/12/2024  1:33 PM

## 2024-03-11 NOTE — PATIENT INSTRUCTIONS
-Thank you for choosing the [facility].  -It was a pleasure to see you today.  -Please take a look at the information below for more specific details regarding the treatment plan and recommendations.  -In this after visit summary is a list of your medications and specific instructions.  Please review this carefully as there may be changes made to your medication list.  -If there are any particular questions or concerns, please feel free to reach out to Dr. Bautista.  -If any labs have been completed, we will reach out to you about results.  If the results are normal or not concerning, a letter or MyChart message will be sent to you.  If any follow-up is needed, either Dr. Bautista or the nurse will give you a call.  If you have not heard regarding results after 2 weeks, please reach out to the clinic.    Patient Instructions:    -Do the exercises given to you today.   -Use the voltaren gel as prescribed.    -Check to see which medication you are taking for sleep (amitriptyline or mirtazapine). Please call and inform Dr. Bautista of the medication name and dose.     -You are doing great.  -Continue to eat well.  Try to increase your servings of calcium as this can help your bones stay strong and healthy.  Follow a nutrition plan patient fruits and vegetables and low in fats and cholesterol.    -Be sure to eat 5-7 servings of fruits and vegetables each day.  -Find ways to stay active.  Try to get 150 minutes of moderate activity (where you are breathing faster and slightly sweating) each week.  -Try to maintain a body mass index (BMI) of 18.5-25 as this is considered a healthier weight range.  -Brush your teeth twice daily.  See a dentist every 6-12 months.  -Be sure to use sunblock with SPF 15 or greater when going outside for extended periods of time.  Sunblock should be used even when it is a cloudy day.  Do intermittent skin checks for any concerning skin changes.  Wearing a wide brimmed hat and  sunglasses can also be helpful to protect your skin from the sun.  -Monitor for any abnormal skin changes (such as new moles/spots, painful moles, changes in your old moles, wounds that will not heal, multiple colors noted in one lesion, lesions that are asymmetric or not circular, or anything that is concerning for you). If any of these are noted, please schedule an appointment to be seen.     -It is generally recommended for you to complete a health care directive or living will. These documents will be able to reflect your wishes and desire in the case that you are unable to express them yourself. Please let Dr. Bautista know if you would like some assistance with this process.    -For the preventive health procedure (such as colonoscopy, mammogram, etc) order from today, if you do not hear within a week's time from the specialist to schedule an appointment, please call the [facility] and speak with the specialty  to help you schedule the appointment. Depending on the specialist availability, it may be a number of weeks prior to your scheduled appointment.    Please seek immediate medical attention (go to the emergency room or urgent care) for the following reasons: worsening symptoms, or any concerning changes.      --------------------------------------------------------------------------------------------------------------------    -We are always looking for ways to improve.  You may be selected to receive a survey regarding your visit today.  We encourage you to complete the survey and provide specific, constructive feedback to help us improve our processes.  Thank you for your time!  -Please review the contact information listed on the after visit summary and in the electronic chart.  Below is the phone number that we have on file.  If there are any changes that are needed to be made, please reach out to the clinic.  [phone] (home)

## 2024-03-12 LAB
ALBUMIN SERPL BCG-MCNC: 4.9 G/DL (ref 3.5–5.2)
ALP SERPL-CCNC: 107 U/L (ref 40–150)
ALT SERPL W P-5'-P-CCNC: 82 U/L (ref 0–50)
ANION GAP SERPL CALCULATED.3IONS-SCNC: 10 MMOL/L (ref 7–15)
AST SERPL W P-5'-P-CCNC: 37 U/L (ref 0–45)
BILIRUB DIRECT SERPL-MCNC: <0.2 MG/DL (ref 0–0.3)
BILIRUB SERPL-MCNC: 0.4 MG/DL
BUN SERPL-MCNC: 11.6 MG/DL (ref 6–20)
CALCIUM SERPL-MCNC: 10 MG/DL (ref 8.6–10)
CHLORIDE SERPL-SCNC: 106 MMOL/L (ref 98–107)
CHOLEST SERPL-MCNC: 166 MG/DL
CREAT SERPL-MCNC: 0.75 MG/DL (ref 0.51–0.95)
DEPRECATED HCO3 PLAS-SCNC: 25 MMOL/L (ref 22–29)
EGFRCR SERPLBLD CKD-EPI 2021: >90 ML/MIN/1.73M2
FASTING STATUS PATIENT QL REPORTED: NO
GLUCOSE SERPL-MCNC: 93 MG/DL (ref 70–99)
HBV SURFACE AB SERPL IA-ACNC: >1000 M[IU]/ML
HBV SURFACE AB SERPL IA-ACNC: REACTIVE M[IU]/ML
HDLC SERPL-MCNC: 41 MG/DL
LDLC SERPL CALC-MCNC: 85 MG/DL
NONHDLC SERPL-MCNC: 125 MG/DL
POTASSIUM SERPL-SCNC: 4.1 MMOL/L (ref 3.4–5.3)
PROT SERPL-MCNC: 7.9 G/DL (ref 6.4–8.3)
SODIUM SERPL-SCNC: 141 MMOL/L (ref 135–145)
TRIGL SERPL-MCNC: 200 MG/DL

## 2024-03-18 ENCOUNTER — TELEPHONE (OUTPATIENT)
Dept: FAMILY MEDICINE | Facility: CLINIC | Age: 49
End: 2024-03-18
Payer: COMMERCIAL

## 2024-03-18 NOTE — TELEPHONE ENCOUNTER
----- Message from Rahul Bautista MD sent at 3/13/2024  8:22 AM CDT -----  Team - please call patient with results.    Awacarly Dariondenise Herrera,    I hope you have been well since our last visit. Below are the results from the testing completed at the visit.     There continues to be an injury to the liver, though not significantly different from previous labs.  Please work on eating foods low in fats and cholesterol and losing weight.    You do not have diabetes.  There continues to be slight elevation of the cholesterol, though better compared to 3 months ago.    You have immunity to hepatitis B.  You do not need the hepatitis B vaccine.    The rest of your labs are in the good range.    The x-ray of the left hip shows that the bones are all good.  Your discomforts are likely due to injuries to the muscles and tendons.    Dr. Bautista recommends that you continue on the plan as discussed in clinic.    If there are any questions or concerns, please call the clinic or schedule an appointment for follow up.     Best wishes,           Rahul Bautista MD  [facility]  3/13/2024  8:20 AM

## 2024-03-20 ENCOUNTER — APPOINTMENT (OUTPATIENT)
Dept: INTERPRETER SERVICES | Facility: CLINIC | Age: 49
End: 2024-03-20
Payer: COMMERCIAL

## 2024-04-01 ENCOUNTER — PATIENT OUTREACH (OUTPATIENT)
Dept: CARE COORDINATION | Facility: CLINIC | Age: 49
End: 2024-04-01
Payer: COMMERCIAL

## 2024-04-01 ASSESSMENT — ACTIVITIES OF DAILY LIVING (ADL): DEPENDENT_IADLS:: INDEPENDENT

## 2024-04-01 NOTE — PROGRESS NOTES
Clinic Care Coordination Contact  [facility]/Voicemail    Clinical Data: Care Coordinator Outreach    Outreach Documentation Number of Outreach Attempt   4/1/2024  10:08 AM 1     Left message on patient's voicemail with call back information and requested return call.    Plan: Care Coordinator will try to reach patient again in 10 business days.    Gale Dean  [facility] Care Coordination  [facility]    Phone: [phone]

## 2024-04-01 NOTE — PROGRESS NOTES
Clinic Care Coordination Contact  Community Health Worker Follow Up    Care Gaps:     Health Maintenance Due   Topic Date Due    ADVANCE CARE PLANNING  Never done    DTAP/TDAP/TD IMMUNIZATION (1 - Tdap) Never done     Postponed to next outreach.      Care Plan:   Care Plan: Financial Wellbeing       Problem: Patient expresses financial resource strain       Goal: Apply for SSI       Start Date: 6/15/2023 Expected End Date: 6/15/2024    This Visit's Progress: 80% Recent Progress: 80%    Priority: High    Note:     Barriers: language  Strengths:motivated to seek support  Patient expressed understanding of goal: yes  Action steps to achieve this goal:  1. I will answer the phone when [employer] contacts me  2. I will provide all necessary information  3. I will follow up with CCC at next outreach     Last Update: 11/2/23 encounter: Patient denied for social security income. Per Julissa,  Representative applying for RSDI next.     [employer] at [email or web address]  Ph. [phone] and Fax: [phone]                            Care Plan: Affordable transportation Completed 4/1/2024      Problem: HP GENERAL PROBLEM  Resolved 4/1/2024      Goal: I would like affordable transportation options  Completed 4/1/2024      Start Date: 1/18/2024    This Visit's Progress: 100% Recent Progress: 20%    Priority: Medium    Note:     Barriers: language  Strengths: motivated to seek support  Patient expressed understanding of goal: yes  Action steps to achieve this goal:  1. I will see if I am eligible for transport through [facility]  2. I will complete [employer] mobility application  3. I will follow up with CCC for further support.                          Intervention and Education during outreach:   Per chart review, patient denied for social security income. Per Julissa,  Representative applying for RSDI next. See encounter 11/2/23.    Patient returned  call. CHW reviewed above and inquired if any update. Patient shares, no update yet, she will continue to watch for follow up by mail or phone from Social Security Office or .     Per [place] check today, patient active with [employer] as of 3/1/24. CHW called [employer] [phone], confirmed eligible for non-emergency medical rides.   CHW reviewed above with patient and inquired if patient knows how to scheduled non-emergency medical rides through [employer].   Patient shares that she thought [employer] to start as of 4/1. Patient expressed appreciation for update, she has help making appointments for rides. Goal completed.     CHW Plan: CHW to follow up in 1 month    Gale Dean  Clinic Care Coordination  [facility]    Phone: [phone]

## 2024-04-17 ENCOUNTER — PATIENT OUTREACH (OUTPATIENT)
Dept: CARE COORDINATION | Facility: CLINIC | Age: 49
End: 2024-04-17
Payer: COMMERCIAL

## 2024-04-17 NOTE — PROGRESS NOTES
Clinic Care Coordination Contact  Care Coordination Clinician Chart Review    Situation: Patient chart reviewed by Care Coordinator.       Background: Care Coordination Program started: 6/13/2023. Initial assessment completed and patient-centered care plan(s) were developed with participation from patient. Lead CC handed patient off to CHW for continued outreaches.       Assessment: Per chart review, patient outreach completed by CC CHW on 4/1.  Patient is actively working to accomplish goal(s). Patient's goal(s) appropriate and relevant at this time. Patient is not due for updated Plan of Care.  Assessments will be completed annually or as needed/with change of patient status.      Care Plan: Financial Wellbeing       Problem: Patient expresses financial resource strain       Goal: Apply for SSI       Start Date: 6/15/2023 Expected End Date: 6/15/2024    This Visit's Progress: 80% Recent Progress: 80%    Priority: High    Note:     Barriers: language  Strengths:motivated to seek support  Patient expressed understanding of goal: yes  Action steps to achieve this goal:  1. I will answer the phone when [employer] contacts me  2. I will provide all necessary information  3. I will follow up with CCC at next outreach     Last Update: 11/2/23 encounter: Patient denied for social security income. Per Julissa,  Representative applying for RSDI next.     [employer] at [email or web address]  Ph. [phone] and Fax: [phone]                                 Plan/Recommendations: The patient will continue working with Care Coordination to achieve goal(s) as above. CHW will continue outreaches at minimum every 30 days and will involve Lead CC as needed or if patient is ready to move to Maintenance. Lead CC will continue to monitor CHW outreaches and patient's progress to goal(s) every 6 weeks.     Plan of Care updated and sent to patient: Nayla Colvin,  ROSALINO, INGE  Social Work Care Coordinator

## 2024-05-02 ENCOUNTER — PATIENT OUTREACH (OUTPATIENT)
Dept: CARE COORDINATION | Facility: CLINIC | Age: 49
End: 2024-05-02
Payer: COMMERCIAL

## 2024-05-02 ASSESSMENT — ACTIVITIES OF DAILY LIVING (ADL): DEPENDENT_IADLS:: INDEPENDENT

## 2024-05-02 NOTE — PROGRESS NOTES
Clinic Care Coordination Contact  [facility]/Voicemail    Clinical Data: Care Coordinator Outreach    Outreach Documentation Number of Outreach Attempt       5/2/2024   1:13 PM 1     Left message on patient's voicemail with call back information and requested return call.    Plan: Care Coordinator will try to reach patient again in 10 business days.    Gale Dean  [facility] Care Coordination  [facility]    Phone: [phone]

## 2024-05-20 ENCOUNTER — PATIENT OUTREACH (OUTPATIENT)
Dept: CARE COORDINATION | Facility: CLINIC | Age: 49
End: 2024-05-20
Payer: COMMERCIAL

## 2024-05-20 ASSESSMENT — ACTIVITIES OF DAILY LIVING (ADL): DEPENDENT_IADLS:: INDEPENDENT

## 2024-05-20 NOTE — PROGRESS NOTES
Clinic Care Coordination Contact  [facility]/Voicemail    Clinical Data: Care Coordinator Outreach    Outreach Documentation Number of Outreach Attempt   5/2/2024   1:13 PM 1   5/20/2024   2:38 PM 2     Left message on patient's voicemail with call back information and requested return call.    Plan: Care Coordinator will do no further outreaches at this time. Routing to lead clinician, to review for graduation or additional outreaches.     Gale Dean  [facility] Care Coordination  [facility]    Phone: [phone]

## 2024-05-23 ENCOUNTER — TELEPHONE (OUTPATIENT)
Dept: FAMILY MEDICINE | Facility: CLINIC | Age: 49
End: 2024-05-23
Payer: COMMERCIAL

## 2024-05-23 DIAGNOSIS — Z98.890 S/P COIL EMBOLIZATION OF CEREBRAL ANEURYSM: Chronic | ICD-10-CM

## 2024-05-23 DIAGNOSIS — I67.1 ANEURYSM OF RIGHT INTERNAL CAROTID ARTERY: ICD-10-CM

## 2024-05-23 DIAGNOSIS — I63.511 CEREBROVASCULAR ACCIDENT (CVA) DUE TO OCCLUSION OF RIGHT MIDDLE CEREBRAL ARTERY (H): Primary | ICD-10-CM

## 2024-05-23 DIAGNOSIS — M54.2 NECK PAIN: ICD-10-CM

## 2024-05-23 NOTE — TELEPHONE ENCOUNTER
Age appropriate prev health care discussed   Cancer screening discussed   Vaccines discussed   Labs offered   Blood pressure at goal     Anticipatory guidance including SPF and other skin protective measures, dental hygiene and care, regular exercise, diet, stress and family planning advice discussed.      Recommend he consider HPV vaccine   He will do zoster when he turns 60    Labs ordered        Form collected from the .  Pre-filled as much as possible.  Placed form in provider's blue folder for completion.

## 2024-05-23 NOTE — TELEPHONE ENCOUNTER
Forms/Letter Request    Type of form/letter: Disability      Do we have the form/letter: Yes: CV    Who is the form from?  (if other please explain)    Where did/will the form come from? form was faxed in    When is form/letter needed by: asap    How would you like the form/letter returned: Fax : [phone]

## 2024-05-25 ENCOUNTER — PATIENT OUTREACH (OUTPATIENT)
Dept: CARE COORDINATION | Facility: CLINIC | Age: 49
End: 2024-05-25
Payer: COMMERCIAL

## 2024-05-25 NOTE — LETTER
[facility] CARE COORDINATION  [facility]  May 25, 2024    Darion Herrera  [address] [place] [address]      Dear Darion,    I have been unsuccessful in reaching you since our last contact. At this time the Care Coordination team will make no further attempts to reach you, however this does not change your ability to continue receiving care from your providers at your primary care clinic. If you need additional support from a care coordinator in the future please contact Elisa at [phone].    All of us at [facility] are invested in your health and are here to assist you in meeting your goals.     Sincerely,    ROSALINO Griffin, [facility]  Social Work Care Coordinator

## 2024-05-25 NOTE — PROGRESS NOTES
Clinic Care Coordination Contact  [facility]/Voicemail    Clinical Data: Care Coordinator Outreach  ROSALINO Griffin, CHRIST  Social Work Care Coordinator    Outreach Documentation Number of Outreach Attempt   5/2/2024   1:13 PM 1   5/20/2024   2:38 PM 2   5/25/2024  11:56 AM 2       Left message on patient's voicemail with call back information and requested return call.    Plan: Care Coordinator will send disenrollment letter with care coordinator contact information via mail. Care Coordinator will do no further outreaches at this time.    ROSALINO Griffin, CHRIST  Social Work Care Coordinator

## 2024-05-29 NOTE — TELEPHONE ENCOUNTER
Orders placed as requested.     Rahul Bautista MD  [facility]  5/29/2024  1:24 PM    Adrion was seen today for forms.    Diagnoses and all orders for this visit:    Cerebrovascular accident (CVA) due to occlusion of right middle cerebral artery (H)  -     Physical Therapy  Referral; Future    Neck pain  -     Physical Therapy  Referral; Future    S/P coil embolization of cerebral aneurysm  -     Physical Therapy  Referral; Future    Aneurysm of right internal carotid artery  -     Physical Therapy  Referral; Future

## 2024-05-29 NOTE — TELEPHONE ENCOUNTER
"Form reviewed by MD (Dr. Bautista) and returned to clinical support staff with note, \"Form to be completed by physical therapy. Referral placed.\"    Dr. Bautista unable to complete form. Form returned via fax to sender (Brenden Russ @ [employer]) with this update.     Specialty scheduling - please assist patient in scheduling with PT for completion of form.  information below.     Representative - Brenden Russ  [employer]  [address] [place] [id number]  P: [phone]  F: [phone]  "

## 2024-06-03 ENCOUNTER — APPOINTMENT (OUTPATIENT)
Dept: INTERPRETER SERVICES | Facility: CLINIC | Age: 49
End: 2024-06-03
Payer: COMMERCIAL

## 2024-06-03 NOTE — TELEPHONE ENCOUNTER
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     Patient should be seen by Physical Therapy at [facility] to complete the evaluation requested by patient's law firm.    Rahul Bautista MD  [facility] [place] [address]  Phone: [phone]  Fax: [phone]    6/3/2024  6:05 PM

## 2024-06-03 NOTE — TELEPHONE ENCOUNTER
6/3/24 TC called pt to schedule physical therapy with . Pt is available anytime for an appointment. Is pt seeing both [facility] PT and .. Or just the .  office closes at 4 pm. TC will contact them again tomorrow.

## 2024-06-04 ENCOUNTER — APPOINTMENT (OUTPATIENT)
Dept: INTERPRETER SERVICES | Facility: CLINIC | Age: 49
End: 2024-06-04
Payer: COMMERCIAL

## 2024-06-14 ENCOUNTER — THERAPY VISIT (OUTPATIENT)
Dept: PHYSICAL THERAPY | Facility: REHABILITATION | Age: 49
End: 2024-06-14
Attending: FAMILY MEDICINE
Payer: COMMERCIAL

## 2024-06-14 DIAGNOSIS — I63.511 CEREBROVASCULAR ACCIDENT (CVA) DUE TO OCCLUSION OF RIGHT MIDDLE CEREBRAL ARTERY (H): ICD-10-CM

## 2024-06-14 DIAGNOSIS — I67.1 ANEURYSM OF RIGHT INTERNAL CAROTID ARTERY: ICD-10-CM

## 2024-06-14 DIAGNOSIS — M54.2 NECK PAIN: Primary | ICD-10-CM

## 2024-06-14 DIAGNOSIS — Z98.890 S/P COIL EMBOLIZATION OF CEREBRAL ANEURYSM: Chronic | ICD-10-CM

## 2024-06-14 PROCEDURE — 97161 PT EVAL LOW COMPLEX 20 MIN: CPT | Mod: GP

## 2024-06-14 PROCEDURE — 97110 THERAPEUTIC EXERCISES: CPT | Mod: GP

## 2024-06-14 NOTE — PROGRESS NOTES
PHYSICAL THERAPY EVALUATION  Type of Visit: Evaluation        Fall Risk Screen:  Fall screen completed by: PT  Have you fallen 2 or more times in the past year?: No  Have you fallen and had an injury in the past year?: No  Is patient a fall risk?: No    Subjective   Patient is a 48 year old female who presents with neck/shoulder pain/radicular symptoms. She states it has made it difficult for her to sleep as well as reaching. When she is reaching, brushing her hair, or showering, she feels a tingling in her arm and hand as well as weakness. Additional difficulty with grasping, feeling like she will drop her phone if she holds it for too long. The continued numbness/tingling is her biggest concern as this leads to weakness in her arm.  She denies any balance deficits at this time. She would like to improve her shoulder/neck symptoms to improve functional mobility and independence.         Presenting condition or subjective complaint: neck and shoulder pain  Date of onset: 05/29/24 (referral)    Relevant medical history:     Patient Active Problem List   Diagnosis    Leiomyoma of uterus    Aneurysmal subarachnoid hemorrhage (H)    S/P coil embolization of cerebral aneurysm    Aneurysm of right internal carotid artery    Cerebrovascular accident (CVA) due to occlusion of right middle cerebral artery (H)    CVA (cerebral vascular accident) (H)    Pneumonia due to group B Streptococcus, unspecified laterality, unspecified part of lung (H24)    Vasospasm of cerebral artery    Hydrocephalus (H)    Adjustment disorder with mixed anxiety and depressed mood    Mood disorder (H24)    Sleep difficulties    Respiratory failure (H)    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 (H)    SAH (subarachnoid hemorrhage) (H)    Hydrocephalus with operating shunt (H)    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)    Cerebral aneurysm    HCAP (healthcare-associated pneumonia)    Pleural effusion    RUQ abdominal pain    Shunt malfunction,  subsequent encounter    Subdural hematoma (H)    S/P  shunt    Neck pain    Chronic bilateral low back pain without sciatica    Mixed hyperlipidemia    Dry eyes    Class 2 severe obesity due to excess calories with serious comorbidity in adult (H)       Dates & types of surgery: none    Prior diagnostic imaging/testing results:     see chart  Prior therapy history for the same diagnosis, illness or injury:        Prior Level of Function  Transfers: Independent  Ambulation: Independent  ADL: Assistive equipment  IADL:  Family helps with household tasks and driving    Living Environment  Social support: With family members   Type of home: House   Stairs to enter the home:         Ramp: No   Stairs inside the home: Yes 7 Is there a railing: Yes     Help at home: Self Cares (home health aide/personal care attendant, family, etc)  Equipment owned:       Employment: No    Hobbies/Interests:      Patient goals for therapy: unable to reach    Pain assessment: Location: Neck/shoulder/Ratin-7/10     Objective      Cognitive Status Examination  Orientation: Oriented to person, place and time   Level of Consciousness: Alert  Follows Commands and Answers Questions: 100% of the time  Personal Safety and Judgement: Intact  Memory: Intact    OBSERVATION: Pt amb IND in clinic, posture as described below   INTEGUMENTARY: Intact  POSTURE:  Pt with significantly increased thoracic kyphosis, rounded shoulders, and forward head    PALPATION: TTP over right trap, inferior to right AC joint  RANGE OF MOTION:   (Degrees) Right AROM    Shoulder Flexion Limited, pt begins to compensate with shoulder elevation   Shoulder Extension NT   Shoulder Abduction Full but painful   Shoulder Adduction NT   Shoulder Internal Rotation Limited, hesitant   Shoulder External Rotation Limited hesitant   Shoulder Horizontal Abduction Full but hesitant   Shoulder Horizontal Adduction NT   Elbow Extension Full   Elbow Flexion Full     STRENGTH:  Reduced  strength RUE, increased pain with overpressure into shoulder abduction     BED MOBILITY: Independent    TRANSFERS: Independent    WHEELCHAIR MOBILITY: NA    GAIT:   Level of [place]: Independent  Assistive Device(s): None  Gait Deviations: WFL  Gait Distance: Within clinic  Stairs: NT    BALANCE: WFL    SENSATION:  Tingling/numbness down RUE      Assessment & Plan   CLINICAL IMPRESSIONS  Medical Diagnosis: 63.511 (ICD-10-CM) - Cerebrovascular accident (CVA) due to occlusion of right middle cerebral artery (H)  M54.2 (ICD-10-CM) - Neck pain  Z98.890 (ICD-10-CM) - S/P coil embolization of cerebral aneurysm  I67.1 (ICD-10-CM) - Aneurysm of right internal carotid artery    Treatment Diagnosis: Shoulder pain, neck pain, limited ROM, strength deficits   Impression/Assessment: Patient is a 48 year old female with shoulder and neck pain/radiculopathy complaints.  The following significant findings have been identified: Pain, Decreased ROM/flexibility, Decreased strength, Impaired muscle performance, and Impaired posture. These impairments interfere with their ability to perform self care tasks, recreational activities, and household chores as compared to previous level of function. She will continue to benefit from skilled PT intervention to address deficits and progress goals.     Clinical Decision Making (Complexity):  Clinical Presentation: Stable/Uncomplicated  Clinical Presentation Rationale: based on medical and personal factors listed in PT evaluation  Clinical Decision Making (Complexity): Low complexity    PLAN OF CARE  Treatment Interventions:  Modalities: Cryotherapy, Hot Pack  Interventions: Gait Training, Manual Therapy, Neuromuscular Re-education, Therapeutic Activity, Therapeutic Exercise    Long Term Goals     PT Goal 1  Goal Identifier: HEP  Goal Description: Pt will be able to demonstrate HEP activities without need for cues from provider to demonstrate understanding and independence with  HEP.  Rationale: to maximize safety and independence with performance of ADLs and functional tasks;to maximize safety and independence within the home;to maximize safety and independence within the community  Target Date: 09/11/24  PT Goal 2  Goal Identifier: Pain  Goal Description: Patient will reduce resting neck/shoulder pain to less than 4/10.  Target Date: 09/11/24  PT Goal 3  Goal Identifier: ROM  Goal Description: Patient will improve RUE ROM to equal LUE ROM  Target Date: 09/11/24      Frequency of Treatment: 1x/week  Duration of Treatment: 8 weeks    Recommended Referrals to Other Professionals:  NA  Education Assessment:   Learner/Method: Patient;Demonstration;Listening  Education Comments: Pt verbalizes/demos understanding of education.    Risks and benefits of evaluation/treatment have been explained.   Patient/Family/caregiver agrees with Plan of Care.     Evaluation Time:          Signing Clinician: Kathie Tamayo, PT, DPT        [facility]                                                                                   OUTPATIENT PHYSICAL THERAPY      PLAN OF TREATMENT FOR OUTPATIENT REHABILITATION   Patient's Last Name, First Name, Darion Goodwin YOB: 1975   Provider's Name   [facility]   Medical Record No.  [id number]     Onset Date: 05/29/24 (referral)  Start of Care Date: 06/14/24     Medical Diagnosis:  63.511 (ICD-10-CM) - Cerebrovascular accident (CVA) due to occlusion of right middle cerebral artery (H)  M54.2 (ICD-10-CM) - Neck pain  Z98.890 (ICD-10-CM) - S/P coil embolization of cerebral aneurysm  I67.1 (ICD-10-CM) - Aneurysm of right internal carotid artery      PT Treatment Diagnosis:  Shoulder pain, neck pain, limited ROM, strength deficits Plan of Treatment  Frequency/Duration: 1x/week/ 8 weeks    Certification date from 06/14/24 to 09/11/24         See note for plan of treatment details and functional  goals     Kathie Tamayo, PT, DPT                        I CERTIFY THE NEED FOR THESE SERVICES FURNISHED UNDER        THIS PLAN OF TREATMENT AND WHILE UNDER MY CARE     (Physician attestation of this document indicates review and certification of the therapy plan).              Referring Provider:  Rahul Bautista    Initial Assessment  See Epic Evaluation- Start of Care Date: 06/14/24

## 2024-07-22 ENCOUNTER — APPOINTMENT (OUTPATIENT)
Dept: INTERPRETER SERVICES | Facility: CLINIC | Age: 49
End: 2024-07-22
Payer: COMMERCIAL

## 2024-07-23 ENCOUNTER — THERAPY VISIT (OUTPATIENT)
Dept: PHYSICAL THERAPY | Facility: REHABILITATION | Age: 49
End: 2024-07-23
Payer: COMMERCIAL

## 2024-07-23 DIAGNOSIS — M54.2 NECK PAIN: ICD-10-CM

## 2024-07-23 DIAGNOSIS — I63.511 CEREBROVASCULAR ACCIDENT (CVA) DUE TO OCCLUSION OF RIGHT MIDDLE CEREBRAL ARTERY (H): Primary | ICD-10-CM

## 2024-07-23 PROCEDURE — 97110 THERAPEUTIC EXERCISES: CPT | Mod: GP | Performed by: PHYSICAL THERAPY ASSISTANT

## 2024-08-06 ENCOUNTER — THERAPY VISIT (OUTPATIENT)
Dept: PHYSICAL THERAPY | Facility: REHABILITATION | Age: 49
End: 2024-08-06
Payer: COMMERCIAL

## 2024-08-06 DIAGNOSIS — G89.29 CHRONIC BILATERAL LOW BACK PAIN WITHOUT SCIATICA: ICD-10-CM

## 2024-08-06 DIAGNOSIS — M54.50 CHRONIC BILATERAL LOW BACK PAIN WITHOUT SCIATICA: ICD-10-CM

## 2024-08-06 DIAGNOSIS — I67.1 ANEURYSM OF RIGHT INTERNAL CAROTID ARTERY: ICD-10-CM

## 2024-08-06 DIAGNOSIS — Z98.890 S/P COIL EMBOLIZATION OF CEREBRAL ANEURYSM: ICD-10-CM

## 2024-08-06 DIAGNOSIS — Z98.2 S/P VP SHUNT: ICD-10-CM

## 2024-08-06 DIAGNOSIS — M54.2 NECK PAIN: ICD-10-CM

## 2024-08-06 DIAGNOSIS — I63.511 CEREBROVASCULAR ACCIDENT (CVA) DUE TO OCCLUSION OF RIGHT MIDDLE CEREBRAL ARTERY (H): Primary | ICD-10-CM

## 2024-08-06 PROCEDURE — 97110 THERAPEUTIC EXERCISES: CPT | Mod: GP

## 2024-08-06 PROCEDURE — 97112 NEUROMUSCULAR REEDUCATION: CPT | Mod: GP

## 2024-08-06 PROCEDURE — T1013 SIGN LANG/ORAL INTERPRETER: HCPCS | Mod: U4 | Performed by: INTERPRETER

## 2024-08-06 PROCEDURE — 97140 MANUAL THERAPY 1/> REGIONS: CPT | Mod: GP

## 2024-08-13 ENCOUNTER — THERAPY VISIT (OUTPATIENT)
Dept: PHYSICAL THERAPY | Facility: REHABILITATION | Age: 49
End: 2024-08-13
Payer: COMMERCIAL

## 2024-08-13 DIAGNOSIS — I63.511 CEREBROVASCULAR ACCIDENT (CVA) DUE TO OCCLUSION OF RIGHT MIDDLE CEREBRAL ARTERY (H): Primary | ICD-10-CM

## 2024-08-13 DIAGNOSIS — I67.1 ANEURYSM OF RIGHT INTERNAL CAROTID ARTERY: ICD-10-CM

## 2024-08-13 DIAGNOSIS — Z98.890 S/P COIL EMBOLIZATION OF CEREBRAL ANEURYSM: ICD-10-CM

## 2024-08-13 DIAGNOSIS — G89.29 CHRONIC BILATERAL LOW BACK PAIN WITHOUT SCIATICA: ICD-10-CM

## 2024-08-13 DIAGNOSIS — Z98.2 S/P VP SHUNT: ICD-10-CM

## 2024-08-13 DIAGNOSIS — M54.2 NECK PAIN: ICD-10-CM

## 2024-08-13 DIAGNOSIS — M54.50 CHRONIC BILATERAL LOW BACK PAIN WITHOUT SCIATICA: ICD-10-CM

## 2024-08-13 PROCEDURE — 97140 MANUAL THERAPY 1/> REGIONS: CPT | Mod: GP

## 2024-08-13 PROCEDURE — 97110 THERAPEUTIC EXERCISES: CPT | Mod: GP

## 2024-08-13 PROCEDURE — 97112 NEUROMUSCULAR REEDUCATION: CPT | Mod: GP

## 2024-08-19 ENCOUNTER — APPOINTMENT (OUTPATIENT)
Dept: INTERPRETER SERVICES | Facility: CLINIC | Age: 49
End: 2024-08-19
Payer: COMMERCIAL

## 2024-08-19 ENCOUNTER — TELEPHONE (OUTPATIENT)
Dept: FAMILY MEDICINE | Facility: CLINIC | Age: 49
End: 2024-08-19
Payer: COMMERCIAL

## 2024-08-19 NOTE — TELEPHONE ENCOUNTER
Patient Quality Outreach    Patient is due for the following:       Topic Date Due    Diptheria Tetanus Pertussis (DTAP/TDAP/TD) Vaccine (1 - Tdap) Never done       Next Steps:   Schedule a nurse only visit for TDAP    Type of outreach:    Phone, spoke to patient/parent. To schedule appt      Questions for provider review:    None           Janeth Ferguson MA  Chart routed to N/A.

## 2024-08-21 ENCOUNTER — ALLIED HEALTH/NURSE VISIT (OUTPATIENT)
Dept: FAMILY MEDICINE | Facility: CLINIC | Age: 49
End: 2024-08-21
Payer: COMMERCIAL

## 2024-08-21 VITALS — TEMPERATURE: 97.9 F

## 2024-08-21 DIAGNOSIS — Z23 ENCOUNTER FOR IMMUNIZATION: Primary | ICD-10-CM

## 2024-08-21 PROCEDURE — 90471 IMMUNIZATION ADMIN: CPT

## 2024-08-21 PROCEDURE — 99207 PR NO CHARGE NURSE ONLY: CPT

## 2024-08-21 PROCEDURE — 90715 TDAP VACCINE 7 YRS/> IM: CPT

## 2024-09-17 ENCOUNTER — OFFICE VISIT (OUTPATIENT)
Dept: FAMILY MEDICINE | Facility: CLINIC | Age: 49
End: 2024-09-17
Payer: COMMERCIAL

## 2024-09-17 VITALS
RESPIRATION RATE: 16 BRPM | SYSTOLIC BLOOD PRESSURE: 119 MMHG | HEART RATE: 63 BPM | HEIGHT: 58 IN | BODY MASS INDEX: 35.84 KG/M2 | DIASTOLIC BLOOD PRESSURE: 81 MMHG | OXYGEN SATURATION: 99 % | TEMPERATURE: 98 F | WEIGHT: 170.75 LBS

## 2024-09-17 DIAGNOSIS — M76.32 IT BAND SYNDROME, LEFT: ICD-10-CM

## 2024-09-17 DIAGNOSIS — Z23 NEED FOR VACCINATION: ICD-10-CM

## 2024-09-17 DIAGNOSIS — I63.511 CEREBROVASCULAR ACCIDENT (CVA) DUE TO OCCLUSION OF RIGHT MIDDLE CEREBRAL ARTERY (H): ICD-10-CM

## 2024-09-17 DIAGNOSIS — M70.62 GREATER TROCHANTERIC BURSITIS OF LEFT HIP: Primary | ICD-10-CM

## 2024-09-17 DIAGNOSIS — G56.21 CUBITAL TUNNEL SYNDROME ON RIGHT: ICD-10-CM

## 2024-09-17 DIAGNOSIS — G56.03 BILATERAL CARPAL TUNNEL SYNDROME: ICD-10-CM

## 2024-09-17 DIAGNOSIS — G62.9 PERIPHERAL POLYNEUROPATHY: ICD-10-CM

## 2024-09-17 LAB
FOLATE SERPL-MCNC: 19.1 NG/ML (ref 4.6–34.8)
HBA1C MFR BLD: 5.7 % (ref 0–5.6)
TOTAL PROTEIN SERUM FOR ELP: 7.6 G/DL (ref 6.4–8.3)
TSH SERPL DL<=0.005 MIU/L-ACNC: 1.17 UIU/ML (ref 0.3–4.2)

## 2024-09-17 PROCEDURE — 84165 PROTEIN E-PHORESIS SERUM: CPT | Performed by: PATHOLOGY

## 2024-09-17 PROCEDURE — 99215 OFFICE O/P EST HI 40 MIN: CPT | Mod: 25 | Performed by: FAMILY MEDICINE

## 2024-09-17 PROCEDURE — 83036 HEMOGLOBIN GLYCOSYLATED A1C: CPT | Performed by: FAMILY MEDICINE

## 2024-09-17 PROCEDURE — 86039 ANTINUCLEAR ANTIBODIES (ANA): CPT | Performed by: FAMILY MEDICINE

## 2024-09-17 PROCEDURE — 84443 ASSAY THYROID STIM HORMONE: CPT | Performed by: FAMILY MEDICINE

## 2024-09-17 PROCEDURE — 90656 IIV3 VACC NO PRSV 0.5 ML IM: CPT | Performed by: FAMILY MEDICINE

## 2024-09-17 PROCEDURE — 82746 ASSAY OF FOLIC ACID SERUM: CPT | Performed by: FAMILY MEDICINE

## 2024-09-17 PROCEDURE — 82607 VITAMIN B-12: CPT | Performed by: FAMILY MEDICINE

## 2024-09-17 PROCEDURE — 86038 ANTINUCLEAR ANTIBODIES: CPT | Performed by: FAMILY MEDICINE

## 2024-09-17 PROCEDURE — 90471 IMMUNIZATION ADMIN: CPT | Performed by: FAMILY MEDICINE

## 2024-09-17 PROCEDURE — 84155 ASSAY OF PROTEIN SERUM: CPT | Performed by: FAMILY MEDICINE

## 2024-09-17 PROCEDURE — 36415 COLL VENOUS BLD VENIPUNCTURE: CPT | Performed by: FAMILY MEDICINE

## 2024-09-17 RX ORDER — GABAPENTIN 100 MG/1
100 CAPSULE ORAL 3 TIMES DAILY
Qty: 90 CAPSULE | Refills: 2 | Status: SHIPPED | OUTPATIENT
Start: 2024-09-17

## 2024-09-17 RX ORDER — LIDOCAINE 50 MG/G
OINTMENT TOPICAL PRN
Qty: 50 G | Refills: 1 | Status: SHIPPED | OUTPATIENT
Start: 2024-09-17

## 2024-09-17 ASSESSMENT — ENCOUNTER SYMPTOMS: NUMBNESS: 1

## 2024-09-17 NOTE — PROGRESS NOTES
Assessment & Plan     Greater trochanteric bursitis of left hip  It band syndrome, left  Her left leg pain is due to greater trochanteric bursitis and IT band syndrome.  Reviewed that she should not be using NSAIDs given her history of stroke and being on clopidogrel.  She can try lidocaine ointment.  She agrees to physical therapy referral, she needs transportation.  We discussed option of referral to sports medicine for consideration of steroid injections.  She would like to try lidocaine first.  Reviewed prior x-rays.  Reviewed prior CT lumbar spine.  Low suspicion for intra-articular hip pathology or lumbar radiculopathy.  - lidocaine (XYLOCAINE) 5 % external ointment; Apply topically as needed for moderate pain.  - Physical Therapy  Referral; Future    Peripheral polyneuropathy  Sensation of foot swelling is due to peripheral neuropathy.  She was concerned that this could be caused by a stroke.  Reassured patient that the history and physical do not support stroke.  I did reiterate to her that if she develops numbness, tingling, weakness of 1 side of her body or 1 part of her body, it would be important for her to present immediately to the emergency room for evaluation for stroke.  Will check labs as ordered.  Start gabapentin 100 mg 3 times daily, advised her of side effects and that it would be best to try the first dose at bedtime as it can cause dizziness and drowsiness.  Renal function is normal.  - gabapentin (NEURONTIN) 100 MG capsule; Take 1 capsule (100 mg) by mouth 3 times daily.  - Vitamin B12; Future  - Folate; Future  - Protein electrophoresis; Future  - Protein electrophoresis, timed urine; Future  - Hemoglobin A1c; Future  - TSH with free T4 reflex; Future  - Anti Nuclear Sintia IgG by IFA with Reflex; Future  - Vitamin B12  - Folate  - Protein electrophoresis  - Protein electrophoresis, timed urine  - Hemoglobin A1c  - TSH with free T4 reflex  - Anti Nuclear Sintia IgG by IFA with  "Reflex    Cerebrovascular accident (CVA) due to occlusion of right middle cerebral artery (H)  She was last to follow-up with neurology due to difficulties with scheduling.  Asked staff to assist her with reestablishing care.  - Adult Neurology  Referral; Future    Bilateral carpal tunnel syndrome  Cubital tunnel syndrome on right  Bilateral arm pain is multifactorial, on right side it is due to carpal tunnel syndrome (reviewed EMG done previously) and cubital tunnel syndrome.  On left it is due to carpal tunnel syndrome.  She had an MRI cervical spine that showed no significant cervical degenerative disc disease, I reviewed today.  - Wrist/Arm/Hand Bracing Supplies Order Wrist Brace; Bilateral; non-thumb spica    Need for vaccination  - INFLUENZA VACCINE,SPLIT VIRUS,TRIVALENT,PF(FLUZONE)          BMI  Estimated body mass index is 35.38 kg/m  as calculated from the following:    Height as of this encounter: 1.48 m (4' 10.25\").    Weight as of this encounter: 77.5 kg (170 lb 12 oz).   Weight management plan: Patient was referred to their PCP to discuss a diet and exercise plan.      Follow-up with PCP in 3 months for multiple pains      I spent a total of 56 minutes on the day of the visit.   Time spent by me doing chart review, history and exam, documentation and further activities per the note    Subjective   Darion is a 48 year old, presenting for the following health issues:  Pain (Left leg pain ), Foot Swelling (feels swollen and tight but when looking at them they are not swollen . ), and Numbness ( Arms numbness when waking up )        9/17/2024     9:02 AM   Additional Questions   Roomed by JAY Fierro   Accompanied by self         9/17/2024     9:02 AM    [facility] Provided    services provided? Yes   [employer] Hmong   Type of interpretation provided Telephone    name Ciro    ID [id number]       History of Present Illness       Reason for visit:  Pain, " "swelling and numbness      Left thigh pain - on the outer side  - duration: for a while, sought help from Dr. Bautista in March and had xrays  - has not used any cream for it (prescribed diclofenac) but has used tylenol and advil; it helps for a little bit but stops after a while    Foot swelling  - when walking, feels swelling or tightness underneath feet  - when sleeping, whole leg from calves down to feet feels swollen  - both feet  - 3-4mos, getting worse    When falling asleep, arms to hand has tingling sensation, and feels numb  - both arms  - not sure how long it has been for, happened after having a fall (1-2y ago)  - ever since then has felt like there is a bubble underneath joints  - has gone to PT but that has not helped    She is scared she is having a stroke    Daughter used to make her appointment but has gone back to work so she has not been able to go back to neurology                Review of Systems  Constitutional, HEENT, cardiovascular, pulmonary, gi and gu systems are negative, except as otherwise noted.      Objective    /81 (BP Location: Right arm, Patient Position: Sitting, Cuff Size: Adult Regular)   Pulse 63   Temp 98  F (36.7  C) (Oral)   Resp 16   Ht 1.48 m (4' 10.25\")   Wt 77.5 kg (170 lb 12 oz)   SpO2 99%   BMI 35.38 kg/m    Body mass index is 35.38 kg/m .  Physical Exam   General: well-appearing, no acute distress  HENT: normocephalic, atraumatic  Eyes: no conjunctival injection, no scleral icterus, EOMI  Neck: normal ROM, supple  Cardiovascular: regular rate  Pulmonary: normal effort  Abdomen: non-distended  Musculoskeletal: Tender to palpation of left greater trochanter.  Increased pain with adduction of the left hip. Bilateral upper extremities: Sensation intact to light touch, normal pulses, strength 4+ to 5/5 bilaterally (symmetric), + phalen's bilaterally, + tinel's at cubital tunnel on right. Bilateral lower extremities: Sensation decreased to light touch distally " compared to proximally, also decreased right sole compared to left sole of foot, normal pulses, strength 4+- 5/5 bilaterally (symmetric), normal patellar reflexes, no ankle clonus  Extremities: no lower extremity edema  Psych: mood/affect normal, tearful when discussing her fears that she could be having a stroke            Reviewed MRI cervical spine, 5/5/23:  FINDINGS: The spine is imaged from skull base through the superior endplate of T3. Straightening of the usual spinal curvature without significant spondylolisthesis. No evidence of acute fracture. No suspicious marrow signal alteration. Sequelae of right   ICA aneurysm coiling, otherwise the visualized posterior fossa, brainstem, and spinal cord are unremarkable. The paravertebral structures are unremarkable. Maintained vascular flow voids.     Craniovertebral junction and C1-C2: Widely patent.     C2-C3: Normal disc height, mild uncovertebral degenerative change, and mild facet arthropathy without significant spinal canal stenosis or neural foraminal narrowing.     C3-C4: Normal disc height, mild uncovertebral degenerative change, and mild facet arthropathy without significant spinal canal stenosis or neural foraminal narrowing.     C4-C5: Minimal disc height loss, mild uncovertebral degenerative change, and mild facet arthropathy without significant spinal canal stenosis or neural foraminal narrowing.     C5-C6: Minimal disc height loss, mild uncovertebral degenerative change, and mild facet arthropathy without significant spinal canal stenosis or neural foraminal narrowing.     C6-C7: Minimal disc height loss, mild uncovertebral degenerative change, and mild facet arthropathy without significant spinal canal stenosis or neural foraminal narrowing.     C7-T1: Normal disc height and mild facet arthropathy without significant spinal canal stenosis or neural foraminal narrowing.                                                                      IMPRESSION:      1.  Partially visualized sequelae of right ICA aneurysm coiling, otherwise unremarkable cervical spine MRI.        Reviewed CT lumbar spine 2/24/23  FINDINGS:  VERTEBRA: Transitional lumbosacral anatomy with partial lumbarization of the S1 vertebra and rudimentary S1-S2 disc. Normal vertebral body heights. Shallow listhesis L5 on S1 measuring meters.. No fracture or destructive osseous lesion.      CANAL/FORAMINA: Interval L5-S1 disc bulge. No canal or neural foraminal stenosis.     PARASPINAL: Early atherosclerotic vascular disease of the aorta.                                                                      IMPRESSION:  1.  No spinal canal or foraminal compromise.          Reviewed hip xray, 3/11/2024:  IMPRESSION: The left hip is negative for fracture. The left hemipelvis is negative for fracture. No dislocation.         Reviewed EMG, 6/28/2023:  SUMMARY  Nerve conduction and EMG study of bilateral upper extremities shows:     Normal right median nerve distal motor latency, amplitude and conduction velocity.  Normal left median nerve distal motor latency, amplitude and conduction velocity.  Normal left ulnar distal motor latency, amplitude and conduction velocity.  Normal bilateral median and left ulnar sensory SNAPs.  Normal left median-ulnar transcarpal peak latency comparison.  Prolonged right median-ulnar transcarpal peak latency comparison.  Monopolar needle exam is normal.        CLINICAL INTERPRETATION:  This is an abnormal nerve conduction and EMG study.  The study is suggestive of a mild right carpal tunnel syndrome.  Further clinical correlation is needed.     Signed Electronically by: Ina Fontanez MD  DME (Durable Medical Equipment) Orders and Documentation  Orders Placed This Encounter   Procedures    Wrist/Arm/Hand Bracing Supplies Order Wrist Brace; Bilateral; non-thumb spica      The patient was assessed and it was determined the patient is in need of the following listed DME  Supplies/Equipment. Please complete supporting documentation below to demonstrate medical necessity.

## 2024-09-18 ENCOUNTER — THERAPY VISIT (OUTPATIENT)
Dept: PHYSICAL THERAPY | Facility: REHABILITATION | Age: 49
End: 2024-09-18
Attending: FAMILY MEDICINE
Payer: COMMERCIAL

## 2024-09-18 DIAGNOSIS — M70.62 GREATER TROCHANTERIC BURSITIS OF LEFT HIP: ICD-10-CM

## 2024-09-18 DIAGNOSIS — M76.32 IT BAND SYNDROME, LEFT: ICD-10-CM

## 2024-09-18 LAB
ALBUMIN SERPL ELPH-MCNC: 4.5 G/DL (ref 3.7–5.1)
ALPHA1 GLOB SERPL ELPH-MCNC: 0.2 G/DL (ref 0.2–0.4)
ALPHA2 GLOB SERPL ELPH-MCNC: 0.5 G/DL (ref 0.5–0.9)
ANA PAT SER IF-IMP: ABNORMAL
ANA SER QL IF: POSITIVE
ANA TITR SER IF: ABNORMAL {TITER}
B-GLOBULIN SERPL ELPH-MCNC: 0.8 G/DL (ref 0.6–1)
GAMMA GLOB SERPL ELPH-MCNC: 1.4 G/DL (ref 0.7–1.6)
M PROTEIN SERPL ELPH-MCNC: 0 G/DL
PROT PATTERN SERPL ELPH-IMP: NORMAL
VIT B12 SERPL-MCNC: 428 PG/ML (ref 232–1245)

## 2024-09-18 PROCEDURE — 97161 PT EVAL LOW COMPLEX 20 MIN: CPT | Mod: GP

## 2024-09-18 PROCEDURE — 97110 THERAPEUTIC EXERCISES: CPT | Mod: GP

## 2024-09-18 ASSESSMENT — ACTIVITIES OF DAILY LIVING (ADL)
HOS_ADL_HIGHEST_POTENTIAL_SCORE: 68
LOW_IMPACT_ACTIVITIES_LIKE_FAST_WALKING: UNABLE TO DO
ADL_HIGHEST_POTENTIAL_SCORE: 68
LANDING: UNABLE TO DO
SPORTS_TOTAL_ITEM_SCORE: 0
TWISTING/PIVOTING_ON_INVOLVED_LEG: UNABLE TO DO
ROLLING_OVER_IN_BED: MODERATE DIFFICULTY
HOW_WOULD_YOU_RATE_YOUR_CURRENT_LEVEL_OF_FUNCTION_DURING_YOUR_SPORTS_RELATED_ACTIVITIES_FROM_0_TO_100_WITH_100_BEING_YOUR_LEVEL_OF_FUNCTION_PRIOR_TO_YOUR_HIP_PROBLEM_AND_0_BEING_THE_INABILITY_TO_PERFORM_ANY_OF_YOUR_USUAL_DAILY_ACTIVITIES?: 45
SPORTS_SCORE(%): 0
ADL_TOTAL_ITEM_SCORE: 0
HOW_WOULD_YOU_RATE_YOUR_CURRENT_LEVEL_OF_FUNCTION_DURING_YOUR_USUAL_ACTIVITIES_OF_DAILY_LIVING_FROM_0_TO_100_WITH_100_BEING_YOUR_LEVEL_OF_FUNCTION_PRIOR_TO_YOUR_HIP_PROBLEM_AND_0_BEING_THE_INABILITY_TO_PERFORM_ANY_OF_YOUR_USUAL_DAILY_ACTIVITIES?: 45
WALKING_INITIALLY: EXTREME DIFFICULTY
SPORTS_HIGHEST_POTENTIAL_SCORE: 36
WALKING_UP_STEEP_HILLS: EXTREME DIFFICULTY
CUTTING/LATERAL_MOVEMENTS: UNABLE TO DO
HOW_WOULD_YOU_RATE_YOUR_CURRENT_LEVEL_OF_FUNCTION?: ABNORMAL
STANDING_FOR_15_MINUTES: EXTREME DIFFICULTY
WALKING_DOWN_STEEP_HILLS: EXTREME DIFFICULTY
WALKING_FOR_APPROXIMATELY_10_MINUTES: MODERATE DIFFICULTY
SITTING_FOR_15_MINUTES: MODERATE DIFFICULTY
ADL_COUNT: 17
SWINGING_OBJECTS_LIKE_A_GOLF_CLUB: UNABLE TO DO
GOING_UP_1_FLIGHT_OF_STAIRS: EXTREME DIFFICULTY
HOS_ADL_SCORE(%): 26.47
GETTING_INTO_AND_OUT_OF_A_BATHTUB: MODERATE DIFFICULTY
STEPPING_UP_AND_DOWN_CURBS: EXTREME DIFFICULTY
PLEASE_INDICATE_YOR_PRIMARY_REASON_FOR_REFERRAL_TO_THERAPY:: HIP
ABILITY_TO_PERFORM_ACTIVITY_WITH_YOUR_NORMAL_TECHNIQUE: MODERATE DIFFICULTY
JUMPING: UNABLE TO DO
SPORTS_COUNT: 9
GETTING_INTO_AND_OUT_OF_AN_AVERAGE_CAR: EXTREME DIFFICULTY
RECREATIONAL_ACTIVITIES: EXTREME DIFFICULTY
DEEP_SQUATTING: UNABLE TO DO
PUTTING_ON_SOCKS_AND_SHOES: EXTREME DIFFICULTY
WALKING_15_MINUTES_OR_GREATER: EXTREME DIFFICULTY
HEAVY_WORK: UNABLE TO DO
STARTING_AND_STOPPING_QUICKLY: UNABLE TO DO
ABILITY_TO_PARTICIPATE_IN_YOUR_DESIRED_SPORT_AS_LONG_AS_YOU_WOULD_LIKE: UNABLE TO DO
HOS_ADL_ITEM_SCORE_TOTAL: 18
RUNNING_ONE_MILE: UNABLE TO DO
ADL_SCORE(%): 0
LIGHT_TO_MODERATE_WORK: MODERATE DIFFICULTY
GOING_DOWN_1_FLIGHT_OF_STAIRS: EXTREME DIFFICULTY

## 2024-09-18 NOTE — PROGRESS NOTES
PHYSICAL THERAPY EVALUATION  Type of Visit: Evaluation     Fall Risk Screen:  Fall screen completed by: PT  Have you fallen 2 or more times in the past year?: No  Have you fallen and had an injury in the past year?: No (pt reports falling in the grass last year, she feels  like  she tripped over something and lost her balance)  Is patient a fall risk?: No    Subjective         Pt presents to PT for eval and treat  for Greater trochanteric bursitis of left hip  and left It band syndrome. She reports this has  been going on for >1 year. Pt reports that sometimes she can feel her pain radiate down her  left lateral thigh. After walking for a long time  she  also feels her pain. Had  hysterectomy  in 2019, then  had a stroke  which caused her  to gain weight. She has been trying to lose the weight and feels like since then this is when her pain started. She has difficulty sleeping on her L side due to her pain. Standing and walking also cause an increase in pain. Pt reports that she is currently walking 15-20 min 2x per day a couple times per week.  Feet  and calves feel swollen at night. She has medication for her pain symptoms, but it makes her drowsy. Pt was prescribed lidocaine yesterday, she has not been able  to use this yet due to not being able  to open the package.    Best pain is 4/10  Worst pain  is 6/10      Presenting condition or subjective complaint: hip pain  Date of onset: 09/17/24 (order date)    Relevant medical history:     Dates & types of surgery: aneurysim 08/2019    Prior diagnostic imaging/testing results: Other ultrasound   Prior therapy history for the same diagnosis, illness or injury: No      Prior Level of Function  Transfers: Independent  Ambulation: Independent  ADL: Independent  IADL:     Living Environment  Social support: With a significant other or spouse   Type of home: House   Stairs to enter the home: Yes       Ramp: No   Stairs inside the home: Yes 1 Is there a railing: Yes      Help at home: None  Equipment owned:       Employment: No    Hobbies/Interests:      Patient goals for therapy: everything    Pain assessment: See objective evaluation for additional pain details     Objective   HIP EVALUATION  PAIN: pt has difficulty describing pain,  notes  soreness over left greater trochanter bursa    INTEGUMENTARY (edema, incisions): WNL  POSTURE: Sitting Posture: Rounded shoulders, Lordosis decreased, Thoracic kyphosis increased  GAIT: wfl, will continue  assessing next session  BALANCE/PROPRIOCEPTION: Single Leg Stance Eyes Open (seconds): less than 5 seconds  ROM: AROM WFL  Pt demonstrates decreased PROM, however  hard  to maddy as pt has difficulty relaxing    PELVIC/SI SCREEN:   STRENGTH:   Pain: - none + mild ++ moderate +++ severe  Strength Scale: 0-5/5 Left Right   Hip Flexion 3+ 3+   Hip Extension 4- 4-   Hip Abduction 3+ 3+   Hip Adduction 3+ 3+   Hip Internal Rotation 4- 4-   Hip External Rotation 4- 4-   Knee Flexion 3+ 3+   Knee Extension 4- 4-     LE FLEXIBILITY: Decreased hip IR L, Decreased hip ER L, Decreased piriformis L, Decreased hip flexors L, Decreased IT band L, Decreased quadriceps L, Decreased hamstrings L, Decreased gastroc L, Decreased hip IR R, Decreased hip ER R, Decreased piriformis R, Decreased hip flexors R, Decreased IT band R, Decreased quadriceps R, Decreased hamstrings R, Decreased gastroc R  SPECIAL TESTS:    Left Right   LEONARDO Negative     FADIR/Labrum/MACHO Positive    Femoral Nerve     Kenneth's Negative     Piriformis     Quadrant Testing     SLR Negative     Slump     Stork with Extension     Alfredo            PALPATION:   + Tenderness At Location Left Right   Ischial Tuberosity +    Greater Trochanter +    IT Band +    Hip Flexors +    Piriformis +    PSIS +    ASIS     Adductors     Abductors     Iliac Crest +    Glut Medius +    Bursa +    Pubis         Assessment & Plan   CLINICAL IMPRESSIONS  Medical Diagnosis: Greater trochanteric bursitis of left  hip, It band syndrome, left    Treatment Diagnosis: Right hip pain, weakness, mobility  deficit   Impression/Assessment: Patient is a 48 year old female with left hip pain consistent with Greater trochanteric bursitis complaints.  The following significant findings have been identified: Pain, Decreased ROM/flexibility, Decreased joint mobility, Decreased strength, Impaired balance, Impaired muscle performance, Decreased activity tolerance, and Impaired posture. These impairments interfere with their ability to perform self care tasks, work tasks, recreational activities, household chores, and community mobility as compared to previous level of function.     Clinical Decision Making (Complexity):  Clinical Presentation: Stable/Uncomplicated  Clinical Presentation Rationale: based on medical and personal factors listed in PT evaluation  Clinical Decision Making (Complexity): Low complexity    PLAN OF CARE  Treatment Interventions:  Interventions: Gait Training, Manual Therapy, Neuromuscular Re-education, Therapeutic Activity, Therapeutic Exercise, Self-Care/Home Management    Long Term Goals     PT Goal 1  Goal Identifier: HEP  Goal Description: Pt will demonstrate understanding and independece of HEP in 30 days.  Rationale: to maximize safety and independence with performance of ADLs and functional tasks  Goal Progress: initial  Target Date: 10/17/24  PT Goal 2  Goal Identifier: HOS  Goal Description: Pt will improve HOS ADL score by 14  points by the end of therapy in order to demonstrate MCID in score to demonstrate improved functional mobility and improved ADLs.  Goal Progress: initial  Target Date: 12/16/24  PT Goal 3  Goal Identifier: sleep  Goal Description: Pt will be able to sleep  through the night without waking due to symptoms at least 4/7 nights by the end of therapy in order to improve  overall QOL.  Goal Progress: initial  Target Date: 12/16/24      Frequency of Treatment: 1 x / week  Duration of  Treatment: 90 days    Recommended Referrals to Other Professionals:   Education Assessment:   Learner/Method: Patient;Demonstration;Pictures/Video    Risks and benefits of evaluation/treatment have been explained.   Patient/Family/caregiver agrees with Plan of Care.     Evaluation Time:     PT Eval, Low Complexity Minutes (35005): 30       Signing Clinician: CICI Vidal [facility]                                                                                   OUTPATIENT PHYSICAL THERAPY      PLAN OF TREATMENT FOR OUTPATIENT REHABILITATION   Patient's Last Name, First Name, Darion Goodwin YOB: 1975   Provider's Name   [facility]   Medical Record No.  [id number]     Onset Date: 09/17/24 (order date)  Start of Care Date: 09/18/24     Medical Diagnosis:  Greater trochanteric bursitis of left hip, It band syndrome, left      PT Treatment Diagnosis:  Right hip pain, weakness, mobility  deficit Plan of Treatment  Frequency/Duration: 1 x / week/ 90 days    Certification date from 09/18/24 to 12/16/24         See note for plan of treatment details and functional goals     Julia Colon PT                         I CERTIFY THE NEED FOR THESE SERVICES FURNISHED UNDER        THIS PLAN OF TREATMENT AND WHILE UNDER MY CARE     (Physician attestation of this document indicates review and certification of the therapy plan).              Referring Provider:  Ina Teresa-Garth    Initial Assessment  See Epic Evaluation- Start of Care Date: 09/18/24

## 2024-09-19 PROCEDURE — 81050 URINALYSIS VOLUME MEASURE: CPT | Performed by: PATHOLOGY

## 2024-09-19 PROCEDURE — 84166 PROTEIN E-PHORESIS/URINE/CSF: CPT | Performed by: PATHOLOGY

## 2024-09-23 DIAGNOSIS — R76.8 POSITIVE ANA (ANTINUCLEAR ANTIBODY): Primary | ICD-10-CM

## 2024-09-23 LAB — PROT PATTERN UR ELPH-IMP: NORMAL

## 2024-09-24 ENCOUNTER — TELEPHONE (OUTPATIENT)
Dept: FAMILY MEDICINE | Facility: CLINIC | Age: 49
End: 2024-09-24
Payer: COMMERCIAL

## 2024-09-24 NOTE — TELEPHONE ENCOUNTER
----- Message from Ina Teresa-Garth sent at 9/23/2024 11:57 AM CDT -----  RN team - please call patient with results. One of the blood tests that we did showed that she may have an inflammatory process that is causing her to have the swollen sensation in her legs and can also cause different joint aches and pains. I would like for her to see a rheumatologist, I will place referral - please connct to specialty scheduling if she desires.    Spoke to patient on the phone to relay provider test result and recommendations above.  Advised patient to call back to clinic towards Thursday/Friday this week if no call from referral for support staff to assist with scheduling.    Familia Bond RN  [facility]

## 2024-10-07 ENCOUNTER — TELEPHONE (OUTPATIENT)
Dept: FAMILY MEDICINE | Facility: CLINIC | Age: 49
End: 2024-10-07
Payer: COMMERCIAL

## 2024-10-07 NOTE — TELEPHONE ENCOUNTER
Order/Referral Request    Who is requesting: patient    Orders being requested: PT    Reason service is needed/diagnosis: leg pain    When are orders needed by: asap    Has this been discussed with Provider: Yes she is wondering where she was referred to    Does patient have a preference on a Group/Provider/Facility? She did not know, they have not reached out to schedule    Does patient have an appointment scheduled?: No    Where to send orders: N/A    Okay to leave a detailed message?: No at Home number on file [phone] (home)

## 2024-10-08 ENCOUNTER — APPOINTMENT (OUTPATIENT)
Dept: INTERPRETER SERVICES | Facility: CLINIC | Age: 49
End: 2024-10-08
Payer: COMMERCIAL

## 2024-10-08 NOTE — TELEPHONE ENCOUNTER
[place] [facility] [place] [phone].   Therapist confirmed able to address left leg pain during future rehab appointments.Pt has ongoing therapy session at [facility].     No further questions from patient.

## 2024-10-14 ENCOUNTER — THERAPY VISIT (OUTPATIENT)
Dept: PHYSICAL THERAPY | Facility: REHABILITATION | Age: 49
End: 2024-10-14
Payer: COMMERCIAL

## 2024-10-14 DIAGNOSIS — M76.32 IT BAND SYNDROME, LEFT: ICD-10-CM

## 2024-10-14 DIAGNOSIS — M70.62 GREATER TROCHANTERIC BURSITIS OF LEFT HIP: Primary | ICD-10-CM

## 2024-10-14 PROCEDURE — 97110 THERAPEUTIC EXERCISES: CPT | Mod: GP

## 2024-10-14 PROCEDURE — T1013 SIGN LANG/ORAL INTERPRETER: HCPCS | Mod: GT

## 2024-10-14 PROCEDURE — 97140 MANUAL THERAPY 1/> REGIONS: CPT | Mod: GP

## 2024-10-22 ENCOUNTER — THERAPY VISIT (OUTPATIENT)
Dept: PHYSICAL THERAPY | Facility: REHABILITATION | Age: 49
End: 2024-10-22
Payer: COMMERCIAL

## 2024-10-22 DIAGNOSIS — M76.32 IT BAND SYNDROME, LEFT: ICD-10-CM

## 2024-10-22 DIAGNOSIS — M70.62 GREATER TROCHANTERIC BURSITIS OF LEFT HIP: Primary | ICD-10-CM

## 2024-10-22 PROCEDURE — T1013 SIGN LANG/ORAL INTERPRETER: HCPCS | Mod: GT

## 2024-10-22 PROCEDURE — 97140 MANUAL THERAPY 1/> REGIONS: CPT | Mod: GP

## 2024-10-22 PROCEDURE — 97110 THERAPEUTIC EXERCISES: CPT | Mod: GP

## 2024-10-28 ENCOUNTER — THERAPY VISIT (OUTPATIENT)
Dept: PHYSICAL THERAPY | Facility: REHABILITATION | Age: 49
End: 2024-10-28
Payer: COMMERCIAL

## 2024-10-28 DIAGNOSIS — M70.62 GREATER TROCHANTERIC BURSITIS OF LEFT HIP: Primary | ICD-10-CM

## 2024-10-28 DIAGNOSIS — M76.32 IT BAND SYNDROME, LEFT: ICD-10-CM

## 2024-10-28 PROCEDURE — T1013 SIGN LANG/ORAL INTERPRETER: HCPCS | Mod: U4

## 2024-10-28 PROCEDURE — 97140 MANUAL THERAPY 1/> REGIONS: CPT | Mod: GP

## 2024-10-28 PROCEDURE — 97110 THERAPEUTIC EXERCISES: CPT | Mod: GP

## 2024-11-04 ENCOUNTER — THERAPY VISIT (OUTPATIENT)
Dept: PHYSICAL THERAPY | Facility: REHABILITATION | Age: 49
End: 2024-11-04
Payer: COMMERCIAL

## 2024-11-04 DIAGNOSIS — M76.32 IT BAND SYNDROME, LEFT: ICD-10-CM

## 2024-11-04 DIAGNOSIS — M70.62 GREATER TROCHANTERIC BURSITIS OF LEFT HIP: Primary | ICD-10-CM

## 2024-11-04 PROCEDURE — 97110 THERAPEUTIC EXERCISES: CPT | Mod: GP

## 2024-11-04 PROCEDURE — 97140 MANUAL THERAPY 1/> REGIONS: CPT | Mod: GP

## 2024-11-11 ENCOUNTER — THERAPY VISIT (OUTPATIENT)
Dept: PHYSICAL THERAPY | Facility: REHABILITATION | Age: 49
End: 2024-11-11
Payer: COMMERCIAL

## 2024-11-11 DIAGNOSIS — M76.32 IT BAND SYNDROME, LEFT: ICD-10-CM

## 2024-11-11 DIAGNOSIS — M70.62 GREATER TROCHANTERIC BURSITIS OF LEFT HIP: Primary | ICD-10-CM

## 2024-11-11 PROCEDURE — 97140 MANUAL THERAPY 1/> REGIONS: CPT | Mod: GP

## 2024-11-11 PROCEDURE — 97110 THERAPEUTIC EXERCISES: CPT | Mod: GP

## 2024-12-11 ENCOUNTER — THERAPY VISIT (OUTPATIENT)
Dept: PHYSICAL THERAPY | Facility: REHABILITATION | Age: 49
End: 2024-12-11
Payer: COMMERCIAL

## 2024-12-11 DIAGNOSIS — M70.62 GREATER TROCHANTERIC BURSITIS OF LEFT HIP: Primary | ICD-10-CM

## 2024-12-11 DIAGNOSIS — M76.32 IT BAND SYNDROME, LEFT: ICD-10-CM

## 2024-12-11 PROCEDURE — 97110 THERAPEUTIC EXERCISES: CPT | Mod: GP

## 2024-12-11 PROCEDURE — 97140 MANUAL THERAPY 1/> REGIONS: CPT | Mod: GP

## 2024-12-17 ENCOUNTER — OFFICE VISIT (OUTPATIENT)
Dept: FAMILY MEDICINE | Facility: CLINIC | Age: 49
End: 2024-12-17
Payer: COMMERCIAL

## 2024-12-17 VITALS
HEIGHT: 58 IN | HEART RATE: 85 BPM | TEMPERATURE: 98.6 F | DIASTOLIC BLOOD PRESSURE: 78 MMHG | BODY MASS INDEX: 35.89 KG/M2 | OXYGEN SATURATION: 97 % | SYSTOLIC BLOOD PRESSURE: 130 MMHG | RESPIRATION RATE: 20 BRPM | WEIGHT: 171 LBS

## 2024-12-17 DIAGNOSIS — G57.02 PIRIFORMIS SYNDROME, LEFT: Primary | ICD-10-CM

## 2024-12-17 DIAGNOSIS — M25.511 RIGHT SHOULDER PAIN, UNSPECIFIED CHRONICITY: ICD-10-CM

## 2024-12-17 DIAGNOSIS — Z98.2 S/P VP SHUNT: ICD-10-CM

## 2024-12-17 DIAGNOSIS — M70.62 GREATER TROCHANTERIC BURSITIS OF LEFT HIP: ICD-10-CM

## 2024-12-17 DIAGNOSIS — F39 MOOD DISORDER (H): ICD-10-CM

## 2024-12-17 DIAGNOSIS — M76.32 IT BAND SYNDROME, LEFT: ICD-10-CM

## 2024-12-17 DIAGNOSIS — E78.2 MIXED HYPERLIPIDEMIA: ICD-10-CM

## 2024-12-17 DIAGNOSIS — G47.9 SLEEP DIFFICULTIES: ICD-10-CM

## 2024-12-17 DIAGNOSIS — F43.23 ADJUSTMENT DISORDER WITH MIXED ANXIETY AND DEPRESSED MOOD: ICD-10-CM

## 2024-12-17 DIAGNOSIS — I63.9 CEREBROVASCULAR ACCIDENT (CVA), UNSPECIFIED MECHANISM (H): ICD-10-CM

## 2024-12-17 DIAGNOSIS — I60.9 SAH (SUBARACHNOID HEMORRHAGE) (H): ICD-10-CM

## 2024-12-17 PROCEDURE — T1013 SIGN LANG/ORAL INTERPRETER: HCPCS | Performed by: INTERPRETER

## 2024-12-17 PROCEDURE — G2211 COMPLEX E/M VISIT ADD ON: HCPCS | Performed by: FAMILY MEDICINE

## 2024-12-17 PROCEDURE — 99215 OFFICE O/P EST HI 40 MIN: CPT | Performed by: FAMILY MEDICINE

## 2024-12-17 RX ORDER — ASPIRIN 81 MG/1
81 TABLET, CHEWABLE ORAL DAILY
Qty: 90 TABLET | Refills: 3 | Status: SHIPPED | OUTPATIENT
Start: 2024-12-17

## 2024-12-17 RX ORDER — ATORVASTATIN CALCIUM 20 MG/1
20 TABLET, FILM COATED ORAL DAILY
Qty: 90 TABLET | Refills: 3 | Status: SHIPPED | OUTPATIENT
Start: 2024-12-17

## 2024-12-17 RX ORDER — CLOPIDOGREL BISULFATE 75 MG/1
75 TABLET ORAL DAILY
Qty: 90 TABLET | Refills: 3 | Status: SHIPPED | OUTPATIENT
Start: 2024-12-17

## 2024-12-17 RX ORDER — PREDNISONE 10 MG/1
TABLET ORAL
Qty: 30 TABLET | Refills: 0 | Status: SHIPPED | OUTPATIENT
Start: 2024-12-17 | End: 2024-12-28

## 2024-12-17 RX ORDER — MIRTAZAPINE 7.5 MG/1
7.5 TABLET, FILM COATED ORAL AT BEDTIME
Qty: 90 TABLET | Refills: 1 | Status: SHIPPED | OUTPATIENT
Start: 2024-12-17

## 2024-12-17 RX ORDER — LIDOCAINE 50 MG/G
OINTMENT TOPICAL PRN
Qty: 50 G | Refills: 1 | Status: SHIPPED | OUTPATIENT
Start: 2024-12-17

## 2024-12-17 ASSESSMENT — PAIN SCALES - GENERAL: PAINLEVEL_OUTOF10: MODERATE PAIN (5)

## 2024-12-17 NOTE — PROGRESS NOTES
The OFFICE VISIT    Assessment/Plan:     Patient Instructions:    -Reduce your intake of sugary foods/drinks and carbohydrates (breads, rice, noodles, etc).   -Eat more vegetables and fruits.   -Start the prednisone as prescribed. Take this medication in the morning with food.   -Continue the other medications as prescribed.   -Check to see if you are still taking the gabapentin.   -Continue to do the exercises and seeing the physical therapist.     -Dr. Bautista recommends that you follow a diet that is rich in fruits and vegetables and low in fats and cholesterol.  In addition, find ways to stay active.  Doing aerobic activities (such as running, biking, etc.) will help improve the HDL or good cholesterol.  Weight loss is also recommended.    -Take acetaminophen/Tylenol 1000 mg once every 8 hours as needed for pain.    -Rest and limit usage of the affected area.  -Try to remain active and limit your activity based on discomfort.  -Apply a cold pack to the affected area for a maximum of 20 minutes at a time, once an hour as needed for pain and swelling.  Application of the cold pack for more than 20 minutes can increase risk of injuries to surrounding tissue.  -Apply a warm pack to the affected area as needed for discomforts.  The warm pack will help to improve blood flow and relax surrounding tissues.  You may make your own warm pack by doing the following: Take a sock, fill the sock with uncooked rice, and tie it off at the opened end.  You may place the sock and rice in the microwave for 30-60 seconds at a time or until warm.  Be cautious that the sock/rice is not too hot.  -Do stretches to help relax the surrounding muscles.  When doing stretches, be sure to hold your body in that position (avoid moving) and hold the stretch for 30 seconds.     -Work on eating healthy and following the recommendations below.   -One serving of food is about the size of the palm of your hand.         Please seek immediate medical  attention (go to the emergency room or urgent care) for the following reasons: worsening symptoms, or any concerning changes.        Darion was seen today for pain.  Diagnoses and all orders for this visit:    Piriformis syndrome, left  Greater trochanteric bursitis of left hip  It band syndrome, left  Right shoulder pain, unspecified chronicity: Left leg has improved significantly.  Examination today, it is suggestive of piriformis syndrome.  Reviewed with patient.  Patient to continue physical therapy for the left leg/hip pain.  Patient had done well with PT in the distant past for the right shoulder pain. Referral placed again.  No previous right shoulder x-rays/imaging noted on chart review.  Consider x-ray if symptoms are not improving.  Continue topical medication. Prednisone taper will likely be helpful. Continue leg physical therapy.   -     diclofenac (VOLTAREN) 1 % topical gel; Apply 2 g topically 3 times daily as needed (right shoulder pain).  -     lidocaine (XYLOCAINE) 5 % external ointment; Apply topically as needed for moderate pain.  -     predniSONE (DELTASONE) 10 MG tablet; Take 4 tablets (40 mg) by mouth daily for 3 days, THEN 3 tablets (30 mg) daily for 3 days, THEN 2 tablets (20 mg) daily for 3 days, THEN 1 tablet (10 mg) daily for 3 days.  -     Physical Therapy  Referral; Future    Adjustment disorder with mixed anxiety and depressed mood  Mood disorder (H)  Sleep difficulties: stable. Refill as below.   -     mirtazapine (REMERON) 7.5 MG tablet; Take 1 tablet (7.5 mg) by mouth at bedtime.    Cerebrovascular accident (CVA), unspecified mechanism (H)  SAH (subarachnoid hemorrhage) (H)  S/P  shunt  Mixed hyperlipidemia: stable. Refill as below.   -     clopidogrel (PLAVIX) 75 MG tablet; Take 1 tablet (75 mg) by mouth daily.  -     atorvastatin (LIPITOR) 20 MG tablet; Take 1 tablet (20 mg) by mouth daily.  -     aspirin (ASA) 81 MG chewable tablet; Take 1 tablet (81 mg) by mouth  daily.        Return in about 3 months (around 3/17/2025), or if symptoms worsen or fail to improve.    The diagnoses, treatment options, risk, benefits, and recommendations were reviewed with patient/guardian.  Questions were answered to patient's/guardian satisfaction.  Red flag signs were reviewed.  Patient/guardian is in agreement with above plan.      Subjective: 49 year old female with history of chronic bilateral low back pain without sciatica, CVA due to occlusion of the right middle cerebral artery and aneurysmal subarachnoid hemorrhage s/p stent coiling on clopidogrel, hydrocephalus with operating  shunt, epilepsy as late effect of CVA, tracheostomy, mood disorder, headaches, adjustment disorder with mixed anxiety and depressed mood who presents to clinic for the following complaints:   Patient presents with:  Pain: Patient complains of pain in left leg, from hip to knee. Experiences a pinch nerve feeling when walking and sleeping.     Answers submitted by the patient for this visit:  General Questionnaire (Submitted on 12/17/2024)  Chief Complaint: Chronic problems general questions HPI Form  What is the reason for your visit today? : Nerve pain  How many servings of fruits and vegetables do you eat daily?: 4 or more  On average, how many sweetened beverages do you drink each day (Examples: soda, juice, sweet tea, etc.  Do NOT count diet or artificially sweetened beverages)?: 1  How many minutes a day do you exercise enough to make your heart beat faster?: 10 to 19  How many days a week do you exercise enough to make your heart beat faster?: 7  How many days per week do you miss taking your medication?: 1  What makes it hard for you to take your medication every day?: remembering to take  Questionnaire about: Chronic problems general questions HPI Form (Submitted on 12/17/2024)  Chief Complaint: Chronic problems general questions HPI Form    Symptoms started several months ago.     The pain is in the  left hip and deep inside. If she presses on the area, it doesn't seem to reach the painful area.     She has been going to therapy. She has two sessions left for therapy. The left leg is much better now compared to before, though it still hasn't resolved.     Pain is worse on the lateral left thigh and down the IT band and sometimes can go into the calf.   She has been doing the physical therapy exercises at home and that helps, too. Encouragement provided.     The right arm can still have numbness on the right side, too, when she sleeps. Pain is noted in the right anterior shoulder and the pain can go down to the elbow. She had fallen several months ago and hit the right elbow, so the right elbow bothers her often, even with washing many dishes or trying to chop meat/vegetables or grinding peppers in a mortar and pestle.  Has PT in the past for the right shoulder pain and had improvement in symptoms, though now the symptoms have come back. Denies any recent falls, injuries, urinary/stool incontinence, perianal sensation changes, fevers, chills, severe headaches, nausea, vomiting, or other changes from baseline.    Patient does have a history of chronic bilateral low back pain without sciatica.    Patient had a CT lumbar spine completed on 2/24/2023 that did not demonstrate any spinal canal or foraminal compromise.  Patient did have development of internal L5/S1 disc bulge.    The topical medications are helpful for the pain. Her medications are set up by her children. Hasn't tried prednisone before.     Requesting for medication refill.  Medication list was reviewed.  Doing well and is stable. Denies any significant issues or side effects.     She likes to eat a bread a lot. She likes to dip bread into coffee and she eats this often.  Discussed dietary recommendations.      She sees a mental health therapist on a regular basis.  Encouragement provided.      HM due was reviewed with patient/parent.  Recommendations,  risk, benefits were reviewed.  Accepted recommendations were ordered.  Otherwise, patient/parent declined.    Health Maintenance Due   Topic Date Due    ADVANCE CARE PLANNING  Never done       Immunization History   Administered Date(s) Administered    COVID-19 12+ (MODERNA) 10/15/2023    COVID-19 12+ (Pfizer) 10/01/2024    COVID-19 Monovalent 18+ (Moderna) 03/15/2021, 04/14/2021, 11/30/2021    Influenza (IIV3) PF 09/14/2011    Influenza Vaccine 18-64 (Flublok) 10/08/2022    Influenza Vaccine >6 months,quad, PF 10/29/2019, 09/27/2021, 09/20/2023    Influenza, Split Virus, Trivalent, Pf (Fluzone\Fluarix) 09/17/2024    TDAP (Adacel,Boostrix) 08/21/2024         The 10 point review of system is negative except as stated in the HPI.    Allergies were reviewed and updated.    Narrative & Impression   EXAM: XR HIP LEFT 2-3 VIEWS  LOCATION: [facility]  DATE: 3/11/2024     INDICATION:  Hip pain, left  COMPARISON: None.                                                                      IMPRESSION: The left hip is negative for fracture. The left hemipelvis is negative for fracture. No dislocation.     Narrative & Impression   EXAM: CT LUMBAR SPINE W/O CONTRAST  LOCATION: [facility]  DATE/TIME: 2/24/2023 2:47 PM     INDICATION: Low back pain; hx Spine surgery; No r o hardware failure; None of the following: Lower extremity weakness, new acute radiculopathy, or worsening low back pain; No r o lumbar disc disease  COMPARISON: None.  TECHNIQUE: Routine CT Lumbar Spine without IV contrast. Multiplanar reformats. Dose reduction techniques were used.      FINDINGS:  VERTEBRA: Transitional lumbosacral anatomy with partial lumbarization of the S1 vertebra and rudimentary S1-S2 disc. Normal vertebral body heights. Shallow listhesis L5 on S1 measuring meters.. No fracture or destructive osseous lesion.      CANAL/FORAMINA: Interval L5-S1 disc bulge. No canal or neural foraminal  "stenosis.     PARASPINAL: Early atherosclerotic vascular disease of the aorta.                                                                      IMPRESSION:  1.  No spinal canal or foraminal compromise.         Objective:   /78   Pulse 85   Temp 98.6  F (37  C) (Oral)   Resp 20   Ht 1.466 m (4' 9.72\")   Wt 77.6 kg (171 lb)   SpO2 97%   BMI 36.09 kg/m    General: Active, alert, nontoxic-appearing.  No acute distress.  HEENT: Normocephalic, atraumatic.  Pupils are equal and round.  Sclera is clear.  Normal external ears. Nares patent.  Moist mucous membranes.    Cardiac: RRR.  S1, S2 present.  No murmurs, rubs, or gallops.  Respiratory/chest: Clear to auscultation bilaterally.  No wheezes, rales, rhonchi.  Breathing is not labored.  No accessory muscle usage.  Extremities: LLE: positive FAIR sign. Unable to reproduce pain to palpation of the left thigh/leg/buttock on exam today (patient indicates the pain is deeper inside). No knee pains. Otherwise normal on exam. Voluntary movements intact.  Right lower extremity: Within normal limits.  Right upper extremity: Positive Desai and Neer signs.  Range of motion limited by pain.  Remainder of the right upper extremity within normal limits.  Integumentary: No concerning rash or skin changes appreciated.    Amount of time spent in chart review, direct patient contact, care coordination, and related activities to patient care on the day of appointment: 40 minutes.       Rahul Bautista MD  [facility] [place] [address]  Phone: [phone]  Fax: [phone]    12/23/2024  10:10 AM            Current Outpatient Medications   Medication Sig Dispense Refill    aspirin (ASA) 81 MG chewable tablet Take 1 tablet (81 mg) by mouth daily. 90 tablet 3    atorvastatin (LIPITOR) 20 MG tablet Take 1 tablet (20 mg) by mouth daily. 90 tablet 3    clopidogrel (PLAVIX) 75 MG tablet Take 1 tablet (75 mg) by mouth daily. 90 tablet 3    diclofenac " (VOLTAREN) 1 % topical gel Apply 2 g topically 3 times daily as needed (right shoulder pain). 150 g 2    gabapentin (NEURONTIN) 100 MG capsule Take 1 capsule (100 mg) by mouth 3 times daily. 90 capsule 2    lidocaine (XYLOCAINE) 5 % external ointment Apply topically as needed for moderate pain. 50 g 1    mirtazapine (REMERON) 7.5 MG tablet Take 1 tablet (7.5 mg) by mouth at bedtime. 90 tablet 1    polyethylene glycol-propylene glycol (SYSTANE ULTRA) 0.4-0.3 % SOLN ophthalmic solution Place 1 drop into both eyes 4 times daily as needed for dry eyes 15 mL 3    predniSONE (DELTASONE) 10 MG tablet Take 4 tablets (40 mg) by mouth daily for 3 days, THEN 3 tablets (30 mg) daily for 3 days, THEN 2 tablets (20 mg) daily for 3 days, THEN 1 tablet (10 mg) daily for 3 days. 30 tablet 0     No current facility-administered medications for this visit.       Allergies   Allergen Reactions    Hydrocodone Rash and Swelling    Oxycodone Rash and Swelling       Patient Active Problem List    Diagnosis Date Noted    Class 2 severe obesity due to excess calories with serious comorbidity in adult (H) 03/11/2024     Priority: Medium    Mixed hyperlipidemia 07/17/2023     Priority: Medium    Dry eyes 07/17/2023     Priority: Medium    Chronic bilateral low back pain without sciatica 03/16/2023     Priority: Medium    S/P  shunt 02/16/2023     Priority: Medium    Neck pain 02/16/2023     Priority: Medium    Subdural hematoma (H) 05/07/2020     Priority: Medium    Shunt malfunction, subsequent encounter      Priority: Medium    RUQ abdominal pain      Priority: Medium    HCAP (healthcare-associated pneumonia) 10/26/2019     Priority: Medium    Pleural effusion 10/26/2019     Priority: Medium    Cerebral aneurysm 09/16/2019     Priority: Medium    SAH (subarachnoid hemorrhage) (H) 08/21/2019     Priority: Medium    Hydrocephalus with operating shunt (H) 08/21/2019     Priority: Medium    Epilepsy as late effect of cerebrovascular accident  (CVA) (H) 08/21/2019     Priority: Medium    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 (H)      Priority: Medium    Respiratory failure (H) 08/20/2019     Priority: Medium    Adjustment disorder with mixed anxiety and depressed mood      Priority: Medium    Mood disorder (H)      Priority: Medium    Sleep difficulties      Priority: Medium    Pneumonia due to group B Streptococcus, unspecified laterality, unspecified part of lung (H)      Priority: Medium    Vasospasm of cerebral artery      Priority: Medium    Cerebrovascular accident (CVA) due to occlusion of right middle cerebral artery (H)      Priority: Medium    S/P coil embolization of cerebral aneurysm 07/23/2019     Priority: Medium     July 22,2019 @ [facility]  ENDOVASCULAR COIL EMBOLIZATION OF A RUPTURED DISSECTING VENTROMEDIAL   SUPRACLINOID RIGHT INTERNAL CAROTID ARTERY ANEURYSM  2. CEREBRAL ANGIOGRAM: SELECTIVE CATHETERIZATION OF THE LEFT COMMON   CAROTID ARTERY, RIGHT COMMON CAROTID ARTERY, RIGHT INTERNAL CAROTID   ARTERY, LEFT VERTEBRAL ARTERY, AND RIGHT VERTEBRAL ARTERY WITH   ANGIOGRAPHIC IMAGING CENTERED OVER THE HEAD  3. CERVICAL ANGIOGRAM: SELECTIVE CATHETERIZATION OF THE LEFT COMMON   CAROTID ARTERY AND THE RIGHT COMMON CAROTID ARTERY WITH ANGIOGRAPHIC   IMAGING CENTERED OVER THE NECK  4. THREE-DIMENSIONAL ROTATIONAL ANGIOGRAM OF THE HEAD WITH IMAGE   PROCESSING ON A SEPARATE COMPUTER WORKSTATION: INJECTION OF THE RIGHT   COMMON CAROTID ARTERY  5. 5 CEREBRAL ANGIOGRAMS THROUGH EXISTING GUIDE CATHETER TO EVALUATE   ENDOVASCULAR TREATMENT: INJECTIONS OF THE RIGHT INTERNAL CAROTID ARTERY        Aneurysm of right internal carotid artery 07/23/2019     Priority: Medium    Aneurysmal subarachnoid hemorrhage (H) 07/22/2019     Priority: Medium    CVA (cerebral vascular accident) (H) 07/22/2019     Priority: Medium     Added automatically from request for surgery [id number]        Hydrocephalus (H) 07/22/2019     Priority: Medium     Added automatically from request  for surgery [id number]        Leiomyoma of uterus 07/18/2016     Priority: Daniel Alamo MD Primary    Procedure Laterality Anesthesia   ROBOTIC HYSTERECTOMY WITH CYSTOSCOPY     2016           Family History   Problem Relation Age of Onset    No Known Problems Mother     No Known Problems Father     No Known Problems Sister     No Known Problems Brother     No Known Problems Maternal Aunt     No Known Problems Maternal Uncle     No Known Problems Paternal Aunt     No Known Problems Paternal Uncle     No Known Problems Maternal Grandmother     No Known Problems Maternal Grandfather     No Known Problems Paternal Grandmother     No Known Problems Paternal Grandfather     No Known Problems Other        Past Surgical History:   Procedure Laterality Date    GYN SURGERY      HC UGI ENDOSCOPY W PLACEMENT GASTROSTOMY TUBE PERCUT N/A 08/07/2019    Procedure: CREATION, GASTROSTOMY, PERCUTANEOUS, ENDOSCOPIC;  Surgeon: Fer Ledezma MD;  Location: [facility];  Service: General    HEAD & NECK SURGERY      HYSTERECTOMY  2017    IR CEREBRAL ANGIOGRAM  07/22/2019    IR CEREBRAL ANGIOGRAM  07/24/2019    IR CEREBRAL ANGIOGRAM  07/27/2019    IR CEREBRAL ANGIOGRAM  07/28/2019    IR CEREBRAL ANGIOGRAM  07/29/2019    IR CEREBRAL ANGIOGRAM  08/02/2019    IR CEREBRAL ANGIOGRAM  07/30/2019    IR CEREBRAL ANGIOGRAM  07/31/2019    IR CEREBRAL ANGIOGRAM  08/01/2019    IR CEREBRAL ANGIOGRAM  08/08/2019    IR CEREBRAL ANGIOGRAM  08/13/2019    IR CEREBRAL ANGIOGRAM  07/22/2019    IR CEREBRAL ANGIOGRAM  07/24/2019    IR CEREBRAL ANGIOGRAM  07/27/2019    IR CEREBRAL ANGIOGRAM  07/29/2019    IR CEREBRAL ANGIOGRAM  07/30/2019    IR CEREBRAL ANGIOGRAM  07/31/2019    IR CEREBRAL ANGIOGRAM  08/01/2019    IR CEREBRAL ANGIOGRAM  08/02/2019    IR CEREBRAL ANGIOGRAM  07/28/2019    IR CEREBRAL ANGIOGRAM  08/13/2019    IR CEREBRAL ANGIOGRAM  08/08/2019    IR EMBOLIZATION  09/16/2019    IR EMBOLIZATION  09/16/2019    IR LUMBAR DRAIN INSERTION   07/27/2019    IR LUMBAR DRAIN INSERTION  08/02/2019    IR LUMBAR DRAIN INSERTION  08/08/2019    IR LUMBAR DRAIN INSERTION  08/16/2019    IR LUMBAR DRAIN PLACEMENT W FLUORO  07/27/2019    IR LUMBAR DRAIN PLACEMENT W FLUORO  08/02/2019    IR LUMBAR DRAIN PLACEMENT W FLUORO  08/08/2019    IR LUMBAR DRAIN PLACEMENT W FLUORO  08/16/2019    OTHER SURGICAL HISTORY      COIL EMBOLIZATION    PICC AND MIDLINE TEAM LINE INSERTION  07/26/2019         IA CREATE SHUNT:VENTRIC-PERITONEAL Right 08/19/2019    Procedure: INSERTION, SHUNT, VENTRICULOPERITONEAL, USING FRAMELESS STEREOTAXY ;  Surgeon: Gale Hoffmann MD;  Location: [facility] Main OR;  Service: Neurosurgery    IA CREATE SHUNT:VENTRIC-PERITONEAL Right 10/30/2019    Procedure: Replacement of distal catheter for ventriculoperitoneal shunt with general surgery assistance for laparoscopic approach;  Surgeon: Malou Sal MD;  Location: [facility] Main OR;  Service: Neurosurgery    IA LAP INSERTION TUNNELED INTRAPERITONEAL CATHETER Right 08/19/2019    Procedure: INSERTION, CATHETER, PERITONEAL, LAPAROSCOPIC;  Surgeon: Fer Ledezma MD;  Location: [facility] Main OR;  Service: General    TRACHEOSTOMY N/A 08/07/2019    Procedure: CREATION, TRACHEOSTOMY;  Surgeon: Fer Ledezma MD;  Location: [facility] Main OR;  Service: General        Social History     Socioeconomic History    Marital status:      Spouse name: Not on file    Number of children: Not on file    Years of education: Not on file    Highest education level: Not on file   Occupational History    Not on file   Tobacco Use    Smoking status: Never     Passive exposure: Never    Smokeless tobacco: Never   Vaping Use    Vaping status: Never Used   Substance and Sexual Activity    Alcohol use: Never    Drug use: Never    Sexual activity: Not on file   Other Topics Concern    Parent/sibling w/ CABG, MI or angioplasty before 65F 55M? Not Asked   Social History Narrative    Not on file      Social Drivers of Health     Financial Resource Strain: High Risk (3/11/2024)    Financial Resource Strain     Within the past 12 months, have you or your family members you live with been unable to get utilities (heat, electricity) when it was really needed?: Yes   Food Insecurity: High Risk (3/11/2024)    Food Insecurity     Within the past 12 months, did you worry that your food would run out before you got money to buy more?: Yes     Within the past 12 months, did the food you bought just not last and you didn t have money to get more?: Yes   Transportation Needs: High Risk (3/11/2024)    Transportation Needs     Within the past 12 months, has lack of transportation kept you from medical appointments, getting your medicines, non-medical meetings or appointments, work, or from getting things that you need?: Yes   Physical Activity: Not on file   Stress: No Stress Concern Present (6/15/2023)    [employer] - Occupational Stress Questionnaire     Feeling of Stress : Only a little   Social Connections: Not on file   Interpersonal Safety: Low Risk  (9/18/2024)    Interpersonal Safety     Do you feel physically and emotionally safe where you currently live?: Yes     Within the past 12 months, have you been hit, slapped, kicked or otherwise physically hurt by someone?: No     Within the past 12 months, have you been humiliated or emotionally abused in other ways by your partner or ex-partner?: No   Housing Stability: High Risk (3/11/2024)    Housing Stability     Do you have housing? : Yes     Are you worried about losing your housing?: Yes

## 2024-12-17 NOTE — PATIENT INSTRUCTIONS
-Thank you for choosing the [facility].  -It was a pleasure to see you today.  -Please take a look at the information below for more specific details regarding the treatment plan and recommendations.  -In this after visit summary is a list of your medications and specific instructions.  Please review this carefully as there may be changes made to your medication list.  -If there are any particular questions or concerns, please feel free to reach out to Dr. Bautista.  -If any labs have been completed, we will reach out to you about results.  If the results are normal or not concerning, a letter or MyChart message will be sent to you.  If any follow-up is needed, either Dr. Bautista or the nurse will give you a call.  If you have not heard regarding results after 2 weeks, please reach out to the clinic.    Patient Instructions:    -Reduce your intake of sugary foods/drinks and carbohydrates (breads, rice, noodles, etc).   -Eat more vegetables and fruits.   -Start the prednisone as prescribed. Take this medication in the morning with food.   -Continue the other medications as prescribed.   -Check to see if you are still taking the gabapentin.   -Continue to do the exercises and seeing the physical therapist.     -Dr. Bautista recommends that you follow a diet that is rich in fruits and vegetables and low in fats and cholesterol.  In addition, find ways to stay active.  Doing aerobic activities (such as running, biking, etc.) will help improve the HDL or good cholesterol.  Weight loss is also recommended.    -Take acetaminophen/Tylenol 1000 mg once every 8 hours as needed for pain.    -Rest and limit usage of the affected area.  -Try to remain active and limit your activity based on discomfort.  -Apply a cold pack to the affected area for a maximum of 20 minutes at a time, once an hour as needed for pain and swelling.  Application of the cold pack for more than 20 minutes can increase risk of injuries to  surrounding tissue.  -Apply a warm pack to the affected area as needed for discomforts.  The warm pack will help to improve blood flow and relax surrounding tissues.  You may make your own warm pack by doing the following: Take a sock, fill the sock with uncooked rice, and tie it off at the opened end.  You may place the sock and rice in the microwave for 30-60 seconds at a time or until warm.  Be cautious that the sock/rice is not too hot.  -Do stretches to help relax the surrounding muscles.  When doing stretches, be sure to hold your body in that position (avoid moving) and hold the stretch for 30 seconds.     -Work on eating healthy and following the recommendations below.   -One serving of food is about the size of the palm of your hand.         Please seek immediate medical attention (go to the emergency room or urgent care) for the following reasons: worsening symptoms, or any concerning changes.      --------------------------------------------------------------------------------------------------------------------    -We are always looking for ways to improve.  You may be selected to receive a survey regarding your visit today.  We encourage you to complete the survey and provide specific, constructive feedback to help us improve our processes.  Thank you for your time!  -Please review the contact information listed on the after visit summary and in the electronic chart.  Below is the phone number that we have on file.  If there are any changes that are needed to be made, please reach out to the clinic.  [phone] (home)

## 2024-12-18 ENCOUNTER — THERAPY VISIT (OUTPATIENT)
Dept: PHYSICAL THERAPY | Facility: REHABILITATION | Age: 49
End: 2024-12-18
Payer: COMMERCIAL

## 2024-12-18 DIAGNOSIS — M70.62 GREATER TROCHANTERIC BURSITIS OF LEFT HIP: Primary | ICD-10-CM

## 2024-12-18 DIAGNOSIS — M76.32 IT BAND SYNDROME, LEFT: ICD-10-CM

## 2024-12-18 PROCEDURE — T1013 SIGN LANG/ORAL INTERPRETER: HCPCS | Mod: GT

## 2024-12-18 PROCEDURE — 97140 MANUAL THERAPY 1/> REGIONS: CPT | Mod: GP

## 2024-12-18 PROCEDURE — 97110 THERAPEUTIC EXERCISES: CPT | Mod: GP

## 2024-12-18 NOTE — PROGRESS NOTES
[facility]                                                                                   OUTPATIENT PHYSICAL THERAPY    PLAN OF TREATMENT FOR OUTPATIENT REHABILITATION   Patient's Last Name, First Name, Darion Goodwin YOB: 1975   Provider's Name   [facility]   Medical Record No.  [id number]     Onset Date: 09/17/24 (order date)  Start of Care Date: 09/18/24     Medical Diagnosis:  Greater trochanteric bursitis of left hip, It band syndrome, left      PT Treatment Diagnosis:  Left hip pain, weakness, mobility  deficit Plan of Treatment  Frequency/Duration: (P) 1 x / week, taper to 1x every other week as able/ 90 days    Certification date from (P) 12/18/24 to (P) 03/17/25         See note for plan of treatment details and functional goals     Julia Colon PT                         I CERTIFY THE NEED FOR THESE SERVICES FURNISHED UNDER        THIS PLAN OF TREATMENT AND WHILE UNDER MY CARE     (Physician attestation of this document indicates review and certification of the therapy plan).              Referring Provider:  Ina Fontanez    Initial Assessment  See Epic Evaluation- Start of Care Date: 09/18/24            PLAN  Continue therapy per current plan of care.    Beginning/End Dates of Progress Note Reporting Period:  09/18/24 to 12/18/2024    Referring Provider:  Ina Fontanez        12/18/24 0500   Appointment Info   Signing clinician's name / credentials Julia Colon PT   Visits Used 8   Medical Diagnosis Greater trochanteric bursitis of left hip, It band syndrome, left   PT Tx Diagnosis Left hip pain, weakness, mobility  deficit   Progress Note/Certification   Start of Care Date 09/18/24   Onset of illness/injury or Date of Surgery 09/17/24  (order date)   Therapy Frequency 1 x / week, taper to 1x every other week as able   Predicted Duration 90 days   Certification date from 12/18/24    Certification date to 03/17/25   Progress Note Due Date 12/18/24   Progress Note Completed Date 09/18/24       Present Yes    [employer] Hmong    ID or First/Last Name Nani ([facility])   GOALS   PT Goals 2;3   PT Goal 1   Goal Identifier HEP   Goal Description Pt will demonstrate understanding and independece of HEP in 30 days.   Rationale to maximize safety and independence with performance of ADLs and functional tasks   Goal Progress In progress   Target Date 01/17/25   PT Goal 2   Goal Identifier HOS   Goal Description Pt will improve HOS ADL score by 14  points by the end of therapy in order to demonstrate MCID in score to demonstrate improved functional mobility and improved ADLs.   Goal Progress In progress   Target Date 03/17/25   PT Goal 3   Goal Identifier sleep   Goal Description Pt will be able to sleep  through the night without waking due to symptoms at least 4/7 nights by the end of therapy in order to improve  overall QOL.   Goal Progress In progress   Target Date 03/17/25   Subjective Report   Subjective Report Pt reports she is doing better. She continues to feel tightness over her left hip that remains similar to previous visits. Pt feels she is 50% better.   Objective Measures   Objective Measures Objective Measure 1;Objective Measure 2   Objective Measure 1   Objective Measure HOS  ADL   Details 26.47% on eval   Objective Measure 2   Objective Measure Pain   Details 4/10   Treatment Interventions (PT)   Interventions Therapeutic Procedure/Exercise;Manual Therapy   Therapeutic Procedure/Exercise   Therapeutic Procedures: strength, endurance, ROM, flexibility minutes (58482) 25   Therapeutic Procedures Ther Proc 2;Ther Proc 3;Ther Proc 4;Ther Proc 5   Ther Proc 1 Nustep:  x5 min, level 5 + subjective   Ther Proc 1 - Details NT: SLR: x 10-15 reps  B   Ther Proc 2 banded Bridge: x  10 reps green gym TB   Ther Proc 2 - Details LTR: x 10-15 reps B   Ther  Proc 3 NT: Seated hip  ABD: 2 sets x 15 reps, blue  gym TB   Ther Proc 3 - Details NT: sidelying SLR: x 10 reps B   Ther Proc 4 NT: Standing hip ABD and ext: x 10 reps B, no pain   Ther Proc 4 - Details TG: level 20, bilateral leg press x 10 reps x 2 sets   PTRx Ther Proc 1 NT: Clamshell Feet Together   PTRx Ther Proc 1 - Details x 15 reps on B, no pain   PTRx Ther Proc 2 NT: Sitting Hip Adduction Squeeze   PTRx Ther Proc 2 - Details x 15 reps w/ pillow   Skilled Intervention Reviewed HEP, Exercises to help improve hip mobility and  strength, reviewed pts HEP, discussed therapy POC, demonstration & cueing provided   Patient Response/Progress pt demonstrated understanding   Ther Proc 5 Sciatic nerve glides in supine: x 15-20 reps, instructed pt to not push through pain   Manual Therapy   Manual Therapy: Mobilization, MFR, MLD, friction massage minutes (67658) 14   Manual Therapy 1 STM, TP release, MFR (layers 2 and 3) to pts left  IT band, quads and left gastroc in right side-lying   Skilled Intervention MT for  relief  of symptoms and decreased  taught bands   Patient Response/Progress Pt reports symptom relief at the end of session and noted she could feel slight tenderness in certain areas throughout treatment   Education   Learner/Method Patient;Demonstration;Pictures/Video   Plan   Home program PTRx   Plan for next session review HEP, continue hip and core strengthening, continue MT prn   Comments   Comments Assessment: Pt returns  for her 8th visit. PT reviewed some of patient's exercises and added nerve glides to improve mobility. Pt has made good progress in her overall symptoms since starting therapy, noting significant symptom relief today. MT continued today, with MFR continued which pt tolerated well. She was able to get release along left medial ITB. Pt continues  to demonstrate tension throughout IT band and generalized weakness  indicated by fast fatigue with ex. We will continue to work on releasing  pts taught muscles in order to meet her goals for PT. Pt continues to benefit from skilled PT  services for return to  highest level of function.

## 2024-12-20 PROBLEM — M25.511 RIGHT SHOULDER PAIN, UNSPECIFIED CHRONICITY: Status: ACTIVE | Noted: 2024-12-20

## 2025-01-02 ENCOUNTER — THERAPY VISIT (OUTPATIENT)
Dept: PHYSICAL THERAPY | Facility: REHABILITATION | Age: 50
End: 2025-01-02
Payer: COMMERCIAL

## 2025-01-02 DIAGNOSIS — M76.32 IT BAND SYNDROME, LEFT: ICD-10-CM

## 2025-01-02 DIAGNOSIS — M70.62 GREATER TROCHANTERIC BURSITIS OF LEFT HIP: Primary | ICD-10-CM

## 2025-01-08 ENCOUNTER — PATIENT OUTREACH (OUTPATIENT)
Dept: CARE COORDINATION | Facility: CLINIC | Age: 50
End: 2025-01-08
Payer: COMMERCIAL

## 2025-01-30 ENCOUNTER — TELEPHONE (OUTPATIENT)
Dept: FAMILY MEDICINE | Facility: CLINIC | Age: 50
End: 2025-01-30
Payer: COMMERCIAL

## 2025-01-30 NOTE — TELEPHONE ENCOUNTER
Reason for Call:  Appointment Request    Patient requesting this type of appt:  patients doctor    Requested provider: Rahul Bautista    Reason patient unable to be scheduled: Not with their preferred provider    When does patient want to be seen/preferred time: 1-2 days    Comments: pt had appt, for 2/3 virtual and pcp called and said they need to be in person, no appt avail until 4/17, daughter would like to bring her in 2/4    Okay to leave a detailed message?: Yes at Home number on file [phone] (home) or Cell number on file:    Telephone Information:   Mobile [phone]       Call taken on 1/30/2025 at 2:05 PM by Arainne Recinos

## 2025-01-30 NOTE — TELEPHONE ENCOUNTER
Patient has appointment video visit on 2/3/2025 . Dr Bautista would like to see her in person. She will check to see if she has transportation and will call us back. Change appointment to in person if patient is able to get transportation

## 2025-02-03 NOTE — TELEPHONE ENCOUNTER
Please return call: okay for 2/4 appt to be in-person. Looks like the appointment type has been changed already. Please inform patient/family.     Rahul Bautista MD  [facility] [place] [address]  Phone: [phone]  Fax: [phone]    2/3/2025  7:38 AM

## 2025-02-03 NOTE — TELEPHONE ENCOUNTER
Chart reviewed, it appears the appointment mode type as already be changed:    Feb 04, 2025 10:00 AM  (Arrive by 9:40 AM)  Provider Visit with Rahul Bautista MD, VIRTUAL   [facility] ([facility] [phone]     Spoke to patient regarding message below. Per patient, she already had a ong  at her therapy help call to change her appointment to In-Person the other day.    Patient will plan to arrive to tomorrow's appointment as scheduled.    No further action needed.    YAIR LeoN, RN, PHN   [facility]

## 2025-02-04 ENCOUNTER — OFFICE VISIT (OUTPATIENT)
Dept: FAMILY MEDICINE | Facility: CLINIC | Age: 50
End: 2025-02-04
Payer: COMMERCIAL

## 2025-02-04 VITALS
HEART RATE: 86 BPM | RESPIRATION RATE: 18 BRPM | TEMPERATURE: 98.2 F | HEIGHT: 58 IN | BODY MASS INDEX: 35.48 KG/M2 | WEIGHT: 169 LBS | DIASTOLIC BLOOD PRESSURE: 74 MMHG | OXYGEN SATURATION: 99 % | SYSTOLIC BLOOD PRESSURE: 113 MMHG

## 2025-02-04 DIAGNOSIS — M25.511 CHRONIC RIGHT SHOULDER PAIN: Primary | ICD-10-CM

## 2025-02-04 DIAGNOSIS — G89.29 CHRONIC RIGHT SHOULDER PAIN: Primary | ICD-10-CM

## 2025-02-04 DIAGNOSIS — Z75.8 LANGUAGE BARRIER: ICD-10-CM

## 2025-02-04 DIAGNOSIS — G89.29 CHRONIC LEFT HIP PAIN: ICD-10-CM

## 2025-02-04 DIAGNOSIS — H92.01 OTALGIA, RIGHT: ICD-10-CM

## 2025-02-04 DIAGNOSIS — M25.552 CHRONIC LEFT HIP PAIN: ICD-10-CM

## 2025-02-04 DIAGNOSIS — H04.123 DRY EYES: ICD-10-CM

## 2025-02-04 DIAGNOSIS — Z60.3 LANGUAGE BARRIER: ICD-10-CM

## 2025-02-04 DIAGNOSIS — L29.9 ITCHING OF EAR: ICD-10-CM

## 2025-02-04 PROCEDURE — 99214 OFFICE O/P EST MOD 30 MIN: CPT | Performed by: FAMILY MEDICINE

## 2025-02-04 PROCEDURE — G2211 COMPLEX E/M VISIT ADD ON: HCPCS | Performed by: FAMILY MEDICINE

## 2025-02-04 RX ORDER — KETOTIFEN FUMARATE 0.35 MG/ML
1 SOLUTION/ DROPS OPHTHALMIC 2 TIMES DAILY PRN
Qty: 10 ML | Refills: 11 | Status: SHIPPED | OUTPATIENT
Start: 2025-02-04

## 2025-02-04 RX ORDER — NEOMYCIN SULFATE, POLYMYXIN B SULFATE AND HYDROCORTISONE 10; 3.5; 1 MG/ML; MG/ML; [USP'U]/ML
3 SUSPENSION/ DROPS AURICULAR (OTIC) 3 TIMES DAILY
Qty: 10 ML | Refills: 3 | Status: SHIPPED | OUTPATIENT
Start: 2025-02-04 | End: 2025-02-11

## 2025-02-04 RX ORDER — BACLOFEN 10 MG/1
10-20 TABLET ORAL 3 TIMES DAILY PRN
Qty: 60 TABLET | Refills: 5 | Status: SHIPPED | OUTPATIENT
Start: 2025-02-04

## 2025-02-04 SDOH — SOCIAL STABILITY - SOCIAL INSECURITY: ACCULTURATION DIFFICULTY: Z60.3

## 2025-02-04 NOTE — PATIENT INSTRUCTIONS
-Thank you for choosing the [facility].  -It was a pleasure to see you today.  -Please take a look at the information below for more specific details regarding the treatment plan and recommendations.  -In this after visit summary is a list of your medications and specific instructions.  Please review this carefully as there may be changes made to your medication list.  -If there are any particular questions or concerns, please feel free to reach out to Dr. Bautista.  -If any labs have been completed, we will reach out to you about results.  If the results are normal or not concerning, a letter or [employer] message will be sent to you.  If any follow-up is needed, either Dr. Bautista or the nurse will give you a call.  If you have not heard regarding results after 2 weeks, please reach out to the clinic.    Patient Instructions:    -Take the medications as prescribed.   -Continue the physical therapy sessions and continue to do the home exercises.   -MRI of the left shoulder and right shoulder.  -If you do not hear from the specialist to schedule an appointment within a week's time from today, please call the [facility] and speak with the specialty  to help you schedule the appointment to see the specialist.  Depending on the specialist availability, it may be a number of weeks prior to your scheduled appointment.  -Do NOT use the Q-tips inside of the ears anymore.   -Use the ear and eye drops as ordered.       Please seek immediate medical attention (go to the emergency room or urgent care) for the following reasons: worsening symptoms, or any concerning changes.      --------------------------------------------------------------------------------------------------------------------    -We are always looking for ways to improve.  You may be selected to receive a survey regarding your visit today.  We encourage you to complete the survey and provide specific, constructive feedback to help  us improve our processes.  Thank you for your time!  -Please review the contact information listed on the after visit summary and in the electronic chart.  Below is the phone number that we have on file.  If there are any changes that are needed to be made, please reach out to the clinic.  [phone] (home)

## 2025-02-04 NOTE — PROGRESS NOTES
OFFICE VISIT    Assessment/Plan:     Patient Instructions:    -Take the medications as prescribed.   -Continue the physical therapy sessions and continue to do the home exercises.   -MRI of the left shoulder and right shoulder.  -If you do not hear from the specialist to schedule an appointment within a week's time from today, please call the [facility] and speak with the specialty  to help you schedule the appointment to see the specialist.  Depending on the specialist availability, it may be a number of weeks prior to your scheduled appointment.  -Do NOT use the Q-tips inside of the ears anymore.   -Use the ear and eye drops as ordered.       Please seek immediate medical attention (go to the emergency room or urgent care) for the following reasons: worsening symptoms, or any concerning changes.      Darion was seen today for recheck and ear problem.  Diagnoses and all orders for this visit:    Chronic right shoulder pain  Chronic left hip pain  [employer] barrier: mild improvement after physical therapy sessions, though has continued discomforts.  Plan for MRI of both the left hip and right shoulder.  Initiate baclofen as below as patient reports muscle tightness as the primary cause of pain she has been noting.  Continue other medications as prescribed. Further recommendations pending results.   -     MR Shoulder Right w/o Contrast; Future  -     MR Hip Left w/o Contrast; Future  -     baclofen (LIORESAL) 10 MG tablet; Take 1-2 tablets (10-20 mg) by mouth 3 times daily as needed for muscle spasms.    Dry eyes: Discontinue Systane and transitioning to Refresh as below.  -     ketotifen fumarate 0.035% 0.035 % SOLN ophthalmic solution; Place 1 drop into both eyes 2 times daily as needed for itching or dry eyes.    Otalgia, right  Itching of ear: started after patient inserted a Q-tip into the ears. Discussed recommendations against use of the Q-tips inside the ear.  Since then, patient has noted right ear  pains. There is irritation and dryness of the external ear canal bilaterally, though on the right side, there appears to be slightly more irritation.  Question if this may be an infection.  TMs appear to be normal without any damage.  -     neomycin-polymyxin-hydrocortisone (CORTISPORIN) 3.5-47891-1 otic suspension; Place 3 drops into both ears 3 times daily for 7 days.        Return for as scheduled..    The diagnoses, treatment options, risk, benefits, and recommendations were reviewed with patient/guardian.  Questions were answered to patient's/guardian satisfaction.  Red flag signs were reviewed.  Patient/guardian is in agreement with above plan.      Subjective: 49 year old female with history of chronic bilateral low back pain without sciatica, CVA due to occlusion of the right middle cerebral artery and aneurysmal subarachnoid hemorrhage s/p stent coiling on clopidogrel, hydrocephalus with operating  shunt, epilepsy as late effect of CVA, tracheostomy, mood disorder, headaches, adjustment disorder with mixed anxiety and depressed mood who presents to clinic for the following complaints:   Patient presents with:  RECHECK  Ear Problem    Answers submitted by the patient for this visit:  General Questionnaire (Submitted on 2/4/2025)  Chief Complaint: Chronic problems general questions HPI Form  What is the reason for your visit today? : Check ear, recheck shoulder/arm/leg  How many days per week do you miss taking your medication?: 0  Questionnaire about: Chronic problems general questions HPI Form (Submitted on 2/4/2025)  Chief Complaint: Chronic problems general questions HPI Form      Left hip/thigh pain and right shoulder pain: Patient was last seen by this provider on 12/17/2024 and at that time, patient had endorsed improvement in the left thigh/leg, though not quite fully resolved.  She has continued to do physical therapy since then.  Referral had also been placed at that visit for right shoulder pain.   Medications used to treat the symptoms included diclofenac 1% topical gel, lidocaine 5% external ointment, prednisone taper (40 mg for 2 days, etc.).  Patient is also on aspirin used to treat for the history of CVA.  No right shoulder x-ray noted.    She has done 2-3 times for the right shoulder pain. She wanted to just stick with one therapist and so she has been seeing the same therapist. She was told that if the symptoms are not improving, she should see the doctor for additional evaluation or treatment.     The left hip/thigh still hurts a bit. Some days, the leg is really tight. Some nights, she has to get up at night to apply topical traditional medications and that helps a bit.     Some days, the right shoulder is really tight, too. The right shoulder started after a slip and she fell and hit the right elbow. The pain is always there, though mild and doesn't get too intense. It is difficult to sleep on the right side. She has tightness in the shoulder blade region down into the shoulder and triceps region. Pulling on the right arm or putting pressure on the shoulder improves the pain for a while.     She has been doing the PT exercises when she is at home, too. She has an exercise bike at home, has the resistance bands, and went to buy the stick they used during the PT sessions.       Ear problem: The other day, the ear was itchy. She put in a Q-tip deeper inside the right ear and then she noted pain. Now, the pain is still there. When she lays on the right ear/side, that causes her to have pain. Her hand joints are also sore. She feels that the right ear is hot. When eating, she has pain in the right jaw, as if it was going to get stuck.       The 10 point review of system is negative except as stated in the HPI.    Allergies were reviewed and updated.    Narrative & Impression   EXAM: CT LUMBAR SPINE W/O CONTRAST  LOCATION: [facility]  DATE/TIME: 2/24/2023 2:47 PM     INDICATION:  Low back pain; hx Spine surgery; No r o hardware failure; None of the following: Lower extremity weakness, new acute radiculopathy, or worsening low back pain; No r o lumbar disc disease  COMPARISON: None.  TECHNIQUE: Routine CT Lumbar Spine without IV contrast. Multiplanar reformats. Dose reduction techniques were used.      FINDINGS:  VERTEBRA: Transitional lumbosacral anatomy with partial lumbarization of the S1 vertebra and rudimentary S1-S2 disc. Normal vertebral body heights. Shallow listhesis L5 on S1 measuring meters.. No fracture or destructive osseous lesion.      CANAL/FORAMINA: Interval L5-S1 disc bulge. No canal or neural foraminal stenosis.     PARASPINAL: Early atherosclerotic vascular disease of the aorta.                                                                      IMPRESSION:  1.  No spinal canal or foraminal compromise.       Narrative & Impression   EXAM: CT CERVICAL SPINE W/O CONTRAST  LOCATION: [facility]  DATE/TIME: 2/24/2023 2:47 PM     INDICATION: Neck pain; hx Spine surgery; No suspected hardware failure; None of the following: New acute radiculopathy, weakness, or worsening neck pain; No suspected cervical disc disease  COMPARISON: 10/05/2020.  TECHNIQUE: Routine CT Cervical Spine without IV contrast. Multiplanar reformats. Dose reduction techniques were used.     FINDINGS:  VERTEBRA: Straightening of the normal cervical lordosis. No significant listhesis. Vertebral body heights are maintained. No fracture or destructive osseous lesion.      CANAL/FORAMINA: No spinal canal or foraminal compromise is noted at any level     PARASPINAL: Ventricular shunt catheter is noted traversing the posterior right neck. No shunt discontinuity. Embolization coils noted in the region of the right supraclinoid internal carotid artery.                                                                      IMPRESSION:  1.  No spinal canal or foraminal compromise.  "        Objective:   /74 (BP Location: Right arm)   Pulse 86   Temp 98.2  F (36.8  C) (Oral)   Resp 18   Ht 1.466 m (4' 9.72\")   Wt 76.7 kg (169 lb)   SpO2 99%   BMI 35.66 kg/m    General: Active, alert, nontoxic-appearing.  No acute distress.  HEENT: Normocephalic, atraumatic.  Pupils are equal and round.  Sclera is clear.  Normal external ears. Dry skin of the ear canals, mild erythema bilaterally. TM are normal bilaterally.  Nares patent.  Moist mucous membranes.  Head and neck exam otherwise normal.   Cardiac: normal skin tone. Capillary refill < 3 seconds.   Respiratory/chest: speaking in full sentences. Breathing is not labored.  No accessory muscle usage.  Extremities: Voluntary movements intact.  Integumentary: No concerning rash or skin changes appreciated.        Rahul Bautista MD  [facility] [place] [address]  Phone: [phone]  Fax: [phone]    2/6/2025  7:03 AM            Current Outpatient Medications   Medication Sig Dispense Refill    aspirin (ASA) 81 MG chewable tablet Take 1 tablet (81 mg) by mouth daily. 90 tablet 3    atorvastatin (LIPITOR) 20 MG tablet Take 1 tablet (20 mg) by mouth daily. 90 tablet 3    baclofen (LIORESAL) 10 MG tablet Take 1-2 tablets (10-20 mg) by mouth 3 times daily as needed for muscle spasms. 60 tablet 5    clopidogrel (PLAVIX) 75 MG tablet Take 1 tablet (75 mg) by mouth daily. 90 tablet 3    diclofenac (VOLTAREN) 1 % topical gel Apply 2 g topically 3 times daily as needed (right shoulder pain). 150 g 2    gabapentin (NEURONTIN) 100 MG capsule Take 1 capsule (100 mg) by mouth 3 times daily. 90 capsule 2    ketotifen fumarate 0.035% 0.035 % SOLN ophthalmic solution Place 1 drop into both eyes 2 times daily as needed for itching or dry eyes. 10 mL 11    lidocaine (XYLOCAINE) 5 % external ointment Apply topically as needed for moderate pain. 50 g 1    mirtazapine (REMERON) 7.5 MG tablet Take 1 tablet (7.5 mg) by mouth at " bedtime. 90 tablet 1    neomycin-polymyxin-hydrocortisone (CORTISPORIN) 3.5-55441-7 otic suspension Place 3 drops into both ears 3 times daily for 7 days. 10 mL 3     No current facility-administered medications for this visit.       Allergies   Allergen Reactions    Hydrocodone Rash and Swelling    Oxycodone Rash and Swelling       Patient Active Problem List    Diagnosis Date Noted    Right shoulder pain, unspecified chronicity 12/20/2024     Priority: Medium    Class 2 severe obesity due to excess calories with serious comorbidity in adult (H) 03/11/2024     Priority: Medium    Mixed hyperlipidemia 07/17/2023     Priority: Medium    Dry eyes 07/17/2023     Priority: Medium    Chronic bilateral low back pain without sciatica 03/16/2023     Priority: Medium    S/P  shunt 02/16/2023     Priority: Medium    Neck pain 02/16/2023     Priority: Medium    Subdural hematoma (H) 05/07/2020     Priority: Medium    Shunt malfunction, subsequent encounter      Priority: Medium    RUQ abdominal pain      Priority: Medium    HCAP (healthcare-associated pneumonia) 10/26/2019     Priority: Medium    Pleural effusion 10/26/2019     Priority: Medium    Cerebral aneurysm 09/16/2019     Priority: Medium    SAH (subarachnoid hemorrhage) (H) 08/21/2019     Priority: Medium    Hydrocephalus with operating shunt (H) 08/21/2019     Priority: Medium    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) 08/21/2019     Priority: Medium    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 (H)      Priority: Medium    Respiratory failure (H) 08/20/2019     Priority: Medium    Adjustment disorder with mixed anxiety and depressed mood      Priority: Medium    Mood disorder      Priority: Medium    Sleep difficulties      Priority: Medium    Pneumonia due to group B Streptococcus, unspecified laterality, unspecified part of lung      Priority: Medium    Vasospasm of cerebral artery      Priority: Medium    Cerebrovascular accident (CVA) due to occlusion of  right middle cerebral artery (H)      Priority: Medium    S/P coil embolization of cerebral aneurysm 07/23/2019     Priority: Medium     July 22,2019 @ [facility]  ENDOVASCULAR COIL EMBOLIZATION OF A RUPTURED DISSECTING VENTROMEDIAL   SUPRACLINOID RIGHT INTERNAL CAROTID ARTERY ANEURYSM  2. CEREBRAL ANGIOGRAM: SELECTIVE CATHETERIZATION OF THE LEFT COMMON   CAROTID ARTERY, RIGHT COMMON CAROTID ARTERY, RIGHT INTERNAL CAROTID   ARTERY, LEFT VERTEBRAL ARTERY, AND RIGHT VERTEBRAL ARTERY WITH   ANGIOGRAPHIC IMAGING CENTERED OVER THE HEAD  3. CERVICAL ANGIOGRAM: SELECTIVE CATHETERIZATION OF THE LEFT COMMON   CAROTID ARTERY AND THE RIGHT COMMON CAROTID ARTERY WITH ANGIOGRAPHIC   IMAGING CENTERED OVER THE NECK  4. THREE-DIMENSIONAL ROTATIONAL ANGIOGRAM OF THE HEAD WITH IMAGE   PROCESSING ON A SEPARATE COMPUTER WORKSTATION: INJECTION OF THE RIGHT   COMMON CAROTID ARTERY  5. 5 CEREBRAL ANGIOGRAMS THROUGH EXISTING GUIDE CATHETER TO EVALUATE   ENDOVASCULAR TREATMENT: INJECTIONS OF THE RIGHT INTERNAL CAROTID ARTERY        Aneurysm of right internal carotid artery 07/23/2019     Priority: Medium    Aneurysmal subarachnoid hemorrhage (H) 07/22/2019     Priority: Medium    CVA (cerebral vascular accident) (H) 07/22/2019     Priority: Medium     Added automatically from request for surgery [id number]        Hydrocephalus (H) 07/22/2019     Priority: Medium     Added automatically from request for surgery [id number]        Leiomyoma of uterus 07/18/2016     Priority: Medium     MD Cally Primary    Procedure Laterality Anesthesia   ROBOTIC HYSTERECTOMY WITH CYSTOSCOPY     2016           Family History   Problem Relation Age of Onset    No Known Problems Mother     No Known Problems Father     No Known Problems Sister     No Known Problems Brother     No Known Problems Maternal Aunt     No Known Problems Maternal Uncle     No Known Problems Paternal Aunt     No Known Problems Paternal Uncle     No Known Problems Maternal Grandmother     No  Known Problems Maternal Grandfather     No Known Problems Paternal Grandmother     No Known Problems Paternal Grandfather     No Known Problems Other        Past Surgical History:   Procedure Laterality Date    GYN SURGERY      HC UGI ENDOSCOPY W PLACEMENT GASTROSTOMY TUBE PERCUT N/A 08/07/2019    Procedure: CREATION, GASTROSTOMY, PERCUTANEOUS, ENDOSCOPIC;  Surgeon: Fer Ledezma MD;  Location: [facility];  Service: General    HEAD & NECK SURGERY      HYSTERECTOMY  2017    IR CEREBRAL ANGIOGRAM  07/22/2019    IR CEREBRAL ANGIOGRAM  07/24/2019    IR CEREBRAL ANGIOGRAM  07/27/2019    IR CEREBRAL ANGIOGRAM  07/28/2019    IR CEREBRAL ANGIOGRAM  07/29/2019    IR CEREBRAL ANGIOGRAM  08/02/2019    IR CEREBRAL ANGIOGRAM  07/30/2019    IR CEREBRAL ANGIOGRAM  07/31/2019    IR CEREBRAL ANGIOGRAM  08/01/2019    IR CEREBRAL ANGIOGRAM  08/08/2019    IR CEREBRAL ANGIOGRAM  08/13/2019    IR CEREBRAL ANGIOGRAM  07/22/2019    IR CEREBRAL ANGIOGRAM  07/24/2019    IR CEREBRAL ANGIOGRAM  07/27/2019    IR CEREBRAL ANGIOGRAM  07/29/2019    IR CEREBRAL ANGIOGRAM  07/30/2019    IR CEREBRAL ANGIOGRAM  07/31/2019    IR CEREBRAL ANGIOGRAM  08/01/2019    IR CEREBRAL ANGIOGRAM  08/02/2019    IR CEREBRAL ANGIOGRAM  07/28/2019    IR CEREBRAL ANGIOGRAM  08/13/2019    IR CEREBRAL ANGIOGRAM  08/08/2019    IR EMBOLIZATION  09/16/2019    IR EMBOLIZATION  09/16/2019    IR LUMBAR DRAIN INSERTION  07/27/2019    IR LUMBAR DRAIN INSERTION  08/02/2019    IR LUMBAR DRAIN INSERTION  08/08/2019    IR LUMBAR DRAIN INSERTION  08/16/2019    IR LUMBAR DRAIN PLACEMENT W FLUORO  07/27/2019    IR LUMBAR DRAIN PLACEMENT W FLUORO  08/02/2019    IR LUMBAR DRAIN PLACEMENT W FLUORO  08/08/2019    IR LUMBAR DRAIN PLACEMENT W FLUORO  08/16/2019    OTHER SURGICAL HISTORY      COIL EMBOLIZATION    PICC AND MIDLINE TEAM LINE INSERTION  07/26/2019         IA CREATE SHUNT:VENTRIC-PERITONEAL Right 08/19/2019    Procedure: INSERTION, SHUNT, VENTRICULOPERITONEAL, USING  FRAMELESS STEREOTAXY ;  Surgeon: Gale Hoffmann MD;  Location: [facility] Main OR;  Service: Neurosurgery    TX CREATE SHUNT:VENTRIC-PERITONEAL Right 10/30/2019    Procedure: Replacement of distal catheter for ventriculoperitoneal shunt with general surgery assistance for laparoscopic approach;  Surgeon: Malou Sal MD;  Location: [facility] Main OR;  Service: Neurosurgery    TX LAP INSERTION TUNNELED INTRAPERITONEAL CATHETER Right 08/19/2019    Procedure: INSERTION, CATHETER, PERITONEAL, LAPAROSCOPIC;  Surgeon: Fer Ledezma MD;  Location: [facility] OR;  Service: General    TRACHEOSTOMY N/A 08/07/2019    Procedure: CREATION, TRACHEOSTOMY;  Surgeon: Fer Ledezma MD;  Location: [facility] OR;  Service: General        Social History     Socioeconomic History    Marital status:      Spouse name: Not on file    Number of children: Not on file    Years of education: Not on file    Highest education level: Not on file   Occupational History    Not on file   Tobacco Use    Smoking status: Never     Passive exposure: Never    Smokeless tobacco: Never   Vaping Use    Vaping status: Never Used   Substance and Sexual Activity    Alcohol use: Never    Drug use: Never    Sexual activity: Not on file   Other Topics Concern    Parent/sibling w/ CABG, MI or angioplasty before 65F 55M? Not Asked   Social History Narrative    Not on file     Social Drivers of Health     Financial Resource Strain: High Risk (3/11/2024)    Financial Resource Strain     Within the past 12 months, have you or your family members you live with been unable to get utilities (heat, electricity) when it was really needed?: Yes   Food Insecurity: High Risk (3/11/2024)    Food Insecurity     Within the past 12 months, did you worry that your food would run out before you got money to buy more?: Yes     Within the past 12 months, did the food you bought just not last and you didn t have money to get more?: Yes    Transportation Needs: High Risk (3/11/2024)    Transportation Needs     Within the past 12 months, has lack of transportation kept you from medical appointments, getting your medicines, non-medical meetings or appointments, work, or from getting things that you need?: Yes   Physical Activity: Not on file   Stress: No Stress Concern Present (6/15/2023)    [employer] [place] [employer] - Occupational Stress Questionnaire     Feeling of Stress : Only a little   Social Connections: Not on file   Interpersonal Safety: Low Risk  (9/18/2024)    Interpersonal Safety     Do you feel physically and emotionally safe where you currently live?: Yes     Within the past 12 months, have you been hit, slapped, kicked or otherwise physically hurt by someone?: No     Within the past 12 months, have you been humiliated or emotionally abused in other ways by your partner or ex-partner?: No   Housing Stability: High Risk (3/11/2024)    Housing Stability     Do you have housing? : Yes     Are you worried about losing your housing?: Yes

## 2025-02-10 ENCOUNTER — OFFICE VISIT (OUTPATIENT)
Dept: NEUROLOGY | Facility: CLINIC | Age: 50
End: 2025-02-10
Attending: FAMILY MEDICINE
Payer: COMMERCIAL

## 2025-02-10 VITALS
SYSTOLIC BLOOD PRESSURE: 129 MMHG | HEART RATE: 80 BPM | BODY MASS INDEX: 35.48 KG/M2 | DIASTOLIC BLOOD PRESSURE: 89 MMHG | WEIGHT: 169 LBS | HEIGHT: 58 IN

## 2025-02-10 DIAGNOSIS — Z98.2 INTRACRANIAL SHUNT: Primary | ICD-10-CM

## 2025-02-10 DIAGNOSIS — I63.511 CEREBROVASCULAR ACCIDENT (CVA) DUE TO OCCLUSION OF RIGHT MIDDLE CEREBRAL ARTERY (H): ICD-10-CM

## 2025-02-10 DIAGNOSIS — R52 THROBBING PAIN: ICD-10-CM

## 2025-02-10 PROCEDURE — T1013 SIGN LANG/ORAL INTERPRETER: HCPCS | Mod: U4 | Performed by: INTERPRETER

## 2025-02-10 PROCEDURE — 99214 OFFICE O/P EST MOD 30 MIN: CPT | Performed by: PSYCHIATRY & NEUROLOGY

## 2025-02-10 PROCEDURE — G2211 COMPLEX E/M VISIT ADD ON: HCPCS | Performed by: PSYCHIATRY & NEUROLOGY

## 2025-02-10 RX ORDER — HYDROXYZINE HYDROCHLORIDE 10 MG/1
10 TABLET, FILM COATED ORAL EVERY 4 HOURS PRN
COMMUNITY
Start: 2024-12-17
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
## 2025-02-10 NOTE — NURSING NOTE
Chief Complaint   Patient presents with    Cerebral Vascular Accident (Cva)     Patient states she is doing well. She is wondering if she is able to get new imaging to make sure the shunt is still okay.      Flakita Coy MA

## 2025-02-14 ENCOUNTER — TELEPHONE (OUTPATIENT)
Dept: FAMILY MEDICINE | Facility: CLINIC | Age: 50
End: 2025-02-14
Payer: COMMERCIAL

## 2025-02-14 NOTE — TELEPHONE ENCOUNTER
Reason for Call:  Appointment Request    Patient requesting this type of appt:  To see     Requested provider:      Reason patient unable to be scheduled: Needs to be schedule by     When does patient want to be seen/preferred time:  ASAP    Comments: Pt brought in forms/letters from [employer] requesting to see  for help. Forms put in CCC bin at the .     Okay to leave a detailed message?: Yes at Home number on file [phone] (home)    Call taken on 2/14/2025 at 12:24 PM by Kelly Adamson

## 2025-02-19 ENCOUNTER — HOSPITAL ENCOUNTER (OUTPATIENT)
Dept: MRI IMAGING | Facility: CLINIC | Age: 50
Discharge: HOME | End: 2025-02-19
Attending: FAMILY MEDICINE
Payer: COMMERCIAL

## 2025-02-19 DIAGNOSIS — M25.511 CHRONIC RIGHT SHOULDER PAIN: ICD-10-CM

## 2025-02-19 DIAGNOSIS — G89.29 CHRONIC LEFT HIP PAIN: ICD-10-CM

## 2025-02-19 DIAGNOSIS — G89.29 CHRONIC RIGHT SHOULDER PAIN: ICD-10-CM

## 2025-02-19 DIAGNOSIS — M25.552 CHRONIC LEFT HIP PAIN: ICD-10-CM

## 2025-02-28 PROBLEM — M25.511 RIGHT SHOULDER PAIN, UNSPECIFIED CHRONICITY: Status: RESOLVED | Noted: 2024-12-20 | Resolved: 2025-02-28

## 2025-03-11 ENCOUNTER — HOSPITAL ENCOUNTER (OUTPATIENT)
Dept: MRI IMAGING | Facility: CLINIC | Age: 50
Discharge: HOME | End: 2025-03-11
Attending: PSYCHIATRY & NEUROLOGY
Payer: COMMERCIAL

## 2025-03-11 ENCOUNTER — ALLIED HEALTH/NURSE VISIT (OUTPATIENT)
Dept: NURSING | Facility: CLINIC | Age: 50
End: 2025-03-11
Payer: COMMERCIAL

## 2025-03-11 ENCOUNTER — HOSPITAL ENCOUNTER (OUTPATIENT)
Dept: RADIOLOGY | Facility: CLINIC | Age: 50
Discharge: HOME | End: 2025-03-11
Attending: PSYCHIATRY & NEUROLOGY
Payer: COMMERCIAL

## 2025-03-11 DIAGNOSIS — R52 THROBBING PAIN: ICD-10-CM

## 2025-03-11 DIAGNOSIS — I63.511 CEREBROVASCULAR ACCIDENT (CVA) DUE TO OCCLUSION OF RIGHT MIDDLE CEREBRAL ARTERY (H): ICD-10-CM

## 2025-03-11 DIAGNOSIS — Z71.89 COMPLEX CARE COORDINATION: Primary | ICD-10-CM

## 2025-03-11 DIAGNOSIS — Z98.2 INTRACRANIAL SHUNT: ICD-10-CM

## 2025-03-11 PROCEDURE — 70551 MRI BRAIN STEM W/O DYE: CPT

## 2025-03-11 PROCEDURE — 99207 PR NO CHARGE LOS: CPT

## 2025-03-11 PROCEDURE — 70544 MR ANGIOGRAPHY HEAD W/O DYE: CPT

## 2025-03-11 PROCEDURE — 71045 X-RAY EXAM CHEST 1 VIEW: CPT

## 2025-03-11 PROCEDURE — 74018 RADEX ABDOMEN 1 VIEW: CPT

## 2025-03-11 PROCEDURE — 999N000065 XR SKULL 1/3 VIEWS

## 2025-03-11 NOTE — PROGRESS NOTES
Clinic Care Coordination Contact  Clinic Care Coordination Contact  OUTREACH    Referral Information:            Chief Complaint   Patient presents with    Clinic Care Coordination - Initial        Universal Utilization: appropriate  Clinic Utilization  No PCP office visit in Past Year: No  Utilization      No Show Count (past year)  2             ED Visits  0             Hospital Admissions  0                    Current as of: 3/11/2025  2:39 PM                Clinical Concerns:  Current Medical Concerns:  none    Current Behavioral Concerns: none    Education Provided to patient:       Health Maintenance Reviewed: Due/Overdue   Health Maintenance Due   Topic Date Due    ADVANCE CARE PLANNING  Never done    PAP  Never done    YEARLY PREVENTIVE VISIT  03/11/2025    LIPID  03/11/2025      Clinical Pathway: None    Medication Management:  Medication review status: Medications reviewed and no changes reported per patient.             Lifestyle & Psychosocial Needs:    Social Drivers of Health     Food Insecurity: High Risk (3/11/2024)    Food Insecurity     Within the past 12 months, did you worry that your food would run out before you got money to buy more?: Yes     Within the past 12 months, did the food you bought just not last and you didn t have money to get more?: Yes   Depression: Not at risk (2/4/2025)    PHQ-2     PHQ-2 Score: 2   Housing Stability: High Risk (3/11/2024)    Housing Stability     Do you have housing? : Yes     Are you worried about losing your housing?: Yes   Tobacco Use: Low Risk  (2/10/2025)    Patient History     Smoking Tobacco Use: Never     Smokeless Tobacco Use: Never     Passive Exposure: Never   Financial Resource Strain: High Risk (3/11/2024)    Financial Resource Strain     Within the past 12 months, have you or your family members you live with been unable to get utilities (heat, electricity) when it was really needed?: Yes   Alcohol Use: Not At Risk (10/16/2020)    AUDIT-C      Frequency of Alcohol Consumption: Never     Average Number of Drinks: Not on file     Frequency of Binge Drinking: Not on file   Transportation Needs: High Risk (3/11/2024)    Transportation Needs     Within the past 12 months, has lack of transportation kept you from medical appointments, getting your medicines, non-medical meetings or appointments, work, or from getting things that you need?: Yes   Physical Activity: Not on file   Interpersonal Safety: Low Risk  (9/18/2024)    Interpersonal Safety     Do you feel physically and emotionally safe where you currently live?: Yes     Within the past 12 months, have you been hit, slapped, kicked or otherwise physically hurt by someone?: No     Within the past 12 months, have you been humiliated or emotionally abused in other ways by your partner or ex-partner?: No   Stress: No Stress Concern Present (6/15/2023)    [employer] [place] [employer] - Occupational Stress Questionnaire     Feeling of Stress : Only a little   Social Connections: Not on file   Health Literacy: Not on file     Diet:: Regular  Inadequate nutrition (GOAL):: No  Tube Feeding: No  Inadequate activity/exercise (GOAL):: No  Significant changes in sleep pattern (GOAL): No  Transportation means:: Family       Patient/Caregiver understanding: yes    Outreach Frequency: 6 weeks, more frequently as needed  Future Appointments                Tomorrow VIRTUAL ; TANIA VICKW NURSE [facility], [facility] MPL[facility]    In 6 days [id number] [facility], [facility] WWH    In 6 days WWH MR1 [facility], [facility] WWH    In 6 days [facility]MRMOBILE [facility], [facility] WW    In 1 week Justin Vasquez MD [facility] M[facility]    In 2 weeks Rahul Bautista MD [facility], [facility] SPRO            Plan: CCSW met with pt and sam  Jazmyne.  CCSW and pt reviewed documents from social security. Pt is appealing the denial again and is working with [employer]. She spoke with her rep last week regarding filing anophter appeal. Pt asked me to have PCP write her a letter approving her for social security. CCSW stated that is not something that can be done. CCSW reminded pt that social security has all of her medical records, as well as the tests and assessments they completed- and this is what lead them to believe she is able to work. Pt is already working with disa specialists and has no ongoing needs at this time.       Elisa oClvin, MSW, [facility]  Social Work Care Coordinator

## 2025-03-12 ENCOUNTER — ALLIED HEALTH/NURSE VISIT (OUTPATIENT)
Dept: NEUROSURGERY | Facility: CLINIC | Age: 50
End: 2025-03-12
Payer: COMMERCIAL

## 2025-03-12 DIAGNOSIS — Z98.2 S/P VP SHUNT: Primary | ICD-10-CM

## 2025-03-12 PROCEDURE — 99207 PR NO CHARGE NURSE ONLY: CPT

## 2025-03-12 NOTE — PROGRESS NOTES
PROCEDURE:  Verify setting of Codman Certas Shunt at 3.0.  [place] easily identified, using template valve position is marked.  Using Codman Certas self verifying , shunt verified set at 3.0.   Patient at neurologic baseline throughout procedure.      PROVIDER: Dr. Hoffmann    FINDINGS:  Shunt final setting at 3.0     COMPLICATIONS:  None

## 2025-03-17 ENCOUNTER — HOSPITAL ENCOUNTER (OUTPATIENT)
Dept: RADIOLOGY | Facility: CLINIC | Age: 50
Discharge: HOME | End: 2025-03-17
Attending: FAMILY MEDICINE
Payer: COMMERCIAL

## 2025-03-17 ENCOUNTER — HOSPITAL ENCOUNTER (OUTPATIENT)
Dept: MAMMOGRAPHY | Facility: CLINIC | Age: 50
Discharge: HOME | End: 2025-03-17
Attending: FAMILY MEDICINE
Payer: COMMERCIAL

## 2025-03-17 ENCOUNTER — HOSPITAL ENCOUNTER (OUTPATIENT)
Dept: MRI IMAGING | Facility: CLINIC | Age: 50
Discharge: HOME | End: 2025-03-17
Attending: FAMILY MEDICINE
Payer: COMMERCIAL

## 2025-03-17 ENCOUNTER — ALLIED HEALTH/NURSE VISIT (OUTPATIENT)
Dept: NEUROSURGERY | Facility: CLINIC | Age: 50
End: 2025-03-17
Payer: COMMERCIAL

## 2025-03-17 DIAGNOSIS — Z98.2 S/P VP SHUNT: Primary | ICD-10-CM

## 2025-03-17 DIAGNOSIS — Z12.31 VISIT FOR SCREENING MAMMOGRAM: ICD-10-CM

## 2025-03-17 DIAGNOSIS — M25.511 CHRONIC RIGHT SHOULDER PAIN: ICD-10-CM

## 2025-03-17 DIAGNOSIS — G89.29 CHRONIC RIGHT SHOULDER PAIN: ICD-10-CM

## 2025-03-17 PROCEDURE — 73221 MRI JOINT UPR EXTREM W/O DYE: CPT | Mod: RT

## 2025-03-17 PROCEDURE — 70250 X-RAY EXAM OF SKULL: CPT

## 2025-03-17 PROCEDURE — 70250 X-RAY EXAM OF SKULL: CPT | Mod: 76

## 2025-03-17 PROCEDURE — 99207 PR NO CHARGE NURSE ONLY: CPT

## 2025-03-17 PROCEDURE — 77063 BREAST TOMOSYNTHESIS BI: CPT

## 2025-03-17 PROCEDURE — 73721 MRI JNT OF LWR EXTRE W/O DYE: CPT | Mod: LT

## 2025-03-17 NOTE — PROGRESS NOTES
PROCEDURE:  Confirm Codman Certas Shunt setting at 3. [place] easily identified, using template valve position is marked.  Using Codman Certas self verifying , shunt confirmed at 3.   Patient at neurologic baseline throughout procedure.      PROVIDER: Dr. Hoffmann    FINDINGS:  Shunt final setting at 3     COMPLICATIONS:  None

## 2025-03-19 ENCOUNTER — OFFICE VISIT (OUTPATIENT)
Dept: NEUROLOGY | Facility: CLINIC | Age: 50
End: 2025-03-19
Payer: COMMERCIAL

## 2025-03-19 VITALS
BODY MASS INDEX: 36.94 KG/M2 | HEIGHT: 58 IN | HEART RATE: 81 BPM | SYSTOLIC BLOOD PRESSURE: 110 MMHG | WEIGHT: 176 LBS | DIASTOLIC BLOOD PRESSURE: 70 MMHG

## 2025-03-19 DIAGNOSIS — Z87.898 HISTORY OF SEIZURES: Primary | ICD-10-CM

## 2025-03-19 DIAGNOSIS — Z86.79 HISTORY OF ANEURYSM OF ARTERY: ICD-10-CM

## 2025-03-19 PROCEDURE — 3078F DIAST BP <80 MM HG: CPT | Performed by: PSYCHIATRY & NEUROLOGY

## 2025-03-19 PROCEDURE — 3074F SYST BP LT 130 MM HG: CPT | Performed by: PSYCHIATRY & NEUROLOGY

## 2025-03-19 PROCEDURE — 99214 OFFICE O/P EST MOD 30 MIN: CPT | Performed by: PSYCHIATRY & NEUROLOGY
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
## 2025-03-19 NOTE — NURSING NOTE
Chief Complaint   Patient presents with    Stroke     Patient states she is here for MRI results. Follow up     Isabela Stout on 3/19/2025 at 1:32 PM

## 2025-03-25 ENCOUNTER — OFFICE VISIT (OUTPATIENT)
Dept: FAMILY MEDICINE | Facility: CLINIC | Age: 50
End: 2025-03-25
Payer: COMMERCIAL

## 2025-03-25 ENCOUNTER — TELEPHONE (OUTPATIENT)
Dept: FAMILY MEDICINE | Facility: CLINIC | Age: 50
End: 2025-03-25

## 2025-03-25 VITALS
TEMPERATURE: 97.9 F | SYSTOLIC BLOOD PRESSURE: 120 MMHG | HEIGHT: 58 IN | DIASTOLIC BLOOD PRESSURE: 77 MMHG | WEIGHT: 172 LBS | HEART RATE: 64 BPM | RESPIRATION RATE: 14 BRPM | OXYGEN SATURATION: 98 % | BODY MASS INDEX: 36.11 KG/M2

## 2025-03-25 DIAGNOSIS — I63.9 CEREBROVASCULAR ACCIDENT (CVA), UNSPECIFIED MECHANISM (H): ICD-10-CM

## 2025-03-25 DIAGNOSIS — E78.2 MIXED HYPERLIPIDEMIA: ICD-10-CM

## 2025-03-25 DIAGNOSIS — G89.29 CHRONIC RIGHT SHOULDER PAIN: Primary | ICD-10-CM

## 2025-03-25 DIAGNOSIS — R73.03 PREDIABETES: ICD-10-CM

## 2025-03-25 DIAGNOSIS — M25.552 HIP PAIN, LEFT: ICD-10-CM

## 2025-03-25 DIAGNOSIS — M25.511 CHRONIC RIGHT SHOULDER PAIN: Primary | ICD-10-CM

## 2025-03-25 DIAGNOSIS — H04.123 DRY EYES: ICD-10-CM

## 2025-03-25 LAB
ALBUMIN SERPL BCG-MCNC: 4.8 G/DL (ref 3.5–5.2)
ALP SERPL-CCNC: 103 U/L (ref 40–150)
ALT SERPL W P-5'-P-CCNC: 111 U/L (ref 0–50)
AST SERPL W P-5'-P-CCNC: 54 U/L (ref 0–45)
BILIRUB DIRECT SERPL-MCNC: 0.14 MG/DL (ref 0–0.3)
BILIRUB SERPL-MCNC: 0.4 MG/DL
CHOLEST SERPL-MCNC: 176 MG/DL
EST. AVERAGE GLUCOSE BLD GHB EST-MCNC: 117 MG/DL
FASTING STATUS PATIENT QL REPORTED: YES
HBA1C MFR BLD: 5.7 % (ref 0–5.6)
HDLC SERPL-MCNC: 36 MG/DL
LDLC SERPL CALC-MCNC: 102 MG/DL
NONHDLC SERPL-MCNC: 140 MG/DL
PROT SERPL-MCNC: 8.2 G/DL (ref 6.4–8.3)
TRIGL SERPL-MCNC: 188 MG/DL

## 2025-03-25 RX ORDER — PREDNISONE 20 MG/1
20 TABLET ORAL
Qty: 7 TABLET | Refills: 0 | Status: SHIPPED | OUTPATIENT
Start: 2025-03-25 | End: 2025-04-01

## 2025-03-25 RX ORDER — CARBOXYMETHYLCELLULOSE SODIUM 10 MG/ML
1 GEL OPHTHALMIC 4 TIMES DAILY PRN
Qty: 15 ML | Refills: 11 | Status: SHIPPED | OUTPATIENT
Start: 2025-03-25

## 2025-03-25 NOTE — TELEPHONE ENCOUNTER
Patient Quality Outreach    Patient is due for the following:   Cervical Cancer Screening - PAP Needed  Physical Preventive Adult Physical    Action(s) Taken:   Schedule a office visit for Pap smear    Type of outreach:    Phone, spoke to patient/parent. Scheduled    Questions for provider review:    None         Ashwin Chapin  Chart routed to Care Team.

## 2025-03-25 NOTE — PROGRESS NOTES
OFFICE VISIT    Assessment/Plan:     Patient Instructions:    -Take the prednisone as prescribed.  Be sure to take this medication with food.  -Do the home exercises that you have at home.  Try to do these exercises every day.    -You are referred to the shoulder and the hip specialist.  -If you do not hear from the specialist to schedule an appointment within a week's time from today, please call the [facility] and speak with the specialty  to help you schedule the appointment to see the specialist.  Depending on the specialist availability, it may be a number of weeks prior to your scheduled appointment.    -Continue the rest of the medications as prescribed.  Refills are available for you.      Please seek immediate medical attention (go to the emergency room or urgent care) for the following reasons: worsening symptoms, or any concerning changes.      Darion was seen today for recheck.  Diagnoses and all orders for this visit:    Chronic right shoulder pain  Hip pain, left  [employer] barrier: long standing and not resolving after treatment. Plan for trial with prednisone burst as below. Continue balcofen, gabapentin, and lidocaine 5% external ointment. Referral placed to specialists to assist with management.   -     predniSONE (DELTASONE) 20 MG tablet; Take 1 tablet (20 mg) by mouth daily with food for 7 days.  -     Orthopedic  Referral; Future  -     Orthopedic  Referral; Future    Mixed hyperlipidemia  Cerebrovascular accident (CVA), unspecified mechanism (H)  Prediabetes: Overall stable.  Continues to follow with neurosurgery.  Check labs as below.  Further recommendations pending results.  Patient reports consistently taking atorvastatin 20 mg.  Also on clopidogrel and aspirin.  -     Lipid panel reflex to direct LDL Fasting; Future  -     Hemoglobin A1c; Future  -     Hepatic function panel; Future    Dry eyes: patient requesting for this medication. Previous eye drop didn't  help as much.   -     Carboxymethylcellulose Sodium 1 % GEL; Apply 1 drop to eye 4 times daily as needed (dry eyes).        Return in about 3 months (around 6/25/2025) for Medication follow up, Establish Care with new doctor.    The diagnoses, treatment options, risk, benefits, and recommendations were reviewed with patient/guardian.  Questions were answered to patient's/guardian satisfaction.  Red flag signs were reviewed.  Patient/guardian is in agreement with above plan.      Subjective: 49 year old female with history of chronic bilateral low back pain without sciatica, CVA due to occlusion of the right middle cerebral artery and aneurysmal subarachnoid hemorrhage s/p stent coiling on clopidogrel, hydrocephalus with operating  shunt, epilepsy as late effect of CVA, tracheostomy, mood disorder, headaches, adjustment disorder with mixed anxiety and depressed mood who presents to clinic for the following complaints:   Patient presents with:  RECHECK    Answers submitted by the patient for this visit:  General Questionnaire (Submitted on 3/25/2025)  Chief Complaint: Chronic problems general questions HPI Form  What is the reason for your visit today? : Follow up  How many servings of fruits and vegetables do you eat daily?: 2-3  On average, how many sweetened beverages do you drink each day (Examples: soda, juice, sweet tea, etc.  Do NOT count diet or artificially sweetened beverages)?: 1  How many minutes a day do you exercise enough to make your heart beat faster?: 20 to 29  How many days a week do you exercise enough to make your heart beat faster?: 6  How many days per week do you miss taking your medication?: 0  Questionnaire about: Chronic problems general questions HPI Form (Submitted on 3/25/2025)  Chief Complaint: Chronic problems general questions HPI Form    The patient was last seen on this provider on 02/04/2025.  At that time, patient had complained of chronic right shoulder pain and chronic left hip  pain and had noted my improvement after physical therapy sessions, though without resolution.  MRI of the right shoulder and left hip were ordered.  Patient was initiated on gabapentin at that time.    MRI results noted as below and reviewed with patient today..  MRI of the left hip demonstrated low-grade partial tearing of the gluteus medius tendon insertion and mild edema overlying the greater trochanteric without bursal fluid collection.  MRI of the right shoulder demonstrated mild supraspinatus and subscapularis tendinosis with low-grade interstitial and articular sided tearing of the subscapularis.  No full rotator cuff tear.  Also noted to have mild subacromial/subdeltoid bursitis.    Reviewed treatment options and recommendations. She had PT for the left hip twice in 01/2025 and was told that if physical therapy wasn't helping, then for her to see the doctor again. Discussed referral to specialists. She has the exercises at home.     On clopidogrel and aspirin. Has some prolonged bleeding at times.       The 10 point review of system is negative except as stated in the HPI.    Allergies were reviewed and updated.    Narrative & Impression   EXAM: MR HIP LEFT W/O CONTRAST  LOCATION: [facility]  DATE: 3/17/2025     INDICATION: Hip pain; Hip pain without trauma or other complicating feature; Hip x ray result available; No known automatically detected potential contraindications to MRI  COMPARISON: 03/11/2024  TECHNIQUE: Unenhanced.     FINDINGS:     LEFT HIP:   -Labrum: Labrum is not well evaluated given nonarthrographic technique and lack of joint distention. No detached labral tear. No paralabral cyst.   -Cartilage: Normal.  -Joint space: No effusion or synovitis.   -Joint capsule/ligaments: Intact joint capsule. Ligamentum teres is intact.     MUSCLES AND TENDONS:   -Gluteal: Low-grade partial tearing of the gluteus medius medius tendon insertion (series 10 image 30, series 9 image  19). Mild edema overlying the left greater trochanter without bursal fluid collection.  -Proximal hamstring: No tendon tear or tendinopathy.   -Iliopsoas: No tendon tear or tendinopathy. No bursitis.  -Rectus femoris origin: No tear or tendinopathy.     BONES:   -No fracture or concerning marrow replacing lesion.      SOFT TISSUES:   -No enlarged or morphologically abnormal lymph nodes.  -Normal muscle bulk. No acute muscular injury.     INTRA-PELVIC CONTENTS:  -No acute abnormality within the visualized portions of the abdomen and pelvis to the limits of technique. Small amount of free fluid in the pelvis.                                                                      IMPRESSION:  1.  Low-grade partial tearing of the gluteus medius tendon insertion.  2.  Mild edema overlying the left greater trochanter without bursal fluid collection.  3.  No fracture or osteonecrosis.         Narrative & Impression   EXAM: MR SHOULDER RIGHT W/O CONTRAST  LOCATION: [facility]  DATE: 3/17/2025     INDICATION: Shoulder pain; Shoulder pain without trauma or other complicating feature; No shoulder x ray result available  COMPARISON: 10/04/2020  TECHNIQUE: Unenhanced.     FINDINGS:     ROTATOR CUFF:  -Supraspinatus: Mild tendinosis. No discrete tear.  -Infraspinatus: No tear or tendinopathy.  -Subscapularis: Mild tendinosis with low-grade interstitial and articular sided tearing (series 4 image 14).  -Teres minor: Intact.  -No fatty infiltration or atrophy of the rotator cuff musculature.     CORACOACROMIAL ARCH:  -Morphology: Type II acromion. No subacromial spur. Subacromial and subcoracoid space are normal.   -Bursa: Mild fluid distention of the subacromial/subdeltoid bursa.     ACROMIOCLAVICULAR JOINT:   -Normal alignment with mild degenerative change.      LONG HEAD OF BICEPS TENDON:   -No tendinopathy or tear. No tenosynovitis or subluxation.     GLENOHUMERAL JOINT:   -Labrum: Labrum is not well  "evaluated given nonarthrographic technique and lack of joint distention. No detached labral tear. No paralabral cyst.   -Cartilage: Mild diffuse chondral thinning. No focal full-thickness defect.   -Joint space: No effusion or synovitis.  -Glenohumeral ligaments and capsule: No pericapsular inflammation.     BONES:   -No fracture or concerning marrow replacing lesion.     SOFT TISSUES:   -Normal deltoid muscle bulk. Normal visualized chest wall and axilla.                                                                      IMPRESSION:  1.  Mild supraspinatus and subscapularis tendinosis. Low-grade interstitial and articular sided tearing of the subscapularis. No full-thickness rotator cuff tear.  2.  Mild subacromial/subdeltoid bursitis.       Objective:   /77 (BP Location: Left arm)   Pulse 64   Temp 97.9  F (36.6  C) (Oral)   Resp 14   Ht 1.466 m (4' 9.72\")   Wt 78 kg (172 lb)   SpO2 98%   BMI 36.30 kg/m    General: Active, alert, nontoxic-appearing.  No acute distress.  Antalgic gait.  HEENT: Normocephalic, atraumatic.  Pupils are equal and round.  Sclera is clear.  Normal external ears. Nares patent.  Moist mucous membranes.    Cardiac: RRR.  S1, S2 present.  No murmurs, rubs, or gallops.  Respiratory/chest: Clear to auscultation bilaterally.  No wheezes, rales, rhonchi.  Breathing is not labored.  No accessory muscle usage.  Extremities: Voluntary movements intact.  Integumentary: No concerning rash or skin changes appreciated.    Amount of time spent in chart review, direct patient contact, care coordination, and related activities to patient care on the day of appointment: 40 minutes.       Rahul Bautista MD  [facility] [place] [address]  Phone: [phone]  Fax: [phone]    3/26/2025  2:53 PM            Current Outpatient Medications   Medication Sig Dispense Refill    aspirin (ASA) 81 MG chewable tablet Take 1 tablet (81 mg) by mouth daily. 90 tablet 3    " atorvastatin (LIPITOR) 20 MG tablet Take 1 tablet (20 mg) by mouth daily. 90 tablet 3    Carboxymethylcellulose Sodium 1 % GEL Apply 1 drop to eye 4 times daily as needed (dry eyes). 15 mL 11    clopidogrel (PLAVIX) 75 MG tablet Take 1 tablet (75 mg) by mouth daily. 90 tablet 3    gabapentin (NEURONTIN) 100 MG capsule Take 1 capsule (100 mg) by mouth 3 times daily. 90 capsule 2    hydrOXYzine HCl (ATARAX) 10 MG tablet Take 10 mg by mouth every 4 hours as needed for anxiety.      mirtazapine (REMERON) 7.5 MG tablet Take 1 tablet (7.5 mg) by mouth at bedtime. 90 tablet 1    predniSONE (DELTASONE) 20 MG tablet Take 1 tablet (20 mg) by mouth daily with food for 7 days. 7 tablet 0    baclofen (LIORESAL) 10 MG tablet Take 1-2 tablets (10-20 mg) by mouth 3 times daily as needed for muscle spasms. 60 tablet 5    diclofenac (VOLTAREN) 1 % topical gel Apply 2 g topically 3 times daily as needed (right shoulder pain). 150 g 2    ketotifen fumarate 0.035% 0.035 % SOLN ophthalmic solution Place 1 drop into both eyes 2 times daily as needed for itching or dry eyes. 10 mL 11    lidocaine (XYLOCAINE) 5 % external ointment Apply topically as needed for moderate pain. 50 g 1     No current facility-administered medications for this visit.       Allergies   Allergen Reactions    Hydrocodone Rash and Swelling    Oxycodone Rash and Swelling       Patient Active Problem List    Diagnosis Date Noted    Prediabetes 03/25/2025     Priority: Medium    Hip pain, left 03/25/2025     Priority: Medium    Class 2 severe obesity due to excess calories with serious comorbidity in adult (H) 03/11/2024     Priority: Medium    Mixed hyperlipidemia 07/17/2023     Priority: Medium    Dry eyes 07/17/2023     Priority: Medium    Chronic bilateral low back pain without sciatica 03/16/2023     Priority: Medium    S/P  shunt 02/16/2023     Priority: Medium    Neck pain 02/16/2023     Priority: Medium    Subdural hematoma (H) 05/07/2020     Priority: Medium     Shunt malfunction, subsequent encounter      Priority: Medium    RUQ abdominal pain      Priority: Medium    HCAP (healthcare-associated pneumonia) 10/26/2019     Priority: Medium    Pleural effusion 10/26/2019     Priority: Medium    Cerebral aneurysm 09/16/2019     Priority: Medium    SAH (subarachnoid hemorrhage) (H) 08/21/2019     Priority: Medium    Hydrocephalus with operating shunt (H) 08/21/2019     Priority: Medium    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) 08/21/2019     Priority: Medium    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 (H)      Priority: Medium    Respiratory failure (H) 08/20/2019     Priority: Medium    Adjustment disorder with mixed anxiety and depressed mood      Priority: Medium    Mood disorder      Priority: Medium    Sleep difficulties      Priority: Medium    Pneumonia due to group B Streptococcus, unspecified laterality, unspecified part of lung      Priority: Medium    Vasospasm of cerebral artery      Priority: Medium    Cerebrovascular accident (CVA) due to occlusion of right middle cerebral artery (H)      Priority: Medium    S/P coil embolization of cerebral aneurysm 07/23/2019     Priority: Medium     July 22,2019 @ [facility]  ENDOVASCULAR COIL EMBOLIZATION OF A RUPTURED DISSECTING VENTROMEDIAL   SUPRACLINOID RIGHT INTERNAL CAROTID ARTERY ANEURYSM  2. CEREBRAL ANGIOGRAM: SELECTIVE CATHETERIZATION OF THE LEFT COMMON   CAROTID ARTERY, RIGHT COMMON CAROTID ARTERY, RIGHT INTERNAL CAROTID   ARTERY, LEFT VERTEBRAL ARTERY, AND RIGHT VERTEBRAL ARTERY WITH   ANGIOGRAPHIC IMAGING CENTERED OVER THE HEAD  3. CERVICAL ANGIOGRAM: SELECTIVE CATHETERIZATION OF THE LEFT COMMON   CAROTID ARTERY AND THE RIGHT COMMON CAROTID ARTERY WITH ANGIOGRAPHIC   IMAGING CENTERED OVER THE NECK  4. THREE-DIMENSIONAL ROTATIONAL ANGIOGRAM OF THE HEAD WITH IMAGE   PROCESSING ON A SEPARATE COMPUTER WORKSTATION: INJECTION OF THE RIGHT   COMMON CAROTID ARTERY  5. 5 CEREBRAL ANGIOGRAMS THROUGH EXISTING  GUIDE CATHETER TO EVALUATE   ENDOVASCULAR TREATMENT: INJECTIONS OF THE RIGHT INTERNAL CAROTID ARTERY        Aneurysm of right internal carotid artery 07/23/2019     Priority: Medium    Aneurysmal subarachnoid hemorrhage (H) 07/22/2019     Priority: Medium    CVA (cerebral vascular accident) (H) 07/22/2019     Priority: Medium     Added automatically from request for surgery [id number]        Hydrocephalus (H) 07/22/2019     Priority: Medium     Added automatically from request for surgery [id number]        Leiomyoma of uterus 07/18/2016     Priority: Medium     MD Cally Primary    Procedure Laterality Anesthesia   ROBOTIC HYSTERECTOMY WITH CYSTOSCOPY     2016           Family History   Problem Relation Age of Onset    No Known Problems Mother     No Known Problems Father     No Known Problems Sister     No Known Problems Brother     No Known Problems Maternal Aunt     No Known Problems Maternal Uncle     No Known Problems Paternal Aunt     No Known Problems Paternal Uncle     No Known Problems Maternal Grandmother     No Known Problems Maternal Grandfather     No Known Problems Paternal Grandmother     No Known Problems Paternal Grandfather     No Known Problems Other        Past Surgical History:   Procedure Laterality Date    GYN SURGERY      HC UGI ENDOSCOPY W PLACEMENT GASTROSTOMY TUBE PERCUT N/A 08/07/2019    Procedure: CREATION, GASTROSTOMY, PERCUTANEOUS, ENDOSCOPIC;  Surgeon: Fer Ledezma MD;  Location: [facility];  Service: General    HEAD & NECK SURGERY      HYSTERECTOMY  2017    IR CEREBRAL ANGIOGRAM  07/22/2019    IR CEREBRAL ANGIOGRAM  07/24/2019    IR CEREBRAL ANGIOGRAM  07/27/2019    IR CEREBRAL ANGIOGRAM  07/28/2019    IR CEREBRAL ANGIOGRAM  07/29/2019    IR CEREBRAL ANGIOGRAM  08/02/2019    IR CEREBRAL ANGIOGRAM  07/30/2019    IR CEREBRAL ANGIOGRAM  07/31/2019    IR CEREBRAL ANGIOGRAM  08/01/2019    IR CEREBRAL ANGIOGRAM  08/08/2019    IR CEREBRAL ANGIOGRAM  08/13/2019    IR CEREBRAL ANGIOGRAM   07/22/2019    IR CEREBRAL ANGIOGRAM  07/24/2019    IR CEREBRAL ANGIOGRAM  07/27/2019    IR CEREBRAL ANGIOGRAM  07/29/2019    IR CEREBRAL ANGIOGRAM  07/30/2019    IR CEREBRAL ANGIOGRAM  07/31/2019    IR CEREBRAL ANGIOGRAM  08/01/2019    IR CEREBRAL ANGIOGRAM  08/02/2019    IR CEREBRAL ANGIOGRAM  07/28/2019    IR CEREBRAL ANGIOGRAM  08/13/2019    IR CEREBRAL ANGIOGRAM  08/08/2019    IR EMBOLIZATION  09/16/2019    IR EMBOLIZATION  09/16/2019    IR LUMBAR DRAIN INSERTION  07/27/2019    IR LUMBAR DRAIN INSERTION  08/02/2019    IR LUMBAR DRAIN INSERTION  08/08/2019    IR LUMBAR DRAIN INSERTION  08/16/2019    IR LUMBAR DRAIN PLACEMENT W FLUORO  07/27/2019    IR LUMBAR DRAIN PLACEMENT W FLUORO  08/02/2019    IR LUMBAR DRAIN PLACEMENT W FLUORO  08/08/2019    IR LUMBAR DRAIN PLACEMENT W FLUORO  08/16/2019    OTHER SURGICAL HISTORY      COIL EMBOLIZATION    PICC AND MIDLINE TEAM LINE INSERTION  07/26/2019         IA CREATE SHUNT:VENTRIC-PERITONEAL Right 08/19/2019    Procedure: INSERTION, SHUNT, VENTRICULOPERITONEAL, USING FRAMELESS STEREOTAXY ;  Surgeon: Gale Hoffmann MD;  Location: [facility] OR;  Service: Neurosurgery    IA CREATE SHUNT:VENTRIC-PERITONEAL Right 10/30/2019    Procedure: Replacement of distal catheter for ventriculoperitoneal shunt with general surgery assistance for laparoscopic approach;  Surgeon: Malou Sal MD;  Location: [facility] Main OR;  Service: Neurosurgery    IA LAP INSERTION TUNNELED INTRAPERITONEAL CATHETER Right 08/19/2019    Procedure: INSERTION, CATHETER, PERITONEAL, LAPAROSCOPIC;  Surgeon: Fer Ledezma MD;  Location: [facility] OR;  Service: General    TRACHEOSTOMY N/A 08/07/2019    Procedure: CREATION, TRACHEOSTOMY;  Surgeon: Fer Ledezma MD;  Location: [facility] Main OR;  Service: General        Social History     Socioeconomic History    Marital status:      Spouse name: Not on file    Number of children: Not on file    Years of education:  Not on file    Highest education level: Not on file   Occupational History    Not on file   Tobacco Use    Smoking status: Never     Passive exposure: Never    Smokeless tobacco: Never   Vaping Use    Vaping status: Never Used   Substance and Sexual Activity    Alcohol use: Never    Drug use: Never    Sexual activity: Not on file   Other Topics Concern    Parent/sibling w/ CABG, MI or angioplasty before 65F 55M? Not Asked   Social History Narrative    Not on file     Social Drivers of Health     Financial Resource Strain: High Risk (3/11/2024)    Financial Resource Strain     Within the past 12 months, have you or your family members you live with been unable to get utilities (heat, electricity) when it was really needed?: Yes   Food Insecurity: High Risk (3/11/2024)    Food Insecurity     Within the past 12 months, did you worry that your food would run out before you got money to buy more?: Yes     Within the past 12 months, did the food you bought just not last and you didn t have money to get more?: Yes   Transportation Needs: High Risk (3/11/2024)    Transportation Needs     Within the past 12 months, has lack of transportation kept you from medical appointments, getting your medicines, non-medical meetings or appointments, work, or from getting things that you need?: Yes   Physical Activity: Not on file   Stress: No Stress Concern Present (6/15/2023)    [employer] - Occupational Stress Questionnaire     Feeling of Stress : Only a little   Social Connections: Not on file   Interpersonal Safety: Low Risk  (3/25/2025)    Interpersonal Safety     Do you feel physically and emotionally safe where you currently live?: Yes     Within the past 12 months, have you been hit, slapped, kicked or otherwise physically hurt by someone?: No     Within the past 12 months, have you been humiliated or emotionally abused in other ways by your partner or ex-partner?: No   Housing Stability: High Risk  (3/11/2024)    Housing Stability     Do you have housing? : Yes     Are you worried about losing your housing?: Yes

## 2025-03-25 NOTE — PATIENT INSTRUCTIONS
-Thank you for choosing the [facility].  -It was a pleasure to see you today.  -Please take a look at the information below for more specific details regarding the treatment plan and recommendations.  -In this after visit summary is a list of your medications and specific instructions.  Please review this carefully as there may be changes made to your medication list.  -If there are any particular questions or concerns, please feel free to reach out to Dr. Bautista.  -If any labs have been completed, we will reach out to you about results.  If the results are normal or not concerning, a letter or MyChart message will be sent to you.  If any follow-up is needed, either Dr. Bautista or the nurse will give you a call.  If you have not heard regarding results after 2 weeks, please reach out to the clinic.    -Dr. Bautista will be leaving the [facility] in May 2025. It has been a pleasure to be a part of your care team.  Thank you for trusting me with your health care. Dr. Bautista recommends that you schedule an appointment to establish care with a new doctor at the clinic.     Patient Instructions:    -Take the prednisone as prescribed.  Be sure to take this medication with food.  -Do the home exercises that you have at home.  Try to do these exercises every day.    -You are referred to the shoulder and the hip specialist.  -If you do not hear from the specialist to schedule an appointment within a week's time from today, please call the [facility] and speak with the specialty  to help you schedule the appointment to see the specialist.  Depending on the specialist availability, it may be a number of weeks prior to your scheduled appointment.    -Continue the rest of the medications as prescribed.  Refills are available for you.      Please seek immediate medical attention (go to the emergency room or urgent care) for the following reasons: worsening symptoms, or any concerning  changes.      --------------------------------------------------------------------------------------------------------------------    -We are always looking for ways to improve.  You may be selected to receive a survey regarding your visit today.  We encourage you to complete the survey and provide specific, constructive feedback to help us improve our processes.  Thank you for your time!  -Please review the contact information listed on the after visit summary and in the electronic chart.  Below is the phone number that we have on file.  If there are any changes that are needed to be made, please reach out to the clinic.  [phone] (home)

## 2025-03-26 ENCOUNTER — PATIENT OUTREACH (OUTPATIENT)
Dept: CARE COORDINATION | Facility: CLINIC | Age: 50
End: 2025-03-26
Payer: COMMERCIAL

## 2025-06-04 ENCOUNTER — OFFICE VISIT (OUTPATIENT)
Dept: FAMILY MEDICINE | Facility: CLINIC | Age: 50
End: 2025-06-04
Payer: COMMERCIAL

## 2025-06-04 VITALS
WEIGHT: 171.31 LBS | HEART RATE: 77 BPM | DIASTOLIC BLOOD PRESSURE: 66 MMHG | SYSTOLIC BLOOD PRESSURE: 110 MMHG | OXYGEN SATURATION: 97 % | RESPIRATION RATE: 16 BRPM | HEIGHT: 58 IN | TEMPERATURE: 98 F | BODY MASS INDEX: 35.96 KG/M2

## 2025-06-04 DIAGNOSIS — R39.81 FUNCTIONAL URINARY INCONTINENCE: ICD-10-CM

## 2025-06-04 DIAGNOSIS — G62.9 PERIPHERAL POLYNEUROPATHY: ICD-10-CM

## 2025-06-04 DIAGNOSIS — G56.03 BILATERAL CARPAL TUNNEL SYNDROME: ICD-10-CM

## 2025-06-04 DIAGNOSIS — G91.8 OTHER HYDROCEPHALUS (H): ICD-10-CM

## 2025-06-04 DIAGNOSIS — Z11.3 SCREEN FOR STD (SEXUALLY TRANSMITTED DISEASE): ICD-10-CM

## 2025-06-04 DIAGNOSIS — Z12.4 CERVICAL CANCER SCREENING: Primary | ICD-10-CM

## 2025-06-04 DIAGNOSIS — Z76.89 ENCOUNTER TO ESTABLISH CARE: ICD-10-CM

## 2025-06-04 LAB
ALBUMIN UR-MCNC: NEGATIVE MG/DL
APPEARANCE UR: CLEAR
BILIRUB UR QL STRIP: NEGATIVE
CLUE CELLS: ABNORMAL
COLOR UR AUTO: YELLOW
GLUCOSE UR STRIP-MCNC: NEGATIVE MG/DL
HGB UR QL STRIP: NEGATIVE
KETONES UR STRIP-MCNC: NEGATIVE MG/DL
LEUKOCYTE ESTERASE UR QL STRIP: NEGATIVE
NITRATE UR QL: NEGATIVE
PH UR STRIP: 6.5 [PH] (ref 5–7)
SP GR UR STRIP: <=1.005 (ref 1–1.03)
TRICHOMONAS, WET PREP: ABNORMAL
UROBILINOGEN UR STRIP-ACNC: 0.2 E.U./DL
WBC'S/HIGH POWER FIELD, WET PREP: ABNORMAL
YEAST, WET PREP: ABNORMAL

## 2025-06-04 PROCEDURE — 81003 URINALYSIS AUTO W/O SCOPE: CPT | Performed by: FAMILY MEDICINE

## 2025-06-04 PROCEDURE — 3074F SYST BP LT 130 MM HG: CPT | Performed by: FAMILY MEDICINE

## 2025-06-04 PROCEDURE — 87591 N.GONORRHOEAE DNA AMP PROB: CPT | Performed by: FAMILY MEDICINE

## 2025-06-04 PROCEDURE — 87491 CHLMYD TRACH DNA AMP PROBE: CPT | Performed by: FAMILY MEDICINE

## 2025-06-04 PROCEDURE — 99215 OFFICE O/P EST HI 40 MIN: CPT | Performed by: FAMILY MEDICINE

## 2025-06-04 PROCEDURE — G2211 COMPLEX E/M VISIT ADD ON: HCPCS | Performed by: FAMILY MEDICINE

## 2025-06-04 PROCEDURE — 87210 SMEAR WET MOUNT SALINE/INK: CPT | Performed by: FAMILY MEDICINE

## 2025-06-04 PROCEDURE — 3078F DIAST BP <80 MM HG: CPT | Performed by: FAMILY MEDICINE

## 2025-06-04 RX ORDER — GABAPENTIN 100 MG/1
100 CAPSULE ORAL 2 TIMES DAILY
Qty: 180 CAPSULE | Refills: 2 | Status: SHIPPED | OUTPATIENT
Start: 2025-06-04

## 2025-06-04 NOTE — PROGRESS NOTES
{PROVIDER CHARTING PREFERENCE:718652}    Johnathan Orlando is a 49 year old, presenting for the following health issues:  No chief complaint on file.    HPI      Patient here for pap.   In 2017 she had surgery to take everything out.   More vaginal discharge.   She has problems holding her pee since her stroke in 2019.   6 kids  Youngest 19  Arm and leg still weak and she is unable to work.           Objective    There were no vitals taken for this visit.  There is no height or weight on file to calculate BMI.  Physical Exam   GENERAL: alert and no distress  NECK: no adenopathy, no asymmetry, masses, or scars  RESP: lungs clear to auscultation - no rales, rhonchi or wheezes  CV: regular rate and rhythm, normal S1 S2, no S3 or S4, no murmur, click or rub, no peripheral edema  ABDOMEN: soft, nontender, no hepatosplenomegaly, no masses and bowel sounds normal  MS: no gross musculoskeletal defects noted, no edema    No results found for any visits on 06/04/25.  No results found for this or any previous visit (from the past 24 hours).        Signed Electronically by: Scot Salvador MD  {Email feedback regarding this note to [email or web address]   :[id number]}   "everything out.   More vaginal discharge.   She has problems holding her pee since her stroke in 2019.   6 kids  Youngest 19  Arm and leg still weak and she is unable to work.           Objective    /66   Pulse 77   Temp 98  F (36.7  C) (Oral)   Resp 16   Ht 1.465 m (4' 9.68\")   Wt 77.7 kg (171 lb 5 oz)   SpO2 97%   BMI 36.21 kg/m    Body mass index is 36.21 kg/m .  Physical Exam   GENERAL: alert and no distress  NECK: no adenopathy, no asymmetry, masses, or scars  RESP: lungs clear to auscultation - no rales, rhonchi or wheezes  CV: regular rate and rhythm, normal S1 S2, no S3 or S4, no murmur, click or rub, no peripheral edema  ABDOMEN: soft, nontender, no hepatosplenomegaly, no masses and bowel sounds normal  MS: no gross musculoskeletal defects noted, no edema    Results for orders placed or performed in visit on 06/04/25   UA Macroscopic with reflex to Microscopic and Culture - Lab Collect     Status: Normal    Specimen: Urine, Clean Catch   Result Value Ref Range    Color Urine Yellow Colorless, Straw, Light Yellow, Yellow    Appearance Urine Clear Clear    Glucose Urine Negative Negative mg/dL    Bilirubin Urine Negative Negative    Ketones Urine Negative Negative mg/dL    Specific Gravity Urine <=1.005 1.005 - 1.030    Blood Urine Negative Negative    pH Urine 6.5 5.0 - 7.0    Protein Albumin Urine Negative Negative mg/dL    Urobilinogen Urine 0.2 0.2, 1.0 E.U./dL    Nitrite Urine Negative Negative    Leukocyte Esterase Urine Negative Negative    Narrative    Microscopic not indicated   Wet prep - Clinic Collect     Status: Abnormal    Specimen: Vagina; Swab   Result Value Ref Range    Trichomonas Absent Absent    Yeast Absent Absent    Clue Cells Absent Absent    WBCs/high power field 2+ (A) None   Chlamydia trachomatis/Neisseria gonorrhoeae by PCR - lab collect     Status: None    Specimen: Vagina; Swab   Result Value Ref Range    Chlamydia Trachomatis Negative Negative    Neisseria " gonorrhoeae Negative Negative    CTNG Specimen Source Vagina      No results found for this or any previous visit (from the past 24 hours).        Signed Electronically by: Scot Salvador MD

## 2025-06-05 ENCOUNTER — RESULTS FOLLOW-UP (OUTPATIENT)
Dept: FAMILY MEDICINE | Facility: CLINIC | Age: 50
End: 2025-06-05

## 2025-06-05 DIAGNOSIS — N89.8 VAGINAL IRRITATION: Primary | ICD-10-CM

## 2025-06-05 LAB
C TRACH DNA SPEC QL PROBE+SIG AMP: NEGATIVE
N GONORRHOEA DNA SPEC QL NAA+PROBE: NEGATIVE
SPECIMEN TYPE: NORMAL

## 2025-06-05 RX ORDER — FLUCONAZOLE 150 MG/1
150 TABLET ORAL ONCE
Qty: 1 TABLET | Refills: 0 | Status: SHIPPED | OUTPATIENT
Start: 2025-06-05 | End: 2025-06-05

## 2025-06-11 PROBLEM — I60.8: Chronic | Status: RESOLVED | Noted: 2019-07-22 | Resolved: 2025-06-11

## 2025-06-11 PROBLEM — I63.9 CVA (CEREBRAL VASCULAR ACCIDENT) (H): Status: RESOLVED | Noted: 2019-07-22 | Resolved: 2025-06-11

## 2025-06-11 PROBLEM — J96.90 RESPIRATORY FAILURE (H): Status: RESOLVED | Noted: 2019-08-20 | Resolved: 2025-06-11

## 2025-06-11 PROBLEM — S06.5XAA SUBDURAL HEMATOMA (H): Status: RESOLVED | Noted: 2020-05-07 | Resolved: 2025-06-11

## 2025-06-11 PROBLEM — J18.9 HCAP (HEALTHCARE-ASSOCIATED PNEUMONIA): Status: RESOLVED | Noted: 2019-10-26 | Resolved: 2025-06-11

## 2025-06-11 PROBLEM — J90 PLEURAL EFFUSION: Status: RESOLVED | Noted: 2019-10-26 | Resolved: 2025-06-11

## 2025-06-11 PROBLEM — M54.2 NECK PAIN: Status: RESOLVED | Noted: 2023-02-16 | Resolved: 2025-06-11

## 2025-06-11 PROBLEM — M25.552 HIP PAIN, LEFT: Status: RESOLVED | Noted: 2025-03-25 | Resolved: 2025-06-11

## 2025-06-11 PROBLEM — G91.9 HYDROCEPHALUS (H): Status: RESOLVED | Noted: 2019-07-22 | Resolved: 2025-06-11

## 2025-06-11 PROBLEM — I60.9 SAH (SUBARACHNOID HEMORRHAGE) (H): Status: RESOLVED | Noted: 2019-08-21 | Resolved: 2025-06-11

## 2025-06-11 PROBLEM — H04.123 DRY EYES: Status: RESOLVED | Noted: 2023-07-17 | Resolved: 2025-06-11

## 2025-06-11 PROBLEM — I67.1 CEREBRAL ANEURYSM: Status: RESOLVED | Noted: 2019-09-16 | Resolved: 2025-06-11

## 2025-07-09 ENCOUNTER — THERAPY VISIT (OUTPATIENT)
Dept: OCCUPATIONAL THERAPY | Facility: REHABILITATION | Age: 50
End: 2025-07-09
Attending: FAMILY MEDICINE
Payer: COMMERCIAL

## 2025-07-09 DIAGNOSIS — G56.03 BILATERAL CARPAL TUNNEL SYNDROME: ICD-10-CM

## 2025-07-09 PROCEDURE — 97535 SELF CARE MNGMENT TRAINING: CPT | Mod: GO

## 2025-07-09 PROCEDURE — 97110 THERAPEUTIC EXERCISES: CPT | Mod: GO

## 2025-07-09 PROCEDURE — 97165 OT EVAL LOW COMPLEX 30 MIN: CPT | Mod: GO

## 2025-07-09 NOTE — PROGRESS NOTES
OCCUPATIONAL THERAPY EVALUATION  Type of Visit: Evaluation       Fall Risk Screen:  Have you fallen 2 or more times in the past year?: No  Have you fallen and had an injury in the past year?: No  Is patient receiving Physical Therapy [facility]?: No    Subjective  It started with stiffness in the arms and 2-3 months ago, I started to notice it in the hands. I feel a pulling pain in the triceps. But the numbness and tingling is mostly in the hand. The index finger and the right ring finger tingling are the most bothersome. If I go to sleep without the brace, when I wake up the next morning I have no feeling in my arm and hand.        Presenting condition or subjective complaint: tingly feeling in hands  Date of onset: 06/04/25    Relevant medical history: Depression; High blood pressure   Dates & types of surgery: 2017 uterus removal    Prior diagnostic imaging/testing results:     No  Prior therapy history for the same diagnosis, illness or injury: No      Living Environment  Social support: With family members   Type of home: House   Stairs to enter the home: Yes 1 Is there a railing: Yes     Ramp: No   Stairs inside the home: Yes 1 Is there a railing: Yes     Help at home: None  Equipment owned: Straight Cane; Bath bench     Employment: No    Hobbies/Interests:  No     Patient goals for therapy: grabbing and strength    Pain assessment: See objective evaluation for additional pain details     Objective   Objective:  Right hand dominant  Patient reports symptoms of pain, stiffness/loss of motion, weakness/loss of strength, numbness, and tingling     Pain Level (Scale 0-10):   7/9/2025   At Rest 3/10   With Use 6-7     Pain Description:  Date 7/9/2025   Location elbow, wrist, and hand   Pain Quality Aching, Dull, Nagging, and Tingling   Frequency constant     Pain is worst  daytime   Exacerbated by  N/A   Relieved by Topical Cream   Progression Initial Eval     Posture  Rounded Forward  Shoulders    Edema  None    Sensation  Decreased Median Nerve distribution per pt report    ROM  Pain Report: - none  + mild    ++ moderate    +++ severe   Wrist 7/9/2025 7/9/2025   AROM (PROM) Right Left   Extension 35+ 40   Flexion 45+ 45 +   RD 10 10   UD 22 15     Special Tests  Pain Report:  - none    + mild    ++ moderate    +++ severe    7/9/2025   Median Nerve Compression at Pronator R: +, L:+   Tinels at Carpal Tunnel R:+, L:-     Neural Tension Testing  MNT: Median Neurodynamic Test (based on DS Joshua's ULNT)   7/9/2025   0-5 Scale 5/5   Position:   0/5: Arm across abdomen in coronal plane  1/5: Depress shoulder, ER to neutral ABD shoulder to 45 degrees  2/5: ER shoulder to end range, keep elbow at 90 degrees  3/5: Extend elbow to 0 degrees  4/5: Fully supinate forearm  5/5: Extend wrist, fingers and thumb  Notes:  (+) indicates beyond grade level but less than skilled nursing to next level  (-) indicates over skilled nursing to level  S1  onset/change of patient's symptoms  S2 definite stop point based on patient's discomfort level    Strength   (Measured in pounds)  Pain Report: - none  + mild    ++ moderate    +++ severe    7/9/2025 7/9/2025   Trials Right Left   1 15+ 9     Pinch gauge not available for assessment.        Assessment & Plan   CLINICAL IMPRESSIONS  Medical Diagnosis: Bilateral carpal tunnel syndrome    Treatment Diagnosis: Bilateral carpal tunnel syndrome    Impression/Assessment: Pt is a 49 year old female presenting to Occupational Therapy due to bilateral hand numbness and tingling.  The following significant findings have been identified: Impaired activity tolerance, Impaired ROM, Impaired sensation, Impaired strength, and Pain.  These identified deficits interfere with their ability to perform self care tasks, household chores, and meal planning and preparation as compared to previous level of function.   Patient's limitations or Problem List includes: Pain, Decreased ROM/motion, Weakness,  Decreased , Decreased pinch, and Tightness in musculature of the bilateral elbow, wrist, and hand which interferes with the patient's ability to perform Self Care Tasks (eating), Recreational Activities, and Household Chores as compared to previous level of function.    Clinical Decision Making (Complexity):  Assessment of Occupational Performance: 1-3 Performance Deficits  Occupational Performance Limitations: meal preparation and cleanup, shopping, and leisure activities  Clinical Decision Making (Complexity): Low complexity    PLAN OF CARE  Treatment Interventions:  Modalities:  US  Therapeutic Exercise:  AROM, AAROM, PROM, Tendon Gliding, Isotonics, Isometrics, and Stabilization  Neuromuscular re-education:  Nerve Gliding and Kinesiotaping  Manual Techniques:  Joint mobilization and Myofascial release  Orthotic Fabrication:  Static and Forearm based    Long Term Goals   OT Goal 1  Goal Identifier: Meal Preparation  Goal Description: Patient will be able to open a tight or new jar without pain or difficulty  Rationale: In order to maximize safety and independence with ADL/IADLs  Goal Progress: Initial [place]  Target Date: 08/06/25      Frequency of Treatment: 1x/week  Duration of Treatment: 4 weeks     Recommended Referrals to Other Professionals: N/A  Education Assessment: Learner/Method: Patient;Listening;Reading;Demonstration;Pictures/Video;No Barriers to Learning  Education Comments: Verbalized good understanding     Risks and benefits of evaluation/treatment have been explained.   Patient/Family/caregiver agrees with Plan of Care.     Evaluation Time:    OT Eval, Low Complexity Minutes (84811): 10    Signing Clinician: ABRIL Reyes [facility]                                                                                   OUTPATIENT OCCUPATIONAL THERAPY      PLAN OF TREATMENT FOR OUTPATIENT REHABILITATION   Patient's Last Name, First Name, Darion Goodwin Date  of Birth:  1975   Provider's Name   [facility]   Medical Record No.  [id number]     Onset Date: 06/04/25 Start of Care Date: 07/09/25     Medical Diagnosis:  Bilateral carpal tunnel syndrome      OT Treatment Diagnosis:  Bilateral carpal tunnel syndrome Plan of Treatment  Frequency/Duration:1x/week/4 weeks    Certification date from 07/09/25   To 08/06/25        See note for plan of treatment details and functional goals     Tootie Michelle, OTR                         I CERTIFY THE NEED FOR THESE SERVICES FURNISHED UNDER        THIS PLAN OF TREATMENT AND WHILE UNDER MY CARE     (Physician attestation of this document indicates review and certification of the therapy plan).              Referring Provider:  Scot Salvador    Initial Assessment  See Epic Evaluation- 07/09/25

## 2025-07-11 NOTE — PROGRESS NOTES
Duplicate    No protocol for requested medication     Medication:    Disp Refills Start End    metoCLOPramide (REGLAN) 5 MG tablet 90 tablet 1 5/15/2025 --    Sig: TAKE ONE TABLET BY MOUTH FOUR TIMES DAILY BEFORE MEALS AND AT BEDTIME    Sent to pharmacy as: Metoclopramide HCl 5 MG Oral Tablet (REGLAN)    Class: Eprescribe    Notes to Pharmacy: This prescription was filled on 4/28/2025. Any refills authorized will be placed on file.    E-Prescribing Status: Receipt confirmed by pharmacy (5/15/2025  4:11 PM CDT)    Renewals    Renewal provider: Camilo Estevez MD         Last office visit date: 6/10/2025 w/PCP  Pharmacy: Pharmacy    [facility] [address] [address]  Phone: [phone]        Order pended, routed to clinician for review.

## 2025-08-06 ENCOUNTER — THERAPY VISIT (OUTPATIENT)
Dept: OCCUPATIONAL THERAPY | Facility: REHABILITATION | Age: 50
End: 2025-08-06
Payer: COMMERCIAL

## 2025-08-06 DIAGNOSIS — G56.03 BILATERAL CARPAL TUNNEL SYNDROME: Primary | ICD-10-CM

## 2025-08-06 PROCEDURE — 97535 SELF CARE MNGMENT TRAINING: CPT | Mod: GO

## 2025-08-06 PROCEDURE — 97110 THERAPEUTIC EXERCISES: CPT | Mod: GO

## 2025-08-06 PROCEDURE — 97035 APP MDLTY 1+ULTRASOUND EA 15: CPT | Mod: GO

## 2025-08-20 ENCOUNTER — THERAPY VISIT (OUTPATIENT)
Dept: OCCUPATIONAL THERAPY | Facility: REHABILITATION | Age: 50
End: 2025-08-20
Payer: COMMERCIAL

## 2025-08-20 DIAGNOSIS — G56.03 BILATERAL CARPAL TUNNEL SYNDROME: Primary | ICD-10-CM

## 2025-08-20 PROCEDURE — 97035 APP MDLTY 1+ULTRASOUND EA 15: CPT | Mod: GO

## 2025-08-20 PROCEDURE — 97110 THERAPEUTIC EXERCISES: CPT | Mod: GO

## 2025-08-20 PROCEDURE — 97140 MANUAL THERAPY 1/> REGIONS: CPT | Mod: GO

## 2025-08-21 ENCOUNTER — HOSPITAL ENCOUNTER (EMERGENCY)
Facility: CLINIC | Age: 50
Discharge: HOME | End: 2025-08-21
Attending: EMERGENCY MEDICINE
Payer: COMMERCIAL

## 2025-08-21 ENCOUNTER — APPOINTMENT (OUTPATIENT)
Dept: CT IMAGING | Facility: CLINIC | Age: 50
End: 2025-08-21
Attending: EMERGENCY MEDICINE
Payer: COMMERCIAL

## 2025-08-21 ENCOUNTER — APPOINTMENT (OUTPATIENT)
Dept: ULTRASOUND IMAGING | Facility: CLINIC | Age: 50
End: 2025-08-21
Attending: EMERGENCY MEDICINE
Payer: COMMERCIAL

## 2025-08-21 ENCOUNTER — APPOINTMENT (OUTPATIENT)
Dept: RADIOLOGY | Facility: CLINIC | Age: 50
End: 2025-08-21
Attending: EMERGENCY MEDICINE
Payer: COMMERCIAL

## 2025-08-21 VITALS
WEIGHT: 165 LBS | BODY MASS INDEX: 35.6 KG/M2 | RESPIRATION RATE: 16 BRPM | OXYGEN SATURATION: 96 % | TEMPERATURE: 97.5 F | DIASTOLIC BLOOD PRESSURE: 75 MMHG | SYSTOLIC BLOOD PRESSURE: 124 MMHG | HEIGHT: 57 IN | HEART RATE: 63 BPM

## 2025-08-21 DIAGNOSIS — R10.10 UPPER ABDOMINAL PAIN: Primary | ICD-10-CM

## 2025-08-21 LAB
ALBUMIN SERPL BCG-MCNC: 4.7 G/DL (ref 3.5–5.2)
ALBUMIN UR-MCNC: NEGATIVE MG/DL
ALP SERPL-CCNC: 100 U/L (ref 40–150)
ALT SERPL W P-5'-P-CCNC: 61 U/L (ref 0–50)
ANION GAP SERPL CALCULATED.3IONS-SCNC: 12 MMOL/L (ref 7–15)
APPEARANCE UR: CLEAR
APTT PPP: 33 SECONDS (ref 22–38)
AST SERPL W P-5'-P-CCNC: 37 U/L (ref 0–45)
BILIRUB SERPL-MCNC: 0.5 MG/DL
BILIRUB UR QL STRIP: NEGATIVE
BILIRUBIN DIRECT (ROCHE PRO & PURE): 0.18 MG/DL (ref 0–0.45)
BUN SERPL-MCNC: 17.4 MG/DL (ref 6–20)
CALCIUM SERPL-MCNC: 10.1 MG/DL (ref 8.8–10.4)
CHLORIDE SERPL-SCNC: 106 MMOL/L (ref 98–107)
COLOR UR AUTO: NORMAL
CREAT SERPL-MCNC: 0.66 MG/DL (ref 0.51–0.95)
EGFRCR SERPLBLD CKD-EPI 2021: >90 ML/MIN/1.73M2
ERYTHROCYTE [DISTWIDTH] IN BLOOD BY AUTOMATED COUNT: 12.3 % (ref 10–15)
GLUCOSE SERPL-MCNC: 108 MG/DL (ref 70–99)
GLUCOSE UR STRIP-MCNC: NEGATIVE MG/DL
HCO3 SERPL-SCNC: 23 MMOL/L (ref 22–29)
HCT VFR BLD AUTO: 41.3 % (ref 35–47)
HGB BLD-MCNC: 13.8 G/DL (ref 11.7–15.7)
HGB UR QL STRIP: NEGATIVE
INR PPP: 1.03 (ref 0.85–1.15)
KETONES UR STRIP-MCNC: NEGATIVE MG/DL
LEUKOCYTE ESTERASE UR QL STRIP: NEGATIVE
LIPASE SERPL-CCNC: 25 U/L (ref 13–60)
MCH RBC QN AUTO: 28.6 PG (ref 26.5–33)
MCHC RBC AUTO-ENTMCNC: 33.4 G/DL (ref 31.5–36.5)
MCV RBC AUTO: 85.7 FL (ref 78–100)
NITRATE UR QL: NEGATIVE
PH UR STRIP: 6.5 [PH] (ref 5–7)
PLATELET # BLD AUTO: 239 10E3/UL (ref 150–450)
POTASSIUM SERPL-SCNC: 3.9 MMOL/L (ref 3.4–5.3)
PROT SERPL-MCNC: 7.4 G/DL (ref 6.4–8.3)
PROTHROMBIN TIME: 13.7 SECONDS (ref 11.8–14.8)
RBC # BLD AUTO: 4.82 10E6/UL (ref 3.8–5.2)
RBC URINE: <1 /HPF
SODIUM SERPL-SCNC: 141 MMOL/L (ref 135–145)
SP GR UR STRIP: 1.01 (ref 1–1.03)
SQUAMOUS EPITHELIAL: <1 /HPF
UROBILINOGEN UR STRIP-MCNC: NORMAL MG/DL
WBC # BLD AUTO: 6.22 10E3/UL (ref 4–11)
WBC URINE: <1 /HPF

## 2025-08-21 PROCEDURE — 74177 CT ABD & PELVIS W/CONTRAST: CPT

## 2025-08-21 PROCEDURE — 82947 ASSAY GLUCOSE BLOOD QUANT: CPT | Performed by: EMERGENCY MEDICINE

## 2025-08-21 PROCEDURE — 76705 ECHO EXAM OF ABDOMEN: CPT

## 2025-08-21 PROCEDURE — 85610 PROTHROMBIN TIME: CPT | Performed by: EMERGENCY MEDICINE

## 2025-08-21 PROCEDURE — 36415 COLL VENOUS BLD VENIPUNCTURE: CPT | Performed by: EMERGENCY MEDICINE

## 2025-08-21 PROCEDURE — 81001 URINALYSIS AUTO W/SCOPE: CPT | Performed by: EMERGENCY MEDICINE

## 2025-08-21 PROCEDURE — 85027 COMPLETE CBC AUTOMATED: CPT | Performed by: EMERGENCY MEDICINE

## 2025-08-21 PROCEDURE — 83690 ASSAY OF LIPASE: CPT | Performed by: EMERGENCY MEDICINE

## 2025-08-21 PROCEDURE — 82248 BILIRUBIN DIRECT: CPT | Performed by: EMERGENCY MEDICINE

## 2025-08-21 PROCEDURE — 250N000011 HC RX IP 250 OP 636: Performed by: EMERGENCY MEDICINE

## 2025-08-21 PROCEDURE — 74018 RADEX ABDOMEN 1 VIEW: CPT

## 2025-08-21 PROCEDURE — 99285 EMERGENCY DEPT VISIT HI MDM: CPT | Mod: 25 | Performed by: EMERGENCY MEDICINE

## 2025-08-21 PROCEDURE — 70450 CT HEAD/BRAIN W/O DYE: CPT

## 2025-08-21 PROCEDURE — 85730 THROMBOPLASTIN TIME PARTIAL: CPT | Performed by: EMERGENCY MEDICINE

## 2025-08-21 PROCEDURE — 99285 EMERGENCY DEPT VISIT HI MDM: CPT | Mod: 25

## 2025-08-21 RX ORDER — IOPAMIDOL 755 MG/ML
90 INJECTION, SOLUTION INTRAVASCULAR ONCE
Status: COMPLETED | OUTPATIENT
Start: 2025-08-21 | End: 2025-08-21

## 2025-08-21 RX ADMIN — IOPAMIDOL 90 ML: 755 INJECTION, SOLUTION INTRAVENOUS at 08:38

## 2025-08-21 ASSESSMENT — ACTIVITIES OF DAILY LIVING (ADL)
ADLS_ACUITY_SCORE: 41

## 2025-08-21 ASSESSMENT — COLUMBIA-SUICIDE SEVERITY RATING SCALE - C-SSRS
2. HAVE YOU ACTUALLY HAD ANY THOUGHTS OF KILLING YOURSELF IN THE PAST MONTH?: NO
6. HAVE YOU EVER DONE ANYTHING, STARTED TO DO ANYTHING, OR PREPARED TO DO ANYTHING TO END YOUR LIFE?: NO
1. IN THE PAST MONTH, HAVE YOU WISHED YOU WERE DEAD OR WISHED YOU COULD GO TO SLEEP AND NOT WAKE UP?: NO

## 2025-09-03 ENCOUNTER — THERAPY VISIT (OUTPATIENT)
Dept: OCCUPATIONAL THERAPY | Facility: REHABILITATION | Age: 50
End: 2025-09-03
Payer: COMMERCIAL

## 2025-09-03 DIAGNOSIS — G56.03 BILATERAL CARPAL TUNNEL SYNDROME: Primary | ICD-10-CM

## 2025-09-03 PROCEDURE — T1013 SIGN LANG/ORAL INTERPRETER: HCPCS

## 2025-09-03 PROCEDURE — 97035 APP MDLTY 1+ULTRASOUND EA 15: CPT | Mod: GO

## 2025-09-03 PROCEDURE — 97140 MANUAL THERAPY 1/> REGIONS: CPT | Mod: GO
